# Patient Record
Sex: MALE | Race: WHITE | NOT HISPANIC OR LATINO | Employment: OTHER | URBAN - METROPOLITAN AREA
[De-identification: names, ages, dates, MRNs, and addresses within clinical notes are randomized per-mention and may not be internally consistent; named-entity substitution may affect disease eponyms.]

---

## 2017-01-09 ENCOUNTER — LAB CONVERSION - ENCOUNTER (OUTPATIENT)
Dept: OTHER | Facility: OTHER | Age: 62
End: 2017-01-09

## 2017-01-09 LAB
CHOLEST SERPL-MCNC: 162 MG/DL (ref 125–200)
CHOLEST/HDLC SERPL: 2.3 (CALC)
HBA1C MFR BLD HPLC: 5.3 % OF TOTAL HGB
HDLC SERPL-MCNC: 70 MG/DL
LDL CHOLESTEROL (HISTORICAL): 85 MG/DL (CALC)
NON-HDL-CHOL (CHOL-HDL) (HISTORICAL): 92 MG/DL (CALC)
TRIGL SERPL-MCNC: 36 MG/DL

## 2017-01-26 ENCOUNTER — ALLSCRIPTS OFFICE VISIT (OUTPATIENT)
Dept: OTHER | Facility: OTHER | Age: 62
End: 2017-01-26

## 2017-02-02 ENCOUNTER — ALLSCRIPTS OFFICE VISIT (OUTPATIENT)
Dept: OTHER | Facility: OTHER | Age: 62
End: 2017-02-02

## 2017-03-06 ENCOUNTER — TRANSCRIBE ORDERS (OUTPATIENT)
Dept: ADMINISTRATIVE | Facility: HOSPITAL | Age: 62
End: 2017-03-06

## 2017-03-06 ENCOUNTER — ALLSCRIPTS OFFICE VISIT (OUTPATIENT)
Dept: OTHER | Facility: OTHER | Age: 62
End: 2017-03-06

## 2017-03-06 DIAGNOSIS — I34.1 NONRHEUMATIC MITRAL VALVE PROLAPSE: ICD-10-CM

## 2017-03-06 DIAGNOSIS — I34.1 MITRAL VALVE PROLAPSE: Primary | ICD-10-CM

## 2017-03-27 ENCOUNTER — HOSPITAL ENCOUNTER (OUTPATIENT)
Dept: NON INVASIVE DIAGNOSTICS | Facility: CLINIC | Age: 62
Discharge: HOME | End: 2017-03-27
Payer: COMMERCIAL

## 2017-03-27 DIAGNOSIS — I34.1 NONRHEUMATIC MITRAL VALVE PROLAPSE: ICD-10-CM

## 2017-03-27 PROCEDURE — 93306 TTE W/DOPPLER COMPLETE: CPT

## 2017-07-30 LAB
A/G RATIO (HISTORICAL): 2.1 (CALC) (ref 1–2.5)
ALBUMIN SERPL BCP-MCNC: 4.6 G/DL (ref 3.6–5.1)
ALP SERPL-CCNC: 46 U/L (ref 40–115)
ALT SERPL W P-5'-P-CCNC: 30 U/L (ref 9–46)
AST SERPL W P-5'-P-CCNC: 26 U/L (ref 10–35)
BILIRUB SERPL-MCNC: 0.7 MG/DL (ref 0.2–1.2)
BUN SERPL-MCNC: 12 MG/DL (ref 7–25)
BUN/CREA RATIO (HISTORICAL): NORMAL (CALC) (ref 6–22)
CALCIUM SERPL-MCNC: 9.6 MG/DL (ref 8.6–10.3)
CHLORIDE SERPL-SCNC: 104 MMOL/L (ref 98–110)
CHOLEST SERPL-MCNC: 167 MG/DL (ref 125–200)
CHOLEST/HDLC SERPL: 2.3 (CALC)
CO2 SERPL-SCNC: 25 MMOL/L (ref 20–31)
CREAT SERPL-MCNC: 0.87 MG/DL (ref 0.7–1.25)
EGFR AFRICAN AMERICAN (HISTORICAL): 106 ML/MIN/1.73M2
EGFR-AMERICAN CALC (HISTORICAL): 91 ML/MIN/1.73M2
GAMMA GLOBULIN (HISTORICAL): 2.2 G/DL (CALC) (ref 1.9–3.7)
GLUCOSE (HISTORICAL): 95 MG/DL (ref 65–99)
HDLC SERPL-MCNC: 72 MG/DL
LDL CHOLESTEROL (HISTORICAL): 90 MG/DL (CALC)
NON-HDL-CHOL (CHOL-HDL) (HISTORICAL): 95 MG/DL (CALC)
POTASSIUM SERPL-SCNC: 4.5 MMOL/L (ref 3.5–5.3)
SODIUM SERPL-SCNC: 137 MMOL/L (ref 135–146)
TOTAL PROTEIN (HISTORICAL): 6.8 G/DL (ref 6.1–8.1)
TRIGL SERPL-MCNC: 27 MG/DL

## 2017-08-02 DIAGNOSIS — L65.9 NONSCARRING HAIR LOSS: ICD-10-CM

## 2017-08-02 DIAGNOSIS — M23.8X9 OTHER INTERNAL DERANGEMENTS OF UNSPECIFIED KNEE: ICD-10-CM

## 2017-08-02 DIAGNOSIS — I10 ESSENTIAL (PRIMARY) HYPERTENSION: ICD-10-CM

## 2017-08-02 DIAGNOSIS — M43.6 TORTICOLLIS: ICD-10-CM

## 2017-08-15 ENCOUNTER — ALLSCRIPTS OFFICE VISIT (OUTPATIENT)
Dept: OTHER | Facility: OTHER | Age: 62
End: 2017-08-15

## 2017-08-17 ENCOUNTER — GENERIC CONVERSION - ENCOUNTER (OUTPATIENT)
Dept: OTHER | Facility: OTHER | Age: 62
End: 2017-08-17

## 2017-08-24 ENCOUNTER — LAB CONVERSION - ENCOUNTER (OUTPATIENT)
Dept: OTHER | Facility: OTHER | Age: 62
End: 2017-08-24

## 2017-08-24 ENCOUNTER — GENERIC CONVERSION - ENCOUNTER (OUTPATIENT)
Dept: OTHER | Facility: OTHER | Age: 62
End: 2017-08-24

## 2017-08-24 LAB
FERRITIN SERPL-MCNC: 11 NG/ML (ref 20–380)
IRON SERPL-MCNC: 80 MCG/DL (ref 50–180)
TSH SERPL DL<=0.05 MIU/L-ACNC: 0.89 MIU/L (ref 0.4–4.5)

## 2017-10-21 ENCOUNTER — GENERIC CONVERSION - ENCOUNTER (OUTPATIENT)
Dept: OTHER | Facility: OTHER | Age: 62
End: 2017-10-21

## 2017-10-21 ENCOUNTER — LAB CONVERSION - ENCOUNTER (OUTPATIENT)
Dept: OTHER | Facility: OTHER | Age: 62
End: 2017-10-21

## 2017-10-21 LAB
FERRITIN SERPL-MCNC: 19 NG/ML (ref 20–380)
IRON SERPL-MCNC: 92 MCG/DL (ref 50–180)

## 2017-11-14 ENCOUNTER — GENERIC CONVERSION - ENCOUNTER (OUTPATIENT)
Dept: OTHER | Facility: OTHER | Age: 62
End: 2017-11-14

## 2017-11-14 DIAGNOSIS — R79.0 ABNORMAL LEVEL OF BLOOD MINERAL: ICD-10-CM

## 2018-01-11 NOTE — RESULT NOTES
Message     notify the patient labs were normal except a low ferritin level have the patient start Slow Fe over-the-counter 1 tablet daily and recheck the CBC and ferritin and 1 month also the patient should see GI Dr Ermias Morris for a colonoscopy and f/u to discuss        Verified Results  (1) OP LUIS ALFREDO FERRITIN [id number] 09:36AM Paulina Staples     Test Name Result Flag Reference   FERRITIN 11 ng/mL L        Signatures   Electronically signed by : Kirby Ashraf DO; Aug 24 2017  9:25AM EST                       (Author)

## 2018-01-11 NOTE — RESULT NOTES
Message   Notify the patient normal CBC, normal iron level and improved ferritin level which is only slightly low at this point in time, is the patient still taking iron, if so how much and how often        Verified Results  (1) IRON 20Oct2017 09:00AM Karuna Chahal     Test Name Result Flag Reference   IRON, TOTAL 92 mcg/dL       (Q) CBC (H/H, RBC, INDICES, WBC, PLT) 20Oct2017 09:00AM Karuna Chahal     Test Name Result Flag Reference   WHITE BLOOD CELL COUNT 6 4 Thousand/uL  3 8-10 8   RED BLOOD CELL COUNT 5 53 Million/uL  4 20-5 80   HEMOGLOBIN 15 7 g/dL  13 2-17 1   HEMATOCRIT 48 0 %  38 5-50 0   MCV 86 8 fL  80 0-100 0   MCH 28 4 pg  27 0-33 0   MCHC 32 7 g/dL  32 0-36 0   RDW 13 8 %  11 0-15 0   PLATELET COUNT 188 Thousand/uL  140-400   WE RECEIVED YOUR HANDWRITTEN TEST ORDER AND  PERFORMED A HEMOGRAM WITH A PLATELET WITHOUT  A DIFFERENTIAL  IF THIS IS NOT WHAT YOU INTENDED  TO ORDER, PLEASE CONTACT YOUR LOCAL CLIENT SERVICE  REPRESENTATIVE IMMEDIATELY SO THAT WE CAN   ADJUST OUR BILLING APPROPRIATELY  YOU MAY ALSO   INQUIRE ABOUT ALTERNATIVE OR ADDITIONAL TESTING     MPV 10 1 fL  7 5-12 5           (1) OP LUIS ALFREDO FERRITIN [id number] 09:00AM Karuna Chahal   REPORT COMMENT:  FASTING:NO     Test Name Result Flag Reference   FERRITIN 19 ng/mL L

## 2018-01-13 VITALS
SYSTOLIC BLOOD PRESSURE: 168 MMHG | HEIGHT: 67 IN | WEIGHT: 168 LBS | DIASTOLIC BLOOD PRESSURE: 80 MMHG | HEART RATE: 88 BPM | BODY MASS INDEX: 26.37 KG/M2

## 2018-01-14 VITALS
DIASTOLIC BLOOD PRESSURE: 82 MMHG | BODY MASS INDEX: 26.84 KG/M2 | HEART RATE: 78 BPM | HEIGHT: 67 IN | SYSTOLIC BLOOD PRESSURE: 154 MMHG | WEIGHT: 171 LBS

## 2018-01-14 VITALS
SYSTOLIC BLOOD PRESSURE: 176 MMHG | BODY MASS INDEX: 26.69 KG/M2 | HEART RATE: 85 BPM | HEIGHT: 67 IN | RESPIRATION RATE: 16 BRPM | WEIGHT: 170.03 LBS | DIASTOLIC BLOOD PRESSURE: 76 MMHG

## 2018-01-14 VITALS
OXYGEN SATURATION: 99 % | BODY MASS INDEX: 26.22 KG/M2 | SYSTOLIC BLOOD PRESSURE: 168 MMHG | WEIGHT: 167.04 LBS | RESPIRATION RATE: 16 BRPM | DIASTOLIC BLOOD PRESSURE: 84 MMHG | HEIGHT: 67 IN | HEART RATE: 67 BPM

## 2018-01-19 ENCOUNTER — ANESTHESIA EVENT (OUTPATIENT)
Dept: PERIOP | Facility: AMBULARY SURGERY CENTER | Age: 63
End: 2018-01-19
Payer: COMMERCIAL

## 2018-01-22 VITALS
WEIGHT: 169 LBS | DIASTOLIC BLOOD PRESSURE: 70 MMHG | HEART RATE: 86 BPM | BODY MASS INDEX: 26.47 KG/M2 | TEMPERATURE: 97.4 F | SYSTOLIC BLOOD PRESSURE: 166 MMHG

## 2018-01-26 DIAGNOSIS — I10 ESSENTIAL (PRIMARY) HYPERTENSION: ICD-10-CM

## 2018-01-26 DIAGNOSIS — N40.2 NODULAR PROSTATE WITHOUT LOWER URINARY TRACT SYMPTOMS: ICD-10-CM

## 2018-01-30 ENCOUNTER — ANESTHESIA (OUTPATIENT)
Dept: PERIOP | Facility: AMBULARY SURGERY CENTER | Age: 63
End: 2018-01-30
Payer: COMMERCIAL

## 2018-02-04 LAB
ALBUMIN SERPL-MCNC: 4.5 G/DL (ref 3.6–5.1)
ALBUMIN/GLOB SERPL: 2.3 (CALC) (ref 1–2.5)
ALP SERPL-CCNC: 49 U/L (ref 40–115)
ALT SERPL-CCNC: 23 U/L (ref 9–46)
AST SERPL-CCNC: 20 U/L (ref 10–35)
BILIRUB SERPL-MCNC: 0.9 MG/DL (ref 0.2–1.2)
BUN SERPL-MCNC: 15 MG/DL (ref 7–25)
BUN/CREAT SERPL: NORMAL (CALC) (ref 6–22)
CALCIUM SERPL-MCNC: 9.5 MG/DL (ref 8.6–10.3)
CHLORIDE SERPL-SCNC: 103 MMOL/L (ref 98–110)
CHOLEST SERPL-MCNC: 163 MG/DL
CHOLEST/HDLC SERPL: 3 (CALC)
CO2 SERPL-SCNC: 27 MMOL/L (ref 20–31)
CREAT SERPL-MCNC: 0.89 MG/DL (ref 0.7–1.25)
GLOBULIN SER CALC-MCNC: 2 G/DL (CALC) (ref 1.9–3.7)
GLUCOSE SERPL-MCNC: 92 MG/DL (ref 65–99)
HDLC SERPL-MCNC: 54 MG/DL
LDLC SERPL CALC-MCNC: 96 MG/DL (CALC)
NONHDLC SERPL-MCNC: 109 MG/DL (CALC)
POTASSIUM SERPL-SCNC: 4.4 MMOL/L (ref 3.5–5.3)
PROT SERPL-MCNC: 6.5 G/DL (ref 6.1–8.1)
PSA SERPL-MCNC: 0.5 NG/ML
SL AMB EGFR AFRICAN AMERICAN: 105 ML/MIN/1.73M2
SL AMB EGFR NON AFRICAN AMERICAN: 90 ML/MIN/1.73M2
SODIUM SERPL-SCNC: 138 MMOL/L (ref 135–146)
TRIGL SERPL-MCNC: 44 MG/DL

## 2018-02-08 RX ORDER — MULTIVITAMIN
1 TABLET ORAL DAILY
COMMUNITY

## 2018-02-08 RX ORDER — EPINEPHRINE 0.3 MG/.3ML
0.3 INJECTION SUBCUTANEOUS
COMMUNITY
Start: 2013-12-03 | End: 2018-02-12

## 2018-02-08 RX ORDER — LISINOPRIL 20 MG/1
1 TABLET ORAL DAILY
COMMUNITY
Start: 2011-08-22 | End: 2018-03-16 | Stop reason: SDUPTHER

## 2018-02-08 RX ORDER — VERAPAMIL HYDROCHLORIDE 240 MG/1
1 TABLET, FILM COATED, EXTENDED RELEASE ORAL DAILY
COMMUNITY
Start: 2012-04-23 | End: 2018-03-16 | Stop reason: SDUPTHER

## 2018-02-08 RX ORDER — FEXOFENADINE HCL 180 MG/1
1 TABLET ORAL DAILY PRN
COMMUNITY
Start: 2014-06-10

## 2018-02-08 RX ORDER — ASPIRIN 81 MG/1
1 TABLET, CHEWABLE ORAL EVERY OTHER DAY
COMMUNITY

## 2018-02-08 RX ORDER — UBIDECARENONE/VIT E ACET 100MG-5
1 CAPSULE ORAL DAILY
COMMUNITY
End: 2019-09-16 | Stop reason: ALTCHOICE

## 2018-02-08 RX ORDER — BACLOFEN 20 MG
6 TABLET ORAL DAILY
COMMUNITY

## 2018-02-09 ENCOUNTER — HOSPITAL ENCOUNTER (OUTPATIENT)
Facility: AMBULARY SURGERY CENTER | Age: 63
Setting detail: OUTPATIENT SURGERY
Discharge: HOME | End: 2018-02-09
Attending: INTERNAL MEDICINE | Admitting: INTERNAL MEDICINE
Payer: COMMERCIAL

## 2018-02-09 VITALS
SYSTOLIC BLOOD PRESSURE: 129 MMHG | WEIGHT: 160 LBS | TEMPERATURE: 97.4 F | HEART RATE: 69 BPM | RESPIRATION RATE: 18 BRPM | DIASTOLIC BLOOD PRESSURE: 69 MMHG | BODY MASS INDEX: 24.25 KG/M2 | OXYGEN SATURATION: 99 % | HEIGHT: 68 IN

## 2018-02-09 PROCEDURE — G0121 COLON CA SCRN NOT HI RSK IND: HCPCS | Performed by: INTERNAL MEDICINE

## 2018-02-09 RX ORDER — SODIUM CHLORIDE 9 MG/ML
125 INJECTION, SOLUTION INTRAVENOUS CONTINUOUS
Status: DISCONTINUED | OUTPATIENT
Start: 2018-02-09 | End: 2018-02-09 | Stop reason: HOSPADM

## 2018-02-09 RX ORDER — PROPOFOL 10 MG/ML
INJECTION, EMULSION INTRAVENOUS AS NEEDED
Status: DISCONTINUED | OUTPATIENT
Start: 2018-02-09 | End: 2018-02-09 | Stop reason: SURG

## 2018-02-09 RX ORDER — ALBUTEROL SULFATE 90 UG/1
2 AEROSOL, METERED RESPIRATORY (INHALATION) EVERY 6 HOURS PRN
COMMUNITY
End: 2018-02-12

## 2018-02-09 RX ADMIN — PROPOFOL 50 MG: 10 INJECTION, EMULSION INTRAVENOUS at 11:45

## 2018-02-09 RX ADMIN — PROPOFOL 100 MG: 10 INJECTION, EMULSION INTRAVENOUS at 11:32

## 2018-02-09 RX ADMIN — PROPOFOL 50 MG: 10 INJECTION, EMULSION INTRAVENOUS at 11:42

## 2018-02-09 RX ADMIN — SODIUM CHLORIDE 125 ML/HR: 0.9 INJECTION, SOLUTION INTRAVENOUS at 10:49

## 2018-02-09 RX ADMIN — SODIUM CHLORIDE: 0.9 INJECTION, SOLUTION INTRAVENOUS at 11:29

## 2018-02-09 RX ADMIN — PROPOFOL 50 MG: 10 INJECTION, EMULSION INTRAVENOUS at 11:38

## 2018-02-09 RX ADMIN — PROPOFOL 50 MG: 10 INJECTION, EMULSION INTRAVENOUS at 11:36

## 2018-02-09 NOTE — ANESTHESIA POSTPROCEDURE EVALUATION
Post-Op Assessment Note      CV Status:  Stable    Mental Status:  Alert and awake    Hydration Status:  Stable and euvolemic    PONV Controlled:  Controlled    Airway Patency:  Patent and adequate    Post Op Vitals Reviewed: Yes          Staff: CRNA           BP 91/63 (02/09/18 1149)    Temp      Pulse 63 (02/09/18 1149)   Resp 20 (02/09/18 1149)    SpO2 99 % (02/09/18 1149)

## 2018-02-09 NOTE — DISCHARGE INSTRUCTIONS
Discharge home  Resume regular diet  Resume home medications  Repeat colonoscopy in 1 to 2 years with a 2 day bowel prep  Call with any abdominal pain, bleeding, fevers    Colonoscopy   WHAT YOU NEED TO KNOW:   A colonoscopy is a procedure to examine the inside of your colon (intestine) with a scope  Polyps or tissue growths may have been removed during your colonoscopy  It is normal to feel bloated and to have some abdominal discomfort  You should be passing gas  If you have hemorrhoids or you had polyps removed, you may have a small amount of bleeding         DISCHARGE INSTRUCTIONS:   Seek care immediately if:   · You have a large amount of bright red blood in your bowel movements      · Your abdomen is hard and firm and you have severe pain      · You have sudden trouble breathing  Contact your healthcare provider if:   · You develop a rash or hives      · You have a fever within 24 hours of your procedure      · You have not had a bowel movement for 3 days after your procedure      · You have questions or concerns about your condition or care  Activity:   · Do not lift, strain, or run for 3 days after your procedure      · Rest after your procedure  You have been given medicine to relax you  Do not drive or make important decisions until the day after your procedure  Return to your normal activity as directed      · Relieve gas and discomfort from bloating by lying on your right side with a heating pad on your abdomen  You may need to take short walks to help the gas move out  Eat small meals until bloating is relieved  If you had polyps removed: For 7 days after your procedure:  · Do not take aspirin      · Do not go on long car rides  Help prevent constipation:   · Eat a variety of healthy foods  Healthy foods include fruit, vegetables, whole-grain breads, low-fat dairy products, beans, lean meat, and fish  Ask if you need to be on a special diet   Your healthcare provider may recommend that you eat high-fiber foods such as cooked beans  Fiber helps you have regular bowel movements      · Drink liquids as directed  Adults should drink between 9 and 13 eight-ounce cups of liquid every day  Ask what amount is best for you  For most people, good liquids to drink are water, juice, and milk       · Exercise as directed  Talk to your healthcare provider about the best exercise plan for you  Exercise can help prevent constipation, decrease your blood pressure and improve your health  Follow up with your healthcare provider as directed: Write down your questions so you remember to ask them during your visits  © 2017 [facility] Information is for End User's use only and may not be sold, redistributed or otherwise used for commercial purposes  All illustrations and images included in CareNotes® are the copyrighted property of A D A M , Inc  or [employer] Jacobsen  The above information is an  only  It is not intended as medical advice for individual conditions or treatments   Talk to your doctor, nurse or pharmacist before following any medical regimen to see if it is safe and effective for you

## 2018-02-09 NOTE — OP NOTE
COLONOSCOPY    PROCEDURE: Colonoscopy    INDICATIONS: low iron levels    POST-OP DIAGNOSIS: See the impression below    SEDATION: Monitored anesthesia care, check anesthesia records    PHYSICAL EXAM:    BP 91/63   Pulse 63   Temp 98 7 °F (37 1 °C)   Resp 20   Ht 5' 8" (1 727 m)   Wt 72 6 kg (160 lb)   SpO2 99%   BMI 24 33 kg/m²   Body mass index is 24 33 kg/m²  General: NAD  Heart: S1 & S2 normal, RRR  Lungs: CTA, No rales or rhonchi  Abdomen: Soft, nontender, nondistended, good bowel sounds    CONSENT:  Informed consent was obtained for the procedure, including sedation after explaining the risks and benefits of the procedure  Risks including but not limited to bleeding, perforation, infection, aspiration were discussed in detail  Also explained about less than 100%$ sensitivity with the exam and other alternatives  PREPARATION:   EKG tracing, pulse oximetry, blood pressure were monitored throughout the procedure  Patient was identified by myself both verbally and by visual inspection of ID band  DESCRIPTION:   Patient was placed in the left lateral decubitus position and was sedated with the above medication  Digital rectal examination was performed  The colonoscope was introduced in to the anal canal and advanced up to cecum, which was identified by the appendiceal orifice and IC valve  A careful inspection was made as the colonoscope was withdrawn, including a retroflexed view of the rectum; findings and interventions are described below  Appropriate photodocumentation was obtained  The quality of the colonic preparation was poor      FINDINGS:    Poor prep with retained seeds and nuts throughout the colon making visualization very difficult despite adequate irrigation  Moderate internal hemorrhoids on retroflexion  No large polyps or lesions were noted         IMPRESSIONS:      Normal colonoscopy, within limits of a poor prep  Hemorrhoids on retroflexion  Smaller lesions, flat lesions could have been missed    RECOMMENDATIONS:    Discharge home  Resume regular diet  Resume home medications  Repeat colonoscopy in 1 to 2 years with a 2 day bowel prep  Call with any abdominal pain, bleeding, fevers    COMPLICATIONS:  None; patient tolerated the procedure well      DISPOSITION: PACU           CONDITION: Stable

## 2018-02-09 NOTE — ANESTHESIA PREPROCEDURE EVALUATION
Review of Systems/Medical History  Patient summary reviewed  Chart reviewed  No history of anesthetic complications     Cardiovascular  Exercise tolerance: good,  Hypertension ,    Pulmonary  Asthma: ,        GI/Hepatic            Endo/Other  History of thyroid disease , hypothyroidism,      GYN       Hematology   Musculoskeletal       Neurology   [place] [address]  [address]  [phone]     Transthoracic Echocardiogram  2D, M-mode, Doppler, and Color Doppler     Study date:  27-Mar-2017     Patient: Brijesh Calle  MR number: [id number]  Account number: [de-identified]  : 1955  Age: 59 years  Gender: Male  Status: Outpatient  [facility] [address] [place] [address] [facility]  Height: 67 in  Weight: 170 7 lb  BP: 154/ 82 mmHg     Indications: Mitral valve prolapse     Diagnoses: I34 1 - Nonrheumatic mitral (valve) prolapse     Sonographer:  Virgil Painting BS, RDCS, RVT, RDMS  Primary Physician:  Anthony Hilton DO  Referring Physician:  Donald Flower DO  Group:  [facility]  Interpreting Physician:  Stefanie Soto MD     SUMMARY     LEFT VENTRICLE:  Size was normal   Systolic function was normal  Ejection fraction was estimated to be 55 %  Wall thickness was normal   Left ventricular diastolic function parameters were normal      RIGHT VENTRICLE:  The size was normal   Systolic function was normal      MITRAL VALVE:  There was mild regurgitation      AORTIC VALVE:  There was trace regurgitation      TRICUSPID VALVE:  There was trace regurgitation      COMPARISONS:  There has been no significant interval change  Comparison was made with the previous study of 2011      Physical Exam    Airway    Mallampati score: II  TM Distance: >3 FB       Dental       Cardiovascular      Pulmonary      Other Findings        Anesthesia Plan  ASA Score- 2     Anesthesia Type- IV sedation with anesthesia with ASA Monitors  Additional Monitors:   Airway Plan:     Comment: FLORENCIO Main , have personally seen and evaluated the patient prior to anesthetic care  I have reviewed the pre-anesthetic record, and other medical records if appropriate to the anesthetic care  If a CRNA is involved in the case, I have reviewed the CRNA assessment, if present, and agree  Risks/benefits and alternatives discussed with patient including possible PONV, sore throat, and possibility of rare anesthetic and surgical emergencies        Plan Factors-Patient not instructed to abstain from smoking on day of procedure  Patient did not smoke on day of surgery  Induction-     Postoperative Plan-     Informed Consent- Anesthetic plan and risks discussed with patient  I personally reviewed this patient with the CRNA  Discussed and agreed on the Anesthesia Plan with the CRNA  Keisha Cortez

## 2018-02-09 NOTE — H&P
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     PAC (premature atrial contraction)     PVC (premature ventricular contraction)     Thyroid nodule      Past Surgical History:   Procedure Laterality Date    HAND SURGERY      PROSTATE BIOPSY      SHOULDER SURGERY       Social History   History   Alcohol Use    4 2 oz/week    7 Glasses of wine per week     Comment: socially     History   Drug Use No     History   Smoking Status    Never Smoker   Smokeless Tobacco    Never Used     History reviewed  No pertinent family history  Meds/Allergies     Prescriptions Prior to Admission   Medication    fexofenadine (ALLEGRA) 180 MG tablet    lisinopril (ZESTRIL) 20 mg tablet    verapamil (CALAN-SR) 240 mg CR tablet    albuterol (PROVENTIL HFA,VENTOLIN HFA) 90 mcg/act inhaler    aspirin 81 mg chewable tablet    Docosahexaenoic Acid (DHA OMEGA 3) 100 MG CAPS    EPINEPHrine (EPIPEN 2-CHAI) 0 3 mg/0 3 mL SOAJ    Misc Natural Products (PROSTATE HEALTH) CAPS    Multiple Vitamin tablet    Resveratrol 50 MG CAPS       No Known Allergies    Objective     Blood pressure (!) 188/95, pulse 75, temperature 98 7 °F (37 1 °C), resp  rate 18, height 5' 8" (1 727 m), weight 72 6 kg (160 lb), SpO2 99 %  PHYSICAL EXAM    Gen: NAD  CV: RRR  CHEST: Clear  ABD: soft, NT/ND  EXT: no edema  Neuro: AAO      ASSESSMENT/PLAN:  This is a 57y o  year old male here for  low iron, last colonosocpy 8 years prior         PLAN:   Procedure: colonoscopy

## 2018-02-12 ENCOUNTER — OFFICE VISIT (OUTPATIENT)
Dept: INTERNAL MEDICINE CLINIC | Facility: CLINIC | Age: 63
End: 2018-02-12
Payer: COMMERCIAL

## 2018-02-12 VITALS
BODY MASS INDEX: 26.18 KG/M2 | OXYGEN SATURATION: 99 % | DIASTOLIC BLOOD PRESSURE: 84 MMHG | WEIGHT: 166.8 LBS | HEIGHT: 67 IN | HEART RATE: 75 BPM | SYSTOLIC BLOOD PRESSURE: 124 MMHG | RESPIRATION RATE: 16 BRPM

## 2018-02-12 DIAGNOSIS — D50.9 IRON DEFICIENCY ANEMIA, UNSPECIFIED IRON DEFICIENCY ANEMIA TYPE: Primary | ICD-10-CM

## 2018-02-12 DIAGNOSIS — Z91.012 EGG ALLERGY: ICD-10-CM

## 2018-02-12 DIAGNOSIS — I10 ESSENTIAL HYPERTENSION: ICD-10-CM

## 2018-02-12 PROCEDURE — 99214 OFFICE O/P EST MOD 30 MIN: CPT | Performed by: INTERNAL MEDICINE

## 2018-02-12 PROCEDURE — 3074F SYST BP LT 130 MM HG: CPT | Performed by: INTERNAL MEDICINE

## 2018-02-12 PROCEDURE — 3079F DIAST BP 80-89 MM HG: CPT | Performed by: INTERNAL MEDICINE

## 2018-02-12 RX ORDER — AMOXICILLIN 500 MG/1
CAPSULE ORAL
COMMUNITY
Start: 2018-01-03 | End: 2018-08-13 | Stop reason: ALTCHOICE

## 2018-02-12 NOTE — PROGRESS NOTES
Assessment/Plan:    No problem-specific Assessment & Plan notes found for this encounter  Diagnoses and all orders for this visit:    Iron deficiency anemia, unspecified iron deficiency anemia type  Comments:  Increase iron in the diet, decreased blood donation to every 4 months the patient plans to go for blood donation in the near future will recheck CBC  Orders:  -     CBC (Includes Diff/Plt) (Refl); Future  -     Ferritin; Future    Essential hypertension  Comments: With white coat hypertension superimposed currently stable  Orders:  -     Comprehensive metabolic panel; Future  -     Lipid Panel with Direct LDL reflex; Future    Egg allergy  Comments: The patient is interested in receiving the flu vaccine but he has a allergy I will have the patient see an allergist for flu shot cell culture  Orders:  -     Ambulatory referral to Allergy; Future        Assessment and plan 1  Hypertension - controlled, I have counseled patient following healthy imbalance diet, I would like the patient reduce sodium, exercise routinely, I would like the patient continued the med current medical regiment and we will continue to monitor the progress  2   Egg allergy patient is very interested in a flu vaccine this year I would like the patient see an allergist for consideration of cell culture flu shot 3  Iron deficiency anemia patient did undergo GI workup we did review the colonoscopy which did show poor prep he will need a repeat in several years he is currently under care of GI no major problems were elucidated I did review the report with the patient  Patient is up-to-date on his vaccines with the exception of flu vaccine which she will see the allergist, up-to-date on colonoscopy and will be seeing Neurology for prostate examination I did review the PSA which is stable  I did review the laboratories with the patient  RTO 6 months call if any problems  Subjective:      Patient ID: Gina Hope is a 57 y o  male     HPI 60-year old male coming in for a follow up visit regarding iron deficiency anemia, hypertension, egg allergy; The patient reports me compliant taking medications without untoward side effects the  The patient is here to review his medical condition, update me on the medical condition and the patient reports me no hospitalizations and no ER visits  Patient did not have a flu shot at this point in time because of his egg allergy but because of the increased incidence of flu this year he is interested to receive one  He is trying to follow healthy imbalance diet, he is less active with the winter months with plans to become more active during the summer and spring  Had the colonoscopy and poor prep ; the pt reports he had given blood prior to the prior last test   The following portions of the patient's history were reviewed and updated as appropriate: allergies, current medications, past medical history, past social history, past surgical history and problem list     Review of Systems   Constitutional: Negative for activity change, appetite change, chills, fever and unexpected weight change  HENT: Negative for hearing loss and postnasal drip  Eyes: Negative for pain  Respiratory: Negative for cough and shortness of breath  Cardiovascular: Negative for chest pain and palpitations  Gastrointestinal: Negative for abdominal distention, abdominal pain, diarrhea, nausea and vomiting  Neurological: Negative for dizziness (mild with change in position), light-headedness and headaches  Psychiatric/Behavioral: Negative for suicidal ideas  The patient is nervous/anxious (only coming into the office)  Objective:    Vitals:    02/12/18 0808   BP: 124/84   Pulse: 75   Resp: 16   SpO2: 99%                     Return in about 6 months (around 8/12/2018)        No Known Allergies    Past Medical History:   Diagnosis Date    Asthma     Hypertension     Mitral valve disorder     Mitral valve prolapse     Murmur, cardiac     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     PAC (premature atrial contraction)     PVC (premature ventricular contraction)     Thyroid nodule      Past Surgical History:   Procedure Laterality Date    HAND SURGERY      HI COLONOSCOPY FLX DX W/COLLJ SPEC WHEN PFRMD N/A 2/9/2018    Procedure: COLONOSCOPY;  Surgeon: Romeo Luis MD;  [facility]; Service: Gastroenterology    PROSTATE BIOPSY      SHOULDER SURGERY       Current Outpatient Prescriptions on File Prior to Visit   Medication Sig Dispense Refill    aspirin 81 mg chewable tablet Chew 1 tablet every other day      Docosahexaenoic Acid (DHA OMEGA 3) 100 MG CAPS Take 6 capsules by mouth daily      fexofenadine (ALLEGRA) 180 MG tablet Take 1 tablet by mouth daily as needed      lisinopril (ZESTRIL) 20 mg tablet Take 1 tablet by mouth daily      Misc Natural Products (PROSTATE HEALTH) CAPS Take 1 capsule by mouth daily      Multiple Vitamin tablet Take 1 tablet by mouth daily      Resveratrol 50 MG CAPS Take 1 capsule by mouth daily      verapamil (CALAN-SR) 240 mg CR tablet Take 1 tablet by mouth daily      albuterol (PROVENTIL HFA,VENTOLIN HFA) 90 mcg/act inhaler Inhale 2 puffs every 6 (six) hours as needed for wheezing      EPINEPHrine (EPIPEN 2-CHAI) 0 3 mg/0 3 mL SOAJ Inject 0 3 mL as directed       No current facility-administered medications on file prior to visit  History reviewed  No pertinent family history  Social History     Social History    Marital status: /Civil Union     Spouse name: N/A    Number of children: N/A    Years of education: N/A     Occupational History    Not on file       Social History Main Topics    Smoking status: Never Smoker    Smokeless tobacco: Never Used    Alcohol use 4 2 oz/week     7 Glasses of wine per week      Comment: socially    Drug use: No    Sexual activity: Not on file     Other Topics Concern    Not on file     Social History Narrative    No narrative on file     Vitals:    02/12/18 0808   BP: 124/84   BP Location: Left arm   Patient Position: Sitting   Cuff Size: Standard   Pulse: 75   Resp: 16   SpO2: 99%   Weight: 75 7 kg (166 lb 12 8 oz)   Height: 5' 7" (1 702 m)     Results for orders placed or performed in visit on 02/03/18   PSA   Result Value Ref Range    Prostate Specific Antigen Total 0 5 < OR = 4 0 ng/mL     Weight (last 2 days)     Date/Time   Weight    02/12/18 0808  75 7 (166 8)            Body mass index is 26 12 kg/m²  /84 (02/12/18 0808)    Temp      Pulse 75 (02/12/18 0808)   Resp 16 (02/12/18 0808)    SpO2 99 % (02/12/18 8425)      Vitals:    02/12/18 0808   Weight: 75 7 kg (166 lb 12 8 oz)     Vitals:    02/12/18 0808   Weight: 75 7 kg (166 lb 12 8 oz)      Physical Exam   Constitutional: He appears well-developed and well-nourished  No distress  HENT:   Head: Normocephalic and atraumatic  Right Ear: External ear normal    Left Ear: External ear normal    Mouth/Throat: Oropharynx is clear and moist    Eyes: Conjunctivae are normal  Pupils are equal, round, and reactive to light  Right eye exhibits no discharge  Left eye exhibits no discharge  No scleral icterus  Neck: Neck supple  Cardiovascular: Normal rate, regular rhythm and normal heart sounds  Exam reveals no gallop and no friction rub  No murmur heard  Pulmonary/Chest: No respiratory distress  He has no wheezes  He has no rales  Abdominal: Soft  Bowel sounds are normal  He exhibits no distension and no mass  There is no tenderness  There is no rebound and no guarding  Musculoskeletal: He exhibits no edema or deformity  Lymphadenopathy:     He has no cervical adenopathy  Neurological: He is alert  Skin: He is not diaphoretic  Psychiatric: He has a normal mood and affect

## 2018-02-14 ENCOUNTER — OFFICE VISIT (OUTPATIENT)
Dept: UROLOGY | Facility: AMBULATORY SURGERY CENTER | Age: 63
End: 2018-02-14
Payer: COMMERCIAL

## 2018-02-14 VITALS
HEART RATE: 78 BPM | SYSTOLIC BLOOD PRESSURE: 130 MMHG | BODY MASS INDEX: 26.21 KG/M2 | DIASTOLIC BLOOD PRESSURE: 80 MMHG | HEIGHT: 67 IN | WEIGHT: 167 LBS

## 2018-02-14 DIAGNOSIS — N40.2 PROSTATE NODULE: Primary | ICD-10-CM

## 2018-02-14 DIAGNOSIS — N40.2 NODULAR PROSTATE WITHOUT LOWER URINARY TRACT SYMPTOMS: ICD-10-CM

## 2018-02-14 LAB
SL AMB  POCT GLUCOSE, UA: NORMAL
SL AMB LEUKOCYTE ESTERASE,UA: NORMAL
SL AMB POCT BILIRUBIN,UA: NORMAL
SL AMB POCT BLOOD,UA: NORMAL
SL AMB POCT CLARITY,UA: NORMAL
SL AMB POCT COLOR,UA: YELLOW
SL AMB POCT KETONES,UA: NORMAL
SL AMB POCT NITRITE,UA: NORMAL
SL AMB POCT PH,UA: 5
SL AMB POCT SPECIFIC GRAVITY,UA: 1.02
SL AMB POCT URINE PROTEIN: NORMAL
SL AMB POCT UROBILINOGEN: NORMAL

## 2018-02-14 PROCEDURE — 81002 URINALYSIS NONAUTO W/O SCOPE: CPT | Performed by: NURSE PRACTITIONER

## 2018-02-14 PROCEDURE — 99213 OFFICE O/P EST LOW 20 MIN: CPT | Performed by: NURSE PRACTITIONER

## 2018-02-14 NOTE — PROGRESS NOTES
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 being managed by Airam Hinson  He is doing well with no urinary complaints  His PSA remains stable at 0 5, previously 0 4  No evidence of infection of urine dip today in the office  He will return in 1 year for follow up with PSA and LEXIE  History of Present Illness  57 y o  male with a history of prostate nodule  presents today for 1 year follow up  He underwent a prostate biopsy 12/2008 for the nodule and his pathology was negative for malignancy  Last week had dysuria  Has since resolved  Denies any other lower urinary tract symptoms  Denies any changes in his overall health  Review of Systems  Review of Systems   Constitutional: Negative  HENT: Negative  Respiratory: Negative  Cardiovascular: Negative  Gastrointestinal: Negative  Genitourinary:        As per HPI   Musculoskeletal: Negative  Skin: Negative  Neurological: Negative  Hematological: Negative  Urinary Incontinence Screening    Flowsheet Row Most Recent Value   Urinary Incontinence   Urinary Incontinence? No   Incomplete emptying? No   Urinary frequency? No   Urinary urgency? No   Urinary hesitancy? No   Dysuria (painful difficult urination)? No   Nocturia (waking up to use the bathroom)? No   Straining (having to push to go)? No   Weak stream?  No   Intermittent stream?  Yes   Post void dribbling? Yes   Vaginal pressure? No   Vaginal dryness?   No          Past Medical History  Past Medical History:   Diagnosis Date    Asthma     Hypertension     Mitral valve disorder     Mitral valve prolapse     Murmur, cardiac     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     PAC (premature atrial contraction)     PVC (premature ventricular contraction)     Thyroid nodule        Past Surgical History  Past Surgical History: Procedure Laterality Date    HAND SURGERY      CT COLONOSCOPY FLX DX W/COLLJ SPEC WHEN PFRMD N/A 2/9/2018    Procedure: COLONOSCOPY;  Surgeon: Cas Weinberg MD;  [facility]; Service: Gastroenterology    PROSTATE BIOPSY      SHOULDER SURGERY          Past Family History  History reviewed  No pertinent family history  Past Social history  Social History     Social History    Marital status: /Civil Union     Spouse name: N/A    Number of children: N/A    Years of education: N/A     Occupational History    Not on file  Social History Main Topics    Smoking status: Never Smoker    Smokeless tobacco: Never Used    Alcohol use 4 2 oz/week     7 Glasses of wine per week      Comment: socially    Drug use: No    Sexual activity: Not on file     Other Topics Concern    Not on file     Social History Narrative    No narrative on file       Current Medications  Current Outpatient Prescriptions   Medication Sig Dispense Refill    amoxicillin (AMOXIL) 500 mg capsule       aspirin 81 mg chewable tablet Chew 1 tablet every other day      Coenzyme Q10-Red Yeast Rice  MG CAPS Take 1 capsule by mouth daily      Docosahexaenoic Acid (DHA OMEGA 3) 100 MG CAPS Take 6 capsules by mouth daily      fexofenadine (ALLEGRA) 180 MG tablet Take 1 tablet by mouth daily as needed      lisinopril (ZESTRIL) 20 mg tablet Take 1 tablet by mouth daily      Misc Natural Products (HDL RX) TABS Take 1 tablet by mouth daily      Misc Natural Products (PROSTATE HEALTH) CAPS Take 1 capsule by mouth daily      Multiple Vitamin tablet Take 1 tablet by mouth daily      Resveratrol 50 MG CAPS Take 1 capsule by mouth daily      verapamil (CALAN-SR) 240 mg CR tablet Take 1 tablet by mouth daily      Na Sulfate-K Sulfate-Mg Sulf (SUPREP BOWEL PREP KIT) 17 5-3 13-1 6 GM/180ML SOLN Take by mouth       No current facility-administered medications for this visit          Allergies  No Known Allergies    Past Medical History, Social History, Family History, medications and allergies were reviewed  Vitals  Vitals:    02/14/18 1057   BP: 130/80   Pulse: 78   Weight: 75 8 kg (167 lb)   Height: 5' 7" (1 702 m)       Physical Exam  Skin: warm, dry, intact  Pulmonary: Non-labored breathing  Abdomen: Soft, non-tender, non-distended  Musculoskeletal: AROM with no joint deformity or tenderness  Neurology: Alert and oriented      (Male):  LEXIE: Prostate is not enlarged at 40 grams  No nodules noted        Results    Lab Results   Component Value Date    CALCIUM 9 5 02/03/2018     07/23/2016    K 4 3 07/23/2016    CO2 23 07/23/2016     07/23/2016    BUN 15 02/03/2018    CREATININE 0 89 02/03/2018     Lab Results   Component Value Date    WBC 6 4 10/20/2017    HGB 15 7 10/20/2017    HCT 48 0 10/20/2017    MCV 86 8 10/20/2017     10/20/2017       Recent Results (from the past 1 hour(s))   POCT urine dip    Collection Time: 02/14/18 11:00 AM   Result Value Ref Range    LEUKOCYTE ESTERASE,UA -      NITRITE,UA -     SL AMB POCT UROBILINOGEN -     SL AMB POCT URINE PROTEIN -      PH,UA 5 0      BLOOD,UA -      SPECIFIC GRAVITY,UA 1 020      KETONES,UA -      BILIRUBIN,UA -     GLUCOSE, UA -      COLOR,UA yellow      CLARITY,UA trans    ]

## 2018-03-16 DIAGNOSIS — I10 ESSENTIAL HYPERTENSION: Primary | ICD-10-CM

## 2018-03-16 RX ORDER — VERAPAMIL HYDROCHLORIDE 240 MG/1
TABLET, FILM COATED, EXTENDED RELEASE ORAL
Qty: 90 TABLET | Refills: 0 | Status: SHIPPED | OUTPATIENT
Start: 2018-03-16 | End: 2018-06-14 | Stop reason: SDUPTHER

## 2018-03-16 RX ORDER — LISINOPRIL 20 MG/1
TABLET ORAL
Qty: 90 TABLET | Refills: 0 | Status: SHIPPED | OUTPATIENT
Start: 2018-03-16 | End: 2018-06-14 | Stop reason: SDUPTHER

## 2018-06-14 DIAGNOSIS — I10 ESSENTIAL HYPERTENSION: ICD-10-CM

## 2018-06-14 RX ORDER — VERAPAMIL HYDROCHLORIDE 240 MG/1
TABLET, FILM COATED, EXTENDED RELEASE ORAL
Qty: 90 TABLET | Refills: 0 | Status: SHIPPED | OUTPATIENT
Start: 2018-06-14 | End: 2018-09-12 | Stop reason: SDUPTHER

## 2018-06-14 RX ORDER — LISINOPRIL 20 MG/1
TABLET ORAL
Qty: 90 TABLET | Refills: 0 | Status: SHIPPED | OUTPATIENT
Start: 2018-06-14 | End: 2018-09-12 | Stop reason: SDUPTHER

## 2018-08-04 LAB
ALBUMIN SERPL-MCNC: 4.5 G/DL (ref 3.6–5.1)
ALBUMIN/GLOB SERPL: 2 (CALC) (ref 1–2.5)
ALP SERPL-CCNC: 47 U/L (ref 40–115)
ALT SERPL-CCNC: 28 U/L (ref 9–46)
AST SERPL-CCNC: 24 U/L (ref 10–35)
BASOPHILS # BLD AUTO: 51 CELLS/UL (ref 0–200)
BASOPHILS NFR BLD AUTO: 1 %
BILIRUB SERPL-MCNC: 1.1 MG/DL (ref 0.2–1.2)
BUN SERPL-MCNC: 16 MG/DL (ref 7–25)
BUN/CREAT SERPL: ABNORMAL (CALC) (ref 6–22)
CALCIUM SERPL-MCNC: 9.3 MG/DL (ref 8.6–10.3)
CHLORIDE SERPL-SCNC: 102 MMOL/L (ref 98–110)
CHOLEST SERPL-MCNC: 145 MG/DL
CHOLEST/HDLC SERPL: 2.5 (CALC)
CO2 SERPL-SCNC: 27 MMOL/L (ref 20–31)
CREAT SERPL-MCNC: 0.72 MG/DL (ref 0.7–1.25)
EOSINOPHIL # BLD AUTO: 454 CELLS/UL (ref 15–500)
EOSINOPHIL NFR BLD AUTO: 8.9 %
ERYTHROCYTE [DISTWIDTH] IN BLOOD BY AUTOMATED COUNT: 12.9 % (ref 11–15)
FERRITIN SERPL-MCNC: 53 NG/ML (ref 20–380)
GLOBULIN SER CALC-MCNC: 2.2 G/DL (CALC) (ref 1.9–3.7)
GLUCOSE SERPL-MCNC: 101 MG/DL (ref 65–99)
HCT VFR BLD AUTO: 46.4 % (ref 38.5–50)
HDLC SERPL-MCNC: 59 MG/DL
HGB BLD-MCNC: 15.5 G/DL (ref 13.2–17.1)
LDLC SERPL CALC-MCNC: 76 MG/DL (CALC)
LYMPHOCYTES # BLD AUTO: 1688 CELLS/UL (ref 850–3900)
LYMPHOCYTES NFR BLD AUTO: 33.1 %
MCH RBC QN AUTO: 29.3 PG (ref 27–33)
MCHC RBC AUTO-ENTMCNC: 33.4 G/DL (ref 32–36)
MCV RBC AUTO: 87.7 FL (ref 80–100)
MONOCYTES # BLD AUTO: 423 CELLS/UL (ref 200–950)
MONOCYTES NFR BLD AUTO: 8.3 %
NEUTROPHILS # BLD AUTO: 2484 CELLS/UL (ref 1500–7800)
NEUTROPHILS NFR BLD AUTO: 48.7 %
NONHDLC SERPL-MCNC: 86 MG/DL (CALC)
PLATELET # BLD AUTO: 261 THOUSAND/UL (ref 140–400)
PMV BLD REES-ECKER: 9.4 FL (ref 7.5–12.5)
POTASSIUM SERPL-SCNC: 4.3 MMOL/L (ref 3.5–5.3)
PROT SERPL-MCNC: 6.7 G/DL (ref 6.1–8.1)
RBC # BLD AUTO: 5.29 MILLION/UL (ref 4.2–5.8)
SL AMB EGFR AFRICAN AMERICAN: 113 ML/MIN/1.73M2
SL AMB EGFR NON AFRICAN AMERICAN: 98 ML/MIN/1.73M2
SODIUM SERPL-SCNC: 136 MMOL/L (ref 135–146)
TRIGL SERPL-MCNC: 34 MG/DL
WBC # BLD AUTO: 5.1 THOUSAND/UL (ref 3.8–10.8)

## 2018-08-13 ENCOUNTER — OFFICE VISIT (OUTPATIENT)
Dept: INTERNAL MEDICINE CLINIC | Facility: CLINIC | Age: 63
End: 2018-08-13
Payer: COMMERCIAL

## 2018-08-13 VITALS
WEIGHT: 165 LBS | HEART RATE: 67 BPM | OXYGEN SATURATION: 99 % | HEIGHT: 67 IN | SYSTOLIC BLOOD PRESSURE: 154 MMHG | DIASTOLIC BLOOD PRESSURE: 84 MMHG | RESPIRATION RATE: 16 BRPM | BODY MASS INDEX: 25.9 KG/M2

## 2018-08-13 DIAGNOSIS — Z23 NEED FOR PNEUMOCOCCAL VACCINATION: ICD-10-CM

## 2018-08-13 DIAGNOSIS — R73.9 ELEVATED BLOOD SUGAR: ICD-10-CM

## 2018-08-13 DIAGNOSIS — R73.01 IMPAIRED FASTING GLUCOSE: ICD-10-CM

## 2018-08-13 DIAGNOSIS — Z11.59 ENCOUNTER FOR HEPATITIS C SCREENING TEST FOR LOW RISK PATIENT: ICD-10-CM

## 2018-08-13 DIAGNOSIS — J30.9 ALLERGIC RHINITIS, UNSPECIFIED SEASONALITY, UNSPECIFIED TRIGGER: ICD-10-CM

## 2018-08-13 DIAGNOSIS — I10 ESSENTIAL HYPERTENSION: Primary | ICD-10-CM

## 2018-08-13 PROCEDURE — 90670 PCV13 VACCINE IM: CPT | Performed by: INTERNAL MEDICINE

## 2018-08-13 PROCEDURE — 99214 OFFICE O/P EST MOD 30 MIN: CPT | Performed by: INTERNAL MEDICINE

## 2018-08-13 PROCEDURE — G0009 ADMIN PNEUMOCOCCAL VACCINE: HCPCS | Performed by: INTERNAL MEDICINE

## 2018-08-13 PROCEDURE — 3725F SCREEN DEPRESSION PERFORMED: CPT | Performed by: INTERNAL MEDICINE

## 2018-08-13 PROCEDURE — 1101F PT FALLS ASSESS-DOCD LE1/YR: CPT | Performed by: INTERNAL MEDICINE

## 2018-08-13 RX ORDER — TRIAMCINOLONE ACETONIDE 1 MG/G
CREAM TOPICAL
COMMUNITY
Start: 2018-07-22 | End: 2019-03-04 | Stop reason: ALTCHOICE

## 2018-08-13 NOTE — PROGRESS NOTES
Assessment/Plan:           Problem List Items Addressed This Visit        Endocrine    Impaired fasting glucose     Pre diabetes -I have counseled the patient to follow a healthy balanced diet, I have counseled patient reduce carbohydrates and sweets in the diet, I would like the patient exercise routinely  I will be checking hemoglobin A1c and comprehensive metabolic panel  Have counseled patient about the prevention of diabetes, and the risk of progression to type 2 diabetes  Respiratory    Allergic rhinitis     Currently stable doing well we will continue with Allegra 180 mg once daily he has brought a copy of his profile of his allergy testing which we have reviewed with him today  Cardiovascular and Mediastinum    Hypertension - Primary     Hypertension controlled, patient does have element of white coat hypertension         Relevant Orders    Lipid Panel with Direct LDL reflex       Other    Encounter for hepatitis C screening test for low risk patient     Counseled, check hep C antibody  Relevant Orders    Hepatitis C antibody    Need for pneumococcal vaccination     Counseled, patient will receive the Prevnar 13         Relevant Orders    PNEUMOCOCCAL CONJUGATE VACCINE 13-VALENT GREATER THAN 6 MONTHS (Completed)      Other Visit Diagnoses     Elevated blood sugar        Relevant Orders    Hemoglobin A1C    Comprehensive metabolic panel    Lipid Panel with Direct LDL reflex          Return to office 6  months  call if any problems  Subjective:      Patient ID: Ida Vergara is a 70 y o  male  HPI 59by -year old male coming in for a follow up visit regarding essential hypertension, screening for hepatitis-C, prediabetes, allergic rhinitis; The patient reports me compliant taking medications without untoward side effects the    The patient is here to review his medical condition, update me on the medical condition and the patient reports me no hospitalizations and no ER visits  Home blood pressure readings are doing very well 130/80's he does report me getting nervous coming in the office traditionally  His blood pressure does increase when coming into the doctor's office  Patient does report me that he is very active doing outdoor work  His allergies are stable using Allegra, he has seen an allergist and has tested positive for multiple wall allergens he has brought a copy of the testing that we have reviewed the weather today  The pt reports he was on vacation week prior to the blood work, less riding bike but a lot of out door work The following portions of the patient's history were reviewed and updated as appropriate: allergies, current medications, past family history, past medical history, past social history, past surgical history and problem list     Review of Systems   Constitutional: Negative for activity change, appetite change and unexpected weight change  HENT: Negative for congestion and postnasal drip  Eyes: Negative for visual disturbance  Respiratory: Negative for cough and shortness of breath  Cardiovascular: Negative for chest pain  Gastrointestinal: Negative for abdominal pain, diarrhea, nausea and vomiting  Neurological: Negative for dizziness, light-headedness and headaches  Hematological: Negative for adenopathy  Objective:                    No Follow-up on file        No Known Allergies    Past Medical History:   Diagnosis Date    Asthma     Hypertension     Impaired fasting glucose     Mitral valve disorder     Mitral valve prolapse     Murmur, cardiac     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     PAC (premature atrial contraction)     PVC (premature ventricular contraction)     Thyroid nodule      Past Surgical History:   Procedure Laterality Date    HAND SURGERY      [facility] COLONOSCOPY FLX DX W/COLLJ SPEC WHEN PFRMD N/A 2/9/2018    Procedure: COLONOSCOPY;  Surgeon: Stacia Isaacs MD;  [facility];  Service: Gastroenterology    PROSTATE BIOPSY      SHOULDER SURGERY       Current Outpatient Prescriptions on File Prior to Visit   Medication Sig Dispense Refill    aspirin 81 mg chewable tablet Chew 1 tablet every other day      Coenzyme Q10-Red Yeast Rice  MG CAPS Take 1 capsule by mouth daily      Docosahexaenoic Acid (DHA OMEGA 3) 100 MG CAPS Take 6 capsules by mouth daily      fexofenadine (ALLEGRA) 180 MG tablet Take 1 tablet by mouth daily as needed      lisinopril (ZESTRIL) 20 mg tablet TAKE 1 TABLET DAILY 90 tablet 0    Misc Natural Products (HDL RX) TABS Take 1 tablet by mouth daily      Misc Natural Products (PROSTATE HEALTH) CAPS Take 1 capsule by mouth daily      Multiple Vitamin tablet Take 1 tablet by mouth daily      Resveratrol 50 MG CAPS Take 1 capsule by mouth daily      verapamil (CALAN-SR) 240 mg CR tablet TAKE 1 TABLET DAILY 90 tablet 0    [DISCONTINUED] amoxicillin (AMOXIL) 500 mg capsule       [DISCONTINUED] Na Sulfate-K Sulfate-Mg Sulf (SUPREP BOWEL PREP KIT) 17 5-3 13-1 6 GM/180ML SOLN Take by mouth       No current facility-administered medications on file prior to visit  Family History   Problem Relation Age of Onset    Diabetes Sister     Other Family         Stroke syndrome     Social History     Social History    Marital status: /Civil Union     Spouse name: N/A    Number of children: N/A    Years of education: N/A     Occupational History    Not on file       Social History Main Topics    Smoking status: Never Smoker    Smokeless tobacco: Never Used    Alcohol use 4 2 oz/week     7 Glasses of wine per week      Comment: socially    Drug use: No    Sexual activity: Not on file     Other Topics Concern    Not on file     Social History Narrative    No narrative on file     Vitals:    08/13/18 0804   BP: 154/84   BP Location: Left arm   Patient Position: Sitting   Cuff Size: Standard   Pulse: 67   Resp: 16   SpO2: 99%   Weight: 74 8 kg (165 lb)   Height: 5' 7" (1 702 m)     Results for orders placed or performed in visit on 08/03/18   Lipid Panel with Direct LDL reflex   Result Value Ref Range    Total Cholesterol 145 <200 mg/dL    HDL 59 >40 mg/dL    Triglycerides 34 <150 mg/dL    LDL Direct 76 mg/dL (calc)    Chol HDLC Ratio 2 5 <5 0 (calc)    Non-HDL Cholesterol 86 <130 mg/dL (calc)   Comprehensive metabolic panel   Result Value Ref Range    SL AMB GLUCOSE 101 (H) 65 - 99 mg/dL    BUN 16 7 - 25 mg/dL    Creatinine, Serum 0 72 0 70 - 1 25 mg/dL    eGFR Non  98 > OR = 60 mL/min/1 73m2    SL AMB EGFR  113 > OR = 60 mL/min/1 73m2    SL AMB BUN/CREATININE RATIO NOT APPLICABLE 6 - 22 (calc)    SL AMB SODIUM 136 135 - 146 mmol/L    SL AMB POTASSIUM 4 3 3 5 - 5 3 mmol/L    SL AMB CHLORIDE 102 98 - 110 mmol/L    SL AMB CARBON DIOXIDE 27 20 - 31 mmol/L    SL AMB CALCIUM 9 3 8 6 - 10 3 mg/dL    SL AMB PROTEIN, TOTAL 6 7 6 1 - 8 1 g/dL    Serum Albumin 4 5 3 6 - 5 1 g/dL    SL AMB GLOBULIN 2 2 1 9 - 3 7 g/dL (calc)    SL AMB ALBUMIN/GLOBULIN RATIO 2 0 1 0 - 2 5 (calc)    SL AMB BILIRUBIN, TOTAL 1 1 0 2 - 1 2 mg/dL    SL AMB ALKALINE PHOSPHATASE 47 40 - 115 U/L    SL AMB AST 24 10 - 35 U/L    SL AMB ALT 28 9 - 46 U/L   CBC and differential   Result Value Ref Range    SL AMB LAB WHITE BLOOD CELL COUNT 5 1 3 8 - 10 8 Thousand/uL    SL AMB LAB RED BLOOD CELLS 5 29 4  20 - 5 80 Million/uL    Hemoglobin 15 5 13 2 - 17 1 g/dL    Hematocrit 46 4 38 5 - 50 0 %    MCV 87 7 80 0 - 100 0 fL    MCH 29 3 27 0 - 33 0 pg    MCHC 33 4 32 0 - 36 0 g/dL    RDW 12 9 11 0 - 15 0 %    Platelet Count 589 103 - 400 Thousand/uL    SL AMB MPV 9 4 7 5 - 12 5 fL    Neutrophils (Absolute) 2,484 1,500 - 7,800 cells/uL    Lymphocytes (Absolute) 1,688 850 - 3,900 cells/uL    Monocytes (Absolute) 423 200 - 950 cells/uL    Eosinophils (Absolute) 454 15 - 500 cells/uL    Basophils (Absolute) 51 0 - 200 cells/uL    Neutrophils 48 7 %    Lymphocytes 33 1 % Monocytes 8 3 %    Eosinophils 8 9 %    Basophils 1 0 %   Ferritin   Result Value Ref Range    Ferritin 53 20 - 380 ng/mL     Weight (last 2 days)     Date/Time   Weight    08/13/18 0804  74 8 (165)            Body mass index is 25 84 kg/m²  BP      Temp      Pulse     Resp      SpO2        Vitals:    08/13/18 0804   Weight: 74 8 kg (165 lb)     Vitals:    08/13/18 0804   Weight: 74 8 kg (165 lb)       /84 (BP Location: Left arm, Patient Position: Sitting, Cuff Size: Standard)   Pulse 67   Resp 16   Ht 5' 7" (1 702 m)   Wt 74 8 kg (165 lb)   SpO2 99%   BMI 25 84 kg/m²          Physical Exam   Constitutional: He appears well-developed and well-nourished  No distress  HENT:   Head: Normocephalic and atraumatic  Right Ear: External ear normal    Left Ear: External ear normal    Mouth/Throat: Oropharynx is clear and moist    Eyes: Conjunctivae are normal  Pupils are equal, round, and reactive to light  Right eye exhibits no discharge  Left eye exhibits no discharge  No scleral icterus  Neck: Neck supple  Cardiovascular: Normal rate, regular rhythm and normal heart sounds  Exam reveals no gallop and no friction rub  No murmur heard  Pulmonary/Chest: No respiratory distress  He has no wheezes  He has no rales  Abdominal: Soft  Bowel sounds are normal  He exhibits no distension and no mass  There is no tenderness  There is no rebound and no guarding  Musculoskeletal: He exhibits no edema or deformity  Lymphadenopathy:     He has no cervical adenopathy  Neurological: He is alert  Skin: He is not diaphoretic  Psychiatric: He has a normal mood and affect

## 2018-08-14 NOTE — ASSESSMENT & PLAN NOTE
Currently stable doing well we will continue with Allegra 180 mg once daily he has brought a copy of his profile of his allergy testing which we have reviewed with him today

## 2018-08-29 PROBLEM — I49.1 PREMATURE ATRIAL CONTRACTIONS: Status: ACTIVE | Noted: 2018-08-29

## 2018-08-29 PROBLEM — I49.3 PVC'S (PREMATURE VENTRICULAR CONTRACTIONS): Status: ACTIVE | Noted: 2018-08-29

## 2018-08-30 ENCOUNTER — OFFICE VISIT (OUTPATIENT)
Dept: CARDIOLOGY CLINIC | Facility: CLINIC | Age: 63
End: 2018-08-30
Payer: COMMERCIAL

## 2018-08-30 VITALS
SYSTOLIC BLOOD PRESSURE: 146 MMHG | HEART RATE: 73 BPM | HEIGHT: 67 IN | BODY MASS INDEX: 26.13 KG/M2 | WEIGHT: 166.5 LBS | DIASTOLIC BLOOD PRESSURE: 84 MMHG

## 2018-08-30 DIAGNOSIS — I49.3 PVC'S (PREMATURE VENTRICULAR CONTRACTIONS): Primary | ICD-10-CM

## 2018-08-30 DIAGNOSIS — I10 ESSENTIAL HYPERTENSION: ICD-10-CM

## 2018-08-30 PROCEDURE — 93000 ELECTROCARDIOGRAM COMPLETE: CPT | Performed by: INTERNAL MEDICINE

## 2018-08-30 PROCEDURE — 4040F PNEUMOC VAC/ADMIN/RCVD: CPT | Performed by: INTERNAL MEDICINE

## 2018-08-30 PROCEDURE — 99214 OFFICE O/P EST MOD 30 MIN: CPT | Performed by: INTERNAL MEDICINE

## 2018-08-30 NOTE — PROGRESS NOTES
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  I had  asked that he have a repeat echocardiogram performed to reassess his mitral regurgitation     This revealed a structurally normal mitral valve with mild regurgitation  His blood pressure is high in the office today  He states he has white coat syndrome  He monitors his blood pressure at home regularly with readings less than 140/90 mmHg  I have asked him to continue to do so and notify either me or his primary care physician if his blood pressure should exceed this reading consistently       In terms of his ectopic ventricular and supraventricular beats, he has symptoms occasionally particularly when he consumes caffeine  He will remain on his current dose of verapamil  His most recent lipids were reviewed  Based on current guidelines statin therapy is indicated which he declines        Follow-up was tentatively scheduled for one year or sooner if deemed necessary  Interval History:   Mr Vasile Lee is a pleasant 27-year-old male who presents to the office today for follow-up  Since his last visit he remains active  He works out pretty much daily  He lifts weights, rides a stationary bike, or utilizes a [employer] for approximately a half hour  He can do so without any chest pain or shortness of breath  He has symptoms reminiscent of his ectopic beats occasionally, particularly when he consumes caffeine   He denies any symptoms suggestive of a sustained arrhythmia  He denies lightheadedness, syncope or presyncope  He denies signs or symptoms of congestive heart failure  He denies symptoms of claudication  Problem List     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    Impaired fasting glucose    Hypertension    Encounter for hepatitis C screening test for low risk patient    Need for pneumococcal vaccination    Allergic rhinitis        Past Medical History:   Diagnosis Date    Asthma     Hypertension     Impaired fasting glucose     Mitral valve disorder     Mitral valve prolapse     Murmur, cardiac     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     PAC (premature atrial contraction)     PVC (premature ventricular contraction)     Thyroid nodule      Social History     Social History    Marital status: /Civil Union     Spouse name: N/A    Number of children: N/A    Years of education: N/A     Occupational History    Not on file  Social History Main Topics    Smoking status: Never Smoker    Smokeless tobacco: Never Used    Alcohol use 4 2 oz/week     7 Glasses of wine per week      Comment: socially    Drug use: No    Sexual activity: Not on file     Other Topics Concern    Not on file     Social History Narrative    No narrative on file      Family History   Problem Relation Age of Onset    Diabetes Sister     Other Family         Stroke syndrome     Past Surgical History:   Procedure Laterality Date    HAND SURGERY      [place] COLONOSCOPY FLX DX W/COLLJ SPEC WHEN PFRMD N/A 2/9/2018    Procedure: COLONOSCOPY;  Surgeon: Beth Leong MD;  [facility];   Service: Gastroenterology    PROSTATE BIOPSY      SHOULDER SURGERY         Current Outpatient Prescriptions:     aspirin 81 mg chewable tablet, Chew 1 tablet every other day, Disp: , Rfl:     Coenzyme Q10-Red Yeast Rice  MG CAPS, Take 1 capsule by mouth daily, Disp: , Rfl:     Docosahexaenoic Acid (DHA OMEGA 3) 100 MG CAPS, Take 6 capsules by mouth daily, Disp: , Rfl:     fexofenadine (ALLEGRA) 180 MG tablet, Take 1 tablet by mouth daily as needed, Disp: , Rfl:     lisinopril (ZESTRIL) 20 mg tablet, TAKE 1 TABLET DAILY, Disp: 90 tablet, Rfl: 0    Misc Natural Products (HDL RX) TABS, Take 1 tablet by mouth daily, Disp: , Rfl:     Misc Natural Products (PROSTATE HEALTH) CAPS, Take 1 capsule by mouth daily, Disp: , Rfl:     Multiple Vitamin tablet, Take 1 tablet by mouth daily, Disp: , Rfl:     Resveratrol 50 MG CAPS, Take 1 capsule by mouth daily, Disp: , Rfl:     triamcinolone (KENALOG) 0 1 % cream, , Disp: , Rfl:     verapamil (CALAN-SR) 240 mg CR tablet, TAKE 1 TABLET DAILY, Disp: 90 tablet, Rfl: 0  No Known Allergies    Labs:     Chemistry        Component Value Date/Time     07/29/2017 0917    K 4 5 07/29/2017 0917     08/03/2018 0803    CO2 25 07/29/2017 0917    BUN 16 08/03/2018 0803    CREATININE 0 72 08/03/2018 0803    CREATININE 0 87 07/29/2017 0917        Component Value Date/Time    CALCIUM 9 3 08/03/2018 0803    ALKPHOS 47 08/03/2018 0803    AST 26 07/29/2017 0917    ALT 30 07/29/2017 0917    BILITOT 0 7 07/29/2017 0917            Lab Results   Component Value Date    CHOL 167 07/29/2017    CHOL 162 01/07/2017    CHOL 152 07/23/2016     Lab Results   Component Value Date    HDL 59 08/03/2018    HDL 54 02/03/2018    HDL 72 07/29/2017     No results found for: [facility]  Lab Results   Component Value Date    TRIG 34 08/03/2018    TRIG 44 02/03/2018    TRIG 27 07/29/2017     No components found for: CHOLHDL    Imaging: No results found  ECG:    Normal sinus rhythm, normal ECG      Review of Systems   Cardiovascular: Positive for irregular heartbeat  Negative for chest pain and near-syncope  All other systems reviewed and are negative        Vitals:    08/30/18 1008   BP: 146/84   Pulse:      Vitals:    08/30/18 0949   Weight: 75 5 kg (166 lb 8 oz)     Height: 5' 7" (170 2 cm)   Body mass index is 26 08 kg/m²      Physical Exam:  General appearance:  Appears stated age, alert, well appearing and in no distress  HEENT:  PERRLA, EOMI, no scleral icterus, no conjunctival pallor  NECK:  Supple, No elevated JVP, no thyromegaly, no carotid bruits  HEART:  Regular rate and rhythm, normal S1/S2, no S3/S4, no murmur or rub  LUNGS:  Clear to auscultation bilaterally  ABDOMEN:  Soft, non-tender, positive bowel sounds, no rebound or guarding, no organomegaly   EXTREMITIES:  No edema  VASCULAR:  Normal pedal pulses   SKIN: No lesions or rashes on exposed skin  NEURO:  CN II-XII intact, no focal deficits

## 2018-09-12 DIAGNOSIS — I10 ESSENTIAL HYPERTENSION: ICD-10-CM

## 2018-09-12 RX ORDER — LISINOPRIL 20 MG/1
TABLET ORAL
Qty: 90 TABLET | Refills: 0 | Status: SHIPPED | OUTPATIENT
Start: 2018-09-12 | End: 2018-12-11 | Stop reason: SDUPTHER

## 2018-09-12 RX ORDER — VERAPAMIL HYDROCHLORIDE 240 MG/1
TABLET, FILM COATED, EXTENDED RELEASE ORAL
Qty: 90 TABLET | Refills: 0 | Status: SHIPPED | OUTPATIENT
Start: 2018-09-12 | End: 2018-12-11 | Stop reason: SDUPTHER

## 2018-12-11 DIAGNOSIS — I10 ESSENTIAL HYPERTENSION: ICD-10-CM

## 2018-12-11 RX ORDER — VERAPAMIL HYDROCHLORIDE 240 MG/1
TABLET, FILM COATED, EXTENDED RELEASE ORAL
Qty: 90 TABLET | Refills: 0 | Status: SHIPPED | OUTPATIENT
Start: 2018-12-11 | End: 2019-03-11 | Stop reason: SDUPTHER

## 2018-12-11 RX ORDER — LISINOPRIL 20 MG/1
TABLET ORAL
Qty: 90 TABLET | Refills: 0 | Status: SHIPPED | OUTPATIENT
Start: 2018-12-11 | End: 2019-02-18 | Stop reason: SDUPTHER

## 2019-02-09 LAB — PSA SERPL-MCNC: 0.5 NG/ML

## 2019-02-18 DIAGNOSIS — I10 ESSENTIAL HYPERTENSION: ICD-10-CM

## 2019-02-18 RX ORDER — LISINOPRIL 20 MG/1
TABLET ORAL
Qty: 90 TABLET | Refills: 0 | Status: SHIPPED | OUTPATIENT
Start: 2019-02-18 | End: 2019-05-17 | Stop reason: SDUPTHER

## 2019-02-19 NOTE — PROGRESS NOTES
2/20/2019      Chief Complaint   Patient presents with    Elevated PSA       Assessment and Plan    70 y o  male managed by Dr Ahum[facility]    1  Prostate cancer screening, prostate nodule previously palpated   - PSA 0 5 (2/8/19), 0 5 (2/3/18)  - LEXIE with no nodules   - FU 1 year with PSA prior and LEXIE at visit       History of Present Illness  Maki Montoya is a 70 y o  male here for follow up evaluation of prostate cancer screening  At some point time the patient was found of a prostate nodule  This is not been palpated as of recently  His PSA is low and stable at 0 5  He has no urinary complaints are lower urinary tract symptoms at his visit today  These in his AUA symptom score listed as below  Review of Systems   Constitutional: Negative for activity change, chills and fever  Gastrointestinal: Negative for abdominal distention and abdominal pain  Musculoskeletal: Negative for back pain and gait problem  Psychiatric/Behavioral: Negative for behavioral problems and confusion  Urinary Incontinence Screening      Most Recent Value   Urinary Incontinence   Urinary Incontinence? No   Incomplete emptying? No   Urinary frequency? No   Urinary urgency? No   Urinary hesitancy? No   Dysuria (painful difficult urination)? No   Nocturia (waking up to use the bathroom)?  -- [1x]   Straining (having to push to go)? No   Weak stream?  No   Intermittent stream?  No   Post void dribbling? No          AUA SYMPTOM SCORE      Most Recent Value   AUA SYMPTOM SCORE   How often have you had a sensation of not emptying your bladder completely after you finished urinating? 1   How often have you had to urinate again less than two hours after you finished urinating? 1   How often have you found you stopped and started again several times when you urinate? 1   How often have you found it difficult to postpone urination?   1   How often have you had a weak urinary stream?  1   How often have you had to push or strain to begin urination? 0   How many times did you most typically get up to urinate from the time you went to bed at night until the time you got up in the morning? 1   Quality of Life: If you were to spend the rest of your life with your urinary condition just the way it is now, how would you feel about that?  0   AUA SYMPTOM SCORE  6          Past Medical History  Past Medical History:   Diagnosis Date    Asthma     Hypertension     Impaired fasting glucose     Mitral valve disorder     Mitral valve prolapse     Murmur, cardiac     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     PAC (premature atrial contraction)     PVC (premature ventricular contraction)     Thyroid nodule        Past Social History  Past Surgical History:   Procedure Laterality Date    HAND SURGERY      MI COLONOSCOPY FLX DX W/COLLJ SPEC WHEN PFRMD N/A 2/9/2018    Procedure: COLONOSCOPY;  Surgeon: John Vasquez MD;  [facility]; Service: Gastroenterology    PROSTATE BIOPSY      SHOULDER SURGERY       Social History     Tobacco Use   Smoking Status Never Smoker   Smokeless Tobacco Never Used       Past Family History  Family History   Problem Relation Age of Onset    Diabetes Sister     Other Family         Stroke syndrome       Past Social history  Social History     Socioeconomic History    Marital status: /Civil Union     Spouse name: Not on file    Number of children: Not on file    Years of education: Not on file    Highest education level: Not on file   Occupational History    Not on file   Social Needs    Financial resource strain: Not on file    Food insecurity:     Worry: Not on file     Inability: Not on file    Transportation needs:     Medical: Not on file     Non-medical: Not on file   Tobacco Use    Smoking status: Never Smoker    Smokeless tobacco: Never Used   Substance and Sexual Activity    Alcohol use:  Yes     Alcohol/week: 4 2 oz     Types: 7 Glasses of wine per week     Comment: socially    Drug use: No    Sexual activity: Not on file   Lifestyle    Physical activity:     Days per week: Not on file     Minutes per session: Not on file    Stress: Not on file   Relationships    Social connections:     Talks on phone: Not on file     Gets together: Not on file     Attends Yazdanism service: Not on file     Active member of club or organization: Not on file     Attends meetings of clubs or organizations: Not on file     Relationship status: Not on file    Intimate partner violence:     Fear of current or ex partner: Not on file     Emotionally abused: Not on file     Physically abused: Not on file     Forced sexual activity: Not on file   Other Topics Concern    Not on file   Social History Narrative    Not on file       Current Medications  Current Outpatient Medications   Medication Sig Dispense Refill    aspirin 81 mg chewable tablet Chew 1 tablet every other day      Coenzyme Q10-Red Yeast Rice  MG CAPS Take 1 capsule by mouth daily      Docosahexaenoic Acid (DHA OMEGA 3) 100 MG CAPS Take 6 capsules by mouth daily      fexofenadine (ALLEGRA) 180 MG tablet Take 1 tablet by mouth daily as needed      lisinopril (ZESTRIL) 20 mg tablet TAKE 1 TABLET DAILY 90 tablet 0    Misc Natural Products (HDL RX) TABS Take 1 tablet by mouth daily      Misc Natural Products (PROSTATE HEALTH) CAPS Take 1 capsule by mouth daily      Multiple Vitamin tablet Take 1 tablet by mouth daily      Resveratrol 50 MG CAPS Take 1 capsule by mouth daily      verapamil (CALAN-SR) 240 mg CR tablet TAKE 1 TABLET DAILY 90 tablet 0    triamcinolone (KENALOG) 0 1 % cream        No current facility-administered medications for this visit          Allergies  No Known Allergies      The following portions of the patient's history were reviewed and updated as appropriate: allergies, current medications, past medical history, past social history, past surgical history and problem list       Vitals  Vitals:    02/20/19 1110   BP: 144/80   Pulse: 72   Weight: 76 4 kg (168 lb 6 4 oz)   Height: 5' 8" (1 727 m)         Physical Exam  Constitutional   General appearance: Patient is seated and in no acute distress, well appearing and well nourished  Head and Face   Head and face: Normal     Eyes   Conjunctiva and lids: No erythema, swelling or discharge  Ears, Nose, Mouth, and Throat   Hearing: Normal     Pulmonary   Respiratory effort: No increased work of breathing or signs of respiratory distress  Cardiovascular   Examination of extremities for edema and/or varicosities: Normal     Abdomen   Abdomen: Non-tender, no masses  Genitourinary   Digital rectal exam of prostate: Smooth, nontender, 40 g prostate with no nodules  Musculoskeletal   Gait and station: Normal     Skin   Skin and subcutaneous tissue: Warm, dry, and intact  No visible lesions or rashes    Psychiatric   Judgment and insight: Normal  Recent and remote memory:  Normal  Mood and affect: Normal      Results  No results found for this or any previous visit (from the past 1 hour(s)) ]  Lab Results   Component Value Date    PSA 0 5 02/08/2019    PSA 0 5 02/03/2018     Lab Results   Component Value Date    CALCIUM 9 3 08/03/2018     07/29/2017    K 4 3 08/03/2018    CO2 27 08/03/2018     08/03/2018    BUN 16 08/03/2018    CREATININE 0 87 07/29/2017     Lab Results   Component Value Date    WBC 5 1 08/03/2018    HGB 15 5 08/03/2018    HCT 46 4 08/03/2018    MCV 87 7 08/03/2018     08/03/2018       Orders  Orders Placed This Encounter   Procedures    PSA Total, Diagnostic     Standing Status:   Future     Standing Expiration Date:   2/20/2020

## 2019-02-20 ENCOUNTER — OFFICE VISIT (OUTPATIENT)
Dept: UROLOGY | Facility: AMBULATORY SURGERY CENTER | Age: 64
End: 2019-02-20
Payer: COMMERCIAL

## 2019-02-20 VITALS
BODY MASS INDEX: 25.52 KG/M2 | HEART RATE: 72 BPM | HEIGHT: 68 IN | DIASTOLIC BLOOD PRESSURE: 80 MMHG | WEIGHT: 168.4 LBS | SYSTOLIC BLOOD PRESSURE: 144 MMHG

## 2019-02-20 DIAGNOSIS — N40.2 NODULAR PROSTATE WITHOUT LOWER URINARY TRACT SYMPTOMS: Primary | ICD-10-CM

## 2019-02-20 PROCEDURE — 99213 OFFICE O/P EST LOW 20 MIN: CPT | Performed by: PHYSICIAN ASSISTANT

## 2019-02-25 LAB
ALBUMIN SERPL-MCNC: 4.8 G/DL (ref 3.6–5.1)
ALBUMIN/GLOB SERPL: 2 (CALC) (ref 1–2.5)
ALP SERPL-CCNC: 47 U/L (ref 40–115)
ALT SERPL-CCNC: 28 U/L (ref 9–46)
AST SERPL-CCNC: 24 U/L (ref 10–35)
BILIRUB SERPL-MCNC: 1.1 MG/DL (ref 0.2–1.2)
BUN SERPL-MCNC: 15 MG/DL (ref 7–25)
BUN/CREAT SERPL: NORMAL (CALC) (ref 6–22)
CALCIUM SERPL-MCNC: 9.6 MG/DL (ref 8.6–10.3)
CHLORIDE SERPL-SCNC: 103 MMOL/L (ref 98–110)
CHOLEST SERPL-MCNC: 141 MG/DL
CHOLEST/HDLC SERPL: 2.4 (CALC)
CO2 SERPL-SCNC: 27 MMOL/L (ref 20–32)
CREAT SERPL-MCNC: 0.88 MG/DL (ref 0.7–1.25)
GLOBULIN SER CALC-MCNC: 2.4 G/DL (CALC) (ref 1.9–3.7)
GLUCOSE SERPL-MCNC: 82 MG/DL (ref 65–99)
HBA1C MFR BLD: 5 % OF TOTAL HGB
HCV AB S/CO SERPL IA: 0.02
HCV AB SERPL QL IA: NORMAL
HDLC SERPL-MCNC: 58 MG/DL
LDLC SERPL CALC-MCNC: 72 MG/DL (CALC)
NONHDLC SERPL-MCNC: 83 MG/DL (CALC)
POTASSIUM SERPL-SCNC: 4.6 MMOL/L (ref 3.5–5.3)
PROT SERPL-MCNC: 7.2 G/DL (ref 6.1–8.1)
SL AMB EGFR AFRICAN AMERICAN: 104 ML/MIN/1.73M2
SL AMB EGFR NON AFRICAN AMERICAN: 90 ML/MIN/1.73M2
SODIUM SERPL-SCNC: 138 MMOL/L (ref 135–146)
TRIGL SERPL-MCNC: 37 MG/DL

## 2019-03-05 ENCOUNTER — TELEPHONE (OUTPATIENT)
Dept: INTERNAL MEDICINE CLINIC | Facility: CLINIC | Age: 64
End: 2019-03-05

## 2019-03-05 ENCOUNTER — OFFICE VISIT (OUTPATIENT)
Dept: INTERNAL MEDICINE CLINIC | Facility: CLINIC | Age: 64
End: 2019-03-05
Payer: COMMERCIAL

## 2019-03-05 VITALS
HEART RATE: 73 BPM | HEIGHT: 68 IN | RESPIRATION RATE: 16 BRPM | DIASTOLIC BLOOD PRESSURE: 82 MMHG | OXYGEN SATURATION: 99 % | SYSTOLIC BLOOD PRESSURE: 152 MMHG | BODY MASS INDEX: 25.82 KG/M2 | WEIGHT: 170.4 LBS

## 2019-03-05 DIAGNOSIS — D50.9 IRON DEFICIENCY ANEMIA, UNSPECIFIED IRON DEFICIENCY ANEMIA TYPE: ICD-10-CM

## 2019-03-05 DIAGNOSIS — Z00.00 MEDICARE ANNUAL WELLNESS VISIT, SUBSEQUENT: ICD-10-CM

## 2019-03-05 DIAGNOSIS — R73.9 ELEVATED BLOOD SUGAR: ICD-10-CM

## 2019-03-05 DIAGNOSIS — I10 ESSENTIAL HYPERTENSION: Primary | ICD-10-CM

## 2019-03-05 PROCEDURE — 1170F FXNL STATUS ASSESSED: CPT | Performed by: INTERNAL MEDICINE

## 2019-03-05 PROCEDURE — 99214 OFFICE O/P EST MOD 30 MIN: CPT | Performed by: INTERNAL MEDICINE

## 2019-03-05 PROCEDURE — 1036F TOBACCO NON-USER: CPT | Performed by: INTERNAL MEDICINE

## 2019-03-05 PROCEDURE — G0439 PPPS, SUBSEQ VISIT: HCPCS | Performed by: INTERNAL MEDICINE

## 2019-03-05 PROCEDURE — 1125F AMNT PAIN NOTED PAIN PRSNT: CPT | Performed by: INTERNAL MEDICINE

## 2019-03-05 NOTE — ASSESSMENT & PLAN NOTE
Reduce carbohydrates, reduce sweets routine exercise will continue to monitor the fasting blood sugar I reviewed his laboratories today with the patient

## 2019-03-05 NOTE — ASSESSMENT & PLAN NOTE
Hypertension - controlled, I have counseled patient following healthy balance diet, I would like the patient reduce sodium, exercise routinely, I would like the patient continued the med current medical regiment and we will continue to monitor  Patient does have superimposed white coat hypertension his blood pressure normalized after 10 minutes

## 2019-03-05 NOTE — ASSESSMENT & PLAN NOTE
Assessment and plan 1  Medicare subsequent annual wellness examination overall the patient is clinically stable and doing well, we encouraged the patient to follow a healthy and balanced diet  We recommend that the patient exercise routinely approximately 30 minutes 5 times per week   We have reviewed the patient's vaccines and have made recommendations for updates if necessary  consider the new shingles vaccine      We will be ordering screening laboratories which are age appropriate  Return to the office in  6 months    call if any problems

## 2019-03-05 NOTE — PROGRESS NOTES
Assessment/Plan:    Medicare annual wellness visit, subsequent  Assessment and plan 1  Medicare subsequent annual wellness examination overall the patient is clinically stable and doing well, we encouraged the patient to follow a healthy and balanced diet  We recommend that the patient exercise routinely approximately 30 minutes 5 times per week   We have reviewed the patient's vaccines and have made recommendations for updates if necessary  consider the new shingles vaccine      We will be ordering screening laboratories which are age appropriate  Return to the office in  6 months    call if any problems  Hypertension  Hypertension - controlled, I have counseled patient following healthy balance diet, I would like the patient reduce sodium, exercise routinely, I would like the patient continued the [facility] current medical regiment and we will continue to monitor  Patient does have superimposed white coat hypertension his blood pressure normalized after 10 minutes  Elevated blood sugar  Reduce carbohydrates, reduce sweets routine exercise will continue to monitor the fasting blood sugar I reviewed his laboratories today with the patient  Iron deficiency anemia  Will check CBC         Problem List Items Addressed This Visit        Cardiovascular and Mediastinum    Hypertension - Primary     Hypertension - controlled, I have counseled patient following healthy balance diet, I would like the patient reduce sodium, exercise routinely, I would like the patient continued the [facility] current medical regiment and we will continue to monitor  Patient does have superimposed white coat hypertension his blood pressure normalized after 10 minutes  Relevant Orders    Comprehensive metabolic panel    Lipid Panel with Direct LDL reflex       Other    Medicare annual wellness visit, subsequent     Assessment and plan 1    Medicare subsequent annual wellness examination overall the patient is clinically stable and doing well, we encouraged the patient to follow a healthy and balanced diet  We recommend that the patient exercise routinely approximately 30 minutes 5 times per week   We have reviewed the patient's vaccines and have made recommendations for updates if necessary  consider the new shingles vaccine      We will be ordering screening laboratories which are age appropriate  Return to the office in  6 months    call if any problems  Iron deficiency anemia     Will check CBC         Relevant Orders    CBC (Includes Diff/Plt) (Refl)    Elevated blood sugar     Reduce carbohydrates, reduce sweets routine exercise will continue to monitor the fasting blood sugar I reviewed his laboratories today with the patient  Return to office 6  months  call if any problems  Subjective:      Patient ID: Destini Cadena is a 70 y o  male  HPI 76-year old male coming in for a follow up visit regarding essential hypertension, history of iron deficiency anemia, elevated blood sugar; The patient reports me compliant taking medications without untoward side effects the  The patient is here to review his medical condition, update me on the medical condition and the patient reports me no hospitalizations and no ER visits  Patient reports me cabin fever, he reports me mild anxiety when coming to the physician's office but normally does not have anxiety he reports me monitoring his blood pressure at home 130/80 on average me the blood pressure goes up when he comes to the office  Overall he is feeling well  He is less active during the winter months  His wife will be retiring in the next several months      The following portions of the patient's history were reviewed and updated as appropriate: allergies, current medications, past family history, past medical history, past social history, past surgical history and problem list     Review of Systems   Constitutional: Negative for activity change, appetite change and unexpected weight change  HENT: Negative for congestion and postnasal drip  Eyes: Negative for visual disturbance  Respiratory: Negative for cough and shortness of breath  Cardiovascular: Negative for chest pain  Gastrointestinal: Negative for abdominal pain, diarrhea, nausea and vomiting  Hematological: Negative for adenopathy  Objective:    Return in about 6 months (around 2019)  No results found  Recent Results (from the past 96 [id number] hour(s))   POCT ECG    Narrative    Normal sinus rhythm, normal ECG   Echo complete with contrast if indicated    Narrative    [address] [facility], [address] [place] [address]  [phone]    Transthoracic Echocardiogram  2D, M-mode, Doppler, and Color Doppler    Study date:  27-Mar-2017    Patient: Benjamin Coleman  MR number: [id number]  Account number: [de-identified]  : 1955  Age: 59 years  Gender: Male  Status: Outpatient  Location: [address] [place] [address] [facility] [place]  Height: 67 in  Weight: 170 7 lb  BP: 154/ 82 mmHg    Indications: Mitral valve prolapse    Diagnoses: I34 1 - Nonrheumatic mitral (valve) prolapse    Sonographer:  Phylicia Saeed BS, RDCS, RVT, RDMS  Primary Physician:  Lupillo Yanez DO  Referring Physician:  Clarence Zhang DO  Group:  [facility]  Interpreting Physician:  Eugenio Villalba MD    SUMMARY    LEFT VENTRICLE:  Size was normal   Systolic function was normal  Ejection fraction was estimated to be 55 %  Wall thickness was normal   Left ventricular diastolic function parameters were normal     RIGHT VENTRICLE:  The size was normal   Systolic function was normal     MITRAL VALVE:  There was mild regurgitation  AORTIC VALVE:  There was trace regurgitation  TRICUSPID VALVE:  There was trace regurgitation  COMPARISONS:  There has been no significant interval change  Comparison was made with the previous study of 2011      HISTORY: PRIOR HISTORY: Palpitations, PAc, PVC, HTN, asthma Prior echo 2011  PROCEDURE: The study was performed in the [address] [facility] [place]  This was a routine study  The transthoracic approach was used  The study included complete 2D imaging, M-mode, complete spectral Doppler, and color Doppler  Image  quality was adequate  LEFT VENTRICLE: Size was normal  Systolic function was normal  Ejection fraction was estimated to be 55 %  There were no regional wall motion abnormalities  Wall thickness was normal  DOPPLER: Left ventricular diastolic function parameters  were normal     RIGHT VENTRICLE: The size was normal  Systolic function was normal  Wall thickness was normal     LEFT ATRIUM: Size was normal     RIGHT ATRIUM: Size was at the upper limits of normal     MITRAL VALVE: Valve structure was normal  There was normal leaflet separation  There was systolic bowing of the anterior leaflet, but without diagnostic evidence for prolapse  DOPPLER: The transmitral velocity was within the normal range  There was no evidence for stenosis  There was mild regurgitation  AORTIC VALVE: The valve was trileaflet  Leaflets exhibited normal thickness and normal cuspal separation  DOPPLER: Transaortic velocity was within the normal range  There was no evidence for stenosis  There was trace regurgitation  TRICUSPID VALVE: The valve structure was normal  There was normal leaflet separation  DOPPLER: The transtricuspid velocity was within the normal range  There was no evidence for stenosis  There was trace regurgitation  The tricuspid jet  envelope definition was inadequate for estimation of RV systolic pressure  There are no indirect findings suggestive of moderate or severe pulmonary hypertension  PULMONIC VALVE: Leaflets exhibited normal thickness, no calcification, and normal cuspal separation  DOPPLER: The transpulmonic velocity was within the normal range  There was no regurgitation      PERICARDIUM: There was no pericardial effusion  The pericardium was normal in appearance  AORTA: The root exhibited normal size  SYSTEMIC VEINS: IVC: The inferior vena cava was normal in size and course  Respirophasic changes were normal     SYSTEM MEASUREMENT TABLES    2D  %FS: 57 05 %  Ao Diam: 3 53 cm  EDV(Teich): 126 79 ml  EF(Cube): 92 08 %  EF(Teich): 87 03 %  ESV(Cube): 10 84 ml  ESV(Teich): 16 45 ml  IVSd: 1 06 cm  LA Area: 19 3 cm2  LA Diam: 3 58 cm  LVEDV MOD A4C: 106 39 ml  LVEF MOD A4C: 70 73 %  LVESV MOD A4C: 31 14 ml  LVIDd: 5 15 cm  LVIDs: 2 21 cm  LVLd A4C: 9 33 cm  LVLs A4C: 7 71 cm  LVPWd: 1 08 cm  RA Area: 20 82 cm2  RV Diam : 4 35 cm  SI(Cube): 66 65 ml/m2  SI(Teich): 58 38 ml/m2  SV MOD A4C: 75 25 ml  SV(Cube): 125 96 ml  SV(Teich): 110 34 ml    CW  AV Vmax: 1 61 m/s  AV maxPG: 10 4 mmHg    MM  TAPSE: 2 33 cm    PW  E': 0 09 m/s  E/E': 8 43  LVOT Vmax: 1 1 m/s  LVOT maxP 84 mmHg  MV A Jose: 0 55 m/s  MV Dec [place]: 2 74 m/s2  MV DecT: 266 8 ms  MV E Jose: 0 73 m/s  MV E/A Ratio: 1 32    [facility]    Prepared and electronically signed by    Sandra Cruz MD  Signed 27-Mar-2017 16:51:56         No Known Allergies    Past Medical History:   Diagnosis Date    Asthma     Hypertension     Impaired fasting glucose     Mitral valve disorder     Mitral valve prolapse     Murmur, cardiac     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     PAC (premature atrial contraction)     PVC (premature ventricular contraction)     Thyroid nodule      Past Surgical History:   Procedure Laterality Date    HAND SURGERY      TN COLONOSCOPY FLX DX W/COLLJ SPEC WHEN PFRMD N/A 2018    Procedure: COLONOSCOPY;  Surgeon: Sridhar Middleton MD;  [facility];   Service: Gastroenterology    PROSTATE BIOPSY      SHOULDER SURGERY       Current Outpatient Medications on File Prior to Visit   Medication Sig Dispense Refill    aspirin 81 mg chewable tablet Chew 1 tablet every other day      Coenzyme Q10-Red Yeast Rice  MG CAPS Take 1 capsule by mouth daily      Docosahexaenoic Acid (DHA OMEGA 3) 100 MG CAPS Take 6 capsules by mouth daily      fexofenadine (ALLEGRA) 180 MG tablet Take 1 tablet by mouth daily as needed      lisinopril (ZESTRIL) 20 mg tablet TAKE 1 TABLET DAILY 90 tablet 0    Misc Natural Products (HDL RX) TABS Take 1 tablet by mouth daily      Misc Natural Products (PROSTATE HEALTH) CAPS Take 1 capsule by mouth daily      Multiple Vitamin tablet Take 1 tablet by mouth daily      Resveratrol 50 MG CAPS Take 1 capsule by mouth daily      verapamil (CALAN-SR) 240 mg CR tablet TAKE 1 TABLET DAILY 90 tablet 0     No current facility-administered medications on file prior to visit  Family History   Problem Relation Age of Onset    Diabetes Sister     Other Family         Stroke syndrome     Social History     Socioeconomic History    Marital status: /Civil Union     Spouse name: Not on file    Number of children: Not on file    Years of education: Not on file    Highest education level: Not on file   Occupational History    Not on file   Social Needs    Financial resource strain: Not on file    Food insecurity:     Worry: Not on file     Inability: Not on file    Transportation needs:     Medical: Not on file     Non-medical: Not on file   Tobacco Use    Smoking status: Never Smoker    Smokeless tobacco: Never Used   Substance and Sexual Activity    Alcohol use:  Yes     Alcohol/week: 4 2 oz     Types: 7 Glasses of wine per week     Comment: socially    Drug use: No    Sexual activity: Not on file   Lifestyle    Physical activity:     Days per week: Not on file     Minutes per session: Not on file    Stress: Not on file   Relationships    Social connections:     Talks on phone: Not on file     Gets together: Not on file     Attends Yarsani service: Not on file     Active member of club or organization: Not on file Attends meetings of clubs or organizations: Not on file     Relationship status: Not on file    Intimate partner violence:     Fear of current or ex partner: Not on file     Emotionally abused: Not on file     Physically abused: Not on file     Forced sexual activity: Not on file   Other Topics Concern    Not on file   Social History Narrative    Not on file     Vitals:    03/05/19 0756   BP: 152/82   Pulse: 73   Resp: 16   SpO2: 99%   Weight: 77 3 kg (170 lb 6 4 oz)   Height: 5' 8" (1 727 m)     Results for orders placed or performed in visit on 02/22/19   Lipid Panel with Direct LDL reflex   Result Value Ref Range    Total Cholesterol 141 <200 mg/dL    HDL 58 >40 mg/dL    Triglycerides 37 <150 mg/dL    LDL Direct 72 mg/dL (calc)    Chol HDLC Ratio 2 4 <5 0 (calc)    Non-HDL Cholesterol 83 <130 mg/dL (calc)   Comprehensive metabolic panel   Result Value Ref Range    Glucose, Random 82 65 - 99 mg/dL    BUN 15 7 - 25 mg/dL    Creatinine 0 88 0 70 - 1 25 mg/dL    eGFR Non  90 > OR = 60 mL/min/1 73m2    eGFR  104 > OR = 60 mL/min/1 73m2    SL AMB BUN/CREATININE RATIO NOT APPLICABLE 6 - 22 (calc)    Sodium 138 135 - 146 mmol/L    Potassium 4 6 3 5 - 5 3 mmol/L    Chloride 103 98 - 110 mmol/L    CO2 27 20 - 32 mmol/L    SL AMB CALCIUM 9 6 8 6 - 10 3 mg/dL    Protein, Total 7 2 6 1 - 8 1 g/dL    Albumin 4 8 3 6 - 5 1 g/dL    Globulin 2 4 1 9 - 3 7 g/dL (calc)    Albumin/Globulin Ratio 2 0 1 0 - 2 5 (calc)    TOTAL BILIRUBIN 1 1 0 2 - 1 2 mg/dL    Alkaline Phosphatase 47 40 - 115 U/L    AST 24 10 - 35 U/L    ALT 28 9 - 46 U/L   Hepatitis C Ab W/Refl To HCV RNA, Qn, PCR   Result Value Ref Range    HEP C AB NON-REACTIVE NON-REACTIVE    Signal to Cut-Off 0 02 <1 00   Hemoglobin A1c (w/out EAG)   Result Value Ref Range    Hemoglobin A1C 5 0 <5 7 % of total Hgb     Weight (last 2 days)     Date/Time   Weight    03/05/19 0756   77 3 (170 4)            Body mass index is 25 91 kg/m²    BP Temp      Pulse     Resp      SpO2        Vitals:    03/05/19 0756   Weight: 77 3 kg (170 lb 6 4 oz)     Vitals:    03/05/19 0756   Weight: 77 3 kg (170 lb 6 4 oz)       /82   Pulse 73   Resp 16   Ht 5' 8" (1 727 m)   Wt 77 3 kg (170 lb 6 4 oz)   SpO2 99%   BMI 25 91 kg/m²          Physical Exam   Constitutional: He appears well-developed and well-nourished  No distress  HENT:   Head: Normocephalic and atraumatic  Right Ear: External ear normal    Left Ear: External ear normal    Mouth/Throat: Oropharynx is clear and moist    Eyes: Pupils are equal, round, and reactive to light  Conjunctivae are normal  Right eye exhibits no discharge  Left eye exhibits no discharge  No scleral icterus  Neck: Neck supple  Cardiovascular: Normal rate, regular rhythm and normal heart sounds  Exam reveals no gallop and no friction rub  No murmur heard  Pulmonary/Chest: No respiratory distress  He has no wheezes  He has no rales  Abdominal: Soft  Bowel sounds are normal  He exhibits no distension and no mass  There is no tenderness  There is no rebound and no guarding  Musculoskeletal: He exhibits no edema or deformity  Lymphadenopathy:     He has no cervical adenopathy  Neurological: He is alert  Skin: He is not diaphoretic  Psychiatric: He has a normal mood and affect

## 2019-03-05 NOTE — PROGRESS NOTES
Assessment and Plan:    Problem List Items Addressed This Visit        Cardiovascular and Mediastinum    Hypertension - Primary    Relevant Orders    Comprehensive metabolic panel    Lipid Panel with Direct LDL reflex       Other    Medicare annual wellness visit, subsequent     Assessment and plan 1  Medicare subsequent annual wellness examination overall the patient is clinically stable and doing well, we encouraged the patient to follow a healthy and balanced diet  We recommend that the patient exercise routinely approximately 30 minutes 5 times per week   We have reviewed the patient's vaccines and have made recommendations for updates if necessary  consider the new shingles vaccine      We will be ordering screening laboratories which are age appropriate  Return to the office in  6 months    call if any problems  Other Visit Diagnoses     Iron deficiency anemia, unspecified iron deficiency anemia type        Relevant Orders    CBC (Includes Diff/Plt) (Refl)        Health Maintenance Due   Topic Date Due    Medicare Annual Wellness Visit (AWV)  1955    BMI: Followup Plan  05/19/1971         HPI:  Robert Patton is a 70 y o  male here for his Subsequent Wellness Visit      Patient Active Problem List   Diagnosis    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    Impaired fasting glucose    Hypertension    Encounter for hepatitis C screening test for low risk patient    Need for pneumococcal vaccination    Allergic rhinitis    PVC's (premature ventricular contractions)    Premature atrial contractions    Medicare annual wellness visit, subsequent     Past Medical History:   Diagnosis Date    Asthma     Hypertension     Impaired fasting glucose     Mitral valve disorder     Mitral valve prolapse     Murmur, cardiac     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     PAC (premature atrial contraction)     PVC (premature ventricular contraction)     Thyroid nodule      Past Surgical History:   Procedure Laterality Date    HAND SURGERY      IN COLONOSCOPY FLX DX W/COLLJ SPEC WHEN PFRMD N/A 2/9/2018    Procedure: COLONOSCOPY;  Surgeon: Colleen Lord MD;  [facility]; Service: Gastroenterology    PROSTATE BIOPSY      SHOULDER SURGERY       Family History   Problem Relation Age of Onset    Diabetes Sister     Other Family         Stroke syndrome     Social History     Tobacco Use   Smoking Status Never Smoker   Smokeless Tobacco Never Used     Social History     Substance and Sexual Activity   Alcohol Use Yes    Alcohol/week: 4 2 oz    Types: 7 Glasses of wine per week    Comment: socially      Social History     Substance and Sexual Activity   Drug Use No       Current Outpatient Medications   Medication Sig Dispense Refill    aspirin 81 mg chewable tablet Chew 1 tablet every other day      Coenzyme Q10-Red Yeast Rice  MG CAPS Take 1 capsule by mouth daily      Docosahexaenoic Acid (DHA OMEGA 3) 100 MG CAPS Take 6 capsules by mouth daily      fexofenadine (ALLEGRA) 180 MG tablet Take 1 tablet by mouth daily as needed      lisinopril (ZESTRIL) 20 mg tablet TAKE 1 TABLET DAILY 90 tablet 0    Misc Natural Products (HDL RX) TABS Take 1 tablet by mouth daily      Misc Natural Products (PROSTATE HEALTH) CAPS Take 1 capsule by mouth daily      Multiple Vitamin tablet Take 1 tablet by mouth daily      Resveratrol 50 MG CAPS Take 1 capsule by mouth daily      verapamil (CALAN-SR) 240 mg CR tablet TAKE 1 TABLET DAILY 90 tablet 0     No current facility-administered medications for this visit        No Known Allergies  Immunization History   Administered Date(s) Administered    Influenza TIV (IM) 10/16/2010, 11/05/2011    Pneumococcal Conjugate 13-Valent 08/13/2018    Td (adult), adsorbed 1955, 06/22/2010    Tuberculin Skin Test-PPD Intradermal 03/03/2009    influenza, trivalent, adjuvanted 11/19/2018       Patient Care Team:  Dereck Britton DO as PCP - General  MD Rupinder Nguyen CRNP Gavin Moment, John Trujillo MD    Medicare Screening Tests and Risk Assessments:  Noreen Pryor is here for his Subsequent Wellness visit  Health Risk Assessment:  Patient rates overall health as good  Patient feels that their physical health rating is Same  Eyesight was rated as Same  Hearing was rated as Same  Patient feels that their emotional and mental health rating is Same  Pain experienced by patient in the last 7 days has been None  Patient states that he has experienced no weight loss or gain in last 6 months  (Additional comments: optho  Dr Cyndy Zimmer and Arynga )    [address]:  Patient has been feeling nervous/anxious  PHQ-9 Depression Screening:    Frequency of the following problems over the past two weeks:      1  Little interest or pleasure in doing things: 0 - not at all      2  Feeling down, depressed, or hopeless: 0 - not at all  PHQ-2 Score: 0          Broken Bones/Falls: Fall Risk Assessment:    In the past year, patient has experienced: No history of falling in past year          Bladder/Bowel:  Patient has not leaked urine accidently in the last six months  Patient reports no loss of bowel control  Immunizations:  Patient has had a flu vaccination within the last year  Patient has received a pneumonia shot  Patient has not received a shingles shot  Patient has received tetanus/diphtheria shot  Home Safety:  Patient does not have trouble with stairs inside or outside of their home  Patient currently reports that there are no safety hazards present in home, working smoke alarms, working carbon monoxide detectors        Preventative Screenings:   prostate cancer screen performed, colon cancer screen completed, cholesterol screen completed, glaucoma eye exam completed,     Nutrition:  Current diet: Regular with servings of the following:    Medications:  Patient is currently taking over-the-counter supplements  Patient is able to manage medications  Lifestyle Choices:  Patient reports no tobacco use  Patient has not smoked or used tobacco in the past   Patient reports alcohol use  Patient drives a vehicle  Patient wears seat belt  Activities of Daily Living:  Can get out of bed by his or her self, able to dress self, able to make own meals, able to do own shopping, able to bathe self, can do own laundry/housekeeping, can manage own money, pay bills and track expenses    Previous Hospitalizations:  No hospitalization or ED visit in past 12 months        Advanced Directives:  Patient has decided on a power of   Patient has not completed advanced directive          Preventative Screening/Counseling:      Cardiovascular:      General: Risks and Benefits Discussed      Counseling: Healthy Diet, Healthy Weight and Improve Exercise Tolerance          Diabetes:      Counseling: Healthy Diet, Healthy Weight and Improve Physical Activity          Colorectal Cancer:      General: Screening Current          Prostate Cancer:      General: Screening Current          Osteoporosis:      General: Screening Not Indicated          AAA:      General: Screening Not Indicated          Glaucoma:      General: Screening Current          HIV:      General: Screening Not Indicated          Hepatitis C:      General: Screening Not Indicated        Advanced Directives:   Patient has no living will for healthcare,

## 2019-03-11 DIAGNOSIS — I10 ESSENTIAL HYPERTENSION: ICD-10-CM

## 2019-03-11 RX ORDER — VERAPAMIL HYDROCHLORIDE 240 MG/1
TABLET, FILM COATED, EXTENDED RELEASE ORAL
Qty: 90 TABLET | Refills: 0 | Status: SHIPPED | OUTPATIENT
Start: 2019-03-11 | End: 2019-06-09 | Stop reason: SDUPTHER

## 2019-05-17 DIAGNOSIS — I10 ESSENTIAL HYPERTENSION: ICD-10-CM

## 2019-05-17 RX ORDER — LISINOPRIL 20 MG/1
TABLET ORAL
Qty: 90 TABLET | Refills: 0 | Status: SHIPPED | OUTPATIENT
Start: 2019-05-17 | End: 2019-08-15 | Stop reason: SDUPTHER

## 2019-06-09 DIAGNOSIS — I10 ESSENTIAL HYPERTENSION: ICD-10-CM

## 2019-06-09 RX ORDER — VERAPAMIL HYDROCHLORIDE 240 MG/1
TABLET, FILM COATED, EXTENDED RELEASE ORAL
Qty: 90 TABLET | Refills: 0 | Status: SHIPPED | OUTPATIENT
Start: 2019-06-09 | End: 2019-09-07 | Stop reason: SDUPTHER

## 2019-08-15 DIAGNOSIS — I10 ESSENTIAL HYPERTENSION: ICD-10-CM

## 2019-08-15 RX ORDER — LISINOPRIL 20 MG/1
TABLET ORAL
Qty: 90 TABLET | Refills: 0 | Status: SHIPPED | OUTPATIENT
Start: 2019-08-15 | End: 2019-11-13 | Stop reason: SDUPTHER

## 2019-09-07 DIAGNOSIS — I10 ESSENTIAL HYPERTENSION: ICD-10-CM

## 2019-09-07 LAB
ALBUMIN SERPL-MCNC: 4.6 G/DL (ref 3.6–5.1)
ALBUMIN/GLOB SERPL: 2.1 (CALC) (ref 1–2.5)
ALP SERPL-CCNC: 45 U/L (ref 40–115)
ALT SERPL-CCNC: 33 U/L (ref 9–46)
AST SERPL-CCNC: 25 U/L (ref 10–35)
BASOPHILS # BLD AUTO: 82 CELLS/UL (ref 0–200)
BASOPHILS NFR BLD AUTO: 1.6 %
BILIRUB SERPL-MCNC: 1 MG/DL (ref 0.2–1.2)
BUN SERPL-MCNC: 12 MG/DL (ref 7–25)
BUN/CREAT SERPL: NORMAL (CALC) (ref 6–22)
CALCIUM SERPL-MCNC: 9.4 MG/DL (ref 8.6–10.3)
CHLORIDE SERPL-SCNC: 103 MMOL/L (ref 98–110)
CHOLEST SERPL-MCNC: 150 MG/DL
CHOLEST/HDLC SERPL: 2.3 (CALC)
CO2 SERPL-SCNC: 26 MMOL/L (ref 20–32)
CREAT SERPL-MCNC: 0.92 MG/DL (ref 0.7–1.25)
EOSINOPHIL # BLD AUTO: 490 CELLS/UL (ref 15–500)
EOSINOPHIL NFR BLD AUTO: 9.6 %
ERYTHROCYTE [DISTWIDTH] IN BLOOD BY AUTOMATED COUNT: 12.8 % (ref 11–15)
GLOBULIN SER CALC-MCNC: 2.2 G/DL (CALC) (ref 1.9–3.7)
GLUCOSE SERPL-MCNC: 93 MG/DL (ref 65–99)
HCT VFR BLD AUTO: 46.8 % (ref 38.5–50)
HDLC SERPL-MCNC: 65 MG/DL
HGB BLD-MCNC: 15.2 G/DL (ref 13.2–17.1)
LDLC SERPL CALC-MCNC: 74 MG/DL (CALC)
LYMPHOCYTES # BLD AUTO: 1658 CELLS/UL (ref 850–3900)
LYMPHOCYTES NFR BLD AUTO: 32.5 %
MCH RBC QN AUTO: 29.2 PG (ref 27–33)
MCHC RBC AUTO-ENTMCNC: 32.5 G/DL (ref 32–36)
MCV RBC AUTO: 89.8 FL (ref 80–100)
MONOCYTES # BLD AUTO: 342 CELLS/UL (ref 200–950)
MONOCYTES NFR BLD AUTO: 6.7 %
NEUTROPHILS # BLD AUTO: 2530 CELLS/UL (ref 1500–7800)
NEUTROPHILS NFR BLD AUTO: 49.6 %
NONHDLC SERPL-MCNC: 85 MG/DL (CALC)
PLATELET # BLD AUTO: 297 THOUSAND/UL (ref 140–400)
PMV BLD REES-ECKER: 9.8 FL (ref 7.5–12.5)
POTASSIUM SERPL-SCNC: 4.1 MMOL/L (ref 3.5–5.3)
PROT SERPL-MCNC: 6.8 G/DL (ref 6.1–8.1)
RBC # BLD AUTO: 5.21 MILLION/UL (ref 4.2–5.8)
SL AMB EGFR AFRICAN AMERICAN: 100 ML/MIN/1.73M2
SL AMB EGFR NON AFRICAN AMERICAN: 86 ML/MIN/1.73M2
SODIUM SERPL-SCNC: 136 MMOL/L (ref 135–146)
TRIGL SERPL-MCNC: 40 MG/DL
WBC # BLD AUTO: 5.1 THOUSAND/UL (ref 3.8–10.8)

## 2019-09-07 RX ORDER — VERAPAMIL HYDROCHLORIDE 240 MG/1
TABLET, FILM COATED, EXTENDED RELEASE ORAL
Qty: 90 TABLET | Refills: 4 | Status: SHIPPED | OUTPATIENT
Start: 2019-09-07 | End: 2020-11-30

## 2019-09-12 RX ORDER — EFINACONAZOLE 100 MG/ML
SOLUTION TOPICAL
COMMUNITY
Start: 2019-08-19 | End: 2021-01-11 | Stop reason: ALTCHOICE

## 2019-09-12 RX ORDER — KETOCONAZOLE 20 MG/ML
SHAMPOO TOPICAL
COMMUNITY
Start: 2019-08-17 | End: 2021-03-19 | Stop reason: ALTCHOICE

## 2019-09-16 ENCOUNTER — OFFICE VISIT (OUTPATIENT)
Dept: INTERNAL MEDICINE CLINIC | Facility: CLINIC | Age: 64
End: 2019-09-16
Payer: COMMERCIAL

## 2019-09-16 VITALS
HEIGHT: 68 IN | BODY MASS INDEX: 25.01 KG/M2 | HEART RATE: 71 BPM | DIASTOLIC BLOOD PRESSURE: 84 MMHG | OXYGEN SATURATION: 98 % | WEIGHT: 165 LBS | RESPIRATION RATE: 16 BRPM | SYSTOLIC BLOOD PRESSURE: 148 MMHG

## 2019-09-16 DIAGNOSIS — I49.1 PREMATURE ATRIAL CONTRACTIONS: ICD-10-CM

## 2019-09-16 DIAGNOSIS — I49.3 PVC (PREMATURE VENTRICULAR CONTRACTION): ICD-10-CM

## 2019-09-16 DIAGNOSIS — Z23 NEED FOR PNEUMOCOCCAL VACCINATION: ICD-10-CM

## 2019-09-16 DIAGNOSIS — D50.9 IRON DEFICIENCY ANEMIA, UNSPECIFIED IRON DEFICIENCY ANEMIA TYPE: ICD-10-CM

## 2019-09-16 DIAGNOSIS — I10 ESSENTIAL HYPERTENSION: Primary | ICD-10-CM

## 2019-09-16 DIAGNOSIS — R73.01 IMPAIRED FASTING GLUCOSE: ICD-10-CM

## 2019-09-16 PROCEDURE — 99214 OFFICE O/P EST MOD 30 MIN: CPT | Performed by: INTERNAL MEDICINE

## 2019-09-16 PROCEDURE — 3008F BODY MASS INDEX DOCD: CPT | Performed by: INTERNAL MEDICINE

## 2019-09-16 PROCEDURE — G0009 ADMIN PNEUMOCOCCAL VACCINE: HCPCS

## 2019-09-16 PROCEDURE — 90732 PPSV23 VACC 2 YRS+ SUBQ/IM: CPT

## 2019-09-16 NOTE — ASSESSMENT & PLAN NOTE
Iron deficiency anemia improved after reducing the amount of blood donations to every 3 months instead of every 2 months also patient will see GI for colonoscopy in early 2020

## 2019-09-16 NOTE — ASSESSMENT & PLAN NOTE
Improved reduce carbohydrates, reduce sweets review the laboratories will continue monitor  Continue routine exercise

## 2019-09-16 NOTE — PROGRESS NOTES
BMI Counseling: Body mass index is 25 09 kg/m²  Discussed the patient's BMI with him  The BMI in the normal range I have counselled the pt to follow a healthy and balanced diet ,and recommend routine exercise  Assessment/Plan:    Impaired fasting glucose  Improved reduce carbohydrates, reduce sweets review the laboratories will continue monitor  Continue routine exercise  Hypertension  Hypertension - controlled, I have counseled patient following healthy balance diet, I would like the patient reduce sodium, exercise routinely, I would like the patient continued the [facility] current medical regiment and we will continue to monitor  Premature atrial contractions  Clinically stable doing well continue reduce caffeinated beverages    Iron deficiency anemia  Iron deficiency anemia improved after reducing the amount of blood donations to every 3 months instead of every 2 months also patient will see GI for colonoscopy in early 2020         Problem List Items Addressed This Visit        Endocrine    Impaired fasting glucose     Improved reduce carbohydrates, reduce sweets review the laboratories will continue monitor  Continue routine exercise  Cardiovascular and Mediastinum    Hypertension - Primary     Hypertension - controlled, I have counseled patient following healthy balance diet, I would like the patient reduce sodium, exercise routinely, I would like the patient continued the [facility] current medical regiment and we will continue to monitor           Relevant Orders    Comprehensive metabolic panel    Lipid Panel with Direct LDL reflex    Premature atrial contractions     Clinically stable doing well continue reduce caffeinated beverages         PVC (premature ventricular contraction)    Relevant Orders    Ambulatory referral to Cardiology       Other    Need for pneumococcal vaccination    Relevant Orders    Pneumococcal polysaccharide vaccine 23-valent greater than or equal to 1yo subcutaneous/IM (Completed)    Iron deficiency anemia     Iron deficiency anemia improved after reducing the amount of blood donations to every 3 months instead of every 2 months also patient will see GI for colonoscopy in early 2020               Return to office 6  months  call if any problems  Subjective:      Patient ID: Chhaya Quinonez is a 75 y o  male  HPI 68-year old male coming in for a follow up visit regarding essential hypertension, PVCs, impaired fasting glucose, hyperlipidemia; The patient reports me compliant taking medications without untoward side effects the  The patient is here to review his medical condition, update me on the medical condition and the patient reports me no hospitalizations and no ER visits  He is here to review his laboratories  Overall he is doing   home readings  140 at first and check 3 times and then 120  He reports me that he has reduced his blood donation stay every 3 months previously it was every 2 months  He has seen GI for colonoscopy last year but because of poor prep it must be repeated this year  The following portions of the patient's history were reviewed and updated as appropriate: allergies, current medications, past family history, past medical history, past social history, past surgical history and problem list     Review of Systems   Constitutional: Negative for activity change, appetite change and unexpected weight change  HENT: Negative for congestion and postnasal drip  Eyes: Negative for visual disturbance  Respiratory: Negative for cough and shortness of breath  Cardiovascular: Negative for chest pain  Gastrointestinal: Negative for abdominal pain, diarrhea, nausea and vomiting  Neurological: Negative for dizziness, light-headedness and headaches  Hematological: Negative for adenopathy  Objective:    No follow-ups on file  No results found    Recent Results (from the past 69541 hour(s))   POCT ECG    Narrative    Normal sinus rhythm, normal ECG   Echo complete with contrast if indicated    Narrative    [address]  [address] [facility], [address] [place] [address]  [phone]    Transthoracic Echocardiogram  2D, M-mode, Doppler, and Color Doppler    Study date:  27-Mar-2017    Patient: Kelsie Joyce  MR number: [id number]  Account number: [de-identified]  : 1955  Age: 59 years  Gender: Male  Status: Outpatient  Location: [address] [place] [address] Vascular Deputy  Height: 67 in  Weight: 170 7 lb  BP: 154/ 82 mmHg    Indications: Mitral valve prolapse    Diagnoses: I34 1 - Nonrheumatic mitral (valve) prolapse    Sonographer:  Deidre Fregoso BS, RDCS, RVT, RDMS  Primary Physician:  Ana Baez DO  Referring Physician:  Taniya Joiner DO  Group:  [facility]  Interpreting Physician:  Getachew Tian MD    SUMMARY    LEFT VENTRICLE:  Size was normal   Systolic function was normal  Ejection fraction was estimated to be 55 %  Wall thickness was normal   Left ventricular diastolic function parameters were normal     RIGHT VENTRICLE:  The size was normal   Systolic function was normal     MITRAL VALVE:  There was mild regurgitation  AORTIC VALVE:  There was trace regurgitation  TRICUSPID VALVE:  There was trace regurgitation  COMPARISONS:  There has been no significant interval change  Comparison was made with the previous study of 2011  HISTORY: PRIOR HISTORY: Palpitations, PAc, PVC, HTN, asthma Prior echo   PROCEDURE: The study was performed in the [address]  This was a routine study  The transthoracic approach was used  The study included complete 2D imaging, M-mode, complete spectral Doppler, and color Doppler  Image  quality was adequate  LEFT VENTRICLE: Size was normal  Systolic function was normal  Ejection fraction was estimated to be 55 %  There were no regional wall motion abnormalities   Wall thickness was normal  DOPPLER: Left ventricular diastolic function parameters  were normal     RIGHT VENTRICLE: The size was normal  Systolic function was normal  Wall thickness was normal     LEFT ATRIUM: Size was normal     RIGHT ATRIUM: Size was at the upper limits of normal     MITRAL VALVE: Valve structure was normal  There was normal leaflet separation  There was systolic bowing of the anterior leaflet, but without diagnostic evidence for prolapse  DOPPLER: The transmitral velocity was within the normal range  There was no evidence for stenosis  There was mild regurgitation  AORTIC VALVE: The valve was trileaflet  Leaflets exhibited normal thickness and normal cuspal separation  DOPPLER: Transaortic velocity was within the normal range  There was no evidence for stenosis  There was trace regurgitation  TRICUSPID VALVE: The valve structure was normal  There was normal leaflet separation  DOPPLER: The transtricuspid velocity was within the normal range  There was no evidence for stenosis  There was trace regurgitation  The tricuspid jet  envelope definition was inadequate for estimation of RV systolic pressure  There are no indirect findings suggestive of moderate or severe pulmonary hypertension  PULMONIC VALVE: Leaflets exhibited normal thickness, no calcification, and normal cuspal separation  DOPPLER: The transpulmonic velocity was within the normal range  There was no regurgitation  PERICARDIUM: There was no pericardial effusion  The pericardium was normal in appearance  AORTA: The root exhibited normal size  SYSTEMIC VEINS: IVC: The inferior vena cava was normal in size and course   Respirophasic changes were normal     SYSTEM MEASUREMENT TABLES    2D  %FS: 57 05 %  Ao Diam: 3 53 cm  EDV(Teich): 126 79 ml  EF(Cube): 92 08 %  EF(Teich): 87 03 %  ESV(Cube): 10 84 ml  ESV(Teich): 16 45 ml  IVSd: 1 06 cm  LA Area: 19 3 cm2  LA Diam: 3 58 cm  LVEDV MOD A4C: 106 39 ml  LVEF MOD A4C: 70 73 %  LVESV MOD A4C: 31 14 ml  LVIDd: 5 15 cm  LVIDs: 2 21 cm  LVLd A4C: 9 33 cm  LVLs A4C: 7 71 cm  LVPWd: 1 08 cm  RA Area: 20 82 cm2  RV Diam : 4 35 cm  SI(Cube): 66 65 ml/m2  SI(Teich): 58 38 ml/m2  SV MOD A4C: 75 25 ml  SV(Cube): 125 96 ml  SV(Teich): 110 34 ml    CW  AV Vmax: 1 61 m/s  AV maxPG: 10 4 mmHg    MM  TAPSE: 2 33 cm    PW  E': 0 09 m/s  E/E': 8 43  LVOT Vmax: 1 1 m/s  LVOT maxP 84 mmHg  MV A Jose: 0 55 m/s  MV Dec [place]: 2 74 m/s2  MV DecT: 266 8 ms  MV E Jose: 0 73 m/s  MV E/A Ratio: 1 32    [facility] Accredited [facility]chocardi[facility]    Prepared and electronically signed by    Naun Peace MD  Signed 27-Mar-2017 16:51:56         No Known Allergies    Past Medical History:   Diagnosis Date    Asthma     Hypertension     Impaired fasting glucose     Mitral valve disorder     Mitral valve prolapse     Murmur, cardiac     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     PAC (premature atrial contraction)     PVC (premature ventricular contraction)     Thyroid nodule      Past Surgical History:   Procedure Laterality Date    HAND SURGERY      [place] COLONOSCOPY FLX DX W/COLLJ SPEC WHEN PFRMD N/A 2018    Procedure: COLONOSCOPY;  Surgeon: Juan Grant MD;  [facility];   Service: Gastroenterology    PROSTATE BIOPSY      SHOULDER SURGERY       Current Outpatient Medications on File Prior to Visit   Medication Sig Dispense Refill    aspirin 81 mg chewable tablet Chew 1 tablet every other day      Coenzyme Q10-Red Yeast Rice  MG CAPS Take 1 capsule by mouth daily      Docosahexaenoic Acid (DHA OMEGA 3) 100 MG CAPS Take 6 capsules by mouth daily      fexofenadine (ALLEGRA) 180 MG tablet Take 1 tablet by mouth daily as needed      JUBLIA 10 % SOLN       ketoconazole (NIZORAL) 2 % shampoo       lisinopril (ZESTRIL) 20 mg tablet TAKE 1 TABLET DAILY 90 tablet 0    Misc Natural Products (PROSTATE HEALTH) CAPS Take 1 capsule by mouth daily      Multiple Vitamin tablet Take 1 tablet by mouth daily      verapamil (CALAN-SR) 240 mg CR tablet TAKE 1 TABLET DAILY 90 tablet 4    [DISCONTINUED] Misc Natural Products (HDL RX) TABS Take 1 tablet by mouth daily      [DISCONTINUED] Resveratrol 50 MG CAPS Take 1 capsule by mouth daily       No current facility-administered medications on file prior to visit  Family History   Problem Relation Age of Onset    Diabetes Sister     Other Family         Stroke syndrome     Social History     Socioeconomic History    Marital status: /Civil Union     Spouse name: Not on file    Number of children: Not on file    Years of education: Not on file    Highest education level: Not on file   Occupational History    Not on file   Social Needs    Financial resource strain: Not on file    Food insecurity:     Worry: Not on file     Inability: Not on file    Transportation needs:     Medical: Not on file     Non-medical: Not on file   Tobacco Use    Smoking status: Never Smoker    Smokeless tobacco: Never Used   Substance and Sexual Activity    Alcohol use:  Yes     Alcohol/week: 7 0 standard drinks     Types: 7 Glasses of wine per week     Comment: socially    Drug use: No    Sexual activity: Not on file   Lifestyle    Physical activity:     Days per week: Not on file     Minutes per session: Not on file    Stress: Not on file   Relationships    Social connections:     Talks on phone: Not on file     Gets together: Not on file     Attends Restorationism service: Not on file     Active member of club or organization: Not on file     Attends meetings of clubs or organizations: Not on file     Relationship status: Not on file    Intimate partner violence:     Fear of current or ex partner: Not on file     Emotionally abused: Not on file     Physically abused: Not on file     Forced sexual activity: Not on file   Other Topics Concern    Not on file   Social History Narrative    Not on file     Vitals:    09/16/19 0801   BP: 148/84   Pulse: 71 Resp: 16   SpO2: 98%   Weight: 74 8 kg (165 lb)   Height: 5' 8" (1 727 m)     Results for orders placed or performed in visit on 09/06/19   Lipid Panel with Direct LDL reflex   Result Value Ref Range    Total Cholesterol 150 <200 mg/dL    HDL 65 >40 mg/dL    Triglycerides 40 <150 mg/dL    LDL Direct 74 mg/dL (calc)    Chol HDLC Ratio 2 3 <5 0 (calc)    Non-HDL Cholesterol 85 <130 mg/dL (calc)   Comprehensive metabolic panel   Result Value Ref Range    Glucose, Random 93 65 - 99 mg/dL    BUN 12 7 - 25 mg/dL    Creatinine 0 92 0 70 - 1 25 mg/dL    eGFR Non  86 > OR = 60 mL/min/1 73m2    eGFR  100 > OR = 60 mL/min/1 73m2    SL AMB BUN/CREATININE RATIO NOT APPLICABLE 6 - 22 (calc)    Sodium 136 135 - 146 mmol/L    Potassium 4 1 3 5 - 5 3 mmol/L    Chloride 103 98 - 110 mmol/L    CO2 26 20 - 32 mmol/L    SL AMB CALCIUM 9 4 8 6 - 10 3 mg/dL    Protein, Total 6 8 6 1 - 8 1 g/dL    Albumin 4 6 3 6 - 5 1 g/dL    Globulin 2 2 1 9 - 3 7 g/dL (calc)    Albumin/Globulin Ratio 2 1 1 0 - 2 5 (calc)    TOTAL BILIRUBIN 1 0 0 2 - 1 2 mg/dL    Alkaline Phosphatase 45 40 - 115 U/L    AST 25 10 - 35 U/L    ALT 33 9 - 46 U/L   CBC (Includes Diff/Plt) (Refl)   Result Value Ref Range    White Blood Cell Count 5 1 3 8 - 10 8 Thousand/uL    Red Blood Cell Count 5 21 4 20 - 5 80 Million/uL    Hemoglobin 15 2 13 2 - 17 1 g/dL    HCT 46 8 38 5 - 50 0 %    MCV 89 8 80 0 - 100 0 fL    MCH 29 2 27 0 - 33 0 pg    MCHC 32 5 32 0 - 36 0 g/dL    RDW 12 8 11 0 - 15 0 %    Platelet Count 586 473 - 400 Thousand/uL    SL AMB MPV 9 8 7 5 - 12 5 fL    Neutrophils (Absolute) 2,530 1,500 - 7,800 cells/uL    Lymphocytes (Absolute) 1,658 850 - 3,900 cells/uL    Monocytes (Absolute) 342 200 - 950 cells/uL    Eosinophils (Absolute) 490 15 - 500 cells/uL    Basophils ABS 82 0 - 200 cells/uL    Neutrophils 49 6 %    Lymphocytes 32 5 %    Monocytes 6 7 %    Eosinophils 9 6 %    Basophils PCT 1 6 %     Weight (last 2 days) Date/Time   Weight    09/16/19 0801   74 8 (165)            Body mass index is 25 09 kg/m²  BP      Temp      Pulse     Resp      SpO2        Vitals:    09/16/19 0801   Weight: 74 8 kg (165 lb)     Vitals:    09/16/19 0801   Weight: 74 8 kg (165 lb)       /84   Pulse 71   Resp 16   Ht 5' 8" (1 727 m)   Wt 74 8 kg (165 lb)   SpO2 98%   BMI 25 09 kg/m²          Physical Exam   Constitutional: He appears well-developed and well-nourished  No distress  HENT:   Head: Normocephalic and atraumatic  Right Ear: External ear normal    Left Ear: External ear normal    Mouth/Throat: Oropharynx is clear and moist    Eyes: Pupils are equal, round, and reactive to light  Conjunctivae are normal  Right eye exhibits no discharge  Left eye exhibits no discharge  No scleral icterus  Neck: Neck supple  Cardiovascular: Normal rate, regular rhythm and normal heart sounds  Exam reveals no gallop and no friction rub  No murmur heard  Pulmonary/Chest: No respiratory distress  He has no wheezes  He has no rales  Abdominal: Soft  Bowel sounds are normal  He exhibits no distension and no mass  There is no tenderness  There is no rebound and no guarding  Musculoskeletal: He exhibits no edema or deformity  Lymphadenopathy:     He has no cervical adenopathy  Neurological: He is alert  Skin: He is not diaphoretic  Psychiatric: He has a normal mood and affect

## 2019-11-13 DIAGNOSIS — I10 ESSENTIAL HYPERTENSION: ICD-10-CM

## 2019-11-13 RX ORDER — LISINOPRIL 20 MG/1
TABLET ORAL
Qty: 90 TABLET | Refills: 4 | Status: SHIPPED | OUTPATIENT
Start: 2019-11-13 | End: 2021-01-15 | Stop reason: SDUPTHER

## 2019-11-25 ENCOUNTER — OFFICE VISIT (OUTPATIENT)
Dept: CARDIOLOGY CLINIC | Facility: CLINIC | Age: 64
End: 2019-11-25
Payer: COMMERCIAL

## 2019-11-25 VITALS
HEART RATE: 74 BPM | DIASTOLIC BLOOD PRESSURE: 82 MMHG | HEIGHT: 68 IN | BODY MASS INDEX: 25.31 KG/M2 | SYSTOLIC BLOOD PRESSURE: 180 MMHG | WEIGHT: 167 LBS

## 2019-11-25 DIAGNOSIS — I10 BENIGN ESSENTIAL HYPERTENSION: Primary | ICD-10-CM

## 2019-11-25 DIAGNOSIS — I49.3 PVC'S (PREMATURE VENTRICULAR CONTRACTIONS): ICD-10-CM

## 2019-11-25 DIAGNOSIS — I49.3 PVC (PREMATURE VENTRICULAR CONTRACTION): ICD-10-CM

## 2019-11-25 PROCEDURE — 93000 ELECTROCARDIOGRAM COMPLETE: CPT | Performed by: INTERNAL MEDICINE

## 2019-11-25 PROCEDURE — 99214 OFFICE O/P EST MOD 30 MIN: CPT | Performed by: INTERNAL MEDICINE

## 2019-11-25 NOTE — PROGRESS NOTES
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  I had asked that he have a repeat echocardiogram performed to reassess his mitral regurgitation     This revealed a structurally normal mitral valve with mild regurgitation  I will ask for repeat study after his next visit  His blood pressure is high in the office today  He attributes this to white coat hypertension  He does check his blood pressure at home with predominantly normotensive readings  Therefore no changes were made to his regimen  In terms of his ectopic ventricular and supraventricular beats, he has symptoms occasionally particularly when he consumes caffeine  He will remain on his current dose of verapamil  He will follow-up in one year or sooner if deemed necessary  Interval History:   Mr Lyndsey Germain is a pleasant 69-year-old male who presents to the office today for follow-up  Since his last visit he remains active  He exercises regularly  With the activity he performs he denies any cardiopulmonary symptoms of chest pain or shortness of breath  He denies any signs or symptoms of congestive heart failure including increasing lower extremity edema, paroxysmal nocturnal dyspnea, orthopnea, acute weight gain or increasing abdominal girth  He denies lightheadedness, syncope or presyncope  He denies palpitations or symptoms of claudication      He does check his blood pressure at home faithfully with a blood pressure monitor which has been verified for accuracy by his primary care provider  Problem List     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    Impaired fasting glucose    Hypertension    Encounter for hepatitis C screening test for low risk patient    Need for pneumococcal vaccination    Allergic rhinitis        Past Medical History:   Diagnosis Date    Asthma     Hypertension     Impaired fasting glucose     Mitral valve disorder     Mitral valve prolapse     Murmur, cardiac     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     PAC (premature atrial contraction)     PVC (premature ventricular contraction)     Thyroid nodule      Social History     Socioeconomic History    Marital status: /Civil Union     Spouse name: Not on file    Number of children: Not on file    Years of education: Not on file    Highest education level: Not on file   Occupational History    Not on file   Social Needs    Financial resource strain: Not on file    Food insecurity:     Worry: Not on file     Inability: Not on file    Transportation needs:     Medical: Not on file     Non-medical: Not on file   Tobacco Use    Smoking status: Never Smoker    Smokeless tobacco: Never Used   Substance and Sexual Activity    Alcohol use:  Yes     Alcohol/week: 7 0 standard drinks     Types: 7 Glasses of wine per week     Comment: socially    Drug use: No    Sexual activity: Not on file   Lifestyle    Physical activity:     Days per week: Not on file     Minutes per session: Not on file    Stress: Not on file   Relationships    Social connections:     Talks on phone: Not on file     Gets together: Not on file     Attends Bahai service: Not on file     Active member of club or organization: Not on file     Attends meetings of clubs or organizations: Not on file     Relationship status: Not on file    Intimate partner violence:     Fear of current or ex partner: Not on file     Emotionally abused: Not on file     Physically abused: Not on file     Forced sexual activity: Not on file   Other Topics Concern    Not on file   Social History Narrative    Not on file      Family History   Problem Relation Age of Onset    Diabetes Sister     Other Family         Stroke syndrome     Past Surgical History:   Procedure Laterality Date    HAND SURGERY      IA COLONOSCOPY FLX DX W/COLLJ SPEC WHEN PFRMD N/A 2/9/2018    Procedure: COLONOSCOPY;  Surgeon: Kayley Granado MD;  [facility];   Service: Gastroenterology    PROSTATE BIOPSY      SHOULDER SURGERY         Current Outpatient Medications:     aspirin 81 mg chewable tablet, Chew 1 tablet every other day, Disp: , Rfl:     Coenzyme Q10-Red Yeast Rice  MG CAPS, Take 1 capsule by mouth daily, Disp: , Rfl:     Docosahexaenoic Acid (DHA OMEGA 3) 100 MG CAPS, Take 6 capsules by mouth daily, Disp: , Rfl:     fexofenadine (ALLEGRA) 180 MG tablet, Take 1 tablet by mouth daily as needed, Disp: , Rfl:     JUBLIA 10 % SOLN, , Disp: , Rfl:     ketoconazole (NIZORAL) 2 % shampoo, , Disp: , Rfl:     lisinopril (ZESTRIL) 20 mg tablet, TAKE 1 TABLET DAILY, Disp: 90 tablet, Rfl: 4    Misc Natural Products (PROSTATE HEALTH) CAPS, Take 1 capsule by mouth daily, Disp: , Rfl:     Multiple Vitamin tablet, Take 1 tablet by mouth daily, Disp: , Rfl:     verapamil (CALAN-SR) 240 mg CR tablet, TAKE 1 TABLET DAILY, Disp: 90 tablet, Rfl: 4  No Known Allergies    Labs:     Chemistry        Component Value Date/Time     07/29/2017 0917    K 4 1 09/06/2019 0902     09/06/2019 0902    CO2 26 09/06/2019 0902    BUN 12 09/06/2019 0902    CREATININE 0 92 09/06/2019 0902    CREATININE 0 87 07/29/2017 0917        Component Value Date/Time    CALCIUM 9 4 09/06/2019 0902    ALKPHOS 45 09/06/2019 0902    AST 25 09/06/2019 0902    ALT 33 09/06/2019 0902    BILITOT 0 7 07/29/2017 0917            Lab Results   Component Value Date    CHOL 167 07/29/2017    CHOL 162 01/07/2017    CHOL 152 07/23/2016     Lab Results   Component Value Date    HDL 65 09/06/2019    HDL 58 02/22/2019    HDL 59 08/03/2018     No results found for: [facility]  Lab Results   Component Value Date    TRIG 40 09/06/2019    TRIG 37 02/22/2019    TRIG 34 08/03/2018     No results found for: CHOLHDL    Imaging: No results found  ECG:    Normal sinus rhythm, left atrial abnormality    Review of Systems   Cardiovascular: Negative for chest pain, claudication and cyanosis  All other systems reviewed and are negative  Vitals:    11/25/19 1439   BP: (!) 180/82   Pulse: 74     Vitals:    11/25/19 1439   Weight: 75 8 kg (167 lb)     Height: 5' 8" (172 7 cm)   Body mass index is 25 39 kg/m²      Physical Exam:  General:  Alert and cooperative, appears stated age  HEENT:  PERRLA, EOMI, no scleral icterus, no conjunctival pallor  Neck:  No lymphadenopathy, no thyromegaly, no carotid bruits, no elevated JVP  Heart:  Regular rate and rhythm, normal S1/S2, no S3/S4, no murmur  Lungs:  Clear to auscultation bilaterally   Abdomen:  Soft, non-tender, positive bowel sounds, no rebound or guarding,   no organomegaly   Extremities:  No clubbing, cyanosis or edema   Vascular:  2+ pedal pulses  Skin:  No rashes or lesions on exposed skin  Neurologic:  Cranial nerves II-XII grossly intact without focal deficits

## 2020-02-15 LAB — PSA SERPL-MCNC: 0.4 NG/ML

## 2020-03-27 LAB
ALBUMIN SERPL-MCNC: 4.7 G/DL (ref 3.6–5.1)
ALBUMIN/GLOB SERPL: 2.1 (CALC) (ref 1–2.5)
ALP SERPL-CCNC: 48 U/L (ref 35–144)
ALT SERPL-CCNC: 28 U/L (ref 9–46)
AST SERPL-CCNC: 28 U/L (ref 10–35)
BILIRUB SERPL-MCNC: 1 MG/DL (ref 0.2–1.2)
BUN SERPL-MCNC: 13 MG/DL (ref 7–25)
BUN/CREAT SERPL: ABNORMAL (CALC) (ref 6–22)
CALCIUM SERPL-MCNC: 9.7 MG/DL (ref 8.6–10.3)
CHLORIDE SERPL-SCNC: 102 MMOL/L (ref 98–110)
CHOLEST SERPL-MCNC: 149 MG/DL
CHOLEST/HDLC SERPL: 2.5 (CALC)
CO2 SERPL-SCNC: 27 MMOL/L (ref 20–32)
CREAT SERPL-MCNC: 0.88 MG/DL (ref 0.7–1.25)
GLOBULIN SER CALC-MCNC: 2.2 G/DL (CALC) (ref 1.9–3.7)
GLUCOSE SERPL-MCNC: 102 MG/DL (ref 65–99)
HDLC SERPL-MCNC: 59 MG/DL
LDLC SERPL CALC-MCNC: 77 MG/DL (CALC)
NONHDLC SERPL-MCNC: 90 MG/DL (CALC)
POTASSIUM SERPL-SCNC: 4.1 MMOL/L (ref 3.5–5.3)
PROT SERPL-MCNC: 6.9 G/DL (ref 6.1–8.1)
SL AMB EGFR AFRICAN AMERICAN: 104 ML/MIN/1.73M2
SL AMB EGFR NON AFRICAN AMERICAN: 90 ML/MIN/1.73M2
SODIUM SERPL-SCNC: 137 MMOL/L (ref 135–146)
TRIGL SERPL-MCNC: 46 MG/DL

## 2020-04-07 ENCOUNTER — TELEPHONE (OUTPATIENT)
Dept: UROLOGY | Facility: AMBULATORY SURGERY CENTER | Age: 65
End: 2020-04-07

## 2020-04-14 ENCOUNTER — DOCUMENTATION (OUTPATIENT)
Dept: INTERNAL MEDICINE CLINIC | Facility: CLINIC | Age: 65
End: 2020-04-14

## 2020-07-15 ENCOUNTER — OFFICE VISIT (OUTPATIENT)
Dept: INTERNAL MEDICINE CLINIC | Facility: CLINIC | Age: 65
End: 2020-07-15
Payer: COMMERCIAL

## 2020-07-15 VITALS
RESPIRATION RATE: 16 BRPM | SYSTOLIC BLOOD PRESSURE: 158 MMHG | WEIGHT: 160 LBS | TEMPERATURE: 98 F | HEART RATE: 74 BPM | BODY MASS INDEX: 24.25 KG/M2 | HEIGHT: 68 IN | DIASTOLIC BLOOD PRESSURE: 80 MMHG

## 2020-07-15 DIAGNOSIS — Z00.00 MEDICARE ANNUAL WELLNESS VISIT, SUBSEQUENT: Primary | ICD-10-CM

## 2020-07-15 DIAGNOSIS — Z20.822 EXPOSURE TO COVID-19 VIRUS: ICD-10-CM

## 2020-07-15 DIAGNOSIS — I10 BENIGN ESSENTIAL HYPERTENSION: ICD-10-CM

## 2020-07-15 DIAGNOSIS — R73.01 IMPAIRED FASTING GLUCOSE: ICD-10-CM

## 2020-07-15 DIAGNOSIS — I10 ESSENTIAL HYPERTENSION: ICD-10-CM

## 2020-07-15 PROCEDURE — 3008F BODY MASS INDEX DOCD: CPT | Performed by: INTERNAL MEDICINE

## 2020-07-15 PROCEDURE — 4040F PNEUMOC VAC/ADMIN/RCVD: CPT | Performed by: INTERNAL MEDICINE

## 2020-07-15 PROCEDURE — 1125F AMNT PAIN NOTED PAIN PRSNT: CPT | Performed by: INTERNAL MEDICINE

## 2020-07-15 PROCEDURE — 99214 OFFICE O/P EST MOD 30 MIN: CPT | Performed by: INTERNAL MEDICINE

## 2020-07-15 PROCEDURE — 1036F TOBACCO NON-USER: CPT | Performed by: INTERNAL MEDICINE

## 2020-07-15 PROCEDURE — G0439 PPPS, SUBSEQ VISIT: HCPCS | Performed by: INTERNAL MEDICINE

## 2020-07-15 PROCEDURE — 1170F FXNL STATUS ASSESSED: CPT | Performed by: INTERNAL MEDICINE

## 2020-07-15 PROCEDURE — 1160F RVW MEDS BY RX/DR IN RCRD: CPT | Performed by: INTERNAL MEDICINE

## 2020-07-15 PROCEDURE — 1101F PT FALLS ASSESS-DOCD LE1/YR: CPT | Performed by: INTERNAL MEDICINE

## 2020-07-15 PROCEDURE — 3079F DIAST BP 80-89 MM HG: CPT | Performed by: INTERNAL MEDICINE

## 2020-07-15 PROCEDURE — 3288F FALL RISK ASSESSMENT DOCD: CPT | Performed by: INTERNAL MEDICINE

## 2020-07-15 PROCEDURE — 3077F SYST BP >= 140 MM HG: CPT | Performed by: INTERNAL MEDICINE

## 2020-07-15 NOTE — ASSESSMENT & PLAN NOTE
He reports me a viral syndrome in February he is concerned it might have been COVID-19 the patient symptoms did resolve will check COVID-19 IgG antibody

## 2020-07-15 NOTE — PROGRESS NOTES
Assessment and Plan:     Problem List Items Addressed This Visit        Endocrine    Impaired fasting glucose     Pre diabetes -I have counseled the patient to follow a healthy balanced diet, I have counseled patient reduce carbohydrates and sweets in the diet, I would like the patient exercise routinely  I will be checking hemoglobin A1c and comprehensive metabolic panel  Have counseled patient about the prevention of diabetes, and the risk of progression to type 2 diabetes  Relevant Orders    Hemoglobin A1C       Cardiovascular and Mediastinum    Benign essential hypertension     Element of white coat hypertension his home readings are stable and doing very well he will continue monitor he is running in the 130/80 range continue lisinopril 20 mg once daily, verapamil  mg once daily         Essential hypertension    Relevant Orders    Comprehensive metabolic panel    Lipid Panel with Direct LDL reflex       Other    Medicare annual wellness visit, subsequent - Primary     Assessment and plan 1  Medicare subsequent annual wellness examination overall the patient is clinically stable and doing well, we encouraged the patient to follow a healthy and balanced diet  We recommend that the patient exercise routinely approximately 30 minutes 5 times per week   We have reviewed the patient's vaccines and have made recommendations for updates if necessary   annual flu vaccine, consider the new shingles vaccine     We will be ordering screening laboratories which are age appropriate  Return to the office in  6 months    call if any problems  Exposure to COVID-19 virus     He reports me a viral syndrome in February he is concerned it might have been COVID-19 the patient symptoms did resolve will check COVID-19 IgG antibody           Relevant Orders    SARS CoV 2 SEROLOGY (COVID 19) AB (IGG), [place]           Preventive health issues were discussed with patient, and age appropriate screening tests were ordered as noted in patient's After Visit Summary  Personalized health advice and appropriate referrals for health education or preventive services given if needed, as noted in patient's After Visit Summary  History of Present Illness:     Patient presents for Medicare Annual Wellness visit    Patient Care Team:  Ellyn Calhoun DO as PCP - General  Geoffrey Sears DO as PCP - [address] [place], [place] [id number] [facility]  MD Dillon Hutchinson CRNP Tolbert Mall, MD Bronson Loh, MD as Endoscopist     Problem List:     Patient Active Problem List   Diagnosis    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    Impaired fasting glucose    Benign essential hypertension    Encounter for hepatitis C screening test for low risk patient    Need for pneumococcal vaccination    Allergic rhinitis    PVC's (premature ventricular contractions)    Premature atrial contractions    Medicare annual wellness visit, subsequent    Iron deficiency anemia    Elevated blood sugar    PVC (premature ventricular contraction)    Exposure to COVID-19 virus    Essential hypertension      Past Medical and Surgical History:     Past Medical History:   Diagnosis Date    Asthma     Hypertension     Impaired fasting glucose     Mitral valve disorder     Mitral valve prolapse     Murmur, cardiac     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     PAC (premature atrial contraction)     PVC (premature ventricular contraction)     Thyroid nodule      Past Surgical History:   Procedure Laterality Date    HAND SURGERY      MA COLONOSCOPY FLX DX W/COLLJ SPEC WHEN PFRMD N/A 2/9/2018    Procedure: COLONOSCOPY;  Surgeon: Salas Cano MD;  [facility];   Service: Gastroenterology    PROSTATE BIOPSY      SHOULDER SURGERY        Family History:     Family History   Problem Relation Age of Onset    Diabetes Sister     Other Family         Stroke syndrome      Social History: Social History     Socioeconomic History    Marital status: /Civil Union     Spouse name: None    Number of children: None    Years of education: None    Highest education level: None   Occupational History    None   Social Needs    Financial resource strain: None    Food insecurity:     Worry: None     Inability: None    Transportation needs:     Medical: None     Non-medical: None   Tobacco Use    Smoking status: Never Smoker    Smokeless tobacco: Never Used   Substance and Sexual Activity    Alcohol use:  Yes     Alcohol/week: 7 0 standard drinks     Types: 7 Glasses of wine per week     Comment: socially    Drug use: No    Sexual activity: None   Lifestyle    Physical activity:     Days per week: None     Minutes per session: None    Stress: None   Relationships    Social connections:     Talks on phone: None     Gets together: None     Attends Pentecostalism service: None     Active member of club or organization: None     Attends meetings of clubs or organizations: None     Relationship status: None    Intimate partner violence:     Fear of current or ex partner: None     Emotionally abused: None     Physically abused: None     Forced sexual activity: None   Other Topics Concern    None   Social History Narrative    None      Medications and Allergies:     Current Outpatient Medications   Medication Sig Dispense Refill    aspirin 81 mg chewable tablet Chew 1 tablet every other day      Coenzyme Q10-Red Yeast Rice  MG CAPS Take 1 capsule by mouth daily      Docosahexaenoic Acid (DHA OMEGA 3) 100 MG CAPS Take 6 capsules by mouth daily      fexofenadine (ALLEGRA) 180 MG tablet Take 1 tablet by mouth daily as needed      JUBLIA 10 % SOLN       ketoconazole (NIZORAL) 2 % shampoo       lisinopril (ZESTRIL) 20 mg tablet TAKE 1 TABLET DAILY 90 tablet 4    Misc Natural Products (PROSTATE HEALTH) CAPS Take 1 capsule by mouth daily      Multiple Vitamin tablet Take 1 tablet by mouth daily      verapamil (CALAN-SR) 240 mg CR tablet TAKE 1 TABLET DAILY 90 tablet 4     No current facility-administered medications for this visit  No Known Allergies   Immunizations:     Immunization History   Administered Date(s) Administered    Influenza Split High Dose Preservative Free IM 03/02/2020    Influenza TIV (IM) 10/16/2010, 11/05/2011    Pneumococcal Conjugate 13-Valent 08/13/2018    Pneumococcal Polysaccharide PPV23 09/16/2019    Td (adult), adsorbed 1955, 06/22/2010    Tuberculin Skin Test-PPD Intradermal 03/03/2009    influenza, trivalent, adjuvanted 11/19/2018      Health Maintenance:         Topic Date Due    CRC Screening: Colonoscopy  02/09/2028    Hepatitis C Screening  Completed         Topic Date Due    Influenza Vaccine  07/01/2020      Medicare Health Risk Assessment:     /80   Pulse 74   Temp 98 °F (36 7 °C) (Temporal)   Resp 16   Ht 5' 8" (1 727 m)   Wt 72 6 kg (160 lb)   BMI 24 33 kg/m²      Taft Primrose is here for his Subsequent Wellness visit  Health Risk Assessment:   Patient rates overall health as good  Patient feels that their physical health rating is same  Eyesight was rated as same  Hearing was rated as same  Patient feels that their emotional and mental health rating is same  Pain experienced in the last 7 days has been none  Patient states that he has experienced no weight loss or gain in last 6 months  Depression Screening:   PHQ-2 Score: 0      Fall Risk Screening: In the past year, patient has experienced: no history of falling in past year      Home Safety:  Patient does not have trouble with stairs inside or outside of their home  Patient has working smoke alarms and has working carbon monoxide detector  Home safety hazards include: none  Nutrition:   Current diet is Regular  Medications:   Patient is currently taking over-the-counter supplements   OTC medications include: see medication list  Patient is able to manage medications  Activities of Daily Living (ADLs)/Instrumental Activities of Daily Living (IADLs):   Walk and transfer into and out of bed and chair?: Yes  Dress and groom yourself?: Yes    Bathe or shower yourself?: Yes    Feed yourself? Yes  Do your laundry/housekeeping?: Yes  Manage your money, pay your bills and track your expenses?: Yes  Make your own meals?: Yes    Do your own shopping?: Yes    Previous Hospitalizations:   Any hospitalizations or ED visits within the last 12 months?: No      Advance Care Planning:   Living will: Yes    Durable POA for healthcare:  Yes    Advanced directive: Yes      Cognitive Screening:   Provider or family/friend/caregiver concerned regarding cognition?: No    PREVENTIVE SCREENINGS      Cardiovascular Screening:    General: Screening Current      Diabetes Screening:     General: Screening Current      Colorectal Cancer Screening:     General: Screening Current      Prostate Cancer Screening:    General: Screening Current      Abdominal Aortic Aneurysm (AAA) Screening:    Risk factors include: age between 73-69 yo        Lung Cancer Screening:     General: Screening Not Indicated      Hepatitis C Screening:    General: Screening Current      Sae Becerra, DO

## 2020-07-15 NOTE — ASSESSMENT & PLAN NOTE
Assessment and plan 1  Medicare subsequent annual wellness examination overall the patient is clinically stable and doing well, we encouraged the patient to follow a healthy and balanced diet  We recommend that the patient exercise routinely approximately 30 minutes 5 times per week   We have reviewed the patient's vaccines and have made recommendations for updates if necessary   annual flu vaccine, consider the new shingles vaccine     We will be ordering screening laboratories which are age appropriate  Return to the office in  6 months    call if any problems

## 2020-07-15 NOTE — PROGRESS NOTES
Assessment/Plan:    Impaired fasting glucose  Pre diabetes -I have counseled the patient to follow a healthy balanced diet, I have counseled patient reduce carbohydrates and sweets in the diet, I would like the patient exercise routinely  I will be checking hemoglobin A1c and comprehensive metabolic panel  Have counseled patient about the prevention of diabetes, and the risk of progression to type 2 diabetes  Benign essential hypertension  Element of white coat hypertension his home readings are stable and doing very well he will continue monitor he is running in the 130/80 range continue lisinopril 20 mg once daily, verapamil  mg once daily    Exposure to COVID-19 virus  He reports me a viral syndrome in February he is concerned it might have been COVID-19 the patient symptoms did resolve will check COVID-19 IgG antibody  Problem List Items Addressed This Visit        Endocrine    Impaired fasting glucose     Pre diabetes -I have counseled the patient to follow a healthy balanced diet, I have counseled patient reduce carbohydrates and sweets in the diet, I would like the patient exercise routinely  I will be checking hemoglobin A1c and comprehensive metabolic panel  Have counseled patient about the prevention of diabetes, and the risk of progression to type 2 diabetes           Relevant Orders    Hemoglobin A1C       Cardiovascular and Mediastinum    Benign essential hypertension     Element of white coat hypertension his home readings are stable and doing very well he will continue monitor he is running in the 130/80 range continue lisinopril 20 mg once daily, verapamil  mg once daily         Essential hypertension    Relevant Orders    Comprehensive metabolic panel    Lipid Panel with Direct LDL reflex       Other    Medicare annual wellness visit, subsequent - Primary    Exposure to COVID-19 virus     He reports me a viral syndrome in February he is concerned it might have been COVID-19 the patient symptoms did resolve will check COVID-19 IgG antibody  Relevant Orders    [facility] CoV 2 SEROLOGY (COVID 19) [facility] ([facility]), [place]          Return to office 6  months  call if any problems  Subjective:      Patient ID: Karie Stack is a 77 y o  male  HPI 68-year old male coming in for a follow up visit regarding viral illness? 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; he is following healthy/balance diet  He is trying his best with COVID-19  He does report me in February will being treated for a dental infection he developed viral-like symptoms  cough, st weak after dental procedure a couple weeks feb 13 130/80 bp home    The following portions of the patient's history were reviewed and updated as appropriate: allergies, current medications, past family history, past medical history, past social history, past surgical history and problem list     Review of Systems   Constitutional: Negative for activity change, appetite change and unexpected weight change  HENT: Negative for congestion and postnasal drip  Respiratory: Negative for cough and shortness of breath  Cardiovascular: Negative for chest pain  Gastrointestinal: Negative for abdominal pain, diarrhea, nausea and vomiting  Neurological: Negative for dizziness, light-headedness and headaches  Objective:    No follow-ups on file  No results found        No Known Allergies    Past Medical History:   Diagnosis Date    Asthma     Hypertension     Impaired fasting glucose     Mitral valve disorder     Mitral valve prolapse     Murmur, cardiac     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     PAC (premature atrial contraction)     PVC (premature ventricular contraction)     Thyroid nodule      Past Surgical History:   Procedure Laterality Date    HAND SURGERY      OR COLONOSCOPY FLX DX W/COLLJ SPEC WHEN PFRMD N/A 2/9/2018    Procedure: COLONOSCOPY;  Surgeon: Victor Manuel Larkin MD;  [facility]; Service: Gastroenterology    PROSTATE BIOPSY      SHOULDER SURGERY       Current Outpatient Medications on File Prior to Visit   Medication Sig Dispense Refill    aspirin 81 mg chewable tablet Chew 1 tablet every other day      Coenzyme Q10-Red Yeast Rice  MG CAPS Take 1 capsule by mouth daily      Docosahexaenoic Acid (DHA OMEGA 3) 100 MG CAPS Take 6 capsules by mouth daily      fexofenadine (ALLEGRA) 180 MG tablet Take 1 tablet by mouth daily as needed      JUBLIA 10 % SOLN       ketoconazole (NIZORAL) 2 % shampoo       lisinopril (ZESTRIL) 20 mg tablet TAKE 1 TABLET DAILY 90 tablet 4    Misc Natural Products (PROSTATE HEALTH) CAPS Take 1 capsule by mouth daily      Multiple Vitamin tablet Take 1 tablet by mouth daily      verapamil (CALAN-SR) 240 mg CR tablet TAKE 1 TABLET DAILY 90 tablet 4     No current facility-administered medications on file prior to visit  Family History   Problem Relation Age of Onset    Diabetes Sister     Other Family         Stroke syndrome     Social History     Socioeconomic History    Marital status: /Civil Union     Spouse name: Not on file    Number of children: Not on file    Years of education: Not on file    Highest education level: Not on file   Occupational History    Not on file   Social Needs    Financial resource strain: Not on file    Food insecurity:     Worry: Not on file     Inability: Not on file    Transportation needs:     Medical: Not on file     Non-medical: Not on file   Tobacco Use    Smoking status: Never Smoker    Smokeless tobacco: Never Used   Substance and Sexual Activity    Alcohol use:  Yes Alcohol/week: 7 0 standard drinks     Types: 7 Glasses of wine per week     Comment: socially    Drug use: No    Sexual activity: Not on file   Lifestyle    Physical activity:     Days per week: Not on file     Minutes per session: Not on file    Stress: Not on file   Relationships    Social connections:     Talks on phone: Not on file     Gets together: Not on file     Attends [employer] service: Not on file     Active member of club or organization: Not on file     Attends meetings of clubs or organizations: Not on file     Relationship status: Not on file    Intimate partner violence:     Fear of current or ex partner: Not on file     Emotionally abused: Not on file     Physically abused: Not on file     Forced sexual activity: Not on file   Other Topics Concern    Not on file   Social History Narrative    Not on file     Vitals:    07/15/20 1534   BP: 158/80   Pulse: 74   Resp: 16   Temp: 98 °F (36 7 °C)   TempSrc: Temporal   Weight: 72 6 kg (160 lb)   Height: 5' 8" (1 727 m)     Results for orders placed or performed in visit on 03/27/20   Lipid Panel with Direct LDL reflex   Result Value Ref Range    Total Cholesterol 149 <200 mg/dL    HDL 59 > OR = 40 mg/dL    Triglycerides 46 <150 mg/dL    LDL Calculated 77 mg/dL (calc)    Chol HDLC Ratio 2 5 <5 0 (calc)    Non-HDL Cholesterol 90 <130 mg/dL (calc)   Comprehensive metabolic panel   Result Value Ref Range    Glucose, Random 102 (H) 65 - 99 mg/dL    BUN 13 7 - 25 mg/dL    Creatinine 0 88 0 70 - 1 25 mg/dL    eGFR Non  90 > OR = 60 mL/min/1 73m2    eGFR  104 > OR = 60 mL/min/1 73m2    SL AMB BUN/CREATININE RATIO NOT APPLICABLE 6 - 22 (calc)    Sodium 137 135 - 146 mmol/L    Potassium 4 1 3 5 - 5 3 mmol/L    Chloride 102 98 - 110 mmol/L    CO2 27 20 - 32 mmol/L    Calcium 9 7 8 6 - 10 3 mg/dL    Protein, Total 6 9 6 1 - 8 1 g/dL    Albumin 4 7 3 6 - 5 1 g/dL    Globulin 2 2 1 9 - 3 7 g/dL (calc)    Albumin/Globulin Ratio 2 1 1 0 - 2 5 (calc)    TOTAL BILIRUBIN 1 0 0 2 - 1 2 mg/dL    Alkaline Phosphatase 48 35 - 144 U/L    AST 28 10 - 35 U/L    ALT 28 9 - 46 U/L     Weight (last 2 days)     Date/Time   Weight    07/15/20 1534   72 6 (160)            Body mass index is 24 33 kg/m²  BP      Temp      Pulse     Resp      SpO2        Vitals:    07/15/20 1534   Weight: 72 6 kg (160 lb)     Vitals:    07/15/20 1534   Weight: 72 6 kg (160 lb)       /80   Pulse 74   Temp 98 °F (36 7 °C) (Temporal)   Resp 16   Ht 5' 8" (1 727 m)   Wt 72 6 kg (160 lb)   BMI 24 33 kg/m²          Physical Exam   Constitutional: He appears well-developed and well-nourished  No distress  HENT:   Head: Normocephalic and atraumatic  Right Ear: External ear normal    Left Ear: External ear normal    Eyes: Pupils are equal, round, and reactive to light  Conjunctivae are normal  Right eye exhibits no discharge  Left eye exhibits no discharge  No scleral icterus  Neck: Neck supple  Cardiovascular: Normal rate, regular rhythm and normal heart sounds  Exam reveals no gallop and no friction rub  No murmur heard  Pulmonary/Chest: No respiratory distress  He has no wheezes  He has no rales  Abdominal: Soft  Bowel sounds are normal  He exhibits no distension and no mass  There is no tenderness  There is no rebound and no guarding  Musculoskeletal: He exhibits no edema or deformity  Lymphadenopathy:     He has no cervical adenopathy  Neurological: He is alert  Skin: He is not diaphoretic  Psychiatric: He has a normal mood and affect

## 2020-07-15 NOTE — PATIENT INSTRUCTIONS
Medicare Preventive Visit Patient Instructions  Thank you for completing your Welcome to Medicare Visit or Medicare Annual Wellness Visit today  Your next wellness visit will be due in one year (7/15/2021)  The screening/preventive services that you may require over the next 5-10 years are detailed below  Some tests may not apply to you based off risk factors and/or age  Screening tests ordered at today's visit but not completed yet may show as past due  Also, please note that scanned in results may not display below  Preventive Screenings:  Service Recommendations Previous Testing/Comments   Colorectal Cancer Screening  · Colonoscopy    · Fecal Occult Blood Test (FOBT)/Fecal Immunochemical Test (FIT)  · Fecal DNA/Cologuard Test  · Flexible Sigmoidoscopy Age: 54-65 years old   Colonoscopy: every 10 years (May be performed more frequently if at higher risk)  OR  FOBT/FIT: every 1 year  OR  Cologuard: every 3 years  OR  Sigmoidoscopy: every 5 years  Screening may be recommended earlier than age 48 if at higher risk for colorectal cancer  Also, an individualized decision between you and your healthcare provider will decide whether screening between the ages of 74-80 would be appropriate  Colonoscopy: 02/09/2018  FOBT/FIT: Not on file  Cologuard: Not on file  Sigmoidoscopy: Not on file         Prostate Cancer Screening Individualized decision between patient and health care provider in men between ages of 53-78   Medicare will cover every 12 months beginning on the day after your 50th birthday PSA: 0 4 ng/mL          Hepatitis C Screening Once for adults born between 1945 and 1965  More frequently in patients at high risk for Hepatitis C Hep C Antibody: 02/22/2019       Diabetes Screening 1-2 times per year if you're at risk for diabetes or have pre-diabetes Fasting glucose: No results in last 5 years   A1C: 5 0 % of total Hgb       Cholesterol Screening Once every 5 years if you don't have a lipid disorder   May order more often based on risk factors  Lipid panel: 03/27/2020          Other Preventive Screenings Covered by Medicare:  1  Abdominal Aortic Aneurysm (AAA) Screening: covered once if your at risk  You're considered to be at risk if you have a family history of AAA or a male between the age of 73-68 who smoking at least 100 cigarettes in your lifetime  2  Lung Cancer Screening: covers low dose CT scan once per year if you meet all of the following conditions: (1) Age 50-69; (2) No signs or symptoms of lung cancer; (3) Current smoker or have quit smoking within the last 15 years; (4) You have a tobacco smoking history of at least 30 pack years (packs per day x number of years you smoked); (5) You get a written order from a healthcare provider  3  Glaucoma Screening: covered annually if you're considered high risk: (1) You have diabetes OR (2) Family history of glaucoma OR (3)  aged 48 and older OR (3)  American aged 72 and older  3  Osteoporosis Screening: covered every 2 years if you meet one of the following conditions: (1) Have a vertebral abnormality; (2) On glucocorticoid therapy for more than 3 months; (3) Have primary hyperparathyroidism; (4) On osteoporosis medications and need to assess response to drug therapy  5  HIV Screening: covered annually if you're between the age of 12-76  Also covered annually if you are younger than 13 and older than 72 with risk factors for HIV infection  For pregnant patients, it is covered up to 3 times per pregnancy      Immunizations:  Immunization Recommendations   Influenza Vaccine Annual influenza vaccination during flu season is recommended for all persons aged >= 6 months who do not have contraindications   Pneumococcal Vaccine (Prevnar and Pneumovax)  * Prevnar = PCV13  * Pneumovax = PPSV23 Adults 25-60 years old: 1-3 doses may be recommended based on certain risk factors  Adults 72 years old: Prevnar (PCV13) vaccine recommended followed by Pneumovax (PPSV23) vaccine  If already received PPSV23 since turning 65, then PCV13 recommended at least one year after PPSV23 dose  Hepatitis B Vaccine 3 dose series if at intermediate or high risk (ex: diabetes, end stage renal disease, liver disease)   Tetanus (Td) Vaccine - COST NOT COVERED BY MEDICARE PART B Following completion of primary series, a booster dose should be given every 10 years to maintain immunity against tetanus  Td may also be given as tetanus wound prophylaxis  Tdap Vaccine - COST NOT COVERED BY MEDICARE PART B Recommended at least once for all adults  For pregnant patients, recommended with each pregnancy  Shingles Vaccine (Shingrix) - COST NOT COVERED BY MEDICARE PART B  2 shot series recommended in those aged 48 and above     Health Maintenance Due:      Topic Date Due    CRC Screening: Colonoscopy  02/09/2028    Hepatitis C Screening  Completed     Immunizations Due:      Topic Date Due    Influenza Vaccine  07/01/2020     Advance Directives   What are advance directives? Advance directives are legal documents that state your wishes and plans for medical care  These plans are made ahead of time in case you lose your ability to make decisions for yourself  Advance directives can apply to any medical decision, such as the treatments you want, and if you want to donate organs  What are the types of advance directives? There are many types of advance directives, and each state has rules about how to use them  You may choose a combination of any of the following:  · Living will: This is a written record of the treatment you want  You can also choose which treatments you do not want, which to limit, and which to stop at a certain time  This includes surgery, medicine, IV fluid, and tube feedings  · Durable power of  for healthcare [facility]): This is a written record that states who you want to make healthcare choices for you when you are unable to make them for yourself  This person, called a proxy, is usually a family member or a friend  You may choose more than 1 proxy  · Do not resuscitate (DNR) order:  A DNR order is used in case your heart stops beating or you stop breathing  It is a request not to have certain forms of treatment, such as CPR  A DNR order may be included in other types of advance directives  · Medical directive: This covers the care that you want if you are in a coma, near death, or unable to make decisions for yourself  You can list the treatments you want for each condition  Treatment may include pain medicine, surgery, blood transfusions, dialysis, IV or tube feedings, and a ventilator (breathing machine)  · Values history: This document has questions about your views, beliefs, and how you feel and think about life  This information can help others choose the care that you would choose  Why are advance directives important? An advance directive helps you control your care  Although spoken wishes may be used, it is better to have your wishes written down  Spoken wishes can be misunderstood, or not followed  Treatments may be given even if you do not want them  An advance directive may make it easier for your family to make difficult choices about your care  © Copyright [employer] 2018 Information is for End User's use only and may not be sold, redistributed or otherwise used for commercial purposes   All illustrations and images included in CareNotes® are the copyrighted property of A D A M [employer] Inc  or [facility] [employer]

## 2020-07-15 NOTE — ASSESSMENT & PLAN NOTE
Element of white coat hypertension his home readings are stable and doing very well he will continue monitor he is running in the 130/80 range continue lisinopril 20 mg once daily, verapamil  mg once daily

## 2020-07-17 LAB — SARS-COV-2 IGG SERPL QL IA: NEGATIVE

## 2020-09-15 ENCOUNTER — OFFICE VISIT (OUTPATIENT)
Dept: UROLOGY | Facility: AMBULATORY SURGERY CENTER | Age: 65
End: 2020-09-15
Payer: COMMERCIAL

## 2020-09-15 VITALS
TEMPERATURE: 98 F | SYSTOLIC BLOOD PRESSURE: 168 MMHG | WEIGHT: 161 LBS | BODY MASS INDEX: 25.27 KG/M2 | RESPIRATION RATE: 18 BRPM | HEIGHT: 67 IN | HEART RATE: 80 BPM | DIASTOLIC BLOOD PRESSURE: 90 MMHG

## 2020-09-15 DIAGNOSIS — Z12.5 PROSTATE CANCER SCREENING: Primary | ICD-10-CM

## 2020-09-15 PROCEDURE — 3077F SYST BP >= 140 MM HG: CPT | Performed by: NURSE PRACTITIONER

## 2020-09-15 PROCEDURE — 1036F TOBACCO NON-USER: CPT | Performed by: NURSE PRACTITIONER

## 2020-09-15 PROCEDURE — 3080F DIAST BP >= 90 MM HG: CPT | Performed by: NURSE PRACTITIONER

## 2020-09-15 PROCEDURE — 1160F RVW MEDS BY RX/DR IN RCRD: CPT | Performed by: NURSE PRACTITIONER

## 2020-09-15 PROCEDURE — 99213 OFFICE O/P EST LOW 20 MIN: CPT | Performed by: NURSE PRACTITIONER

## 2020-09-15 NOTE — PROGRESS NOTES
9/15/2020      Chief Complaint   Patient presents with    Prostate Cancer Screening     Assessment and Plan    77 y o  male managed by Dr Key Gains    1  Prostate cancer screening  · PSA performed 02/14/2020 resulted 0 4  · LEXIE reveals a prostate of approximate 40 grams no nodules noted   · Repeat PSA and LEXIE in 1 year    History of Present Illness  Marianne Subramanian is a 77 y o  male here for follow up evaluation of  prostate cancer screening  At some point time the patient was found of a prostate nodule  This is not been palpated as of recently  His PSA is low and stable at 0 5  He has no urinary complaints are lower urinary tract symptoms at his visit today  These in his AUA symptom score listed as below  Component       PSA, Total   Latest Ref Rng & Units       < OR = 4 0 ng/mL   2/3/2018      9:01 AM 0 5   2/8/2019      10:40 AM 0 5   2/14/2020      8:59 AM 0 4     AUA SYMPTOM SCORE      Most Recent Value   AUA SYMPTOM SCORE   How often have you had a sensation of not emptying your bladder completely after you finished urinating? 1   How often have you had to urinate again less than two hours after you finished urinating? 1   How often have you found you stopped and started again several times when you urinate? 1   How often have you found it difficult to postpone urination? 1   How often have you had a weak urinary stream?  1   How often have you had to push or strain to begin urination? 0   How many times did you most typically get up to urinate from the time you went to bed at night until the time you got up in the morning? 1   Quality of Life: If you were to spend the rest of your life with your urinary condition just the way it is now, how would you feel about that?  1   AUA SYMPTOM SCORE  6        Review of Systems   Constitutional: Negative for chills and fever  Respiratory: Negative for cough and shortness of breath  Cardiovascular: Negative for chest pain     Gastrointestinal: Negative for abdominal distention, abdominal pain, blood in stool, nausea and vomiting  Genitourinary: Negative for difficulty urinating, dysuria, enuresis, flank pain, frequency, hematuria and urgency  Skin: Negative for rash  Past Medical History  Past Medical History:   Diagnosis Date    Asthma     Hypertension     Impaired fasting glucose     Mitral valve disorder     Mitral valve prolapse     Murmur, cardiac     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     PAC (premature atrial contraction)     PVC (premature ventricular contraction)     Thyroid nodule        Past Social History  Past Surgical History:   Procedure Laterality Date    HAND SURGERY      OK COLONOSCOPY FLX DX W/COLLJ SPEC WHEN PFRMD N/A 2/9/2018    Procedure: COLONOSCOPY;  Surgeon: Victor Manuel Larkin MD;  [facility]; Service: Gastroenterology    PROSTATE BIOPSY      SHOULDER SURGERY       Social History     Tobacco Use   Smoking Status Never Smoker   Smokeless Tobacco Never Used       Past Family History  Family History   Problem Relation Age of Onset    Diabetes Sister     Other Family         Stroke syndrome       Past Social history  Social History     Socioeconomic History    Marital status: /Civil Union     Spouse name: Not on file    Number of children: Not on file    Years of education: Not on file    Highest education level: Not on file   Occupational History    Not on file   Social Needs    Financial resource strain: Not on file    Food insecurity     Worry: Not on file     Inability: Not on file    Transportation needs     Medical: Not on file     Non-medical: Not on file   Tobacco Use    Smoking status: Never Smoker    Smokeless tobacco: Never Used   Substance and Sexual Activity    Alcohol use:  Yes     Alcohol/week: 7 0 standard drinks     Types: 7 Glasses of wine per week     Comment: socially    Drug use: No    Sexual activity: Not on file   Lifestyle    Physical activity     Days per week: Not on file     Minutes per session: Not on file    Stress: Not on file   Relationships    Social connections     Talks on phone: Not on file     Gets together: Not on file     Attends Tenriism service: Not on file     Active member of club or organization: Not on file     Attends meetings of clubs or organizations: Not on file     Relationship status: Not on file    Intimate partner violence     Fear of current or ex partner: Not on file     Emotionally abused: Not on file     Physically abused: Not on file     Forced sexual activity: Not on file   Other Topics Concern    Not on file   Social History Narrative    Not on file       Current Medications  Current Outpatient Medications   Medication Sig Dispense Refill    aspirin 81 mg chewable tablet Chew 1 tablet every other day      Coenzyme Q10-Red Yeast Rice  MG CAPS Take 1 capsule by mouth daily      Docosahexaenoic Acid (DHA OMEGA 3) 100 MG CAPS Take 6 capsules by mouth daily      fexofenadine (ALLEGRA) 180 MG tablet Take 1 tablet by mouth daily as needed      ketoconazole (NIZORAL) 2 % shampoo       lisinopril (ZESTRIL) 20 mg tablet TAKE 1 TABLET DAILY 90 tablet 4    Misc Natural Products (PROSTATE HEALTH) CAPS Take 1 capsule by mouth daily      Multiple Vitamin tablet Take 1 tablet by mouth daily      verapamil (CALAN-SR) 240 mg CR tablet TAKE 1 TABLET DAILY 90 tablet 4    JUBLIA 10 % SOLN        No current facility-administered medications for this visit          Allergies  Allergies   Allergen Reactions    Seasonal Ic  [Cholestatin]      The following portions of the patient's history were reviewed and updated as appropriate: allergies, current medications, past medical history, past social history, past surgical history and problem list       Vitals  Vitals:    09/15/20 1131   BP: 168/90   BP Location: Right arm   Patient Position: Sitting   Cuff Size: Adult   Pulse: 80   Resp: 18   Temp: 98 °F (36 7 °C) Weight: 73 kg (161 lb)   Height: 5' 7" (1 702 m)     Physical Exam  Physical Exam  Vitals signs reviewed  Constitutional:       General: He is not in acute distress  Appearance: Normal appearance  He is normal weight  HENT:      Head: Normocephalic  Eyes:      Pupils: Pupils are equal, round, and reactive to light  Cardiovascular:      Rate and Rhythm: Normal rate  Pulmonary:      Effort: No respiratory distress  Breath sounds: Normal breath sounds  Genitourinary:     Comments: Prostate approximately 40 g smooth nontender  No nodules noted  Skin:     General: Skin is warm and dry  Neurological:      General: No focal deficit present  Mental Status: He is alert and oriented to person, place, and time  Psychiatric:         Mood and Affect: Mood normal          Behavior: Behavior normal        Results  No results found for this or any previous visit (from the past 1 hour(s)) ]  Lab Results   Component Value Date    PSA 0 4 02/14/2020    PSA 0 5 02/08/2019    PSA 0 5 02/03/2018     Lab Results   Component Value Date    CALCIUM 9 7 03/27/2020     07/29/2017    K 4 1 03/27/2020    CO2 27 03/27/2020     03/27/2020    BUN 13 03/27/2020    CREATININE 0 88 03/27/2020     Lab Results   Component Value Date    WBC 5 1 09/06/2019    HGB 15 2 09/06/2019    HCT 46 8 09/06/2019    MCV 89 8 09/06/2019     09/06/2019     Orders  Orders Placed This Encounter   Procedures    PSA, Total Screen     This is a patient instruction: This test is non-fasting  Please drink two glasses of water morning of bloodwork          Standing Status:   Future     Standing Expiration Date:   3/15/2022       PRABHAKAR Portillo

## 2020-10-14 ENCOUNTER — CLINICAL SUPPORT (OUTPATIENT)
Dept: INTERNAL MEDICINE CLINIC | Facility: CLINIC | Age: 65
End: 2020-10-14
Payer: COMMERCIAL

## 2020-10-14 DIAGNOSIS — Z23 NEED FOR INFLUENZA VACCINATION: Primary | ICD-10-CM

## 2020-10-14 PROCEDURE — G0008 ADMIN INFLUENZA VIRUS VAC: HCPCS

## 2020-10-14 PROCEDURE — 90662 IIV NO PRSV INCREASED AG IM: CPT

## 2020-11-30 DIAGNOSIS — I10 ESSENTIAL HYPERTENSION: ICD-10-CM

## 2020-11-30 RX ORDER — VERAPAMIL HYDROCHLORIDE 240 MG/1
TABLET, FILM COATED, EXTENDED RELEASE ORAL
Qty: 90 TABLET | Refills: 3 | Status: SHIPPED | OUTPATIENT
Start: 2020-11-30 | End: 2021-11-27

## 2021-01-13 LAB
ALBUMIN SERPL-MCNC: 4.5 G/DL (ref 3.6–5.1)
ALBUMIN/GLOB SERPL: 2.1 (CALC) (ref 1–2.5)
ALP SERPL-CCNC: 49 U/L (ref 35–144)
ALT SERPL-CCNC: 27 U/L (ref 9–46)
AST SERPL-CCNC: 25 U/L (ref 10–35)
BILIRUB SERPL-MCNC: 1 MG/DL (ref 0.2–1.2)
BUN SERPL-MCNC: 11 MG/DL (ref 7–25)
BUN/CREAT SERPL: ABNORMAL (CALC) (ref 6–22)
CALCIUM SERPL-MCNC: 9.4 MG/DL (ref 8.6–10.3)
CHLORIDE SERPL-SCNC: 104 MMOL/L (ref 98–110)
CHOLEST SERPL-MCNC: 143 MG/DL
CHOLEST/HDLC SERPL: 2.5 (CALC)
CO2 SERPL-SCNC: 28 MMOL/L (ref 20–32)
CREAT SERPL-MCNC: 0.73 MG/DL (ref 0.7–1.25)
GLOBULIN SER CALC-MCNC: 2.1 G/DL (CALC) (ref 1.9–3.7)
GLUCOSE SERPL-MCNC: 100 MG/DL (ref 65–99)
HBA1C MFR BLD: 4.9 % OF TOTAL HGB
HDLC SERPL-MCNC: 57 MG/DL
LDLC SERPL CALC-MCNC: 75 MG/DL (CALC)
NONHDLC SERPL-MCNC: 86 MG/DL (CALC)
POTASSIUM SERPL-SCNC: 4.2 MMOL/L (ref 3.5–5.3)
PROT SERPL-MCNC: 6.6 G/DL (ref 6.1–8.1)
SARS-COV-2 IGG SERPL QL IA: NEGATIVE
SL AMB EGFR AFRICAN AMERICAN: 111 ML/MIN/1.73M2
SL AMB EGFR NON AFRICAN AMERICAN: 96 ML/MIN/1.73M2
SODIUM SERPL-SCNC: 139 MMOL/L (ref 135–146)
TRIGL SERPL-MCNC: 40 MG/DL

## 2021-01-15 ENCOUNTER — OFFICE VISIT (OUTPATIENT)
Dept: INTERNAL MEDICINE CLINIC | Facility: CLINIC | Age: 66
End: 2021-01-15
Payer: COMMERCIAL

## 2021-01-15 VITALS
OXYGEN SATURATION: 98 % | HEIGHT: 67 IN | HEART RATE: 78 BPM | DIASTOLIC BLOOD PRESSURE: 90 MMHG | SYSTOLIC BLOOD PRESSURE: 194 MMHG | WEIGHT: 160 LBS | BODY MASS INDEX: 25.11 KG/M2 | TEMPERATURE: 96.9 F

## 2021-01-15 DIAGNOSIS — I10 ESSENTIAL HYPERTENSION: Primary | ICD-10-CM

## 2021-01-15 DIAGNOSIS — I49.3 PVC (PREMATURE VENTRICULAR CONTRACTION): ICD-10-CM

## 2021-01-15 DIAGNOSIS — R73.01 IMPAIRED FASTING GLUCOSE: ICD-10-CM

## 2021-01-15 DIAGNOSIS — D50.9 IRON DEFICIENCY ANEMIA, UNSPECIFIED IRON DEFICIENCY ANEMIA TYPE: ICD-10-CM

## 2021-01-15 DIAGNOSIS — F41.9 ANXIETY: ICD-10-CM

## 2021-01-15 PROCEDURE — 1160F RVW MEDS BY RX/DR IN RCRD: CPT | Performed by: INTERNAL MEDICINE

## 2021-01-15 PROCEDURE — 3725F SCREEN DEPRESSION PERFORMED: CPT | Performed by: INTERNAL MEDICINE

## 2021-01-15 PROCEDURE — 1036F TOBACCO NON-USER: CPT | Performed by: INTERNAL MEDICINE

## 2021-01-15 PROCEDURE — 3080F DIAST BP >= 90 MM HG: CPT | Performed by: INTERNAL MEDICINE

## 2021-01-15 PROCEDURE — 3077F SYST BP >= 140 MM HG: CPT | Performed by: INTERNAL MEDICINE

## 2021-01-15 PROCEDURE — 3008F BODY MASS INDEX DOCD: CPT | Performed by: INTERNAL MEDICINE

## 2021-01-15 PROCEDURE — 99214 OFFICE O/P EST MOD 30 MIN: CPT | Performed by: INTERNAL MEDICINE

## 2021-01-15 RX ORDER — ESCITALOPRAM OXALATE 5 MG/1
5 TABLET ORAL DAILY
Qty: 30 TABLET | Refills: 5 | Status: SHIPPED | OUTPATIENT
Start: 2021-01-15 | End: 2021-03-24

## 2021-01-15 RX ORDER — LISINOPRIL 30 MG/1
30 TABLET ORAL DAILY
Qty: 90 TABLET | Refills: 2 | Status: SHIPPED | OUTPATIENT
Start: 2021-01-15 | End: 2021-07-01

## 2021-01-15 NOTE — PROGRESS NOTES
Assessment/Plan:    Impaired fasting glucose  Pre diabetes -I have counseled the patient to follow a healthy balanced diet, I have counseled patient reduce carbohydrates and sweets in the diet, I would like the patient exercise routinely  I will be checking hemoglobin A1c and comprehensive metabolic panel  Have counseled patient about the prevention of diabetes, and the risk of progression to type 2 diabetes  Essential hypertension  Suboptimal control increase lisinopril to 30 mg once daily check BMP in 1 week monitor blood pressure keep log    PVC (premature ventricular contraction)  Clinically stable and doing well     Iron deficiency anemia  Check CBC    Anxiety  No SI possibly contributing to the elevated blood pressure likely to multiple life stressors will start Lexapro 5 mg once daily, counseled patient; at this point time would like to hold off on counseling at this point         Problem List Items Addressed This Visit        Endocrine    Impaired fasting glucose     Pre diabetes -I have counseled the patient to follow a healthy balanced diet, I have counseled patient reduce carbohydrates and sweets in the diet, I would like the patient exercise routinely  I will be checking hemoglobin A1c and comprehensive metabolic panel  Have counseled patient about the prevention of diabetes, and the risk of progression to type 2 diabetes           Relevant Orders    Hemoglobin A1C       Cardiovascular and Mediastinum    PVC (premature ventricular contraction)     Clinically stable and doing well          Essential hypertension - Primary     Suboptimal control increase lisinopril to 30 mg once daily check BMP in 1 week monitor blood pressure keep log         Relevant Medications    lisinopril (ZESTRIL) 30 mg tablet    Other Relevant Orders    Lipid Panel with Direct LDL reflex    Basic metabolic panel       Other    Iron deficiency anemia     Check CBC         Relevant Orders    CBC (Includes Diff/Plt) (Refl) Anxiety     No SI possibly contributing to the elevated blood pressure likely to multiple life stressors will start Lexapro 5 mg once daily, counseled patient; at this point time would like to hold off on counseling at this point         Relevant Medications    escitalopram (LEXAPRO) 5 mg tablet          RTO in 4 weeks call if any problems  Subjective:      Patient ID: Javi Chamorro is a 77 y o  male  HPI 68-year old male coming in for a follow up visit regarding hypertension, impaired fasting glucose, PVC, anemia, anxiety; The patient reports me compliant taking medications without untoward side effects the  The patient is here to review his medical condition, update me on the medical condition and the patient reports me no hospitalizations and no ER visits  Does report me multiple stressors that have compound it over last 6 months including several problems at home with his house  stress with house, telephone multiple phone calls (he reports me some how they got his phone number and keep calling), stress, what is next going to happen next, worrying, mild depression   No si using breathing exercizes day    The following portions of the patient's history were reviewed and updated as appropriate: allergies, current medications, past family history, past medical history, past social history, past surgical history and problem list     Review of Systems   Constitutional: Negative for activity change, appetite change and unexpected weight change  HENT: Negative for congestion and postnasal drip  Eyes: Negative for visual disturbance  Respiratory: Negative for cough and shortness of breath  Cardiovascular: Negative for chest pain  Gastrointestinal: Negative for abdominal pain, diarrhea, nausea and vomiting  Neurological: Negative for dizziness, light-headedness and headaches  Psychiatric/Behavioral: Negative for suicidal ideas  The patient is nervous/anxious            Objective:    No follow-ups on file  No results found  Allergies   Allergen Reactions    Seasonal Ic  [Cholestatin]        Past Medical History:   Diagnosis Date    Asthma     Hypertension     Impaired fasting glucose     Mitral valve disorder     Mitral valve prolapse     Murmur, cardiac     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     PAC (premature atrial contraction)     PVC (premature ventricular contraction)     Thyroid nodule      Past Surgical History:   Procedure Laterality Date    HAND SURGERY      NE COLONOSCOPY FLX DX W/COLLJ SPEC WHEN PFRMD N/A 2/9/2018    Procedure: COLONOSCOPY;  Surgeon: Esperanza Shirley MD;  [facility]; Service: Gastroenterology    PROSTATE BIOPSY      SHOULDER SURGERY       Current Outpatient Medications on File Prior to Visit   Medication Sig Dispense Refill    aspirin 81 mg chewable tablet Chew 1 tablet every other day      Coenzyme Q10-Red Yeast Rice  MG CAPS Take 1 capsule by mouth daily      Docosahexaenoic Acid (DHA OMEGA 3) 100 MG CAPS Take 6 capsules by mouth daily      fexofenadine (ALLEGRA) 180 MG tablet Take 1 tablet by mouth daily as needed      ketoconazole (NIZORAL) 2 % shampoo       Misc Natural Products (PROSTATE HEALTH) CAPS Take 1 capsule by mouth daily      Multiple Vitamin tablet Take 1 tablet by mouth daily      verapamil (CALAN-SR) 240 mg CR tablet TAKE 1 TABLET DAILY 90 tablet 3     No current facility-administered medications on file prior to visit        Family History   Problem Relation Age of Onset    Diabetes Sister     Other Family         Stroke syndrome     Social History     Socioeconomic History    Marital status: /Civil Union     Spouse name: Not on file    Number of children: Not on file    Years of education: Not on file    Highest education level: Not on file   Occupational History    Not on file   Social Needs    Financial resource strain: Not on file    Food insecurity     Worry: Not on file     Inability: Not on file    Transportation needs     Medical: Not on file     Non-medical: Not on file   Tobacco Use    Smoking status: Never Smoker    Smokeless tobacco: Never Used   Substance and Sexual Activity    Alcohol use:  Yes     Alcohol/week: 7 0 standard drinks     Types: 7 Glasses of wine per week     Comment: socially    Drug use: No    Sexual activity: Not on file   Lifestyle    Physical activity     Days per week: Not on file     Minutes per session: Not on file    Stress: Not on file   Relationships    Social connections     Talks on phone: Not on file     Gets together: Not on file     Attends Christian service: Not on file     Active member of club or organization: Not on file     Attends meetings of clubs or organizations: Not on file     Relationship status: Not on file    Intimate partner violence     Fear of current or ex partner: Not on file     Emotionally abused: Not on file     Physically abused: Not on file     Forced sexual activity: Not on file   Other Topics Concern    Not on file   Social History Narrative    Not on file     Vitals:    01/15/21 1028 01/15/21 1037   BP: 170/100 (!) 194/90   Pulse: 78    Temp: (!) 96 9 °F (36 1 °C)    TempSrc: Temporal    SpO2: 98%    Weight: 72 6 kg (160 lb)    Height: 5' 7" (1 702 m)      Results for orders placed or performed in visit on 01/12/21   Lipid Panel with Direct LDL reflex   Result Value Ref Range    Total Cholesterol 143 <200 mg/dL    HDL 57 > OR = 40 mg/dL    Triglycerides 40 <150 mg/dL    LDL Calculated 75 mg/dL (calc)    Chol HDLC Ratio 2 5 <5 0 (calc)    Non-HDL Cholesterol 86 <130 mg/dL (calc)   Comprehensive metabolic panel   Result Value Ref Range    Glucose, Random 100 (H) 65 - 99 mg/dL    BUN 11 7 - 25 mg/dL    Creatinine 0 73 0 70 - 1 25 mg/dL    eGFR Non  96 > OR = 60 mL/min/1 73m2    eGFR  111 > OR = 60 mL/min/1 73m2    SL AMB BUN/CREATININE RATIO NOT APPLICABLE 6 - 22 (calc) Sodium 139 135 - 146 mmol/L    Potassium 4 2 3 5 - 5 3 mmol/L    Chloride 104 98 - 110 mmol/L    CO2 28 20 - 32 mmol/L    Calcium 9 4 8 6 - 10 3 mg/dL    Protein, Total 6 6 6 1 - 8 1 g/dL    Albumin 4 5 3 6 - 5 1 g/dL    Globulin 2 1 1 9 - 3 7 g/dL (calc)    Albumin/Globulin Ratio 2 1 1 0 - 2 5 (calc)    TOTAL BILIRUBIN 1 0 0 2 - 1 2 mg/dL    Alkaline Phosphatase 49 35 - 144 U/L    AST 25 10 - 35 U/L    ALT 27 9 - 46 U/L   SARS CoV 2 SEROLOGY (COVID 19) AB (IGG), IA   Result Value Ref Range    SARS-CoV-2 Ab, IgG NEGATIVE    Hemoglobin A1c (w/out EAG)   Result Value Ref Range    Hemoglobin A1C 4 9 <5 7 % of total Hgb     Weight (last 2 days)     Date/Time   Weight    01/15/21 1028   72 6 (160)            Body mass index is 25 06 kg/m²  BP     Temp     Pulse     Resp      SpO2        Vitals:    01/15/21 1028   Weight: 72 6 kg (160 lb)     Vitals:    01/15/21 1028   Weight: 72 6 kg (160 lb)       BP (!) 194/90   Pulse 78   Temp (!) 96 9 °F (36 1 °C) (Temporal)   Ht 5' 7" (1 702 m)   Wt 72 6 kg (160 lb)   SpO2 98%   BMI 25 06 kg/m²          Physical Exam  Constitutional:       General: He is not in acute distress  Appearance: He is well-developed  He is not diaphoretic  HENT:      Head: Normocephalic and atraumatic  Right Ear: External ear normal       Left Ear: External ear normal    Eyes:      General: No scleral icterus  Right eye: No discharge  Left eye: No discharge  Conjunctiva/sclera: Conjunctivae normal       Pupils: Pupils are equal, round, and reactive to light  Neck:      Musculoskeletal: Neck supple  Cardiovascular:      Rate and Rhythm: Normal rate and regular rhythm  Heart sounds: Normal heart sounds  No murmur  No friction rub  No gallop  Pulmonary:      Effort: No respiratory distress  Breath sounds: No wheezing or rales  Abdominal:      General: Bowel sounds are normal  There is no distension  Palpations: Abdomen is soft  There is no mass  Tenderness: There is no abdominal tenderness  There is no guarding or rebound  Musculoskeletal:         General: No deformity  Lymphadenopathy:      Cervical: No cervical adenopathy  Neurological:      Mental Status: He is alert  Psychiatric:         Mood and Affect: Mood is anxious  Mood is not depressed  Thought Content: Thought content does not include suicidal ideation

## 2021-01-17 PROBLEM — F41.9 ANXIETY: Status: ACTIVE | Noted: 2021-01-17

## 2021-01-17 NOTE — ASSESSMENT & PLAN NOTE
No SI possibly contributing to the elevated blood pressure likely to multiple life stressors will start Lexapro 5 mg once daily, counseled patient; at this point time would like to hold off on counseling at this point

## 2021-01-17 NOTE — ASSESSMENT & PLAN NOTE
Suboptimal control increase lisinopril to 30 mg once daily check BMP in 1 week monitor blood pressure keep log

## 2021-02-13 DIAGNOSIS — Z23 ENCOUNTER FOR IMMUNIZATION: ICD-10-CM

## 2021-03-19 RX ORDER — AMOXICILLIN 500 MG/1
CAPSULE ORAL
COMMUNITY
Start: 2021-02-09 | End: 2022-02-25 | Stop reason: ALTCHOICE

## 2021-03-22 LAB
BUN SERPL-MCNC: 15 MG/DL (ref 7–25)
BUN/CREAT SERPL: NORMAL (CALC) (ref 6–22)
CALCIUM SERPL-MCNC: 9.4 MG/DL (ref 8.6–10.3)
CHLORIDE SERPL-SCNC: 103 MMOL/L (ref 98–110)
CO2 SERPL-SCNC: 27 MMOL/L (ref 20–32)
CREAT SERPL-MCNC: 0.79 MG/DL (ref 0.7–1.25)
GLUCOSE SERPL-MCNC: 97 MG/DL (ref 65–99)
POTASSIUM SERPL-SCNC: 4.2 MMOL/L (ref 3.5–5.3)
SL AMB EGFR AFRICAN AMERICAN: 108 ML/MIN/1.73M2
SL AMB EGFR NON AFRICAN AMERICAN: 93 ML/MIN/1.73M2
SODIUM SERPL-SCNC: 138 MMOL/L (ref 135–146)

## 2021-03-24 ENCOUNTER — OFFICE VISIT (OUTPATIENT)
Dept: INTERNAL MEDICINE CLINIC | Facility: CLINIC | Age: 66
End: 2021-03-24
Payer: COMMERCIAL

## 2021-03-24 VITALS
HEIGHT: 67 IN | BODY MASS INDEX: 24.89 KG/M2 | HEART RATE: 71 BPM | WEIGHT: 158.6 LBS | RESPIRATION RATE: 16 BRPM | OXYGEN SATURATION: 99 % | SYSTOLIC BLOOD PRESSURE: 164 MMHG | DIASTOLIC BLOOD PRESSURE: 88 MMHG

## 2021-03-24 DIAGNOSIS — Z23 ENCOUNTER FOR IMMUNIZATION: Primary | ICD-10-CM

## 2021-03-24 DIAGNOSIS — I10 BENIGN ESSENTIAL HYPERTENSION: ICD-10-CM

## 2021-03-24 DIAGNOSIS — F41.9 ANXIETY: ICD-10-CM

## 2021-03-24 DIAGNOSIS — R73.01 IMPAIRED FASTING GLUCOSE: ICD-10-CM

## 2021-03-24 PROCEDURE — 3079F DIAST BP 80-89 MM HG: CPT | Performed by: INTERNAL MEDICINE

## 2021-03-24 PROCEDURE — 3077F SYST BP >= 140 MM HG: CPT | Performed by: INTERNAL MEDICINE

## 2021-03-24 PROCEDURE — 1036F TOBACCO NON-USER: CPT | Performed by: INTERNAL MEDICINE

## 2021-03-24 PROCEDURE — 3008F BODY MASS INDEX DOCD: CPT | Performed by: INTERNAL MEDICINE

## 2021-03-24 PROCEDURE — 3725F SCREEN DEPRESSION PERFORMED: CPT | Performed by: INTERNAL MEDICINE

## 2021-03-24 PROCEDURE — 1160F RVW MEDS BY RX/DR IN RCRD: CPT | Performed by: INTERNAL MEDICINE

## 2021-03-24 PROCEDURE — 99214 OFFICE O/P EST MOD 30 MIN: CPT | Performed by: INTERNAL MEDICINE

## 2021-03-24 RX ORDER — HYDROCHLOROTHIAZIDE 12.5 MG/1
12.5 CAPSULE, GELATIN COATED ORAL EVERY MORNING
Qty: 30 CAPSULE | Refills: 3 | Status: SHIPPED | OUTPATIENT
Start: 2021-03-24 | End: 2021-07-01 | Stop reason: ALTCHOICE

## 2021-03-24 RX ORDER — SERTRALINE HYDROCHLORIDE 25 MG/1
25 TABLET, FILM COATED ORAL DAILY
Qty: 30 TABLET | Refills: 5 | Status: SHIPPED | OUTPATIENT
Start: 2021-03-24 | End: 2021-04-20

## 2021-03-24 NOTE — PROGRESS NOTES
Assessment/Plan:    Impaired fasting glucose    I have counselled the pt to follow a healthy and balanced diet ,and recommend routine exercise  , review labs    Benign essential hypertension   suboptimal controlled  There is also element of white coat hypertension secondary to anxiety his home averages are in the 305P 800 systolic today will continue with the current regimen and add hydrochlorothiazide 12 5 mg 1 tab once daily reviewed the risks benefits side effects medication will check BMP in 1 week he is to monitor the blood pressure twice daily keep a log I did explain to him he should talk to his cardiologist about possibly changing the calcium channel blocker to a beta-blocker which did help both with palpitations and also his blood pressure    Anxiety   Did not tolerate Lexapro secondary to loss of the emotion therefore discontinued the medication; at this point time his symptoms are moderate no suicidal ideation will start him on Zoloft 25 mg once a day reviewed the risks benefits and side effects at this point time he would like to hold off on counseling if any problem please notify me  Will re-evaluate in 4 weeks         Problem List Items Addressed This Visit        Endocrine    Impaired fasting glucose       I have counselled the pt to follow a healthy and balanced diet ,and recommend routine exercise   , review labs            Cardiovascular and Mediastinum    Benign essential hypertension      suboptimal controlled  There is also element of white coat hypertension secondary to anxiety his home averages are in the 699O 624 systolic today will continue with the current regimen and add hydrochlorothiazide 12 5 mg 1 tab once daily reviewed the risks benefits side effects medication will check BMP in 1 week he is to monitor the blood pressure twice daily keep a log I did explain to him he should talk to his cardiologist about possibly changing the calcium channel blocker to a beta-blocker which did help both with palpitations and also his blood pressure         Relevant Medications    hydrochlorothiazide (MICROZIDE) 12 5 mg capsule    Other Relevant Orders    Basic metabolic panel       Other    Anxiety      Did not tolerate Lexapro secondary to loss of the emotion therefore discontinued the medication; at this point time his symptoms are moderate no suicidal ideation will start him on Zoloft 25 mg once a day reviewed the risks benefits and side effects at this point time he would like to hold off on counseling if any problem please notify me  Will re-evaluate in 4 weeks         Relevant Medications    sertraline (ZOLOFT) 25 mg tablet      Other Visit Diagnoses     Encounter for immunization    -  Primary           RTO in 4 weeks call if any problems  Subjective:      Patient ID: Javi Chamorro is a 77 y o  male  HPI  77-year old male coming in for a follow up visit regarding anxiety, benign essential hypertension, impaired fasting glucose; The patient reports me compliant taking medications without untoward side effects the  The patient is here to review his medical condition, update me on the medical condition and the patient reports me no hospitalizations and no ER visits  He reports me he did not tolerate the Lexapro he reports me he did not experience any motions therefore discontinue the medicine  Patient reports me his home blood pressure readings are elevated in the 140- 60 range at home  He reports me feeling anxious and on edge related to the news and the COVID crisis no suicidal ideation no depression he is skeptical about the COVID vaccine although we did discuss the vaccine in detail he may consider for the future  He is willing to try a different he anxiety medication to help him with this symptoms  nervous about bp-  140-160 systioic no si the pt is using the resperate pulled muscle in left shoulder wants to wait on treatment    The following portions of the patient's history were reviewed and updated as appropriate: allergies, current medications, past family history, past medical history, past social history, past surgical history and problem list     Review of Systems   Constitutional: Negative for activity change, appetite change and unexpected weight change  HENT: Negative for congestion and postnasal drip  Eyes: Negative for visual disturbance  Respiratory: Negative for cough and shortness of breath  Cardiovascular: Negative for chest pain  Gastrointestinal: Negative for abdominal pain, diarrhea, nausea and vomiting  Neurological: Negative for dizziness, light-headedness and headaches  Psychiatric/Behavioral: Negative for suicidal ideas  The patient is nervous/anxious  Objective:    No follow-ups on file  No results found  Allergies   Allergen Reactions    Seasonal Ic  [Cholestatin]        Past Medical History:   Diagnosis Date    Asthma     Hypertension     Impaired fasting glucose     Mitral valve disorder     Mitral valve prolapse     Murmur, cardiac     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     PAC (premature atrial contraction)     PVC (premature ventricular contraction)     Thyroid nodule      Past Surgical History:   Procedure Laterality Date    HAND SURGERY      DC COLONOSCOPY FLX DX W/COLLJ SPEC WHEN PFRMD N/A 2/9/2018    Procedure: COLONOSCOPY;  Surgeon: Doni Mcintosh MD;  [facility];   Service: Gastroenterology    PROSTATE BIOPSY      SHOULDER SURGERY       Current Outpatient Medications on File Prior to Visit   Medication Sig Dispense Refill    amoxicillin (AMOXIL) 500 mg capsule TAKE 4 CAPSULES BY MOUTH ONE HOUR PRIOR TO APPOINTMENT      aspirin 81 mg chewable tablet Chew 1 tablet every other day      Coenzyme Q10-Red Yeast Rice  MG CAPS Take 1 capsule by mouth daily      Docosahexaenoic Acid (DHA OMEGA 3) 100 MG CAPS Take 6 capsules by mouth daily      fexofenadine (ALLEGRA) 180 MG tablet Take 1 tablet by mouth daily as needed      lisinopril (ZESTRIL) 30 mg tablet Take 1 tablet (30 mg total) by mouth daily 90 tablet 2    Misc Natural Products (PROSTATE HEALTH) CAPS Take 1 capsule by mouth daily      Multiple Vitamin tablet Take 1 tablet by mouth daily      verapamil (CALAN-SR) 240 mg CR tablet TAKE 1 TABLET DAILY 90 tablet 3    [DISCONTINUED] escitalopram (LEXAPRO) 5 mg tablet Take 1 tablet (5 mg total) by mouth daily 30 tablet 5     No current facility-administered medications on file prior to visit  Family History   Problem Relation Age of Onset    Diabetes Sister     Other Family         Stroke syndrome     Social History     Socioeconomic History    Marital status: /Civil Union     Spouse name: Not on file    Number of children: Not on file    Years of education: Not on file    Highest education level: Not on file   Occupational History    Not on file   Social Needs    Financial resource strain: Not on file    Food insecurity     Worry: Not on file     Inability: Not on file   [employer] needs     Medical: Not on file     Non-medical: Not on file   Tobacco Use    Smoking status: Never Smoker    Smokeless tobacco: Never Used   Substance and Sexual Activity    Alcohol use:  Yes     Alcohol/week: 7 0 standard drinks     Types: 7 Glasses of wine per week     Comment: socially    Drug use: No    Sexual activity: Not on file   Lifestyle    Physical activity     Days per week: Not on file     Minutes per session: Not on file    Stress: Not on file   Relationships    Social connections     Talks on phone: Not on file     Gets together: Not on file     Attends Confucianist service: Not on file     Active member of club or organization: Not on file     Attends meetings of clubs or organizations: Not on file     Relationship status: Not on file    Intimate partner violence     Fear of current or ex partner: Not on file     Emotionally abused: Not on file     Physically abused: Not on file     Forced sexual activity: Not on file   Other Topics Concern    Not on file   Social History Narrative    Not on file     Vitals:    03/24/21 1324   BP: 164/88   Pulse: 71   Resp: 16   SpO2: 99%   Weight: 71 9 kg (158 lb 9 6 oz)   Height: 5' 7" (1 702 m)     Results for orders placed or performed in visit on 66/07/55   Basic metabolic panel   Result Value Ref Range    Glucose, Random 97 65 - 99 mg/dL    BUN 15 7 - 25 mg/dL    Creatinine 0 79 0 70 - 1 25 mg/dL    eGFR Non  93 > OR = 60 mL/min/1 73m2    eGFR  108 > OR = 60 mL/min/1 73m2    SL AMB BUN/CREATININE RATIO NOT APPLICABLE 6 - 22 (calc)    Sodium 138 135 - 146 mmol/L    Potassium 4 2 3 5 - 5 3 mmol/L    Chloride 103 98 - 110 mmol/L    CO2 27 20 - 32 mmol/L    Calcium 9 4 8 6 - 10 3 mg/dL     Weight (last 2 days)     Date/Time   Weight    03/24/21 1324   71 9 (158 6)            Body mass index is 24 84 kg/m²  BP      Temp      Pulse     Resp      SpO2        Vitals:    03/24/21 1324   Weight: 71 9 kg (158 lb 9 6 oz)     Vitals:    03/24/21 1324   Weight: 71 9 kg (158 lb 9 6 oz)       /88   Pulse 71   Resp 16   Ht 5' 7" (1 702 m)   Wt 71 9 kg (158 lb 9 6 oz)   SpO2 99%   BMI 24 84 kg/m²          Physical Exam  Constitutional:       General: He is not in acute distress  Appearance: He is well-developed  He is not diaphoretic  HENT:      Head: Normocephalic and atraumatic  Right Ear: External ear normal       Left Ear: External ear normal    Eyes:      General: No scleral icterus  Right eye: No discharge  Left eye: No discharge  Conjunctiva/sclera: Conjunctivae normal       Pupils: Pupils are equal, round, and reactive to light  Neck:      Musculoskeletal: Neck supple  Cardiovascular:      Rate and Rhythm: Normal rate and regular rhythm  Heart sounds: Normal heart sounds  No murmur  No friction rub  No gallop      Pulmonary:      Effort: No respiratory distress  Breath sounds: No wheezing or rales  Abdominal:      General: Bowel sounds are normal  There is no distension  Palpations: Abdomen is soft  There is no mass  Tenderness: There is no abdominal tenderness  There is no guarding or rebound  Musculoskeletal:         General: No deformity  Lymphadenopathy:      Cervical: No cervical adenopathy  Neurological:      Mental Status: He is alert  Psychiatric:         Mood and Affect: Mood is anxious  Mood is not depressed  Thought Content: Thought content does not include suicidal ideation

## 2021-03-25 NOTE — ASSESSMENT & PLAN NOTE
suboptimal controlled  There is also element of white coat hypertension secondary to anxiety his home averages are in the 381Y 718 systolic today will continue with the current regimen and add hydrochlorothiazide 12 5 mg 1 tab once daily reviewed the risks benefits side effects medication will check BMP in 1 week he is to monitor the blood pressure twice daily keep a log I did explain to him he should talk to his cardiologist about possibly changing the calcium channel blocker to a beta-blocker which did help both with palpitations and also his blood pressure

## 2021-03-25 NOTE — ASSESSMENT & PLAN NOTE
I have counselled the pt to follow a healthy and balanced diet ,and recommend routine exercise   , review labs

## 2021-03-25 NOTE — ASSESSMENT & PLAN NOTE
Did not tolerate Lexapro secondary to loss of the emotion therefore discontinued the medication; at this point time his symptoms are moderate no suicidal ideation will start him on Zoloft 25 mg once a day reviewed the risks benefits and side effects at this point time he would like to hold off on counseling if any problem please notify me    Will re-evaluate in 4 weeks

## 2021-04-07 DIAGNOSIS — F41.9 ANXIETY: Primary | ICD-10-CM

## 2021-04-07 RX ORDER — BUSPIRONE HYDROCHLORIDE 5 MG/1
5 TABLET ORAL DAILY PRN
Qty: 30 TABLET | Refills: 1 | Status: SHIPPED | OUTPATIENT
Start: 2021-04-07

## 2021-04-08 LAB
BUN SERPL-MCNC: 17 MG/DL (ref 7–25)
BUN/CREAT SERPL: ABNORMAL (CALC) (ref 6–22)
CALCIUM SERPL-MCNC: 9.3 MG/DL (ref 8.6–10.3)
CHLORIDE SERPL-SCNC: 104 MMOL/L (ref 98–110)
CO2 SERPL-SCNC: 28 MMOL/L (ref 20–32)
CREAT SERPL-MCNC: 0.78 MG/DL (ref 0.7–1.25)
GLUCOSE SERPL-MCNC: 102 MG/DL (ref 65–99)
POTASSIUM SERPL-SCNC: 4.2 MMOL/L (ref 3.5–5.3)
SL AMB EGFR AFRICAN AMERICAN: 108 ML/MIN/1.73M2
SL AMB EGFR NON AFRICAN AMERICAN: 93 ML/MIN/1.73M2
SODIUM SERPL-SCNC: 137 MMOL/L (ref 135–146)

## 2021-04-12 ENCOUNTER — IMMUNIZATIONS (OUTPATIENT)
Dept: FAMILY MEDICINE CLINIC | Facility: HOSPITAL | Age: 66
End: 2021-04-12

## 2021-04-12 DIAGNOSIS — Z23 ENCOUNTER FOR IMMUNIZATION: Primary | ICD-10-CM

## 2021-04-12 PROCEDURE — 91300 SARS-COV-2 / COVID-19 MRNA VACCINE (PFIZER-BIONTECH) 30 MCG: CPT

## 2021-04-12 PROCEDURE — 0001A SARS-COV-2 / COVID-19 MRNA VACCINE (PFIZER-BIONTECH) 30 MCG: CPT

## 2021-04-20 ENCOUNTER — TELEPHONE (OUTPATIENT)
Dept: INTERNAL MEDICINE CLINIC | Facility: CLINIC | Age: 66
End: 2021-04-20

## 2021-04-20 ENCOUNTER — TELEPHONE (OUTPATIENT)
Dept: OTHER | Facility: OTHER | Age: 66
End: 2021-04-20

## 2021-04-20 ENCOUNTER — TELEMEDICINE (OUTPATIENT)
Dept: INTERNAL MEDICINE CLINIC | Facility: CLINIC | Age: 66
End: 2021-04-20
Payer: COMMERCIAL

## 2021-04-20 VITALS
HEIGHT: 67 IN | BODY MASS INDEX: 24.8 KG/M2 | WEIGHT: 158 LBS | TEMPERATURE: 98 F | HEART RATE: 72 BPM | OXYGEN SATURATION: 99 %

## 2021-04-20 DIAGNOSIS — Z20.822 EXPOSURE TO COVID-19 VIRUS: Primary | ICD-10-CM

## 2021-04-20 DIAGNOSIS — Z20.822 EXPOSURE TO COVID-19 VIRUS: ICD-10-CM

## 2021-04-20 PROCEDURE — 99442 PR PHYS/QHP TELEPHONE EVALUATION 11-20 MIN: CPT | Performed by: NURSE PRACTITIONER

## 2021-04-20 PROCEDURE — U0003 INFECTIOUS AGENT DETECTION BY NUCLEIC ACID (DNA OR RNA); SEVERE ACUTE RESPIRATORY SYNDROME CORONAVIRUS 2 (SARS-COV-2) (CORONAVIRUS DISEASE [COVID-19]), AMPLIFIED PROBE TECHNIQUE, MAKING USE OF HIGH THROUGHPUT TECHNOLOGIES AS DESCRIBED BY CMS-2020-01-R: HCPCS | Performed by: NURSE PRACTITIONER

## 2021-04-20 PROCEDURE — 3008F BODY MASS INDEX DOCD: CPT | Performed by: INTERNAL MEDICINE

## 2021-04-20 PROCEDURE — U0005 INFEC AGEN DETEC AMPLI PROBE: HCPCS | Performed by: NURSE PRACTITIONER

## 2021-04-20 NOTE — PROGRESS NOTES
COVID-19 Outpatient Progress Note    Assessment/Plan:    Problem List Items Addressed This Visit        Other    Exposure to COVID-19 virus - Primary    Relevant Orders    Novel Coronavirus (Covid-19),PCR [facility] - Collected at [facility]         Disposition:     I referred patient to one of our centralized sites for a COVID-19 swab  I have spent 15 minutes directly with the patient  Greater than 50% of this time was spent in counseling/coordination of care regarding: instructions for management and impressions  Encounter provider Jessica Cavazos [facility]    Provider located at [address] [address] [id number]    Recent Visits  No visits were found meeting these conditions  Showing recent visits within past 7 days and meeting all other requirements     Today's Visits  Date Type Provider Dept   04/20/21 Telemedicine Jessica Cavazos, [address] [place] [address]   Showing today's visits and meeting all other requirements     Future Appointments  No visits were found meeting these conditions  Showing future appointments within next 150 days and meeting all other requirements        Patient agrees to participate in a virtual check in via telephone or video visit instead of presenting to the office to address urgent/immediate medical needs  Patient is aware this is a billable service  After connecting through Telephone, the patient was identified by name and date of birth  [email or web address] [email or web address] was informed that this was a telemedicine visit and that the exam was being conducted confidentially over secure lines  My office door was closed  No one else was in the room  Krystina Reid acknowledged consent and understanding of privacy and security of the telemedicine visit  I informed the patient that I have reviewed his record in Epic and presented the opportunity for him to ask any questions regarding the visit today   The patient agreed to participate  It was my intent to perform this visit via video technology but the patient was not able to do a video connection so the visit was completed via audio telephone only  Subjective: Victoria Lanier is a 77 y o  male who is concerned about COVID-19  Patient's symptoms include chills, fatigue and headache  Patient denies fever, malaise, congestion, rhinorrhea, sore throat, anosmia, loss of taste, cough, shortness of breath, chest tightness, abdominal pain, nausea, vomiting, diarrhea and myalgias       Date of symptom onset: 4/19/2021    Exposure:   Contact with a person who is under investigation (PUI) for or who is positive for COVID-19 within the last 14 days?: No    Hospitalized recently for fever and/or lower respiratory symptoms?: No      Currently a healthcare worker that is involved in direct patient care?: No      Works in a special setting where the risk of COVID-19 transmission may be high? (this may include long-term care, correctional and [facility] facilities; homeless shelters; assisted-living facilities and group homes ): No      Resident in a special setting where the risk of COVID-19 transmission may be high? (this may include long-term care, correctional and [facility] facilities; homeless shelters; assisted-living facilities and group homes ): No      Had first [id number] vaccine a week ago  Started yesterday with dizziness and chills    No results found for: Ana Amador  Past Medical History:   Diagnosis Date    Asthma     Hypertension     Impaired fasting glucose     Mitral valve disorder     Mitral valve prolapse     Murmur, cardiac     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     PAC (premature atrial contraction)     PVC (premature ventricular contraction)     Thyroid nodule      Past Surgical History:   Procedure Laterality Date    HAND SURGERY      DE COLONOSCOPY FLX DX W/COLLJ SPEC WHEN PFRMD N/A 2/9/2018 Procedure: COLONOSCOPY;  Surgeon: Marie Forman MD;  [facility]; Service: Gastroenterology    PROSTATE BIOPSY      SHOULDER SURGERY       Current Outpatient Medications   Medication Sig Dispense Refill    aspirin 81 mg chewable tablet Chew 1 tablet every other day      Coenzyme Q10-Red Yeast Rice  MG CAPS Take 1 capsule by mouth daily      Docosahexaenoic Acid (DHA OMEGA 3) 100 MG CAPS Take 6 capsules by mouth daily      fexofenadine (ALLEGRA) 180 MG tablet Take 1 tablet by mouth daily as needed      hydrochlorothiazide (MICROZIDE) 12 5 mg capsule Take 1 capsule (12 5 mg total) by mouth every morning 30 capsule 3    lisinopril (ZESTRIL) 30 mg tablet Take 1 tablet (30 mg total) by mouth daily 90 tablet 2    Misc Natural Products (PROSTATE HEALTH) CAPS Take 1 capsule by mouth daily      Multiple Vitamin tablet Take 1 tablet by mouth daily      verapamil (CALAN-SR) 240 mg CR tablet TAKE 1 TABLET DAILY 90 tablet 3    amoxicillin (AMOXIL) 500 mg capsule TAKE 4 CAPSULES BY MOUTH ONE HOUR PRIOR TO APPOINTMENT      busPIRone (BUSPAR) 5 mg tablet Take 1 tablet (5 mg total) by mouth daily as needed (Anxiety) (Patient not taking: Reported on 4/20/2021) 30 tablet 1     No current facility-administered medications for this visit  Allergies   Allergen Reactions    Seasonal Ic  [Cholestatin]        Review of Systems   Constitutional: Positive for chills and fatigue  Negative for fever  HENT: Negative  Negative for congestion, rhinorrhea and sore throat  Eyes: Negative  Respiratory: Negative  Negative for cough, chest tightness and shortness of breath  Cardiovascular: Negative  Gastrointestinal: Negative  Negative for abdominal pain, diarrhea, nausea and vomiting  Musculoskeletal: Negative  Negative for myalgias  Neurological: Positive for dizziness and headaches       Objective:    Vitals:    04/20/21 1146   Pulse: 72   Temp: 98 °F (36 7 °C)   SpO2: 99%   Weight: 71 7 kg (158 lb)   Height: 5' 7" (1 702 m)         VIRTUAL VISIT DISCLAIMER    Destini Cadena acknowledges that he has consented to an online visit or consultation  He understands that the online visit is based solely on information provided by him, and that, in the absence of a face-to-face physical evaluation by the physician, the diagnosis he receives is both limited and provisional in terms of accuracy and completeness  This is not intended to replace a full medical face-to-face evaluation by the physician  Destini Cadena understands and accepts these terms

## 2021-04-20 NOTE — TELEPHONE ENCOUNTER
Wife states patient has not been feeling well since this morning  He complained of feeling dizzy, lightheaded, weak, chills, hot, BP elevated and blood sugar elevated       No fever

## 2021-04-20 NOTE — TELEPHONE ENCOUNTER
Pt's wife calling in stating something was supposed to be sent into the pharmacy for dizziness?  Please send/advise

## 2021-04-21 LAB — SARS-COV-2 RNA RESP QL NAA+PROBE: NEGATIVE

## 2021-04-26 ENCOUNTER — OFFICE VISIT (OUTPATIENT)
Dept: URGENT CARE | Facility: MEDICAL CENTER | Age: 66
End: 2021-04-26
Payer: COMMERCIAL

## 2021-04-26 ENCOUNTER — APPOINTMENT (OUTPATIENT)
Dept: RADIOLOGY | Facility: MEDICAL CENTER | Age: 66
End: 2021-04-26
Payer: COMMERCIAL

## 2021-04-26 VITALS
RESPIRATION RATE: 14 BRPM | HEIGHT: 67 IN | DIASTOLIC BLOOD PRESSURE: 86 MMHG | TEMPERATURE: 97.9 F | BODY MASS INDEX: 24.48 KG/M2 | OXYGEN SATURATION: 98 % | HEART RATE: 78 BPM | SYSTOLIC BLOOD PRESSURE: 160 MMHG | WEIGHT: 156 LBS

## 2021-04-26 DIAGNOSIS — S46.192A OTHER INJURY OF MUSCLE, FASCIA AND TENDON OF LONG HEAD OF BICEPS, LEFT ARM, INITIAL ENCOUNTER: Primary | ICD-10-CM

## 2021-04-26 DIAGNOSIS — M25.512 ACUTE PAIN OF LEFT SHOULDER: ICD-10-CM

## 2021-04-26 PROCEDURE — 99213 OFFICE O/P EST LOW 20 MIN: CPT | Performed by: PHYSICIAN ASSISTANT

## 2021-04-26 PROCEDURE — 73030 X-RAY EXAM OF SHOULDER: CPT

## 2021-04-26 NOTE — PROGRESS NOTES
[facility]        NAME: Victorino Gonsales is a 77 y o  male  : 1955    MRN: [id number]  DATE: 2021  TIME: 11:32 AM    Assessment and Plan   Other injury of muscle, fascia and tendon of long head of biceps, left arm, initial encounter [S46 192A]  1  Other injury of muscle, fascia and tendon of long head of biceps, left arm, initial encounter  XR shoulder 2+ vw left    Ambulatory referral to Orthopedic Surgery       X-ray shows no acute fracture or dislocation, does shows some degenerative change  There is concern for biceps tendon tear as he does have bulge of anterior upper arm with bruising, given orthopedics referral for further evaluation and treatment  Recommended rest, ice, NSAIDs for pain  Patient Instructions   Rest  Ice  Motrin for pain  Follow up with orthopedics  Follow up with PCP in 3-5 days  Proceed to  ER if symptoms worsen  Chief Complaint     Chief Complaint   Patient presents with    Shoulder Pain     left rotator cuff pain getting progressively worse since winter, appears swollen, interfering with range of motion         History of Present Illness         Patient is a 69-year-old male who presents today with progressively worsening left shoulder pain  Patient states he had rotator cuff surgery in  but the last couple years it has been bothering him  No significant injury although he does report shoveling a lot of snow this winter and began bothering him again 2-3 months ago  He noticed over the past few days it has gotten more swollen, cannot remember an acute injury recently  Has been using Tylenol and icing with little relief  He does report a bulge at the top of the arm  Review of Systems   Review of Systems   Constitutional: Negative for fever  Respiratory: Negative for shortness of breath  Cardiovascular: Negative for chest pain  Musculoskeletal: Positive for arthralgias and joint swelling  Skin: Positive for color change  Negative for wound           Current Medications       Current Outpatient Medications:     aspirin 81 mg chewable tablet, Chew 1 tablet every other day, Disp: , Rfl:     busPIRone (BUSPAR) 5 mg tablet, Take 1 tablet (5 mg total) by mouth daily as needed (Anxiety), Disp: 30 tablet, Rfl: 1    Coenzyme Q10-Red Yeast Rice  MG CAPS, Take 1 capsule by mouth daily, Disp: , Rfl:     Docosahexaenoic Acid (DHA OMEGA 3) 100 MG CAPS, Take 6 capsules by mouth daily, Disp: , Rfl:     fexofenadine (ALLEGRA) 180 MG tablet, Take 1 tablet by mouth daily as needed, Disp: , Rfl:     hydrochlorothiazide (MICROZIDE) 12 5 mg capsule, Take 1 capsule (12 5 mg total) by mouth every morning, Disp: 30 capsule, Rfl: 3    lisinopril (ZESTRIL) 30 mg tablet, Take 1 tablet (30 mg total) by mouth daily, Disp: 90 tablet, Rfl: 2    Misc Natural Products (PROSTATE HEALTH) CAPS, Take 1 capsule by mouth daily, Disp: , Rfl:     Multiple Vitamin tablet, Take 1 tablet by mouth daily, Disp: , Rfl:     verapamil (CALAN-SR) 240 mg CR tablet, TAKE 1 TABLET DAILY, Disp: 90 tablet, Rfl: 3    amoxicillin (AMOXIL) 500 mg capsule, TAKE 4 CAPSULES BY MOUTH ONE HOUR PRIOR TO APPOINTMENT, Disp: , Rfl:     Current Allergies     Allergies as of 04/26/2021 - Reviewed 04/26/2021   Allergen Reaction Noted    Seasonal ic  [cholestatin]  09/15/2020            The following portions of the patient's history were reviewed and updated as appropriate: allergies, current medications, past family history, past medical history, past social history, past surgical history and problem list      Past Medical History:   Diagnosis Date    Asthma     Hypertension     Impaired fasting glucose     Mitral valve disorder     Mitral valve prolapse     Murmur, cardiac     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     PAC (premature atrial contraction)     PVC (premature ventricular contraction)     Thyroid nodule        Past Surgical History:   Procedure Laterality Date    HAND SURGERY      [place] COLONOSCOPY FLX DX W/COLLJ SPEC WHEN PFRMD N/A 2/9/2018    Procedure: COLONOSCOPY;  Surgeon: Otoniel Ware MD;  [facility]; Service: Gastroenterology    PROSTATE BIOPSY      SHOULDER SURGERY         Family History   Problem Relation Age of Onset    Diabetes Sister     Other Family         Stroke syndrome         Medications have been verified  Objective   /86 (BP Location: Right arm)   Pulse 78   Temp 97 9 °F (36 6 °C)   Resp 14   Ht 5' 7" (1 702 m)   Wt 70 8 kg (156 lb)   SpO2 98%   BMI 24 43 kg/m²        Physical Exam     Physical Exam  Constitutional:       General: He is not in acute distress  Appearance: Normal appearance  He is normal weight  He is not ill-appearing  Cardiovascular:      Rate and Rhythm: Normal rate and regular rhythm  Pulmonary:      Effort: Pulmonary effort is normal    Musculoskeletal:         General: Swelling, tenderness and signs of injury present  Left shoulder: He exhibits tenderness, bony tenderness, swelling and pain  He exhibits normal range of motion, no deformity, normal pulse and normal strength  Arms:       Comments:   Negative Holcomb, negative empty can   Skin:     General: Skin is warm and dry  Neurological:      Mental Status: He is alert

## 2021-05-03 ENCOUNTER — IMMUNIZATIONS (OUTPATIENT)
Dept: FAMILY MEDICINE CLINIC | Facility: HOSPITAL | Age: 66
End: 2021-05-03

## 2021-05-03 DIAGNOSIS — Z23 ENCOUNTER FOR IMMUNIZATION: Primary | ICD-10-CM

## 2021-05-03 PROCEDURE — 0002A SARS-COV-2 / COVID-19 MRNA VACCINE (PFIZER-BIONTECH) 30 MCG: CPT

## 2021-05-03 PROCEDURE — 91300 SARS-COV-2 / COVID-19 MRNA VACCINE (PFIZER-BIONTECH) 30 MCG: CPT

## 2021-05-06 ENCOUNTER — OFFICE VISIT (OUTPATIENT)
Dept: CARDIOLOGY CLINIC | Facility: CLINIC | Age: 66
End: 2021-05-06
Payer: COMMERCIAL

## 2021-05-06 VITALS
HEIGHT: 67 IN | HEART RATE: 72 BPM | WEIGHT: 157.9 LBS | DIASTOLIC BLOOD PRESSURE: 80 MMHG | BODY MASS INDEX: 24.78 KG/M2 | SYSTOLIC BLOOD PRESSURE: 148 MMHG

## 2021-05-06 DIAGNOSIS — I49.1 PREMATURE ATRIAL CONTRACTIONS: ICD-10-CM

## 2021-05-06 DIAGNOSIS — I49.3 PVC'S (PREMATURE VENTRICULAR CONTRACTIONS): ICD-10-CM

## 2021-05-06 DIAGNOSIS — I10 BENIGN ESSENTIAL HYPERTENSION: Primary | ICD-10-CM

## 2021-05-06 PROCEDURE — 93000 ELECTROCARDIOGRAM COMPLETE: CPT | Performed by: INTERNAL MEDICINE

## 2021-05-06 PROCEDURE — 99214 OFFICE O/P EST MOD 30 MIN: CPT | Performed by: INTERNAL MEDICINE

## 2021-05-06 RX ORDER — MECLIZINE HCL 12.5 MG/1
TABLET ORAL
COMMUNITY
Start: 2021-04-20 | End: 2021-07-01

## 2021-05-06 NOTE — PROGRESS NOTES
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  I had asked that he have a repeat echocardiogram performed to reassess his mitral regurgitation    This revealed a structurally normal mitral valve with mild regurgitation  I will ask for a repeat echocardiogram for re-evaluation  His blood pressure is high in the office today  He attributes this to white coat hypertension  He does check his blood pressure at home with predominantly normotensive readings  Therefore no changes were made to his regimen  Regarding his ectopic ventricular and supraventricular beats, he has symptoms occasionally particularly when he is anxious or consumes caffeine  He will remain on his current dose of verapamil  His most recent lipids were reviewed  Based on his 10-year-risk statin therapy is indicated which he declines  He will follow-up in one year or sooner if deemed necessary  Interval History:   Mr Norris Monday is a pleasant 13-year-old male who presents to the office today for follow-up  Since his last visit he remains active  He exercises regularly  With the activity he performs he denies any cardiopulmonary symptoms of chest pain or shortness of breath    He denies any signs or symptoms of congestive heart failure including increasing lower extremity edema, paroxysmal nocturnal dyspnea, orthopnea, acute weight gain or increasing abdominal girth   He denies lightheadedness, syncope or presyncope  He denies sustained palpitations or symptoms of claudication  Since his last visit with me his primary care provider did place him on hydrochlorothiazide for blood pressure control  He checks it at home faithfully  He declined daily anti-anxiety medication and has been taking BuSpar as needed  Problem List     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    Impaired fasting glucose    Hypertension    Encounter for hepatitis C screening test for low risk patient    Need for pneumococcal vaccination    Allergic rhinitis        Past Medical History:   Diagnosis Date    Asthma     Hypertension     Impaired fasting glucose     Mitral valve disorder     Mitral valve prolapse     Murmur, cardiac     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     PAC (premature atrial contraction)     PVC (premature ventricular contraction)     Thyroid nodule      Social History     Socioeconomic History    Marital status: /Civil Union     Spouse name: Not on file    Number of children: Not on file    Years of education: Not on file    Highest education level: Not on file   Occupational History    Not on file   Social Needs    Financial resource strain: Not on file    Food insecurity     Worry: Not on file     Inability: Not on file   [employer] needs     Medical: Not on file     Non-medical: Not on file   Tobacco Use    Smoking status: Never Smoker    Smokeless tobacco: Never Used   Substance and Sexual Activity    Alcohol use:  Yes     Alcohol/week: 7 0 standard drinks     Types: 7 Glasses of wine per week     Comment: socially    Drug use: No    Sexual activity: Yes   Lifestyle    Physical activity     Days per week: Not on file     Minutes per session: Not on file    Stress: Not on file   Relationships    Social connections     Talks on phone: Not on file     Gets together: Not on file     Attends Shinto service: Not on file     Active member of club or organization: Not on file     Attends meetings of clubs or organizations: Not on file     Relationship status: Not on file    Intimate partner violence     Fear of current or ex partner: Not on file     Emotionally abused: Not on file     Physically abused: Not on file     Forced sexual activity: Not on file   Other Topics Concern    Not on file   Social History Narrative    Not on file      Family History   Problem Relation Age of Onset    Diabetes Sister     Other Family         Stroke syndrome     Past Surgical History:   Procedure Laterality Date    HAND SURGERY      [place] COLONOSCOPY FLX DX W/COLLJ SPEC WHEN PFRMD N/A 2/9/2018    Procedure: COLONOSCOPY;  Surgeon: Laura Thomas MD;  [facility];   Service: Gastroenterology    PROSTATE BIOPSY      SHOULDER SURGERY         Current Outpatient Medications:     aspirin 81 mg chewable tablet, Chew 1 tablet every other day, Disp: , Rfl:     busPIRone (BUSPAR) 5 mg tablet, Take 1 tablet (5 mg total) by mouth daily as needed (Anxiety), Disp: 30 tablet, Rfl: 1    Coenzyme Q10-Red Yeast Rice  MG CAPS, Take 1 capsule by mouth daily, Disp: , Rfl:     Docosahexaenoic Acid (DHA OMEGA 3) 100 MG CAPS, Take 6 capsules by mouth daily, Disp: , Rfl:     fexofenadine (ALLEGRA) 180 MG tablet, Take 1 tablet by mouth daily as needed, Disp: , Rfl:     hydrochlorothiazide (MICROZIDE) 12 5 mg capsule, Take 1 capsule (12 5 mg total) by mouth every morning, Disp: 30 capsule, Rfl: 3    lisinopril (ZESTRIL) 30 mg tablet, Take 1 tablet (30 mg total) by mouth daily, Disp: 90 tablet, Rfl: 2    Misc Natural Products (PROSTATE HEALTH) CAPS, Take 1 capsule by mouth daily, Disp: , Rfl:     Multiple Vitamin tablet, Take 1 tablet by mouth daily, Disp: , Rfl:     verapamil (CALAN-SR) 240 mg CR tablet, TAKE 1 TABLET DAILY, Disp: 90 tablet, Rfl: 3    amoxicillin (AMOXIL) 500 mg capsule, TAKE 4 CAPSULES BY MOUTH ONE HOUR PRIOR TO APPOINTMENT, Disp: , Rfl:     meclizine (ANTIVERT) 12 5 MG tablet, TAKE ONE TABLET BY MOUTH TWICE A DAY FOR 1 WEEK, Disp: , Rfl:   Allergies   Allergen Reactions    Seasonal Ic  [Cholestatin]        Labs:     Chemistry        Component Value Date/Time     07/29/2017 0917    K 4 2 04/08/2021 0918     04/08/2021 0918    CO2 28 04/08/2021 0918    BUN 17 04/08/2021 0918    CREATININE 0 78 04/08/2021 0918    CREATININE 0 87 07/29/2017 0917        Component Value Date/Time    CALCIUM 9 3 04/08/2021 0918    ALKPHOS 49 01/12/2021 0920    AST 25 01/12/2021 0920    ALT 27 01/12/2021 0920    BILITOT 0 7 07/29/2017 0917            Lab Results   Component Value Date    CHOL 167 07/29/2017    CHOL 162 01/07/2017    CHOL 152 07/23/2016     Lab Results   Component Value Date    HDL 57 01/12/2021    HDL 59 03/27/2020    HDL 65 09/06/2019     Lab Results   Component Value Date    LDLCALC 75 01/12/2021    LDLCALC 77 03/27/2020    LDLCALC 74 09/06/2019     Lab Results   Component Value Date    TRIG 40 01/12/2021    TRIG 46 03/27/2020    TRIG 40 09/06/2019     No results found for: CHOLHDL    Imaging: No results found  ECG:    Normal sinus rhythm, normal ECG     Review of Systems   Cardiovascular: Positive for irregular heartbeat  Negative for chest pain, claudication, cyanosis, near-syncope and orthopnea  Psychiatric/Behavioral: The patient is nervous/anxious  All other systems reviewed and are negative  Vitals:    05/06/21 1159   BP: 148/80   Pulse:      Vitals:    05/06/21 1137   Weight: 71 6 kg (157 lb 14 4 oz)     Height: 5' 7" (170 2 cm)   Body mass index is 24 73 kg/m²      Physical Exam:  General:  Alert and cooperative, appears stated age  HEENT:  PERRLA, EOMI, no scleral icterus, no conjunctival pallor  Neck:  No lymphadenopathy, no thyromegaly, no carotid bruits, no elevated JVP  Heart:  Regular rate and rhythm, normal S1/S2, no S3/S4, no murmur  Lungs:  Clear to auscultation bilaterally Abdomen:  Soft, non-tender, positive bowel sounds, no rebound or guarding,   no organomegaly   Extremities:  No clubbing, cyanosis or edema   Vascular:  2+ pedal pulses  Skin:  No rashes or lesions on exposed skin  Neurologic:  Cranial nerves II-XII grossly intact without focal deficits

## 2021-05-07 ENCOUNTER — OFFICE VISIT (OUTPATIENT)
Dept: OBGYN CLINIC | Facility: MEDICAL CENTER | Age: 66
End: 2021-05-07
Payer: COMMERCIAL

## 2021-05-07 VITALS
BODY MASS INDEX: 24.48 KG/M2 | WEIGHT: 156 LBS | DIASTOLIC BLOOD PRESSURE: 86 MMHG | SYSTOLIC BLOOD PRESSURE: 160 MMHG | HEART RATE: 71 BPM | HEIGHT: 67 IN

## 2021-05-07 DIAGNOSIS — S46.192A OTHER INJURY OF MUSCLE, FASCIA AND TENDON OF LONG HEAD OF BICEPS, LEFT ARM, INITIAL ENCOUNTER: ICD-10-CM

## 2021-05-07 DIAGNOSIS — S46.212A BICEPS RUPTURE, PROXIMAL, LEFT, INITIAL ENCOUNTER: Primary | ICD-10-CM

## 2021-05-07 PROCEDURE — 99203 OFFICE O/P NEW LOW 30 MIN: CPT | Performed by: ORTHOPAEDIC SURGERY

## 2021-05-07 NOTE — PROGRESS NOTES
Assessment:  1  Biceps rupture, proximal, left, initial encounter  Ambulatory referral to Physical Therapy   2  Other injury of muscle, fascia and tendon of long head of biceps, left arm, initial encounter  Ambulatory referral to Orthopedic Surgery    Ambulatory referral to Physical Therapy       Plan:  The patient is explained he has rupture of proximal biceps attachment and conservative care can be considered as the biceps has two proximal attachment points  Exercises are described and explained to patient  He should limited activity and slowly return to baseline activity as tolerated  Physical therapy script is provided and patient should attend 1-3 visits to learn focused exercises  He should follow up in 6 weeks as needed  To do next visit:  Return in about 6 weeks (around 6/18/2021)  The above stated was discussed in layman's terms and the patient expressed understanding  All questions were answered to the patient's satisfaction  Scribe Attestation    I,:  Radha Don am acting as a scribe while in the presence of the attending physician :       I,:  Alina Connolly MD personally performed the services described in this documentation    as scribed in my presence :             Subjective: Clarissa Bro is a 77 y o  RHD male who presents for initial evaluation of left shoulder  He has longstanding history of issues with increase of symptoms to weeks ago following doing a lot of yard work  Today he complains of superior left shoulder pain and laxity  The arm became swollen near the elbow  He rates his pain at 2-3/10 and 5/10 at its worse  He denies radiating numbness and tingling into the arm  Using left arm aggravates while rest alleviates  He does use Tylenol with some benefit  He avoids NSAIDs due to blood pressure issues  He denies past left shoulder physical therapy or injections  He has history of 2005, Dr Kings Tate, RTC repair          Review of systems negative unless otherwise specified in HPI    Past Medical History:   Diagnosis Date    Asthma     Hypertension     Impaired fasting glucose     Mitral valve disorder     Mitral valve prolapse     Murmur, cardiac     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     PAC (premature atrial contraction)     PVC (premature ventricular contraction)     Thyroid nodule        Past Surgical History:   Procedure Laterality Date    HAND SURGERY      [place] COLONOSCOPY FLX DX W/COLLJ SPEC WHEN PFRMD N/A 2/9/2018    Procedure: COLONOSCOPY;  Surgeon: Marie Forman MD;  [facility]; Service: Gastroenterology    PROSTATE BIOPSY      SHOULDER SURGERY         Family History   Problem Relation Age of Onset    Diabetes Sister     Other Family         Stroke syndrome       Social History     Occupational History    Not on file   Tobacco Use    Smoking status: Never Smoker    Smokeless tobacco: Never Used   Substance and Sexual Activity    Alcohol use:  Yes     Alcohol/week: 7 0 standard drinks     Types: 7 Glasses of wine per week     Comment: socially    Drug use: No    Sexual activity: Yes         Current Outpatient Medications:     aspirin 81 mg chewable tablet, Chew 1 tablet every other day, Disp: , Rfl:     busPIRone (BUSPAR) 5 mg tablet, Take 1 tablet (5 mg total) by mouth daily as needed (Anxiety), Disp: 30 tablet, Rfl: 1    Coenzyme Q10-Red Yeast Rice  MG CAPS, Take 1 capsule by mouth daily, Disp: , Rfl:     Docosahexaenoic Acid (DHA OMEGA 3) 100 MG CAPS, Take 6 capsules by mouth daily, Disp: , Rfl:     fexofenadine (ALLEGRA) 180 MG tablet, Take 1 tablet by mouth daily as needed, Disp: , Rfl:     hydrochlorothiazide (MICROZIDE) 12 5 mg capsule, Take 1 capsule (12 5 mg total) by mouth every morning, Disp: 30 capsule, Rfl: 3    lisinopril (ZESTRIL) 30 mg tablet, Take 1 tablet (30 mg total) by mouth daily, Disp: 90 tablet, Rfl: 2    meclizine (ANTIVERT) 12 5 MG tablet, TAKE ONE TABLET BY MOUTH TWICE A DAY FOR 1 WEEK, Disp: , Rfl:     Misc Natural Products (PROSTATE HEALTH) CAPS, Take 1 capsule by mouth daily, Disp: , Rfl:     Multiple Vitamin tablet, Take 1 tablet by mouth daily, Disp: , Rfl:     verapamil (CALAN-SR) 240 mg CR tablet, TAKE 1 TABLET DAILY, Disp: 90 tablet, Rfl: 3    amoxicillin (AMOXIL) 500 mg capsule, TAKE 4 CAPSULES BY MOUTH ONE HOUR PRIOR TO APPOINTMENT, Disp: , Rfl:     Allergies   Allergen Reactions    Seasonal Ic  [Cholestatin]             Vitals:    05/07/21 0832   BP: 160/86   Pulse: 71       Objective:  Physical exam  · General: Awake, Alert, Oriented  · Eyes: Pupils equal, round and reactive to light  · Heart: regular rate and rhythm  · Lungs: No audible wheezing  · Abdomen: soft                    Ortho Exam  Left shoulder:  Anterior ecchymotic staining over upper arm  Pop-eye deformity with flexed biceps   TTP anterior and superior shoulder over biceps origin   Pain with resisted abduction with good strength  Full FF  Sensation intact     Diagnostics, reviewed and taken today if performed as documented: The attending physician has personally reviewed the pertinent films in PACS and interpretation is as follows:  Left shoulder x-ray:  No obvious osseus abnormalities  Evidence of past RTC surgery  Procedures, if performed today:    Procedures    None performed      Portions of the record may have been created with voice recognition software  Occasional wrong word or "sound a like" substitutions may have occurred due to the inherent limitations of voice recognition software  Read the chart carefully and recognize, using context, where substitutions have occurred

## 2021-05-10 ENCOUNTER — OFFICE VISIT (OUTPATIENT)
Dept: INTERNAL MEDICINE CLINIC | Facility: CLINIC | Age: 66
End: 2021-05-10
Payer: COMMERCIAL

## 2021-05-10 VITALS
SYSTOLIC BLOOD PRESSURE: 162 MMHG | HEART RATE: 75 BPM | HEIGHT: 67 IN | OXYGEN SATURATION: 98 % | DIASTOLIC BLOOD PRESSURE: 89 MMHG | WEIGHT: 158.4 LBS | BODY MASS INDEX: 24.86 KG/M2 | RESPIRATION RATE: 16 BRPM

## 2021-05-10 DIAGNOSIS — I10 BENIGN ESSENTIAL HYPERTENSION: Primary | ICD-10-CM

## 2021-05-10 DIAGNOSIS — S46.212A RUPTURE OF LEFT BICEPS TENDON, INITIAL ENCOUNTER: ICD-10-CM

## 2021-05-10 DIAGNOSIS — I95.1 POSTURAL HYPOTENSION: ICD-10-CM

## 2021-05-10 PROCEDURE — 99213 OFFICE O/P EST LOW 20 MIN: CPT | Performed by: INTERNAL MEDICINE

## 2021-05-10 NOTE — ASSESSMENT & PLAN NOTE
Blood Pressure: 162/89(Electronic cuff)     · Has white coat syndrome  · BP at home is 120-144/70s  · Home medication HCTZ 12 5 mg qd  Verapamil 240 mg qd  Lisinopril 30 mg qd  · Patient has had cardiologist visit 5/6/21 and had ECG that was benign  · Patient is compliant with medication  Had postural hypotension that resolves in a few seconds  Patient also had Covid 19 vaccination within the same time frame  Will resolve with time  Plan:  · Continue medication regimen with BP checks at home  · Encouraged to use NUUN electrolyte tabs with 6-8 glasses of water per day  · Follow up in 3 months

## 2021-05-10 NOTE — ASSESSMENT & PLAN NOTE
· Patient had left bicep rupture about 2 months ago  · He followed up with Orthopedic clinic and had Xray done which showed no acute pathology  · He has PT scheduled for the left bicep  Plan:  · Continue PT as scheduled  · Continue f/u with Orthopedic outpatient clinic

## 2021-05-10 NOTE — ASSESSMENT & PLAN NOTE
· Patient complains of lightheadness when he gets up too fast or shakes his head  · He received both covid vaccinations within the same time frame  · He admits to feeling like he is swaying and not the room spinning  Plan:  · Continue BP meds as prescribed  · Encourage good hydration and use of electrolyte drinks and NUUN for electrolyte replacement without sugars    · Follow up in 3 months or sooner if s/s persist

## 2021-05-10 NOTE — PROGRESS NOTES
INTERNAL MEDICINE FOLLOW-UP OFFICE VISIT  [facility]    NAME: Sandhay Alejandre  AGE: 77 y o  SEX: male  : 1955     DATE: 5/10/2021     Assessment and Plan:     Benign essential hypertension  Blood Pressure: 162/89(Electronic cuff)     · Has white coat syndrome  · BP at home is 120-144/70s  · Home medication HCTZ 12 5 mg qd  Verapamil 240 mg qd  Lisinopril 30 mg qd  · Patient has had cardiologist visit 21 and had ECG that was benign  · Patient is compliant with medication  Had postural hypotension that resolves in a few seconds  Patient also had Covid 19 vaccination within the same time frame  Will resolve with time  Plan:  · Continue medication regimen with BP checks at home  · Encouraged to use NUUN electrolyte tabs with 6-8 glasses of water per day  · Follow up in 3 months  Postural hypotension  · Patient complains of lightheadness when he gets up too fast or shakes his head  · He received both covid vaccinations within the same time frame  · He admits to feeling like he is swaying and not the room spinning  Plan:  · Continue BP meds as prescribed  · Encourage good hydration and use of electrolyte drinks and NUUN for electrolyte replacement without sugars  · Follow up in 3 months or sooner if s/s persist     Rupture of left biceps tendon  · Patient had left bicep rupture about 2 months ago  · He followed up with Orthopedic clinic and had Xray done which showed no acute pathology  · He has PT scheduled for the left bicep  Plan:  · Continue PT as scheduled  · Continue f/u with Orthopedic outpatient clinic  No follow-ups on file  Chief Complaint:     Chief Complaint   Patient presents with    Follow-up     1 month        History of Present Illness:     Patient presents for follow-up on blood pressure  He admits to have a hypertension and had his medications adjusted at previous visit about a month ago    Patient has been compliant with medication and states that his blood pressure readings at home have been better  BP range is 120-144/70s-80  He has associated lightheadedness that is intermittent and only experiences when he gets suddenly from a sitting position to standing or laying position to sitting up  He also experiences the same lightheadedness and dizziness when he turns his head real fast   Symptoms only last a few minutes  Symptoms have been going on for about 3 weeks  Coincidentally patient had his goal with vaccines in the last 3 weeks as well  Patient also complains of left  Arm pain  He followed up with Orthopedic surgery last week and was informed that he had a ruptured biceps tendon  He had an x-ray which was benign  He is scheduled for physical therapy  Patient states that he injured the bicep while mowing his lawn recently  However he does admit to doing a lot of shoveling of snow in the past few months during the winter months  The following portions of the patient's history were reviewed and updated as appropriate: allergies, current medications, past family history, past medical history, past social history, past surgical history and problem list      Review of Systems:     Review of Systems   Constitutional: Negative for activity change, appetite change, chills, fatigue, fever and unexpected weight change  HENT: Negative for congestion  Eyes: Negative for visual disturbance  Respiratory: Negative for shortness of breath  Cardiovascular: Negative for chest pain and palpitations  Gastrointestinal: Negative for abdominal pain, constipation, diarrhea, nausea and vomiting  Genitourinary: Negative for difficulty urinating  Musculoskeletal: Positive for myalgias (left bicep)  Negative for arthralgias, joint swelling and neck pain  Neurological: Positive for dizziness and light-headedness  Negative for weakness, numbness and headaches     Hematological: Does not bruise/bleed easily  Psychiatric/Behavioral: Negative for agitation, behavioral problems and confusion  Problem List:     Patient Active Problem List   Diagnosis    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    Impaired fasting glucose    Benign essential hypertension    Encounter for hepatitis C screening test for low risk patient    Need for pneumococcal vaccination    Allergic rhinitis    PVC's (premature ventricular contractions)    Premature atrial contractions    Medicare annual wellness visit, subsequent    Iron deficiency anemia    Elevated blood sugar    Exposure to COVID-19 virus    Essential hypertension    Anxiety    Postural hypotension    Rupture of left biceps tendon        Objective:     /89 (BP Location: Right arm, Patient Position: Sitting, Cuff Size: Standard) Comment: Electronic cuff  Pulse 75   Resp 16   Ht 5' 7" (1 702 m)   Wt 71 8 kg (158 lb 6 4 oz)   SpO2 98%   BMI 24 81 kg/m²     Physical Exam  Vitals signs and nursing note reviewed  Constitutional:       General: He is not in acute distress  Appearance: Normal appearance  He is not ill-appearing, toxic-appearing or diaphoretic  HENT:      Head: Normocephalic and atraumatic  Nose: Nose normal    Eyes:      General: No scleral icterus  Right eye: No discharge  Left eye: No discharge  Extraocular Movements: Extraocular movements intact  Pupils: Pupils are equal, round, and reactive to light  Neck:      Musculoskeletal: Normal range of motion and neck supple  No neck rigidity or muscular tenderness  Vascular: No carotid bruit  Cardiovascular:      Rate and Rhythm: Normal rate and regular rhythm  Pulses: Normal pulses  Heart sounds: Normal heart sounds  No murmur  No friction rub  No gallop  Pulmonary:      Effort: Pulmonary effort is normal       Breath sounds: Normal breath sounds  No wheezing, rhonchi or rales     Abdominal:      General: Bowel sounds are normal       Palpations: Abdomen is soft  Tenderness: There is no abdominal tenderness  There is no guarding or rebound  Musculoskeletal:         General: No swelling or tenderness  Right lower leg: No edema  Left lower leg: No edema  Skin:     Capillary Refill: Capillary refill takes less than 2 seconds  Coloration: Skin is not jaundiced  Findings: No bruising, erythema, lesion or rash  Neurological:      General: No focal deficit present  Mental Status: He is alert and oriented to person, place, and time  Motor: No weakness  Gait: Gait normal    Psychiatric:         Mood and Affect: Mood normal          Behavior: Behavior normal          Thought Content: Thought content normal          Judgment: Judgment normal          Pertinent Laboratory/Diagnostic Studies:    Laboratory Results: I have personally reviewed the pertinent laboratory results/reports     Chemistry Profile:   Results from Last 12 Months   Lab Units 04/08/21  0918  01/12/21  0920   POTASSIUM mmol/L 4 2   < > 4 2   CHLORIDE mmol/L 104   < > 104   CO2 mmol/L 28   < > 28   BUN mg/dL 17   < > 11   CREATININE mg/dL 0 78   < > 0 73   GLUCOSE RANDOM mg/dL 102*   < > 100*   CALCIUM mg/dL 9 3   < > 9 4   AST U/L  --   --  25   ALT U/L  --   --  27   ALK PHOS U/L  --   --  49   GFR MDRD AF AMER mL/min/1 73m2 108   < > 111   EGFR NON  mL/min/1 73m2 93   < > 96    < > = values in this interval not displayed         Radiology/Other Diagnostic Testing Results: I have personally reviewed pertinent films in PACS    MD Jignesh ArreagaMayo Clinic Floridaramande[id number]

## 2021-05-11 ENCOUNTER — EVALUATION (OUTPATIENT)
Dept: PHYSICAL THERAPY | Facility: MEDICAL CENTER | Age: 66
End: 2021-05-11
Payer: COMMERCIAL

## 2021-05-11 DIAGNOSIS — S46.192A OTHER INJURY OF MUSCLE, FASCIA AND TENDON OF LONG HEAD OF BICEPS, LEFT ARM, INITIAL ENCOUNTER: ICD-10-CM

## 2021-05-11 DIAGNOSIS — S46.212A BICEPS RUPTURE, PROXIMAL, LEFT, INITIAL ENCOUNTER: ICD-10-CM

## 2021-05-11 PROCEDURE — 97161 PT EVAL LOW COMPLEX 20 MIN: CPT | Performed by: PHYSICAL THERAPIST

## 2021-05-11 PROCEDURE — 97110 THERAPEUTIC EXERCISES: CPT | Performed by: PHYSICAL THERAPIST

## 2021-05-11 NOTE — PROGRESS NOTES
PT Evaluation     Today's date: 2021  Patient name: Nyla Knott  : 1955  MRN: [id number]  Referring provider: Aram Mclean MD  Dx:   Encounter Diagnosis     ICD-10-CM    1  Other injury of muscle, fascia and tendon of long head of biceps, left arm, initial encounter  [id number] Ambulatory referral to Physical Therapy   2  Biceps rupture, proximal, left, initial encounter  S46 212A Ambulatory referral to Physical Therapy       Start Time:   Stop Time: 140  Total time in clinic (min): 44 minutes    Assessment  Assessment details: Pt is a 77 y o male who presents with increased L shoulder pain, decreased L shoulder ROM, decreased L elbow strength and decreased activity tolerance secondary to symptoms  These impairments limit the patient from participating in house and yard work at [employer], decreased tolerance to prolonged UE activity with L UE, and decreased ability to participate in the community  Pt reports that he would like to avoid surgery for L UE  I believe this patient is a good candidate for and will benefit from skilled physical therapy for L shoulder ROM exercises, L elbow strengthening exercises, and postural retraining to improve symptoms and assist the patient to return to PLOF  Positive Prognostic Indicators: good attitude towards PT    Negative Prognostic Indicators: chronic reoccurring condition  Understanding of Dx/Px/POC: good   Prognosis: good    Goals  STGs: 4 weeks  1) Pt will have SPR decrease of 2 units at worst in L bicep  2) pt will have improved L shoulder flexion AROM by 10*  3) pt will have improved foto score of 10 points    LTGs: 8 weeks  1) pt will be independent with HEP by D/C  2) pt will be independent with symptom management by D/C  3) pt will reports improved tolerance to yard work with no more than 2/10 pain in L shoulder by DC      Plan  Patient would benefit from: skilled physical therapy  Planned modality interventions: cryotherapy  Planned therapy interventions: joint mobilization, manual therapy, neuromuscular re-education, postural training, patient education, strengthening, stretching, therapeutic activities, therapeutic exercise and home exercise program  Frequency: 1x week  Duration in weeks: 6  Plan of Care beginning date: 5/11/2021  Plan of Care expiration date: 6/22/2021  Treatment plan discussed with: patient        Subjective Evaluation    History of Present Illness  Mechanism of injury: Subjective Comments: about 6 months of symptoms  Pt has Hx of RTC tear in L shoulder with repair  He has a Hx of b/l RTC tears  He reports increased pain in L shoulder recently and recent bicep tear in L UE  Pt has repeat f/u appointment with MD in 6 weeks  Pt's major concerns are weakness in the L UE  Certain movements cause increased pain  Quick movements cause increased pain  Also holding up objects away from the body will cause increased pain  Pt reports some numbness in L hand secondary to  accident  Other than that there are no numbness  L shoulder  Pain   Rest: 0/10   Best: 0/10   Worst: 5-6/10    L bicep  Pain   Rest: 0/10   Best:0/10   Worst: 5-6/10      Relieving Factors: ice, tylenol     Exacerbating Factors: sleeping the wrong way    Sleeping: sometimes disturbed, but bale to fall sleep    Home Set-up: hold back on yard work due to arm     ADLs: independent    Work/Hobbies: [employer]  Previous Treatment: previous PT for RTC repairs    Goals:  Wants to get back to doing yard work and normal day to day activities  Objective     Static Posture     Head  Forward  Shoulders  Elevated  Observations     Additional Observation Details  Ecchymosis noted L bicep secondary to BP cuff  Palpation   Left   Hypertonic in the upper trapezius  Tenderness of the anterior deltoid and biceps  Tenderness     Left Shoulder   Tenderness in the biceps tendon (proximal)       Active Range of Motion   Left Shoulder   Flexion: 135 degrees   Abduction: 132 degrees with pain  External rotation BTH: T3   Internal rotation BTB: L2     Right Shoulder   Flexion: 147 degrees   Abduction: 155 degrees   External rotation BTH: T4   Internal rotation BTB: L2     Left Elbow   Flexion: 138 degrees   Extension: 0 degrees   Forearm supination: 80 degrees   Forearm pronation: 80 degrees     Right Elbow   Flexion: 143 degrees   Extension: -2 degrees   Forearm supination: 80 degrees   Forearm pronation: 80 degrees     Passive Range of Motion   Left Shoulder   Flexion: 155 degrees   Abduction: 150 degrees   External rotation 45°: 90 degrees   Internal rotation 45°: 50 degrees     Left Elbow   Normal passive range of motion    Right Elbow   Normal passive range of motion    Strength/Myotome Testing     Left Shoulder     Planes of Motion   Flexion: 4   Abduction: 3   External rotation at 0°: 3+   Internal rotation at 0°: 4+     Right Shoulder     Planes of Motion   Flexion: 4   Abduction: 4   External rotation at 0°: 4-   Internal rotation at 0°: 4+     Left Elbow   Flexion: 4  Extension: 4+  Forearm supination: 4+  Forearm pronation: 4+    Right Elbow   Flexion: 4+  Extension: 4+  Forearm supination: 4+  Forearm pronation: 4+    Tests     Left Shoulder   Positive drop arm, external rotation lag sign and sulcus sign         Flowsheet Rows      Most Recent Value   PT/OT G-Codes   Current Score  55   Projected Score  70             Precautions: HTN, PVCs, premature atrial contractions, postural hypotension, L bicep tear, Hx bilateral RTC repairs      Manuals 5/11            L shoulder PROM RK                                                   Neuro Re-Ed             scap retract 5"x10            TB rows/ext                                                                              Ther Ex             Supine cane flexion x10            L elbow flex/ext x10            L elbow sup/pro x10            pulleys             Wall slides thera  sup/pro                                       Ther Activity                                       Gait Training                                       Modalities

## 2021-05-18 ENCOUNTER — OFFICE VISIT (OUTPATIENT)
Dept: PHYSICAL THERAPY | Facility: MEDICAL CENTER | Age: 66
End: 2021-05-18
Payer: COMMERCIAL

## 2021-05-18 DIAGNOSIS — S46.212A BICEPS RUPTURE, PROXIMAL, LEFT, INITIAL ENCOUNTER: ICD-10-CM

## 2021-05-18 DIAGNOSIS — S46.192A OTHER INJURY OF MUSCLE, FASCIA AND TENDON OF LONG HEAD OF BICEPS, LEFT ARM, INITIAL ENCOUNTER: Primary | ICD-10-CM

## 2021-05-18 PROCEDURE — 97110 THERAPEUTIC EXERCISES: CPT | Performed by: PHYSICAL THERAPIST

## 2021-05-18 PROCEDURE — 97140 MANUAL THERAPY 1/> REGIONS: CPT | Performed by: PHYSICAL THERAPIST

## 2021-05-18 NOTE — PROGRESS NOTES
Daily Note     Today's date: 2021  Patient name: Ml Lee  : 1955  MRN: [id number]  Referring provider: Khoi Muller MD  Dx:   Encounter Diagnosis     ICD-10-CM    1  Other injury of muscle, fascia and tendon of long head of biceps, left arm, initial encounter  S46 192A    2  Biceps rupture, proximal, left, initial encounter  S46 212A        Start Time: 1315  Stop Time: 1400  Total time in clinic (min): 45 minutes    Subjective: pt reports aching in his L shoulder  He states that there is a lot of grinding in his L shoulder  He was mowing the lawn with increased symptoms  Objective: See treatment diary below      Assessment: Tolerated treatment well  Pt completed HEP well with minimal complaints of symptoms  progression of exercise was also tolerated well  Trial of AROM forward flexion in standing was completed with increased pain and significant UT compensation  This was substituted with AAROM shoulder flexion (wall slides) which was tolerated better  HEP progressed  Continue to progress as tolerated  Patient would benefit from continued PT      Plan: Continue per plan of care  Precautions: HTN, PVCs, premature atrial contractions, postural hypotension, L bicep tear, Hx bilateral RTC repairs      Manuals            L shoulder PROM RK RK                                                  Neuro Re-Ed             scap retract 5"x10 5"x10           TB rows/ext  2x10 ea  rtb                                                                            Ther Ex             Supine cane flexion x10 X10, x10 AROM           L elbow flex/ext x10 #2 2x10           L elbow sup/pro x10            pulleys  5'           Wall slides  x20           thera  sup/pro  W/ STM roller x10 ea             SL L shoulder abd AROM  2x10                        Ther Activity                                       Gait Training                                       Modalities

## 2021-05-25 ENCOUNTER — OFFICE VISIT (OUTPATIENT)
Dept: PHYSICAL THERAPY | Facility: MEDICAL CENTER | Age: 66
End: 2021-05-25
Payer: COMMERCIAL

## 2021-05-25 DIAGNOSIS — S46.192A OTHER INJURY OF MUSCLE, FASCIA AND TENDON OF LONG HEAD OF BICEPS, LEFT ARM, INITIAL ENCOUNTER: Primary | ICD-10-CM

## 2021-05-25 DIAGNOSIS — S46.212A BICEPS RUPTURE, PROXIMAL, LEFT, INITIAL ENCOUNTER: ICD-10-CM

## 2021-05-25 PROCEDURE — 97140 MANUAL THERAPY 1/> REGIONS: CPT | Performed by: PHYSICAL THERAPIST

## 2021-05-25 PROCEDURE — 97110 THERAPEUTIC EXERCISES: CPT | Performed by: PHYSICAL THERAPIST

## 2021-05-25 PROCEDURE — 97112 NEUROMUSCULAR REEDUCATION: CPT | Performed by: PHYSICAL THERAPIST

## 2021-05-25 NOTE — PROGRESS NOTES
Daily Note     Today's date: 2021  Patient name: Karie Stack  : 1955  MRN: [id number]  Referring provider: Cailin Pat MD  Dx:   Encounter Diagnosis     ICD-10-CM    1  Other injury of muscle, fascia and tendon of long head of biceps, left arm, initial encounter  S46 192A    2  Biceps rupture, proximal, left, initial encounter  S46 212A        Start Time: 1316  Stop Time: 1357  Total time in clinic (min): 41 minutes    Subjective: pt reports that his biep is sore but not painful  He reports increased aching and soreness in his L shoulder  Objective: See treatment diary below      Assessment: Tolerated treatment well  Pt completed all exercises well with minor increase in discomfort in the L bicep with increased intensity of bicep curls and sup/pro  Sup/pro exercise was reduced  Pt was educated to monitor symptoms with bicep curls and reduce reps if symptoms are produced  We will continue to progress as tolerated  Patient would benefit from continued PT      Plan: Continue per plan of care  Precautions: HTN, PVCs, premature atrial contractions, postural hypotension, L bicep tear, Hx bilateral RTC repairs      Manuals           L shoulder PROM RK RK RK + STM L shoulder and bicep                                                 Neuro Re-Ed             scap retract 5"x10 5"x10           TB rows/ext  2x10 ea  rtb 2x10 btb ea  Ther Ex             Supine cane flexion x10 X10, x10 AROM 2x10           L elbow flex/ext x10 #2 2x10 #4 2x10          L elbow sup/pro x10            pulleys  5' 5'          Wall slides  x20 x20          thera  sup/pro  W/ STM roller x10 ea  W/ STM roller 2x10 ea            SL L shoulder abd AROM  2x10                        Ther Activity                                       Gait Training                                       Modalities

## 2021-06-01 ENCOUNTER — OFFICE VISIT (OUTPATIENT)
Dept: PHYSICAL THERAPY | Facility: MEDICAL CENTER | Age: 66
End: 2021-06-01
Payer: COMMERCIAL

## 2021-06-01 DIAGNOSIS — S46.192A OTHER INJURY OF MUSCLE, FASCIA AND TENDON OF LONG HEAD OF BICEPS, LEFT ARM, INITIAL ENCOUNTER: Primary | ICD-10-CM

## 2021-06-01 DIAGNOSIS — S46.212A BICEPS RUPTURE, PROXIMAL, LEFT, INITIAL ENCOUNTER: ICD-10-CM

## 2021-06-01 PROCEDURE — 97110 THERAPEUTIC EXERCISES: CPT | Performed by: PHYSICAL THERAPIST

## 2021-06-01 PROCEDURE — 97112 NEUROMUSCULAR REEDUCATION: CPT | Performed by: PHYSICAL THERAPIST

## 2021-06-01 PROCEDURE — 97140 MANUAL THERAPY 1/> REGIONS: CPT | Performed by: PHYSICAL THERAPIST

## 2021-06-01 NOTE — PROGRESS NOTES
Daily Note     Today's date: 2021  Patient name: Destini Cadena  : 1955  MRN: [id number]  Referring provider: Estiven Palacios MD  Dx:   Encounter Diagnosis     ICD-10-CM    1  Other injury of muscle, fascia and tendon of long head of biceps, left arm, initial encounter  S46 192A    2  Biceps rupture, proximal, left, initial encounter  S46 212A        Start Time: 1315  Stop Time: 1355  Total time in clinic (min): 40 minutes    Subjective: pt states that he continues to have aching and discomfort in his shoulder  He has no complaints about his bicep  Objective: See treatment diary below      Assessment: Tolerated treatment well  Pt completed all exercises well with minor complaints of discomfort in his hand with increased gripping exercises  Continue to progress as tolerated  Patient would benefit from continued PT      Plan: Continue per plan of care  Precautions: HTN, PVCs, premature atrial contractions, postural hypotension, L bicep tear, Hx bilateral RTC repairs      Manuals          L shoulder PROM RK RK RK + STM L shoulder and bicep RK + STM L shoulder and bicep                                                Neuro Re-Ed             scap retract 5"x10 5"x10           TB rows/ext  2x10 ea  rtb 2x10 btb ea  2x10 btb ea  Ther Ex             Supine cane flexion x10 X10, x10 AROM 2x10  2x10 supine and standing limit UT comp         L elbow flex/ext x10 #2 2x10 #4 2x10 #7 2x10         L elbow sup/pro x10            pulleys  5' 5' 5'         Wall slides  x20 x20 x20         thera  sup/pro  W/ STM roller x10 ea  W/ STM roller 2x10 ea  W/ STM roller 2x10 ea  #1 close to          SL L shoulder abd AROM  2x10           thera  twist    Ext and flex x10; bending sup and pro x10 ea                                                  Ther Activity                                       Gait Training Modalities

## 2021-06-08 ENCOUNTER — OFFICE VISIT (OUTPATIENT)
Dept: PHYSICAL THERAPY | Facility: MEDICAL CENTER | Age: 66
End: 2021-06-08
Payer: COMMERCIAL

## 2021-06-08 DIAGNOSIS — S46.192A OTHER INJURY OF MUSCLE, FASCIA AND TENDON OF LONG HEAD OF BICEPS, LEFT ARM, INITIAL ENCOUNTER: Primary | ICD-10-CM

## 2021-06-08 DIAGNOSIS — S46.212A BICEPS RUPTURE, PROXIMAL, LEFT, INITIAL ENCOUNTER: ICD-10-CM

## 2021-06-08 PROCEDURE — 97140 MANUAL THERAPY 1/> REGIONS: CPT | Performed by: PHYSICAL THERAPIST

## 2021-06-08 PROCEDURE — 97112 NEUROMUSCULAR REEDUCATION: CPT | Performed by: PHYSICAL THERAPIST

## 2021-06-08 PROCEDURE — 97110 THERAPEUTIC EXERCISES: CPT | Performed by: PHYSICAL THERAPIST

## 2021-06-08 NOTE — PROGRESS NOTES
Daily Note     Today's date: 2021  Patient name: Maki Montoya  : 1955  MRN: [id number]  Referring provider: Raysa Baum MD  Dx:   Encounter Diagnosis     ICD-10-CM    1  Other injury of muscle, fascia and tendon of long head of biceps, left arm, initial encounter  S46 192A    2  Biceps rupture, proximal, left, initial encounter  S46 212A        Start Time: 1400  Stop Time: 1445  Total time in clinic (min): 45 minutes    Subjective: pt reports no complaints in his bicep  He reports no change in the symptoms in his shoulder  Pt reports cracking and popping with all activity  His biggets complaint is decreased strength in L shoulder  Objective: See treatment diary below      Assessment: Tolerated treatment well  Pt completed all exercises well with good tolerance  Pt completed gentle shoulder stability exercises with good tolerance and complaints of fatigue following  Increased fatigue was also noted in biceps and wrists with increased intensity of exercises  fatigue was kept within a normal range  Continue to progress as tolerated  Patient would benefit from continued PT      Plan: Continue per plan of care  Precautions: HTN, PVCs, premature atrial contractions, postural hypotension, L bicep tear, Hx bilateral RTC repairs      Manuals         L shoulder PROM RK RK RK + STM L shoulder and bicep RK + STM L shoulder and bicep RK                                               Neuro Re-Ed             scap retract 5"x10 5"x10           TB rows/ext  2x10 ea  rtb 2x10 btb ea  2x10 btb ea  2x10 btb ea          RS     30"x3        Supine ABCs     x1                                               Ther Ex             Supine cane flexion x10 X10, x10 AROM 2x10  2x10 supine and standing limit UT comp 2x10 supine and standing limit UT comp        L elbow flex/ext x10 #2 2x10 #4 2x10 #7 2x10 #10 2x10        L elbow sup/pro x10            pulleys  5' 5' 5' 5'        Wall slides  x20 x20 x20         thera  sup/pro  W/ STM roller x10 ea  W/ STM roller 2x10 ea  W/ STM roller 2x10 ea  #1 close to  W/ STM roller 2x10 ea  #1 close to         SL L shoulder abd AROM  2x10           thera  twist    Ext and flex x10; bending sup and pro x10 ea  Ext and flex x10; bending sup and pro x10 ea                                                 Ther Activity                                       Gait Training                                       Modalities

## 2021-06-15 ENCOUNTER — EVALUATION (OUTPATIENT)
Dept: PHYSICAL THERAPY | Facility: MEDICAL CENTER | Age: 66
End: 2021-06-15
Payer: COMMERCIAL

## 2021-06-15 DIAGNOSIS — S46.212A BICEPS RUPTURE, PROXIMAL, LEFT, INITIAL ENCOUNTER: ICD-10-CM

## 2021-06-15 DIAGNOSIS — S46.192A OTHER INJURY OF MUSCLE, FASCIA AND TENDON OF LONG HEAD OF BICEPS, LEFT ARM, INITIAL ENCOUNTER: Primary | ICD-10-CM

## 2021-06-15 PROCEDURE — 97112 NEUROMUSCULAR REEDUCATION: CPT | Performed by: PHYSICAL THERAPIST

## 2021-06-15 PROCEDURE — 97110 THERAPEUTIC EXERCISES: CPT | Performed by: PHYSICAL THERAPIST

## 2021-06-15 PROCEDURE — 97140 MANUAL THERAPY 1/> REGIONS: CPT | Performed by: PHYSICAL THERAPIST

## 2021-06-15 NOTE — PROGRESS NOTES
PT Re-Evaluation     Today's date: 6/15/2021  Patient name: Robert Patton  : 1955  MRN: [id number]  Referring provider: Eulogio Watson MD  Dx:   Encounter Diagnosis     ICD-10-CM    1  Other injury of muscle, fascia and tendon of long head of biceps, left arm, initial encounter  S46 192A    2  Biceps rupture, proximal, left, initial encounter  S46 212A        Start Time: 1400  Stop Time: 1438  Total time in clinic (min): 38 minutes    Assessment  Assessment details: Pt is a 77 y o male who has completed 6 PT sessions to this date  The patient has made improvements in decreased L bicep pain, improved L UE strength, improved L shoulder AROM and improved activity tolerance  However, the patient continues to have increased difficulty with limited L shoulder AROM , decreased L shoulder strength and decreased functional ability with the L shoulder  Pt states that he f/u with MD on friday about shoulder and bicep  Pt and therapist agree to put PT on hold pending MD f/u  I believe this patient would continue to benefit from skilled PT for continued manual therapy to the L shoulder, L shoulder strengthening exercises and L shoulder ROM exercises to improve limitations and assist the patient to improved functional ability    Impairments: abnormal or restricted ROM, abnormal movement, activity intolerance, impaired physical strength and pain with function  Understanding of Dx/Px/POC: good   Prognosis: good    Goals  STGs: 4 weeks  1) Pt will have SPR decrease of 2 units at worst in L bicep- met  2) pt will have improved L shoulder flexion AROM by 10*- met  3) pt will have improved foto score of 10 points- met    LTGs: 8 weeks  1) pt will be independent with HEP by D/C- met  2) pt will be independent with symptom management by D/C- met  3) pt will reports improved tolerance to yard work with no more than 2/10 pain in L shoulder by DC - not met    Plan  Patient would benefit from: skilled physical therapy  Planned modality interventions: cryotherapy  Planned therapy interventions: joint mobilization, manual therapy, neuromuscular re-education, postural training, patient education, strengthening, stretching, therapeutic activities, therapeutic exercise and home exercise program  Frequency: 1x week  Duration in weeks: 8  Plan of Care beginning date: 6/15/2021  Plan of Care expiration date: 8/10/2021  Treatment plan discussed with: patient        Subjective Evaluation    History of Present Illness  Mechanism of injury: Subjective Comments: pt reports that his bicep feels good and has no issues  He denies pain with all activity in the bicep  Pt reports that his shoulder feels achy, grindy and rattley  Pt reports that his shoulder feels about the same since we began PT  Denies N&T in his arm  L shoulder  Pain   Rest: 5/10   Best: 0/10   Worst: 9/10- lifting something with weight over head  L bicep  Pain   Rest: 0/10   Best:0/10   Worst: 0/10              Objective     Static Posture     Head  Forward  Shoulders  Elevated  Observations     Additional Observation Details  Ecchymosis noted L bicep secondary to BP cuff  Tenderness     Left Shoulder   Tenderness in the biceps tendon (proximal)       Active Range of Motion   Left Shoulder   Flexion: 135 degrees   Abduction: 132 degrees with pain  External rotation BTH: T3   Internal rotation BTB: L2     Right Shoulder   Flexion: 147 degrees   Abduction: 155 degrees   External rotation BTH: T4   Internal rotation BTB: L2     Left Elbow   Flexion: 142 degrees   Extension: 0 degrees   Forearm supination: 80 degrees   Forearm pronation: 80 degrees     Right Elbow   Flexion: 143 degrees   Extension: -2 degrees   Forearm supination: 80 degrees   Forearm pronation: 80 degrees     Passive Range of Motion   Left Shoulder   Flexion: 155 degrees   Abduction: 150 degrees   External rotation 45°: 90 degrees   Internal rotation 45°: 70 degrees     Left Elbow Normal passive range of motion    Right Elbow   Normal passive range of motion    Strength/Myotome Testing     Left Shoulder     Planes of Motion   Flexion: 4   Abduction: 3+   External rotation at 0°: 3+   Internal rotation at 0°: 4+     Right Shoulder     Planes of Motion   Flexion: 4+   Abduction: 4+   External rotation at 0°: 4+   Internal rotation at 0°: 4+     Left Elbow   Flexion: 4+  Extension: 4+  Forearm supination: 4+  Forearm pronation: 4+    Right Elbow   Flexion: 4+  Extension: 4+  Forearm supination: 4+  Forearm pronation: 4+    Tests     Left Shoulder   Positive drop arm, external rotation lag sign and sulcus sign  Precautions: HTN, PVCs, premature atrial contractions, postural hypotension, L bicep tear, Hx bilateral RTC repairs      Manuals 5/11 5/18 5/25 6/1 6/8 6/15       L shoulder PROM RK RK RK + STM L shoulder and bicep RK + STM L shoulder and bicep RK RK                                              Neuro Re-Ed             scap retract 5"x10 5"x10           TB rows/ext  2x10 ea  rtb 2x10 btb ea  2x10 btb ea  2x10 btb ea  2x10 btb ea  RS     30"x3 30"x3       Supine ABCs     x1 x1                                              Ther Ex             Supine cane flexion x10 X10, x10 AROM 2x10  2x10 supine and standing limit UT comp 2x10 supine and standing limit UT comp 2x10 supine and standing limit UT comp       L elbow flex/ext x10 #2 2x10 #4 2x10 #7 2x10 #10 2x10 #10 2x10       L elbow sup/pro x10            pulleys  5' 5' 5' 5' 5'       Wall slides  x20 x20 x20         thera  sup/pro  W/ STM roller x10 ea  W/ STM roller 2x10 ea  W/ STM roller 2x10 ea  #1 close to  W/ STM roller 2x10 ea  #1 close to         SL L shoulder abd AROM  2x10           thera  twist    Ext and flex x10; bending sup and pro x10 ea  Ext and flex x10; bending sup and pro x10 ea  Ext and flex x10; bending sup and pro x10 ea                                                Ther Activity Gait Training                                       Modalities

## 2021-06-18 ENCOUNTER — OFFICE VISIT (OUTPATIENT)
Dept: OBGYN CLINIC | Facility: MEDICAL CENTER | Age: 66
End: 2021-06-18
Payer: COMMERCIAL

## 2021-06-18 VITALS
DIASTOLIC BLOOD PRESSURE: 72 MMHG | HEIGHT: 67 IN | SYSTOLIC BLOOD PRESSURE: 201 MMHG | BODY MASS INDEX: 24.48 KG/M2 | HEART RATE: 80 BPM | WEIGHT: 156 LBS

## 2021-06-18 DIAGNOSIS — M25.512 CHRONIC LEFT SHOULDER PAIN: ICD-10-CM

## 2021-06-18 DIAGNOSIS — M75.42 IMPINGEMENT SYNDROME OF LEFT SHOULDER: ICD-10-CM

## 2021-06-18 DIAGNOSIS — G89.29 CHRONIC LEFT SHOULDER PAIN: ICD-10-CM

## 2021-06-18 DIAGNOSIS — M24.812 INTERNAL DERANGEMENT OF LEFT SHOULDER: Primary | ICD-10-CM

## 2021-06-18 DIAGNOSIS — S46.212A BICEPS RUPTURE, PROXIMAL, LEFT, INITIAL ENCOUNTER: ICD-10-CM

## 2021-06-18 PROCEDURE — 3078F DIAST BP <80 MM HG: CPT | Performed by: ORTHOPAEDIC SURGERY

## 2021-06-18 PROCEDURE — 3008F BODY MASS INDEX DOCD: CPT | Performed by: ORTHOPAEDIC SURGERY

## 2021-06-18 PROCEDURE — 1036F TOBACCO NON-USER: CPT | Performed by: ORTHOPAEDIC SURGERY

## 2021-06-18 PROCEDURE — 20610 DRAIN/INJ JOINT/BURSA W/O US: CPT | Performed by: ORTHOPAEDIC SURGERY

## 2021-06-18 PROCEDURE — 1160F RVW MEDS BY RX/DR IN RCRD: CPT | Performed by: ORTHOPAEDIC SURGERY

## 2021-06-18 PROCEDURE — 99213 OFFICE O/P EST LOW 20 MIN: CPT | Performed by: ORTHOPAEDIC SURGERY

## 2021-06-18 PROCEDURE — 3077F SYST BP >= 140 MM HG: CPT | Performed by: ORTHOPAEDIC SURGERY

## 2021-06-18 RX ORDER — LIDOCAINE HYDROCHLORIDE 10 MG/ML
2 INJECTION, SOLUTION INFILTRATION; PERINEURAL
Status: COMPLETED | OUTPATIENT
Start: 2021-06-18 | End: 2021-06-18

## 2021-06-18 RX ORDER — BETAMETHASONE SODIUM PHOSPHATE AND BETAMETHASONE ACETATE 3; 3 MG/ML; MG/ML
12 INJECTION, SUSPENSION INTRA-ARTICULAR; INTRALESIONAL; INTRAMUSCULAR; SOFT TISSUE
Status: COMPLETED | OUTPATIENT
Start: 2021-06-18 | End: 2021-06-18

## 2021-06-18 RX ORDER — BUPIVACAINE HYDROCHLORIDE 2.5 MG/ML
2 INJECTION, SOLUTION INFILTRATION; PERINEURAL
Status: COMPLETED | OUTPATIENT
Start: 2021-06-18 | End: 2021-06-18

## 2021-06-18 RX ADMIN — LIDOCAINE HYDROCHLORIDE 2 ML: 10 INJECTION, SOLUTION INFILTRATION; PERINEURAL at 10:40

## 2021-06-18 RX ADMIN — BETAMETHASONE SODIUM PHOSPHATE AND BETAMETHASONE ACETATE 12 MG: 3; 3 INJECTION, SUSPENSION INTRA-ARTICULAR; INTRALESIONAL; INTRAMUSCULAR; SOFT TISSUE at 10:40

## 2021-06-18 RX ADMIN — BUPIVACAINE HYDROCHLORIDE 2 ML: 2.5 INJECTION, SOLUTION INFILTRATION; PERINEURAL at 10:40

## 2021-06-18 NOTE — PROGRESS NOTES
Assessment:  1  Internal derangement of left shoulder  MRI shoulder left wo contrast   2  Biceps rupture, proximal, left, initial encounter     3  Chronic left shoulder pain  MRI shoulder left wo contrast   4  Impingement syndrome of left shoulder         Plan:  The patient is doing well in regard to left proximal biceps tear yet does have new complaints of left shoulder pain and instability with history of past RTC repair in 2005  He is provided with left subacromial steroid injection  He tolerated the procedure well  He should re-start physical therapy for specific left shoulder pathology  Left shoulder MRI is ordered  He should follow up with Dr Robertson for further evaluation  To do next visit:  Return if symptoms worsen or fail to improve  The above stated was discussed in layman's terms and the patient expressed understanding  All questions were answered to the patient's satisfaction  Scribe Attestation    I,:  Radha Don am acting as a scribe while in the presence of the attending physician :       I,:  Alina Connolly MD personally performed the services described in this documentation    as scribed in my presence :             Subjective: Clarissa Bro is a 74 y o  male who presents for follow up left proximal biceps tear  He is improving with left biceps complaints yet has new left shoulder complaints  Today he complains of left superior shoulder pain and weakness  Lateral and overhead movements aggravate  He has history of bilateral RTC repai in 2005          Review of systems negative unless otherwise specified in HPI    Past Medical History:   Diagnosis Date    Asthma     Hypertension     Impaired fasting glucose     Mitral valve disorder     Mitral valve prolapse     Murmur, cardiac     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     PAC (premature atrial contraction)     PVC (premature ventricular contraction)     Thyroid nodule        Past Surgical History:   Procedure Laterality Date    HAND SURGERY      WA COLONOSCOPY FLX DX W/COLLJ SPEC WHEN PFRMD N/A 2/9/2018    Procedure: COLONOSCOPY;  Surgeon: Tiki Cosby MD;  [facility]; Service: Gastroenterology    PROSTATE BIOPSY      SHOULDER SURGERY         Family History   Problem Relation Age of Onset    Diabetes Sister     Other Family         Stroke syndrome       Social History     Occupational History    Not on file   Tobacco Use    Smoking status: Never Smoker    Smokeless tobacco: Never Used   Substance and Sexual Activity    Alcohol use:  Yes     Alcohol/week: 7 0 standard drinks     Types: 7 Glasses of wine per week     Comment: socially    Drug use: No    Sexual activity: Yes         Current Outpatient Medications:     amoxicillin (AMOXIL) 500 mg capsule, TAKE 4 CAPSULES BY MOUTH ONE HOUR PRIOR TO APPOINTMENT, Disp: , Rfl:     aspirin 81 mg chewable tablet, Chew 1 tablet every other day, Disp: , Rfl:     busPIRone (BUSPAR) 5 mg tablet, Take 1 tablet (5 mg total) by mouth daily as needed (Anxiety), Disp: 30 tablet, Rfl: 1    Coenzyme Q10-Red Yeast Rice  MG CAPS, Take 1 capsule by mouth daily, Disp: , Rfl:     Docosahexaenoic Acid (DHA OMEGA 3) 100 MG CAPS, Take 6 capsules by mouth daily, Disp: , Rfl:     fexofenadine (ALLEGRA) 180 MG tablet, Take 1 tablet by mouth daily as needed, Disp: , Rfl:     hydrochlorothiazide (MICROZIDE) 12 5 mg capsule, Take 1 capsule (12 5 mg total) by mouth every morning, Disp: 30 capsule, Rfl: 3    lisinopril (ZESTRIL) 30 mg tablet, Take 1 tablet (30 mg total) by mouth daily, Disp: 90 tablet, Rfl: 2    meclizine (ANTIVERT) 12 5 MG tablet, TAKE ONE TABLET BY MOUTH TWICE A DAY FOR 1 WEEK, Disp: , Rfl:     Misc Natural Products (PROSTATE HEALTH) CAPS, Take 1 capsule by mouth daily, Disp: , Rfl:     Multiple Vitamin tablet, Take 1 tablet by mouth daily, Disp: , Rfl:     verapamil (CALAN-SR) 240 mg CR tablet, TAKE 1 TABLET DAILY, Disp: 90 tablet, Rfl: 3    Allergies   Allergen Reactions    Seasonal Ic  [Cholestatin]             Vitals:    06/18/21 0947   BP: (!) 201/72   Pulse: 80       Objective:  Physical exam  · General: Awake, Alert, Oriented  · Eyes: Pupils equal, round and reactive to light  · Heart: regular rate and rhythm  · Lungs: No audible wheezing  · Abdomen: soft                    Ortho Exam  Left shoulder:  No evidence of muscular waisting  Shoulders equal height  Well healed anterolateral incision  Restriction of ER compared to contralateral side  Good ROM in FF, Abduction and IR  Weakness of supraspinatus           Diagnostics, reviewed and taken today if performed as documented: The attending physician has personally reviewed the pertinent films in PACS and interpretation is as follows:  Left shoulder x-ray of 4/26/2021:  No obvious osseus abnormalities  RTC repair anchors  Procedures, if performed today:    Large joint arthrocentesis: L subacromial bursa  Universal Protocol:  Consent: Verbal consent obtained  Risks and benefits: risks, benefits and alternatives were discussed  Consent given by: patient  Time out: Immediately prior to procedure a "time out" was called to verify the correct patient, procedure, equipment, support staff and site/side marked as required    Timeout called at: 6/18/2021 10:29 AM   Patient understanding: patient states understanding of the procedure being performed  Site marked: the operative site was marked  Patient identity confirmed: verbally with patient    Supporting Documentation  Indications: pain   Procedure Details  Location: shoulder - L subacromial bursa  Needle size: 25 G  Ultrasound guidance: no  Approach: posterolateral  Medications administered: 2 mL bupivacaine 0 25 %; 12 mg betamethasone acetate-betamethasone sodium phosphate 6 (3-3) mg/mL; 2 mL lidocaine 1 %    Patient tolerance: patient tolerated the procedure well with no immediate complications  Dressing:  Sterile dressing applied              Portions of the record may have been created with voice recognition software  Occasional wrong word or "sound a like" substitutions may have occurred due to the inherent limitations of voice recognition software  Read the chart carefully and recognize, using context, where substitutions have occurred

## 2021-06-29 ENCOUNTER — OFFICE VISIT (OUTPATIENT)
Dept: PHYSICAL THERAPY | Facility: MEDICAL CENTER | Age: 66
End: 2021-06-29
Payer: COMMERCIAL

## 2021-06-29 DIAGNOSIS — M24.812 INTERNAL DERANGEMENT OF LEFT SHOULDER: ICD-10-CM

## 2021-06-29 DIAGNOSIS — S46.212A BICEPS RUPTURE, PROXIMAL, LEFT, INITIAL ENCOUNTER: ICD-10-CM

## 2021-06-29 DIAGNOSIS — G89.29 CHRONIC LEFT SHOULDER PAIN: ICD-10-CM

## 2021-06-29 DIAGNOSIS — M75.42 IMPINGEMENT SYNDROME OF LEFT SHOULDER: ICD-10-CM

## 2021-06-29 DIAGNOSIS — S46.192A OTHER INJURY OF MUSCLE, FASCIA AND TENDON OF LONG HEAD OF BICEPS, LEFT ARM, INITIAL ENCOUNTER: Primary | ICD-10-CM

## 2021-06-29 DIAGNOSIS — M25.512 CHRONIC LEFT SHOULDER PAIN: ICD-10-CM

## 2021-06-29 PROCEDURE — 97112 NEUROMUSCULAR REEDUCATION: CPT | Performed by: PHYSICAL THERAPIST

## 2021-06-29 PROCEDURE — 97110 THERAPEUTIC EXERCISES: CPT | Performed by: PHYSICAL THERAPIST

## 2021-06-29 NOTE — PROGRESS NOTES
Daily Note     Today's date: 2021  Patient name: Clarissa Bro  : 1955  MRN: [id number]  Referring provider: Jeanna Zelaya MD  Dx:   Encounter Diagnosis     ICD-10-CM    1  Other injury of muscle, fascia and tendon of long head of biceps, left arm, initial encounter  S46 192A    2  Internal derangement of left shoulder  M24 812 Ambulatory referral to Physical Therapy   3  Biceps rupture, proximal, left, initial encounter  S46 212A Ambulatory referral to Physical Therapy   4  Chronic left shoulder pain  M25 512 Ambulatory referral to Physical Therapy    G89 29    5  Impingement syndrome of left shoulder  M75 42 Ambulatory referral to Physical Therapy                  Subjective: pt reports no complaints in his bicep  He reports no change in the symptoms in his shoulder  Continues to have cracking and popping and would like to be on hold after today  Objective: See treatment diary below      Assessment: Tolerated treatment well  Pt deferred a few exercises due to increase in pain and discomfort  Pt reports cracking and popping with all activity which continues at times, held off on exercises that increased this  Reviewed HEP with patient and answered all       Patient would benefit from continued PT      Plan: Hold PT     Precautions: HTN, PVCs, premature atrial contractions, postural hypotension, L bicep tear, Hx bilateral RTC repairs      Manuals        L shoulder PROM RK RK RK + STM L shoulder and bicep RK + STM L shoulder and bicep RK                                               Neuro Re-Ed             scap retract 5"x10 5"x10           TB rows/ext  2x10 ea  rtb 2x10 btb ea  2x10 btb ea  2x10 btb ea  3x10 btb ea         RS     30"x3 30" x 3       Supine ABCs     x1                                               Ther Ex             Supine cane flexion x10 X10, x10 AROM 2x10  2x10 supine and standing limit UT comp 2x10 supine and standing limit UT comp deferred L elbow flex/ext x10 #2 2x10 #4 2x10 #7 2x10 #10 2x10 # 10, 2 x 10        L elbow sup/pro x10            pulleys  5' 5' 5' 5' deferred       Wall slides  x20 x20 x20  deferred       thera  sup/pro  W/ STM roller x10 ea  W/ STM roller 2x10 ea  W/ STM roller 2x10 ea  #1 close to  W/ STM roller 2x10 ea  #1 close to  W/ STM roller 2x10 ea  #1 close to        SL L shoulder abd AROM  2x10           thera  twist    Ext and flex x10; bending sup and pro x10 ea  Ext and flex x10; bending sup and pro x10 ea   Ext and flex x10; bending sup and pro x10                                               Ther Activity                                       Gait Training                                       Modalities

## 2021-06-30 LAB
BASOPHILS # BLD AUTO: 51 CELLS/UL (ref 0–200)
BASOPHILS NFR BLD AUTO: 0.9 %
CHOLEST SERPL-MCNC: 138 MG/DL
CHOLEST/HDLC SERPL: 2.6 (CALC)
EOSINOPHIL # BLD AUTO: 143 CELLS/UL (ref 15–500)
EOSINOPHIL NFR BLD AUTO: 2.5 %
ERYTHROCYTE [DISTWIDTH] IN BLOOD BY AUTOMATED COUNT: 12.2 % (ref 11–15)
HBA1C MFR BLD: 4.9 % OF TOTAL HGB
HCT VFR BLD AUTO: 45.8 % (ref 38.5–50)
HDLC SERPL-MCNC: 54 MG/DL
HGB BLD-MCNC: 15.6 G/DL (ref 13.2–17.1)
LDLC SERPL CALC-MCNC: 72 MG/DL (CALC)
LYMPHOCYTES # BLD AUTO: 1562 CELLS/UL (ref 850–3900)
LYMPHOCYTES NFR BLD AUTO: 27.4 %
MCH RBC QN AUTO: 30 PG (ref 27–33)
MCHC RBC AUTO-ENTMCNC: 34.1 G/DL (ref 32–36)
MCV RBC AUTO: 88.1 FL (ref 80–100)
MONOCYTES # BLD AUTO: 519 CELLS/UL (ref 200–950)
MONOCYTES NFR BLD AUTO: 9.1 %
NEUTROPHILS # BLD AUTO: 3426 CELLS/UL (ref 1500–7800)
NEUTROPHILS NFR BLD AUTO: 60.1 %
NONHDLC SERPL-MCNC: 84 MG/DL (CALC)
PLATELET # BLD AUTO: 303 THOUSAND/UL (ref 140–400)
PMV BLD REES-ECKER: 9.5 FL (ref 7.5–12.5)
RBC # BLD AUTO: 5.2 MILLION/UL (ref 4.2–5.8)
TRIGL SERPL-MCNC: 41 MG/DL
WBC # BLD AUTO: 5.7 THOUSAND/UL (ref 3.8–10.8)

## 2021-07-01 ENCOUNTER — OFFICE VISIT (OUTPATIENT)
Dept: INTERNAL MEDICINE CLINIC | Facility: CLINIC | Age: 66
End: 2021-07-01
Payer: COMMERCIAL

## 2021-07-01 VITALS
WEIGHT: 155 LBS | BODY MASS INDEX: 24.33 KG/M2 | TEMPERATURE: 98 F | OXYGEN SATURATION: 99 % | HEART RATE: 66 BPM | HEIGHT: 67 IN

## 2021-07-01 DIAGNOSIS — I95.1 POSTURAL HYPOTENSION: ICD-10-CM

## 2021-07-01 DIAGNOSIS — I10 BENIGN ESSENTIAL HYPERTENSION: Primary | ICD-10-CM

## 2021-07-01 DIAGNOSIS — S46.212S RUPTURE OF LEFT BICEPS TENDON, SEQUELA: ICD-10-CM

## 2021-07-01 DIAGNOSIS — I10 ESSENTIAL HYPERTENSION: ICD-10-CM

## 2021-07-01 DIAGNOSIS — R53.83 FATIGUE, UNSPECIFIED TYPE: ICD-10-CM

## 2021-07-01 PROCEDURE — 3725F SCREEN DEPRESSION PERFORMED: CPT | Performed by: INTERNAL MEDICINE

## 2021-07-01 PROCEDURE — 3008F BODY MASS INDEX DOCD: CPT | Performed by: INTERNAL MEDICINE

## 2021-07-01 PROCEDURE — 1036F TOBACCO NON-USER: CPT | Performed by: INTERNAL MEDICINE

## 2021-07-01 PROCEDURE — 1160F RVW MEDS BY RX/DR IN RCRD: CPT | Performed by: INTERNAL MEDICINE

## 2021-07-01 PROCEDURE — 99213 OFFICE O/P EST LOW 20 MIN: CPT | Performed by: INTERNAL MEDICINE

## 2021-07-01 RX ORDER — LISINOPRIL 40 MG/1
40 TABLET ORAL DAILY
Qty: 30 TABLET | Refills: 0 | Status: SHIPPED | OUTPATIENT
Start: 2021-07-01 | End: 2021-07-26 | Stop reason: SDUPTHER

## 2021-07-01 NOTE — ASSESSMENT & PLAN NOTE
· Patient has been complaining of fatigue that has progressively worsening since in the last 3 months He noticed the symptoms since he started the HCTZ and would like to see if he can get off the HCTZ  His urine is darker now  · [place]  He denies havng ticks on the property or getting a tick bite  · He denies depression s/s  Plan:  · Discontinue the HCTZ and monitor BP    · Increase po hydration  · Lyme titer pending  · CMP pending  · Magnesium pending  · Iron panel as ferritin was low in the past   · TSH pending

## 2021-07-01 NOTE — ASSESSMENT & PLAN NOTE
· Has white coat syndrome  He went to Ortho clinic on 6/18/21 and BP was 201/72  Repeat BP was in the 140s  · BP at home is 120-144/70s  · Home medication HCTZ 12 5 mg qd  Verapamil 240 mg qd  Lisinopril 30 mg qd  · Patient admits to being fatigued since starting HCTZ and would like to come off of it  Wife concurs  · Patient has had cardiologist visit 5/6/21 and had ECG that was benign  · Patient is compliant with medication  Had postural hypotension that resolves in a few seconds  Patient also had Covid 19 vaccination within the same time frame  Will resolve with time      Plan:  · Continue Verapamil and increase Lisinopril 40 mg qd regimen with BP checks at home  · Discontinue HCTZ at this  time and monitor  · Encouraged to use NUUN electrolyte tabs with 6-8 glasses of water per day  · Follow up in 3 months

## 2021-07-01 NOTE — PROGRESS NOTES
INTERNAL MEDICINE FOLLOW-UP OFFICE VISIT  [facility]    NAME: Victorino Carney  AGE: 74 y o  SEX: male  : 1955     DATE: 2021     Assessment and Plan:     Fatigue  · Patient has been complaining of fatigue that has progressively worsening since in the last 3 months He noticed the symptoms since he started the HCTZ and would like to see if he can get off the HCTZ  His urine is darker now  · He [place]  He denies havng ticks on the property or getting a tick bite  · He denies depression s/s  Plan:  · Discontinue the HCTZ and monitor BP  · Increase po hydration  · Lyme titer pending  · CMP pending  · Magnesium pending  · Iron panel as ferritin was low in the past   · TSH pending    Benign essential hypertension        · Has white coat syndrome  He went to Ortho clinic on 21 and BP was 201/72  Repeat BP was in the 140s  · BP at home is 120-144/70s  · Home medication HCTZ 12 5 mg qd  Verapamil 240 mg qd  Lisinopril 30 mg qd  · Patient admits to being fatigued since starting HCTZ and would like to come off of it  Wife concurs  · Patient has had cardiologist visit 21 and had ECG that was benign  · Patient is compliant with medication  Had postural hypotension that resolves in a few seconds  Patient also had Covid 19 vaccination within the same time frame  Will resolve with time      Plan:  · Continue Verapamil and increase Lisinopril 40 mg qd regimen with BP checks at home  · Discontinue HCTZ at this  time and monitor  · Encouraged to use NUUN electrolyte tabs with 6-8 glasses of water per day  · Follow up in 3 months  Rupture of left biceps tendon  · Mild atrophy on left bicep noted  Plan:  · Patient has MRI scheduled by Ortho  · Continue PT      Return in about 4 weeks (around 2021) for Next scheduled follow up  Chief Complaint:     No chief complaint on file         History of Present Illness:     Patient presented for follow up on BP  He states that his BP at home daily is in the 120s-130s/70s  However, when he went to the Ortho clinic, his BP was 200s/70s initially and then the repeat was 140s/70  He took Buspar before his appt today to avoid getting anxious  He is compliant with medication but complains of fatigue and muscle cramps that is progressively worsening since he started taking the HCTZ  His urine has been darker and has light headedness intermittently  His wife who is with him at the office visit agrees  He denies tick bites, depression, rashes, SOB or CP  The following portions of the patient's history were reviewed and updated as appropriate: allergies, current medications, past family history, past medical history, past social history, past surgical history and problem list      Review of Systems:     Review of Systems   Constitutional: Positive for fatigue  Negative for activity change, appetite change, chills, fever and unexpected weight change  HENT: Negative for congestion  Eyes: Negative for visual disturbance  Respiratory: Negative for shortness of breath  Cardiovascular: Negative for chest pain and palpitations  Gastrointestinal: Negative for abdominal pain, constipation, diarrhea, nausea and vomiting  Endocrine: Negative for polyuria  Genitourinary: Negative for decreased urine volume and difficulty urinating  Musculoskeletal: Negative for arthralgias, joint swelling, myalgias and neck pain  Skin: Negative for rash  No bites   Neurological: Negative for dizziness, weakness, numbness and headaches  Hematological: Does not bruise/bleed easily  Psychiatric/Behavioral: Negative for agitation, behavioral problems and confusion          Problem List:     Patient Active Problem List   Diagnosis    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    Impaired fasting glucose    Benign essential hypertension    Encounter for hepatitis C screening test for low risk patient    Need for pneumococcal vaccination    Allergic rhinitis    PVC's (premature ventricular contractions)    Premature atrial contractions    Medicare annual wellness visit, subsequent    Iron deficiency anemia    Elevated blood sugar    Exposure to COVID-19 virus    Essential hypertension    Anxiety    Postural hypotension    Rupture of left biceps tendon    Fatigue        Objective:     Pulse 66   Temp 98 °F (36 7 °C) (Temporal)   Ht 5' 7" (1 702 m)   Wt 70 3 kg (155 lb)   SpO2 99%   BMI 24 28 kg/m²     Physical Exam  Vitals and nursing note reviewed  Constitutional:       General: He is not in acute distress  Appearance: Normal appearance  He is not ill-appearing, toxic-appearing or diaphoretic  HENT:      Head: Normocephalic and atraumatic  Nose: Nose normal    Eyes:      General: No scleral icterus  Right eye: No discharge  Left eye: No discharge  Extraocular Movements: Extraocular movements intact  Pupils: Pupils are equal, round, and reactive to light  Neck:      Vascular: No carotid bruit  Cardiovascular:      Rate and Rhythm: Normal rate and regular rhythm  Pulses: Normal pulses  Heart sounds: Normal heart sounds  No murmur heard  No friction rub  No gallop  Pulmonary:      Effort: Pulmonary effort is normal       Breath sounds: Normal breath sounds  No wheezing, rhonchi or rales  Abdominal:      General: Bowel sounds are normal       Palpations: Abdomen is soft  Tenderness: There is no abdominal tenderness  There is no guarding or rebound  Musculoskeletal:         General: No swelling or tenderness  Cervical back: Normal range of motion and neck supple  No rigidity  No muscular tenderness  Right lower leg: No edema  Left lower leg: No edema  Skin:     Capillary Refill: Capillary refill takes less than 2 seconds  Coloration: Skin is not jaundiced  Findings: No bruising, erythema, lesion or rash  Neurological:      General: No focal deficit present  Mental Status: He is alert and oriented to person, place, and time  Motor: No weakness  Gait: Gait normal    Psychiatric:         Mood and Affect: Mood normal          Behavior: Behavior normal          Thought Content: Thought content normal          Judgment: Judgment normal          Pertinent Laboratory/Diagnostic Studies:    Laboratory Results: I have personally reviewed the pertinent laboratory results/reports     CBC:   Results from Last 12 Months   Lab Units 06/30/21  0904   WHITE BLOOD CELL COUNT  Thousand/uL 5 7   RED BLOOD CELL COUNT  Million/uL 5 20   HEMOGLOBIN  g/dL 15 6   HEMATOCRIT  % 45 8   MCV  fL 88 1   MCH  pg 30 0   MCHC  g/dL 34 1   RDW  % 12 2   PLATELETS  Thousand/uL 303   NEUTROS PCT  % 60 1   LYMPHS PCT  % 27 4   MONOS PCT  % 9 1   EOS PCT  % 2 5   NEUTROS ABS  cells/uL 3,426   LYMPHS ABS  cells/uL 1,562   MONOS ABS  cells/uL 519   EOS ABS  cells/uL 143     Chemistry Profile:   Results from Last 12 Months   Lab Units 04/08/21  0918 01/12/21  0920   POTASSIUM mmol/L 4 2 4 2   CHLORIDE mmol/L 104 104   CO2 mmol/L 28 28   BUN mg/dL 17 11   CREATININE mg/dL 0 78 0 73   GLUCOSE RANDOM mg/dL 102* 100*   CALCIUM mg/dL 9 3 9 4   AST U/L  --  25   ALT U/L  --  27   ALK PHOS U/L  --  49   GFR MDRD AF AMER mL/min/1 73m2 108 111   EGFR NON  mL/min/1 73m2 93 96     Endocrine Studies:   Results from Last 12 Months   Lab Units 06/30/21  0904   HEMOGLOBIN A1C % of total Hgb 4 9   TRIGLYCERIDES mg/dL 41   CHOLESTEROL mg/dL 138   HDL mg/dL 54   LDL CALC mg/dL (calc) 72       Radiology/Other Diagnostic Testing Results: I have personally reviewed pertinent reports        Dex Robles MD  [facility]

## 2021-07-14 ENCOUNTER — HOSPITAL ENCOUNTER (OUTPATIENT)
Dept: RADIOLOGY | Age: 66
Discharge: HOME | End: 2021-07-14
Payer: COMMERCIAL

## 2021-07-14 DIAGNOSIS — G89.29 CHRONIC LEFT SHOULDER PAIN: ICD-10-CM

## 2021-07-14 DIAGNOSIS — M24.812 INTERNAL DERANGEMENT OF LEFT SHOULDER: ICD-10-CM

## 2021-07-14 DIAGNOSIS — M25.512 CHRONIC LEFT SHOULDER PAIN: ICD-10-CM

## 2021-07-14 PROCEDURE — G1004 CDSM NDSC: HCPCS

## 2021-07-14 PROCEDURE — 73221 MRI JOINT UPR EXTREM W/O DYE: CPT

## 2021-07-19 ENCOUNTER — OFFICE VISIT (OUTPATIENT)
Dept: OBGYN CLINIC | Facility: OTHER | Age: 66
End: 2021-07-19
Payer: COMMERCIAL

## 2021-07-19 VITALS
HEIGHT: 67 IN | BODY MASS INDEX: 24.8 KG/M2 | HEART RATE: 74 BPM | WEIGHT: 158 LBS | DIASTOLIC BLOOD PRESSURE: 89 MMHG | SYSTOLIC BLOOD PRESSURE: 165 MMHG

## 2021-07-19 DIAGNOSIS — M12.812 ROTATOR CUFF TEAR ARTHROPATHY OF LEFT SHOULDER: Primary | ICD-10-CM

## 2021-07-19 DIAGNOSIS — M75.102 ROTATOR CUFF TEAR ARTHROPATHY OF LEFT SHOULDER: Primary | ICD-10-CM

## 2021-07-19 PROCEDURE — 3079F DIAST BP 80-89 MM HG: CPT | Performed by: ORTHOPAEDIC SURGERY

## 2021-07-19 PROCEDURE — 3077F SYST BP >= 140 MM HG: CPT | Performed by: ORTHOPAEDIC SURGERY

## 2021-07-19 PROCEDURE — 99214 OFFICE O/P EST MOD 30 MIN: CPT | Performed by: ORTHOPAEDIC SURGERY

## 2021-07-19 NOTE — PROGRESS NOTES
Assessment  Diagnoses and all orders for this visit:    Rotator cuff tear arthropathy of left shoulder      Discussion and Plan:    · Explained to the patient that his MRI reveals a chronic, retracted rotator cuff tear with osteoarthritis which is termed as rotator cuff tear arthropathy  The pathoanatomy and natural history of this diagnosis was explained to the patient in detail today in the office  The surgical option for him would be to perform a reverse total shoulder arthroplasty but this is only if he fails all other non operative treatments  He understood and all questions were answered  · At this time it is recommended that he continue with non operative treatments, which include intermittent CS injections and PT/HEP as tolerated, as he is functioning well at this time with only mild, intermittent pain about the shoulder  · Follow up in 3 months for possible repeat CS injection  May cancel if symptoms improve    Subjective:   Patient ID: Carlo Mcdowell is a 74 y o  male      The patient presents with a chief complaint of left shoulder pain  The pain began a few month(s) ago and is not associated with an acute injury  Patient reports that  The patient describes the pain as aching and dull in intensity,  intermittent in timing, and localizes the pain to the  left subacromial joint, deltoid  The pain is worse with overhead work, overuse and raising arm over head and relieved by rest, ice, avoiding the painful activities  The pain is not associated with numbness and tingling  The pain is not associated with constitutional symptoms  The patient is not awoken at night by the pain  The patient has had prior treatment in the form of formal PT/HEP with some benefit and a CS injection on 6/18/2021 with benefit for about 1 week  He also had a rotator cuff repair in 2005 by Dr Demetria Bianchi                 The following portions of the patient's history were reviewed and updated as appropriate: allergies, current medications, past family history, past medical history, past social history, past surgical history and problem list     Review of Systems   Constitutional: Negative for chills, fatigue, fever and unexpected weight change  HENT: Negative for hearing loss, nosebleeds and sore throat  Eyes: Negative for pain, redness and visual disturbance  Respiratory: Negative for cough, shortness of breath and wheezing  Cardiovascular: Negative for chest pain, palpitations and leg swelling  Gastrointestinal: Negative for abdominal pain, nausea and vomiting  Endocrine: Negative for polydipsia and polyuria  Genitourinary: Negative for frequency and urgency  Skin: Negative for color change, rash and wound  Neurological: Negative for dizziness, weakness, numbness and headaches  Psychiatric/Behavioral: Negative for behavioral problems, self-injury and suicidal ideas  Objective:  /89   Pulse 74   Ht 5' 7" (1 702 m)   Wt 71 7 kg (158 lb)   BMI 24 75 kg/m²       Left Shoulder Exam     Tenderness   The patient is experiencing no tenderness  Range of Motion   External rotation: 60   Forward flexion: 160   Internal rotation 0 degrees: Lumbar     Muscle Strength   Abduction: 3/5   External rotation: 3/5     Tests   Drop arm: positive    Other   Erythema: absent  Sensation: normal  Pulse: present             Physical Exam  Constitutional:       General: He is not in acute distress  Appearance: He is well-developed  Eyes:      Conjunctiva/sclera: Conjunctivae normal       Pupils: Pupils are equal, round, and reactive to light  Cardiovascular:      Rate and Rhythm: Normal rate and regular rhythm  Pulmonary:      Effort: Pulmonary effort is normal       Breath sounds: Normal breath sounds  Abdominal:      General: Bowel sounds are normal       Palpations: Abdomen is soft  Musculoskeletal:      Cervical back: Normal range of motion and neck supple     Skin:     General: Skin is warm and dry       Findings: No erythema or rash  Neurological:      Mental Status: He is alert and oriented to person, place, and time  Deep Tendon Reflexes: Reflexes are normal and symmetric  Psychiatric:         Behavior: Behavior normal            I have personally reviewed pertinent films in PACS and my interpretation is as follows  MRI Left Shoulder 7/14/2021: Post surgical changes from prior rotator cuff repair  Complete, full thickness re tear of the supraspinatus and infraspinatus tendons with retraction and moderate fatty muscle atrophy  Mild glenohumeral joint osteoarthritis       Scribe Attestation    I,:  Ian Melgar am acting as a scribe while in the presence of the attending physician :       I,:  Roxy Lopez MD personally performed the services described in this documentation    as scribed in my presence :

## 2021-07-22 ENCOUNTER — VBI (OUTPATIENT)
Dept: ADMINISTRATIVE | Facility: OTHER | Age: 66
End: 2021-07-22

## 2021-07-26 ENCOUNTER — OFFICE VISIT (OUTPATIENT)
Dept: INTERNAL MEDICINE CLINIC | Facility: CLINIC | Age: 66
End: 2021-07-26
Payer: COMMERCIAL

## 2021-07-26 VITALS
HEART RATE: 67 BPM | OXYGEN SATURATION: 99 % | WEIGHT: 158.6 LBS | BODY MASS INDEX: 24.89 KG/M2 | DIASTOLIC BLOOD PRESSURE: 72 MMHG | RESPIRATION RATE: 16 BRPM | HEIGHT: 67 IN | SYSTOLIC BLOOD PRESSURE: 132 MMHG

## 2021-07-26 DIAGNOSIS — F41.9 ANXIETY: ICD-10-CM

## 2021-07-26 DIAGNOSIS — I10 ESSENTIAL HYPERTENSION: ICD-10-CM

## 2021-07-26 DIAGNOSIS — Z00.00 MEDICARE ANNUAL WELLNESS VISIT, SUBSEQUENT: Primary | ICD-10-CM

## 2021-07-26 DIAGNOSIS — R53.83 OTHER FATIGUE: ICD-10-CM

## 2021-07-26 LAB
ALBUMIN SERPL-MCNC: 4.4 G/DL (ref 3.6–5.1)
ALBUMIN/GLOB SERPL: 2.1 (CALC) (ref 1–2.5)
ALP SERPL-CCNC: 46 U/L (ref 35–144)
ALT SERPL-CCNC: 24 U/L (ref 9–46)
AST SERPL-CCNC: 24 U/L (ref 10–35)
B BURGDOR AB SER IA-ACNC: <0.9 INDEX
BILIRUB SERPL-MCNC: 1 MG/DL (ref 0.2–1.2)
BUN SERPL-MCNC: 12 MG/DL (ref 7–25)
BUN/CREAT SERPL: 18 (CALC) (ref 6–22)
CALCIUM SERPL-MCNC: 9.2 MG/DL (ref 8.6–10.3)
CHLORIDE SERPL-SCNC: 104 MMOL/L (ref 98–110)
CO2 SERPL-SCNC: 25 MMOL/L (ref 20–32)
CREAT SERPL-MCNC: 0.68 MG/DL (ref 0.7–1.25)
FERRITIN SERPL-MCNC: 52 NG/ML (ref 24–380)
GLOBULIN SER CALC-MCNC: 2.1 G/DL (CALC) (ref 1.9–3.7)
GLUCOSE SERPL-MCNC: 97 MG/DL (ref 65–99)
IRON SERPL-MCNC: 122 MCG/DL (ref 50–180)
MAGNESIUM SERPL-MCNC: 2.2 MG/DL (ref 1.5–2.5)
POTASSIUM SERPL-SCNC: 4.3 MMOL/L (ref 3.5–5.3)
PROT SERPL-MCNC: 6.5 G/DL (ref 6.1–8.1)
SL AMB EGFR AFRICAN AMERICAN: 114 ML/MIN/1.73M2
SL AMB EGFR NON AFRICAN AMERICAN: 98 ML/MIN/1.73M2
SODIUM SERPL-SCNC: 137 MMOL/L (ref 135–146)
TIBC SERPL-MCNC: NORMAL MCG/DL (CALC)
TSH SERPL-ACNC: 1.07 MIU/L (ref 0.4–4.5)

## 2021-07-26 PROCEDURE — 1160F RVW MEDS BY RX/DR IN RCRD: CPT | Performed by: INTERNAL MEDICINE

## 2021-07-26 PROCEDURE — 99213 OFFICE O/P EST LOW 20 MIN: CPT | Performed by: INTERNAL MEDICINE

## 2021-07-26 PROCEDURE — 3288F FALL RISK ASSESSMENT DOCD: CPT | Performed by: INTERNAL MEDICINE

## 2021-07-26 PROCEDURE — 1170F FXNL STATUS ASSESSED: CPT | Performed by: INTERNAL MEDICINE

## 2021-07-26 PROCEDURE — 1036F TOBACCO NON-USER: CPT | Performed by: INTERNAL MEDICINE

## 2021-07-26 PROCEDURE — 1125F AMNT PAIN NOTED PAIN PRSNT: CPT | Performed by: INTERNAL MEDICINE

## 2021-07-26 PROCEDURE — G0439 PPPS, SUBSEQ VISIT: HCPCS | Performed by: INTERNAL MEDICINE

## 2021-07-26 RX ORDER — LISINOPRIL 20 MG/1
20 TABLET ORAL EVERY 12 HOURS
Qty: 180 TABLET | Refills: 3 | Status: SHIPPED | OUTPATIENT
Start: 2021-07-26 | End: 2022-03-01 | Stop reason: SDUPTHER

## 2021-07-26 NOTE — PROGRESS NOTES
Assessment/Plan:    Medicare annual wellness visit, subsequent    Assessment and plan 1  Medicare subsequent annual wellness examination overall the patient is clinically stable and doing well, we encouraged the patient to follow a healthy and balanced diet  We recommend that the patient exercise routinely approximately 30 minutes 5 times per week   We have reviewed the patient's vaccines and have made recommendations for updates if necessary  Annual flu shot, he has had the COVID vaccine       We will be ordering screening laboratories which are age appropriate  Return to the office in   6 months    call if any problems  Essential hypertension   High blood pressure is now controlled with the current regimen reports me some possible fatigue secondary to the lisinopril I have asked him the take 20 mg b i d  if fatigue persists he can take 40 mg HS  Continue monitor blood pressure his readings are well controlled at this point    Anxiety   Improvement continue BuSpar 5 mg once daily as needed    Fatigue   Possibly related to antihypertensive medication will adjust medications as per plan under hypertension will also check vitamin-D and B12 level         Problem List Items Addressed This Visit        Cardiovascular and Mediastinum    Essential hypertension      High blood pressure is now controlled with the current regimen reports me some possible fatigue secondary to the lisinopril I have asked him the take 20 mg b i d  if fatigue persists he can take 40 mg HS  Continue monitor blood pressure his readings are well controlled at this point         Relevant Medications    lisinopril (ZESTRIL) 20 mg tablet       Other    Medicare annual wellness visit, subsequent - Primary       Assessment and plan 1  Medicare subsequent annual wellness examination overall the patient is clinically stable and doing well, we encouraged the patient to follow a healthy and balanced diet    We recommend that the patient exercise routinely approximately 30 minutes 5 times per week   We have reviewed the patient's vaccines and have made recommendations for updates if necessary  Annual flu shot, he has had the COVID vaccine       We will be ordering screening laboratories which are age appropriate  Return to the office in   6 months    call if any problems  Anxiety      Improvement continue BuSpar 5 mg once daily as needed         Fatigue      Possibly related to antihypertensive medication will adjust medications as per plan under hypertension will also check vitamin-D and B12 level         Relevant Orders    Vitamin B12    Vitamin D 25 hydroxy           RTO in 6 months call if any problems  Subjective:      Patient ID: Elkin Mccracken is a 74 y o  male  HPI  74-year old male coming in for a follow up visit regarding essential hypertension, fatigue, anxiety; The patient reports me compliant taking medications without untoward side effects the  The patient is here to review his medical condition, update me on the medical condition and the patient reports me no hospitalizations and no ER visits  Reports me ongoing mild fatigue possibly related to the lisinopril he had noted more after taking this medication pressure also reports me anxiety levels are better controlled he is using the BuSpar as needed reports me he is currently in the process of downsizing  The following portions of the patient's history were reviewed and updated as appropriate: allergies, current medications, past family history, past medical history, past social history, past surgical history and problem list     Review of Systems   Constitutional: Positive for fatigue  Negative for activity change, appetite change and unexpected weight change  Eyes: Negative for visual disturbance  Respiratory: Negative for cough and shortness of breath  Cardiovascular: Negative for chest pain     Gastrointestinal: Negative for abdominal pain, diarrhea, nausea and vomiting  Neurological: Negative for dizziness, light-headedness and headaches  Psychiatric/Behavioral: The patient is nervous/anxious ( improved)  Objective:    No follow-ups on file  Procedure: MRI shoulder left wo contrast    Result Date: 7/16/2021  Narrative: MRI LEFT SHOULDER INDICATION:   M24 812: Other specific joint derangements of left shoulder, not elsewhere classified M25 512: Pain in left shoulder G89 29: Other chronic pain  COMPARISON:  X-ray left shoulder dated April 26, 2021  TECHNIQUE:   The following MR sequences were obtained of the left shoulder: Localizer, axial GRE/PD fat sat, oblique coronal T2 fat sat, oblique sagittal T1/T2 fat sat  Imaging performed on 3 0T MRI Gadolinium was not used  FINDINGS: SUBCUTANEOUS TISSUES: Normal JOINT EFFUSION: None  ACROMION PROCESS: There appears to be prior acromioplasty and subacromial decompression  ROTATOR CUFF: Status post prior rotator cuff repair with suture anchors in place with susceptibility artifacts  There is complete re-tear of supraspinatus and infraspinatus with the torn tendon edge is proximally retracted to the level of glenohumeral joint (series 4 images 8-15, series 6 images 11-19)  There is subscapularis tendinosis with low-grade interstitial tear (3/15, 6/17)  Teres minor is intact  Moderate fatty muscle atrophy of supraspinatus, infraspinatus and subscapularis (5/27)  SUBACROMIAL/SUBDELTOID BURSA: Small volume bursal fluid  LONG HEAD OF BICEPS TENDON: Status post long head of biceps tendinosis with susceptibility artifacts (3/12, 4/18)  GLENOID LABRUM: Diffuse labral degeneration  GLENOHUMERAL JOINT: Mild degenerative arthropathy with diffuse cartilage irregularity  ACROMIOCLAVICULAR JOINT:  There is mild osteoarthritis  BONES: No acute or suspicious osseous abnormality  Superior migration of the humeral head, in keeping with chronicity of rotator cuff tear  Impression: 1    Postsurgical changes from prior rotator cuff repair  There is complete re-tear of supraspinatus and infraspinatus with the torn tendon edges proximally retracted to the level of glenohumeral joint  Moderate fatty muscle atrophy of supraspinatus, infraspinatus and subscapularis  2   Subscapularis tendinosis with low-grade interstitial tear  3   Postsurgical changes from long head of biceps tenodesis  4   Mild glenohumeral and acromioclavicular osteoarthritis  Workstation performed: [id number]         Allergies   Allergen Reactions    Seasonal Ic  [Cholestatin]        Past Medical History:   Diagnosis Date    Asthma     Hypertension     Impaired fasting glucose     Mitral valve disorder     Mitral valve prolapse     Murmur, cardiac     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     PAC (premature atrial contraction)     PVC (premature ventricular contraction)     Thyroid nodule      Past Surgical History:   Procedure Laterality Date    HAND SURGERY      [place] COLONOSCOPY FLX DX W/COLLJ SPEC WHEN PFRMD N/A 2/9/2018    Procedure: COLONOSCOPY;  Surgeon: Janett Dobbins MD;  [facility];   Service: Gastroenterology    PROSTATE BIOPSY      SHOULDER SURGERY       Current Outpatient Medications on File Prior to Visit   Medication Sig Dispense Refill    amoxicillin (AMOXIL) 500 mg capsule TAKE 4 CAPSULES BY MOUTH ONE HOUR PRIOR TO APPOINTMENT      aspirin 81 mg chewable tablet Chew 1 tablet every other day      busPIRone (BUSPAR) 5 mg tablet Take 1 tablet (5 mg total) by mouth daily as needed (Anxiety) 30 tablet 1    Coenzyme Q10-Red Yeast Rice  MG CAPS Take 1 capsule by mouth daily      Docosahexaenoic Acid (DHA OMEGA 3) 100 MG CAPS Take 6 capsules by mouth daily      fexofenadine (ALLEGRA) 180 MG tablet Take 1 tablet by mouth daily as needed      Misc Natural Products (PROSTATE HEALTH) CAPS Take 1 capsule by mouth daily      Multiple Vitamin tablet Take 1 tablet by mouth daily      verapamil (CALAN-SR) 240 mg CR tablet TAKE 1 TABLET DAILY 90 tablet 3    [DISCONTINUED] lisinopril (ZESTRIL) 40 mg tablet Take 1 tablet (40 mg total) by mouth daily 30 tablet 0     No current facility-administered medications on file prior to visit  Family History   Problem Relation Age of Onset    Diabetes Sister     Other Family         Stroke syndrome     Social History     Socioeconomic History    Marital status: /Civil Union     Spouse name: Not on file    Number of children: Not on file    Years of education: Not on file    Highest education level: Not on file   Occupational History    Not on file   Tobacco Use    Smoking status: Never Smoker    Smokeless tobacco: Never Used   Vaping Use    Vaping Use: Never used   Substance and Sexual Activity    Alcohol use: Yes     Alcohol/week: 7 0 standard drinks     Types: 7 Glasses of wine per week     Comment: socially    Drug use: No    Sexual activity: Yes   Other Topics Concern    Not on file   Social History Narrative    Not on file     Social Determinants of Health     Financial Resource Strain:     Difficulty of Paying Living Expenses:    Food Insecurity:     Worried About Running Out of Food in the Last Year:     [address] in the Last Year:    Transportation Needs:     Lack of Transportation (Medical):      Lack of Transportation (Non-Medical):    Physical Activity:     Days of Exercise per Week:     Minutes of Exercise per Session:    Stress:     Feeling of Stress :    Social Connections:     Frequency of Communication with Friends and Family:     Frequency of Social Gatherings with Friends and Family:     Attends [employer] Services:     Active Member of Clubs or Organizations:     Attends Club or Organization Meetings:     Marital Status:    Intimate Partner Violence:     Fear of Current or Ex-Partner:     Emotionally Abused:     Physically Abused:     Sexually Abused:      Vitals:    07/26/21 1114   BP: 132/72   Pulse: 67   Resp: 16 SpO2: 99%   Weight: 71 9 kg (158 lb 9 6 oz)   Height: 5' 7" (1 702 m)     Results for orders placed or performed in visit on 07/24/21   Iron, TIBC and Ferritin Panel   Result Value Ref Range    Iron, Serum 122 50 - 180 mcg/dL    Total Iron Binding Capacity (TIBC) TNP mcg/dL (calc)    Ferritin 52 24 - 380 ng/mL   Magnesium   Result Value Ref Range    Magnesium, Serum 2 2 1 5 - 2 5 mg/dL   Comprehensive metabolic panel   Result Value Ref Range    Glucose, Random 97 65 - 99 mg/dL    BUN 12 7 - 25 mg/dL    Creatinine 0 68 (L) 0 70 - 1 25 mg/dL    eGFR Non  98 > OR = 60 mL/min/1 73m2    eGFR  114 > OR = 60 mL/min/1 73m2    SL AMB BUN/CREATININE RATIO 18 6 - 22 (calc)    Sodium 137 135 - 146 mmol/L    Potassium 4 3 3 5 - 5 3 mmol/L    Chloride 104 98 - 110 mmol/L    CO2 25 20 - 32 mmol/L    Calcium 9 2 8 6 - 10 3 mg/dL    Protein, Total 6 5 6 1 - 8 1 g/dL    Albumin 4 4 3 6 - 5 1 g/dL    Globulin 2 1 1 9 - 3 7 g/dL (calc)    Albumin/Globulin Ratio 2 1 1 0 - 2 5 (calc)    TOTAL BILIRUBIN 1 0 0 2 - 1 2 mg/dL    Alkaline Phosphatase 46 35 - 144 U/L    AST 24 10 - 35 U/L    ALT 24 9 - 46 U/L   Lyme Total Antibody Profile with reflex to WB   Result Value Ref Range    SL AMB LYME AB SCREEN <0 90 index   TSH, 3rd generation with Free T4 reflex   Result Value Ref Range    TSH W/RFX TO FREE T4 1 07 0 40 - 4 50 mIU/L     Weight (last 2 days)     Date/Time   Weight    07/26/21 1114   71 9 (158 6)            Body mass index is 24 84 kg/m²  BP      Temp      Pulse     Resp      SpO2        Vitals:    07/26/21 1114   Weight: 71 9 kg (158 lb 9 6 oz)     Vitals:    07/26/21 1114   Weight: 71 9 kg (158 lb 9 6 oz)       /72   Pulse 67   Resp 16   Ht 5' 7" (1 702 m)   Wt 71 9 kg (158 lb 9 6 oz)   SpO2 99%   BMI 24 84 kg/m²          Physical Exam  Constitutional:       General: He is not in acute distress  Appearance: He is well-developed  He is not diaphoretic     HENT:      Head: Normocephalic and atraumatic  Right Ear: External ear normal       Left Ear: External ear normal    Eyes:      General: No scleral icterus  Right eye: No discharge  Left eye: No discharge  Conjunctiva/sclera: Conjunctivae normal       Pupils: Pupils are equal, round, and reactive to light  Cardiovascular:      Rate and Rhythm: Normal rate and regular rhythm  Heart sounds: Normal heart sounds  No murmur heard  No friction rub  No gallop  Pulmonary:      Effort: No respiratory distress  Breath sounds: No wheezing or rales  Abdominal:      General: Bowel sounds are normal  There is no distension  Palpations: Abdomen is soft  There is no mass  Tenderness: There is no abdominal tenderness  There is no guarding or rebound  Musculoskeletal:         General: No deformity  Cervical back: Neck supple  Lymphadenopathy:      Cervical: No cervical adenopathy  Neurological:      Mental Status: He is alert  Psychiatric:         Mood and Affect: Mood is not anxious or depressed  Thought Content: Thought content does not include suicidal ideation

## 2021-07-26 NOTE — ASSESSMENT & PLAN NOTE
High blood pressure is now controlled with the current regimen reports me some possible fatigue secondary to the lisinopril I have asked him the take 20 mg b i d  if fatigue persists he can take 40 mg HS    Continue monitor blood pressure his readings are well controlled at this point

## 2021-07-26 NOTE — ASSESSMENT & PLAN NOTE
Assessment and plan 1  Medicare subsequent annual wellness examination overall the patient is clinically stable and doing well, we encouraged the patient to follow a healthy and balanced diet  We recommend that the patient exercise routinely approximately 30 minutes 5 times per week   We have reviewed the patient's vaccines and have made recommendations for updates if necessary  Annual flu shot, he has had the COVID vaccine       We will be ordering screening laboratories which are age appropriate  Return to the office in   6 months    call if any problems

## 2021-07-26 NOTE — PROGRESS NOTES
Assessment and Plan:     Problem List Items Addressed This Visit        Cardiovascular and Mediastinum    Essential hypertension    Relevant Medications    lisinopril (ZESTRIL) 20 mg tablet       Other    Medicare annual wellness visit, subsequent - Primary       Assessment and plan 1  Medicare subsequent annual wellness examination overall the patient is clinically stable and doing well, we encouraged the patient to follow a healthy and balanced diet  We recommend that the patient exercise routinely approximately 30 minutes 5 times per week   We have reviewed the patient's vaccines and have made recommendations for updates if necessary  Annual flu shot, he has had the COVID vaccine       We will be ordering screening laboratories which are age appropriate  Return to the office in   6 months    call if any problems  Fatigue    Relevant Orders    Vitamin B12    Vitamin D 25 hydroxy       RTO in 6 months call if any problems     Preventive health issues were discussed with patient, and age appropriate screening tests were ordered as noted in patient's After Visit Summary  Personalized health advice and appropriate referrals for health education or preventive services given if needed, as noted in patient's After Visit Summary       History of Present Illness:     Patient presents for Medicare Annual Wellness visit    Patient Care Team:  Chris Erazo DO as PCP - [facility] DO Reji as PCP - [address] [place] [address] (RTE)  MD Nina Pena CRNP Shedrick Harts, Earlyne Ding, MD Karin Callow, MD as Endoscopist     Problem List:     Patient Active Problem List   Diagnosis    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    Impaired fasting glucose    Benign essential hypertension    Encounter for hepatitis C screening test for low risk patient    Need for pneumococcal vaccination    Allergic rhinitis    PVC's (premature ventricular contractions)    Premature atrial contractions    Medicare annual wellness visit, subsequent    Iron deficiency anemia    Elevated blood sugar    Exposure to COVID-19 virus    Essential hypertension    Anxiety    Postural hypotension    Rupture of left biceps tendon    Fatigue    Rotator cuff tear arthropathy of left shoulder    Other fatigue      Past Medical and Surgical History:     Past Medical History:   Diagnosis Date    Asthma     Hypertension     Impaired fasting glucose     Mitral valve disorder     Mitral valve prolapse     Murmur, cardiac     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     PAC (premature atrial contraction)     PVC (premature ventricular contraction)     Thyroid nodule      Past Surgical History:   Procedure Laterality Date    HAND SURGERY      [place] COLONOSCOPY FLX DX W/COLLJ SPEC WHEN PFRMD N/A 2/9/2018    Procedure: COLONOSCOPY;  Surgeon: Werner Caldwell MD;  [facility]; Service: Gastroenterology    PROSTATE BIOPSY      SHOULDER SURGERY        Family History:     Family History   Problem Relation Age of Onset    Diabetes Sister     Other Family         Stroke syndrome      Social History:     Social History     Socioeconomic History    Marital status: /Civil Union     Spouse name: None    Number of children: None    Years of education: None    Highest education level: None   Occupational History    None   Tobacco Use    Smoking status: Never Smoker    Smokeless tobacco: Never Used   Vaping Use    Vaping Use: Never used   Substance and Sexual Activity    Alcohol use:  Yes     Alcohol/week: 7 0 standard drinks     Types: 7 Glasses of wine per week     Comment: socially    Drug use: No    Sexual activity: Yes   Other Topics Concern    None   Social History Narrative    None     Social Determinants of Health     Financial Resource Strain:     Difficulty of Paying Living Expenses:    Food Insecurity:     Worried About Running Out of Food in the Last Year:     Ran Out of Food in the Last Year:    Transportation Needs:     Lack of Transportation (Medical):  Lack of Transportation (Non-Medical):    Physical Activity:     Days of Exercise per Week:     Minutes of Exercise per Session:    Stress:     Feeling of Stress :    Social Connections:     Frequency of Communication with Friends and Family:     Frequency of Social Gatherings with Friends and Family:     Attends [employer] Services:     Active Member of Clubs or Organizations:     Attends Club or Organization Meetings:     Marital Status:    Intimate Partner Violence:     Fear of Current or Ex-Partner:     Emotionally Abused:     Physically Abused:     Sexually Abused:       Medications and Allergies:     Current Outpatient Medications   Medication Sig Dispense Refill    amoxicillin (AMOXIL) 500 mg capsule TAKE 4 CAPSULES BY MOUTH ONE HOUR PRIOR TO APPOINTMENT      aspirin 81 mg chewable tablet Chew 1 tablet every other day      busPIRone (BUSPAR) 5 mg tablet Take 1 tablet (5 mg total) by mouth daily as needed (Anxiety) 30 tablet 1    Coenzyme Q10-Red Yeast Rice  MG CAPS Take 1 capsule by mouth daily      Docosahexaenoic Acid (DHA OMEGA 3) 100 MG CAPS Take 6 capsules by mouth daily      fexofenadine (ALLEGRA) 180 MG tablet Take 1 tablet by mouth daily as needed      lisinopril (ZESTRIL) 20 mg tablet Take 1 tablet (20 mg total) by mouth every 12 (twelve) hours 180 tablet 3    Misc Natural Products (PROSTATE HEALTH) CAPS Take 1 capsule by mouth daily      Multiple Vitamin tablet Take 1 tablet by mouth daily      verapamil (CALAN-SR) 240 mg CR tablet TAKE 1 TABLET DAILY 90 tablet 3     No current facility-administered medications for this visit       Allergies   Allergen Reactions    Seasonal Ic  [Cholestatin]       Immunizations:     Immunization History   Administered Date(s) Administered    Influenza Split High Dose Preservative Free IM 03/02/2020    Influenza, high dose seasonal 0 7 mL 10/14/2020    Influenza, seasonal, injectable 10/16/2010, 11/05/2011    Pneumococcal Conjugate 13-Valent 08/13/2018    Pneumococcal Polysaccharide PPV23 09/16/2019    SARS-CoV-2 / COVID-19 mRNA IM ("[employer]") 04/12/2021, 05/03/2021    TD (adult) Preservative Free 08/15/2020    Td (adult), adsorbed 1953, 06/22/2010    Tuberculin Skin Test-PPD Intradermal 03/03/2009    influenza, trivalent, adjuvanted 11/19/2018      Health Maintenance:         Topic Date Due    Colorectal Cancer Screening  02/09/2028    Hepatitis C Screening  Completed         Topic Date Due    Influenza Vaccine (1) 09/01/2021      Medicare Health Risk Assessment:     /72   Pulse 67   Resp 16   Ht 5' 7" (1 702 m)   Wt 71 9 kg (158 lb 9 6 oz)   SpO2 99%   BMI 24 84 kg/m²      Mario Reyes is here for his Subsequent Wellness visit  Health Risk Assessment:   Patient rates overall health as good  Patient feels that their physical health rating is same  Patient is very satisfied with their life  Eyesight was rated as same  Hearing was rated as same  Patient feels that their emotional and mental health rating is same  Patients states they are never, rarely angry  Patient states they are never, rarely unusually tired/fatigued  Pain experienced in the last 7 days has been none  Patient states that he has experienced no weight loss or gain in last 6 months  Fall Risk Screening: In the past year, patient has experienced: no history of falling in past year      Home Safety:  Patient does not have trouble with stairs inside or outside of their home  Patient has working smoke alarms and has working carbon monoxide detector  Home safety hazards include: none  Nutrition:   Current diet is Regular  Medications:   Patient is currently taking over-the-counter supplements  OTC medications include: see medication list  Patient is able to manage medications       Activities of Daily Living (ADLs)/Instrumental Activities of Daily Living (IADLs):   Walk and transfer into and out of bed and chair?: Yes  Dress and groom yourself?: Yes    Bathe or shower yourself?: Yes    Feed yourself? Yes  Do your laundry/housekeeping?: Yes  Manage your money, pay your bills and track your expenses?: Yes  Make your own meals?: Yes    Do your own shopping?: Yes    Previous Hospitalizations:   Any hospitalizations or ED visits within the last 12 months?: No      Advance Care Planning:   Living will: Yes    Durable POA for healthcare:  Yes    Advanced directive: Yes      Cognitive Screening:   Provider or family/friend/caregiver concerned regarding cognition?: No    PREVENTIVE SCREENINGS      Cardiovascular Screening:    General: Screening Current      Diabetes Screening:     General: Screening Current      Colorectal Cancer Screening:     General: Screening Current      Abdominal Aortic Aneurysm (AAA) Screening:    Risk factors include: age between 73-69 yo        Lung Cancer Screening:     General: Screening Not Indicated      Hepatitis C Screening:    General: Screening Current    Screening, Brief Intervention, and Referral to Treatment (SBIRT)    Screening      AUDIT-C Screenin) How often did you have a drink containing alcohol in the past year? never  2) How many drinks did you have on a typical day when you were drinking in the past year? 0  3) How often did you have 6 or more drinks on one occasion in the past year? never    AUDIT-C Score: 0  Interpretation: Score 0-3 (male): Negative screen for alcohol misuse    Single Item Drug Screening:  How often have you used an illegal drug (including marijuana) or a prescription medication for non-medical reasons in the past year? never    Single Item Drug Screen Score: 0  Interpretation: Negative screen for possible drug use disorder      Ana Baez,

## 2021-07-26 NOTE — ASSESSMENT & PLAN NOTE
Possibly related to antihypertensive medication will adjust medications as per plan under hypertension will also check vitamin-D and B12 level

## 2021-07-26 NOTE — PATIENT INSTRUCTIONS
Medicare Preventive Visit Patient Instructions  Thank you for completing your Welcome to Medicare Visit or Medicare Annual Wellness Visit today  Your next wellness visit will be due in one year (7/27/2022)  The screening/preventive services that you may require over the next 5-10 years are detailed below  Some tests may not apply to you based off risk factors and/or age  Screening tests ordered at today's visit but not completed yet may show as past due  Also, please note that scanned in results may not display below  Preventive Screenings:  Service Recommendations Previous Testing/Comments   Colorectal Cancer Screening  · Colonoscopy    · Fecal Occult Blood Test (FOBT)/Fecal Immunochemical Test (FIT)  · Fecal DNA/Cologuard Test  · Flexible Sigmoidoscopy Age: 54-65 years old   Colonoscopy: every 10 years (May be performed more frequently if at higher risk)  OR  FOBT/FIT: every 1 year  OR  Cologuard: every 3 years  OR  Sigmoidoscopy: every 5 years  Screening may be recommended earlier than age 48 if at higher risk for colorectal cancer  Also, an individualized decision between you and your healthcare provider will decide whether screening between the ages of 74-80 would be appropriate  Colonoscopy: 02/09/2018  FOBT/FIT: Not on file  Cologuard: Not on file  Sigmoidoscopy: Not on file          Prostate Cancer Screening Individualized decision between patient and health care provider in men between ages of 53-78   Medicare will cover every 12 months beginning on the day after your 50th birthday PSA: 0 4 ng/mL           Hepatitis C Screening Once for adults born between 1945 and 1965  More frequently in patients at high risk for Hepatitis C Hep C Antibody: 02/22/2019        Diabetes Screening 1-2 times per year if you're at risk for diabetes or have pre-diabetes Fasting glucose: No results in last 5 years   A1C: 4 9 % of total Hgb        Cholesterol Screening Once every 5 years if you don't have a lipid disorder   May order more often based on risk factors  Lipid panel: 06/30/2021           Other Preventive Screenings Covered by Medicare:  1  Abdominal Aortic Aneurysm (AAA) Screening: covered once if your at risk  You're considered to be at risk if you have a family history of AAA or a male between the age of 73-68 who smoking at least 100 cigarettes in your lifetime  2  Lung Cancer Screening: covers low dose CT scan once per year if you meet all of the following conditions: (1) Age 50-69; (2) No signs or symptoms of lung cancer; (3) Current smoker or have quit smoking within the last 15 years; (4) You have a tobacco smoking history of at least 30 pack years (packs per day x number of years you smoked); (5) You get a written order from a healthcare provider  3  Glaucoma Screening: covered annually if you're considered high risk: (1) You have diabetes OR (2) Family history of glaucoma OR (3)  aged 48 and older OR (3)  American aged 72 and older  3  Osteoporosis Screening: covered every 2 years if you meet one of the following conditions: (1) Have a vertebral abnormality; (2) On glucocorticoid therapy for more than 3 months; (3) Have primary hyperparathyroidism; (4) On osteoporosis medications and need to assess response to drug therapy  5  HIV Screening: covered annually if you're between the age of 12-76  Also covered annually if you are younger than 13 and older than 72 with risk factors for HIV infection  For pregnant patients, it is covered up to 3 times per pregnancy      Immunizations:  Immunization Recommendations   Influenza Vaccine Annual influenza vaccination during flu season is recommended for all persons aged >= 6 months who do not have contraindications   Pneumococcal Vaccine (Prevnar and Pneumovax)  * Prevnar = PCV13  * Pneumovax = PPSV23 Adults 25-60 years old: 1-3 doses may be recommended based on certain risk factors  Adults 72 years old: Prevnar (PCV13) vaccine recommended followed by Pneumovax (PPSV23) vaccine  If already received PPSV23 since turning 65, then PCV13 recommended at least one year after PPSV23 dose  Hepatitis B Vaccine 3 dose series if at intermediate or high risk (ex: diabetes, end stage renal disease, liver disease)   Tetanus (Td) Vaccine - COST NOT COVERED BY MEDICARE PART B Following completion of primary series, a booster dose should be given every 10 years to maintain immunity against tetanus  Td may also be given as tetanus wound prophylaxis  Tdap Vaccine - COST NOT COVERED BY MEDICARE PART B Recommended at least once for all adults  For pregnant patients, recommended with each pregnancy  Shingles Vaccine (Shingrix) - COST NOT COVERED BY MEDICARE PART B  2 shot series recommended in those aged 48 and above     Health Maintenance Due:      Topic Date Due    Colorectal Cancer Screening  02/09/2028    Hepatitis C Screening  Completed     Immunizations Due:      Topic Date Due    Influenza Vaccine (1) 09/01/2021     Advance Directives   What are advance directives? Advance directives are legal documents that state your wishes and plans for medical care  These plans are made ahead of time in case you lose your ability to make decisions for yourself  Advance directives can apply to any medical decision, such as the treatments you want, and if you want to donate organs  What are the types of advance directives? There are many types of advance directives, and each state has rules about how to use them  You may choose a combination of any of the following:  · Living will: This is a written record of the treatment you want  You can also choose which treatments you do not want, which to limit, and which to stop at a certain time  This includes surgery, medicine, IV fluid, and tube feedings  · Durable power of  for healthcare [facility]):   This is a written record that states who you want to make healthcare choices for you when you are unable to make them for yourself  This person, called a proxy, is usually a family member or a friend  You may choose more than 1 proxy  · Do not resuscitate (DNR) order:  A DNR order is used in case your heart stops beating or you stop breathing  It is a request not to have certain forms of treatment, such as CPR  A DNR order may be included in other types of advance directives  · Medical directive: This covers the care that you want if you are in a coma, near death, or unable to make decisions for yourself  You can list the treatments you want for each condition  Treatment may include pain medicine, surgery, blood transfusions, dialysis, IV or tube feedings, and a ventilator (breathing machine)  · Values history: This document has questions about your views, beliefs, and how you feel and think about life  This information can help others choose the care that you would choose  Why are advance directives important? An advance directive helps you control your care  Although spoken wishes may be used, it is better to have your wishes written down  Spoken wishes can be misunderstood, or not followed  Treatments may be given even if you do not want them  An advance directive may make it easier for your family to make difficult choices about your care  © Copyright [employer] 2018 Information is for End User's use only and may not be sold, redistributed or otherwise used for commercial purposes   All illustrations and images included in CareNotes® are the copyrighted property of [employer]  or [facility]

## 2021-07-27 NOTE — PROGRESS NOTES
Pt has not contacted PT clinic about continuing PT services    At this time this patient will be DC from PT

## 2021-10-02 ENCOUNTER — IMMUNIZATIONS (OUTPATIENT)
Dept: INTERNAL MEDICINE CLINIC | Facility: CLINIC | Age: 66
End: 2021-10-02
Payer: COMMERCIAL

## 2021-10-02 DIAGNOSIS — Z23 ENCOUNTER FOR IMMUNIZATION: Primary | ICD-10-CM

## 2021-10-02 PROCEDURE — G0008 ADMIN INFLUENZA VIRUS VAC: HCPCS

## 2021-10-02 PROCEDURE — 90662 IIV NO PRSV INCREASED AG IM: CPT

## 2021-10-20 DIAGNOSIS — Z12.5 PROSTATE CANCER SCREENING: Primary | ICD-10-CM

## 2021-10-24 LAB — PSA SERPL-MCNC: 0.35 NG/ML

## 2021-11-08 ENCOUNTER — OFFICE VISIT (OUTPATIENT)
Dept: OBGYN CLINIC | Facility: OTHER | Age: 66
End: 2021-11-08
Payer: COMMERCIAL

## 2021-11-08 VITALS
DIASTOLIC BLOOD PRESSURE: 80 MMHG | BODY MASS INDEX: 24.36 KG/M2 | SYSTOLIC BLOOD PRESSURE: 176 MMHG | WEIGHT: 155.2 LBS | HEART RATE: 79 BPM | HEIGHT: 67 IN

## 2021-11-08 DIAGNOSIS — M75.102 ROTATOR CUFF TEAR ARTHROPATHY OF LEFT SHOULDER: Primary | ICD-10-CM

## 2021-11-08 DIAGNOSIS — S46.111A RUPTURE OF RIGHT LONG HEAD BICEPS TENDON, INITIAL ENCOUNTER: ICD-10-CM

## 2021-11-08 DIAGNOSIS — M12.812 ROTATOR CUFF TEAR ARTHROPATHY OF LEFT SHOULDER: Primary | ICD-10-CM

## 2021-11-08 PROCEDURE — 3079F DIAST BP 80-89 MM HG: CPT | Performed by: ORTHOPAEDIC SURGERY

## 2021-11-08 PROCEDURE — 3077F SYST BP >= 140 MM HG: CPT | Performed by: ORTHOPAEDIC SURGERY

## 2021-11-08 PROCEDURE — 1160F RVW MEDS BY RX/DR IN RCRD: CPT | Performed by: ORTHOPAEDIC SURGERY

## 2021-11-08 PROCEDURE — 99214 OFFICE O/P EST MOD 30 MIN: CPT | Performed by: ORTHOPAEDIC SURGERY

## 2021-11-08 PROCEDURE — 3008F BODY MASS INDEX DOCD: CPT | Performed by: ORTHOPAEDIC SURGERY

## 2021-11-08 PROCEDURE — 1036F TOBACCO NON-USER: CPT | Performed by: ORTHOPAEDIC SURGERY

## 2021-11-08 PROCEDURE — 20610 DRAIN/INJ JOINT/BURSA W/O US: CPT | Performed by: ORTHOPAEDIC SURGERY

## 2021-11-08 RX ORDER — BETAMETHASONE SODIUM PHOSPHATE AND BETAMETHASONE ACETATE 3; 3 MG/ML; MG/ML
6 INJECTION, SUSPENSION INTRA-ARTICULAR; INTRALESIONAL; INTRAMUSCULAR; SOFT TISSUE
Status: COMPLETED | OUTPATIENT
Start: 2021-11-08 | End: 2021-11-08

## 2021-11-08 RX ORDER — BUPIVACAINE HYDROCHLORIDE 2.5 MG/ML
2 INJECTION, SOLUTION INFILTRATION; PERINEURAL
Status: COMPLETED | OUTPATIENT
Start: 2021-11-08 | End: 2021-11-08

## 2021-11-08 RX ADMIN — BETAMETHASONE SODIUM PHOSPHATE AND BETAMETHASONE ACETATE 6 MG: 3; 3 INJECTION, SUSPENSION INTRA-ARTICULAR; INTRALESIONAL; INTRAMUSCULAR; SOFT TISSUE at 10:46

## 2021-11-08 RX ADMIN — BUPIVACAINE HYDROCHLORIDE 2 ML: 2.5 INJECTION, SOLUTION INFILTRATION; PERINEURAL at 10:46

## 2021-11-25 DIAGNOSIS — I10 ESSENTIAL HYPERTENSION: ICD-10-CM

## 2021-11-27 RX ORDER — VERAPAMIL HYDROCHLORIDE 240 MG/1
TABLET, FILM COATED, EXTENDED RELEASE ORAL
Qty: 90 TABLET | Refills: 3 | Status: SHIPPED | OUTPATIENT
Start: 2021-11-27 | End: 2022-03-17

## 2021-12-21 ENCOUNTER — TELEPHONE (OUTPATIENT)
Dept: URGENT CARE | Facility: MEDICAL CENTER | Age: 66
End: 2021-12-21

## 2021-12-21 ENCOUNTER — TELEPHONE (OUTPATIENT)
Dept: OBGYN CLINIC | Facility: HOSPITAL | Age: 66
End: 2021-12-21

## 2021-12-21 ENCOUNTER — APPOINTMENT (OUTPATIENT)
Dept: RADIOLOGY | Facility: MEDICAL CENTER | Age: 66
End: 2021-12-21
Payer: COMMERCIAL

## 2021-12-21 ENCOUNTER — OFFICE VISIT (OUTPATIENT)
Dept: URGENT CARE | Facility: MEDICAL CENTER | Age: 66
End: 2021-12-21
Payer: COMMERCIAL

## 2021-12-21 VITALS
TEMPERATURE: 98.4 F | SYSTOLIC BLOOD PRESSURE: 179 MMHG | DIASTOLIC BLOOD PRESSURE: 88 MMHG | HEART RATE: 64 BPM | RESPIRATION RATE: 20 BRPM | OXYGEN SATURATION: 99 %

## 2021-12-21 DIAGNOSIS — S63.501A SPRAIN OF RIGHT WRIST, INITIAL ENCOUNTER: ICD-10-CM

## 2021-12-21 DIAGNOSIS — S62.101A CLOSED FRACTURE OF RIGHT WRIST, INITIAL ENCOUNTER: Primary | ICD-10-CM

## 2021-12-21 PROCEDURE — 73110 X-RAY EXAM OF WRIST: CPT

## 2021-12-21 PROCEDURE — 99213 OFFICE O/P EST LOW 20 MIN: CPT | Performed by: PHYSICIAN ASSISTANT

## 2021-12-23 ENCOUNTER — OFFICE VISIT (OUTPATIENT)
Dept: OBGYN CLINIC | Facility: CLINIC | Age: 66
End: 2021-12-23
Payer: COMMERCIAL

## 2021-12-23 VITALS — HEIGHT: 67 IN | BODY MASS INDEX: 24.86 KG/M2 | WEIGHT: 158.4 LBS

## 2021-12-23 DIAGNOSIS — M19.131 SLAC (SCAPHOLUNATE ADVANCED COLLAPSE) OF WRIST, RIGHT: Primary | ICD-10-CM

## 2021-12-23 DIAGNOSIS — S63.501A SPRAIN OF RIGHT WRIST, INITIAL ENCOUNTER: ICD-10-CM

## 2021-12-23 PROCEDURE — 3008F BODY MASS INDEX DOCD: CPT | Performed by: ORTHOPAEDIC SURGERY

## 2021-12-23 PROCEDURE — 99213 OFFICE O/P EST LOW 20 MIN: CPT | Performed by: PHYSICIAN ASSISTANT

## 2022-02-27 LAB
25(OH)D3 SERPL-MCNC: 97 NG/ML (ref 30–100)
ALBUMIN SERPL-MCNC: 4.6 G/DL (ref 3.6–5.1)
ALBUMIN/GLOB SERPL: 2.1 (CALC) (ref 1–2.5)
ALP SERPL-CCNC: 54 U/L (ref 35–144)
ALT SERPL-CCNC: 28 U/L (ref 9–46)
AST SERPL-CCNC: 25 U/L (ref 10–35)
BILIRUB SERPL-MCNC: 1.1 MG/DL (ref 0.2–1.2)
BUN SERPL-MCNC: 13 MG/DL (ref 7–25)
BUN/CREAT SERPL: NORMAL (CALC) (ref 6–22)
CALCIUM SERPL-MCNC: 9.6 MG/DL (ref 8.6–10.3)
CHLORIDE SERPL-SCNC: 103 MMOL/L (ref 98–110)
CHOLEST SERPL-MCNC: 152 MG/DL
CHOLEST/HDLC SERPL: 2.4 (CALC)
CO2 SERPL-SCNC: 28 MMOL/L (ref 20–32)
CREAT SERPL-MCNC: 0.83 MG/DL (ref 0.7–1.25)
GLOBULIN SER CALC-MCNC: 2.2 G/DL (CALC) (ref 1.9–3.7)
GLUCOSE SERPL-MCNC: 92 MG/DL (ref 65–99)
HDLC SERPL-MCNC: 63 MG/DL
LDLC SERPL CALC-MCNC: 78 MG/DL (CALC)
NONHDLC SERPL-MCNC: 89 MG/DL (CALC)
POTASSIUM SERPL-SCNC: 5 MMOL/L (ref 3.5–5.3)
PROT SERPL-MCNC: 6.8 G/DL (ref 6.1–8.1)
SL AMB EGFR AFRICAN AMERICAN: 105 ML/MIN/1.73M2
SL AMB EGFR NON AFRICAN AMERICAN: 90 ML/MIN/1.73M2
SODIUM SERPL-SCNC: 137 MMOL/L (ref 135–146)
TRIGL SERPL-MCNC: 40 MG/DL
VIT B12 SERPL-MCNC: 1609 PG/ML (ref 200–1100)

## 2022-02-28 ENCOUNTER — TELEPHONE (OUTPATIENT)
Dept: OBGYN CLINIC | Facility: HOSPITAL | Age: 67
End: 2022-02-28

## 2022-02-28 NOTE — TELEPHONE ENCOUNTER
Patient is calling to reschedule his 3/3 appt with Jacky Khan  Not sure how long he can wait to be seen    Call back # [phone]

## 2022-03-01 ENCOUNTER — OFFICE VISIT (OUTPATIENT)
Dept: INTERNAL MEDICINE CLINIC | Facility: CLINIC | Age: 67
End: 2022-03-01
Payer: COMMERCIAL

## 2022-03-01 VITALS
HEART RATE: 71 BPM | BODY MASS INDEX: 24.27 KG/M2 | SYSTOLIC BLOOD PRESSURE: 164 MMHG | HEIGHT: 67 IN | WEIGHT: 154.6 LBS | DIASTOLIC BLOOD PRESSURE: 80 MMHG | OXYGEN SATURATION: 98 %

## 2022-03-01 DIAGNOSIS — I10 BENIGN ESSENTIAL HYPERTENSION: ICD-10-CM

## 2022-03-01 DIAGNOSIS — I10 ESSENTIAL HYPERTENSION: ICD-10-CM

## 2022-03-01 DIAGNOSIS — F41.9 ANXIETY: ICD-10-CM

## 2022-03-01 DIAGNOSIS — G89.29 CHRONIC PAIN OF BOTH SHOULDERS: Primary | ICD-10-CM

## 2022-03-01 DIAGNOSIS — M25.511 CHRONIC PAIN OF BOTH SHOULDERS: Primary | ICD-10-CM

## 2022-03-01 DIAGNOSIS — J30.2 SEASONAL ALLERGIES: ICD-10-CM

## 2022-03-01 DIAGNOSIS — M25.512 CHRONIC PAIN OF BOTH SHOULDERS: Primary | ICD-10-CM

## 2022-03-01 PROBLEM — R73.01 IMPAIRED FASTING GLUCOSE: Status: RESOLVED | Noted: 2018-08-13 | Resolved: 2022-03-01

## 2022-03-01 PROCEDURE — 99214 OFFICE O/P EST MOD 30 MIN: CPT | Performed by: INTERNAL MEDICINE

## 2022-03-01 RX ORDER — LISINOPRIL 20 MG/1
20 TABLET ORAL DAILY
Qty: 90 TABLET | Refills: 3 | Status: SHIPPED | OUTPATIENT
Start: 2022-03-01 | End: 2022-04-05 | Stop reason: SDUPTHER

## 2022-03-01 RX ORDER — LIDOCAINE 50 MG/G
OINTMENT TOPICAL AS NEEDED
Qty: 35.44 G | Refills: 0 | Status: SHIPPED | OUTPATIENT
Start: 2022-03-01

## 2022-03-01 RX ORDER — FLUTICASONE PROPIONATE 50 MCG
2 SPRAY, SUSPENSION (ML) NASAL DAILY
Qty: 1 G | Refills: 0 | Status: SHIPPED | OUTPATIENT
Start: 2022-03-01 | End: 2022-04-12 | Stop reason: ALTCHOICE

## 2022-03-01 NOTE — PROGRESS NOTES
Assessment/Plan:    Benign essential hypertension  Patient reports me symptoms of postural hypotension venous he has had work symptoms of dizziness since going on the higher of lisinopril therefore will reduce the dose of lisinopril to 20 mg once daily I have asked the patient to monitor his blood pressure at home if the blood pressures greater than 150/90 to notify me  I also would like him to drink adequate amounts water    Anxiety  Moderate extrinsic anxiety no suicidal ideation he does not want to go on SSRI I did have him meet with Janice Bah today I did not reduction and bring recommended that he has an appointment    Chronic pain of both shoulders  Rx for lidocaine ointment apply to affected area once a day as needed met with Orthopedics recommended total shoulder replacement at this point time the patient would like to hold off on surgical intervention    Seasonal allergies  Rx Flonase 2 sprays each nostril daily as needed         Problem List Items Addressed This Visit        Cardiovascular and Mediastinum    Benign essential hypertension     Patient reports me symptoms of postural hypotension venous he has had work symptoms of dizziness since going on the higher of lisinopril therefore will reduce the dose of lisinopril to 20 mg once daily I have asked the patient to monitor his blood pressure at home if the blood pressures greater than 150/90 to notify me    I also would like him to drink adequate amounts water         Relevant Medications    lisinopril (ZESTRIL) 20 mg tablet    RESOLVED: Essential hypertension    Relevant Medications    lisinopril (ZESTRIL) 20 mg tablet       Other    Anxiety     Moderate extrinsic anxiety no suicidal ideation he does not want to go on SSRI I did have him meet with Janice Bah today I did not reduction and bring recommended that he has an appointment         Relevant Orders    Ambulatory Referral to Psychiatry    Seasonal allergies     Rx Flonase 2 sprays each nostril daily as needed         Relevant Medications    fluticasone (FLONASE) 50 mcg/act nasal spray    Chronic pain of both shoulders - Primary     Rx for lidocaine ointment apply to affected area once a day as needed met with Orthopedics recommended total shoulder replacement at this point time the patient would like to hold off on surgical intervention         Relevant Medications    lidocaine (XYLOCAINE) 5 % ointment          RTO in 3 months call if any problems  Subjective:      Patient ID: Keya Melendez is a 74 y o  male  HPI 70-year old male coming in for a follow up visit regarding hypertension, anxiety, fatigue, postural dizziness, allergies;; The patient reports me compliant taking medications without untoward side effects the  The patient is here to review his medical condition, update me on the medical condition and the patient reports me no hospitalizations and no ER visits  Pt has b/l ruptured bicep tendon, reports saw Dr Shania Cummins [facility] total shoulder replacement (115/60) tries to drink ; reports since increase of lisinopril in july  Feels light headed getting up quickly    Sinus congestion ear congestion ,   Ear clicking     Low energy  Reports me anxiety, stress regarding the world situation watching too much news also concerns about his health no suicidal ideation he does not want SSRI but may consider counseling  The following portions of the patient's history were reviewed and updated as appropriate: allergies, current medications, past family history, past medical history, past social history, past surgical history and problem list     Review of Systems   Constitutional: Negative for activity change, appetite change and unexpected weight change  HENT: Negative for congestion and postnasal drip  Eyes: Negative for visual disturbance  Respiratory: Negative for cough and shortness of breath  Cardiovascular: Negative for chest pain     Gastrointestinal: Negative for abdominal pain, diarrhea, nausea and vomiting  Musculoskeletal:        Shoulder pain   Neurological: Negative for dizziness, light-headedness and headaches  Psychiatric/Behavioral: Negative for suicidal ideas  The patient is nervous/anxious  Objective:    No follow-ups on file  No results found  Allergies   Allergen Reactions    Seasonal Ic  [Cholestatin]        Past Medical History:   Diagnosis Date    Asthma     Hypertension     Impaired fasting glucose     Mitral valve disorder     Mitral valve prolapse     Murmur, cardiac     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     PAC (premature atrial contraction)     PVC (premature ventricular contraction)     Thyroid nodule      Past Surgical History:   Procedure Laterality Date    HAND SURGERY      WI COLONOSCOPY FLX DX W/COLLJ SPEC WHEN PFRMD N/A 2/9/2018    Procedure: COLONOSCOPY;  Surgeon: Zachary Pugh MD;  [facility]; Service: Gastroenterology    PROSTATE BIOPSY      SHOULDER SURGERY       Current Outpatient Medications on File Prior to Visit   Medication Sig Dispense Refill    aspirin 81 mg chewable tablet Chew 1 tablet every other day      busPIRone (BUSPAR) 5 mg tablet Take 1 tablet (5 mg total) by mouth daily as needed (Anxiety) 30 tablet 1    Coenzyme Q10-Red Yeast Rice  MG CAPS Take 1 capsule by mouth daily      Docosahexaenoic Acid (DHA OMEGA 3) 100 MG CAPS Take 6 capsules by mouth daily      fexofenadine (ALLEGRA) 180 MG tablet Take 1 tablet by mouth daily as needed      Misc Natural Products (PROSTATE HEALTH) CAPS Take 1 capsule by mouth daily      Multiple Vitamin tablet Take 1 tablet by mouth daily      verapamil (CALAN-SR) 240 mg CR tablet TAKE 1 TABLET DAILY 90 tablet 3    [DISCONTINUED] lisinopril (ZESTRIL) 20 mg tablet Take 1 tablet (20 mg total) by mouth every 12 (twelve) hours 180 tablet 3     No current facility-administered medications on file prior to visit       Family History Problem Relation Age of Onset    Diabetes Sister     Other Family         Stroke syndrome     Social History     Socioeconomic History    Marital status: /Civil Union     Spouse name: Not on file    Number of children: Not on file    Years of education: Not on file    Highest education level: Not on file   Occupational History    Not on file   Tobacco Use    Smoking status: Never Smoker    Smokeless tobacco: Never Used   Vaping Use    Vaping Use: Never used   Substance and Sexual Activity    Alcohol use: Yes     Comment: very little    Drug use: No    Sexual activity: Yes   Other Topics Concern    Not on file   Social History Narrative    Not on file     Social Determinants of Health     Financial Resource Strain: Not on file   Food Insecurity: Not on file   Transportation Needs: Not on file   Physical Activity: Not on file   Stress: Not on file   Social Connections: Not on file   Intimate Partner Violence: Not on file   Housing Stability: Not on file     Vitals:    03/01/22 1411 03/01/22 1502 03/01/22 1503 03/01/22 1504   BP: 162/80 138/82 144/82 164/80   BP Location: Left arm Left arm Left arm Left arm   Patient Position: Sitting Supine Sitting Standing   Cuff Size: Standard Adult Adult Adult   Pulse: 68 66 67 71   SpO2: 99% 96% 98% 98%   Weight: 70 1 kg (154 lb 9 6 oz)      Height: 5' 7" (1 702 m)        Results for orders placed or performed in visit on 02/26/22   Lipid Panel with Direct LDL reflex   Result Value Ref Range    Total Cholesterol 152 <200 mg/dL    HDL 63 > OR = 40 mg/dL    Triglycerides 40 <150 mg/dL    LDL Calculated 78 mg/dL (calc)    Chol HDLC Ratio 2 4 <5 0 (calc)    Non-HDL Cholesterol 89 <130 mg/dL (calc)   Comprehensive metabolic panel   Result Value Ref Range    Glucose, Random 92 65 - 99 mg/dL    BUN 13 7 - 25 mg/dL    Creatinine 0 83 0 70 - 1 25 mg/dL    eGFR Non  90 > OR = 60 mL/min/1 73m2    eGFR  105 > OR = 60 mL/min/1 73m2    SL AMB BUN/CREATININE RATIO NOT APPLICABLE 6 - 22 (calc)    Sodium 137 135 - 146 mmol/L    Potassium 5 0 3 5 - 5 3 mmol/L    Chloride 103 98 - 110 mmol/L    CO2 28 20 - 32 mmol/L    Calcium 9 6 8 6 - 10 3 mg/dL    Protein, Total 6 8 6 1 - 8 1 g/dL    Albumin 4 6 3 6 - 5 1 g/dL    Globulin 2 2 1 9 - 3 7 g/dL (calc)    Albumin/Globulin Ratio 2 1 1 0 - 2 5 (calc)    TOTAL BILIRUBIN 1 1 0 2 - 1 2 mg/dL    Alkaline Phosphatase 54 35 - 144 U/L    AST 25 10 - 35 U/L    ALT 28 9 - 46 U/L   Vitamin B12   Result Value Ref Range    Vitamin B-12 1,609 (H) 200 - 1,100 pg/mL   Vitamin D 25 hydroxy   Result Value Ref Range    Vitamin D, 25-Hydroxy, Serum 97 30 - 100 ng/mL     Weight (last 2 days)     Date/Time Weight    03/01/22 1411 70 1 (154 6)        Body mass index is 24 21 kg/m²  BP      Temp      Pulse     Resp      SpO2        Vitals:    03/01/22 1411   Weight: 70 1 kg (154 lb 9 6 oz)     Vitals:    03/01/22 1411   Weight: 70 1 kg (154 lb 9 6 oz)       /80 (BP Location: Left arm, Patient Position: Standing, Cuff Size: Adult)   Pulse 71   Ht 5' 7" (1 702 m)   Wt 70 1 kg (154 lb 9 6 oz)   SpO2 98%   BMI 24 21 kg/m²          Physical Exam  Constitutional:       General: He is not in acute distress  Appearance: He is well-developed  He is not diaphoretic  HENT:      Head: Normocephalic and atraumatic  Right Ear: External ear normal       Left Ear: External ear normal    Eyes:      General: No scleral icterus  Right eye: No discharge  Left eye: No discharge  Conjunctiva/sclera: Conjunctivae normal       Pupils: Pupils are equal, round, and reactive to light  Cardiovascular:      Rate and Rhythm: Normal rate and regular rhythm  Heart sounds: Normal heart sounds  No murmur heard  No friction rub  No gallop  Pulmonary:      Effort: No respiratory distress  Breath sounds: No wheezing or rales  Abdominal:      General: Bowel sounds are normal  There is no distension  Palpations: Abdomen is soft  There is no mass  Tenderness: There is no abdominal tenderness  There is no guarding or rebound  Musculoskeletal:         General: No deformity  Cervical back: Neck supple  Lymphadenopathy:      Cervical: No cervical adenopathy  Neurological:      Mental Status: He is alert  Psychiatric:         Mood and Affect: Mood is anxious  Mood is not depressed  Thought Content: Thought content does not include suicidal ideation

## 2022-03-02 ENCOUNTER — TELEPHONE (OUTPATIENT)
Dept: PSYCHIATRY | Facility: CLINIC | Age: 67
End: 2022-03-02

## 2022-03-02 NOTE — ASSESSMENT & PLAN NOTE
Moderate extrinsic anxiety no suicidal ideation he does not want to go on SSRI I did have him meet with Pratik Naidu today I did not reduction and bring recommended that he has an appointment

## 2022-03-02 NOTE — ASSESSMENT & PLAN NOTE
Patient reports me symptoms of postural hypotension venous he has had work symptoms of dizziness since going on the higher of lisinopril therefore will reduce the dose of lisinopril to 20 mg once daily I have asked the patient to monitor his blood pressure at home if the blood pressures greater than 150/90 to notify me    I also would like him to drink adequate amounts water

## 2022-03-02 NOTE — ASSESSMENT & PLAN NOTE
Rx for lidocaine ointment apply to affected area once a day as needed met with Orthopedics recommended total shoulder replacement at this point time the patient would like to hold off on surgical intervention

## 2022-03-07 ENCOUNTER — OFFICE VISIT (OUTPATIENT)
Dept: UROLOGY | Facility: AMBULATORY SURGERY CENTER | Age: 67
End: 2022-03-07
Payer: COMMERCIAL

## 2022-03-07 VITALS
HEIGHT: 67 IN | BODY MASS INDEX: 24.33 KG/M2 | SYSTOLIC BLOOD PRESSURE: 178 MMHG | DIASTOLIC BLOOD PRESSURE: 80 MMHG | OXYGEN SATURATION: 100 % | WEIGHT: 155 LBS | HEART RATE: 78 BPM

## 2022-03-07 DIAGNOSIS — Z12.5 SCREENING FOR PROSTATE CANCER: Primary | ICD-10-CM

## 2022-03-07 PROCEDURE — 99213 OFFICE O/P EST LOW 20 MIN: CPT | Performed by: NURSE PRACTITIONER

## 2022-03-07 NOTE — PROGRESS NOTES
3/7/2022    Carina Jae  1955  [id number]      Assessment  -Prostate cancer screening    Discussion/Plan  Saleem Aiken is a 74 y o  male being managed by our office    1  Prostate cancer screening- reviewed the results of his recent PSA which is 0 35, previously 0 4  No significant findings noted on digital rectal examination today  He has no urinary complaints  We discussed follow-up every other year following AUA guidelines as he denies any significant risk factors  He is amenable with this plan  Follow-up in March 2024 with PSA and LEXIE  He was instructed to call sooner with any issues     -All questions answered, patient agrees with plan      History of Present Illness  74 y o  male with a history of prostate cancer screening presents today for follow up  Patient last seen in the office in September 2020  He denies any lower urinary tract symptoms, gross hematuria, or dysuria  Last PSA ws 0 4  He denies any strong family history of prostate cancer  No changes to his overall health  Review of Systems  Review of Systems   Constitutional: Negative  HENT: Negative  Respiratory: Negative  Cardiovascular: Negative  Gastrointestinal: Negative  Genitourinary: Negative for decreased urine volume, difficulty urinating, dysuria, flank pain, frequency, hematuria and urgency  Musculoskeletal: Negative  Skin: Negative  Neurological: Negative  Psychiatric/Behavioral: Negative        AUA SYMPTOM SCORE      Most Recent Value   AUA SYMPTOM SCORE    How often have you had a sensation of not emptying your bladder completely after you finished urinating? 0 (P)     How often have you had to urinate again less than two hours after you finished urinating? 0 (P)     How often have you found you stopped and started again several times when you urinate? 0 (P)     How often have you found it difficult to postpone urination? 0 (P)     How often have you had a weak urinary stream? 0 (P) How often have you had to push or strain to begin urination? 0 (P)     How many times did you most typically get up to urinate from the time you went to bed at night until the time you got up in the morning? 2 (P)     Quality of Life: If you were to spend the rest of your life with your urinary condition just the way it is now, how would you feel about that? 1 (P)     AUA SYMPTOM SCORE 2 (P)           Past Medical History  Past Medical History:   Diagnosis Date    Asthma     Hypertension     Impaired fasting glucose     Mitral valve disorder     Mitral valve prolapse     Murmur, cardiac     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     PAC (premature atrial contraction)     PVC (premature ventricular contraction)     Thyroid nodule        Past Social History  Past Surgical History:   Procedure Laterality Date    HAND SURGERY      NE COLONOSCOPY FLX DX W/COLLJ SPEC WHEN PFRMD N/A 2/9/2018    Procedure: COLONOSCOPY;  Surgeon: Latoya Lord MD;  [facility];   Service: Gastroenterology    PROSTATE BIOPSY      SHOULDER SURGERY         Past Family History  Family History   Problem Relation Age of Onset    Diabetes Sister     Other Family         Stroke syndrome       Past Social history  Social History     Socioeconomic History    Marital status: /Civil Union     Spouse name: Not on file    Number of children: Not on file    Years of education: Not on file    Highest education level: Not on file   Occupational History    Not on file   Tobacco Use    Smoking status: Never Smoker    Smokeless tobacco: Never Used   Vaping Use    Vaping Use: Never used   Substance and Sexual Activity    Alcohol use: Yes     Comment: very little    Drug use: No    Sexual activity: Yes   Other Topics Concern    Not on file   Social History Narrative    Not on file     Social Determinants of Health     Financial Resource Strain: Not on file   Food Insecurity: Not on file Transportation Needs: Not on file   Physical Activity: Not on file   Stress: Not on file   Social Connections: Not on file   Intimate Partner Violence: Not on file   Housing Stability: Not on file       Current Medications  Current Outpatient Medications   Medication Sig Dispense Refill    aspirin 81 mg chewable tablet Chew 1 tablet every other day      busPIRone (BUSPAR) 5 mg tablet Take 1 tablet (5 mg total) by mouth daily as needed (Anxiety) 30 tablet 1    Coenzyme Q10-Red Yeast Rice  MG CAPS Take 1 capsule by mouth daily      Docosahexaenoic Acid (DHA OMEGA 3) 100 MG CAPS Take 6 capsules by mouth daily      fexofenadine (ALLEGRA) 180 MG tablet Take 1 tablet by mouth daily as needed      fluticasone (FLONASE) 50 mcg/act nasal spray 2 sprays into each nostril daily 1 g 0    lidocaine (XYLOCAINE) 5 % ointment Apply topically as needed for mild pain 35 44 g 0    lisinopril (ZESTRIL) 20 mg tablet Take 1 tablet (20 mg total) by mouth daily 90 tablet 3    Misc Natural Products (PROSTATE HEALTH) CAPS Take 1 capsule by mouth daily      Multiple Vitamin tablet Take 1 tablet by mouth daily      verapamil (CALAN-SR) 240 mg CR tablet TAKE 1 TABLET DAILY 90 tablet 3     No current facility-administered medications for this visit  Allergies  Allergies   Allergen Reactions    Seasonal Ic  [Cholestatin]        Past Medical History, Social History, Family History, medications and allergies were reviewed  Vitals  Vitals:    03/07/22 1508   BP: (!) 178/80   BP Location: Left arm   Patient Position: Sitting   Cuff Size: Adult   Pulse: 78   SpO2: 100%   Weight: 70 3 kg (155 lb)   Height: 5' 7" (1 702 m)       Physical Exam  Physical Exam  Constitutional:       Appearance: Normal appearance  He is well-developed  HENT:      Head: Normocephalic  Eyes:      Pupils: Pupils are equal, round, and reactive to light     Pulmonary:      Effort: Pulmonary effort is normal    Abdominal:      Palpations: Abdomen is soft  Genitourinary:     Prostate: Normal       Rectum: Normal       Comments: Prostate 35gm, smooth, nontender, no nodules  Musculoskeletal:         General: Normal range of motion  Cervical back: Normal range of motion  Skin:     General: Skin is warm and dry  Neurological:      General: No focal deficit present  Mental Status: He is alert and oriented to person, place, and time  Psychiatric:         Mood and Affect: Mood normal          Behavior: Behavior normal          Thought Content: Thought content normal          Judgment: Judgment normal          Results    I have personally reviewed all pertinent lab results and reviewed with patient  Lab Results   Component Value Date    PSA 0 35 10/23/2021    PSA 0 4 02/14/2020    PSA 0 5 02/08/2019     Lab Results   Component Value Date    CALCIUM 9 6 02/26/2022     07/29/2017    K 5 0 02/26/2022    CO2 28 02/26/2022     02/26/2022    BUN 13 02/26/2022    CREATININE 0 83 02/26/2022     Lab Results   Component Value Date    WBC 5 7 06/30/2021    HGB 15 6 06/30/2021    HCT 45 8 06/30/2021    MCV 88 1 06/30/2021     06/30/2021     No results found for this or any previous visit (from the past 1 hour(s))

## 2022-03-17 ENCOUNTER — OFFICE VISIT (OUTPATIENT)
Dept: INTERNAL MEDICINE CLINIC | Facility: CLINIC | Age: 67
End: 2022-03-17
Payer: COMMERCIAL

## 2022-03-17 VITALS
DIASTOLIC BLOOD PRESSURE: 78 MMHG | HEART RATE: 70 BPM | SYSTOLIC BLOOD PRESSURE: 162 MMHG | OXYGEN SATURATION: 99 % | BODY MASS INDEX: 24.36 KG/M2 | WEIGHT: 155.2 LBS | HEIGHT: 67 IN

## 2022-03-17 DIAGNOSIS — I10 BENIGN ESSENTIAL HYPERTENSION: ICD-10-CM

## 2022-03-17 DIAGNOSIS — R00.2 PALPITATIONS: Primary | ICD-10-CM

## 2022-03-17 PROCEDURE — 1036F TOBACCO NON-USER: CPT | Performed by: INTERNAL MEDICINE

## 2022-03-17 PROCEDURE — 93000 ELECTROCARDIOGRAM COMPLETE: CPT | Performed by: INTERNAL MEDICINE

## 2022-03-17 PROCEDURE — 3008F BODY MASS INDEX DOCD: CPT | Performed by: INTERNAL MEDICINE

## 2022-03-17 PROCEDURE — 1160F RVW MEDS BY RX/DR IN RCRD: CPT | Performed by: INTERNAL MEDICINE

## 2022-03-17 PROCEDURE — 99213 OFFICE O/P EST LOW 20 MIN: CPT | Performed by: INTERNAL MEDICINE

## 2022-03-17 PROCEDURE — 3078F DIAST BP <80 MM HG: CPT | Performed by: INTERNAL MEDICINE

## 2022-03-17 PROCEDURE — 3077F SYST BP >= 140 MM HG: CPT | Performed by: INTERNAL MEDICINE

## 2022-03-17 RX ORDER — METOPROLOL SUCCINATE 25 MG/1
25 TABLET, EXTENDED RELEASE ORAL DAILY
Qty: 30 TABLET | Refills: 0 | Status: SHIPPED | OUTPATIENT
Start: 2022-03-17 | End: 2022-04-05 | Stop reason: SDUPTHER

## 2022-03-17 NOTE — PROGRESS NOTES
INTERNAL MEDICINE OFFICE VISIT  [facility]        ASSESSMENT/PLAN:    Palpitations  · Patient reports feeling sensation of palpitations after decreasing lisinopril from b i d  To daily, associated with increased blood pressure in the 140s over 80s  · EKG in the office shows normal sinus rhythm without PVCs or PACs  Patient reports feeling palpitations while in the office  · Patient has already scheduled appointment with Cardiology and echocardiogram in the next few months    Plan:  · Obtain echocardiogram and follow-up with cardiology as planned  · Switch from verapamil to metoprolol succinate, beta-blocker may help if there is an orthostatic component to patient's lightheadedness  · Advised patient to call us right away if he experiences worsening symptoms while on this new medication  · Follow-up in 1 month    Benign essential hypertension  · Patient reports concerns of palpitations and higher blood pressures reverse since decreasing lisinopril from b i d  To once daily  · Decrease in lisinopril last appointment due to complaints of lightheadedness "out of body sensation" from seated position to standing  Patient does not notice any difference in symptoms despite decrease in lisinopril  · Orthostatic vital signs in the office negative    Plan:  · Will resume lisinopril b i d  Since decreased did not affect symptoms of lightheadedness  · Will switch verapamil to metoprolol succinate  · Advise a slow positional changes, patient to stay well hydrated, try electrolyte drink  · Follow-up in 1 month  · Symptoms not exactly consistent with vertigo but could consider vestibular therapy in the future if cardiac workup and medication changes do not help  · Continue to monitor BP      Diagnoses and all orders for this visit:    Palpitations  -     POCT ECG  -     metoprolol succinate (TOPROL-XL) 25 mg 24 hr tablet;  Take 1 tablet (25 mg total) by mouth daily    Benign essential hypertension        SUBJECTIVE     Patient ID: Derwood Eisenmenger is a 74 y o  male  Patient presents for follow up regarding hypertension  During his last appointment on 3/1/2022, patient was noted to have developed symptoms of postural hypotension with dizziness after starting lisinopril 20 mg BID  His medication dosage was reduced to lisinopril 20 mg daily  Since then, patient has noted blood pressure rising as well as the sensation of palpitations  He resumed lisinopril BID  He has made appointments for an echocardiogram and to see the cardiologist in April and May respectively  He states that despite lowering lisinopril dose from b i d  To daily, the "out of body" sensation that he gets when rising from a sitting to standing position has not improved  He denies any loss of consciousness, or lightheadedness to the point that he thinks he would fall  The sensation lasts a few minutes  He avoids standing too quickly  We reviewed his blood pressure log  On average, his blood pressures since decreasing his lisinopril dose have been in the 140s over 80s  There was 1 value with the SBP in the 150s  Patient denies any chest pain, shortness of breath, abdominal pain, nausea, vomiting, fever, chills, numbness, tingling           The following portions of the patient's history were reviewed and updated as appropriate: allergies, current medications, past family history, past medical history, past social history, past surgical history and problem list     Review of Systems   Constitutional: Negative for chills and fever  Respiratory: Negative for shortness of breath  Cardiovascular: Negative for chest pain  Gastrointestinal: Negative for abdominal pain  Genitourinary: Negative for difficulty urinating  Musculoskeletal: Negative for myalgias  Skin: Negative for rash  Neurological: Positive for dizziness and light-headedness  Negative for syncope     Psychiatric/Behavioral: The patient is nervous/anxious  OBJECTIVE:      /78 (BP Location: Left arm, Patient Position: Standing)   Pulse 70   Ht 5' 7" (1 702 m)   Wt 70 4 kg (155 lb 3 2 oz)   SpO2 99%   BMI 24 31 kg/m²        Physical Exam  Vitals reviewed  Constitutional:       Appearance: Normal appearance  HENT:      Head: Normocephalic and atraumatic  Eyes:      Extraocular Movements: Extraocular movements intact  Pupils: Pupils are equal, round, and reactive to light  Cardiovascular:      Rate and Rhythm: Normal rate and regular rhythm  Pulmonary:      Effort: Pulmonary effort is normal       Breath sounds: Normal breath sounds  Abdominal:      General: Bowel sounds are normal       Palpations: Abdomen is soft  Tenderness: There is no abdominal tenderness  Musculoskeletal:      Cervical back: Neck supple  Right lower leg: No edema  Left lower leg: No edema  Skin:     General: Skin is warm and dry  Neurological:      General: No focal deficit present  Mental Status: He is alert and oriented to person, place, and time     Psychiatric:         Mood and Affect: Mood normal          Behavior: Behavior normal

## 2022-03-17 NOTE — PATIENT INSTRUCTIONS
1  Please stop taking verapamil  2  Please start taking metoprolol succinate once a day  3  Please continue to take lisinopril twice a day  4  Stay well hydrated  Try electrolyte drinks  5  Follow up in 1 month

## 2022-03-18 PROBLEM — R00.2 PALPITATIONS: Status: ACTIVE | Noted: 2022-03-18

## 2022-03-18 NOTE — ASSESSMENT & PLAN NOTE
· Patient reports concerns of palpitations and higher blood pressures reverse since decreasing lisinopril from b i d  To once daily  · Decrease in lisinopril last appointment due to complaints of lightheadedness "out of body sensation" from seated position to standing  Patient does not notice any difference in symptoms despite decrease in lisinopril  · Orthostatic vital signs in the office negative    Plan:  · Will resume lisinopril b i d   Since decreased did not affect symptoms of lightheadedness  · Will switch verapamil to metoprolol succinate  · Advise a slow positional changes, patient to stay well hydrated, try electrolyte drink  · Follow-up in 1 month  · Symptoms not exactly consistent with vertigo but could consider vestibular therapy in the future if cardiac workup and medication changes do not help  · Continue to monitor BP

## 2022-03-18 NOTE — ASSESSMENT & PLAN NOTE
· Patient reports feeling sensation of palpitations after decreasing lisinopril from b i d  To daily, associated with increased blood pressure in the 140s over 80s  · EKG in the office shows normal sinus rhythm without PVCs or PACs  Patient reports feeling palpitations while in the office    · Patient has already scheduled appointment with Cardiology and echocardiogram in the next few months    Plan:  · Obtain echocardiogram and follow-up with cardiology as planned  · Switch from verapamil to metoprolol succinate, beta-blocker may help if there is an orthostatic component to patient's lightheadedness  · Advised patient to call us right away if he experiences worsening symptoms while on this new medication  · Follow-up in 1 month

## 2022-03-28 ENCOUNTER — HOSPITAL ENCOUNTER (OUTPATIENT)
Dept: NON INVASIVE DIAGNOSTICS | Facility: HOSPITAL | Age: 67
Discharge: HOME | End: 2022-03-28
Payer: COMMERCIAL

## 2022-03-28 VITALS
HEIGHT: 67 IN | WEIGHT: 155 LBS | HEART RATE: 70 BPM | DIASTOLIC BLOOD PRESSURE: 78 MMHG | BODY MASS INDEX: 24.33 KG/M2 | SYSTOLIC BLOOD PRESSURE: 162 MMHG

## 2022-03-28 DIAGNOSIS — I49.3 PVC'S (PREMATURE VENTRICULAR CONTRACTIONS): ICD-10-CM

## 2022-03-28 LAB
AORTIC ROOT: 3.3 CM
APICAL FOUR CHAMBER EJECTION FRACTION: 67 %
ASCENDING AORTA: 3.8 CM (ref 1.94–2.9)
E WAVE DECELERATION TIME: 162 MS
FRACTIONAL SHORTENING: 37 % (ref 28–44)
INTERVENTRICULAR SEPTUM IN DIASTOLE (PARASTERNAL SHORT AXIS VIEW): 0.9 CM
INTERVENTRICULAR SEPTUM: 0.9 CM (ref 0.51–0.96)
LAAS-AP4: 17.5 CM2
LEFT ATRIUM SIZE: 4 CM
LEFT INTERNAL DIMENSION IN SYSTOLE: 3.1 CM (ref 2.55–3.85)
LEFT VENTRICULAR INTERNAL DIMENSION IN DIASTOLE: 4.9 CM (ref 4.16–6.19)
LEFT VENTRICULAR POSTERIOR WALL IN END DIASTOLE: 1 CM (ref 0.5–0.95)
LEFT VENTRICULAR STROKE VOLUME: 78 ML
LVSV (TEICH): 78 ML
MV E'TISSUE VEL-SEP: 10 CM/S
MV PEAK A VEL: 0.56 M/S
MV PEAK E VEL: 68 CM/S
MV STENOSIS PRESSURE HALF TIME: 47 MS
MV VALVE AREA P 1/2 METHOD: 4.68 CM2
RIGHT ATRIAL 2D VOLUME: 41 ML
RIGHT ATRIUM AREA SYSTOLE A4C: 16.7 CM2
RIGHT VENTRICLE ID DIMENSION: 3.9 CM
SL CV LV EF: 60
SL CV PED ECHO LEFT VENTRICLE DIASTOLIC VOLUME (MOD BIPLANE) 2D: 115 ML
SL CV PED ECHO LEFT VENTRICLE SYSTOLIC VOLUME (MOD BIPLANE) 2D: 37 ML
Z-SCORE OF ASCENDING AORTA: 5.66 CM
Z-SCORE OF INTERVENTRICULAR SEPTUM IN END DIASTOLE: 1.41
Z-SCORE OF LEFT VENTRICULAR DIMENSION IN END DIASTOLE: -0.34
Z-SCORE OF LEFT VENTRICULAR DIMENSION IN END SYSTOLE: -0.08
Z-SCORE OF LEFT VENTRICULAR POSTERIOR WALL IN END DIASTOLE: 2.39

## 2022-03-28 PROCEDURE — 93306 TTE W/DOPPLER COMPLETE: CPT

## 2022-03-28 PROCEDURE — 93306 TTE W/DOPPLER COMPLETE: CPT | Performed by: INTERNAL MEDICINE

## 2022-04-05 ENCOUNTER — NURSE TRIAGE (OUTPATIENT)
Dept: OTHER | Facility: OTHER | Age: 67
End: 2022-04-05

## 2022-04-05 DIAGNOSIS — I10 ESSENTIAL HYPERTENSION: ICD-10-CM

## 2022-04-05 DIAGNOSIS — R00.2 PALPITATIONS: ICD-10-CM

## 2022-04-05 RX ORDER — LISINOPRIL 20 MG/1
20 TABLET ORAL DAILY
Qty: 180 TABLET | Refills: 3 | Status: SHIPPED | OUTPATIENT
Start: 2022-04-05

## 2022-04-05 RX ORDER — METOPROLOL SUCCINATE 25 MG/1
25 TABLET, EXTENDED RELEASE ORAL DAILY
Qty: 90 TABLET | Refills: 0 | Status: SHIPPED | OUTPATIENT
Start: 2022-04-05 | End: 2022-06-21 | Stop reason: SDUPTHER

## 2022-04-05 NOTE — TELEPHONE ENCOUNTER
Regarding: Medication Cancelled needs Refill  ----- Message from [facility] sent at 4/5/2022 11:43 AM EDT -----  " I am trying to get my Lisinopril 20 mg , but it was cancelled   I would also like to get my Metoprolol 25 mg "

## 2022-04-05 NOTE — TELEPHONE ENCOUNTER
Reason for Disposition   Caller requesting a prescription renewal (no refills left), no triage required, and triager able to refill per department policy    Answer Assessment - Initial Assessment Questions  1  NAME of MEDICATION: "What medicine are you calling about?"      Lisinopril and Metoprolol     2  QUESTION: "What is your question?" (e g , medication refill, side effect)      Patient is calling because he would like his medications sent to [facility] for refill  He said when he called they told him his medications were d/c'd  It looks like his refills had been sent to the [facility] instead of [facility]  Will pend medications to be sent to [facility] instead  3  PRESCRIBING HCP: "Who prescribed it?" Reason: if prescribed by specialist, call should be referred to that group        PCP    Protocols used: MEDICATION QUESTION CALL-ADULT-[place]

## 2022-04-11 ENCOUNTER — TELEPHONE (OUTPATIENT)
Dept: INTERNAL MEDICINE CLINIC | Facility: CLINIC | Age: 67
End: 2022-04-11

## 2022-04-11 NOTE — TELEPHONE ENCOUNTER
Spoke to patient and advised that form was completed with correct directions and faxed to [facility]

## 2022-04-11 NOTE — TELEPHONE ENCOUNTER
----- Message from Cong Griffin sent at 4/11/2022 11:06 AM EDT -----  Regarding: Lisinopril  I talked to [facility] they sent a fax to your office last week to fill the RX  They are still waiting for a response to that message and sent another message this morning  I also spoke to Julianna Mosquera this morning  Can someone please call [facility] system so I can have this filled? I am running low  I am supposed to take 20 mg twice/day  I am looking for a 90-day supply  Thank you for your help

## 2022-04-11 NOTE — TELEPHONE ENCOUNTER
Form received from [facility] to clarify directions on Lisinopril  Script sent reads take 1 tablet daily and take twice daily  The patient was recently increased to BID  Form completed and faxed to [facility] with clarification  Patient advised

## 2022-04-11 NOTE — TELEPHONE ENCOUNTER
Patient is trying to get a refill on his Blood Pressure Medication and have it filled thru [id number]  The Pharmacy faxed over a request last week and today and still have not received a response  Please look into this and fax over the necessary paperwork  Also, the patient would like a call back to know that this was taken care of please

## 2022-04-14 ENCOUNTER — OFFICE VISIT (OUTPATIENT)
Dept: INTERNAL MEDICINE CLINIC | Facility: CLINIC | Age: 67
End: 2022-04-14
Payer: COMMERCIAL

## 2022-04-14 VITALS
WEIGHT: 154.4 LBS | HEART RATE: 66 BPM | SYSTOLIC BLOOD PRESSURE: 132 MMHG | OXYGEN SATURATION: 98 % | BODY MASS INDEX: 24.23 KG/M2 | TEMPERATURE: 97.3 F | DIASTOLIC BLOOD PRESSURE: 74 MMHG | RESPIRATION RATE: 16 BRPM | HEIGHT: 67 IN

## 2022-04-14 DIAGNOSIS — I10 BENIGN ESSENTIAL HYPERTENSION: ICD-10-CM

## 2022-04-14 DIAGNOSIS — R00.2 PALPITATIONS: Primary | ICD-10-CM

## 2022-04-14 PROCEDURE — 99213 OFFICE O/P EST LOW 20 MIN: CPT | Performed by: INTERNAL MEDICINE

## 2022-04-14 PROCEDURE — 3075F SYST BP GE 130 - 139MM HG: CPT | Performed by: INTERNAL MEDICINE

## 2022-04-14 PROCEDURE — 3078F DIAST BP <80 MM HG: CPT | Performed by: INTERNAL MEDICINE

## 2022-04-14 NOTE — PROGRESS NOTES
Assessment/Plan:    Benign essential hypertension  ·  Reviewed blood pressure log  ·   Ranging from 120 to 140/80 to 90  Average-120 to 130  Only 2 episodes of 140 blood pressure -associated with stress, exercise  ·  continue Toprol XL 25 mg-   Noted heart rate ranging from 49-60  Average 55  ·  continue lisinopril 20 mg b i d   ·   Counseled patient on symptoms of orthostatic hypotension, precautions  ·  counseled patient on with hold parameters for  Toprol XL if heart rate is less than 50  Or with symptoms of bradycardia  ·  consult to continue monitoring heart rate and blood pressure    Palpitations   Reviewed echocardiogram -no changes from previous   decreased symptoms after initiation of Toprol XL   continue Toprol XL 25 mg  Reviewed  With patient on symptoms of bradycardia  Asked to hold Toprol-XL for a day if heart rate is below 50  counselled if heart rate is continuously below 50 -to call the office  Will need  Dose adjustment-  decrease Toprol-XL to 12 5       Diagnoses and all orders for this visit:    Palpitations    Benign essential hypertension          Subjective:      Patient ID: Rossi Deng is a 74 y o  male  Patient seen in the office for 4 week follow-up visit  has noticed significant improvement with palpitations after starting Toprol XL  Had couple of episodes lasting for couple of seconds  Brought blood pressure log with him today for office visit reviewed  Blood pressure logs noted in a/plan  Still has symptoms of lightheadedness when gets up suddenly  Now takes time to stand up  Hydrates well  Has significantly cut down on caffeine, drinks more T, coffee less than 1 cup per day  No sodas            The following portions of the patient's history were reviewed and updated as appropriate: allergies, current medications, past family history, past medical history, past social history, past surgical history and problem list     Review of Systems Constitutional: Negative  HENT: Negative  Respiratory: Negative  Cardiovascular: Positive for palpitations ( significant improvement)  Negative for chest pain and leg swelling  Gastrointestinal: Negative  Objective:      /74   Pulse 66   Temp (!) 97 3 °F (36 3 °C)   Resp 16   Ht 5' 7" (1 702 m)   Wt 70 kg (154 lb 6 4 oz)   SpO2 98%   BMI 24 18 kg/m²            Physical Exam     Physical Exam  Vitals and nursing note reviewed  Constitutional:       Appearance: Normal appearance  HENT:      Head: Normocephalic and atraumatic  Nose: Nose normal       Mouth/Throat:      Mouth: Mucous membranes are moist       Pharynx: Oropharynx is clear  Eyes:      Conjunctiva/sclera: Conjunctivae normal    Cardiovascular:      Rate and Rhythm: Normal rate and regular rhythm  Pulses: Normal pulses  Pulmonary:      Effort: Pulmonary effort is normal    Abdominal:      General: There is no distension  Palpations: Abdomen is soft  There is no mass  Tenderness: There is no abdominal tenderness  There is no guarding or rebound  Hernia: No hernia is present  Musculoskeletal:      Cervical back: Normal range of motion  Comments:  Biceps tendon rupture bilaterally-  chronic   Skin:     General: Skin is warm and dry  Capillary Refill: Capillary refill takes 2 to 3 seconds  Neurological:      Mental Status: He is alert and oriented to person, place, and time  Psychiatric:         Mood and Affect: Mood normal          Behavior: Behavior normal          Thought Content:  Thought content normal          Judgment: Judgment normal

## 2022-04-15 NOTE — ASSESSMENT & PLAN NOTE
·  Reviewed blood pressure log  ·   Ranging from 120 to 140/80 to 90  Average-120 to 130  Only 2 episodes of 140 blood pressure -associated with stress, exercise  ·  continue Toprol XL 25 mg-   Noted heart rate ranging from 49-60  Average 55  ·  continue lisinopril 20 mg b i d   ·   Counseled patient on symptoms of orthostatic hypotension, precautions    ·  counseled patient on with hold parameters for  Toprol XL if heart rate is less than 50  Or with symptoms of bradycardia  ·  consult to continue monitoring heart rate and blood pressure

## 2022-04-18 ENCOUNTER — ESTABLISHED COMPREHENSIVE EXAM (OUTPATIENT)
Dept: URBAN - METROPOLITAN AREA CLINIC 6 | Facility: CLINIC | Age: 67
End: 2022-04-18

## 2022-04-18 DIAGNOSIS — H25.13: ICD-10-CM

## 2022-04-18 DIAGNOSIS — H02.831: ICD-10-CM

## 2022-04-18 DIAGNOSIS — H04.123: ICD-10-CM

## 2022-04-18 DIAGNOSIS — H02.834: ICD-10-CM

## 2022-04-18 DIAGNOSIS — H43.813: ICD-10-CM

## 2022-04-18 PROCEDURE — 92014 COMPRE OPH EXAM EST PT 1/>: CPT

## 2022-04-18 ASSESSMENT — VISUAL ACUITY
OD_CC: 20/25
OS_CC: 20/20-2
OU_CC: J1

## 2022-04-18 ASSESSMENT — TONOMETRY
OS_IOP_MMHG: 15
OD_IOP_MMHG: 19

## 2022-04-21 ENCOUNTER — OFFICE VISIT (OUTPATIENT)
Dept: OBGYN CLINIC | Facility: CLINIC | Age: 67
End: 2022-04-21
Payer: COMMERCIAL

## 2022-04-21 VITALS
HEART RATE: 63 BPM | SYSTOLIC BLOOD PRESSURE: 144 MMHG | HEIGHT: 67 IN | DIASTOLIC BLOOD PRESSURE: 83 MMHG | BODY MASS INDEX: 24.33 KG/M2 | WEIGHT: 155 LBS

## 2022-04-21 DIAGNOSIS — M19.131 SLAC (SCAPHOLUNATE ADVANCED COLLAPSE) OF WRIST, RIGHT: Primary | ICD-10-CM

## 2022-04-21 PROCEDURE — 1160F RVW MEDS BY RX/DR IN RCRD: CPT | Performed by: SURGERY

## 2022-04-21 PROCEDURE — 99203 OFFICE O/P NEW LOW 30 MIN: CPT | Performed by: SURGERY

## 2022-04-21 PROCEDURE — 1036F TOBACCO NON-USER: CPT | Performed by: SURGERY

## 2022-04-21 PROCEDURE — 3008F BODY MASS INDEX DOCD: CPT | Performed by: SURGERY

## 2022-04-21 NOTE — PATIENT INSTRUCTIONS
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

## 2022-04-21 NOTE — PROGRESS NOTES
Phylicia BENTON  Attending, Orthopaedic Surgery  Hand, Wrist, and Elbow Surgery  [facility]      ORTHOPAEDIC HAND, WRIST, AND ELBOW OFFICE  VISIT       ASSESSMENT/PLAN:      74year old male with right SLAC wrist noted on xrays from 12/2021  On exam, patient does not have any pain that warrants an injection at this time  It's recommended to hold on a cortisone injection at this time since he can manage his pain with oral and topical medications  We discussed taking NSAIDs with Tylenol to help with pain and discomfort before and after his activities  Wearing a wrist splint if he is using this wrist in the flexion/extension plane like shoveling  He understands and agrees with this plan of care  We will follow up as needed  The patient verbalized understanding of exam findings and treatment plan  We engaged in the shared decision-making process and treatment options were discussed at length with the patient  Surgical and conservative management discussed today along with risks and benefits  Diagnoses and all orders for this visit:    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, right        Follow Up:  Return if symptoms worsen or fail to improve  To Do Next Visit:  Re-evaluation of current issue      General Discussions:  Osteoarthritis:  The anatomy and physiology of osteoarthritis was discussed with the patient today in the office  Deterioration of the articular cartilage eventually leads to altered mobility at the joint, resulting in joint subluxation, osteophyte formation, cystic changes, as well as subchondral sclerosis  Eventually, pain, limited mobility, and compensatory hypermobility at surrounding joints may develop  While normal activity and usage of the joint may provide a painful experience to the patient, this typically does not result in damage to the limb  Treatment options include splints to decreased joint edema, pain, and inflammation    Therapy exercises to strengthen the surrounding musculature may relieve pain, but do not alter the overall continued development of osteoarthritis  Oral medications, topical medications, corticosteroid injections may decrease pain and increase overall function  Eventually, some patients may require surgical intervention  ____________________________________________________________________________________________________________________________________________      CHIEF COMPLAINT:  Chief Complaint   Patient presents with    Right Wrist - Pain       SUBJECTIVE:  Cam Jean is a 74y o  year old RHD male who presents today for a consultation for his right wrist pain referred by STEPH Reina  He was seen by Kandice Higgins on 12/23/21, had increase pain after moving a ladder 2 days prior  He has treated conservatively with splinting that has helped, but then had increase stiffness  Pain/symptom timing:  Worse during the day when active  Pain/symptom context:  Worse with activites and work  Pain/symptom modifying factors:  Rest makes better, activities make worse  Pain/symptom associated signs/symptoms: none    Prior treatment   · NSAIDsNo   · Injections No   · Bracing/Orthotics Yes    Physical Therapy No     I have personally reviewed all the relevant PMH, PSH, SH, FH, Medications and allergies      PAST MEDICAL HISTORY:  Past Medical History:   Diagnosis Date    Asthma     Hypertension     Impaired fasting glucose     Mitral valve disorder     Mitral valve prolapse     Murmur, cardiac     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     PAC (premature atrial contraction)     PVC (premature ventricular contraction)     Thyroid nodule        PAST SURGICAL HISTORY:  Past Surgical History:   Procedure Laterality Date    HAND SURGERY      [place] COLONOSCOPY FLX DX W/COLLJ SPEC WHEN PFRMD N/A 2/9/2018    Procedure: COLONOSCOPY;  Surgeon: Betty Khan MD;  [facility];   Service: Gastroenterology    PROSTATE BIOPSY      SHOULDER SURGERY         FAMILY HISTORY:  Family History   Problem Relation Age of Onset    Diabetes Sister     Other Family         Stroke syndrome       SOCIAL HISTORY:  Social History     Tobacco Use    Smoking status: Never Smoker    Smokeless tobacco: Never Used   Vaping Use    Vaping Use: Never used   Substance Use Topics    Alcohol use: Yes     Comment: very little    Drug use: No       MEDICATIONS:    Current Outpatient Medications:     aspirin 81 mg chewable tablet, Chew 1 tablet every other day, Disp: , Rfl:     busPIRone (BUSPAR) 5 mg tablet, Take 1 tablet (5 mg total) by mouth daily as needed (Anxiety), Disp: 30 tablet, Rfl: 1    Coenzyme Q10-Red Yeast Rice  MG CAPS, Take 1 capsule by mouth daily, Disp: , Rfl:     Docosahexaenoic Acid (DHA OMEGA 3) 100 MG CAPS, Take 6 capsules by mouth daily, Disp: , Rfl:     fexofenadine (ALLEGRA) 180 MG tablet, Take 1 tablet by mouth daily as needed, Disp: , Rfl:     lidocaine (XYLOCAINE) 5 % ointment, Apply topically as needed for mild pain, Disp: 35 44 g, Rfl: 0    lisinopril (ZESTRIL) 20 mg tablet, Take 1 tablet (20 mg total) by mouth daily Twice a day, Disp: 180 tablet, Rfl: 3    metoprolol succinate (TOPROL-XL) 25 mg 24 hr tablet, Take 1 tablet (25 mg total) by mouth daily, Disp: 90 tablet, Rfl: 0    Misc Natural Products (PROSTATE HEALTH) CAPS, Take 1 capsule by mouth daily, Disp: , Rfl:     Multiple Vitamin tablet, Take 1 tablet by mouth daily, Disp: , Rfl:     ALLERGIES:  Allergies   Allergen Reactions    Seasonal Ic  [Cholestatin]            REVIEW OF SYSTEMS:  Review of Systems   Constitutional: Negative for chills, fever and unexpected weight change  HENT: Negative for hearing loss, nosebleeds and sore throat  Eyes: Negative for pain, redness and visual disturbance  Respiratory: Negative for cough, shortness of breath and wheezing      Cardiovascular: Negative for chest pain, palpitations and leg swelling  Gastrointestinal: Negative for abdominal pain, nausea and vomiting  Endocrine: Negative for polydipsia and polyuria  Genitourinary: Negative for dysuria and hematuria  Musculoskeletal: Positive for arthralgias  Skin: Negative for rash and wound  Neurological: Negative for dizziness, light-headedness and headaches  Psychiatric/Behavioral: Negative for decreased concentration, dysphoric mood and suicidal ideas  The patient is not nervous/anxious  VITALS:  Vitals:    04/21/22 0840   BP: 144/83   Pulse: 63       LABS:  HgA1c:   Lab Results   Component Value Date    HGBA1C 4 9 06/30/2021     BMP:   Lab Results   Component Value Date    CALCIUM 9 6 02/26/2022     07/29/2017    K 5 0 02/26/2022    CO2 28 02/26/2022     02/26/2022    BUN 13 02/26/2022    CREATININE 0 83 02/26/2022       _____________________________________________________  PHYSICAL EXAMINATION:  General: well developed and well nourished, alert, oriented times 3 and appears comfortable  Psychiatric: Normal  HEENT: Normocephalic, Atraumatic Trachea Midline, No torticollis  Pulmonary: No audible wheezing or respiratory distress   Abdomen/GI: Non tender, non distended   Cardiovascular: No pitting edema, 2+ radial pulse   Skin: No masses, erythema, lacerations, fluctation, ulcerations  Neurovascular: Sensation Intact to the Median, Ulnar, Radial Nerve, Motor Intact to the Median, Ulnar, Radial Nerve and Pulses Intact  Musculoskeletal: Normal, except as noted in detailed exam and in HPI        MUSCULOSKELETAL EXAMINATION:  Right wrist:     Wrist deformity noted  Flexion 50 degrees  Extension 30 degrees  Full pronation and supination  Sensation intact to light touch  Brisk capillary refill     ___________________________________________________  STUDIES REVIEWED:  I have personally reviewed AP lateral and oblique radiographs of right wrist reviewed from 12/21/21   which demonstrate SLAC wrist with degenerative changes          PROCEDURES PERFORMED:  Procedures  No Procedures performed today    _____________________________________________________      Tariqrol Minium    I,:  Dona Vera am acting as a scribe while in the presence of the attending physician :       I,:  Petra Mcneal MD personally performed the services described in this documentation    as scribed in my presence :

## 2022-05-05 NOTE — PROGRESS NOTES
Cardiology Follow Up    Kendall Ro  1955  [id number]  [address] [facility]  [address] [place] [address] [address]      1  Benign essential hypertension     2  PVC's (premature ventricular contractions)     3  Premature atrial contractions         Discussion/Summary:  Mr Lyndsey Germain is a pleasant 58-year-old male who presents to the office for routine follow-up       Since his last visit he underwent a repeat echocardiogram revealing a mildly dilated ascending aorta and mild aortic insufficiency  This will require ongoing surveillance  His blood pressure is high in the office today  He attributes this to white coat hypertension  He brings a log of his blood pressure readings in from home  His numbers are acceptable on his current regimen to which no changes were advised  Regarding his ectopic ventricular and supraventricular beats, symptomatically he has improved after the transition from verapamil to metoprolol  He cannot quantify how often they occur  Nonetheless no changes were made to his AV taye blocking regimen  His most recent lipids were reviewed  Based on his 10-year risk statin therapy is indicated which he declines  Manolo Spence He will follow-up in one year or sooner if deemed necessary  Interval History:   Mr Lyndsey Germain is a pleasant 58-year-old male who presents to the office today for follow-up  Since his last visit he remains active  He has been having issues with his shoulders and biceps tendons  He has been exercising on a stationary bike and also doing stretching exercises  With the activity he performs he denies any cardiopulmonary symptoms of chest pain or shortness of breath  He denies any signs or symptoms of congestive heart failure including increasing lower extremity edema, paroxysmal nocturnal dyspnea, orthopnea, acute weight gain or increasing abdominal girth    He denies lightheadedness, syncope or presyncope  He denies sustained palpitations or symptoms of claudication  After his last visit his verapamil was transitioned to metoprolol given palpitations  They have improved  He cannot quantify how frequently they are occurring  They are triggered by stress and caffeine          Problem List     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    Impaired fasting glucose    Hypertension    Encounter for hepatitis C screening test for low risk patient    Need for pneumococcal vaccination    Allergic rhinitis        Past Medical History:   Diagnosis Date    Asthma     Hypertension     Impaired fasting glucose     Mitral valve disorder     Mitral valve prolapse     Murmur, cardiac     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     PAC (premature atrial contraction)     PVC (premature ventricular contraction)     Thyroid nodule      Social History     Socioeconomic History    Marital status: /Civil Union     Spouse name: Not on file    Number of children: Not on file    Years of education: Not on file    Highest education level: Not on file   Occupational History    Not on file   Tobacco Use    Smoking status: Never Smoker    Smokeless tobacco: Never Used   Vaping Use    Vaping Use: Never used   Substance and Sexual Activity    Alcohol use: Yes     Comment: very little    Drug use: No    Sexual activity: Yes   Other Topics Concern    Not on file   Social History Narrative    Not on file     Social Determinants of Health     Financial Resource Strain: Not on file   Food Insecurity: Not on file   Transportation Needs: Not on file   Physical Activity: Not on file   Stress: Not on file   Social Connections: Not on file   Intimate Partner Violence: Not on file   Housing Stability: Not on file      Family History   Problem Relation Age of Onset    Diabetes Sister     Other Family         Stroke syndrome     Past Surgical History: Procedure Laterality Date    HAND SURGERY      [place] COLONOSCOPY FLX DX W/COLLJ SPEC WHEN PFRMD N/A 2/9/2018    Procedure: COLONOSCOPY;  Surgeon: Fabienne Kong MD;  [facility];   Service: Gastroenterology    PROSTATE BIOPSY      SHOULDER SURGERY         Current Outpatient Medications:     aspirin 81 mg chewable tablet, Chew 1 tablet every other day, Disp: , Rfl:     busPIRone (BUSPAR) 5 mg tablet, Take 1 tablet (5 mg total) by mouth daily as needed (Anxiety), Disp: 30 tablet, Rfl: 1    Coenzyme Q10-Red Yeast Rice  MG CAPS, Take 1 capsule by mouth daily, Disp: , Rfl:     Docosahexaenoic Acid (DHA OMEGA 3) 100 MG CAPS, Take 6 capsules by mouth daily, Disp: , Rfl:     fexofenadine (ALLEGRA) 180 MG tablet, Take 1 tablet by mouth daily as needed, Disp: , Rfl:     lidocaine (XYLOCAINE) 5 % ointment, Apply topically as needed for mild pain, Disp: 35 44 g, Rfl: 0    lisinopril (ZESTRIL) 20 mg tablet, Take 1 tablet (20 mg total) by mouth daily Twice a day, Disp: 180 tablet, Rfl: 3    metoprolol succinate (TOPROL-XL) 25 mg 24 hr tablet, Take 1 tablet (25 mg total) by mouth daily, Disp: 90 tablet, Rfl: 0    Misc Natural Products (PROSTATE HEALTH) CAPS, Take 1 capsule by mouth daily, Disp: , Rfl:     Multiple Vitamin tablet, Take 1 tablet by mouth daily, Disp: , Rfl:   Allergies   Allergen Reactions    Seasonal Ic  [Cholestatin]        Labs:     Chemistry        Component Value Date/Time     07/29/2017 0917    K 5 0 02/26/2022 0843     02/26/2022 0843    CO2 28 02/26/2022 0843    BUN 13 02/26/2022 0843    CREATININE 0 83 02/26/2022 0843    CREATININE 0 87 07/29/2017 0917        Component Value Date/Time    CALCIUM 9 6 02/26/2022 0843    ALKPHOS 54 02/26/2022 0843    AST 25 02/26/2022 0843    ALT 28 02/26/2022 0843    BILITOT 0 7 07/29/2017 0917            Lab Results   Component Value Date    CHOL 167 07/29/2017    CHOL 162 01/07/2017    CHOL 152 07/23/2016     Lab Results   Component Value Date    HDL 63 02/26/2022    HDL 54 06/30/2021    HDL 57 01/12/2021     Lab Results   Component Value Date    LDLCALC 78 02/26/2022    LDLCALC 72 06/30/2021    LDLCALC 75 01/12/2021     Lab Results   Component Value Date    TRIG 40 02/26/2022    TRIG 41 06/30/2021    TRIG 40 01/12/2021     No results found for: CHOLHDL    Imaging: No results found  Review of Systems   Cardiovascular: Positive for palpitations  Negative for chest pain, dyspnea on exertion and irregular heartbeat  Musculoskeletal: Positive for arthritis and joint pain  All other systems reviewed and are negative  Vitals:    05/06/22 1021   BP: 142/68   Pulse:      Vitals:    05/06/22 0950   Weight: 71 kg (156 lb 8 oz)     Height: 5' 7" (170 2 cm)   Body mass index is 24 51 kg/m²      Physical Exam:  General:  Alert and cooperative, appears stated age  HEENT:  PERRLA, EOMI, no scleral icterus, no conjunctival pallor  Neck:  No lymphadenopathy, no thyromegaly, no carotid bruits, no elevated JVP  Heart:  Regular rate and rhythm, normal S1/S2, no S3/S4, no murmur  Lungs:  Clear to auscultation bilaterally   Abdomen:  Soft, non-tender, positive bowel sounds, no rebound or guarding,   no organomegaly   Extremities:  No clubbing, cyanosis or edema   Vascular:  2+ pedal pulses  Skin:  No rashes or lesions on exposed skin  Neurologic:  Cranial nerves II-XII grossly intact without focal deficits

## 2022-05-06 ENCOUNTER — OFFICE VISIT (OUTPATIENT)
Dept: CARDIOLOGY CLINIC | Facility: CLINIC | Age: 67
End: 2022-05-06
Payer: COMMERCIAL

## 2022-05-06 VITALS
HEIGHT: 67 IN | DIASTOLIC BLOOD PRESSURE: 68 MMHG | BODY MASS INDEX: 24.56 KG/M2 | HEART RATE: 66 BPM | SYSTOLIC BLOOD PRESSURE: 142 MMHG | WEIGHT: 156.5 LBS

## 2022-05-06 DIAGNOSIS — I49.3 PVC'S (PREMATURE VENTRICULAR CONTRACTIONS): ICD-10-CM

## 2022-05-06 DIAGNOSIS — I10 BENIGN ESSENTIAL HYPERTENSION: Primary | ICD-10-CM

## 2022-05-06 DIAGNOSIS — I49.1 PREMATURE ATRIAL CONTRACTIONS: ICD-10-CM

## 2022-05-06 PROCEDURE — 3078F DIAST BP <80 MM HG: CPT | Performed by: INTERNAL MEDICINE

## 2022-05-06 PROCEDURE — 99214 OFFICE O/P EST MOD 30 MIN: CPT | Performed by: INTERNAL MEDICINE

## 2022-05-06 PROCEDURE — 3008F BODY MASS INDEX DOCD: CPT | Performed by: NURSE PRACTITIONER

## 2022-05-06 PROCEDURE — 3077F SYST BP >= 140 MM HG: CPT | Performed by: INTERNAL MEDICINE

## 2022-05-13 ENCOUNTER — TELEMEDICINE (OUTPATIENT)
Dept: INTERNAL MEDICINE CLINIC | Facility: CLINIC | Age: 67
End: 2022-05-13
Payer: COMMERCIAL

## 2022-05-13 DIAGNOSIS — J01.00 ACUTE NON-RECURRENT MAXILLARY SINUSITIS: ICD-10-CM

## 2022-05-13 DIAGNOSIS — R42 VERTIGO: Primary | ICD-10-CM

## 2022-05-13 PROCEDURE — 99213 OFFICE O/P EST LOW 20 MIN: CPT | Performed by: NURSE PRACTITIONER

## 2022-05-13 PROCEDURE — 1160F RVW MEDS BY RX/DR IN RCRD: CPT | Performed by: NURSE PRACTITIONER

## 2022-05-13 PROCEDURE — 1036F TOBACCO NON-USER: CPT | Performed by: NURSE PRACTITIONER

## 2022-05-13 RX ORDER — AMOXICILLIN AND CLAVULANATE POTASSIUM 875; 125 MG/1; MG/1
1 TABLET, FILM COATED ORAL EVERY 12 HOURS SCHEDULED
Qty: 14 TABLET | Refills: 0 | Status: SHIPPED | OUTPATIENT
Start: 2022-05-13 | End: 2022-05-20

## 2022-05-13 RX ORDER — MECLIZINE HYDROCHLORIDE 25 MG/1
25 TABLET ORAL EVERY 8 HOURS SCHEDULED
Qty: 30 TABLET | Refills: 0 | Status: SHIPPED | OUTPATIENT
Start: 2022-05-13

## 2022-05-13 NOTE — ASSESSMENT & PLAN NOTE
Sudden vertigo may be due to possible sinus congestion/infection start meclizine avoid sudden head movements    Drink lots of fluids and rest if not improving go to the ER for a CT scan

## 2022-05-13 NOTE — ASSESSMENT & PLAN NOTE
Start antibiotics drink lots of fluids and rest I advised him to keep testing for COVID in 2-3 days to make sure this is not COVID

## 2022-05-13 NOTE — PROGRESS NOTES
Virtual Regular Visit    Verification of patient location:    Patient is located in the following state in which I hold an active license [place]      Assessment/Plan:    Problem List Items Addressed This Visit        Respiratory    Acute non-recurrent maxillary sinusitis     Start antibiotics drink lots of fluids and rest I advised him to keep testing for COVID in 2-3 days to make sure this is not COVID           Relevant Medications    amoxicillin-clavulanate (AUGMENTIN) 875-125 mg per tablet       Other    Vertigo - Primary     Sudden vertigo may be due to possible sinus congestion/infection start meclizine avoid sudden head movements  Drink lots of fluids and rest if not improving go to the ER for a CT scan           Relevant Medications    meclizine (ANTIVERT) 25 mg tablet               Reason for visit is   Chief Complaint   Patient presents with    Virtual Regular Visit        Encounter provider PRABHAKAR Fatima    Provider located at [address] [place] [id number]      Recent Visits  No visits were found meeting these conditions  Showing recent visits within past 7 days and meeting all other requirements  Today's Visits  Date Type Provider Dept   05/13/22 Telemedicine [facility] Fatima today's visits and meeting all other requirements  Future Appointments  No visits were found meeting these conditions  Showing future appointments within next 150 days and meeting all other requirements       The patient was identified by name and date of birth  Huey Dominguez was informed that this is a telemedicine visit and that the visit is being conducted through [facility] and patient was informed this is a secure, HIPAA-complaint platform  He agrees to proceed     My office door was closed  No one else was in the room  He acknowledged consent and understanding of privacy and security of the video platform   The patient has agreed to participate and understands they can discontinue the visit at any time  Patient is aware this is a billable service  Subjective  Rossi Deng is a 74 y o  male    Patient is here for vertigo sinus congestion and pressure chills fatigue since last night  He is very dizzy with head movements  His mucus is clear to yellowish green  No fever  He tested negative for COVID today       Past Medical History:   Diagnosis Date    Asthma     Hypertension     Impaired fasting glucose     Mitral valve disorder     Mitral valve prolapse     Murmur, cardiac     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     PAC (premature atrial contraction)     PVC (premature ventricular contraction)     Thyroid nodule        Past Surgical History:   Procedure Laterality Date    HAND SURGERY      [place] COLONOSCOPY FLX DX W/COLLJ SPEC WHEN PFRMD N/A 2/9/2018    Procedure: COLONOSCOPY;  Surgeon: Esperanza Shirley MD;  [facility];   Service: Gastroenterology    PROSTATE BIOPSY      SHOULDER SURGERY         Current Outpatient Medications   Medication Sig Dispense Refill    amoxicillin-clavulanate (AUGMENTIN) 875-125 mg per tablet Take 1 tablet by mouth every 12 (twelve) hours for 7 days 14 tablet 0    meclizine (ANTIVERT) 25 mg tablet Take 1 tablet (25 mg total) by mouth every 8 (eight) hours 30 tablet 0    aspirin 81 mg chewable tablet Chew 1 tablet every other day      busPIRone (BUSPAR) 5 mg tablet Take 1 tablet (5 mg total) by mouth daily as needed (Anxiety) 30 tablet 1    Coenzyme Q10-Red Yeast Rice  MG CAPS Take 1 capsule by mouth daily      Docosahexaenoic Acid (DHA OMEGA 3) 100 MG CAPS Take 6 capsules by mouth daily      fexofenadine (ALLEGRA) 180 MG tablet Take 1 tablet by mouth daily as needed      lidocaine (XYLOCAINE) 5 % ointment Apply topically as needed for mild pain 35 44 g 0    lisinopril (ZESTRIL) 20 mg tablet Take 1 tablet (20 mg total) by mouth daily Twice a day 180 tablet 3    metoprolol succinate (TOPROL-XL) 25 mg 24 hr tablet Take 1 tablet (25 mg total) by mouth daily 90 tablet 0    Misc Natural Products (PROSTATE HEALTH) CAPS Take 1 capsule by mouth daily      Multiple Vitamin tablet Take 1 tablet by mouth daily       No current facility-administered medications for this visit  Allergies   Allergen Reactions    Seasonal Ic  [Cholestatin]        Review of Systems   Constitutional: Positive for chills and fatigue  HENT: Positive for congestion, sinus pressure and sinus pain  Eyes: Negative  Respiratory: Negative  Cardiovascular: Negative  Gastrointestinal: Negative  Musculoskeletal: Negative  Neurological: Positive for dizziness  Video Exam    There were no vitals filed for this visit  Physical Exam  Vitals reviewed  Constitutional:       Appearance: Normal appearance  Neurological:      Mental Status: He is alert  I spent 15 minutes directly with the patient during this visit    [address] verbally agrees to participate in [employer]  Pt is aware that [employer] could be limited without vital signs or the ability to perform a full hands-on physical exam  Oral Yang understands he or the provider may request at any time to terminate the video visit and request the patient to seek care or treatment in person

## 2022-06-14 ENCOUNTER — OFFICE VISIT (OUTPATIENT)
Dept: INTERNAL MEDICINE CLINIC | Facility: CLINIC | Age: 67
End: 2022-06-14
Payer: COMMERCIAL

## 2022-06-14 VITALS
HEIGHT: 67 IN | WEIGHT: 152.8 LBS | HEART RATE: 67 BPM | OXYGEN SATURATION: 97 % | SYSTOLIC BLOOD PRESSURE: 132 MMHG | RESPIRATION RATE: 16 BRPM | BODY MASS INDEX: 23.98 KG/M2 | DIASTOLIC BLOOD PRESSURE: 80 MMHG

## 2022-06-14 DIAGNOSIS — M12.812 ROTATOR CUFF ARTHROPATHY OF LEFT SHOULDER: ICD-10-CM

## 2022-06-14 DIAGNOSIS — Z23 ENCOUNTER FOR IMMUNIZATION: Primary | ICD-10-CM

## 2022-06-14 DIAGNOSIS — T07.XXXA MULTIPLE BRUISES: ICD-10-CM

## 2022-06-14 DIAGNOSIS — R42 DIZZINESS: ICD-10-CM

## 2022-06-14 DIAGNOSIS — R53.82 CHRONIC FATIGUE: ICD-10-CM

## 2022-06-14 DIAGNOSIS — M25.512 ACUTE PAIN OF LEFT SHOULDER: ICD-10-CM

## 2022-06-14 DIAGNOSIS — R53.83 FATIGUE, UNSPECIFIED TYPE: ICD-10-CM

## 2022-06-14 PROCEDURE — 1036F TOBACCO NON-USER: CPT | Performed by: INTERNAL MEDICINE

## 2022-06-14 PROCEDURE — 1101F PT FALLS ASSESS-DOCD LE1/YR: CPT | Performed by: INTERNAL MEDICINE

## 2022-06-14 PROCEDURE — 3725F SCREEN DEPRESSION PERFORMED: CPT | Performed by: INTERNAL MEDICINE

## 2022-06-14 PROCEDURE — 1160F RVW MEDS BY RX/DR IN RCRD: CPT | Performed by: INTERNAL MEDICINE

## 2022-06-14 PROCEDURE — 3288F FALL RISK ASSESSMENT DOCD: CPT | Performed by: INTERNAL MEDICINE

## 2022-06-14 PROCEDURE — 3079F DIAST BP 80-89 MM HG: CPT | Performed by: INTERNAL MEDICINE

## 2022-06-14 PROCEDURE — 3008F BODY MASS INDEX DOCD: CPT | Performed by: INTERNAL MEDICINE

## 2022-06-14 PROCEDURE — 3075F SYST BP GE 130 - 139MM HG: CPT | Performed by: INTERNAL MEDICINE

## 2022-06-14 PROCEDURE — 99214 OFFICE O/P EST MOD 30 MIN: CPT | Performed by: INTERNAL MEDICINE

## 2022-06-14 RX ORDER — LIDOCAINE 40 MG/G
CREAM TOPICAL AS NEEDED
Qty: 30 G | Refills: 0 | Status: SHIPPED | OUTPATIENT
Start: 2022-06-14

## 2022-06-14 RX ORDER — METHOCARBAMOL 500 MG/1
500 TABLET, FILM COATED ORAL 4 TIMES DAILY PRN
Qty: 30 TABLET | Refills: 0 | Status: SHIPPED | OUTPATIENT
Start: 2022-06-14

## 2022-06-14 NOTE — ASSESSMENT & PLAN NOTE
History of left rotator cuff arthropathy  Worsening pain since a week ago  Limited range of motion( can't reach above the head, limited external and internal rotation, abduction)  Plan:   · Tylenol p r n ,  Lidocaine patch or gel, diclofenac gel  · Robaxin prn    · Avoid NSAIDS oral because of the bruising  · Continue with the physical therapy exercises     · Orthopedics referral

## 2022-06-14 NOTE — ASSESSMENT & PLAN NOTE
Muscular and generalized fatigue, also reported low appetite and 10 lbs weight loss since last year  Fatigue is not new    - Las cmp wnl, vit b12 and vit D normal  Last PSA normal     - Had colonoscopy in the past (2018) that was normal but insufficient bowel prep  Was recommended to recheck in 1-2 years      - Reported HR in the low 50s random in the morning that will increase during the day  Currently WNL  - Low suspicion for STEPHANIE  Depression screening negative  Could be related to the generalized joint pain he has ( chronic pain)   Will rule out other causes     Plan:   · Check CBC for anemia, TSH  · Recommended colonoscopy   · Pain management with tylenol, lidocaine and ortho referral   · Monitor heart rate at home

## 2022-06-14 NOTE — ASSESSMENT & PLAN NOTE
Spontaneous bruising on the right hand since a week ago  Ecchymosis left arm  Patient reported likely secondary to blood pressure cuff( he was checking his BP on the same arm, 4 times a day)  On aspirin 81 daily     Will check CBC

## 2022-06-14 NOTE — PROGRESS NOTES
Assessment/Plan:    Shoulder pain, left   History of left rotator cuff arthropathy  Worsening pain since a week ago  Limited range of motion( can't reach above the head, limited external and internal rotation, abduction)  Plan:   · Tylenol p r n ,  Lidocaine patch or gel, diclofenac gel  · Robaxin prn    · Avoid NSAIDS oral because of the bruising  · Continue with the physical therapy exercises  · Orthopedics referral      Multiple bruises  Spontaneous bruising on the right hand since a week ago  Ecchymosis left arm  Patient reported likely secondary to blood pressure cuff( he was checking his BP on the same arm, 4 times a day)  On aspirin 81 daily  Will check CBC     Fatigue  Muscular and generalized fatigue, also reported low appetite and 10 lbs weight loss since last year  Fatigue is not new    - Las cmp wnl, vit b12 and vit D normal  Last PSA normal     - Had colonoscopy in the past (2018) that was normal but insufficient bowel prep  Was recommended to recheck in 1-2 years      - Reported HR in the low 50s random in the morning that will increase during the day  Currently WNL  - Low suspicion for STEPHANIE  Depression screening negative  Could be related to the generalized joint pain he has ( chronic pain)  Will rule out other causes     Plan:   · Check CBC for anemia, TSH  · Recommended colonoscopy   · Pain management with tylenol, lidocaine and ortho referral   · Monitor heart rate at home        Dizziness  Chronic  Positional dizziness/off balance ( when standing fast) or with head movements  Reported he is off balance if he is closing his eyes  improvement with prn Meclizine   Likely multifactorial: orthostatic hypotension, BPPV, posterior circulation  Plan:   · Refer to neurology   · Continue Meclizine prn   · Maintain proper oral hydration  · Rest at the edge of the bed before walking  · Monitor BP at home ( reported Bp >100 mmHg)           Diagnoses and all orders for this visit:    Encounter for immunization    Rotator cuff arthropathy of left shoulder  -     Ambulatory Referral to Orthopedic Surgery; Future  -     methocarbamol (ROBAXIN) 500 mg tablet; Take 1 tablet (500 mg total) by mouth 4 (four) times a day as needed for muscle spasms  -     lidocaine (LMX) 4 % cream; Apply topically as needed for mild pain    Dizziness  -     Ambulatory Referral to Neurology; Future    Chronic fatigue    Fatigue, unspecified type  -     CBC and differential; Future  -     TSH, 3rd generation with Free T4 reflex; Future  -     Ambulatory referral for colonoscopy; Future    Acute pain of left shoulder    Multiple bruises          Subjective:      Patient ID: Surjit Reyes is a 69 y o  male, came in the office for fatigue, dizziness follow up and for acute shoulder pain complaint  patient has history of left shoulder surgery, rotator cuff arthropathy, right biceps tendon rupture and multiple joints osteoarthritis  Patient was seen in Orthopedics office last year for his left shoulder, with indication to have left shoulder arthroplasty  The patient reported left shoulder injections in the past with improvement  Patient mentioned that he had no left shoulder pain in the past year until last week  Patient worked with arms over his shoulder and had acute pain in the left shoulder  Patient has reduced range of motion in the left shoulder in part secondary to rotator cuff and exacerbated by acute pain  Patient currently interested in pain management, mentioned he can't have shoulder surgery as for now because 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  Patient mentioned chronic fatigue, generalized and muscle fatigue  He mentioned that he has chronic pain, shoulders, knees, legs likely related to generalized osteoarthritis  Reported poor appetite and about 10 lb loss in the past year  Denied sleep apnea, constipation/ diarrhea, blood in stool  No history of cancer   He follow with Urology for PSA  Which is  With the normal range, had colonoscopy in 2018 that was normal, but was poor preparation, recommendations to have it repeated in 1-2 years  Patient reported his heart rate sometimes in the morning is in the 50s but with increased during the day  Also reported his blood pressure was 108 mmHg,  Increasing during the day  Denied blood pressure lower than 100 mmHg     Has history of dizziness, tried meclizine before with improvement  Reported dizziness with transition from sitting to standing, had movements, mentioned he is off balance if he closes his eyes  HPI    The following portions of the patient's history were reviewed and updated as appropriate: allergies, current medications, past family history, past medical history, past social history, past surgical history and problem list     Review of Systems   Constitutional: Positive for activity change, appetite change, fatigue and unexpected weight change  Negative for fever  Respiratory: Negative for cough, chest tightness and shortness of breath  Cardiovascular: Negative for chest pain  Gastrointestinal: Negative for abdominal pain, blood in stool, constipation and diarrhea  Genitourinary: Negative for decreased urine volume, difficulty urinating and hematuria  Musculoskeletal: Positive for arthralgias  Skin: Positive for color change (bruises)  Neurological: Positive for dizziness  Negative for light-headedness and headaches  Psychiatric/Behavioral: Negative for self-injury, sleep disturbance and suicidal ideas  Objective:      /80   Pulse 67   Resp 16   Ht 5' 7" (1 702 m)   Wt 69 3 kg (152 lb 12 8 oz)   SpO2 97%   BMI 23 93 kg/m²            Physical Exam  Vitals reviewed  Constitutional:       General: He is not in acute distress  Appearance: He is normal weight  He is not diaphoretic  Cardiovascular:      Rate and Rhythm: Normal rate  Rhythm irregular  Occasional extrasystoles are present  Heart sounds: No murmur heard  Pulmonary:      Effort: Pulmonary effort is normal  No respiratory distress  Breath sounds: Normal breath sounds  No wheezing or rales  Abdominal:      General: Abdomen is flat  There is no distension  Palpations: Abdomen is soft  Tenderness: There is no abdominal tenderness  There is no guarding or rebound  Musculoskeletal:      Left shoulder: Tenderness and crepitus present  No swelling, deformity or effusion  Decreased range of motion  Right lower leg: No edema  Left lower leg: No edema  Skin:     General: Skin is warm  Neurological:      Mental Status: He is alert and oriented to person, place, and time  Psychiatric:         Mood and Affect: Mood normal          Behavior: Behavior normal          Thought Content:  Thought content normal          Judgment: Judgment normal

## 2022-06-14 NOTE — PATIENT INSTRUCTIONS
Please take tylenol as needed for pain  You can take up to 3 grams a day or 975 mg tylenol every 8 hours  Use lidocaine gel as needed, diclofenac gel   Avoid Ibuprofen or any NSAIDs medication, oral   You can take Robaxin as needed as muscle relaxant

## 2022-06-17 ENCOUNTER — TELEPHONE (OUTPATIENT)
Dept: INTERNAL MEDICINE CLINIC | Facility: CLINIC | Age: 67
End: 2022-06-17

## 2022-06-21 DIAGNOSIS — R00.2 PALPITATIONS: ICD-10-CM

## 2022-06-21 RX ORDER — METOPROLOL SUCCINATE 25 MG/1
25 TABLET, EXTENDED RELEASE ORAL DAILY
Qty: 90 TABLET | Refills: 3 | Status: SHIPPED | OUTPATIENT
Start: 2022-06-21 | End: 2022-09-19

## 2022-07-08 ENCOUNTER — APPOINTMENT (OUTPATIENT)
Dept: RADIOLOGY | Facility: AMBULARY SURGERY CENTER | Age: 67
End: 2022-07-08
Attending: ORTHOPAEDIC SURGERY
Payer: COMMERCIAL

## 2022-07-08 ENCOUNTER — OFFICE VISIT (OUTPATIENT)
Dept: OBGYN CLINIC | Facility: CLINIC | Age: 67
End: 2022-07-08
Payer: COMMERCIAL

## 2022-07-08 VITALS
BODY MASS INDEX: 23.54 KG/M2 | HEART RATE: 60 BPM | WEIGHT: 150 LBS | DIASTOLIC BLOOD PRESSURE: 88 MMHG | HEIGHT: 67 IN | SYSTOLIC BLOOD PRESSURE: 157 MMHG

## 2022-07-08 DIAGNOSIS — M25.512 LEFT SHOULDER PAIN, UNSPECIFIED CHRONICITY: ICD-10-CM

## 2022-07-08 DIAGNOSIS — M12.812 ROTATOR CUFF ARTHROPATHY OF LEFT SHOULDER: ICD-10-CM

## 2022-07-08 DIAGNOSIS — M25.512 LEFT SHOULDER PAIN, UNSPECIFIED CHRONICITY: Primary | ICD-10-CM

## 2022-07-08 PROCEDURE — 1160F RVW MEDS BY RX/DR IN RCRD: CPT | Performed by: ORTHOPAEDIC SURGERY

## 2022-07-08 PROCEDURE — 3079F DIAST BP 80-89 MM HG: CPT | Performed by: ORTHOPAEDIC SURGERY

## 2022-07-08 PROCEDURE — 3077F SYST BP >= 140 MM HG: CPT | Performed by: ORTHOPAEDIC SURGERY

## 2022-07-08 PROCEDURE — 99214 OFFICE O/P EST MOD 30 MIN: CPT | Performed by: ORTHOPAEDIC SURGERY

## 2022-07-08 PROCEDURE — 73030 X-RAY EXAM OF SHOULDER: CPT

## 2022-07-08 NOTE — PROGRESS NOTES
Assessment:  1  Left shoulder pain, unspecified chronicity  XR shoulder 2+ vw left    CANCELED: XR shoulder 2+ vw left   2  Rotator cuff arthropathy of left shoulder  Ambulatory Referral to Orthopedic Surgery     Patient Active Problem List   Diagnosis    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    Benign essential hypertension    Encounter for hepatitis C screening test for low risk patient    Need for pneumococcal vaccination    Allergic rhinitis    PVC's (premature ventricular contractions)    Premature atrial contractions    Medicare annual wellness visit, subsequent    Iron deficiency anemia    Elevated blood sugar    Exposure to COVID-19 virus    Anxiety    Postural hypotension    Rupture of left biceps tendon    Fatigue    Rotator cuff tear arthropathy of left shoulder    Other fatigue    Rupture of right long head biceps tendon    Seasonal allergies    Shoulder pain, left    Palpitations    Acute non-recurrent maxillary sinusitis    Dizziness    Multiple bruises           Plan      Upon review of the MRI, a thorough history and my examination, Teri Smith is presenting with signs and symptoms consistent with retracted rotator cuff tear and severe atrophy of supraspinatus, infraspinatus and subscapularis  · Diagnosis and treatment options were discussed including no treatment, non surgical treatment and potential for surgical intervention  · He will follow up with Dr Ke Best when he is ready to move forward with a shoulder replacement                Subjective:     Patient ID:    Chief Complaint:Oral IBARRA Marcia 69 y o  male      HPI      Patient comes in today with regards to left shoulder pain  Previous patient of Dr Ke Best  Patient has chronic rotator cuff tear  MRI from 7/14/2021 reveals a chronic, retracted rotator cuff tear with osteoarthritis  Patient also reports a history of bilateral biceps tear, notes pop eye deformity    He notes a severe lack of mobility  He says he has pain and limited range of motion  He describes his pain as aching in intensity, intermittent in timing, localized to the posterior shoulder and deltoid  It is worsened with overhead activity, and overuse  It is relieved by rest   The pain is not associated with numbness and tingling  He has gotten previous relief from PT/HEP and corticosteroid injections          The following portions of the patient's history were reviewed and updated as appropriate: allergies, current medications, past family history, past social history, past surgical history and problem list         Social History     Socioeconomic History    Marital status: /Civil Union     Spouse name: Not on file    Number of children: Not on file    Years of education: Not on file    Highest education level: Not on file   Occupational History    Not on file   Tobacco Use    Smoking status: Never Smoker    Smokeless tobacco: Never Used   Vaping Use    Vaping Use: Never used   Substance and Sexual Activity    Alcohol use: Yes     Comment: very little    Drug use: No    Sexual activity: Yes   Other Topics Concern    Not on file   Social History Narrative    Not on file     Social Determinants of Health     Financial Resource Strain: Not on file   Food Insecurity: Not on file   Transportation Needs: Not on file   Physical Activity: Not on file   Stress: Not on file   Social Connections: Not on file   Intimate Partner Violence: Not on file   Housing Stability: Not on file     Past Medical History:   Diagnosis Date    Asthma     Hypertension     Impaired fasting glucose     Mitral valve disorder     Mitral valve prolapse     Murmur, cardiac     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     PAC (premature atrial contraction)     PVC (premature ventricular contraction)     Thyroid nodule      Past Surgical History:   Procedure Laterality Date    HAND SURGERY      SD COLONOSCOPY FLX DX W/COLLJ SPEC WHEN PFRMD N/A 2/9/2018    Procedure: COLONOSCOPY;  Surgeon: Zeke Abrams MD;  [facility]; Service: Gastroenterology    PROSTATE BIOPSY      SHOULDER SURGERY       Allergies   Allergen Reactions    Seasonal Ic  [Cholestatin]      Current Outpatient Medications on File Prior to Visit   Medication Sig Dispense Refill    aspirin 81 mg chewable tablet Chew 1 tablet every other day      busPIRone (BUSPAR) 5 mg tablet Take 1 tablet (5 mg total) by mouth daily as needed (Anxiety) 30 tablet 1    Coenzyme Q10-Red Yeast Rice  MG CAPS Take 1 capsule by mouth daily      Docosahexaenoic Acid (DHA OMEGA 3) 100 MG CAPS Take 6 capsules by mouth daily      fexofenadine (ALLEGRA) 180 MG tablet Take 1 tablet by mouth daily as needed      lidocaine (LMX) 4 % cream Apply topically as needed for mild pain 30 g 0    lidocaine (XYLOCAINE) 5 % ointment Apply topically as needed for mild pain 35 44 g 0    lisinopril (ZESTRIL) 20 mg tablet Take 1 tablet (20 mg total) by mouth daily Twice a day 180 tablet 3    meclizine (ANTIVERT) 25 mg tablet Take 1 tablet (25 mg total) by mouth every 8 (eight) hours 30 tablet 0    methocarbamol (ROBAXIN) 500 mg tablet Take 1 tablet (500 mg total) by mouth 4 (four) times a day as needed for muscle spasms 30 tablet 0    metoprolol succinate (TOPROL-XL) 25 mg 24 hr tablet Take 1 tablet (25 mg total) by mouth daily 90 tablet 3    Misc Natural Products (PROSTATE HEALTH) CAPS Take 1 capsule by mouth daily      Multiple Vitamin tablet Take 1 tablet by mouth daily       No current facility-administered medications on file prior to visit  Objective:    Review of Systems   Constitutional: Negative for chills and fever  HENT: Negative for ear pain and sore throat  Eyes: Negative for pain and visual disturbance  Respiratory: Negative for cough and shortness of breath  Cardiovascular: Negative for chest pain and palpitations     Gastrointestinal: Negative for abdominal pain and vomiting  Genitourinary: Negative for dysuria and hematuria  Musculoskeletal: Negative for arthralgias and back pain  Skin: Negative for color change and rash  Neurological: Negative for seizures and syncope  All other systems reviewed and are negative  Left Shoulder Exam     Range of Motion   Active abduction: 30 (compensation from shoulder blade, passive 180)   Left shoulder external rotation: Passive at 90 is 90  Forward flexion: 10 (Passive 170)   Internal rotation 90 degrees: 70     Tests   Drop arm: positive    Other   Erythema: absent  Sensation: normal             Physical Exam  HENT:      Head: Normocephalic  Eyes:      Pupils: Pupils are equal, round, and reactive to light  Pulmonary:      Effort: Pulmonary effort is normal    Musculoskeletal:         General: Normal range of motion  Comments: See HPI     Skin:     General: Skin is warm and dry  Neurological:      General: No focal deficit present  Mental Status: He is alert  Psychiatric:         Behavior: Behavior normal          Procedures  No Procedures performed today    I have personally reviewed pertinent films in PACS  MRI from 7/14/2021 reviewed, shows postsurgical changes from prior rotator cuff repair  There is complete re-tear of supraspinatus and infraspinatus with the torn tendon edges proximally retracted  Moderate fatty muscle atrophy supraspinatus, infraspinatus and subscapularis    I showed I showed these pictures these pictures and explained these pictures to the patient and explained these pictures to the patient    Scribe Attestation    I,:  Mina Jones am acting as a scribe while in the presence of the attending physician :       I,:  Song Jimenez, DO personally performed the services described in this documentation    as scribed in my presence :             Portions of the record may have been created with voice recognition software   Occasional wrong word or "sound a like" substitutions may have occurred due to the inherent limitations of voice recognition software   Read the chart carefully and recognize, using context, where substitutions have occurred

## 2022-08-08 ENCOUNTER — TELEMEDICINE (OUTPATIENT)
Dept: INTERNAL MEDICINE CLINIC | Facility: CLINIC | Age: 67
End: 2022-08-08
Payer: COMMERCIAL

## 2022-08-08 DIAGNOSIS — U07.1 COVID-19: Primary | ICD-10-CM

## 2022-08-08 PROCEDURE — 99442 PR PHYS/QHP TELEPHONE EVALUATION 11-20 MIN: CPT | Performed by: INTERNAL MEDICINE

## 2022-08-08 NOTE — PROGRESS NOTES
Virtual Regular Visit    Verification of patient location:    Patient is located in the following state in which I hold an active license [place]      Assessment/Plan:    Problem List Items Addressed This Visit    None              Reason for visit is   Chief Complaint   Patient presents with    Cold Like Symptoms    Sore Throat     Tested + on home test    Virtual Regular Visit        Encounter provider Abdirahman Groves MD    Provider located at [address] [place] [id number]      Recent Visits  No visits were found meeting these conditions  Showing recent visits within past 7 days and meeting all other requirements  Today's Visits  Date Type Provider Dept   08/08/22 Telemedicine Abdirahman Groves MD [facility]   Showing today's visits and meeting all other requirements  Future Appointments  No visits were found meeting these conditions  Showing future appointments within next 150 days and meeting all other requirements       The patient was identified by name and date of birth  Marianne Subramanian was informed that this is a telemedicine visit and that the visit is being conducted through Telephone  My office door was closed  No one else was in the room  He acknowledged consent and understanding of privacy and security of the video platform  The patient has agreed to participate and understands they can discontinue the visit at any time  Patient is aware this is a billable service  It was my intent to perform this visit via video technology but the patient was not able to do a video connection so the visit was completed via audio telephone only  Subjective  Marianne Subramanian is a 69 y o  male with COVID-19   HPI   Symptoms started on Wednesday August 3rd with fatigue, cough, shortness of breath in myalgia  He also reports 1-2 loose stools daily  No blood in the stool  Tested positive on Saturday    Currently doing better  He reports oxygen saturation 98% on room air  Offered paxlovid but he declined  Instructed patient to go to ED for any worsening symptoms or if his oxygen saturation dropped below 90%  He will call us for any concerns  Continue supportive measures and maintain good hydration  Recommended as needed Imodium for diarrhea  Past Medical History:   Diagnosis Date    Asthma     Hypertension     Impaired fasting glucose     Mitral valve disorder     Mitral valve prolapse     Murmur, cardiac     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     PAC (premature atrial contraction)     PVC (premature ventricular contraction)     Thyroid nodule        Past Surgical History:   Procedure Laterality Date    HAND SURGERY      TX COLONOSCOPY FLX DX W/COLLJ SPEC WHEN PFRMD N/A 2/9/2018    Procedure: COLONOSCOPY;  Surgeon: Christiano Barnett MD;  [facility];   Service: Gastroenterology    PROSTATE BIOPSY      SHOULDER SURGERY         Current Outpatient Medications   Medication Sig Dispense Refill    aspirin 81 mg chewable tablet Chew 1 tablet every other day      busPIRone (BUSPAR) 5 mg tablet Take 1 tablet (5 mg total) by mouth daily as needed (Anxiety) 30 tablet 1    Coenzyme Q10-Red Yeast Rice  MG CAPS Take 1 capsule by mouth daily      Docosahexaenoic Acid (DHA OMEGA 3) 100 MG CAPS Take 6 capsules by mouth daily      fexofenadine (ALLEGRA) 180 MG tablet Take 1 tablet by mouth daily as needed      lidocaine (LMX) 4 % cream Apply topically as needed for mild pain 30 g 0    lidocaine (XYLOCAINE) 5 % ointment Apply topically as needed for mild pain 35 44 g 0    lisinopril (ZESTRIL) 20 mg tablet Take 1 tablet (20 mg total) by mouth daily Twice a day 180 tablet 3    meclizine (ANTIVERT) 25 mg tablet Take 1 tablet (25 mg total) by mouth every 8 (eight) hours 30 tablet 0    methocarbamol (ROBAXIN) 500 mg tablet Take 1 tablet (500 mg total) by mouth 4 (four) times a day as needed for muscle spasms 30 tablet 0    metoprolol succinate (TOPROL-XL) 25 mg 24 hr tablet Take 1 tablet (25 mg total) by mouth daily 90 tablet 3    Misc Natural Products (PROSTATE HEALTH) CAPS Take 1 capsule by mouth daily      Multiple Vitamin tablet Take 1 tablet by mouth daily       No current facility-administered medications for this visit  Allergies   Allergen Reactions    Seasonal Ic  [Cholestatin]        Review of Systems   Constitutional: Positive for fatigue  Negative for chills and fever  HENT: Negative for ear pain and sore throat  Eyes: Negative for pain and visual disturbance  Respiratory: Positive for cough and shortness of breath (Improved significantly)  Cardiovascular: Negative for palpitations  Gastrointestinal: Positive for diarrhea  Negative for abdominal pain, blood in stool, nausea and vomiting  Genitourinary: Negative for dysuria and hematuria  Musculoskeletal: Negative for arthralgias and back pain  Skin: Negative for color change and rash  Neurological: Negative for dizziness, seizures, syncope and headaches  All other systems reviewed and are negative  Video Exam    There were no vitals filed for this visit  I spent 20 minutes directly with the patient during this visit    [address] [place] [address] verbally agrees to participate in [employer]  Pt is aware that [employer] could be limited without vital signs or the ability to perform a full hands-on physical exam  Oarl Daugherty understands he or the provider may request at any time to terminate the video visit and request the patient to seek care or treatment in person

## 2022-08-08 NOTE — ASSESSMENT & PLAN NOTE
· Symptoms started on Wednesday August 3rd with fatigue, cough, shortness of breath in myalgia  He also reports 1-2 loose stools daily  No blood in the stool  Tested positive on Saturday  Currently doing better  He reports oxygen saturation 98% on room air  Offered paxlovid but he declined  Instructed patient to go to ED for any worsening symptoms or if his oxygen saturation dropped below 90%  He will call us for any concerns  Continue supportive measures and maintain good hydration    Recommended as needed Imodium for diarrhea

## 2022-08-15 ENCOUNTER — TELEPHONE (OUTPATIENT)
Dept: INTERNAL MEDICINE CLINIC | Facility: CLINIC | Age: 67
End: 2022-08-15

## 2022-08-15 ENCOUNTER — APPOINTMENT (OUTPATIENT)
Dept: RADIOLOGY | Facility: MEDICAL CENTER | Age: 67
End: 2022-08-15
Payer: COMMERCIAL

## 2022-08-15 ENCOUNTER — OFFICE VISIT (OUTPATIENT)
Dept: INTERNAL MEDICINE CLINIC | Facility: CLINIC | Age: 67
End: 2022-08-15
Payer: COMMERCIAL

## 2022-08-15 VITALS
HEART RATE: 65 BPM | BODY MASS INDEX: 23.76 KG/M2 | DIASTOLIC BLOOD PRESSURE: 76 MMHG | SYSTOLIC BLOOD PRESSURE: 157 MMHG | WEIGHT: 151.4 LBS | OXYGEN SATURATION: 99 % | HEIGHT: 67 IN

## 2022-08-15 DIAGNOSIS — Z00.00 MEDICARE ANNUAL WELLNESS VISIT, SUBSEQUENT: ICD-10-CM

## 2022-08-15 DIAGNOSIS — H91.93 DECREASED HEARING OF BOTH EARS: ICD-10-CM

## 2022-08-15 DIAGNOSIS — I10 BENIGN ESSENTIAL HYPERTENSION: ICD-10-CM

## 2022-08-15 DIAGNOSIS — R06.2 WHEEZING: ICD-10-CM

## 2022-08-15 DIAGNOSIS — U07.1 COVID-19: ICD-10-CM

## 2022-08-15 DIAGNOSIS — R06.2 WHEEZING: Primary | ICD-10-CM

## 2022-08-15 DIAGNOSIS — Z23 NEED FOR SHINGLES VACCINE: ICD-10-CM

## 2022-08-15 LAB
SARS-COV-2 AG UPPER RESP QL IA: NEGATIVE
VALID CONTROL: NORMAL

## 2022-08-15 PROCEDURE — 3077F SYST BP >= 140 MM HG: CPT | Performed by: INTERNAL MEDICINE

## 2022-08-15 PROCEDURE — 1125F AMNT PAIN NOTED PAIN PRSNT: CPT | Performed by: INTERNAL MEDICINE

## 2022-08-15 PROCEDURE — 1170F FXNL STATUS ASSESSED: CPT | Performed by: INTERNAL MEDICINE

## 2022-08-15 PROCEDURE — 99213 OFFICE O/P EST LOW 20 MIN: CPT | Performed by: INTERNAL MEDICINE

## 2022-08-15 PROCEDURE — 3288F FALL RISK ASSESSMENT DOCD: CPT | Performed by: INTERNAL MEDICINE

## 2022-08-15 PROCEDURE — G0439 PPPS, SUBSEQ VISIT: HCPCS | Performed by: INTERNAL MEDICINE

## 2022-08-15 PROCEDURE — 87811 SARS-COV-2 COVID19 W/OPTIC: CPT | Performed by: INTERNAL MEDICINE

## 2022-08-15 PROCEDURE — 1160F RVW MEDS BY RX/DR IN RCRD: CPT | Performed by: INTERNAL MEDICINE

## 2022-08-15 PROCEDURE — 3078F DIAST BP <80 MM HG: CPT | Performed by: INTERNAL MEDICINE

## 2022-08-15 PROCEDURE — 71046 X-RAY EXAM CHEST 2 VIEWS: CPT

## 2022-08-15 RX ORDER — ZOSTER VACCINE RECOMBINANT, ADJUVANTED 50 MCG/0.5
0.5 KIT INTRAMUSCULAR ONCE
Qty: 1 EACH | Refills: 1 | Status: SHIPPED | OUTPATIENT
Start: 2022-08-15 | End: 2022-08-15

## 2022-08-15 RX ORDER — ALBUTEROL SULFATE 90 UG/1
2 AEROSOL, METERED RESPIRATORY (INHALATION) EVERY 6 HOURS PRN
Qty: 1 G | Refills: 0 | Status: SHIPPED | OUTPATIENT
Start: 2022-08-15

## 2022-08-15 NOTE — TELEPHONE ENCOUNTER
----- Message from Mynor Marroquin DO sent at 8/15/2022  1:58 PM EDT -----  Please have patient check the blood pressure on a daily basis keep a log and call back with the readings in 1 week

## 2022-08-15 NOTE — ASSESSMENT & PLAN NOTE
He is now on day 11    Continues to have difficulty with smell and taste fatigue along with a residual cough and minimal wheezing in the left base of his lungs he rapid COVID antibody in office test was completed that was negative I will check a chest x-ray PA and Lat discontinue Allegra Rx for albuterol 2 puffs every 4-6 hours as needed use Mucinex as directed p r n  plenty of fluids and rest RTO in 4 weeks call if any problems

## 2022-08-15 NOTE — ASSESSMENT & PLAN NOTE
Assessment and plan 1  Medicare subsequent annual wellness examination overall the patient is clinically stable and doing well, we encouraged the patient to follow a healthy and balanced diet  We recommend that the patient exercise routinely approximately 30 minutes 5 times per week   We have reviewed the patient's vaccines and have made recommendations for updates if necessary  recommend annual flu shot      We will be ordering screening laboratories which are age appropriate  Return to the office in   1 month   call if any problems

## 2022-08-15 NOTE — ASSESSMENT & PLAN NOTE
Mild elevation related to likely anxiety I would like the patient start monitoring the blood pressure at home routinely and call with the readings in 1 week

## 2022-08-15 NOTE — PATIENT INSTRUCTIONS
Medicare Preventive Visit Patient Instructions  Thank you for completing your Welcome to Medicare Visit or Medicare Annual Wellness Visit today  Your next wellness visit will be due in one year (8/16/2023)  The screening/preventive services that you may require over the next 5-10 years are detailed below  Some tests may not apply to you based off risk factors and/or age  Screening tests ordered at today's visit but not completed yet may show as past due  Also, please note that scanned in results may not display below  Preventive Screenings:  Service Recommendations Previous Testing/Comments   Colorectal Cancer Screening  · Colonoscopy    · Fecal Occult Blood Test (FOBT)/Fecal Immunochemical Test (FIT)  · Fecal DNA/Cologuard Test  · Flexible Sigmoidoscopy Age: 39-70 years old   Colonoscopy: every 10 years (May be performed more frequently if at higher risk)  OR  FOBT/FIT: every 1 year  OR  Cologuard: every 3 years  OR  Sigmoidoscopy: every 5 years  Screening may be recommended earlier than age 39 if at higher risk for colorectal cancer  Also, an individualized decision between you and your healthcare provider will decide whether screening between the ages of 74-80 would be appropriate   Colonoscopy: 02/09/2018  FOBT/FIT: Not on file  Cologuard: Not on file  Sigmoidoscopy: Not on file          Prostate Cancer Screening Individualized decision between patient and health care provider in men between ages of 53-78   Medicare will cover every 12 months beginning on the day after your 50th birthday PSA: 0 35 ng/mL           Hepatitis C Screening Once for adults born between 1945 and 1965  More frequently in patients at high risk for Hepatitis C Hep C Antibody: 02/22/2019        Diabetes Screening 1-2 times per year if you're at risk for diabetes or have pre-diabetes Fasting glucose: No results in last 5 years (No results in last 5 years)  A1C: 4 9 % of total Hgb (6/30/2021)      Cholesterol Screening Once every 5 years if you don't have a lipid disorder  May order more often based on risk factors  Lipid panel: 02/26/2022         Other Preventive Screenings Covered by Medicare:  1  Abdominal Aortic Aneurysm (AAA) Screening: covered once if your at risk  You're considered to be at risk if you have a family history of AAA or a male between the age of 73-68 who smoking at least 100 cigarettes in your lifetime  2  Lung Cancer Screening: covers low dose CT scan once per year if you meet all of the following conditions: (1) Age 50-69; (2) No signs or symptoms of lung cancer; (3) Current smoker or have quit smoking within the last 15 years; (4) You have a tobacco smoking history of at least 20 pack years (packs per day x number of years you smoked); (5) You get a written order from a healthcare provider  3  Glaucoma Screening: covered annually if you're considered high risk: (1) You have diabetes OR (2) Family history of glaucoma OR (3)  aged 48 and older OR (3)  American aged 72 and older  3  Osteoporosis Screening: covered every 2 years if you meet one of the following conditions: (1) Have a vertebral abnormality; (2) On glucocorticoid therapy for more than 3 months; (3) Have primary hyperparathyroidism; (4) On osteoporosis medications and need to assess response to drug therapy  5  HIV Screening: covered annually if you're between the age of 12-76  Also covered annually if you are younger than 13 and older than 72 with risk factors for HIV infection  For pregnant patients, it is covered up to 3 times per pregnancy      Immunizations:  Immunization Recommendations   Influenza Vaccine Annual influenza vaccination during flu season is recommended for all persons aged >= 6 months who do not have contraindications   Pneumococcal Vaccine   * Pneumococcal conjugate vaccine = PCV13 (Prevnar 13), PCV15 (Vaxneuvance), PCV20 (Prevnar 20)  * Pneumococcal polysaccharide vaccine = PPSV23 (Pneumovax) Adults 25-60 years old: 1-3 doses may be recommended based on certain risk factors  Adults 72 years old: 1-2 doses may be recommended based off what pneumonia vaccine you previously received   Hepatitis B Vaccine 3 dose series if at intermediate or high risk (ex: diabetes, end stage renal disease, liver disease)   Tetanus (Td) Vaccine - COST NOT COVERED BY MEDICARE PART B Following completion of primary series, a booster dose should be given every 10 years to maintain immunity against tetanus  Td may also be given as tetanus wound prophylaxis  Tdap Vaccine - COST NOT COVERED BY MEDICARE PART B Recommended at least once for all adults  For pregnant patients, recommended with each pregnancy  Shingles Vaccine (Shingrix) - COST NOT COVERED BY MEDICARE PART B  2 shot series recommended in those aged 48 and above     Health Maintenance Due:      Topic Date Due    Colorectal Cancer Screening  02/09/2028    Hepatitis C Screening  Completed     Immunizations Due:      Topic Date Due    COVID-19 Vaccine (3 - Booster for Pfizer series) 10/03/2021    Influenza Vaccine (1) 09/01/2022     Advance Directives   What are advance directives? Advance directives are legal documents that state your wishes and plans for medical care  These plans are made ahead of time in case you lose your ability to make decisions for yourself  Advance directives can apply to any medical decision, such as the treatments you want, and if you want to donate organs  What are the types of advance directives? There are many types of advance directives, and each state has rules about how to use them  You may choose a combination of any of the following:  · Living will: This is a written record of the treatment you want  You can also choose which treatments you do not want, which to limit, and which to stop at a certain time  This includes surgery, medicine, IV fluid, and tube feedings  · Durable power of  for healthcare [place] [facility]):   This is a written record that states who you want to make healthcare choices for you when you are unable to make them for yourself  This person, called a proxy, is usually a family member or a friend  You may choose more than 1 proxy  · Do not resuscitate (DNR) order:  A DNR order is used in case your heart stops beating or you stop breathing  It is a request not to have certain forms of treatment, such as CPR  A DNR order may be included in other types of advance directives  · Medical directive: This covers the care that you want if you are in a coma, near death, or unable to make decisions for yourself  You can list the treatments you want for each condition  Treatment may include pain medicine, surgery, blood transfusions, dialysis, IV or tube feedings, and a ventilator (breathing machine)  · Values history: This document has questions about your views, beliefs, and how you feel and think about life  This information can help others choose the care that you would choose  Why are advance directives important? An advance directive helps you control your care  Although spoken wishes may be used, it is better to have your wishes written down  Spoken wishes can be misunderstood, or not followed  Treatments may be given even if you do not want them  An advance directive may make it easier for your family to make difficult choices about your care  © Copyright [employer] 2018 Information is for End User's use only and may not be sold, redistributed or otherwise used for commercial purposes  All illustrations and images included in CareNotes® are the copyrighted property of [employer]  or [facility] Preventive Visit Patient Instructions  Thank you for completing your Welcome to Medicare Visit or Medicare Annual Wellness Visit today  Your next wellness visit will be due in one year (8/16/2023)  The screening/preventive services that you may require over the next 5-10 years are detailed below   Some tests may not apply to you based off risk factors and/or age  Screening tests ordered at today's visit but not completed yet may show as past due  Also, please note that scanned in results may not display below  Preventive Screenings:  Service Recommendations Previous Testing/Comments   Colorectal Cancer Screening  · Colonoscopy    · Fecal Occult Blood Test (FOBT)/Fecal Immunochemical Test (FIT)  · Fecal DNA/Cologuard Test  · Flexible Sigmoidoscopy Age: 39-70 years old   Colonoscopy: every 10 years (May be performed more frequently if at higher risk)  OR  FOBT/FIT: every 1 year  OR  Cologuard: every 3 years  OR  Sigmoidoscopy: every 5 years  Screening may be recommended earlier than age 39 if at higher risk for colorectal cancer  Also, an individualized decision between you and your healthcare provider will decide whether screening between the ages of 74-80 would be appropriate  Colonoscopy: 02/09/2018  FOBT/FIT: Not on file  Cologuard: Not on file  Sigmoidoscopy: Not on file          Prostate Cancer Screening Individualized decision between patient and health care provider in men between ages of 53-78   Medicare will cover every 12 months beginning on the day after your 50th birthday PSA: 0 35 ng/mL           Hepatitis C Screening Once for adults born between 1945 and 1965  More frequently in patients at high risk for Hepatitis C Hep C Antibody: 02/22/2019        Diabetes Screening 1-2 times per year if you're at risk for diabetes or have pre-diabetes Fasting glucose: No results in last 5 years (No results in last 5 years)  A1C: 4 9 % of total Hgb (6/30/2021)      Cholesterol Screening Once every 5 years if you don't have a lipid disorder  May order more often based on risk factors  Lipid panel: 02/26/2022         Other Preventive Screenings Covered by Medicare:  6  Abdominal Aortic Aneurysm (AAA) Screening: covered once if your at risk   You're considered to be at risk if you have a family history of AAA or a male between the age of 73-68 who smoking at least 100 cigarettes in your lifetime  7  Lung Cancer Screening: covers low dose CT scan once per year if you meet all of the following conditions: (1) Age 50-69; (2) No signs or symptoms of lung cancer; (3) Current smoker or have quit smoking within the last 15 years; (4) You have a tobacco smoking history of at least 20 pack years (packs per day x number of years you smoked); (5) You get a written order from a healthcare provider  8  Glaucoma Screening: covered annually if you're considered high risk: (1) You have diabetes OR (2) Family history of glaucoma OR (3)  aged 48 and older OR (3)  American aged 72 and older  5  Osteoporosis Screening: covered every 2 years if you meet one of the following conditions: (1) Have a vertebral abnormality; (2) On glucocorticoid therapy for more than 3 months; (3) Have primary hyperparathyroidism; (4) On osteoporosis medications and need to assess response to drug therapy  10  HIV Screening: covered annually if you're between the age of 12-76  Also covered annually if you are younger than 13 and older than 72 with risk factors for HIV infection  For pregnant patients, it is covered up to 3 times per pregnancy      Immunizations:  Immunization Recommendations   Influenza Vaccine Annual influenza vaccination during flu season is recommended for all persons aged >= 6 months who do not have contraindications   Pneumococcal Vaccine   * Pneumococcal conjugate vaccine = PCV13 (Prevnar 13), PCV15 (Vaxneuvance), PCV20 (Prevnar 20)  * Pneumococcal polysaccharide vaccine = PPSV23 (Pneumovax) Adults 25-60 years old: 1-3 doses may be recommended based on certain risk factors  Adults 72 years old: 1-2 doses may be recommended based off what pneumonia vaccine you previously received   Hepatitis B Vaccine 3 dose series if at intermediate or high risk (ex: diabetes, end stage renal disease, liver disease)   Tetanus (Td) Vaccine - COST NOT COVERED BY MEDICARE PART B Following completion of primary series, a booster dose should be given every 10 years to maintain immunity against tetanus  Td may also be given as tetanus wound prophylaxis  Tdap Vaccine - COST NOT COVERED BY MEDICARE PART B Recommended at least once for all adults  For pregnant patients, recommended with each pregnancy  Shingles Vaccine (Shingrix) - COST NOT COVERED BY MEDICARE PART B  2 shot series recommended in those aged 48 and above     Health Maintenance Due:      Topic Date Due    Colorectal Cancer Screening  02/09/2028    Hepatitis C Screening  Completed     Immunizations Due:      Topic Date Due    COVID-19 Vaccine (3 - Booster for Pfizer series) 10/03/2021    Influenza Vaccine (1) 09/01/2022     Advance Directives   What are advance directives? Advance directives are legal documents that state your wishes and plans for medical care  These plans are made ahead of time in case you lose your ability to make decisions for yourself  Advance directives can apply to any medical decision, such as the treatments you want, and if you want to donate organs  What are the types of advance directives? There are many types of advance directives, and each state has rules about how to use them  You may choose a combination of any of the following:  · Living will: This is a written record of the treatment you want  You can also choose which treatments you do not want, which to limit, and which to stop at a certain time  This includes surgery, medicine, IV fluid, and tube feedings  · Durable power of  for healthcare [place] [facility]): This is a written record that states who you want to make healthcare choices for you when you are unable to make them for yourself  This person, called a proxy, is usually a family member or a friend  You may choose more than 1 proxy  · Do not resuscitate (DNR) order:  A DNR order is used in case your heart stops beating or you stop breathing   It is a request not to have certain forms of treatment, such as CPR  A DNR order may be included in other types of advance directives  · Medical directive: This covers the care that you want if you are in a coma, near death, or unable to make decisions for yourself  You can list the treatments you want for each condition  Treatment may include pain medicine, surgery, blood transfusions, dialysis, IV or tube feedings, and a ventilator (breathing machine)  · Values history: This document has questions about your views, beliefs, and how you feel and think about life  This information can help others choose the care that you would choose  Why are advance directives important? An advance directive helps you control your care  Although spoken wishes may be used, it is better to have your wishes written down  Spoken wishes can be misunderstood, or not followed  Treatments may be given even if you do not want them  An advance directive may make it easier for your family to make difficult choices about your care  © Copyright [employer] 2018 Information is for End User's use only and may not be sold, redistributed or otherwise used for commercial purposes   All illustrations and images included in CareNotes® are the copyrighted property of [employer]  or [address] [place] [address] [employer]

## 2022-08-15 NOTE — PROGRESS NOTES
Assessment and Plan:     Problem List Items Addressed This Visit        Cardiovascular and Mediastinum    Benign essential hypertension     Mild elevation related to likely anxiety I would like the patient start monitoring the blood pressure at home routinely and call with the readings in 1 week         Relevant Orders    Comprehensive metabolic panel    Lipid Panel with Direct LDL reflex       Other    Medicare annual wellness visit, subsequent     Assessment and plan 1  Medicare subsequent annual wellness examination overall the patient is clinically stable and doing well, we encouraged the patient to follow a healthy and balanced diet  We recommend that the patient exercise routinely approximately 30 minutes 5 times per week   We have reviewed the patient's vaccines and have made recommendations for updates if necessary  recommend annual flu shot      We will be ordering screening laboratories which are age appropriate  Return to the office in   1 month   call if any problems  COVID-19     He is now on day 11    Continues to have difficulty with smell and taste fatigue along with a residual cough and minimal wheezing in the left base of his lungs he rapid COVID antibody in office test was completed that was negative I will check a chest x-ray PA and Lat discontinue Allegra Rx for albuterol 2 puffs every 4-6 hours as needed use Mucinex as directed p r n  plenty of fluids and rest RTO in 4 weeks call if any problems            Other Visit Diagnoses     Wheezing    -  Primary    Relevant Medications    albuterol (ProAir HFA) 90 mcg/act inhaler    Other Relevant Orders    XR chest pa & lateral    POCT Rapid Covid Ag (Completed)    Need for shingles vaccine        Relevant Medications    Zoster Vac Recomb Adjuvanted (Shingrix) 50 MCG/0 5ML SUSR    Decreased hearing of both ears        Relevant Orders    Ambulatory Referral to Audiology          RTO in 4 weeks call if any problems  Preventive health issues were discussed with patient, and age appropriate screening tests were ordered as noted in patient's After Visit Summary  Personalized health advice and appropriate referrals for health education or preventive services given if needed, as noted in patient's After Visit Summary  History of Present Illness:     Patient presents for a Medicare Wellness Visit    HPI 68-year old male coming in for a follow up visit regarding resolving COVID-19, cough, intermittent wheezing, hypertension decreased hearing; The patient reports me compliant taking medications without untoward side effects the  The patient is here to review his medical condition, update me on the medical condition and the patient reports me no hospitalizations and no ER visits  No injuries no illnesses recently diagnosed with cov positive day 12 tired , cough , mucous clear  Decrease taste and smell sx improving , some brain fog ears popping rhinorrhea , pnd , mucinex helping   No fevers no chills no body aches  Patient Care Team:  Agnieszka Bobo DO as PCP - General  Georgiana Agarwal DO as PCP - [address] [place], [place] [id number] [facility]  MD Clau Abbasi CRNP Jennefer Guess, Reggie Alliance, MD Pollie Gardner, MD as Endoscopist     Review of Systems:     Review of Systems   Constitutional: Negative for appetite change, chills and fever  HENT: Positive for congestion  Negative for ear pain, rhinorrhea, sinus pressure, sinus pain, sneezing and sore throat  Respiratory: Positive for cough  Negative for shortness of breath and wheezing  Gastrointestinal: Negative for diarrhea, nausea and vomiting  Neurological: Negative for headaches          Problem List:     Patient Active Problem List   Diagnosis    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    Benign essential hypertension    Encounter for hepatitis C screening test for low risk patient    Need for pneumococcal vaccination    Allergic rhinitis    [facility]'s (premature ventricular contractions)    Premature atrial contractions    Medicare annual wellness visit, subsequent    Iron deficiency anemia    Elevated blood sugar    Exposure to COVID-19 virus    Anxiety    Postural hypotension    Rupture of left biceps tendon    Fatigue    Rotator cuff tear arthropathy of left shoulder    Other fatigue    Rupture of right long head biceps tendon    Seasonal allergies    Shoulder pain, left    Palpitations    Acute non-recurrent maxillary sinusitis    Dizziness    Multiple bruises    COVID-19      Past Medical and Surgical History:     Past Medical History:   Diagnosis Date    Asthma     Hypertension     Impaired fasting glucose     Mitral valve disorder     Mitral valve prolapse     Murmur, cardiac     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     PAC (premature atrial contraction)     PVC (premature ventricular contraction)     Thyroid nodule      Past Surgical History:   Procedure Laterality Date    HAND SURGERY      [place] COLONOSCOPY FLX DX W/COLLJ SPEC WHEN PFRMD N/A 2/9/2018    Procedure: COLONOSCOPY;  Surgeon: Romeo Luis MD;  [facility];   Service: Gastroenterology    PROSTATE BIOPSY      SHOULDER SURGERY        Family History:     Family History   Problem Relation Age of Onset    Diabetes Sister     Other Family         Stroke syndrome      Social History:     Social History     Socioeconomic History    Marital status: /Civil Union     Spouse name: None    Number of children: None    Years of education: None    Highest education level: None   Occupational History    None   Tobacco Use    Smoking status: Never Smoker    Smokeless tobacco: Never Used   Vaping Use    Vaping Use: Never used   Substance and Sexual Activity    Alcohol use: Yes     Comment: very little    Drug use: No    Sexual activity: Yes   Other Topics Concern    None   Social History Narrative    None     Social Determinants of Health     Financial Resource Strain: Low Risk     Difficulty of Paying Living Expenses: Not very hard   Food Insecurity: Not on file   Transportation Needs: No Transportation Needs    Lack of Transportation (Medical): No    Lack of Transportation (Non-Medical):  No   Physical Activity: Not on file   Stress: Not on file   Social Connections: Not on file   Intimate Partner Violence: Not on file   Housing Stability: Not on file      Medications and Allergies:     Current Outpatient Medications   Medication Sig Dispense Refill    albuterol (ProAir HFA) 90 mcg/act inhaler Inhale 2 puffs every 6 (six) hours as needed for wheezing 1 g 0    aspirin 81 mg chewable tablet Chew 1 tablet every other day      busPIRone (BUSPAR) 5 mg tablet Take 1 tablet (5 mg total) by mouth daily as needed (Anxiety) 30 tablet 1    Coenzyme Q10-Red Yeast Rice  MG CAPS Take 1 capsule by mouth daily      Docosahexaenoic Acid (DHA OMEGA 3) 100 MG CAPS Take 6 capsules by mouth daily      fexofenadine (ALLEGRA) 180 MG tablet Take 1 tablet by mouth daily as needed      lidocaine (LMX) 4 % cream Apply topically as needed for mild pain 30 g 0    lidocaine (XYLOCAINE) 5 % ointment Apply topically as needed for mild pain 35 44 g 0    lisinopril (ZESTRIL) 20 mg tablet Take 1 tablet (20 mg total) by mouth daily Twice a day 180 tablet 3    meclizine (ANTIVERT) 25 mg tablet Take 1 tablet (25 mg total) by mouth every 8 (eight) hours 30 tablet 0    methocarbamol (ROBAXIN) 500 mg tablet Take 1 tablet (500 mg total) by mouth 4 (four) times a day as needed for muscle spasms 30 tablet 0    metoprolol succinate (TOPROL-XL) 25 mg 24 hr tablet Take 1 tablet (25 mg total) by mouth daily 90 tablet 3    Misc Natural Products (PROSTATE HEALTH) CAPS Take 1 capsule by mouth daily      Multiple Vitamin tablet Take 1 tablet by mouth daily      Zoster Vac Recomb Adjuvanted (Shingrix) 50 MCG/0 5ML SUSR Inject 0 5 mL into a muscle once for 1 dose Repeat dose in 2 to 6 months 1 each 1     No current facility-administered medications for this visit  Allergies   Allergen Reactions    Seasonal Ic  [Cholestatin]       Immunizations:     Immunization History   Administered Date(s) Administered    COVID-19 PFIZER VACCINE 0 3 ML IM 04/12/2021, 05/03/2021    Influenza Split High Dose Preservative Free IM 03/02/2020    Influenza, high dose seasonal 0 7 mL 10/14/2020, 10/02/2021    Influenza, seasonal, injectable 10/16/2010, 11/05/2011    Pneumococcal Conjugate 13-Valent 08/13/2018    Pneumococcal Polysaccharide PPV23 09/16/2019    TD (adult) Preservative Free 08/15/2020    Td (adult), adsorbed 1953, 06/22/2010    Tuberculin Skin Test-PPD Intradermal 03/03/2009    influenza, trivalent, adjuvanted 11/19/2018      Health Maintenance:         Topic Date Due    Colorectal Cancer Screening  02/09/2028    Hepatitis C Screening  Completed         Topic Date Due    COVID-19 Vaccine (3 - Booster for [employer] series) 10/03/2021    Influenza Vaccine (1) 09/01/2022      Medicare Screening Tests and Risk Assessments:     Derek Ramirez is here for his Subsequent Wellness visit  Health Risk Assessment:   Patient rates overall health as fair  Patient feels that their physical health rating is slightly worse  Patient is satisfied with their life  Eyesight was rated as slightly worse  Hearing was rated as slightly worse  Patient feels that their emotional and mental health rating is same  Patients states they are never, rarely angry  Patient states they are often unusually tired/fatigued  Pain experienced in the last 7 days has been some  Patient's pain rating has been 5/10  Patient states that he has experienced weight loss or gain in last 6 months  Fall Risk Screening: In the past year, patient has experienced: no history of falling in past year      Home Safety:  Patient does not have trouble with stairs inside or outside of their home   Patient has working smoke alarms and has working carbon monoxide detector  Home safety hazards include: medications that cause fatigue  Nutrition:   Current diet is Regular  Medications:   Patient is currently taking over-the-counter supplements  OTC medications include: on list  Patient is able to manage medications  Activities of Daily Living (ADLs)/Instrumental Activities of Daily Living (IADLs):   Walk and transfer into and out of bed and chair?: Yes  Dress and groom yourself?: Yes    Bathe or shower yourself?: Yes    Feed yourself? Yes  Do your laundry/housekeeping?: Yes  Manage your money, pay your bills and track your expenses?: Yes  Make your own meals?: Yes    Do your own shopping?: Yes    Previous Hospitalizations:   Any hospitalizations or ED visits within the last 12 months?: No      Advance Care Planning:   Living will: Yes    Durable POA for healthcare: Yes    Advanced directive: Yes      Cognitive Screening:   Provider or family/friend/caregiver concerned regarding cognition?: No    PREVENTIVE SCREENINGS      Cardiovascular Screening:    General: Screening Current      Diabetes Screening:     General: Screening Current      Colorectal Cancer Screening:     General: Screening Current      Prostate Cancer Screening:    General: Screening Current      Abdominal Aortic Aneurysm (AAA) Screening:    Risk factors include: age between 73-67 yo        Lung Cancer Screening:     General: Screening Not Indicated      Hepatitis C Screening:    General: Screening Current    Screening, Brief Intervention, and Referral to Treatment (SBIRT)    Screening  Typical number of drinks in a day: 0  Typical number of drinks in a week: 2  Interpretation: Low risk drinking behavior      AUDIT-C Screenin) How often did you have a drink containing alcohol in the past year? 2 to 4 times a month  2) How many drinks did you have on a typical day when you were drinking in the past year? 0  3) How often did you have 6 or more drinks on one occasion in the past year? never    AUDIT-C Score: 2  Interpretation: Score 0-3 (male): Negative screen for alcohol misuse    Single Item Drug Screening:  How often have you used an illegal drug (including marijuana) or a prescription medication for non-medical reasons in the past year? never    Single Item Drug Screen Score: 0  Interpretation: Negative screen for possible drug use disorder    No exam data present     Physical Exam:     /76 (BP Location: Left arm, Patient Position: Sitting, Cuff Size: Standard)   Pulse 65   Ht 5' 7" (1 702 m)   Wt 68 7 kg (151 lb 6 4 oz)   SpO2 99%   BMI 23 71 kg/m²     Physical Exam  Constitutional:       General: He is not in acute distress  Appearance: Normal appearance  He is well-developed and normal weight  He is not ill-appearing, toxic-appearing or diaphoretic  HENT:      Head: Normocephalic and atraumatic  Right Ear: External ear normal       Left Ear: External ear normal       Nose: Nose normal    Eyes:      Pupils: Pupils are equal, round, and reactive to light  Cardiovascular:      Rate and Rhythm: Normal rate and regular rhythm  Heart sounds: Normal heart sounds  No murmur heard  Pulmonary:      Effort: Pulmonary effort is normal       Breath sounds: Wheezing (Left base minimal) present  Abdominal:      General: There is no distension  Palpations: Abdomen is soft  Tenderness: There is no abdominal tenderness  There is no guarding  Neurological:      Mental Status: He is alert            Mery Crawford DO

## 2022-09-18 LAB
ALBUMIN SERPL-MCNC: 4.5 G/DL (ref 3.6–5.1)
ALBUMIN/GLOB SERPL: 2 (CALC) (ref 1–2.5)
ALP SERPL-CCNC: 51 U/L (ref 35–144)
ALT SERPL-CCNC: 25 U/L (ref 9–46)
AST SERPL-CCNC: 25 U/L (ref 10–35)
BILIRUB SERPL-MCNC: 1.4 MG/DL (ref 0.2–1.2)
BUN SERPL-MCNC: 13 MG/DL (ref 7–25)
BUN/CREAT SERPL: 19 (CALC) (ref 6–22)
CALCIUM SERPL-MCNC: 9.6 MG/DL (ref 8.6–10.3)
CHLORIDE SERPL-SCNC: 104 MMOL/L (ref 98–110)
CHOLEST SERPL-MCNC: 153 MG/DL
CHOLEST/HDLC SERPL: 2.4 (CALC)
CO2 SERPL-SCNC: 25 MMOL/L (ref 20–32)
CREAT SERPL-MCNC: 0.69 MG/DL (ref 0.7–1.35)
GFR/BSA.PRED SERPLBLD CYS-BASED-ARV: 100 ML/MIN/1.73M2
GLOBULIN SER CALC-MCNC: 2.3 G/DL (CALC) (ref 1.9–3.7)
GLUCOSE SERPL-MCNC: 97 MG/DL (ref 65–99)
HDLC SERPL-MCNC: 64 MG/DL
LDLC SERPL CALC-MCNC: 76 MG/DL (CALC)
NONHDLC SERPL-MCNC: 89 MG/DL (CALC)
POTASSIUM SERPL-SCNC: 4.3 MMOL/L (ref 3.5–5.3)
PROT SERPL-MCNC: 6.8 G/DL (ref 6.1–8.1)
SODIUM SERPL-SCNC: 137 MMOL/L (ref 135–146)
TRIGL SERPL-MCNC: 45 MG/DL

## 2022-09-19 ENCOUNTER — OFFICE VISIT (OUTPATIENT)
Dept: INTERNAL MEDICINE CLINIC | Facility: CLINIC | Age: 67
End: 2022-09-19
Payer: COMMERCIAL

## 2022-09-19 VITALS
WEIGHT: 151.2 LBS | RESPIRATION RATE: 16 BRPM | OXYGEN SATURATION: 97 % | BODY MASS INDEX: 23.73 KG/M2 | DIASTOLIC BLOOD PRESSURE: 78 MMHG | HEART RATE: 67 BPM | SYSTOLIC BLOOD PRESSURE: 140 MMHG | HEIGHT: 67 IN

## 2022-09-19 DIAGNOSIS — I10 BENIGN ESSENTIAL HYPERTENSION: ICD-10-CM

## 2022-09-19 DIAGNOSIS — Z23 NEEDS FLU SHOT: Primary | ICD-10-CM

## 2022-09-19 DIAGNOSIS — F41.9 ANXIETY: ICD-10-CM

## 2022-09-19 DIAGNOSIS — R53.83 FATIGUE, UNSPECIFIED TYPE: ICD-10-CM

## 2022-09-19 PROCEDURE — 99213 OFFICE O/P EST LOW 20 MIN: CPT | Performed by: INTERNAL MEDICINE

## 2022-09-19 PROCEDURE — 3078F DIAST BP <80 MM HG: CPT | Performed by: INTERNAL MEDICINE

## 2022-09-19 PROCEDURE — 90662 IIV NO PRSV INCREASED AG IM: CPT | Performed by: INTERNAL MEDICINE

## 2022-09-19 PROCEDURE — 3077F SYST BP >= 140 MM HG: CPT | Performed by: INTERNAL MEDICINE

## 2022-09-19 PROCEDURE — G0008 ADMIN INFLUENZA VIRUS VAC: HCPCS | Performed by: INTERNAL MEDICINE

## 2022-09-19 PROCEDURE — 1160F RVW MEDS BY RX/DR IN RCRD: CPT | Performed by: INTERNAL MEDICINE

## 2022-09-19 NOTE — PATIENT INSTRUCTIONS
Please take Lisinopril 20 mg once a day  Check blood pressure daily  If the top number is >140 or the bottom number is > 90, give the office a call

## 2022-09-19 NOTE — ASSESSMENT & PLAN NOTE
· Elevated BP during office visits but home BP log with '2-120's   +whitecoat hypertension  · Reports lightheadedness occasionally, no syncope or falls  · Currently taking Lisinopril 20 mg bid and Toprol XL 25 mg daily  · Instructed to decrease Lisinopril dose to 20 mg once daily and continue Metoprolol  · Continue to keep BP log

## 2022-09-19 NOTE — PROGRESS NOTES
Assessment/Plan:    HTN (hypertension)  · Elevated BP during office visits but home BP log with '2-120's  +whitecoat hypertension  · Reports lightheadedness occasionally, no syncope or falls  · Currently taking Lisinopril 20 mg bid and Toprol XL 25 mg daily  · Instructed to decrease Lisinopril dose to 20 mg once daily and continue Metoprolol  · Continue to keep BP log    Anxiety  · Controlled  · Buspar 5 mg prn  · Has been trying breathing techniques and other stress-relieving techniques    Fatigue  · Started with COVID 19 infection, however improving  · Encouraged to stay active         Problem List Items Addressed This Visit        Cardiovascular and Mediastinum    HTN (hypertension)     · Elevated BP during office visits but home BP log with '2-120's  +whitecoat hypertension  · Reports lightheadedness occasionally, no syncope or falls  · Currently taking Lisinopril 20 mg bid and Toprol XL 25 mg daily  · Instructed to decrease Lisinopril dose to 20 mg once daily and continue Metoprolol  · Continue to keep BP log            Other    Anxiety     · Controlled  · Buspar 5 mg prn  · Has been trying breathing techniques and other stress-relieving techniques         Fatigue     · Started with COVID 19 infection, however improving  · Encouraged to stay active           Other Visit Diagnoses     Needs flu shot    -  Primary    Relevant Orders    influenza vaccine, high-dose, PF 0 7 mL (FLUZONE HIGH-DOSE) (Completed)            Subjective:      Patient ID: Melissa Jimenez is a 69 y o  male  Patient is a 58-year-old male with history of hypertension and anxiety who presents for a one-month follow-up  He was last seen 8/15/22 at which time he reported wheezing, fatigue and persistent loss of smell since COVID infection the week prior  Outpatient chest x-ray was ordered and was normal   He was ordered p r n  albuterol and Mucinex    While in the office, he was noted to have elevated blood pressures so he was asked to keep a blood pressure log at home  He presents today in no acute distress  Reports persistent fatigue however states that he has no further shortness of breath, wheezing or cough  He is not using his albuterol inhaler  Taste and smell are slowly coming back  He reports occasional lightheadedness when standing up from sitting position too abruptly  Currently states that he is taking lisinopril 20 mg twice a day and metoprolol 25 mg daily  On review of blood pressure log, noted BP range of 110-120's/60's-70's at home  Endorses good mood and good sleep  He has occasional anxiety however has not had to take Buspar recently  Denies abdominal pain, nausea, vomiting, diarrhea, fever or chills  The following portions of the patient's history were reviewed and updated as appropriate: allergies, current medications, past family history, past medical history, past social history, past surgical history and problem list     Review of Systems   Constitutional: Positive for fatigue  Negative for chills and fever  Respiratory: Negative for cough and shortness of breath  Cardiovascular: Negative for chest pain, palpitations and leg swelling  Gastrointestinal: Negative for abdominal pain, diarrhea, nausea and vomiting  Neurological: Positive for light-headedness  Negative for syncope, weakness and headaches  Objective:    Return in about 6 months (around 3/19/2023) for Next scheduled follow up  No results found        Allergies   Allergen Reactions    Seasonal Ic  [Cholestatin]        Past Medical History:   Diagnosis Date    Asthma     Hypertension     Impaired fasting glucose     Mitral valve disorder     Mitral valve prolapse     Murmur, cardiac     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     PAC (premature atrial contraction)     PVC (premature ventricular contraction)     Thyroid nodule      Past Surgical History:   Procedure Laterality Date    HAND SURGERY  MD COLONOSCOPY FLX DX W/COLLJ SPEC WHEN PFRMD N/A 2/9/2018    Procedure: COLONOSCOPY;  Surgeon: Marie Forman MD;  [facility]; Service: Gastroenterology    PROSTATE BIOPSY      SHOULDER SURGERY       Current Outpatient Medications on File Prior to Visit   Medication Sig Dispense Refill    albuterol (ProAir HFA) 90 mcg/act inhaler Inhale 2 puffs every 6 (six) hours as needed for wheezing 1 g 0    aspirin 81 mg chewable tablet Chew 1 tablet every other day      busPIRone (BUSPAR) 5 mg tablet Take 1 tablet (5 mg total) by mouth daily as needed (Anxiety) 30 tablet 1    Coenzyme Q10-Red Yeast Rice  MG CAPS Take 1 capsule by mouth daily      Docosahexaenoic Acid (DHA OMEGA 3) 100 MG CAPS Take 6 capsules by mouth daily      fexofenadine (ALLEGRA) 180 MG tablet Take 1 tablet by mouth daily as needed      lidocaine (LMX) 4 % cream Apply topically as needed for mild pain 30 g 0    lidocaine (XYLOCAINE) 5 % ointment Apply topically as needed for mild pain 35 44 g 0    lisinopril (ZESTRIL) 20 mg tablet Take 1 tablet (20 mg total) by mouth daily Twice a day 180 tablet 3    meclizine (ANTIVERT) 25 mg tablet Take 1 tablet (25 mg total) by mouth every 8 (eight) hours 30 tablet 0    methocarbamol (ROBAXIN) 500 mg tablet Take 1 tablet (500 mg total) by mouth 4 (four) times a day as needed for muscle spasms 30 tablet 0    metoprolol succinate (TOPROL-XL) 25 mg 24 hr tablet Take 1 tablet (25 mg total) by mouth daily 90 tablet 3    Misc Natural Products (PROSTATE HEALTH) CAPS Take 1 capsule by mouth daily      Multiple Vitamin tablet Take 1 tablet by mouth daily       No current facility-administered medications on file prior to visit       Family History   Problem Relation Age of Onset    Diabetes Sister     Other Family         Stroke syndrome     Social History     Socioeconomic History    Marital status: /Civil Union     Spouse name: Not on file    Number of children: Not on file    Years of education: Not on file    Highest education level: Not on file   Occupational History    Not on file   Tobacco Use    Smoking status: Never Smoker    Smokeless tobacco: Never Used   Vaping Use    Vaping Use: Never used   Substance and Sexual Activity    Alcohol use: Yes     Comment: very little    Drug use: No    Sexual activity: Yes   Other Topics Concern    Not on file   Social History Narrative    Not on file     Social Determinants of Health     Financial Resource Strain: Low Risk     Difficulty of Paying Living Expenses: Not very hard   Food Insecurity: Not on file   Transportation Needs: No Transportation Needs    Lack of Transportation (Medical): No    Lack of Transportation (Non-Medical):  No   Physical Activity: Not on file   Stress: Not on file   Social Connections: Not on file   Intimate Partner Violence: Not on file   Housing Stability: Not on file     Vitals:    09/19/22 1400   BP: 140/78   Pulse: 67   Resp: 16   SpO2: 97%   Weight: 68 6 kg (151 lb 3 2 oz)   Height: 5' 7" (1 702 m)     Results for orders placed or performed in visit on 09/17/22   Lipid Panel with Direct LDL reflex   Result Value Ref Range    Total Cholesterol 153 <200 mg/dL    HDL 64 > OR = 40 mg/dL    Triglycerides 45 <150 mg/dL    LDL Calculated 76 mg/dL (calc)    Chol HDLC Ratio 2 4 <5 0 (calc)    Non-HDL Cholesterol 89 <130 mg/dL (calc)   Comprehensive metabolic panel   Result Value Ref Range    Glucose, Random 97 65 - 99 mg/dL    BUN 13 7 - 25 mg/dL    Creatinine 0 69 (L) 0 70 - 1 35 mg/dL    eGFR 100 > OR = 60 mL/min/1 73m2    SL AMB BUN/CREATININE RATIO 19 6 - 22 (calc)    Sodium 137 135 - 146 mmol/L    Potassium 4 3 3 5 - 5 3 mmol/L    Chloride 104 98 - 110 mmol/L    CO2 25 20 - 32 mmol/L    Calcium 9 6 8 6 - 10 3 mg/dL    Protein, Total 6 8 6 1 - 8 1 g/dL    Albumin 4 5 3 6 - 5 1 g/dL    Globulin 2 3 1 9 - 3 7 g/dL (calc)    Albumin/Globulin Ratio 2 0 1 0 - 2 5 (calc)    TOTAL BILIRUBIN 1 4 (H) 0 2 - 1 2 mg/dL    Alkaline Phosphatase 51 35 - 144 U/L    AST 25 10 - 35 U/L    ALT 25 9 - 46 U/L     Weight (last 2 days)     Date/Time Weight    09/19/22 1400 68 6 (151 2)        Body mass index is 23 68 kg/m²  BP      Temp      Pulse     Resp      SpO2        Vitals:    09/19/22 1400   Weight: 68 6 kg (151 lb 3 2 oz)     Vitals:    09/19/22 1400   Weight: 68 6 kg (151 lb 3 2 oz)       /78   Pulse 67   Resp 16   Ht 5' 7" (1 702 m)   Wt 68 6 kg (151 lb 3 2 oz)   SpO2 97%   BMI 23 68 kg/m²          Physical Exam  Vitals reviewed  Constitutional:       General: He is not in acute distress  Appearance: He is well-developed  He is not ill-appearing  HENT:      Head: Normocephalic and atraumatic  Eyes:      Extraocular Movements: Extraocular movements intact  Conjunctiva/sclera: Conjunctivae normal    Cardiovascular:      Rate and Rhythm: Normal rate and regular rhythm  Pulses: Normal pulses  Heart sounds: Normal heart sounds  Pulmonary:      Effort: Pulmonary effort is normal  No respiratory distress  Breath sounds: Normal breath sounds  No wheezing  Abdominal:      General: Bowel sounds are normal  There is no distension  Palpations: Abdomen is soft  Tenderness: There is no abdominal tenderness  Musculoskeletal:         General: No swelling or tenderness  Normal range of motion  Cervical back: Normal range of motion and neck supple  Skin:     General: Skin is warm and dry  Neurological:      General: No focal deficit present  Mental Status: He is alert and oriented to person, place, and time

## 2022-09-19 NOTE — ASSESSMENT & PLAN NOTE
· Controlled  · Buspar 5 mg prn  · Has been trying breathing techniques and other stress-relieving techniques

## 2022-09-23 ENCOUNTER — ESTABLISHED COMPREHENSIVE EXAM (OUTPATIENT)
Dept: URBAN - METROPOLITAN AREA CLINIC 6 | Facility: CLINIC | Age: 67
End: 2022-09-23

## 2022-09-23 DIAGNOSIS — H02.831: ICD-10-CM

## 2022-09-23 DIAGNOSIS — H02.834: ICD-10-CM

## 2022-09-23 DIAGNOSIS — H04.123: ICD-10-CM

## 2022-09-23 DIAGNOSIS — H25.13: ICD-10-CM

## 2022-09-23 PROCEDURE — 92012 INTRM OPH EXAM EST PATIENT: CPT

## 2022-09-23 PROCEDURE — 92020 GONIOSCOPY: CPT | Mod: NC

## 2022-09-23 ASSESSMENT — TONOMETRY
OD_IOP_MMHG: 17
OS_IOP_MMHG: 15

## 2022-09-23 ASSESSMENT — VISUAL ACUITY
OD_CC: 20/25
OS_CC: 20/25-2
OU_CC: J1+

## 2022-09-26 ENCOUNTER — TELEPHONE (OUTPATIENT)
Dept: INTERNAL MEDICINE CLINIC | Facility: CLINIC | Age: 67
End: 2022-09-26

## 2022-09-26 NOTE — TELEPHONE ENCOUNTER
Pt calling in stating he was told to reduce his lisinopril from twice daily to once, since reducing his BP was 140/75 - he is going to go back up to twice daily

## 2022-10-11 PROBLEM — J01.00 ACUTE NON-RECURRENT MAXILLARY SINUSITIS: Status: RESOLVED | Noted: 2022-05-13 | Resolved: 2022-10-11

## 2022-10-12 DIAGNOSIS — M25.511 CHRONIC PAIN OF BOTH SHOULDERS: ICD-10-CM

## 2022-10-12 DIAGNOSIS — M25.512 CHRONIC PAIN OF BOTH SHOULDERS: ICD-10-CM

## 2022-10-12 DIAGNOSIS — G89.29 CHRONIC PAIN OF BOTH SHOULDERS: ICD-10-CM

## 2022-10-12 RX ORDER — LIDOCAINE 50 MG/G
OINTMENT TOPICAL AS NEEDED
Qty: 35.44 G | Refills: 3 | Status: SHIPPED | OUTPATIENT
Start: 2022-10-12

## 2022-11-23 ENCOUNTER — OFFICE VISIT (OUTPATIENT)
Dept: INTERNAL MEDICINE CLINIC | Facility: CLINIC | Age: 67
End: 2022-11-23

## 2022-11-23 ENCOUNTER — NURSE TRIAGE (OUTPATIENT)
Dept: OTHER | Facility: OTHER | Age: 67
End: 2022-11-23

## 2022-11-23 VITALS
OXYGEN SATURATION: 98 % | DIASTOLIC BLOOD PRESSURE: 80 MMHG | HEIGHT: 67 IN | WEIGHT: 153.2 LBS | TEMPERATURE: 96.6 F | HEART RATE: 66 BPM | SYSTOLIC BLOOD PRESSURE: 160 MMHG | BODY MASS INDEX: 24.04 KG/M2

## 2022-11-23 DIAGNOSIS — M62.81 MUSCLE WEAKNESS: ICD-10-CM

## 2022-11-23 DIAGNOSIS — H10.9 BACTERIAL CONJUNCTIVITIS: Primary | ICD-10-CM

## 2022-11-23 DIAGNOSIS — R53.83 FATIGUE, UNSPECIFIED TYPE: ICD-10-CM

## 2022-11-23 DIAGNOSIS — I10 PRIMARY HYPERTENSION: ICD-10-CM

## 2022-11-23 RX ORDER — OFLOXACIN 3 MG/ML
1 SOLUTION/ DROPS OPHTHALMIC 4 TIMES DAILY
Qty: 5 ML | Refills: 0 | Status: SHIPPED | OUTPATIENT
Start: 2022-11-23

## 2022-11-23 NOTE — ASSESSMENT & PLAN NOTE
Chronic  Had several visits with pcp on this before  Reviewed previous notes  Had work ups ruled out common medical causes of fatigue  Has appointment with neurology  Due for colonoscopy

## 2022-11-23 NOTE — ASSESSMENT & PLAN NOTE
Component of white coat HTN  Check BP at home daily usually runs 148-550 systolic  He has brought machine to office and reading is consistent with office reading     In office today BP is elevated likely 2/2 white coat htn  Continue current regimen

## 2022-11-23 NOTE — PROGRESS NOTES
Name: Javi Chamorro      : 1955      MRN: [id number]  Encounter Provider: Nely Horvath MD  Encounter Date: 2022   Encounter department: [facility]    Assessment & Plan     1  Bacterial conjunctivitis  Assessment & Plan:  Start abx eye drop    Orders:  -     ofloxacin (OCUFLOX) 0 3 % ophthalmic solution; Administer 1 drop to the right eye 4 (four) times a day    2  Primary hypertension  Assessment & Plan:  Component of white coat HTN  Check BP at home daily usually runs 311-038 systolic  He has brought machine to office and reading is consistent with office reading  In office today BP is elevated likely 2/2 white coat htn  Continue current regimen      3  Fatigue, unspecified type  Assessment & Plan:  Chronic  Had several visits with pcp on this before  Reviewed previous notes  Had work ups ruled out common medical causes of fatigue  Has appointment with neurology  Due for colonoscopy  4  Muscle weakness  Assessment & Plan:  Chronic  Affecting his daily activity  Has appointment with neurology  Subjective      HPI    From MA: "Eye Drainage (Right eye draining, red feel weakness, fatigue started yesterday with eye redness   Fatigue has been going on for a while)  "  Fatigue, muscle weakness, vertigo  All ongoing    BP running high in office    Review of Systems    Current Outpatient Medications on File Prior to Visit   Medication Sig   • albuterol (ProAir HFA) 90 mcg/act inhaler Inhale 2 puffs every 6 (six) hours as needed for wheezing   • aspirin 81 mg chewable tablet Chew 1 tablet every other day   • busPIRone (BUSPAR) 5 mg tablet Take 1 tablet (5 mg total) by mouth daily as needed (Anxiety)   • Coenzyme Q10-Red Yeast Rice  MG CAPS Take 1 capsule by mouth daily   • Docosahexaenoic Acid (DHA OMEGA 3) 100 MG CAPS Take 6 capsules by mouth daily   • fexofenadine (ALLEGRA) 180 MG tablet Take 1 tablet by mouth daily as needed   • lidocaine (LMX) 4 % cream Apply topically as needed for mild pain   • lidocaine (XYLOCAINE) 5 % ointment Apply topically as needed for mild pain   • lisinopril (ZESTRIL) 20 mg tablet Take 1 tablet (20 mg total) by mouth daily Twice a day   • metoprolol succinate (TOPROL-XL) 25 mg 24 hr tablet Take 1 tablet (25 mg total) by mouth daily   • Misc Natural Products (PROSTATE HEALTH) CAPS Take 1 capsule by mouth daily   • Multiple Vitamin tablet Take 1 tablet by mouth daily   • meclizine (ANTIVERT) 25 mg tablet Take 1 tablet (25 mg total) by mouth every 8 (eight) hours (Patient not taking: Reported on 11/23/2022)   • methocarbamol (ROBAXIN) 500 mg tablet Take 1 tablet (500 mg total) by mouth 4 (four) times a day as needed for muscle spasms (Patient not taking: Reported on 11/23/2022)       Objective     /80   Pulse 66   Temp (!) 96 6 °F (35 9 °C) (Probe)   Ht 5' 7" (1 702 m)   Wt 69 5 kg (153 lb 3 2 oz)   SpO2 98%   BMI 23 99 kg/m²     Physical Exam  Kade White MD

## 2022-11-23 NOTE — TELEPHONE ENCOUNTER
Regarding: Weak, Off Balance, Right Eye is Red and Swollen    ----- Message from Mychal Wood sent at 11/23/2022  7:02 AM EST -----  " I have been off balance and feeling weak, my right eye is red and swollen, the white area is red and irritated with Crust  I'd like to see my Doctor "

## 2022-11-23 NOTE — TELEPHONE ENCOUNTER
Patient reports redness and drainage in his right eye along with weakness, fatigue, vertigo, and chills  He denies fever  Patient would like to be seen today in the office  Appointment made for 0845  Reason for Disposition  • Patient wants to be seen    Answer Assessment - Initial Assessment Questions  1  EYE DISCHARGE: "Is the discharge in one or both eyes?" "What color is it?" "How much is there?" "When did the discharge start?"       R eye   2  REDNESS OF SCLERA: "Is the redness in one or both eyes?" "When did the redness start?"       Yes  3  EYELIDS: "Are the eyelids red or swollen?" If Yes, ask: "How much?"       Denies   4  VISION: "Is there any difficulty seeing clearly?"       Denies   5  PAIN: "Is there any pain? If Yes, ask: "How bad is it?" (Scale 1-10; or mild, moderate, severe)     - MILD (1-3): doesn't interfere with normal activities      - MODERATE (4-7): interferes with normal activities or awakens from sleep     - SEVERE (8-10): excruciating pain, unable to do any normal activities        Denies   6  CONTACT LENS: "Do you wear contacts?"      Denies   7  OTHER SYMPTOMS: "Do you have any other symptoms?" (e g , fever, runny nose, cough)      Weakness/fatigue/vertigo/chills   8   PREGNANCY: "Is there any chance you are pregnant?" "When was your last menstrual period?"      Denies    Protocols used: EYE - PUS OR DISCHARGE-ADULT-OH

## 2022-11-23 NOTE — PATIENT INSTRUCTIONS
Start eye drops  If no improvement after 7 days, follow up with ophthalmologist      Schedule your colonoscopy

## 2022-11-25 ENCOUNTER — TELEPHONE (OUTPATIENT)
Dept: NEUROLOGY | Facility: CLINIC | Age: 67
End: 2022-11-25

## 2022-11-25 NOTE — TELEPHONE ENCOUNTER
Spoke to patient and confirmed his 11/29/2022 appointment with Dr Corry Oakley at the [facility]

## 2022-11-29 ENCOUNTER — FOLLOW UP (OUTPATIENT)
Dept: URBAN - METROPOLITAN AREA CLINIC 6 | Facility: CLINIC | Age: 67
End: 2022-11-29

## 2022-11-29 ENCOUNTER — CONSULT (OUTPATIENT)
Dept: NEUROLOGY | Facility: CLINIC | Age: 67
End: 2022-11-29

## 2022-11-29 VITALS
HEIGHT: 67 IN | SYSTOLIC BLOOD PRESSURE: 178 MMHG | BODY MASS INDEX: 24.14 KG/M2 | WEIGHT: 153.8 LBS | DIASTOLIC BLOOD PRESSURE: 82 MMHG

## 2022-11-29 DIAGNOSIS — G62.9 POLYNEUROPATHY: Primary | ICD-10-CM

## 2022-11-29 DIAGNOSIS — H04.123: ICD-10-CM

## 2022-11-29 DIAGNOSIS — R26.89 IMBALANCE: ICD-10-CM

## 2022-11-29 DIAGNOSIS — R42 DIZZINESS: ICD-10-CM

## 2022-11-29 PROCEDURE — 92012 INTRM OPH EXAM EST PATIENT: CPT

## 2022-11-29 ASSESSMENT — VISUAL ACUITY
OD_CC: 20/25
OS_CC: 20/20-1

## 2022-11-29 ASSESSMENT — TONOMETRY
OS_IOP_MMHG: 16
OD_IOP_MMHG: 22

## 2022-11-29 NOTE — PROGRESS NOTES
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  - Referral to Physical therapy for balance and dizziness    Follow up in 4-5 months    Dizziness  Dizziness especially when he closes his eyes  Mic Hernandez was negative  -Recommend continue taking Meclizine as needed prescribed by PCP  -Will refer physical therapy evaluation for vestibular therapy     Imbalance  Balance issues especially at night or in a darker environment secondary to neuropathy in his feet  Plan same as above  Diagnoses and all orders for this visit:    Polyneuropathy  -     Vitamin B1, whole blood; Future  -     Vitamin B6; Future  -     Methylmalonic acid, serum; Future  -     Sedimentation rate, automated; Future  -     Protein electrophoresis, serum; Future  -     LUZMARIA w/Reflex if Positive; Future  -     Lyme Total Antibody Profile with reflex to WB; Future  -     Sjogren's Antibodies; Future  -     Heavy metals screen; Future  -     Magnesium; Future  -     Phosphorus; Future  -     C-reactive protein; Future  -     Ambulatory Referral to Physical Therapy; Future  -     EMG 1 Upper/1 Lower Neuropathy; Future    Dizziness  -     Ambulatory Referral to Neurology  -     Ambulatory Referral to Physical Therapy; Future    Imbalance  -     Ambulatory Referral to Physical Therapy; Future           Subjective:    68 yo M with hypertension is here for initial evaluation of dizziness, generalized weakness, eyes twitching, and balance issues  Symptoms started after 2nd COVID vaccine shot in May 2021  He feels that he has generalized weakness overall  Dizziness occurs especially when he closes his eyes    He describes it as moving motion when he closes his eyes and that causes him to feel dizzy  He also has eyes twitching right side more than left especially when eyes are closed  He has balance issues when the environment is dark or when he uses bathroom at nighttime or when he showers  For dizziness, PCP prescribed meclizine p r n  in which seems to help  He had COVID positive in August 2022  He denies any headache, vision changes, nausea/vomiting, any other neurological deficits  He also has multiple musculoskeletal problems with his upper extremities  He does not smoke  Uses alcohol socially  He does not use drugs  The following portions of the patient's history were reviewed and updated as appropriate: He  has a past medical history of Asthma, Hypertension, Impaired fasting glucose, Mitral valve disorder, Mitral valve prolapse, Murmur, cardiac, 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, PAC (premature atrial contraction), PVC (premature ventricular contraction), and Thyroid nodule    He   Patient Active Problem List    Diagnosis Date Noted   • Polyneuropathy 11/29/2022   • Imbalance 11/29/2022   • Muscle weakness 11/23/2022   • Bacterial conjunctivitis 11/23/2022   • COVID-19 08/08/2022   • Multiple bruises 06/14/2022   • Dizziness 05/13/2022   • Palpitations 03/18/2022   • Seasonal allergies 03/01/2022   • Shoulder pain, left 03/01/2022   • Rupture of right long head biceps tendon 11/08/2021   • Other fatigue 07/26/2021   • Rotator cuff tear arthropathy of left shoulder 07/19/2021   • Fatigue 07/01/2021   • Postural hypotension 05/10/2021   • Rupture of left biceps tendon 05/10/2021   • Anxiety 01/17/2021   • Exposure to COVID-19 virus 07/15/2020   • Medicare annual wellness visit, subsequent 03/05/2019   • Iron deficiency anemia 03/05/2019   • Elevated blood sugar 03/05/2019   • PVC's (premature ventricular contractions) 08/29/2018   • Premature atrial contractions 08/29/2018   • HTN (hypertension) 08/13/2018   • Encounter for hepatitis C screening test for low risk patient 08/13/2018   • Need for pneumococcal vaccination 08/13/2018   • 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 12/30/2013   • Allergic rhinitis 12/03/2013     He  has a past surgical history that includes Hand surgery; Shoulder surgery; Prostate biopsy; and pr colonoscopy flx dx w/collj spec when pfrmd (N/A, 2/9/2018)  His family history includes Diabetes in his sister; Other in his family  He  reports that he has never smoked  He has never used smokeless tobacco  He reports current alcohol use  He reports that he does not use drugs    Current Outpatient Medications   Medication Sig Dispense Refill   • albuterol (ProAir HFA) 90 mcg/act inhaler Inhale 2 puffs every 6 (six) hours as needed for wheezing 1 g 0   • aspirin 81 mg chewable tablet Chew 1 tablet every other day     • busPIRone (BUSPAR) 5 mg tablet Take 1 tablet (5 mg total) by mouth daily as needed (Anxiety) 30 tablet 1   • Coenzyme Q10-Red Yeast Rice  MG CAPS Take 1 capsule by mouth daily     • Docosahexaenoic Acid (DHA OMEGA 3) 100 MG CAPS Take 6 capsules by mouth daily     • fexofenadine (ALLEGRA) 180 MG tablet Take 1 tablet by mouth daily as needed     • lidocaine (XYLOCAINE) 5 % ointment Apply topically as needed for mild pain 35 44 g 3   • lisinopril (ZESTRIL) 20 mg tablet Take 1 tablet (20 mg total) by mouth daily Twice a day 180 tablet 3   • metoprolol succinate (TOPROL-XL) 25 mg 24 hr tablet Take 1 tablet (25 mg total) by mouth daily 90 tablet 3   • Misc Natural Products (PROSTATE HEALTH) CAPS Take 1 capsule by mouth daily     • Multiple Vitamin tablet Take 1 tablet by mouth daily     • ofloxacin (OCUFLOX) 0 3 % ophthalmic solution Administer 1 drop to the right eye 4 (four) times a day 5 mL 0   • lidocaine (LMX) 4 % cream Apply topically as needed for mild pain 30 g 0   • meclizine (ANTIVERT) 25 mg tablet Take 1 tablet (25 mg total) by mouth every 8 (eight) hours (Patient not taking: Reported on 11/23/2022) 30 tablet 0   • methocarbamol (ROBAXIN) 500 mg tablet Take 1 tablet (500 mg total) by mouth 4 (four) times a day as needed for muscle spasms (Patient not taking: Reported on 11/23/2022) 30 tablet 0     No current facility-administered medications for this visit  Current Outpatient Medications on File Prior to Visit   Medication Sig   • albuterol (ProAir HFA) 90 mcg/act inhaler Inhale 2 puffs every 6 (six) hours as needed for wheezing   • aspirin 81 mg chewable tablet Chew 1 tablet every other day   • busPIRone (BUSPAR) 5 mg tablet Take 1 tablet (5 mg total) by mouth daily as needed (Anxiety)   • Coenzyme Q10-Red Yeast Rice  MG CAPS Take 1 capsule by mouth daily   • Docosahexaenoic Acid (DHA OMEGA 3) 100 MG CAPS Take 6 capsules by mouth daily   • fexofenadine (ALLEGRA) 180 MG tablet Take 1 tablet by mouth daily as needed   • lidocaine (XYLOCAINE) 5 % ointment Apply topically as needed for mild pain   • lisinopril (ZESTRIL) 20 mg tablet Take 1 tablet (20 mg total) by mouth daily Twice a day   • metoprolol succinate (TOPROL-XL) 25 mg 24 hr tablet Take 1 tablet (25 mg total) by mouth daily   • Misc Natural Products (PROSTATE HEALTH) CAPS Take 1 capsule by mouth daily   • Multiple Vitamin tablet Take 1 tablet by mouth daily   • ofloxacin (OCUFLOX) 0 3 % ophthalmic solution Administer 1 drop to the right eye 4 (four) times a day   • lidocaine (LMX) 4 % cream Apply topically as needed for mild pain   • meclizine (ANTIVERT) 25 mg tablet Take 1 tablet (25 mg total) by mouth every 8 (eight) hours (Patient not taking: Reported on 11/23/2022)   • methocarbamol (ROBAXIN) 500 mg tablet Take 1 tablet (500 mg total) by mouth 4 (four) times a day as needed for muscle spasms (Patient not taking: Reported on 11/23/2022)     No current facility-administered medications on file prior to visit  He has No Known Allergies            Objective:    Blood pressure (!) 178/82, height 5' 7" (1 702 m), weight 69 8 kg (153 lb 12 8 oz)  Physical Exam  Constitutional:       General: He is not in acute distress  Appearance: He is normal weight  HENT:      Head: Normocephalic and atraumatic  Nose: Nose normal       Mouth/Throat:      Mouth: Mucous membranes are moist       Pharynx: Oropharynx is clear  No oropharyngeal exudate or posterior oropharyngeal erythema  Eyes:      General: Lids are normal       Extraocular Movements: Extraocular movements intact  Pupils: Pupils are equal, round, and reactive to light  Cardiovascular:      Rate and Rhythm: Normal rate  Pulses: Normal pulses  Pulmonary:      Effort: Pulmonary effort is normal  No respiratory distress  Musculoskeletal:      Cervical back: Normal range of motion  Skin:     General: Skin is warm and dry  Neurological:      General: No focal deficit present  Mental Status: He is alert and oriented to person, place, and time  Motor: Motor strength is normal       Coordination: Romberg sign negative  Deep Tendon Reflexes:      Reflex Scores:       Tricep reflexes are 2+ on the right side and 2+ on the left side  Bicep reflexes are 2+ on the right side and 2+ on the left side  Brachioradialis reflexes are 2+ on the right side and 2+ on the left side  Patellar reflexes are 2+ on the right side and 2+ on the left side  Achilles reflexes are 2+ on the right side and 2+ on the left side  Psychiatric:         Mood and Affect: Mood normal          Speech: Speech normal          Thought Content: Thought content normal          Judgment: Judgment normal          Neurological Exam  Mental Status  Alert  Oriented to person, place and time  Oriented to person, place, and time  Speech is normal  Language is fluent with no aphasia  Cranial Nerves  CN II: Visual acuity is normal  Visual fields full to confrontation  Right funduscopic exam: not visualized   Left funduscopic exam: not visualized  CN III, IV, VI: Extraocular movements intact bilaterally  Normal lids and orbits bilaterally  Pupils equal round and reactive to light bilaterally  CN V: Facial sensation is normal   CN VII: Full and symmetric facial movement  CN VIII: Hearing is normal   CN IX, X: Palate elevates symmetrically  CN XI: Shoulder shrug strength is normal   CN XII: Tongue midline without atrophy or fasciculations  Motor  Normal muscle bulk throughout  No fasciculations present  Normal muscle tone  No abnormal involuntary movements  Strength is 5/5 throughout all four extremities  Sensory  Light touch is normal in upper and lower extremities  Pinprick abnormality: Left dorsal hand has reduced sensation due to prior major surgery    Temperature abnormality: Reduced below mid foot bilaterally  Vibration abnormality: 4 seconds on the toes bilaterally  Proprioception is normal in upper and lower extremities  Reflexes                                            Right                      Left  Brachioradialis                    2+                         2+  Biceps                                 2+                         2+  Triceps                                2+                         2+  Patellar                                2+                         2+  Achilles                                2+                         2+    Coordination  Right: Finger-to-nose normal Left: Finger-to-nose normal     Gait  Casual gait: Stoop  Romberg is absent  ROS:    Review of Systems   Constitutional: Negative for chills and fever  HENT: Negative for ear pain and sore throat  Eyes: Negative for pain and visual disturbance  Respiratory: Negative for cough and shortness of breath  Cardiovascular: Negative for chest pain and palpitations  Gastrointestinal: Negative for abdominal pain and vomiting  Genitourinary: Negative for dysuria and hematuria     Musculoskeletal: Negative for arthralgias and back pain  Skin: Negative for color change and rash  Neurological: Positive for dizziness and weakness  Negative for seizures and syncope  Speech difficulty:     All other systems reviewed and are negative

## 2022-11-29 NOTE — ASSESSMENT & PLAN NOTE
Balance issues especially at night or in a darker environment secondary to neuropathy in his feet  Plan same as above

## 2022-11-29 NOTE — ASSESSMENT & PLAN NOTE
Dizziness especially when he closes his eyes  Johnson Sender was negative      -Recommend continue taking Meclizine as needed prescribed by PCP  -Will refer physical therapy evaluation for vestibular therapy

## 2022-11-29 NOTE — PATIENT INSTRUCTIONS
Please get blood work done    EMG 1upper and 1 lower Neuropathy    Referral to Physical therapy for balance and vestibular therapy if appropriate      Follow up in 4-5 months

## 2022-12-17 LAB
ALBUMIN SERPL ELPH-MCNC: 4.4 G/DL (ref 3.8–4.8)
ALPHA1 GLOB SERPL ELPH-MCNC: 0.2 G/DL (ref 0.2–0.3)
ALPHA2 GLOB SERPL ELPH-MCNC: 0.6 G/DL (ref 0.5–0.9)
ANA SER QL IF: NEGATIVE
ARSENIC BLD-MCNC: 3 MCG/L
B BURGDOR AB SER IA-ACNC: <0.9 INDEX
BETA1 GLOB SERPL ELPH-MCNC: 0.4 G/DL (ref 0.4–0.6)
BETA2 GLOB SERPL ELPH-MCNC: 0.3 G/DL (ref 0.2–0.5)
CRP SERPL-MCNC: 0.2 MG/L
ENA SS-A AB SER IA-ACNC: NORMAL AI
ENA SS-B AB SER IA-ACNC: NORMAL AI
ERYTHROCYTE [SEDIMENTATION RATE] IN BLOOD BY WESTERGREN METHOD: 2 MM/H
GAMMA GLOB SERPL ELPH-MCNC: 0.8 G/DL (ref 0.8–1.7)
LEAD BLD-MCNC: 1.7 MCG/DL
MAGNESIUM SERPL-MCNC: 2.2 MG/DL (ref 1.5–2.5)
MERCURY BLD-MCNC: <4 MCG/L
METHYLMALONATE SERPL-SCNC: 74 NMOL/L (ref 87–318)
PHOSPHATE SERPL-MCNC: 3.1 MG/DL (ref 2.1–4.3)
PROT PATTERN SERPL ELPH-IMP: NORMAL
PROT SERPL-MCNC: 6.6 G/DL (ref 6.1–8.1)
VIT B1 BLD-SCNC: 191 NMOL/L (ref 78–185)

## 2023-01-22 PROBLEM — H10.9 BACTERIAL CONJUNCTIVITIS: Status: RESOLVED | Noted: 2022-11-23 | Resolved: 2023-01-22

## 2023-03-13 ENCOUNTER — OFFICE VISIT (OUTPATIENT)
Dept: INTERNAL MEDICINE CLINIC | Facility: CLINIC | Age: 68
End: 2023-03-13

## 2023-03-13 VITALS
OXYGEN SATURATION: 98 % | BODY MASS INDEX: 23.61 KG/M2 | HEIGHT: 67 IN | RESPIRATION RATE: 16 BRPM | WEIGHT: 150.4 LBS | SYSTOLIC BLOOD PRESSURE: 138 MMHG | HEART RATE: 67 BPM | DIASTOLIC BLOOD PRESSURE: 82 MMHG

## 2023-03-13 DIAGNOSIS — Z13.6 SCREENING FOR CARDIOVASCULAR CONDITION: ICD-10-CM

## 2023-03-13 DIAGNOSIS — M25.511 ACUTE PAIN OF RIGHT SHOULDER: Primary | ICD-10-CM

## 2023-03-13 DIAGNOSIS — Z12.5 SCREENING PSA (PROSTATE SPECIFIC ANTIGEN): ICD-10-CM

## 2023-03-13 NOTE — ASSESSMENT & PLAN NOTE
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 I confirm that the note above accurately reflects all work, treatment, procedures, and medical decision making performed by me.  Avery Diaz MD

## 2023-03-13 NOTE — PROGRESS NOTES
Assessment/Plan:    Acute pain of right shoulder  Suspected reinjury of prior rotator cuff repair rule out tear we will order MRI of the right shoulder we will have patient see orthopedics Dr Jaquelin Felix ice, rest, range of motion exercises Tylenol as directed as needed RTO as scheduled call if any change, worse or symptoms ciro         Problem List Items Addressed This Visit        Other    Acute pain of right shoulder - Primary     Suspected reinjury of prior rotator cuff repair rule out tear we will order MRI of the right shoulder we will have patient see orthopedics Dr Jaquelin Felix ice, rest, range of motion exercises Tylenol as directed as needed RTO as scheduled call if any change, worse or symptoms ciro         Relevant Orders    MRI shoulder right wo contrast    Ambulatory Referral to Orthopedic Surgery   Other Visit Diagnoses     Screening PSA (prostate specific antigen)        Relevant Orders    PSA, Total Screen    Screening for cardiovascular condition        Relevant Orders    Comprehensive metabolic panel    Lipid Panel with Direct LDL reflex          RTO as scheduled call if any problems  Subjective:      Patient ID: Nikky Rider is a 69 y o  male  HPI 64-year-old male, with a chief complaint of right shoulder pain with movement he reports me his symptoms started after lifting a heavy bucket 25 labs , heard snap , no rest pain , right hands , history of bicep ruptue,  Had open repair 2004 rtc complete tear in the past patient reports me the pain is worse with movement  And he has limitation of range of motion    The following portions of the patient's history were reviewed and updated as appropriate: allergies, current medications, past family history, past medical history, past social history, past surgical history and problem list     Review of Systems   Constitutional: Negative for activity change, appetite change and unexpected weight change  Eyes: Negative for visual disturbance  Respiratory: Negative for cough and shortness of breath  Cardiovascular: Negative for chest pain  Gastrointestinal: Negative for abdominal pain, diarrhea, nausea and vomiting  Musculoskeletal:        Right shoulder pain with movement/limited range of motion   Neurological: Negative for dizziness  Objective:    Return if symptoms worsen or fail to improve, for Next scheduled follow up  No results found  No Known Allergies    Past Medical History:   Diagnosis Date   • Asthma    • Hypertension    • Impaired fasting glucose    • Mitral valve disorder    • Mitral valve prolapse    • Murmur, cardiac    • 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    • PAC (premature atrial contraction)    • PVC (premature ventricular contraction)    • Thyroid nodule      Past Surgical History:   Procedure Laterality Date   • HAND SURGERY     • [place] COLONOSCOPY FLX DX W/COLLJ SPEC WHEN PFRMD N/A 2/9/2018    Procedure: COLONOSCOPY;  Surgeon: Ihsan Gomez MD;  Location: [facility];   Service: Gastroenterology   • PROSTATE BIOPSY     • SHOULDER SURGERY       Current Outpatient Medications on File Prior to Visit   Medication Sig Dispense Refill   • albuterol (ProAir HFA) 90 mcg/act inhaler Inhale 2 puffs every 6 (six) hours as needed for wheezing 1 g 0   • aspirin 81 mg chewable tablet Chew 1 tablet every other day     • busPIRone (BUSPAR) 5 mg tablet Take 1 tablet (5 mg total) by mouth daily as needed (Anxiety) 30 tablet 1   • Coenzyme Q10-Red Yeast Rice  MG CAPS Take 1 capsule by mouth daily     • Docosahexaenoic Acid (DHA OMEGA 3) 100 MG CAPS Take 6 capsules by mouth daily     • fexofenadine (ALLEGRA) 180 MG tablet Take 1 tablet by mouth daily as needed     • lidocaine (XYLOCAINE) 5 % ointment Apply topically as needed for mild pain 35 44 g 3   • lisinopril (ZESTRIL) 20 mg tablet Take 1 tablet (20 mg total) by mouth daily Twice a day 180 tablet 3   • meclizine (ANTIVERT) 25 mg tablet Take 1 tablet (25 mg total) by mouth every 8 (eight) hours (Patient not taking: Reported on 11/23/2022) 30 tablet 0   • methocarbamol (ROBAXIN) 500 mg tablet Take 1 tablet (500 mg total) by mouth 4 (four) times a day as needed for muscle spasms (Patient not taking: Reported on 11/23/2022) 30 tablet 0   • metoprolol succinate (TOPROL-XL) 25 mg 24 hr tablet Take 1 tablet (25 mg total) by mouth daily 90 tablet 3   • Misc Natural Products (PROSTATE HEALTH) CAPS Take 1 capsule by mouth daily     • Multiple Vitamin tablet Take 1 tablet by mouth daily       No current facility-administered medications on file prior to visit  Family History   Problem Relation Age of Onset   • Diabetes Sister    • Other Family         Stroke syndrome     Social History     Socioeconomic History   • Marital status: /Civil Union     Spouse name: Not on file   • Number of children: Not on file   • Years of education: Not on file   • Highest education level: Not on file   Occupational History   • Not on file   Tobacco Use   • Smoking status: Never   • Smokeless tobacco: Never   Vaping Use   • Vaping Use: Never used   Substance and Sexual Activity   • Alcohol use: Yes     Comment: very little   • Drug use: No   • Sexual activity: Yes   Other Topics Concern   • Not on file   Social History Narrative   • Not on file     Social Determinants of Health     Financial Resource Strain: Low Risk    • Difficulty of Paying Living Expenses: Not very hard   Food Insecurity: Not on file   Transportation Needs: No Transportation Needs   • Lack of Transportation (Medical): No   • Lack of Transportation (Non-Medical):  No   Physical Activity: Not on file   Stress: Not on file   Social Connections: Not on file   Intimate Partner Violence: Not on file   Housing Stability: Not on file     Vitals:    03/13/23 1002   BP: 138/82   Pulse: 67   Resp: 16   SpO2: 98%   Weight: 68 2 kg (150 lb 6 4 oz)   Height: 5' 7" (1 702 m)     Results for orders placed or performed in visit on 12/10/22   Protein electrophoresis, serum   Result Value Ref Range    Protein, Total 6 6 6 1 - 8 1 g/dL    Albumin, Electrophoresis 4 4 3 8 - 4 8 g/dL    Alpha-1 Globulin 0 2 0 2 - 0 3 g/dL    Alpha-2 Globulin 0 6 0 5 - 0 9 g/dL    Beta 1 Globulin 0 4 0 4 - 0 6 g/dL    Beta 2 Globulin 0 3 0 2 - 0 5 g/dL    Gamma Globulin 0 8 0 8 - 1 7 g/dL    Interpretation Normal Electrophoretic Pattern    Heavy metals, blood   Result Value Ref Range    Arsenic 3 <23 mcg/L    Mercury <4 <=10 mcg/L    Lead 1 7 <3 5 mcg/dL   Magnesium   Result Value Ref Range    Magnesium, Serum 2 2 1 5 - 2 5 mg/dL   Phosphorus   Result Value Ref Range    Phosphorus, Serum 3 1 2 1 - 4 3 mg/dL   Vitamin B1, whole blood   Result Value Ref Range    Vitamin B1, Whole Blood 191 (H) 78 - 185 nmol/L   Methylmalonic acid, serum   Result Value Ref Range    Methylmalonic Acid, S 74 (L) 87 - 318 nmol/L   Sedimentation rate, automated   Result Value Ref Range    Sedimentation Rate 2 < OR = 20 mm/h   Lyme Total Antibody Profile with reflex to WB   Result Value Ref Range    SL AMB LYME AB SCREEN <0 90 index   LUZMARIA Screen w/ Reflex to Titer/Pattern   Result Value Ref Range    LUZMARIA Screen, IFA NEGATIVE NEGATIVE   C-reactive protein   Result Value Ref Range    C-Reactive Protein, Quant 0 2 <8 0 mg/L   Sjogren's Antibodies   Result Value Ref Range    Sjogren's Antibody (SS-A) <1 0 NEG <1 0 NEG AI    Sjogren's Antibody (SS-B) <1 0 NEG <1 0 NEG AI     Weight (last 2 days)     None        Body mass index is 23 56 kg/m²  BP      Temp      Pulse     Resp      SpO2        Vitals:    03/13/23 1002   Weight: 68 2 kg (150 lb 6 4 oz)     Vitals:    03/13/23 1002   Weight: 68 2 kg (150 lb 6 4 oz)       /82   Pulse 67   Resp 16   Ht 5' 7" (1 702 m)   Wt 68 2 kg (150 lb 6 4 oz)   SpO2 98%   BMI 23 56 kg/m²          Physical Exam  Constitutional:       General: He is not in acute distress  Appearance: He is well-developed  He is not diaphoretic     HENT:      Head: Normocephalic and atraumatic  Right Ear: External ear normal       Left Ear: External ear normal    Eyes:      General: No scleral icterus  Right eye: No discharge  Left eye: No discharge  Conjunctiva/sclera: Conjunctivae normal       Pupils: Pupils are equal, round, and reactive to light  Cardiovascular:      Rate and Rhythm: Normal rate and regular rhythm  Heart sounds: Normal heart sounds  No murmur heard  No friction rub  No gallop  Pulmonary:      Effort: No respiratory distress  Breath sounds: No wheezing or rales  Abdominal:      Palpations: Abdomen is soft  Musculoskeletal:         General: No deformity  Cervical back: Neck supple  Lymphadenopathy:      Cervical: No cervical adenopathy  Neurological:      Mental Status: He is alert         Examination of the right shoulder pain with internal/external rotation decreased range of motion abduction, internal/external rotation

## 2023-03-17 DIAGNOSIS — M25.512 ACUTE PAIN OF LEFT SHOULDER: Primary | ICD-10-CM

## 2023-03-17 RX ORDER — NAPROXEN 500 MG/1
500 TABLET ORAL 2 TIMES DAILY WITH MEALS
Qty: 30 TABLET | Refills: 0 | Status: SHIPPED | OUTPATIENT
Start: 2023-03-17

## 2023-03-19 PROBLEM — M25.511 ACUTE PAIN OF RIGHT SHOULDER: Status: ACTIVE | Noted: 2023-03-19

## 2023-03-19 NOTE — ASSESSMENT & PLAN NOTE
Suspected reinjury of prior rotator cuff repair rule out tear we will order MRI of the right shoulder we will have patient see orthopedics Dr Sarah Birmingham ice, rest, range of motion exercises Tylenol as directed as needed RTO as scheduled call if any change, worse or symptoms ciro

## 2023-03-23 ENCOUNTER — HOSPITAL ENCOUNTER (OUTPATIENT)
Dept: RADIOLOGY | Age: 68
Discharge: HOME | End: 2023-03-23

## 2023-03-23 DIAGNOSIS — M25.511 ACUTE PAIN OF RIGHT SHOULDER: ICD-10-CM

## 2023-03-24 ENCOUNTER — PROBLEM (OUTPATIENT)
Dept: URBAN - METROPOLITAN AREA CLINIC 6 | Facility: CLINIC | Age: 68
End: 2023-03-24

## 2023-03-24 DIAGNOSIS — H04.123: ICD-10-CM

## 2023-03-24 DIAGNOSIS — H43.813: ICD-10-CM

## 2023-03-24 PROCEDURE — 92014 COMPRE OPH EXAM EST PT 1/>: CPT

## 2023-03-24 ASSESSMENT — TONOMETRY
OS_IOP_MMHG: 19
OD_IOP_MMHG: 23

## 2023-03-24 ASSESSMENT — VISUAL ACUITY
OS_CC: 20/25
OD_CC: 20/30-1

## 2023-03-26 LAB
ALBUMIN SERPL-MCNC: 4.5 G/DL (ref 3.6–5.1)
ALBUMIN/GLOB SERPL: 2 (CALC) (ref 1–2.5)
ALP SERPL-CCNC: 56 U/L (ref 35–144)
ALT SERPL-CCNC: 37 U/L (ref 9–46)
AST SERPL-CCNC: 32 U/L (ref 10–35)
BILIRUB SERPL-MCNC: 0.9 MG/DL (ref 0.2–1.2)
BUN SERPL-MCNC: 14 MG/DL (ref 7–25)
BUN/CREAT SERPL: ABNORMAL (CALC) (ref 6–22)
CALCIUM SERPL-MCNC: 9.7 MG/DL (ref 8.6–10.3)
CHLORIDE SERPL-SCNC: 104 MMOL/L (ref 98–110)
CHOLEST SERPL-MCNC: 156 MG/DL
CHOLEST/HDLC SERPL: 2.6 (CALC)
CO2 SERPL-SCNC: 26 MMOL/L (ref 20–32)
CREAT SERPL-MCNC: 0.77 MG/DL (ref 0.7–1.35)
GFR/BSA.PRED SERPLBLD CYS-BASED-ARV: 97 ML/MIN/1.73M2
GLOBULIN SER CALC-MCNC: 2.2 G/DL (CALC) (ref 1.9–3.7)
GLUCOSE SERPL-MCNC: 102 MG/DL (ref 65–99)
HDLC SERPL-MCNC: 61 MG/DL
LDLC SERPL CALC-MCNC: 82 MG/DL (CALC)
NONHDLC SERPL-MCNC: 95 MG/DL (CALC)
POTASSIUM SERPL-SCNC: 4.3 MMOL/L (ref 3.5–5.3)
PROT SERPL-MCNC: 6.7 G/DL (ref 6.1–8.1)
PSA SERPL-MCNC: 0.41 NG/ML
SODIUM SERPL-SCNC: 139 MMOL/L (ref 135–146)
TRIGL SERPL-MCNC: 54 MG/DL

## 2023-03-27 ENCOUNTER — OFFICE VISIT (OUTPATIENT)
Dept: INTERNAL MEDICINE CLINIC | Facility: CLINIC | Age: 68
End: 2023-03-27

## 2023-03-27 VITALS
WEIGHT: 148 LBS | BODY MASS INDEX: 23.23 KG/M2 | SYSTOLIC BLOOD PRESSURE: 158 MMHG | RESPIRATION RATE: 16 BRPM | HEART RATE: 67 BPM | HEIGHT: 67 IN | OXYGEN SATURATION: 98 % | DIASTOLIC BLOOD PRESSURE: 92 MMHG

## 2023-03-27 DIAGNOSIS — I10 PRIMARY HYPERTENSION: Primary | ICD-10-CM

## 2023-03-27 DIAGNOSIS — R00.2 PALPITATIONS: ICD-10-CM

## 2023-03-27 DIAGNOSIS — G89.29 CHRONIC PAIN OF BOTH SHOULDERS: ICD-10-CM

## 2023-03-27 DIAGNOSIS — M25.512 CHRONIC PAIN OF BOTH SHOULDERS: ICD-10-CM

## 2023-03-27 DIAGNOSIS — R63.4 WEIGHT LOSS: ICD-10-CM

## 2023-03-27 DIAGNOSIS — I10 ESSENTIAL HYPERTENSION: ICD-10-CM

## 2023-03-27 DIAGNOSIS — M25.511 CHRONIC PAIN OF BOTH SHOULDERS: ICD-10-CM

## 2023-03-27 DIAGNOSIS — M75.81 TENDINITIS OF RIGHT ROTATOR CUFF: ICD-10-CM

## 2023-03-27 DIAGNOSIS — F41.9 ANXIETY: ICD-10-CM

## 2023-03-27 RX ORDER — LIDOCAINE 50 MG/G
OINTMENT TOPICAL AS NEEDED
Qty: 35.44 G | Refills: 3 | Status: SHIPPED | OUTPATIENT
Start: 2023-03-27

## 2023-03-27 RX ORDER — BUSPIRONE HYDROCHLORIDE 7.5 MG/1
7.5 TABLET ORAL 2 TIMES DAILY PRN
Qty: 60 TABLET | Refills: 1 | Status: SHIPPED | OUTPATIENT
Start: 2023-03-27

## 2023-03-27 RX ORDER — AMLODIPINE BESYLATE 5 MG/1
5 TABLET ORAL DAILY
Qty: 30 TABLET | Refills: 1 | Status: SHIPPED | OUTPATIENT
Start: 2023-03-27

## 2023-03-27 NOTE — ASSESSMENT & PLAN NOTE
Anxiety no SI increase BuSpar to 7 5 mg 1 p o  twice daily as needed the patient does not want SSRI and would like to hold off on counseling

## 2023-03-27 NOTE — PROGRESS NOTES
Assessment/Plan:    HTN (hypertension)  Suboptimal control labile in nature secondary to pain and anxiety I have given him a prescription for amlodipine 5 mg 1 tablet once daily  as needed blood pressure greater than 150/90 he is to check his blood sugar daily if his blood pressure is greater than 150/90 he will utilize the amlodipine    Tendinitis of right rotator cuff  Patient does have weakness and limitation of range of motion he did review the MRI of the right shoulder in detail with the patient he will be seeing orthopedics Dr Titi Rivera later this week to determine if he needs arthroscopic repair versus a total shoulder replacement      Anxiety  Anxiety no SI increase BuSpar to 7 5 mg 1 p o  twice daily as needed the patient does not want SSRI and would like to hold off on counseling    Palpitations  EKG normal sinus rhythm no acute changes likely he is describing PVCs/PACs had them in the past he reports me they are more noticeable when he is stressed and using caffeinated beverages, at this point in time I would like the patient to go for a Holter monitor 48-hour, will check CBC and also third-generation TSH we will treat his anxiety if any change or worsen please notify me    Weight loss  Weight loss reviewed need to decrease appetite related to anxiety he also reports some constipation over the last year his last colonoscopy was several years ago at this point in time I would like the patient to see GI for a work-up         Problem List Items Addressed This Visit        Cardiovascular and Mediastinum    HTN (hypertension) - Primary     Suboptimal control labile in nature secondary to pain and anxiety I have given him a prescription for amlodipine 5 mg 1 tablet once daily  as needed blood pressure greater than 150/90 he is to check his blood sugar daily if his blood pressure is greater than 150/90 he will utilize the amlodipine         Relevant Medications    amLODIPine (NORVASC) 5 mg tablet Musculoskeletal and Integument    Tendinitis of right rotator cuff     Patient does have weakness and limitation of range of motion he did review the MRI of the right shoulder in detail with the patient he will be seeing orthopedics Dr Ivis Moy later this week to determine if he needs arthroscopic repair versus a total shoulder replacement  Other    Anxiety     Anxiety no SI increase BuSpar to 7 5 mg 1 p o  twice daily as needed the patient does not want SSRI and would like to hold off on counseling         Relevant Medications    busPIRone (BUSPAR) 7 5 mg tablet    Palpitations     EKG normal sinus rhythm no acute changes likely he is describing PVCs/PACs had them in the past he reports me they are more noticeable when he is stressed and using caffeinated beverages, at this point in time I would like the patient to go for a Holter monitor 48-hour, will check CBC and also third-generation TSH we will treat his anxiety if any change or worsen please notify me         Relevant Orders    Holter monitor    TSH, 3rd generation with Free T4 reflex    Chronic pain of both shoulders    Relevant Medications    lidocaine (XYLOCAINE) 5 % ointment    Other Relevant Orders    Ambulatory Referral to Social Work Care Management Program    Weight loss     Weight loss reviewed need to decrease appetite related to anxiety he also reports some constipation over the last year his last colonoscopy was several years ago at this point in time I would like the patient to see GI for a work-up         Relevant Orders    Ambulatory Referral to Gastroenterology    CBC (Includes Diff/Plt) (Refl)   Other Visit Diagnoses     Essential hypertension        Relevant Medications    amLODIPine (NORVASC) 5 mg tablet          Return to office 2  months  call if any problems  Subjective:      Patient ID: Elden Nissen is a 69 y o  male      HPI 68-year old male coming in for a follow up visit regarding anxiety, right shoulder pain/rotator cuff tendinitis, hypertension, weight loss, palpitations, essential hypertension; the patient reports me compliant taking medications without untoward side effects the  The patient is here to review his medical condition, update me on the medical condition and the patient reports me no hospitalizations and no ER visits  No injuries no illnesses he reports may increase anxiety he reports me no depression reports a decreased appetite secondary to anxiety; reports may constipation over the last year no blood in the stool his last colonoscopy was several years prior  He reports me reports heart palpitation , yesterday , last night missed beats , no cp , no racing heart , increase stress, several cups of coffee ,  Mild shoulder pain and frustration with getting the shirt and jacket on   Reports flashing lights saw oph no tear  Not using the systane more floaters   Reports me anxiety no SI  The patient also reports me his home blood pressure readings have been elevated in the 150s to 80s to 90s in the last several days also  There is mild loss of muscle bulk involving the supraspinatus and infraspinatus muscles  There is also some edema and fluid within the supraspinatus and infraspinatus muscle suggestive of partial tear/grade 2 strain      SUBACROMIAL/SUBDELTOID BURSA: There is fluid noted communicating with the glenohumeral joint       LONG HEAD OF BICEPS TENDON: The biceps tendon appears somewhat small and attenuated      GLENOID LABRUM: There is degeneration of the superior labrum      GLENOHUMERAL JOINT: There is moderate glenohumeral joint cartilage loss particularly superiorly with prominent humeral head marginal osteophytes  There is superior subluxation of the humeral head with respect to the glenoid    There is a prominent   subchondral cyst along the superior glenoid      ACROMIOCLAVICULAR JOINT:  There is moderate osteoarthritis      BONES: Normal      IMPRESSION:     Prior rotator cuff repair  Full-thickness full width re-tears of the supraspinatus and infraspinatus tendons with retracted tendon fibers to the level of the glenoid  Partial tears of the supraspinatus and infraspinatus muscles greater along the   infraspinatus muscle      Moderate glenohumeral joint osteoarthrosis with superior subluxation of the humeral head and degeneration of the superior labrum      Moderate acromioclavicular joint osteoarthrosis      The long head of the biceps tendon appears attenuated and small      Glenohumeral joint effusion communicating with the subacromial/subdeltoid bursa    ollowing portions of the patient's history were reviewed and updated as appropriate: allergies, current medications, past family history, past medical history, past social history, past surgical history and problem list     Review of Systems   Constitutional: Negative for activity change, appetite change and unexpected weight change  HENT: Negative for congestion and postnasal drip  Eyes: Negative for visual disturbance  Respiratory: Negative for cough and shortness of breath  Cardiovascular: Positive for palpitations  Negative for chest pain  Gastrointestinal: Negative for abdominal pain, diarrhea, nausea and vomiting  Musculoskeletal:        Shoulder pain right   Neurological: Negative for dizziness, light-headedness and headaches  Psychiatric/Behavioral: Negative for suicidal ideas  The patient is nervous/anxious  Objective:    Return in about 4 weeks (around 4/24/2023)  Procedure: MRI shoulder right wo contrast    Result Date: 3/24/2023  Narrative: MRI SHOULDER RIGHT WO CONTRAST INDICATION:   M25 511: Pain in right shoulder  COMPARISON:  None  TECHNIQUE:  Multiplanar/multisequence MR of the right shoulder was performed  Imaging performed on 3 0T MRI FINDINGS: SUBCUTANEOUS TISSUES: There is magnetic susceptibility artifact around the shoulder from prior surgery   JOINT EFFUSION: There is a small glenohumeral joint effusion  ACROMION PROCESS: Normal  ROTATOR CUFF: There is magnetic susceptibility artifact arising from anchors along the greater tuberosity  Full-thickness full width tears of the supraspinatus and infraspinatus tendons are noted with retracted tendon fibers to the level of the glenoid  There is mild loss of muscle bulk involving the supraspinatus and infraspinatus muscles  There is also some edema and fluid within the supraspinatus and infraspinatus muscle suggestive of partial tear/grade 2 strain  SUBACROMIAL/SUBDELTOID BURSA: There is fluid noted communicating with the glenohumeral joint  LONG HEAD OF BICEPS TENDON: The biceps tendon appears somewhat small and attenuated  GLENOID LABRUM: There is degeneration of the superior labrum  GLENOHUMERAL JOINT: There is moderate glenohumeral joint cartilage loss particularly superiorly with prominent humeral head marginal osteophytes  There is superior subluxation of the humeral head with respect to the glenoid  There is a prominent subchondral cyst along the superior glenoid  ACROMIOCLAVICULAR JOINT:  There is moderate osteoarthritis  BONES: Normal      Impression: Prior rotator cuff repair  Full-thickness full width re-tears of the supraspinatus and infraspinatus tendons with retracted tendon fibers to the level of the glenoid  Partial tears of the supraspinatus and infraspinatus muscles greater along the infraspinatus muscle  Moderate glenohumeral joint osteoarthrosis with superior subluxation of the humeral head and degeneration of the superior labrum  Moderate acromioclavicular joint osteoarthrosis  The long head of the biceps tendon appears attenuated and small  Glenohumeral joint effusion communicating with the subacromial/subdeltoid bursa   Workstation performed: [id number]          No Known Allergies    Past Medical History:   Diagnosis Date   • Asthma    • Hypertension    • Impaired fasting glucose    • Mitral valve disorder    • Mitral valve prolapse    • Murmur, cardiac    • 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    • PAC (premature atrial contraction)    • PVC (premature ventricular contraction)    • Thyroid nodule      Past Surgical History:   Procedure Laterality Date   • HAND SURGERY     • CA COLONOSCOPY FLX DX W/COLLJ SPEC WHEN PFRMD N/A 2/9/2018    Procedure: COLONOSCOPY;  Surgeon: Garth Merlos MD;  Location: [facility];   Service: Gastroenterology   • PROSTATE BIOPSY     • SHOULDER SURGERY       Current Outpatient Medications on File Prior to Visit   Medication Sig Dispense Refill   • albuterol (ProAir HFA) 90 mcg/act inhaler Inhale 2 puffs every 6 (six) hours as needed for wheezing 1 g 0   • aspirin 81 mg chewable tablet Chew 1 tablet every other day     • Coenzyme Q10-Red Yeast Rice  MG CAPS Take 1 capsule by mouth daily     • Docosahexaenoic Acid (DHA OMEGA 3) 100 MG CAPS Take 6 capsules by mouth daily     • fexofenadine (ALLEGRA) 180 MG tablet Take 1 tablet by mouth daily as needed     • lisinopril (ZESTRIL) 20 mg tablet Take 1 tablet (20 mg total) by mouth daily Twice a day 180 tablet 3   • Misc Natural Products (PROSTATE HEALTH) CAPS Take 1 capsule by mouth daily     • Multiple Vitamin tablet Take 1 tablet by mouth daily     • naproxen (Naprosyn) 500 mg tablet Take 1 tablet (500 mg total) by mouth 2 (two) times a day with meals 30 tablet 0   • [DISCONTINUED] busPIRone (BUSPAR) 5 mg tablet Take 1 tablet (5 mg total) by mouth daily as needed (Anxiety) 30 tablet 1   • [DISCONTINUED] lidocaine (XYLOCAINE) 5 % ointment Apply topically as needed for mild pain 35 44 g 3   • meclizine (ANTIVERT) 25 mg tablet Take 1 tablet (25 mg total) by mouth every 8 (eight) hours (Patient not taking: Reported on 11/23/2022) 30 tablet 0   • methocarbamol (ROBAXIN) 500 mg tablet Take 1 tablet (500 mg total) by mouth 4 (four) times a day as needed for muscle spasms (Patient not taking: Reported on 11/23/2022) 30 tablet 0   • "metoprolol succinate (TOPROL-XL) 25 mg 24 hr tablet Take 1 tablet (25 mg total) by mouth daily 90 tablet 3     No current facility-administered medications on file prior to visit  Family History   Problem Relation Age of Onset   • Diabetes Sister    • Other Family         Stroke syndrome     Social History     Socioeconomic History   • Marital status: /Civil Union     Spouse name: Not on file   • Number of children: Not on file   • Years of education: Not on file   • Highest education level: Not on file   Occupational History   • Not on file   Tobacco Use   • Smoking status: Never   • Smokeless tobacco: Never   Vaping Use   • Vaping Use: Never used   Substance and Sexual Activity   • Alcohol use: Yes     Comment: very little   • Drug use: No   • Sexual activity: Yes   Other Topics Concern   • Not on file   Social History Narrative   • Not on file     Social Determinants of Health     Financial Resource Strain: Low Risk    • Difficulty of Paying Living Expenses: Not very hard   Food Insecurity: Not on file   Transportation Needs: No Transportation Needs   • Lack of Transportation (Medical): No   • Lack of Transportation (Non-Medical):  No   Physical Activity: Not on file   Stress: Not on file   Social Connections: Not on file   Intimate Partner Violence: Not on file   Housing Stability: Not on file     Vitals:    03/27/23 1009   BP: 158/92   Pulse: 67   Resp: 16   SpO2: 98%   Weight: 67 1 kg (148 lb)   Height: 5' 7\" (1 702 m)     Results for orders placed or performed in visit on 03/25/23   Lipid Panel with Direct LDL reflex   Result Value Ref Range    Total Cholesterol 156 <200 mg/dL    HDL 61 > OR = 40 mg/dL    Triglycerides 54 <150 mg/dL    LDL Calculated 82 mg/dL (calc)    Chol HDLC Ratio 2 6 <5 0 (calc)    Non-HDL Cholesterol 95 <130 mg/dL (calc)   Comprehensive metabolic panel   Result Value Ref Range    Glucose, Random 102 (H) 65 - 99 mg/dL    BUN 14 7 - 25 mg/dL    Creatinine 0 77 0 70 - 1 35 mg/dL " "   eGFR 97 > OR = 60 mL/min/1 73m2    SL AMB BUN/CREATININE RATIO NOT APPLICABLE 6 - 22 (calc)    Sodium 139 135 - 146 mmol/L    Potassium 4 3 3 5 - 5 3 mmol/L    Chloride 104 98 - 110 mmol/L    CO2 26 20 - 32 mmol/L    Calcium 9 7 8 6 - 10 3 mg/dL    Protein, Total 6 7 6 1 - 8 1 g/dL    Albumin 4 5 3 6 - 5 1 g/dL    Globulin 2 2 1 9 - 3 7 g/dL (calc)    Albumin/Globulin Ratio 2 0 1 0 - 2 5 (calc)    TOTAL BILIRUBIN 0 9 0 2 - 1 2 mg/dL    Alkaline Phosphatase 56 35 - 144 U/L    AST 32 10 - 35 U/L    ALT 37 9 - 46 U/L   PSA, Total Screen   Result Value Ref Range    Prostate Specific Antigen Total 0 41 < OR = 4 00 ng/mL     Weight (last 2 days)     Date/Time Weight    03/27/23 1009 67 1 (148)        Body mass index is 23 18 kg/m²  BP      Temp      Pulse     Resp      SpO2        Vitals:    03/27/23 1009   Weight: 67 1 kg (148 lb)     Vitals:    03/27/23 1009   Weight: 67 1 kg (148 lb)       /92   Pulse 67   Resp 16   Ht 5' 7\" (1 702 m)   Wt 67 1 kg (148 lb)   SpO2 98%   BMI 23 18 kg/m²          Physical Exam  Vitals and nursing note reviewed  Constitutional:       General: He is not in acute distress  Appearance: He is well-developed  He is not diaphoretic  HENT:      Head: Normocephalic and atraumatic  Right Ear: External ear normal       Left Ear: External ear normal    Eyes:      General: No scleral icterus  Right eye: No discharge  Left eye: No discharge  Conjunctiva/sclera: Conjunctivae normal       Pupils: Pupils are equal, round, and reactive to light  Cardiovascular:      Rate and Rhythm: Normal rate and regular rhythm  Heart sounds: Normal heart sounds  No murmur heard  No friction rub  No gallop  Pulmonary:      Effort: No respiratory distress  Breath sounds: No wheezing or rales  Abdominal:      General: Bowel sounds are normal  There is no distension  Palpations: Abdomen is soft  There is no mass  Tenderness:  There is no abdominal " tenderness  There is no guarding or rebound  Musculoskeletal:         General: No deformity  Cervical back: Neck supple  Lymphadenopathy:      Cervical: No cervical adenopathy  Neurological:      Mental Status: He is alert  Psychiatric:         Mood and Affect: Mood is anxious  Mood is not depressed  Thought Content: Thought content does not include suicidal ideation  Decreased range of motion abduction internal/external rotation decreased bilateral shoulders  TONE-7 Flowsheet Screening    Flowsheet Row Most Recent Value   Over the last 2 weeks, how often have you been bothered by any of the following problems?     Feeling nervous, anxious, or on edge 1   Not being able to stop or control worrying 2   Worrying too much about different things 2   Trouble relaxing 1   Being so restless that it is hard to sit still 1   Becoming easily annoyed or irritable 0   Feeling afraid as if something awful might happen 1   TONE-7 Total Score 8

## 2023-03-27 NOTE — ASSESSMENT & PLAN NOTE
Patient does have weakness and limitation of range of motion he did review the MRI of the right shoulder in detail with the patient he will be seeing orthopedics Dr Naga Whalen later this week to determine if he needs arthroscopic repair versus a total shoulder replacement

## 2023-03-27 NOTE — ASSESSMENT & PLAN NOTE
Suboptimal control labile in nature secondary to pain and anxiety I have given him a prescription for amlodipine 5 mg 1 tablet once daily  as needed blood pressure greater than 150/90 he is to check his blood sugar daily if his blood pressure is greater than 150/90 he will utilize the amlodipine

## 2023-03-27 NOTE — ASSESSMENT & PLAN NOTE
Weight loss reviewed need to decrease appetite related to anxiety he also reports some constipation over the last year his last colonoscopy was several years ago at this point in time I would like the patient to see GI for a work-up

## 2023-03-27 NOTE — ASSESSMENT & PLAN NOTE
EKG normal sinus rhythm no acute changes likely he is describing PVCs/PACs had them in the past he reports me they are more noticeable when he is stressed and using caffeinated beverages, at this point in time I would like the patient to go for a Holter monitor 48-hour, will check CBC and also third-generation TSH we will treat his anxiety if any change or worsen please notify me

## 2023-03-29 ENCOUNTER — PATIENT OUTREACH (OUTPATIENT)
Dept: INTERNAL MEDICINE CLINIC | Facility: CLINIC | Age: 68
End: 2023-03-29

## 2023-03-29 NOTE — PROGRESS NOTES
IRVING received referral to assess patient for transportation needs  Chart review completed  Phone call placed to patient  Patient reports that he is unable to drive due to 2 bad rotator cuffs  Wife has been providing transportation; however, still has to work  IRVING advised patient to contact insurance to determine if he has a benefit for non-emergent medical transportation  IRVING also discussed [facility] [employer] and provided phone number to determine if they would drive up his lengthy driveway to their home  Application for [employer] will be emailed to patient  IRVING also provided information on [employer] and will email this as well  Patient was also informed that some doctor's offices do have the capability to set up Lyft rides in situations where no other transportation can be arranged  Patient will make appropriate phone calls  Information emailed to patient on [employer] and [employer]  IRVING provided phone number for future contact

## 2023-03-30 ENCOUNTER — OFFICE VISIT (OUTPATIENT)
Dept: OBGYN CLINIC | Facility: OTHER | Age: 68
End: 2023-03-30

## 2023-03-30 VITALS
WEIGHT: 152.2 LBS | SYSTOLIC BLOOD PRESSURE: 160 MMHG | BODY MASS INDEX: 23.89 KG/M2 | DIASTOLIC BLOOD PRESSURE: 86 MMHG | HEART RATE: 80 BPM | HEIGHT: 67 IN

## 2023-03-30 DIAGNOSIS — M75.101 ROTATOR CUFF TEAR ARTHROPATHY OF RIGHT SHOULDER: Primary | ICD-10-CM

## 2023-03-30 DIAGNOSIS — M12.811 ROTATOR CUFF TEAR ARTHROPATHY OF RIGHT SHOULDER: Primary | ICD-10-CM

## 2023-03-30 DIAGNOSIS — I10 ESSENTIAL HYPERTENSION: ICD-10-CM

## 2023-03-30 RX ORDER — BUPIVACAINE HYDROCHLORIDE 2.5 MG/ML
2 INJECTION, SOLUTION INFILTRATION; PERINEURAL
Status: COMPLETED | OUTPATIENT
Start: 2023-03-30 | End: 2023-03-30

## 2023-03-30 RX ORDER — LISINOPRIL 20 MG/1
TABLET ORAL
Qty: 180 TABLET | Refills: 3 | Status: SHIPPED | OUTPATIENT
Start: 2023-03-30

## 2023-03-30 RX ORDER — BETAMETHASONE SODIUM PHOSPHATE AND BETAMETHASONE ACETATE 3; 3 MG/ML; MG/ML
6 INJECTION, SUSPENSION INTRA-ARTICULAR; INTRALESIONAL; INTRAMUSCULAR; SOFT TISSUE
Status: COMPLETED | OUTPATIENT
Start: 2023-03-30 | End: 2023-03-30

## 2023-03-30 RX ADMIN — BUPIVACAINE HYDROCHLORIDE 2 ML: 2.5 INJECTION, SOLUTION INFILTRATION; PERINEURAL at 10:38

## 2023-03-30 RX ADMIN — BETAMETHASONE SODIUM PHOSPHATE AND BETAMETHASONE ACETATE 6 MG: 3; 3 INJECTION, SUSPENSION INTRA-ARTICULAR; INTRALESIONAL; INTRAMUSCULAR; SOFT TISSUE at 10:38

## 2023-03-30 NOTE — PATIENT INSTRUCTIONS

## 2023-03-30 NOTE — PROGRESS NOTES
Assessment  Diagnoses and all orders for this visit:    Rotator cuff tear arthropathy of right shoulder    Discussion and Plan:    · Explained to the patient that his examination and diagnostic studies are consistent with rotator cuff tear arthropathy of the right shoulder  He is experiencing an acute flare of his chronic symptoms due to lifting that heavy pale  · He was provided with a cortisone injection today in the office  This is documented appropriately below  · He will also be given a referral to formal physical therapy/HEP  · The surgical procedure for his diagnosis would be a reverse total shoulder arthroplasty, similar to his left shoulder  · Follow up on an as needed basis    Subjective:   Patient ID: Dinorah Rico is a 69 y o  male    The patient presents today for an orthopedic surgery consultation visit at the request of Dr Maria Guadalupe Giraldo on 3/25/23 with a chief complaint of right shoulder pain  The pain began a few week(s) ago and is associated with an acute injury  Patient reports that he was lifting a heavy pail and her a crunch and tearing sensation about the shoudler  The patient describes the pain as aching, dull and sharp in intensity,  intermittent, occurring with increasing frequency, awakening patient from sleep in timing, and localizes the pain to the  right subacromial joint, deltoid  The pain is worse with movement, overuse and raising arm over head and relieved by rest, ice, avoiding the painful activities  The pain is not associated with numbness and tingling  The pain is not associated with constitutional symptoms  The patient is awoken at night by the pain  The patient has had treatment in the form of a rotator cuff repair in 2004 with Dr Guero Cabral        The following portions of the patient's history were reviewed and updated as appropriate: allergies, current medications, past family history, past medical history, past social history, past surgical history and problem "list     Objective:  /86 (BP Location: Left arm, Patient Position: Sitting, Cuff Size: Standard)   Pulse 80   Ht 5' 7\" (1 702 m)   Wt 69 kg (152 lb 3 2 oz)   BMI 23 84 kg/m²       Right Shoulder Exam     Range of Motion   External rotation: 10   Right shoulder forward flexion: AROM: 70, PROM: 110  Muscle Strength   Abduction: 3/5   External rotation: 3/5     Other   Erythema: absent  Sensation: normal  Pulse: present            Physical Exam  Constitutional:       General: He is not in acute distress  Appearance: He is well-developed  Eyes:      Conjunctiva/sclera: Conjunctivae normal       Pupils: Pupils are equal, round, and reactive to light  Cardiovascular:      Rate and Rhythm: Normal rate and regular rhythm  Pulmonary:      Effort: Pulmonary effort is normal       Breath sounds: Normal breath sounds  Abdominal:      General: Bowel sounds are normal       Palpations: Abdomen is soft  Musculoskeletal:      Cervical back: Normal range of motion and neck supple  Skin:     General: Skin is warm and dry  Findings: No erythema or rash  Neurological:      Mental Status: He is alert and oriented to person, place, and time  Deep Tendon Reflexes: Reflexes are normal and symmetric  Psychiatric:         Behavior: Behavior normal          Large joint arthrocentesis: R subacromial bursa  Universal Protocol:  Consent: Verbal consent obtained  Risks and benefits: risks, benefits and alternatives were discussed  Consent given by: patient  Time out: Immediately prior to procedure a \"time out\" was called to verify the correct patient, procedure, equipment, support staff and site/side marked as required  Site marked: the operative site was marked  Radiology Images displayed and confirmed   If images not available, report reviewed: imaging studies available  Patient identity confirmed: verbally with patient    Supporting Documentation  Indications: pain and diagnostic evaluation " Procedure Details  Location: shoulder - R subacromial bursa  Preparation: Patient was prepped and draped in the usual sterile fashion  Needle size: 22 G  Ultrasound guidance: no  Approach: lateral  Medications administered: 2 mL bupivacaine 0 25 %; 6 mg betamethasone acetate-betamethasone sodium phosphate 6 (3-3) mg/mL    Patient tolerance: patient tolerated the procedure well with no immediate complications  Dressing:  Sterile dressing applied        I have personally reviewed pertinent films in PACS and my interpretation is as follows  MRI Right Shoulder 3/23/23: Prior rotator cuff repair with full thickness re tears of the supraspinatus and infraspinatus tendons  There is significant retraction and fatty muscle atrophy       Scribe Attestation    I,:  Shivani Zamorano am acting as a scribe while in the presence of the attending physician :       I,:  Abhijeet Ruiz MD personally performed the services described in this documentation    as scribed in my presence :

## 2023-04-07 ENCOUNTER — EVALUATION (OUTPATIENT)
Dept: PHYSICAL THERAPY | Facility: CLINIC | Age: 68
End: 2023-04-07

## 2023-04-07 DIAGNOSIS — M75.101 ROTATOR CUFF TEAR ARTHROPATHY OF RIGHT SHOULDER: Primary | ICD-10-CM

## 2023-04-07 DIAGNOSIS — M12.811 ROTATOR CUFF TEAR ARTHROPATHY OF RIGHT SHOULDER: Primary | ICD-10-CM

## 2023-04-07 NOTE — PROGRESS NOTES
PT Evaluation     Today's date: 2023  Patient name: Lara Apgar  : 1955  MRN: [id number]  Referring provider: Francesco Lynne  Dx:   Encounter Diagnosis     ICD-10-CM    1  Rotator cuff tear arthropathy of right shoulder  M75 101 Ambulatory Referral to Physical Therapy    M12 [id number]           Start Time: 1015  Stop Time: 1100  Total time in clinic (min): 45 minutes    Assessment  Assessment details: Lara Apgar is a pleasant 69 y o  male who presents with acute on chronic right shoulder pain and weakness  The patient's greatest concerns are the pain he is experiencing, worry over not knowing what's wrong and fear of not being able to keep active  No further referral appears necessary at this time based upon examination results  Primary movement impairment diagnosis of s/s consistent with full thickness RTC tear resulting in pathoanatomical symptoms of right shoulder pain and weakness and limiting his ability to dress independently, exercise or recreation, lift, perform household chores, reach overhead and reach behind the body  Primary Impairments:  1) Decreased mm strength of the right shoulder and UE   2) Decreased active and passive ROM of the right shoulder     Etiologic factors include none recalled by the patient  Impairments: abnormal or restricted ROM, activity intolerance, impaired physical strength, lacks appropriate home exercise program, pain with function, scapular dyskinesis and poor posture     Symptom irritability: moderateUnderstanding of Dx/Px/POC: good   Prognosis: fair  Prognosis details: Positive prognostic indicators include positive attitude toward recovery and good understanding of diagnosis and treatment plan options  Negative prognostic indicators include chronicity of symptoms, hypertension, high symptom irritability, minimal changes in pain with movement and multiple concurrent orthopedic problems          Goals  Patient will be "independent with home exercise program    Patient will be able to manage symptoms independently  Short Term Goals 2-4 wks   1  Pt will be independent with initial HEP   2  Pt will decrease pain in the right shoulder to < 5/10 at worst   3  Pt will improve deficient mm strength in the right shoulder 1/2 grade     Long Term Goals 6-8 wks  1  Pt will demonstrate improved postural awareness and control   2  Pt will report no limitations with ADLs using the right UE   3  Pt will improve FOTO score to >61 by d/c      Plan  Patient would benefit from: skilled physical therapy  Planned modality interventions: Modalities PRN  Planned therapy interventions: activity modification, manual therapy, neuromuscular re-education, patient education, therapeutic activities, therapeutic exercise, graded activity, home exercise program, behavior modification, self care, joint mobilization and postural training  Frequency: 1-2x/wk  Duration in weeks: 12  Treatment plan discussed with: patient        Subjective Evaluation    History of Present Illness  Mechanism of injury: Oscar Arzate presents with c/c of acute on chronic right shoulder pain  Symptoms began about a month ago when he was trying to lift a pail and felt a \"pop\" in his right shoulder  Reports intense pain at the time and has noticed progressive worsening of strength since onset  Pt saw PCP, was referred for MRI and to orthopedic  MRI showed full thickness re-tears of supraspinatus and infraspinatus as well as biceps tendon  Pt saw orthopedic and had CSI into subacromial bursa and referred to OPPT  No further f/u scheduled at this time  Pt reports decrease of symptoms since CSI, however, worsening strength day to day  Limitations reported with AROM of the left shoulder in all planes, with lifting, carrying, and performing ADLs  Pt has hx of previous RTCR 18 years ago and tore the right biceps tendon approx 1 year ago   Notes 6 month hx of adhesive capsulitis on " the left, 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, and 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     Pain location: right anterior and lateral shoulder, descriptors: sharp, stab, dull   Aggravating factors: reaching, lifting, carrying performing ADLs   Relieving factors: rest, medication   24hr pain pattern: 0/10 (current), 0/10 (best), 8-9/10 (worst)   Imaging: MRI of the right shoulder on 3/23 (tear of supraspinatus and infraspinatus with retraction, superior subluxation of humerus, damage to superior labrum)   Previous treatments: previous episodes of PT, CSI into right shoulder   Occupation/recreation: retired   Sleeping: some disturbed sleep reported due to positioning, can return to sleep   Patient concerns: performing ADLs, improving function, decreasing pain           Objective     Postural Observations  Seated posture: poor  Standing posture: fair        Tenderness     Additional Tenderness Details  Diffuse TTP anterior and lateral shoulder     Active Range of Motion   Left Shoulder   Flexion: 45 degrees   Abduction: 45 degrees     Right Shoulder   Flexion: 140 degrees with pain  Extension: WFL  Abduction: 100 degrees with pain  External rotation BTH: T2 with pain  Internal rotation BTB: lumbar with pain    Passive Range of Motion     Right Shoulder   Flexion: 150 degrees   External rotation 45°: 75 degrees   Internal rotation 45°: 60 degrees with pain    Strength/Myotome Testing     Left Shoulder     Planes of Motion   Flexion: 4   Extension: 4+   Abduction: 3+   External rotation at 0°: 2+   Internal rotation at 0°: 4     Isolated Muscles   Biceps: 4   Triceps: 4+     Right Shoulder     Planes of Motion   Flexion: 3-   Extension: 4+   Abduction: 2+   External rotation at 0°: 2+   Internal rotation at 0°: 4     Isolated Muscles   Biceps: 3+   Triceps: 4     Additional Strength Details  Manual muscle testing performed in neutral due to lack of mobility and pain      Tests     Right Shoulder   Positive drop arm, external rotation lag sign, full can and painful arc       Additional Tests Details  (+) Infraspinatus MMT              Precautions: Hx of Right RTCR, HTN, PVC, Neuropathy     Access Code: [email or web address]    Manuals 4/7            R Shoulder PROM                                        Neuro Re-Ed             Scap Retract             Cervical Retraction              Thoracic Extension              Scap Set                                        Education  S/S, POC, HEP             Ther Ex             Pulleys              TB Row/Ext              GH Isometrics              Cane AAROM             Shrugs                                        Ther Activity                                       Gait Training                                       Modalities

## 2023-04-12 ENCOUNTER — TELEPHONE (OUTPATIENT)
Dept: OBGYN CLINIC | Facility: HOSPITAL | Age: 68
End: 2023-04-12

## 2023-04-12 ENCOUNTER — APPOINTMENT (OUTPATIENT)
Dept: PHYSICAL THERAPY | Facility: CLINIC | Age: 68
End: 2023-04-12

## 2023-04-12 NOTE — TELEPHONE ENCOUNTER
Have nothing else to add  He just had an injection so that is not something we can offer him tomorrow  OTC medication and Ice  We do not prescribe pain medication    We will eval tomorrow

## 2023-04-12 NOTE — TELEPHONE ENCOUNTER
Called and spoke w/pt and he states that his pain is a 5-6  He is concerned that he cannot raise arm up with shoulder or reach out as the day progress  He has bruise on bicep area and on shoulder area  Has feeling in fingers  Pt is trying to apply ice to area  Pt is taking naproxen for pain every 12 hours  Recommended Tylenol per label instructions for max dose 3000mg in 24 hour period  He is having problems picking up glass or eat  He has a bummed left shoulder  Pt started PT and called out today after speaking to therapist  Reminded of appt tomorrow at 0930  Pt has someone that can drive him  Wife is there to assist pt  Please advise

## 2023-04-12 NOTE — TELEPHONE ENCOUNTER
Caller: patient  Doctor: Breanna Francois    Reason for call: patient scheduled to be seen tomorrow, having a lot of pain and discomfort today, asking what he should do?     Call back#: [phone]

## 2023-04-13 PROBLEM — M12.811 ROTATOR CUFF ARTHROPATHY OF RIGHT SHOULDER: Status: ACTIVE | Noted: 2023-04-13

## 2023-04-14 ENCOUNTER — APPOINTMENT (INPATIENT)
Dept: MRI IMAGING | Facility: HOSPITAL | Age: 68
End: 2023-04-14

## 2023-04-14 ENCOUNTER — HOSPITAL ENCOUNTER (INPATIENT)
Facility: HOSPITAL | Age: 68
LOS: 2 days | Discharge: HOME | End: 2023-04-16
Attending: EMERGENCY MEDICINE | Admitting: INTERNAL MEDICINE

## 2023-04-14 ENCOUNTER — APPOINTMENT (EMERGENCY)
Dept: CT IMAGING | Facility: HOSPITAL | Age: 68
End: 2023-04-14

## 2023-04-14 DIAGNOSIS — R53.1 GENERALIZED WEAKNESS: ICD-10-CM

## 2023-04-14 DIAGNOSIS — R53.1 RIGHT SIDED WEAKNESS: Primary | ICD-10-CM

## 2023-04-14 DIAGNOSIS — K21.9 GERD (GASTROESOPHAGEAL REFLUX DISEASE): ICD-10-CM

## 2023-04-14 DIAGNOSIS — R20.1 IMPAIRED SENSATION: ICD-10-CM

## 2023-04-14 DIAGNOSIS — M12.811 ROTATOR CUFF ARTHROPATHY OF RIGHT SHOULDER: ICD-10-CM

## 2023-04-14 LAB
2HR DELTA HS TROPONIN: 4 NG/L
ALBUMIN SERPL BCP-MCNC: 3.9 G/DL (ref 3.5–5)
ALP SERPL-CCNC: 46 U/L (ref 34–104)
ALT SERPL W P-5'-P-CCNC: 32 U/L (ref 7–52)
ANION GAP SERPL CALCULATED.3IONS-SCNC: 5 MMOL/L (ref 4–13)
AST SERPL W P-5'-P-CCNC: 31 U/L (ref 13–39)
ATRIAL RATE: 78 BPM
BASOPHILS # BLD AUTO: 0.05 THOUSANDS/ΜL (ref 0–0.1)
BASOPHILS NFR BLD AUTO: 1 % (ref 0–1)
BILIRUB SERPL-MCNC: 0.71 MG/DL (ref 0.2–1)
BUN SERPL-MCNC: 14 MG/DL (ref 5–25)
CALCIUM SERPL-MCNC: 9 MG/DL (ref 8.4–10.2)
CARDIAC TROPONIN I PNL SERPL HS: 11 NG/L
CARDIAC TROPONIN I PNL SERPL HS: 7 NG/L
CHLORIDE SERPL-SCNC: 105 MMOL/L (ref 96–108)
CK SERPL-CCNC: 289 U/L (ref 39–308)
CO2 SERPL-SCNC: 25 MMOL/L (ref 21–32)
CREAT SERPL-MCNC: 0.62 MG/DL (ref 0.6–1.3)
CRP SERPL QL: <1 MG/L
EOSINOPHIL # BLD AUTO: 0.06 THOUSAND/ΜL (ref 0–0.61)
EOSINOPHIL NFR BLD AUTO: 1 % (ref 0–6)
ERYTHROCYTE [DISTWIDTH] IN BLOOD BY AUTOMATED COUNT: 12.8 % (ref 11.6–15.1)
ERYTHROCYTE [SEDIMENTATION RATE] IN BLOOD: 2 MM/HOUR (ref 0–19)
FOLATE SERPL-MCNC: >20 NG/ML (ref 3.1–17.5)
GFR SERPL CREATININE-BSD FRML MDRD: 101 ML/MIN/1.73SQ M
GLUCOSE SERPL-MCNC: 105 MG/DL (ref 65–140)
HCT VFR BLD AUTO: 41.7 % (ref 36.5–49.3)
HGB BLD-MCNC: 13.9 G/DL (ref 12–17)
IMM GRANULOCYTES # BLD AUTO: 0.02 THOUSAND/UL (ref 0–0.2)
IMM GRANULOCYTES NFR BLD AUTO: 0 % (ref 0–2)
LYMPHOCYTES # BLD AUTO: 1.23 THOUSANDS/ΜL (ref 0.6–4.47)
LYMPHOCYTES NFR BLD AUTO: 16 % (ref 14–44)
MCH RBC QN AUTO: 29.5 PG (ref 26.8–34.3)
MCHC RBC AUTO-ENTMCNC: 33.3 G/DL (ref 31.4–37.4)
MCV RBC AUTO: 89 FL (ref 82–98)
MONOCYTES # BLD AUTO: 0.7 THOUSAND/ΜL (ref 0.17–1.22)
MONOCYTES NFR BLD AUTO: 9 % (ref 4–12)
NEUTROPHILS # BLD AUTO: 5.64 THOUSANDS/ΜL (ref 1.85–7.62)
NEUTS SEG NFR BLD AUTO: 73 % (ref 43–75)
NRBC BLD AUTO-RTO: 0 /100 WBCS
P AXIS: 32 DEGREES
PLATELET # BLD AUTO: 276 THOUSANDS/UL (ref 149–390)
PMV BLD AUTO: 9.1 FL (ref 8.9–12.7)
POTASSIUM SERPL-SCNC: 4 MMOL/L (ref 3.5–5.3)
PR INTERVAL: 172 MS
PROT SERPL-MCNC: 6.1 G/DL (ref 6.4–8.4)
QRS AXIS: -4 DEGREES
QRSD INTERVAL: 104 MS
QT INTERVAL: 374 MS
QTC INTERVAL: 426 MS
RBC # BLD AUTO: 4.71 MILLION/UL (ref 3.88–5.62)
SODIUM SERPL-SCNC: 135 MMOL/L (ref 135–147)
T WAVE AXIS: 46 DEGREES
TSH SERPL DL<=0.05 MIU/L-ACNC: 0.69 UIU/ML (ref 0.45–4.5)
VENTRICULAR RATE: 78 BPM
VIT B12 SERPL-MCNC: 1316 PG/ML (ref 100–900)
WBC # BLD AUTO: 7.7 THOUSAND/UL (ref 4.31–10.16)

## 2023-04-14 RX ORDER — LISINOPRIL 20 MG/1
20 TABLET ORAL 2 TIMES DAILY
Status: DISCONTINUED | OUTPATIENT
Start: 2023-04-14 | End: 2023-04-16 | Stop reason: HOSPADM

## 2023-04-14 RX ORDER — ASPIRIN 81 MG/1
81 TABLET, CHEWABLE ORAL EVERY OTHER DAY
Status: DISCONTINUED | OUTPATIENT
Start: 2023-04-14 | End: 2023-04-16 | Stop reason: HOSPADM

## 2023-04-14 RX ORDER — HYDROXYZINE HYDROCHLORIDE 25 MG/1
25 TABLET, FILM COATED ORAL EVERY 6 HOURS PRN
Status: DISCONTINUED | OUTPATIENT
Start: 2023-04-14 | End: 2023-04-15

## 2023-04-14 RX ORDER — BUSPIRONE HYDROCHLORIDE 5 MG/1
7.5 TABLET ORAL 2 TIMES DAILY PRN
Status: DISCONTINUED | OUTPATIENT
Start: 2023-04-14 | End: 2023-04-16 | Stop reason: HOSPADM

## 2023-04-14 RX ORDER — AMLODIPINE BESYLATE 5 MG/1
5 TABLET ORAL DAILY PRN
Status: DISCONTINUED | OUTPATIENT
Start: 2023-04-14 | End: 2023-04-16 | Stop reason: HOSPADM

## 2023-04-14 RX ORDER — METOPROLOL SUCCINATE 25 MG/1
25 TABLET, EXTENDED RELEASE ORAL DAILY
Status: DISCONTINUED | OUTPATIENT
Start: 2023-04-14 | End: 2023-04-16 | Stop reason: HOSPADM

## 2023-04-14 RX ORDER — HYDROXYZINE HYDROCHLORIDE 25 MG/1
25 TABLET, FILM COATED ORAL ONCE
Status: COMPLETED | OUTPATIENT
Start: 2023-04-14 | End: 2023-04-14

## 2023-04-14 RX ORDER — AMLODIPINE BESYLATE 5 MG/1
5 TABLET ORAL DAILY
Status: DISCONTINUED | OUTPATIENT
Start: 2023-04-15 | End: 2023-04-14

## 2023-04-14 RX ORDER — ACETAMINOPHEN 325 MG/1
650 TABLET ORAL EVERY 6 HOURS PRN
Status: DISCONTINUED | OUTPATIENT
Start: 2023-04-14 | End: 2023-04-16 | Stop reason: HOSPADM

## 2023-04-14 RX ORDER — METHOCARBAMOL 500 MG/1
500 TABLET, FILM COATED ORAL 2 TIMES DAILY
Status: DISCONTINUED | OUTPATIENT
Start: 2023-04-14 | End: 2023-04-15

## 2023-04-14 RX ORDER — MAGNESIUM 30 MG
400 TABLET ORAL
COMMUNITY
End: 2023-04-16

## 2023-04-14 RX ORDER — GABAPENTIN 300 MG/1
300 CAPSULE ORAL 3 TIMES DAILY
Status: DISCONTINUED | OUTPATIENT
Start: 2023-04-14 | End: 2023-04-15

## 2023-04-14 RX ORDER — SODIUM CHLORIDE, SODIUM LACTATE, POTASSIUM CHLORIDE, CALCIUM CHLORIDE 600; 310; 30; 20 MG/100ML; MG/100ML; MG/100ML; MG/100ML
75 INJECTION, SOLUTION INTRAVENOUS CONTINUOUS
Status: DISCONTINUED | OUTPATIENT
Start: 2023-04-14 | End: 2023-04-15

## 2023-04-14 RX ORDER — KETOROLAC TROMETHAMINE 30 MG/ML
15 INJECTION, SOLUTION INTRAMUSCULAR; INTRAVENOUS EVERY 6 HOURS PRN
Status: DISCONTINUED | OUTPATIENT
Start: 2023-04-14 | End: 2023-04-16 | Stop reason: HOSPADM

## 2023-04-14 RX ADMIN — LISINOPRIL 20 MG: 20 TABLET ORAL at 16:47

## 2023-04-14 RX ADMIN — ASPIRIN 81 MG 81 MG: 81 TABLET ORAL at 16:46

## 2023-04-14 RX ADMIN — KETOROLAC TROMETHAMINE 15 MG: 30 INJECTION, SOLUTION INTRAMUSCULAR at 16:47

## 2023-04-14 RX ADMIN — HYDROXYZINE HYDROCHLORIDE 25 MG: 25 TABLET ORAL at 16:46

## 2023-04-14 RX ADMIN — SODIUM CHLORIDE 500 ML: 0.9 INJECTION, SOLUTION INTRAVENOUS at 11:38

## 2023-04-14 RX ADMIN — GADOBUTROL 6 ML: 604.72 INJECTION INTRAVENOUS at 23:36

## 2023-04-14 RX ADMIN — SODIUM CHLORIDE, SODIUM LACTATE, POTASSIUM CHLORIDE, AND CALCIUM CHLORIDE 75 ML/HR: .6; .31; .03; .02 INJECTION, SOLUTION INTRAVENOUS at 19:00

## 2023-04-14 NOTE — RESPIRATORY THERAPY NOTE
RT Protocol Note  Rell Belts 69 y o  male MRN: [id number]  Unit/Bed#: S -01 Encounter: [id number]    Assessment    Principal Problem:    Generalized weakness  Active Problems:    HTN (hypertension)    Allergic rhinitis    Anxiety    Rotator cuff arthropathy of right shoulder    Impaired sensation      Home Pulmonary Medications:  None  Home Devices/Therapy:  (None)    Past Medical History:   Diagnosis Date    Asthma     Hypertension     Impaired fasting glucose     Mitral valve disorder     Mitral valve prolapse     Murmur, cardiac     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     PAC (premature atrial contraction)     PVC (premature ventricular contraction)     Thyroid nodule      Social History     Socioeconomic History    Marital status: /Civil Union     Spouse name: Not on file    Number of children: Not on file    Years of education: Not on file    Highest education level: Not on file   Occupational History    Not on file   Tobacco Use    Smoking status: Never    Smokeless tobacco: Never   Vaping Use    Vaping Use: Never used   Substance and Sexual Activity    Alcohol use: Yes     Comment: very little    Drug use: No    Sexual activity: Yes   Other Topics Concern    Not on file   Social History Narrative    Not on file     Social Determinants of Health     Financial Resource Strain: Low Risk     Difficulty of Paying Living Expenses: Not very hard   Food Insecurity: Not on file   Transportation Needs: No Transportation Needs    Lack of Transportation (Medical): No    Lack of Transportation (Non-Medical):  No   Physical Activity: Not on file   Stress: Not on file   Social Connections: Not on file   Intimate Partner Violence: Not on file   Housing Stability: Not on file       Subjective         Objective    Physical Exam:   Assessment Type: During-treatment  General Appearance: Alert, Awake  Respiratory Pattern: Normal  Chest Assessment: Chest expansion symmetrical  Bilateral Breath Sounds: Clear  Cough: None  O2 Device: Room Air    Vitals:  Blood pressure 143/74, pulse 67, temperature 98 8 °F (37 1 °C), resp  rate 16, weight 65 3 kg (144 lb), SpO2 95 %  Imaging and other studies: I have personally reviewed pertinent reports        O2 Device: Room Air     Plan    Respiratory Plan: No distress/Pulmonary history        Resp Comments: Pt has no Resp Distress and doesn't take tx at home

## 2023-04-14 NOTE — ASSESSMENT & PLAN NOTE
Patient has a history of rotator cuff arthropathy of the right shoulder, history of complete rotator cuff tear on the right side  Follows with orthopedic surgery  Recently saw orthopedic surgery yesterday 4/13 due to severe symptoms that are unresponsive to nonoperative treatments including corticosteroid injections, physical therapy, and HEP  Patient has signed consent for a reverse total shoulder arthroplasty, which will occur in approximately 8 weeks after most recent corticosteroid injection done on 3/30/2023  X-ray of the right shoulder done on 4/13 demonstrating a high riding humeral head in relation to the glenoid suggestive of chronic rotator cuff tear with moderate glenohumeral osteoarthritis    Plan:  Pain regimen with Tylenol as needed for mild pain, Toradol 15 mg every 6 hours as needed for moderate pain, and oxycodone 2 5 mg every 6 hours as needed for severe pain  Hold off on orthopedic surgery consult as patient just saw his orthopedic surgeon yesterday 4/13/2023

## 2023-04-14 NOTE — ASSESSMENT & PLAN NOTE
· Patient has a history of anxiety and whitecoat syndrome subjectively  · Home medication includes buspirone 7 5 mg daily    Plan:  Buspirone 7 5 mg daily, home medication  Atarax 25 mg every 6 hours as needed for anxiety

## 2023-04-14 NOTE — H&P
[facility]  H&P  Name: Louis Rand 69 y o  male I MRN: [id number]  Unit/Bed#: S -45 I Date of Admission: 4/14/2023   Date of Service: 4/14/2023 I Hospital Day: 0      Assessment/Plan   * Generalized weakness  Assessment & Plan  · Presenting with generalized weakness, worsening over the last 1 month since significant right-sided shoulder injury  · History of bilateral rotator cuff tears, with bilateral repairs, with subsequent bilateral bicep tendon ruptures  · Patient complaining of subjective weakness of the right upper and right lower extremity, as well as generalized fatigue and other symptoms including reduction in  strength, cramping and muscles of the hands feet and calves, reduction in ability to feed himself, hoarse voice, brain fog, dizziness/vertigo, reduction in exercise capacity  · Also presenting with subjective feelings of reduction in sensation of the right upper and right lower extremity  · Dramatic worsening of chronic symptoms over the last 1 month  · Has never had MRI of the brain/cervical spinal cord    Plan:  Neurology consult, possible MRI of the brain/cervical spinal cord to investigate for CNS pathology, i e  myelopathy  PT/OT evaluation  Creatinine kinase, to investigate for rhabdomyolysis, follow-up on results  LUZMARIA, follow-up on results  CRP and ESR, follow-up on results  Vitamin D level, follow-up on results  B12 and folate, follow-up on results  Regular diet    Rotator cuff arthropathy of right shoulder  Assessment & Plan  · Patient has a history of rotator cuff arthropathy of the right shoulder, history of complete rotator cuff tear on the right side  · Follows with orthopedic surgery  · Recently saw orthopedic surgery yesterday 4/13 due to severe symptoms that are unresponsive to nonoperative treatments including corticosteroid injections, physical therapy, and HEP  · Patient has signed consent for a reverse total shoulder arthroplasty, which will occur in approximately 8 weeks after most recent corticosteroid injection done on 3/30/2023  · X-ray of the right shoulder done on 4/13 demonstrating a high riding humeral head in relation to the glenoid suggestive of chronic rotator cuff tear with moderate glenohumeral osteoarthritis    Plan:  Consult orthopedic surgery for evaluation to possibly expedite patient's surgery/obtain recommendations  Pain regimen with Tylenol as needed for mild pain, Toradol 15 mg every 6 hours as needed for moderate pain, and oxycodone 2 5 mg every 6 hours as needed for severe pain    Impaired sensation  Assessment & Plan  · Patient complaining of impaired sensation on the right sided upper and lower extremities  · Physical exam demonstrating only reduction in sensation of the right lateral shoulder over the deltoid region, normal sensation on the face, chest, torso, bilateral lower extremities    Plan:  Neurology consult    Anxiety  Assessment & Plan  · Patient has a history of anxiety and whitecoat syndrome subjectively  · Home medication includes buspirone 7 5 mg daily    Plan:  Buspirone 7 5 mg daily, home medication  Atarax 25 mg every 6 hours as needed for anxiety    Allergic rhinitis  Assessment & Plan  · Patient has a history of allergic rhinitis  · Home medications include albuterol and fexofenadine 100 mg as needed  · Patient reports he has not been taking these medications lately as he is not experiencing symptoms    Plan:  Monitor    HTN (hypertension)  Assessment & Plan  · Patient has a history of hypertension  · Home medications include amlodipine 5 mg daily, lisinopril 20 mg twice daily, metoprolol 25 mg daily  · Presenting with mild elevation in blood pressure of 164/72    Plan:  Amlodipine 5 mg as needed for blood pressure greater than 150/90  Lisinopril 20 mg twice daily, home medication  Metoprolol 25 mg daily, home medication         VTE Pharmacologic Prophylaxis: VTE Score: 2 Low Risk (Score 0-2) - Encourage Ambulation  Code Status: Level 1 - Full Code   Discussion with family: Updated  (wife) at bedside  Anticipated Length of Stay: Patient will be admitted on an inpatient basis with an anticipated length of stay of greater than 2 midnights secondary to Right-sided shoulder arthropathy with generalized weakness, and other symptoms concerning for possible CNS pathology  Chief Complaint: Generalized weakness    History of Present Illness:  Minh Wheeler is a 69 y o  male with a PMH of history of right-sided rotator cuff tear with osteoarthritis of the right shoulder, bilateral rotator cuff injuries, bilateral bicep tendon ruptures, allergic rhinitis, mitral valve prolapse, hypertension, anxiety, who presents with generalized weakness and changes in sensation on the right side of patient's body  Patient prefers the name Thuy Maguire and presents with his wife Bhaskar Hoskins  Patient reports that he has a significant history of issues with his bilateral shoulders and biceps  He reports he first injured his right rotator cuff in 2004, and tore his left rotator cuff in 2005  Both rotator cuffs were repaired in the past and they were good for several years  Approximately 1 year ago patient reports that he had bilateral bicep tendon ruptures, he is not sure how or why this happened, but he does remember his orthopedic surgeon telling him that it is related to his rotator cuff tears, and the expected progression of his disease includes bicep tendon ruptures with subsequent worsening of rotator cuff symptoms  Approximately 6 to 12 months following his bilateral bicep tendon rupture he began experiencing significant pain in the left shoulder  Patient reports that for approximately 1 month now, after attempting to lift a 20 pound bucket with his right arm, he experienced a crunching loud sensation with extreme pain in his right shoulder    Since then he has had inability to use his right arm, and has had multiple associated symptoms including reduced  strength on the right hand, cramping in the bilateral feet, hands, and calves, reduction in weight, reduction in p o  intake, dry mouth, hoarse voice  patient reports he has also been experiencing brain fog/difficulty making decisions and concentrating for 2 weeks  Patient reports a significant worsening of his symptoms since this event  Patient is also complaining of muscle weakness on the right upper extremity and right lower extremity, which has been going on for greater than 1 year, however has subjectively worsened over the last 1 month  Additionally, patient has a 1 to 2-year history of dizziness/vertigo that has been getting worse over the last 1 month as well  Patient reports that he had adequate mobility of his right arm until approximately 1 week ago with dramatic reduction in range of motion  Patient does note he notices a reduction in his exercise capacity, never had a problem walking up and down his driveway until now  He denies any shortness of breath  Patient also reports that he has been losing weight because he cannot use his hands to hold utensils  He cannot use his right arm due to his right rotator cuff injury, and he cannot use his left hand due to previous trauma to the left fingers that occurred 12 years ago while attempting to work on a lawnmower with blades still running  He reports he almost lost all of his fingers, and his second through fifth fingers on the left hand are deformed  Patient currently reports his pain in the right shoulder is 5/10 described as sharp shooting radiating down the lateral right-sided shoulder, which is worse with movement  He reports his range of motion is limited in the bilateral upper extremities, worse in the right arm, can bend at the elbow he cannot move his right shoulder, and has no strength in the fingers on the right side    He reports his left arm is not as bad, but his  strength is also poor due to the previous trauma  Patient reports that he is significantly anxious, and has whitecoat syndrome  Patient recently saw his orthopedic physician, signed consent yesterday for reverse total shoulder arthroplasty  Was prescribed steroid Z-Syed, however did not pick it up and has not yet started it  Patient reports he is hesitant to begin steroids due to the side effects of the medication  Patient was also prescribed naproxen about 1 month ago, has used it only intermittently, about once or twice over the last 1 week  Regarding this patient's subjective history of sensation changes on the right side, he reports that he has noticed changes in sensation in his right upper extremity and right lower extremity  He denies any changes in sensation on the face, the chest, or the abdomen  He does report he went to see a neurologist in November and they recommended a repeat follow-up in 5 to 6 months  Patient reports that he went for root canal this past Monday, and was strapped to a chair for 2 hours where he experienced excruciating pain  He still needs to go back to complete his procedure  He reports that he has not been able to brush or floss his teeth due to the inability to use his bilateral hands  He has developed cavities which he reports he has never had in the past   Patient needs assistance with eating  Patient and his wife do note that he has had elevated blood pressure over the last 1 year which he was started on metoprolol in May 2022  Since then he reports he has been experiencing generalized muscle weakness and vision changes  Patient does report he went to an ophthalmologist for his vision changes which included blurry vision, and they reported dry eyes and recommended lubricating eyedrops  Patient and his spouse are suspicious that metoprolol is contributing to his muscular weakness    Patient reports an additional history of heart palpitations, has had echocardiograms done in the past, also has a history of mitral valve prolapse  Additionally, patient does report that in May 2021 after second COVID-vaccine booster he became significantly ill, and could not get out of bed, experiencing vertigo, chills, and malaise for a significant period of time  Shortly after this time he experienced bilateral biceps tendon rupture  Patient reports he has a history of constipation which he attributes to his poor diet as of lately  His last bowel movement was this morning, normal stool, no blood  Patient also reports worsening bilateral hand tremors  Patient denies fevers, chills, nausea, vomiting, recent changes in vision, upper respiratory symptoms, chest pain, shortness of breath, abdominal pain, neck pain, diarrhea  Patient reports that he and his wife are under a lot of stress at home as they are trying to sell their home, and the patient can no longer drive  They live in [place]  Patient used to be a , is now retired due to his health changes  Review of Systems:  Review of Systems   Constitutional: Positive for appetite change and fatigue  Negative for chills and fever  HENT: Negative for ear pain and sore throat  Eyes: Negative for pain and visual disturbance  Respiratory: Negative for cough and shortness of breath  Cardiovascular: Negative for chest pain and palpitations  Gastrointestinal: Negative for abdominal pain and vomiting  Genitourinary: Negative for dysuria and hematuria  Musculoskeletal: Negative for arthralgias and back pain  Skin: Negative for color change and rash  Neurological: Negative for seizures and syncope  Hematological: Bruises/bleeds easily  All other systems reviewed and are negative        Past Medical and Surgical History:   Past Medical History:   Diagnosis Date   • Asthma    • Hypertension    • Impaired fasting glucose    • Mitral valve disorder    • Mitral valve prolapse    • Murmur, cardiac    • 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    • PAC (premature atrial contraction)    • PVC (premature ventricular contraction)    • Thyroid nodule        Past Surgical History:   Procedure Laterality Date   • HAND SURGERY     • ND COLONOSCOPY FLX DX W/COLLJ SPEC WHEN PFRMD N/A 2/9/2018    Procedure: COLONOSCOPY;  Surgeon: Martina Nicole MD;  Location: [facility]; Service: Gastroenterology   • PROSTATE BIOPSY     • SHOULDER SURGERY         Meds/Allergies:  Prior to Admission medications    Medication Sig Start Date End Date Taking?  Authorizing Provider   amLODIPine (NORVASC) 5 mg tablet Take 1 tablet (5 mg total) by mouth daily Prn BP> 150/90 3/27/23  Yes Bebe Min DO   aspirin 81 mg chewable tablet Chew 1 tablet every other day   Yes Historical Provider, MD   busPIRone (BUSPAR) 7 5 mg tablet Take 1 tablet (7 5 mg total) by mouth 2 (two) times a day as needed (Anxiety) 3/27/23  Yes Bebe Min DO   Coenzyme Q10-Red Yeast Rice  MG CAPS Take 1 capsule by mouth daily   Yes Historical Provider, MD   Docosahexaenoic Acid (DHA OMEGA 3) 100 MG CAPS Take 6 capsules by mouth daily   Yes Historical Provider, MD   lidocaine (XYLOCAINE) 5 % ointment Apply topically as needed for mild pain 3/27/23  Yes Bebe Min DO   lisinopril (ZESTRIL) 20 mg tablet TAKE 1 TABLET TWICE A DAY 3/30/23  Yes Bebe Min DO   magnesium 30 MG tablet Take 400 mg by mouth   Yes Historical Provider, MD   metoprolol succinate (TOPROL-XL) 25 mg 24 hr tablet Take 1 tablet (25 mg total) by mouth daily 6/21/22 4/14/23 Yes Bebe Min DO   Misc Natural Products (PROSTATE HEALTH) CAPS Take 1 capsule by mouth daily   Yes Historical Provider, MD   Multiple Vitamin tablet Take 1 tablet by mouth daily   Yes Historical Provider, MD   naproxen (Naprosyn) 500 mg tablet Take 1 tablet (500 mg total) by mouth 2 (two) times a day with meals 3/17/23  Yes Bebe Min DO   albuterol (ProAir HFA) 90 mcg/act inhaler Inhale 2 puffs every 6 (six) hours as needed for wheezing  Patient not taking: Reported on 4/14/2023 8/15/22   Breanna Botello DO   fexofenadine (ALLEGRA) 180 MG tablet Take 1 tablet by mouth daily as needed 6/10/14   Historical Provider, MD   meclizine (ANTIVERT) 25 mg tablet Take 1 tablet (25 mg total) by mouth every 8 (eight) hours  Patient not taking: Reported on 11/23/2022 5/13/22   PRABHAKAR Leyva   methocarbamol (ROBAXIN) 500 mg tablet Take 1 tablet (500 mg total) by mouth 4 (four) times a day as needed for muscle spasms  Patient not taking: Reported on 11/23/2022 6/14/22   Bernarda Pat MD   methylPREDNISolone 4 MG tablet therapy pack Use as directed on package  Patient not taking: Reported on 4/14/2023 4/13/23   Sirisha Cuevas MD     I have reviewed home medications with patient personally  Allergies: No Known Allergies    Social History:  Marital Status: /Civil Union   Occupation: Retired  Patient Pre-hospital Living Situation: Home  Patient Pre-hospital Level of Mobility: walks  Patient Pre-hospital Diet Restrictions:   Substance Use History:   Social History     Substance and Sexual Activity   Alcohol Use Yes    Comment: very little     Social History     Tobacco Use   Smoking Status Never   Smokeless Tobacco Never     Social History     Substance and Sexual Activity   Drug Use No       Family History:  Family History   Problem Relation Age of Onset   • Diabetes Sister    • Other Family         Stroke syndrome       Physical Exam:     Vitals:   Blood Pressure: 143/74 (04/14/23 1646)  Pulse: 67 (04/14/23 1646)  Temperature: 98 8 °F (37 1 °C) (04/14/23 1601)  Respirations: 16 (04/14/23 1209)  Weight - Scale: 65 3 kg (144 lb) (04/14/23 1049)  SpO2: 95 % (04/14/23 1646)    Physical Exam  Vitals and nursing note reviewed  Constitutional:       General: He is not in acute distress  Appearance: He is well-developed     HENT:      Head: Normocephalic and atraumatic  Eyes:      Extraocular Movements: Extraocular movements intact  Conjunctiva/sclera: Conjunctivae normal       Pupils: Pupils are equal, round, and reactive to light  Cardiovascular:      Rate and Rhythm: Normal rate and regular rhythm  Heart sounds: No murmur heard  Pulmonary:      Effort: Pulmonary effort is normal  No respiratory distress  Breath sounds: Normal breath sounds  Abdominal:      Palpations: Abdomen is soft  Tenderness: There is no abdominal tenderness  Musculoskeletal:         General: No swelling  Cervical back: Neck supple  Right lower leg: No edema  Left lower leg: No edema  Comments: Adequate range of motion of the neck with bilateral lateral flexion, neck extension and neck flexion  Poor range of motion of the right shoulder, very limited abduction, normal range of motion of elbow flexion and extension on the right side  Improved range of motion on the left shoulder compared to the right, limited abduction and extension, normal range of motion of left elbow flexion and extension  Normal range of motion of the bilateral lower extremity leg extension and flexion at the knee as well as hip flexion   Skin:     General: Skin is warm and dry  Capillary Refill: Capillary refill takes less than 2 seconds  Neurological:      Mental Status: He is alert and oriented to person, place, and time  Sensory: Sensory deficit (reduction in sensation on right lateral sholulder) present  Motor: Weakness (weakness of the right upper extremity with elbow flexion and extension) present        Comments: Bilateral equal strength in the lower legs, with knee flexion and extension, and hip flexion  Knee reflexes equal bilateral  No other sensory deficits noted, no sensory deficits of the face, torso, abdomen, or lower extremities   Psychiatric:         Mood and Affect: Mood normal           Additional Data:     Lab Results:  Results from last 7 days   Lab Units 04/14/23  1136   WBC Thousand/uL 7 70   HEMOGLOBIN g/dL 13 9   HEMATOCRIT % 41 7   PLATELETS Thousands/uL 276   NEUTROS PCT % 73   LYMPHS PCT % 16   MONOS PCT % 9   EOS PCT % 1     Results from last 7 days   Lab Units 04/14/23  1136   SODIUM mmol/L 135   POTASSIUM mmol/L 4 0   CHLORIDE mmol/L 105   CO2 mmol/L 25   BUN mg/dL 14   CREATININE mg/dL 0 62   ANION GAP mmol/L 5   CALCIUM mg/dL 9 0   ALBUMIN g/dL 3 9   TOTAL BILIRUBIN mg/dL 0 71   ALK PHOS U/L 46   ALT U/L 32   AST U/L 31   GLUCOSE RANDOM mg/dL 105                       Lines/Drains:  Invasive Devices     Peripheral Intravenous Line  Duration           Peripheral IV 04/14/23 Proximal;Right;Ventral (anterior) Forearm <1 day                    Imaging: Personally reviewed the following imaging: xray(s)  CT head without contrast   Final Result by Joann Barreto MD (04/14 3460)      No acute intracranial abnormality  Workstation performed: [id number]         MRI inpatient order    (Results Pending)       EKG and Other Studies Reviewed on Admission:   · EKG: No EKG obtained  ** Please Note: This note has been constructed using a voice recognition system   **

## 2023-04-14 NOTE — ASSESSMENT & PLAN NOTE
Patient complaining of impaired sensation on the right sided upper and lower extremities  Physical exam demonstrating only reduction in sensation of the right lateral shoulder over the deltoid region, normal sensation on the face, chest, torso, bilateral lower extremities    Plan:  Neurology consult

## 2023-04-14 NOTE — ASSESSMENT & PLAN NOTE
Patient has a history of allergic rhinitis  Home medications include albuterol and fexofenadine 100 mg as needed  Patient reports he has not been taking these medications lately as he is not experiencing symptoms    Plan:  Monitor

## 2023-04-14 NOTE — ASSESSMENT & PLAN NOTE
· Presenting with generalized weakness, worsening over the last 1 month since significant right-sided shoulder injury  · History of bilateral rotator cuff tears, with bilateral repairs, with subsequent bilateral bicep tendon ruptures  · Patient complaining of subjective weakness of the right upper and right lower extremity, as well as generalized fatigue and other symptoms including reduction in  strength, cramping and muscles of the hands feet and calves, reduction in ability to feed himself, hoarse voice, brain fog, dizziness/vertigo, reduction in exercise capacity  · Also presenting with subjective feelings of reduction in sensation of the right upper and right lower extremity  · Dramatic worsening of chronic symptoms over the last 1 month  · Has never had MRI of the brain/cervical spinal cord    Plan:  Neurology consult, possible MRI of the brain/cervical spinal cord to investigate for CNS pathology, i e  myelopathy  PT/OT evaluation  Creatinine kinase, to investigate for rhabdomyolysis, follow-up on results  LUZMARIA, follow-up on results  CRP and ESR, follow-up on results  Vitamin D level, follow-up on results  B12 and folate, follow-up on results  Regular diet

## 2023-04-14 NOTE — ASSESSMENT & PLAN NOTE
· Patient has a history of rotator cuff arthropathy of the right shoulder, history of complete rotator cuff tear on the right side  · Follows with orthopedic surgery  · Recently saw orthopedic surgery yesterday 4/13 due to severe symptoms that are unresponsive to nonoperative treatments including corticosteroid injections, physical therapy, and HEP  · Patient has signed consent for a reverse total shoulder arthroplasty, which will occur in approximately 8 weeks after most recent corticosteroid injection done on 3/30/2023  · X-ray of the right shoulder done on 4/13 demonstrating a high riding humeral head in relation to the glenoid suggestive of chronic rotator cuff tear with moderate glenohumeral osteoarthritis    Plan:  Consult orthopedic surgery for evaluation to possibly expedite patient's surgery/obtain recommendations  Pain regimen with Tylenol as needed for mild pain, Toradol 15 mg every 6 hours as needed for moderate pain, and oxycodone 2 5 mg every 6 hours as needed for severe pain

## 2023-04-14 NOTE — ASSESSMENT & PLAN NOTE
· Patient has a history of allergic rhinitis  · Home medications include albuterol and fexofenadine 100 mg as needed  · Patient reports he has not been taking these medications lately as he is not experiencing symptoms    Plan:  Monitor

## 2023-04-14 NOTE — PLAN OF CARE
Problem: Potential for Falls  Goal: Patient will remain free of falls  Description: INTERVENTIONS:  - Educate patient/family on patient safety including physical limitations  - Instruct patient to call for assistance with activity   - Consult OT/PT to assist with strengthening/mobility   - Keep Call bell within reach  - Keep bed low and locked with side rails adjusted as appropriate  - Keep care items and personal belongings within reach  - Initiate and maintain comfort rounds  - Make Fall Risk Sign visible to staff  - Offer Toileting every 2 Hours, in advance of need  - Initiate/Maintain alarm  - Obtain necessary fall risk management equipment  - Apply yellow socks and bracelet for high fall risk patients  - Consider moving patient to room near nurses station  Outcome: Progressing     Problem: PAIN - ADULT  Goal: Verbalizes/displays adequate comfort level or baseline comfort level  Description: Interventions:  - Encourage patient to monitor pain and request assistance  - Assess pain using appropriate pain scale  - Administer analgesics based on type and severity of pain and evaluate response  - Implement non-pharmacological measures as appropriate and evaluate response  - Consider cultural and social influences on pain and pain management  - Notify physician/advanced practitioner if interventions unsuccessful or patient reports new pain  Outcome: Progressing     Problem: PAIN - ADULT  Goal: Verbalizes/displays adequate comfort level or baseline comfort level  Description: Interventions:  - Encourage patient to monitor pain and request assistance  - Assess pain using appropriate pain scale  - Administer analgesics based on type and severity of pain and evaluate response  - Implement non-pharmacological measures as appropriate and evaluate response  - Consider cultural and social influences on pain and pain management  - Notify physician/advanced practitioner if interventions unsuccessful or patient reports new pain  Outcome: Progressing     Problem: SAFETY ADULT  Goal: Patient will remain free of falls  Description: INTERVENTIONS:  - Educate patient/family on patient safety including physical limitations  - Instruct patient to call for assistance with activity   - Consult OT/PT to assist with strengthening/mobility   - Keep Call bell within reach  - Keep bed low and locked with side rails adjusted as appropriate  - Keep care items and personal belongings within reach  - Initiate and maintain comfort rounds  - Make Fall Risk Sign visible to staff  - Offer Toileting every 2 Hours, in advance of need  - Initiate/Maintain alarm  - Obtain necessary fall risk management equipmenT  - Apply yellow socks and bracelet for high fall risk patients  - Consider moving patient to room near nurses station  Outcome: Progressing  Goal: Maintain or return to baseline ADL function  Description: INTERVENTIONS:  -  Assess patient's ability to carry out ADLs; assess patient's baseline for ADL function and identify physical deficits which impact ability to perform ADLs (bathing, care of mouth/teeth, toileting, grooming, dressing, etc )  - Assess/evaluate cause of self-care deficits   - Assess range of motion  - Assess patient's mobility; develop plan if impaired  - Assess patient's need for assistive devices and provide as appropriate  - Encourage maximum independence but intervene and supervise when necessary  - Involve family in performance of ADLs  - Assess for home care needs following discharge   - Consider OT consult to assist with ADL evaluation and planning for discharge  - Provide patient education as appropriate  Outcome: Progressing  Goal: Maintains/Returns to pre admission functional level  Description: INTERVENTIONS:  - Perform BMAT or MOVE assessment daily    - Set and communicate daily mobility goal to care team and patient/family/caregiver     - Collaborate with rehabilitation services on mobility goals if consulted  - Perform Range of Motion 3 times a day  - Reposition patient every 2 hours  - Dangle patient 3 times a day  - Stand patient 3 times a day  - Ambulate patient 3 times a day  - Out of bed to chair 3 times a day   - Out of bed for meals 3 times a day  - Out of bed for toileting  - Record patient progress and toleration of activity level   Outcome: Progressing     Problem: DISCHARGE PLANNING  Goal: Discharge to home or other facility with appropriate resources  Description: INTERVENTIONS:  - Identify barriers to discharge w/patient and caregiver  - Arrange for needed discharge resources and transportation as appropriate  - Identify discharge learning needs (meds, wound care, etc )  - Arrange for interpretive services to assist at discharge as needed  - Refer to Case Management Department for coordinating discharge planning if the patient needs post-hospital services based on physician/advanced practitioner order or complex needs related to functional status, cognitive ability, or social support system  Outcome: Progressing     Problem: Knowledge Deficit  Goal: Patient/family/caregiver demonstrates understanding of disease process, treatment plan, medications, and discharge instructions  Description: Complete learning assessment and assess knowledge base    Interventions:  - Provide teaching at level of understanding  - Provide teaching via preferred learning methods  Outcome: Progressing

## 2023-04-14 NOTE — ASSESSMENT & PLAN NOTE
Patient has a history of hypertension  Home medications include amlodipine 5 mg daily, lisinopril 20 mg twice daily, metoprolol 25 mg daily  Presenting with mild elevation in blood pressure of 164/72    Plan:  Amlodipine 5 mg as needed for blood pressure greater than 150/90  Lisinopril 20 mg twice daily, home medication  Metoprolol 25 mg daily, home medication

## 2023-04-14 NOTE — ASSESSMENT & PLAN NOTE
Presenting with generalized weakness, worsening over the last 1 month since significant right-sided shoulder injury  History of bilateral rotator cuff tears, with bilateral repairs, with subsequent bilateral bicep tendon ruptures  Patient complaining of subjective weakness of the right upper and right lower extremity, as well as generalized fatigue and other symptoms including reduction in  strength, cramping and muscles of the hands feet and calves, reduction in ability to feed himself, hoarse voice, brain fog, dizziness/vertigo, reduction in exercise capacity  Also presenting with subjective feelings of reduction in sensation of the right upper and right lower extremity  Dramatic worsening of chronic symptoms over the last 1 month  Has never had MRI of the brain/cervical spinal cord    Plan:  Neurology consult, possible MRI of the brain/cervical spinal cord to investigate for CNS pathology, i e  myelopathy  PT/OT evaluation  Creatinine kinase, to investigate for rhabdomyolysis, follow-up on results  LUZMARIA, follow-up on results  CRP and ESR, follow-up on results  Vitamin D level, follow-up on results  B12 and folate, follow-up on results  Regular diet

## 2023-04-14 NOTE — ASSESSMENT & PLAN NOTE
· Patient has a history of hypertension  · Home medications include amlodipine 5 mg daily, lisinopril 20 mg twice daily, metoprolol 25 mg daily  · Presenting with mild elevation in blood pressure of 164/72    Plan:  Amlodipine 5 mg as needed for blood pressure greater than 150/90  Lisinopril 20 mg twice daily, home medication  Metoprolol 25 mg daily, home medication

## 2023-04-14 NOTE — ASSESSMENT & PLAN NOTE
· Patient complaining of impaired sensation on the right sided upper and lower extremities  · Physical exam demonstrating only reduction in sensation of the right lateral shoulder over the deltoid region, normal sensation on the face, chest, torso, bilateral lower extremities    Plan:  Neurology consult

## 2023-04-14 NOTE — ASSESSMENT & PLAN NOTE
Patient has a history of anxiety and whitecoat syndrome subjectively  Home medication includes buspirone 7 5 mg daily    Plan:  Buspirone 7 5 mg daily, home medication  Atarax 25 mg every 6 hours as needed for anxiety

## 2023-04-15 ENCOUNTER — APPOINTMENT (INPATIENT)
Dept: MRI IMAGING | Facility: HOSPITAL | Age: 68
End: 2023-04-15

## 2023-04-15 PROBLEM — R29.898 UPPER EXTREMITY WEAKNESS: Status: ACTIVE | Noted: 2021-07-26

## 2023-04-15 LAB
25(OH)D3 SERPL-MCNC: 73.4 NG/ML (ref 30–100)
ANA SER QL IA: NEGATIVE
ANION GAP SERPL CALCULATED.3IONS-SCNC: 4 MMOL/L (ref 4–13)
BUN SERPL-MCNC: 11 MG/DL (ref 5–25)
CALCIUM SERPL-MCNC: 9.2 MG/DL (ref 8.4–10.2)
CHLORIDE SERPL-SCNC: 107 MMOL/L (ref 96–108)
CO2 SERPL-SCNC: 27 MMOL/L (ref 21–32)
CREAT SERPL-MCNC: 0.64 MG/DL (ref 0.6–1.3)
ERYTHROCYTE [DISTWIDTH] IN BLOOD BY AUTOMATED COUNT: 13.1 % (ref 11.6–15.1)
GFR SERPL CREATININE-BSD FRML MDRD: 99 ML/MIN/1.73SQ M
GLUCOSE SERPL-MCNC: 92 MG/DL (ref 65–140)
HCT VFR BLD AUTO: 44.7 % (ref 36.5–49.3)
HGB BLD-MCNC: 15 G/DL (ref 12–17)
MAGNESIUM SERPL-MCNC: 2.2 MG/DL (ref 1.9–2.7)
MCH RBC QN AUTO: 30.1 PG (ref 26.8–34.3)
MCHC RBC AUTO-ENTMCNC: 33.6 G/DL (ref 31.4–37.4)
MCV RBC AUTO: 90 FL (ref 82–98)
PLATELET # BLD AUTO: 288 THOUSANDS/UL (ref 149–390)
PMV BLD AUTO: 8.9 FL (ref 8.9–12.7)
POTASSIUM SERPL-SCNC: 4.3 MMOL/L (ref 3.5–5.3)
RBC # BLD AUTO: 4.98 MILLION/UL (ref 3.88–5.62)
SODIUM SERPL-SCNC: 138 MMOL/L (ref 135–147)
WBC # BLD AUTO: 6.51 THOUSAND/UL (ref 4.31–10.16)

## 2023-04-15 RX ORDER — METHOCARBAMOL 500 MG/1
250 TABLET, FILM COATED ORAL 2 TIMES DAILY PRN
Status: DISCONTINUED | OUTPATIENT
Start: 2023-04-15 | End: 2023-04-16 | Stop reason: HOSPADM

## 2023-04-15 RX ORDER — METHYLPREDNISOLONE 4 MG/1
16 TABLET ORAL DAILY
Status: DISCONTINUED | OUTPATIENT
Start: 2023-04-17 | End: 2023-04-16 | Stop reason: HOSPADM

## 2023-04-15 RX ORDER — METHYLPREDNISOLONE 4 MG/1
4 TABLET ORAL DAILY
Status: DISCONTINUED | OUTPATIENT
Start: 2023-04-20 | End: 2023-04-16 | Stop reason: HOSPADM

## 2023-04-15 RX ORDER — METHYLPREDNISOLONE 4 MG/1
12 TABLET ORAL DAILY
Status: DISCONTINUED | OUTPATIENT
Start: 2023-04-18 | End: 2023-04-16 | Stop reason: HOSPADM

## 2023-04-15 RX ORDER — METHOCARBAMOL 500 MG/1
500 TABLET, FILM COATED ORAL 2 TIMES DAILY PRN
Status: DISCONTINUED | OUTPATIENT
Start: 2023-04-15 | End: 2023-04-15

## 2023-04-15 RX ORDER — PANTOPRAZOLE SODIUM 40 MG/1
40 TABLET, DELAYED RELEASE ORAL
Status: DISCONTINUED | OUTPATIENT
Start: 2023-04-15 | End: 2023-04-16 | Stop reason: HOSPADM

## 2023-04-15 RX ORDER — METHYLPREDNISOLONE 4 MG/1
8 TABLET ORAL DAILY
Status: DISCONTINUED | OUTPATIENT
Start: 2023-04-19 | End: 2023-04-16 | Stop reason: HOSPADM

## 2023-04-15 RX ADMIN — PANTOPRAZOLE SODIUM 40 MG: 40 TABLET, DELAYED RELEASE ORAL at 12:32

## 2023-04-15 RX ADMIN — HYDROXYZINE HYDROCHLORIDE 25 MG: 25 TABLET ORAL at 08:22

## 2023-04-15 RX ADMIN — METHYLPREDNISOLONE 24 MG: 4 TABLET ORAL at 11:34

## 2023-04-15 RX ADMIN — SODIUM CHLORIDE, SODIUM LACTATE, POTASSIUM CHLORIDE, AND CALCIUM CHLORIDE 75 ML/HR: .6; .31; .03; .02 INJECTION, SOLUTION INTRAVENOUS at 08:28

## 2023-04-15 RX ADMIN — METOPROLOL SUCCINATE 25 MG: 25 TABLET, EXTENDED RELEASE ORAL at 08:22

## 2023-04-15 RX ADMIN — LISINOPRIL 20 MG: 20 TABLET ORAL at 08:22

## 2023-04-15 RX ADMIN — GABAPENTIN 300 MG: 300 CAPSULE ORAL at 08:22

## 2023-04-15 RX ADMIN — LISINOPRIL 20 MG: 20 TABLET ORAL at 21:07

## 2023-04-15 NOTE — CONSULTS
[address] [facility] 69 y o  male MRN: [id number]  Unit/Bed#: [facility] MRI      Assessment:  69 y o male with bilateral shoulder pain, R>L  Plan:   · WBAT B/L upper extremity   · Rest, ice, activity modification, analgesics for pain control   · PT/OT for ROM/stretching  · Medrol dose pack to improve inflammation and aid in symptom relief  · Continue with outpatient orthopedic follow up and evaluations  Patient saw Dr Lynda Diaz on 4/13  His recommendation was for a Reverse Total Shoulder Arthroplasty  His most recent CSI injection was 3/30 and Dr Lynda Diaz stated he will need to wait at least 8 weeks from the injection for him to be eligible for surgery  · Body mass index is 22 55 kg/m²  · Dispo: No ortho follow up necessary  Please call or TT with any questions  HPI:  69 y o male right hand dominant complaining of bilateral shoulder pain R>L  He has had ongoing bilateral shoulder injuries/surgeries and has seen multiple orthopedic providers within [facility]  He has a PMH of bilateral rotator cuff repairs with full thickness re-tears of supraspinatus and infraspinatus, bilateral distal biceps tears and bilateral glenohumeral arthritis  He saw Dr Lynda Diaz on 4/13/23 who suggested that he needs a right reverse total shoulder replacement  His most recent CSI injection was 3/30/23 and was told by Dr Lynda Diaz he will need to wait at least 8 weeks for surgery  Patient states that his symptoms have worsened a week ago following a dental procedure and was seated in a chair for an extended period of time  He complains of ROM issues and difficulties completing his ADLs including eating and brushing his teeth  Patient also complains of generalized weakness and right sided sensory deficits of which neurology was consulted to evaluate          Review Of Systems:   · Skin: Normal  · Neuro: See HPI  · Musculoskeletal: See HPI  · 14 point review of systems negative except as stated above     Past Medical History:   Past Medical History:   Diagnosis Date   • Asthma    • Hypertension    • Impaired fasting glucose    • Mitral valve disorder    • Mitral valve prolapse    • Murmur, cardiac    • 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    • PAC (premature atrial contraction)    • PVC (premature ventricular contraction)    • Thyroid nodule        Past Surgical History:   Past Surgical History:   Procedure Laterality Date   • HAND SURGERY     • DC COLONOSCOPY FLX DX W/COLLJ SPEC WHEN PFRMD N/A 2/9/2018    Procedure: COLONOSCOPY;  Surgeon: Chetan Jones MD;  Location: [facility]  GI LAB; Service: Gastroenterology   • PROSTATE BIOPSY     • SHOULDER SURGERY         Family History:  Family history reviewed and non-contributory  Family History   Problem Relation Age of Onset   • Diabetes Sister    • Other Family         Stroke syndrome       Social History:  Social History     Socioeconomic History   • Marital status: /Civil Union     Spouse name: Not on file   • Number of children: Not on file   • Years of education: Not on file   • Highest education level: Not on file   Occupational History   • Not on file   Tobacco Use   • Smoking status: Never   • Smokeless tobacco: Never   Vaping Use   • Vaping Use: Never used   Substance and Sexual Activity   • Alcohol use: Yes     Comment: very little   • Drug use: No   • Sexual activity: Yes   Other Topics Concern   • Not on file   Social History Narrative   • Not on file     Social Determinants of Health     Financial Resource Strain: Low Risk    • Difficulty of Paying Living Expenses: Not very hard   Food Insecurity: Not on file   Transportation Needs: No Transportation Needs   • Lack of Transportation (Medical): No   • Lack of Transportation (Non-Medical):  No   Physical Activity: Not on file   Stress: Not on file   Social Connections: Not on file   Intimate Partner Violence: Not on file   Housing Stability: Not on file       Allergies:   No Known Allergies        Labs:  0   Lab Value Date/Time    HCT 44 7 04/15/2023 0651    HCT 41 7 04/14/2023 1136    HCT 44 2 04/08/2023 0930    HCT 45 8 06/30/2021 0904    HCT 46 8 09/06/2019 0902    HGB 15 0 04/15/2023 0651    HGB 13 9 04/14/2023 1136    HGB 14 9 04/08/2023 0930    HGB 15 6 06/30/2021 0904    HGB 15 2 09/06/2019 0902    HGB 15 3 11/21/2015 1153    WBC 6 51 04/15/2023 0651    WBC 7 70 04/14/2023 1136    WBC 7 1 04/08/2023 0930    WBC 5 7 06/30/2021 0904    WBC 5 1 09/06/2019 0902    ESR 2 04/14/2023 1136    ESR 2 12/10/2022 0928    CRP <1 0 04/14/2023 1136       Meds:    Current Facility-Administered Medications:   •  acetaminophen (TYLENOL) tablet 650 mg, 650 mg, Oral, Q6H PRN, Rickie Padilla MD  •  amLODIPine (NORVASC) tablet 5 mg, 5 mg, Oral, Daily PRN, Rickie Padilla MD  •  aspirin chewable tablet 81 mg, 81 mg, Oral, Every Other Day, Rickie Padilla MD, 81 mg at 04/14/23 1646  •  busPIRone (BUSPAR) tablet 7 5 mg, 7 5 mg, Oral, BID PRN, Rickie Padilla MD  •  gabapentin (NEURONTIN) capsule 300 mg, 300 mg, Oral, TID, Raina Hanson MD, 300 mg at 04/15/23 4058  •  hydrOXYzine HCL (ATARAX) tablet 25 mg, 25 mg, Oral, Q6H PRN, Rickie Padilla MD, 25 mg at 04/15/23 3077  •  ketorolac (TORADOL) injection 15 mg, 15 mg, Intravenous, Q6H PRN, Rickie Padilla MD, 15 mg at 04/14/23 1647  •  lactated ringers infusion, 75 mL/hr, Intravenous, Continuous, Rickie Padilla MD, Last Rate: 75 mL/hr at 04/15/23 0828, 75 mL/hr at 04/15/23 0175  •  lisinopril (ZESTRIL) tablet 20 mg, 20 mg, Oral, BID, Harvis Samples MD Valentin, 20 mg at 04/15/23 1753  •  methocarbamol (ROBAXIN) tablet 500 mg, 500 mg, Oral, BID, Rickie Padilla MD  •  metoprolol succinate (TOPROL-XL) 24 hr tablet 25 mg, 25 mg, Oral, Daily, Harvis Samples MD Valentin, 25 mg at 04/15/23 0840  •  oxyCODONE (ROXICODONE) split tablet 2 5 mg, 2 5 mg, Oral, Q6H PRN, Rickie Padilla MD    Blood Culture:   No results found for: BLOODCX    Wound Culture:    No results found for: WOUNDCULT    Ins and Outs:  No intake/output data recorded  Physical Exam:   /79   Pulse 72   Temp 97 8 °F (36 6 °C)   Resp 19   Wt 65 3 kg (144 lb)   SpO2 97%   BMI 22 55 kg/m²   Gen: No acute distress, resting comfortably in bed  HEENT: Eyes clear, moist mucus membranes, hearing intact  Respiratory: No audible wheezing or stridor  Cardiovascular: Well Perfused peripherally, 2+ distal pulse  Abdomen: nondistended, no peritoneal signs  Musculoskeletal: right upper extremity  · Skin pink dry and intact  · Severely reduced passive and Active ROM   · Sensation intact to axillary, musculocutaneous, radial, ulna, median nerves  · 4/5 motor strength to axillary, musculocutaneous, radial, ulna, median nerves  · 2+ radial and ulnar pulse   · Musculature is soft and compressible, no pain with passive stretch    Radiology:   I personally reviewed the films  X Ray Right Shoulder 4/13/23: High riding humeral head in relation to the glenoid suggestive of a chronic rotator cuff tear with moderate glenohumeral joint osteoarthritis   Evidence of prior rotator cuff repair    Ericka Howell PA-C    _*_*_*_*_*_*_*_*_*_*_*_*_*_*_*_*_*_*_*_*_*_*_*_*_*_*_*_*_*_*_*_*_*_*_*_*_*_*_*_*_*

## 2023-04-15 NOTE — TREATMENT PLAN
Discussed patient's case with neurology earlier today, neurology had additional recommendations including the following:    Plan:  MRI with and without contrast of the cervical spine  NIF/vital capacity  Gabapentin 300 mg 3 times daily  Robaxin 500 mg every 12 hours for muscle cramps  Maintenance fluids  Neurology will evaluate patient tomorrow 4/15/2023

## 2023-04-15 NOTE — PROGRESS NOTES
[facility]  Progress Note  Name: Shannon Rodgers  MRN: [id number]  Unit/Bed#: S -53 I Date of Admission: 4/14/2023   Date of Service: 4/15/2023 I Hospital Day: 1    Assessment/Plan   * Upper extremities weakness  Assessment & Plan  · Presenting with generalized weakness, worsening over the last 1 month since significant right-sided shoulder injury  · History of bilateral rotator cuff tears, with bilateral repairs, with subsequent bilateral bicep tendon ruptures  · Patient complaining of subjective weakness of the right upper and right lower extremity, as well as generalized fatigue and other symptoms including reduction in  strength, cramping and muscles of the hands feet and calves, reduction in ability to feed himself, hoarse voice, brain fog, dizziness/vertigo, reduction in exercise capacity  · Also presenting with subjective feelings of reduction in sensation of the right upper and right lower extremity  · Dramatic worsening of chronic symptoms over the last 1 month  · Has never had MRI of the brain/cervical spinal cord    Plan:  Neurology consult, possible MRI of the brain/cervical spinal cord to investigate for CNS pathology, i e  myelopathy  PT/OT evaluation  Creatinine kinase, to investigate for rhabdomyolysis, follow-up on results  LUZMARIA, follow-up on results  CRP and ESR, follow-up on results  Vitamin D level, follow-up on results  B12 and folate, follow-up on results  Regular diet    Impaired sensation  Assessment & Plan  · Patient complaining of impaired sensation on the right sided upper and lower extremities  · Physical exam demonstrating only reduction in sensation of the right lateral shoulder over the deltoid region, normal sensation on the face, chest, torso, bilateral lower extremities    Plan:  Neurology consult    Rotator cuff arthropathy of right shoulder  Assessment & Plan  · Patient has a history of rotator cuff arthropathy of the right shoulder, history of complete rotator cuff tear on the right side  · Follows with orthopedic surgery  · Recently saw orthopedic surgery yesterday 4/13 due to severe symptoms that are unresponsive to nonoperative treatments including corticosteroid injections, physical therapy, and HEP  · Patient has signed consent for a reverse total shoulder arthroplasty, which will occur in approximately 8 weeks after most recent corticosteroid injection done on 3/30/2023  · X-ray of the right shoulder done on 4/13 demonstrating a high riding humeral head in relation to the glenoid suggestive of chronic rotator cuff tear with moderate glenohumeral osteoarthritis    Plan:  Consult orthopedic surgery for evaluation to possibly expedite patient's surgery/obtain recommendations  Pain regimen with Tylenol as needed for mild pain, Toradol 15 mg every 6 hours as needed for moderate pain, and oxycodone 2 5 mg every 6 hours as needed for severe pain    Anxiety  Assessment & Plan  · Patient has a history of anxiety and whitecoat syndrome subjectively  · Home medication includes buspirone 7 5 mg daily    Plan:  Buspirone 7 5 mg daily, home medication  Atarax 25 mg every 6 hours as needed for anxiety    Allergic rhinitis  Assessment & Plan  · Patient has a history of allergic rhinitis  · Home medications include albuterol and fexofenadine 100 mg as needed  · Patient reports he has not been taking these medications lately as he is not experiencing symptoms    Plan:  Monitor    HTN (hypertension)  Assessment & Plan  · Patient has a history of hypertension  · Home medications include amlodipine 5 mg daily, lisinopril 20 mg twice daily, metoprolol 25 mg daily  · Presenting with mild elevation in blood pressure of 164/72    Plan:  Amlodipine 5 mg as needed for blood pressure greater than 150/90  Lisinopril 20 mg twice daily, home medication  Metoprolol 25 mg daily, home medication             VTE Pharmacologic Prophylaxis:   VTE Score: 2 Moderate Risk (Score 3-4) - Pharmacological DVT Prophylaxis Ordered: Enoxaparin (Lovenox)  Mechanical VTE Prophylaxis in Place: Yes    Patient Centered Rounds: I have performed bedside rounds with nursing staff today  Discussions with Specialists or Other Care Team Provider: Orthopedic surgery    Education and Discussions with Family / Patient: Updated  (wife) via phone  Current Length of Stay: 1 day(s)    Current Patient Status: Inpatient     Discharge Plan / Estimated Discharge Date: Anticipate discharge in 48-72 hrs to discharge location to be determined pending rehab evaluations  Code Status: Level 1 - Full Code      Subjective:   Continues to report upper extremity right more than left  Objective:     Vitals:   Temp (24hrs), Av 3 °F (36 8 °C), Min:97 8 °F (36 6 °C), Max:98 8 °F (37 1 °C)    Temp:  [97 8 °F (36 6 °C)-98 8 °F (37 1 °C)] 97 8 °F (36 6 °C)  HR:  [67-72] 72  Resp:  [19] 19  BP: (143-153)/(74-79) 153/79  SpO2:  [95 %-97 %] 97 %  Body mass index is 22 55 kg/m²  Input and Output Summary (last 24 hours):     No intake or output data in the 24 hours ending 04/15/23 1310    Physical Exam:     Physical Exam  Vitals and nursing note reviewed  Constitutional:       General: He is not in acute distress  Appearance: He is well-developed  HENT:      Head: Normocephalic and atraumatic  Eyes:      Conjunctiva/sclera: Conjunctivae normal    Cardiovascular:      Rate and Rhythm: Normal rate and regular rhythm  Heart sounds: No murmur heard  Pulmonary:      Effort: Pulmonary effort is normal  No respiratory distress  Breath sounds: Normal breath sounds  Abdominal:      Palpations: Abdomen is soft  Tenderness: There is no abdominal tenderness  Musculoskeletal:         General: No swelling  Cervical back: Neck supple  Skin:     General: Skin is warm and dry  Capillary Refill: Capillary refill takes less than 2 seconds     Neurological:      General: No focal deficit present  Mental Status: He is alert and oriented to person, place, and time  Sensory: Sensory deficit (LUE) present  Motor: Weakness (RUE>LUE) present     Psychiatric:      Comments: Depressed           Additional Data:     Labs:  Results from last 7 days   Lab Units 04/15/23  0651 04/14/23  1136   WBC Thousand/uL 6 51 7 70   HEMOGLOBIN g/dL 15 0 13 9   HEMATOCRIT % 44 7 41 7   PLATELETS Thousands/uL 288 276   NEUTROS PCT %  --  73   LYMPHS PCT %  --  16   MONOS PCT %  --  9   EOS PCT %  --  1     Results from last 7 days   Lab Units 04/15/23  0651 04/14/23  1136   SODIUM mmol/L 138 135   POTASSIUM mmol/L 4 3 4 0   CHLORIDE mmol/L 107 105   CO2 mmol/L 27 25   BUN mg/dL 11 14   CREATININE mg/dL 0 64 0 62   ANION GAP mmol/L 4 5   CALCIUM mg/dL 9 2 9 0   ALBUMIN g/dL  --  3 9   TOTAL BILIRUBIN mg/dL  --  0 71   ALK PHOS U/L  --  46   ALT U/L  --  32   AST U/L  --  31   GLUCOSE RANDOM mg/dL 92 105                       Imaging: Reviewed radiology reports from this admission including: MRI C    Recent Cultures (last 7 days):           Lines/Drains:  Invasive Devices     Peripheral Intravenous Line  Duration           Peripheral IV 04/14/23 Proximal;Right;Ventral (anterior) Forearm 1 day                Telemetry:        Last 24 Hours Medication List:   Current Facility-Administered Medications   Medication Dose Route Frequency Provider Last Rate   • acetaminophen  650 mg Oral Q6H PRN Cecilio Odom MD     • amLODIPine  5 mg Oral Daily PRN Cecilio Odom MD     • aspirin  81 mg Oral Every Other Day Cecilio Odom MD     • busPIRone  7 5 mg Oral BID PRN Cecilio Odom MD     • ketorolac  15 mg Intravenous Q6H PRN Cecilio Odom MD     • lisinopril  20 mg Oral BID Cecilio Odom MD     • methocarbamol  250 mg Oral BID PRN Ted Salgado MD     • [START ON 4/16/2023] methylPREDNISolone  20 mg Oral Daily Anabelle Santana PA-C      Followed by   • [START ON 4/17/2023] methylPREDNISolone  16 mg Oral Daily Ana Distance, PA-C      Followed by   • [START ON 4/18/2023] methylPREDNISolone  12 mg Oral Daily Ana Distance, PA-C      Followed by   • [START ON 4/19/2023] methylPREDNISolone  8 mg Oral Daily Ana Distance, PA-C      Followed by   • [START ON 4/20/2023] methylPREDNISolone  4 mg Oral Daily Ana Distance, PA-C     • metoprolol succinate  25 mg Oral Daily Shani Knutson MD     • pantoprazole  40 mg Oral Early Morning Bal Constantino MD          Today, Patient Was Seen By: Lamonte Reagan MD    ** Please Note: This note has been constructed using a voice recognition system   **

## 2023-04-15 NOTE — PLAN OF CARE
Problem: Potential for Falls  Goal: Patient will remain free of falls  Description: INTERVENTIONS:  - Educate patient/family on patient safety including physical limitations  - Instruct patient to call for assistance with activity   - Consult OT/PT to assist with strengthening/mobility   - Keep Call bell within reach  - Keep bed low and locked with side rails adjusted as appropriate  - Keep care items and personal belongings within reach  - Initiate and maintain comfort rounds  - Make Fall Risk Sign visible to staff  - Offer Toileting every  Hours, in advance of need  - Initiate/Maintain alarm  - Obtain necessary fall risk management equipment:   - Apply yellow socks and bracelet for high fall risk patients  - Consider moving patient to room near nurses station  Outcome: Progressing     Problem: PAIN - ADULT  Goal: Verbalizes/displays adequate comfort level or baseline comfort level  Description: Interventions:  - Encourage patient to monitor pain and request assistance  - Assess pain using appropriate pain scale  - Administer analgesics based on type and severity of pain and evaluate response  - Implement non-pharmacological measures as appropriate and evaluate response  - Consider cultural and social influences on pain and pain management  - Notify physician/advanced practitioner if interventions unsuccessful or patient reports new pain  Outcome: Progressing     Problem: PAIN - ADULT  Goal: Verbalizes/displays adequate comfort level or baseline comfort level  Description: Interventions:  - Encourage patient to monitor pain and request assistance  - Assess pain using appropriate pain scale  - Administer analgesics based on type and severity of pain and evaluate response  - Implement non-pharmacological measures as appropriate and evaluate response  - Consider cultural and social influences on pain and pain management  - Notify physician/advanced practitioner if interventions unsuccessful or patient reports new pain  Outcome: Progressing     Problem: INFECTION - ADULT  Goal: Absence or prevention of progression during hospitalization  Description: INTERVENTIONS:  - Assess and monitor for signs and symptoms of infection  - Monitor lab/diagnostic results  - Monitor all insertion sites, i e  indwelling lines, tubes, and drains  - Monitor endotracheal if appropriate and nasal secretions for changes in amount and color  - [place] appropriate cooling/warming therapies per order  - Administer medications as ordered  - Instruct and encourage patient and family to use good hand hygiene technique  - Identify and instruct in appropriate isolation precautions for identified infection/condition  Outcome: Progressing  Goal: Absence of fever/infection during neutropenic period  Description: INTERVENTIONS:  - Monitor WBC    Outcome: Progressing     Problem: SAFETY ADULT  Goal: Patient will remain free of falls  Description: INTERVENTIONS:  - Educate patient/family on patient safety including physical limitations  - Instruct patient to call for assistance with activity   - Consult OT/PT to assist with strengthening/mobility   - Keep Call bell within reach  - Keep bed low and locked with side rails adjusted as appropriate  - Keep care items and personal belongings within reach  - Initiate and maintain comfort rounds  - Make Fall Risk Sign visible to staff  - Offer Toileting every  Hours, in advance of need  - Initiate/Maintain alarm  - Obtain necessary fall risk management equipment:   - Apply yellow socks and bracelet for high fall risk patients  - Consider moving patient to room near nurses station  Outcome: Progressing  Goal: Maintain or return to baseline ADL function  Description: INTERVENTIONS:  -  Assess patient's ability to carry out ADLs; assess patient's baseline for ADL function and identify physical deficits which impact ability to perform ADLs (bathing, care of mouth/teeth, toileting, grooming, dressing, etc )  - Assess/evaluate cause of self-care deficits   - Assess range of motion  - Assess patient's mobility; develop plan if impaired  - Assess patient's need for assistive devices and provide as appropriate  - Encourage maximum independence but intervene and supervise when necessary  - Involve family in performance of ADLs  - Assess for home care needs following discharge   - Consider OT consult to assist with ADL evaluation and planning for discharge  - Provide patient education as appropriate  Outcome: Progressing  Goal: Maintains/Returns to pre admission functional level  Description: INTERVENTIONS:  - Perform BMAT or MOVE assessment daily    - Set and communicate daily mobility goal to care team and patient/family/caregiver  - Collaborate with rehabilitation services on mobility goals if consulted  - Perform Range of Motion  times a day  - Reposition patient every  hours    - Dangle patient  times a day  - Stand patient  times a day  - Ambulate patient  times a day  - Out of bed to chair  a day   - Out of bed for meals  times a day  - Out of bed for toileting  - Record patient progress and toleration of activity level   Outcome: Progressing

## 2023-04-15 NOTE — UTILIZATION REVIEW
Initial Clinical Review    Admission: Date/Time/Statement:   Admission Orders (From admission, onward)     Ordered        04/14/23 1354  INPATIENT ADMISSION  Once                      Orders Placed This Encounter   Procedures   • INPATIENT ADMISSION     Standing Status:   Standing     Number of Occurrences:   1     Order Specific Question:   Level of Care     Answer:   Med Surg [16]     Order Specific Question:   Estimated length of stay     Answer:   More than 2 Midnights     Order Specific Question:   Certification     Answer:   I certify that inpatient services are medically necessary for this patient for a duration of greater than two midnights  See H&P and MD Progress Notes for additional information about the patient's course of treatment  ED Arrival Information     Expected   4/14/2023 09:30    Arrival   4/14/2023 10:43    Acuity   Urgent            Means of arrival   Walk-In    Escorted by   Self    Service   Hospitalist    Admission type   Emergency            Arrival complaint   Shoulder Pain, Numbness, Confusion           Chief Complaint   Patient presents with   • Weakness - Generalized     Pt c/o generalized weakness x2 weeks, worse since Monday  Also c/o right arm weakness r/t rotator cuff injury x1 year, saw ortho yesterday       Initial Presentation: 69 y o  male PMH right-sided rotator cuff tear with osteoarthritis of the right shoulder, bilateral rotator cuff injuries, bilateral bicep tendon ruptures w/ OP evaluated by orthopedic surgery for total right shoulder replacement,  allergic rhinitis, mitral valve prolapse, hypertension, anxiety, OP Neuro eval for gen weakness to ED presents self with Dramatic change of generalized weakness and changes in sensation on the right side of patient's body   Reports  cramping of muscles of feet/ calves, reduction in ability to feed himself, hoarse voice, brain fog, dizziness/ vertigo, reduction in exercise capacity;     EXAM taurus shoulder deformities; R shoulder active & passive movements severely limited, wo focal deficits  MRI spine reveals multilevel degenerative changes, mild to moderate canal stenosis and foraminal narrowing w no cord compression    Inpatient admission due to Gen weakness, Rotator cuff arthropathy of R shoulder, Impaired sensation  Neurology consult for possible MRI brain/cervical spine, PT/OT, follow up labs  Consult ORTHO for eval to expedite upcoming surgery; pain regimen  Cont home meds  Date: 4/15   Day 2:   Attending MD NAVA upper extremity weakness will need follow up OP ; MRI rule out Cervical lumbar stenosis; NEURO rec Rt Brachial Plexus MRI to assess nerve injury distally, Neurology suspects nerve injury from rotator cuff injury  Patient does not want Gabapentin  IV toradol for pain DC Oxycodone, PRN Robaxin  Neurology  Symptoms appear mostly isolated to RUE although strength testing limited by pain; systemic symptoms appear related to increased frustration & worry w ORTHO issues & pain  Obtain MRI brachial plexus injury  DC gabapentin, change Robaxin Q 12hr, steroid taper per Ortho, cont MAG, PT/ OT, OP Ortho  ORTHO   XR reveal  bilateral rotator cuff arthropathy  Bilateral arthritis  Seen by OP Ortho in the past and has recommended a total shoulder replacement  At this point a cortisone injection would prohibit him from getting the joint replacement anytime soon recommend oral anti-inflammatories and oral steroids only  He should follow-up with OP Ortho in the office to schedule surgery      ED Triage Vitals   Temperature Pulse Respirations Blood Pressure SpO2   04/14/23 1048 04/14/23 1048 04/14/23 1048 04/14/23 1048 04/14/23 1048   98 8 °F (37 1 °C) 72 18 164/72 98 %      Temp src Heart Rate Source Patient Position - Orthostatic VS BP Location FiO2 (%)   -- 04/14/23 1048 04/14/23 1209 04/14/23 1209 --    Monitor Sitting Right arm       Pain Score       04/14/23 1048       6          Wt Readings from Last 1 Encounters:   04/14/23 65 3 kg (144 lb)     Additional Vital Signs:   Date/Time Temp Pulse Resp BP MAP (mmHg) SpO2 O2 Device Patient Position - Orthostatic VS   04/15/23 07:22:27 97 8 °F (36 6 °C) 72 19 153/79 104 97 % -- --   04/14/23 16:46:24 -- 67 -- 143/74 97 95 % -- --   04/14/23 16:01:58 98 8 °F (37 1 °C) 71 -- 145/78 100 97 % -- --   04/14/23 1209 -- 69 16 147/73 -- 98 % None (Room air) Sitting   04/14/23 1048 98 8 °F (37 1 °C) 72 18 164/72 104 98 % None (Room air) --     Weights (last 14 days)    Date/Time Weight Weight Method   04/14/23 1049 65 3 kg (144 lb) Stated       Pertinent Labs/Diagnostic Test Results:   MRI cervical spine w wo contrast   Final Result by Doc Lam DO (04/15 1984)      Multilevel cervical spondylitic degenerative change with mild to moderate canal stenosis and foraminal narrowing  No cord compression or severe foraminal nerve impingement  CT head without contrast   Final Result by Janie Oneal MD (04/14 1243)      No acute intracranial abnormality        MRI brachial plexus wo contrast    (Results Pending)         Results from last 7 days   Lab Units 04/15/23  0651 04/14/23  1136   WBC Thousand/uL 6 51 7 70   HEMOGLOBIN g/dL 15 0 13 9   HEMATOCRIT % 44 7 41 7   PLATELETS Thousands/uL 288 276   NEUTROS ABS Thousands/µL  --  5 64         Results from last 7 days   Lab Units 04/15/23  0651 04/14/23  1136   SODIUM mmol/L 138 135   POTASSIUM mmol/L 4 3 4 0   CHLORIDE mmol/L 107 105   CO2 mmol/L 27 25   ANION GAP mmol/L 4 5   BUN mg/dL 11 14   CREATININE mg/dL 0 64 0 62   EGFR ml/min/1 73sq m 99 101   CALCIUM mg/dL 9 2 9 0   MAGNESIUM mg/dL 2 2  --      Results from last 7 days   Lab Units 04/14/23  1136   AST U/L 31   ALT U/L 32   ALK PHOS U/L 46   TOTAL PROTEIN g/dL 6 1*   ALBUMIN g/dL 3 9   TOTAL BILIRUBIN mg/dL 0 71         Results from last 7 days   Lab Units 04/15/23  0651 04/14/23  1136   GLUCOSE RANDOM mg/dL 92 105             No results found for: BETA-HYDROXYBUTYRATE Results from last 7 days   Lab Units 04/14/23  1136   CK TOTAL U/L 289     Results from last 7 days   Lab Units 04/14/23  1845 04/14/23  1136   HS TNI 0HR ng/L  --  7   HS TNI 2HR ng/L 11  --    HSTNI D2 ng/L 4  --              Results from last 7 days   Lab Units 04/14/23  1136   TSH 3RD GENERATON uIU/mL 0 689                                     Results from last 7 days   Lab Units 04/14/23  1136   CRP mg/L <1 0   SED RATE mm/hour 2       ED Treatment:   Medication Administration from 04/14/2023 0838 to 04/14/2023 1558       Date/Time Order Dose Route Action     04/14/2023 1138 EDT sodium chloride 0 9 % bolus 500 mL 500 mL Intravenous New Bag        Past Medical History:   Diagnosis Date   • Asthma    • Hypertension    • Impaired fasting glucose    • Mitral valve disorder    • Mitral valve prolapse    • Murmur, cardiac    • 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    • PAC (premature atrial contraction)    • PVC (premature ventricular contraction)    • Thyroid nodule      Present on Admission:  • HTN (hypertension)  • Allergic rhinitis  • Anxiety  • Rotator cuff arthropathy of right shoulder  • Upper extremity weakness      Admitting Diagnosis: Right sided weakness [R53 1]  Age/Sex: 69 y o  male  Admission Orders:  Up OOB   MRi brachial plexus wo contrast    Scheduled Medications:  aspirin, 81 mg, Oral, Every Other Day  lisinopril, 20 mg, Oral, BID  [START ON 4/16/2023] methylPREDNISolone, 20 mg, Oral, Daily   Followed by  Kojo Gallagher ON 4/17/2023] methylPREDNISolone, 16 mg, Oral, Daily   Followed by  Kojo Gallagher ON 4/18/2023] methylPREDNISolone, 12 mg, Oral, Daily   Followed by  Kojo Gallagher ON 4/19/2023] methylPREDNISolone, 8 mg, Oral, Daily   Followed by  Kojo Gallagher ON 4/20/2023] methylPREDNISolone, 4 mg, Oral, Daily  metoprolol succinate, 25 mg, Oral, Daily  pantoprazole, 40 mg, Oral, Early Morning    Continuous IV Infusions:     PRN Meds:  acetaminophen, 650 mg, Oral, Q6H PRN  amLODIPine, 5 mg, Oral, Daily PRN  busPIRone, 7 5 mg, Oral, BID PRN  ketorolac, 15 mg, Intravenous, Q6H PRN  methocarbamol, 250 mg, Oral, BID PRN    IP CONSULT TO NEUROLOGY  IP CONSULT TO ORTHOPEDIC SURGERY    Network Utilization Review Department  ATTENTION: Please call with any questions or concerns to [phone] and carefully listen to the prompts so that you are directed to the right person  All voicemails are confidential   Ailyn Perry all requests for admission clinical reviews, approved or denied determinations and any other requests to dedicated fax number below belonging to the campus where the patient is receiving treatment   List of dedicated fax numbers for the Facilities:  [address] DENIALS (Administrative/Medical Necessity) [phone]   [address] (Maternity/NICU/Pediatrics) [phone]   7 Joanne Vargas [phone]   [facility] [address] [phone]   [address] [address] Artemio Daily 28 [phone]   [facility] [place] [facility] [address] 8[address] [facility] [phone]

## 2023-04-15 NOTE — CONSULTS
Consultation - Neurology   Rory Lira 69 y o  male MRN: [id number]  Unit/Bed#: S -29 Encounter: [id number]      Assessment/Plan     * Upper extremity weakness  Assessment & Plan  69 y o  male with hypertension, asthma, PACs/PVCs, recent right rotator cuff injury on aspirin 81 mg who presented to the ED on 4/14/2023 due to progressive generalized weakness, right greater than left, over the past month to the point where he reports he is unable to brush his teeth or feed himself with his right hand  Additionally he reported a 10 pound weight loss due to difficulty feeding himself, mild confusion, right thumb and right lower extremity numbness and difficulty ambulating secondary to weakness of his right lower extremity  Work-up:  · CTH negative for acute abnormality  · MRI C-spine with and without contrast: Multilevel cervical spondylitic degenerative change with mild to moderate canal stenosis and foraminal narrowing  No cord compression or severe foraminal nerve impingement  · Normal/Negative serum studies: TSH, CK, CRP, ESR, folate, LUZMARIA, Vitamin D 25 hydroxy  · B12 1316  · VC 2 7L  · NIF -60cm H20    On neuro exam, patient's symptoms appear to be mostly isolated to his RUE, though strength testing was limited by pain  Systemic symptoms appear to be more likely related to patient's increased frustration and worry with his orthopaedic issues and pain  For full evaluation, will obtain MRI brachial plexus  Plan:  · Discontinue Gabapentin 300 mg 3 times daily  · Change Robaxin 500 mg every 12 hours prn  · Continue Steroid taper as recommended by Ortho  · Continue Magnesium as patient reports this improves symptoms  · Obtain MRI brachial plexus  · PT/OT  · Outpatient ortho follow-up    Rory Lira will need follow up in in 6 weeks with neuromuscular attending  He will require a EMG/NCS within 4-6 weeks      History of Present Illness     Reason for Consult / Principal Problem: Generalized weakness  Hx and PE limited by: NA  HPI: Teena Solares is a 69 y o  right handed male with hypertension, asthma, PACs/PVCs, recent right rotator cuff injury on aspirin 81 mg at baseline who presented to the ED on 4/14/2023 due to progressive generalized weakness, right greater than left, over the past month  In the ED, patient reported that over the last 3 days symptoms have significantly worsened to the point where he is unable to brush his teeth or feed himself with his right hand  Additionally he reported a 10 pound weight loss due to difficulty eating from pain, mild confusion, right thumb and right lower extremity numbness and difficulty ambulating secondary to weakness of his right lower extremity  SBPs ranging between 140s-160s     3/23/23 MRI R shoulder: Prior rotator cuff repair with full thickness re tears of the supraspinatus and infraspinatus tendons  There is significant retraction and fatty muscle atrophy  On 3/30 patient had a R shoulder arthrocentesis and steroid injection He reported that for a few days after it improved his pain and minimally improved his mobility but symptoms progressively worsened a few days after that  Patient saw ortho in followup on 4/13 and at that time a Medrol dose pack was prescribed which was started here in the hospital      This admission, CT head negative for acute intracranial abnormality  CT C spine revealed multilevel cervical spondylitic degenerative change with mild to moderate canal stenosis and foraminal narrowing  No cord compression or severe foraminal nerve impingement  Labs unremakable and detailed above  Patient reported that in the beginning of March he was picking up a bucket of pellets and said he knew right away that he tore his rotator cuff; however over time his movement has reduced to the point where he has significant difficulty moving his RUE and with  strength   On 4/10/23 he had a root canal and needed to sit in a chair for a prolonged point of time He notes that as a result he has had increasing pain in the right shoulder due to postioning and has since had progressive worsening with increasing difficulty using his RUE  He does note more chronic RLE weakness and numbness related to neuropathy, though notes that his RLE and his balance is a bit worse because he is spending more time sitting and not as much activity on a regular basis  He also notes chronic L frozen shoulder as well  Patient states he tried to minimize medications if possible but does note that Magnesium helps his symptoms  Today reports that he has no pain if he doesn't move his arm, but if he uses it it significant increases in pain in his shoulder and arm pit  He notes that he had to cancel his previously scheduled EMG in [place] because he reports he had too much going on and it was too long of a drive with his pain  Inpatient consult to Neurology  Consult performed by: Wendy Elizabeth PA-C  Consult ordered by: Lamine Camp MD        Review of Systems   Constitutional: Positive for activity change (decreased due to pain/decreased arm mobility)  HENT: Negative for trouble swallowing  Eyes: Negative for visual disturbance  Respiratory: Negative for shortness of breath  Cardiovascular: Negative for chest pain  Musculoskeletal: Positive for arthralgias (R shoulder>L)  Neurological: Positive for weakness (RUE over the last 1-2 months, chronic RLE) and numbness (R thumb and R finger tips, L distal middle finger, BLE distal toes)  Negative for speech difficulty     Psychiatric/Behavioral:        Increased worrying       Historical Information   Past Medical History:   Diagnosis Date   • Asthma    • Hypertension    • Impaired fasting glucose    • Mitral valve disorder    • Mitral valve prolapse    • Murmur, cardiac    • 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    • PAC (premature atrial contraction)    • PVC (premature ventricular contraction)    • Thyroid nodule      Past Surgical History:   Procedure Laterality Date   • HAND SURGERY     • AL COLONOSCOPY FLX DX W/COLLJ SPEC WHEN PFRMD N/A 2/9/2018    Procedure: COLONOSCOPY;  Surgeon: Melita Basurto MD;  Location: [facility]; Service: Gastroenterology   • PROSTATE BIOPSY     • SHOULDER SURGERY       Social History   Social History     Substance and Sexual Activity   Alcohol Use Yes    Comment: very little     Social History     Substance and Sexual Activity   Drug Use No     E-Cigarette/Vaping   • E-Cigarette Use Never User      E-Cigarette/Vaping Substances   • Nicotine No    • THC No    • CBD No    • Flavoring No      Social History     Tobacco Use   Smoking Status Never   Smokeless Tobacco Never     Family History:   Family History   Problem Relation Age of Onset   • Diabetes Sister    • Other Family         Stroke syndrome       Review of previous medical records was completed  Meds/Allergies   all current active meds have been reviewed    No Known Allergies    Objective   Vitals:Blood pressure 153/79, pulse 72, temperature 97 8 °F (36 6 °C), resp  rate 19, weight 65 3 kg (144 lb), SpO2 97 %  ,Body mass index is 22 55 kg/m²  No intake or output data in the 24 hours ending 04/15/23 1524    Invasive Devices: Invasive Devices     Peripheral Intravenous Line  Duration           Peripheral IV 04/14/23 Proximal;Right;Ventral (anterior) Forearm 1 day              Exam completed by Dr Veronica Mckeon with myself at bedside  Physical Exam  Constitutional:       General: He is not in acute distress  Appearance: Normal appearance  He is well-developed  He is not ill-appearing, toxic-appearing or diaphoretic  HENT:      Head: Normocephalic and atraumatic  Eyes:      General: No scleral icterus  Right eye: No discharge  Left eye: No discharge        Extraocular Movements: Extraocular movements intact and EOM normal       Conjunctiva/sclera: Conjunctivae normal  Pupils: Pupils are equal, round, and reactive to light  Cardiovascular:      Rate and Rhythm: Normal rate and regular rhythm  Pulmonary:      Effort: Pulmonary effort is normal  No respiratory distress  Breath sounds: No stridor  Musculoskeletal:         General: No tenderness or deformity  Cervical back: Normal range of motion and neck supple  No rigidity or tenderness  Lymphadenopathy:      Cervical: No cervical adenopathy  Skin:     General: Skin is warm and dry  Findings: No erythema or rash  Neurological:      Mental Status: He is alert and oriented to person, place, and time  Coordination: Heel to Shin Test normal  Finger-nose-finger test: L: no ataxia though mobility limited, Unable to test R       Gait: Gait is intact  Psychiatric:         Speech: Speech normal        Neurologic Exam     Mental Status   Oriented to person, place, and time  Follows 2 step commands  Attention: normal  Concentration: normal    Speech: speech is normal   Level of consciousness: alert  Knowledge: good  Normal comprehension  Cranial Nerves     CN II   Visual acuity: normal  Right visual field deficit: none  Left visual field deficit: none     CN III, IV, VI   Pupils are equal, round, and reactive to light  Extraocular motions are normal    Nystagmus: none   Conjugate gaze: present    CN V   Facial sensation intact  Minimally decreased L nasolabial fold on the left     Motor Exam   Muscle bulk: Grossly normal aside from minimal thenal emminence atrophy in the L  Overall muscle tone: normal  RUE: Biceps 3 limited by pain, Tricep 4  Wrist extension 4-, Wrist flexion 5, Finger extension 4/4-, Finger abduction 4,  and finger flexion 5, thumb extension 4-    LUE: Abduction, elbow flexion,  5, finger abduction, finger flexion 5, finger and thumb extension 4  Limited L shoulder ROM due to frozen shoulder  Limited DIP ROM due to prior injury      BLE 5/5 throughout       Sensory Exam Right arm vibration: normal  Left arm vibration: normal  Right leg vibration: decreased from ankle  Left leg vibration: decreased from ankle  Right leg proprioception: normal  Left leg proprioception: normal    Pin prick:   LUE intact proximally aside from patchy decrease in the doral middle and lower forearm and dorsum of hand and in the hand distal to injury/scarring pin prick is diminished  RUE: Intact throughout proximally and distally including dorsal surface and throughout palmar surface of hand and digits  BLE intact throughout     Gait, Coordination, and Reflexes     Gait  Gait: normal (cautious)    Coordination   Finger-nose-finger test: L: no ataxia though mobility limited, Unable to test R  Heel to winter coordination: normal    Reflexes   Right plantar: equivocal  Left plantar: equivocal  Right Chavira: present  Left Chavira reflex: unable to evaluate due to surgery  DTRs:  Patellars, Achilles: 3+ bilaterally  LUE 3+  RUE: biceps brachi trace, biceps absent, triceps 2       Lab Results: I have personally reviewed pertinent reports  Imaging Studies: I have personally reviewed pertinent films in PACS  EKG, Pathology, and Other Studies: I have personally reviewed pertinent reports      VTE Prophylaxis: Sequential compression device (Venodyne)     Code Status: Level 1 - Full Code

## 2023-04-16 VITALS
OXYGEN SATURATION: 97 % | BODY MASS INDEX: 22.55 KG/M2 | DIASTOLIC BLOOD PRESSURE: 75 MMHG | WEIGHT: 144 LBS | SYSTOLIC BLOOD PRESSURE: 143 MMHG | RESPIRATION RATE: 18 BRPM | HEART RATE: 63 BPM | TEMPERATURE: 98.4 F

## 2023-04-16 RX ORDER — METHYLPREDNISOLONE 8 MG/1
8 TABLET ORAL DAILY
Qty: 1 TABLET | Refills: 0 | Status: SHIPPED | OUTPATIENT
Start: 2023-04-19 | End: 2023-04-20

## 2023-04-16 RX ORDER — METHYLPREDNISOLONE 16 MG/1
16 TABLET ORAL DAILY
Qty: 1 TABLET | Refills: 0 | Status: SHIPPED | OUTPATIENT
Start: 2023-04-17 | End: 2023-04-18

## 2023-04-16 RX ORDER — METHYLPREDNISOLONE 4 MG/1
4 TABLET ORAL DAILY
Qty: 1 TABLET | Refills: 0 | Status: SHIPPED | OUTPATIENT
Start: 2023-04-20 | End: 2023-04-21

## 2023-04-16 RX ORDER — METHYLPREDNISOLONE 4 MG/1
12 TABLET ORAL DAILY
Qty: 3 TABLET | Refills: 0 | Status: SHIPPED | OUTPATIENT
Start: 2023-04-18 | End: 2023-04-19

## 2023-04-16 RX ORDER — PANTOPRAZOLE SODIUM 40 MG/1
40 TABLET, DELAYED RELEASE ORAL
Qty: 90 TABLET | Refills: 0 | Status: SHIPPED | OUTPATIENT
Start: 2023-04-17

## 2023-04-16 RX ADMIN — METHYLPREDNISOLONE 20 MG: 4 TABLET ORAL at 07:50

## 2023-04-16 RX ADMIN — ACETAMINOPHEN 650 MG: 325 TABLET ORAL at 07:50

## 2023-04-16 RX ADMIN — BUSPIRONE HYDROCHLORIDE 7.5 MG: 5 TABLET ORAL at 11:16

## 2023-04-16 RX ADMIN — GADOBUTROL 6 ML: 604.72 INJECTION INTRAVENOUS at 10:10

## 2023-04-16 RX ADMIN — METOPROLOL SUCCINATE 25 MG: 25 TABLET, EXTENDED RELEASE ORAL at 07:50

## 2023-04-16 RX ADMIN — LISINOPRIL 20 MG: 20 TABLET ORAL at 07:49

## 2023-04-16 RX ADMIN — PANTOPRAZOLE SODIUM 40 MG: 40 TABLET, DELAYED RELEASE ORAL at 06:05

## 2023-04-16 RX ADMIN — ASPIRIN 81 MG 81 MG: 81 TABLET ORAL at 07:50

## 2023-04-16 NOTE — ASSESSMENT & PLAN NOTE
· Patient has a history of rotator cuff arthropathy of the right shoulder, history of complete rotator cuff tear on the right side  · Follows with orthopedic surgery  · Recently saw orthopedic surgery yesterday 4/13 due to severe symptoms that are unresponsive to nonoperative treatments including corticosteroid injections, physical therapy, and HEP  · Patient has signed consent for a reverse total shoulder arthroplasty, which will occur in approximately 8 weeks after most recent corticosteroid injection done on 3/30/2023  · X-ray of the right shoulder done on 4/13 demonstrating a high riding humeral head in relation to the glenoid suggestive of chronic rotator cuff tear with moderate glenohumeral osteoarthritis    Plan:   Will follow with orthopedic surgery outpatient, likely total shoulder replacement  Oral steroids and oral antiinflammatories only per ortho  Pain regimen with Tylenol as needed for mild pain

## 2023-04-16 NOTE — PLAN OF CARE
Problem: Potential for Falls  Goal: Patient will remain free of falls  Description: INTERVENTIONS:  - Educate patient/family on patient safety including physical limitations  - Instruct patient to call for assistance with activity   - Consult OT/PT to assist with strengthening/mobility   - Keep Call bell within reach  - Keep bed low and locked with side rails adjusted as appropriate  - Keep care items and personal belongings within reach  - Initiate and maintain comfort rounds  - Consider moving patient to room near nurses station  Outcome: Progressing     Problem: PAIN - ADULT  Goal: Verbalizes/displays adequate comfort level or baseline comfort level  Description: Interventions:  - Encourage patient to monitor pain and request assistance  - Assess pain using appropriate pain scale  - Administer analgesics based on type and severity of pain and evaluate response  - Implement non-pharmacological measures as appropriate and evaluate response  - Consider cultural and social influences on pain and pain management  - Notify physician/advanced practitioner if interventions unsuccessful or patient reports new pain  Outcome: Progressing     Problem: INFECTION - ADULT  Goal: Absence or prevention of progression during hospitalization  Description: INTERVENTIONS:  - Assess and monitor for signs and symptoms of infection  - Monitor lab/diagnostic results  - Monitor all insertion sites, i e  indwelling lines, tubes, and drains  - Monitor endotracheal if appropriate and nasal secretions for changes in amount and color  - Harmon appropriate cooling/warming therapies per order  - Administer medications as ordered  - Instruct and encourage patient and family to use good hand hygiene technique  - Identify and instruct in appropriate isolation precautions for identified infection/condition  Outcome: Progressing  Goal: Absence of fever/infection during neutropenic period  Description: INTERVENTIONS:  - Monitor WBC    Outcome: Progressing     Problem: SAFETY ADULT  Goal: Patient will remain free of falls  Description: INTERVENTIONS:  - Educate patient/family on patient safety including physical limitations  - Instruct patient to call for assistance with activity   - Consult OT/PT to assist with strengthening/mobility   - Keep Call bell within reach  - Keep bed low and locked with side rails adjusted as appropriate  - Keep care items and personal belongings within reach  - Initiate and maintain comfort rounds  - Consider moving patient to room near nurses station  Outcome: Progressing  Goal: Maintain or return to baseline ADL function  Description: INTERVENTIONS:  -  Assess patient's ability to carry out ADLs; assess patient's baseline for ADL function and identify physical deficits which impact ability to perform ADLs (bathing, care of mouth/teeth, toileting, grooming, dressing, etc )  - Assess/evaluate cause of self-care deficits   - Assess range of motion  - Assess patient's mobility; develop plan if impaired  - Assess patient's need for assistive devices and provide as appropriate  - Encourage maximum independence but intervene and supervise when necessary  - Involve family in performance of ADLs  - Assess for home care needs following discharge   - Consider OT consult to assist with ADL evaluation and planning for discharge  - Provide patient education as appropriate  Outcome: Progressing  Goal: Maintains/Returns to pre admission functional level  Description: INTERVENTIONS:  - Perform BMAT or MOVE assessment daily    - Set and communicate daily mobility goal to care team and patient/family/caregiver  - Collaborate with rehabilitation services on mobility goals if consulted  - Perform Range of Motion 4 times a day  - Reposition patient every 2 hours    - Dangle patient 3 times a day  - Stand patient 3 times a day  - Ambulate patient 3 times a day  - Out of bed to chair 3 times a day   - Out of bed for meals 3 times a day  - Out of bed for toileting  - Record patient progress and toleration of activity level   Outcome: Progressing     Problem: DISCHARGE PLANNING  Goal: Discharge to home or other facility with appropriate resources  Description: INTERVENTIONS:  - Identify barriers to discharge w/patient and caregiver  - Arrange for needed discharge resources and transportation as appropriate  - Identify discharge learning needs (meds, wound care, etc )  - Arrange for interpretive services to assist at discharge as needed  - Refer to Case Management Department for coordinating discharge planning if the patient needs post-hospital services based on physician/advanced practitioner order or complex needs related to functional status, cognitive ability, or social support system  Outcome: Progressing     Problem: Knowledge Deficit  Goal: Patient/family/caregiver demonstrates understanding of disease process, treatment plan, medications, and discharge instructions  Description: Complete learning assessment and assess knowledge base  Interventions:  - Provide teaching at level of understanding  - Provide teaching via preferred learning methods  Outcome: Progressing     Problem: Nutrition/Hydration-ADULT  Goal: Nutrient/Hydration intake appropriate for improving, restoring or maintaining nutritional needs  Description: Monitor and assess patient's nutrition/hydration status for malnutrition  Collaborate with interdisciplinary team and initiate plan and interventions as ordered  Monitor patient's weight and dietary intake as ordered or per policy  Utilize nutrition screening tool and intervene as necessary  Determine patient's food preferences and provide high-protein, high-caloric foods as appropriate       INTERVENTIONS:  - Monitor oral intake, urinary output, labs, and treatment plans  - Assess nutrition and hydration status and recommend course of action  - Evaluate amount of meals eaten  - Allow adequate time for meals  - Assess need for intravenous fluids  - Provide specific nutrition/hydration education as appropriate  - Include patient/family/caregiver in decisions related to nutrition  Outcome: Progressing

## 2023-04-16 NOTE — DISCHARGE SUMMARY
[facility]- [facility] 1955, 69 y o  male MRN: [id number]  Unit/Bed#: S -01 Encounter: [id number]  Primary Care Provider: Ortiz Roger DO   Date and time admitted to hospital: 4/14/2023 10:50 AM    * Upper extremity weakness  Assessment & Plan  · Presenting with generalized weakness, worsening over the last 1 month since significant right-sided shoulder injury  · History of bilateral rotator cuff tears, with bilateral repairs, with subsequent bilateral bicep tendon ruptures  · Patient complaining of subjective weakness of the right upper and right lower extremity, as well as generalized fatigue and other symptoms including reduction in  strength, cramping and muscles of the hands feet and calves, reduction in ability to feed himself, hoarse voice, brain fog, dizziness/vertigo, reduction in exercise capacity  · Also presenting with subjective feelings of reduction in sensation of the right upper and right lower extremity  · Dramatic worsening of chronic symptoms over the last 1 month  · MRI C spine: Multilevel cervical spondylitic degenerative change with mild to moderate canal stenosis and foraminal narrowing  No cord compression or severe foraminal nerve impingement  · LUZMARIA, CRP ESR, and CK unremarkable  · MRI brachial plexus: No discrete signal abnormality or abnormal enhancement identified along the course of the right brachial plexus  No compressive hematoma is identified      Plan:  Follow up with neurology and ortho outpatient  PT/OT evaluation  Regular diet    Rotator cuff arthropathy of right shoulder  Assessment & Plan  · Patient has a history of rotator cuff arthropathy of the right shoulder, history of complete rotator cuff tear on the right side  · Follows with orthopedic surgery  · Recently saw orthopedic surgery yesterday 4/13 due to severe symptoms that are unresponsive to nonoperative treatments including corticosteroid injections, physical therapy, and HEP  · Patient has signed consent for a reverse total shoulder arthroplasty, which will occur in approximately 8 weeks after most recent corticosteroid injection done on 3/30/2023  · X-ray of the right shoulder done on 4/13 demonstrating a high riding humeral head in relation to the glenoid suggestive of chronic rotator cuff tear with moderate glenohumeral osteoarthritis    Plan: Will follow with orthopedic surgery outpatient, likely total shoulder replacement  Oral steroids and oral antiinflammatories only per ortho  Pain regimen with Tylenol as needed for mild pain    Impaired sensation  Assessment & Plan  · Patient complaining of impaired sensation on the right sided upper and lower extremities  · Physical exam demonstrating only reduction in sensation of the right lateral shoulder over the deltoid region, normal sensation on the face, chest, torso, bilateral lower extremities    Plan:  Neurology consult    Anxiety  Assessment & Plan  · Patient has a history of anxiety and whitecoat syndrome subjectively  · Home medication includes buspirone 7 5 mg daily    Plan:  Buspirone 7 5 mg daily, home medication  Atarax 25 mg every 6 hours as needed for anxiety        Medical Problems     Resolved Problems  Date Reviewed: 4/15/2023   None       Discharging Resident: Annmarie Cordon DO  Discharging Attending: No att  providers found  PCP: Chana Sanders DO  Admission Date:   Admission Orders (From admission, onward)     Ordered        04/14/23 1354  INPATIENT ADMISSION  Once                      Discharge Date: 04/17/23    Consultations During Hospital Stay:  · Neurology, ortho    Procedures Performed:   · none    Significant Findings / Test Results:   · MRI brachial plexus: No discrete signal abnormality or abnormal enhancement identified along the course of the right brachial plexus  No compressive hematoma is identified    · MRI C spine: Multilevel cervical spondylitic degenerative change with mild to moderate canal stenosis and foraminal narrowing  No cord compression or severe foraminal nerve impingement  Incidental Findings:   · none    Test Results Pending at Discharge (will require follow up):  · none     Outpatient Tests Requested:  · none    Complications:  none    Reason for Admission: upper extremity weakness    Hospital Course: Teena Solares is a 69 y o  male patient who originally presented to the hospital on 4/14/2023 due to generalized weakness that has been worsening over the last month  LUZMARIA, CRP, ESR, and CK levels were drawn which were unremarkable  B12 and folate levels were elevated  Neurology was consulted  MRI C spine was performed which showed multilevel degenerative disc disease  MRI brachial plexus study was performed which showed no abnormality   Patient's chart was continuously review and X-ray of the right shoulder done on 4/13 demonstrating a high riding humeral head in relation to the glenoid suggestive of chronic rotator cuff tear with moderate glenohumeral osteoarthritis so Ortho was consulted  They states that patient will need a total shoulder replacement outpatient  Since patient got a cortisone injection in March the surgery has to be delayed  In the mean time they recommended oral steroid taper and oral antiinflammatories  Patient's pain was treated with Toradol and robaxin  Patient was offered gabapentin and oxycodone but did not want to take it  Patient also given IV fluids during the hospital course  Patient deemed fit for discharge on 4/16  Please see above list of diagnoses and related plan for additional information  Condition at Discharge: stable    Discharge Day Visit / Exam:   Subjective:  Patient seen at bedside this morning  Patient still have bilateral upper extremity weakness R>L   No acute events over night  Vitals: Blood Pressure: 143/75 (04/16/23 1521)  Pulse: 63 (04/16/23 1521)  Temperature: 98 4 °F (36 9 °C) (04/16/23 1521)  Respirations: 18 (04/16/23 1521)  Weight - Scale: 65 3 kg (144 lb) (04/14/23 1049)  SpO2: 97 % (04/16/23 1521)  Exam:   Physical Exam  Vitals and nursing note reviewed  Constitutional:       General: He is not in acute distress  Appearance: He is well-developed  HENT:      Head: Normocephalic and atraumatic  Eyes:      Conjunctiva/sclera: Conjunctivae normal    Cardiovascular:      Rate and Rhythm: Normal rate and regular rhythm  Heart sounds: No murmur heard  Pulmonary:      Effort: Pulmonary effort is normal  No respiratory distress  Breath sounds: Normal breath sounds  Abdominal:      Palpations: Abdomen is soft  Tenderness: There is no abdominal tenderness  Musculoskeletal:         General: No swelling  Cervical back: Neck supple  Skin:     General: Skin is warm and dry  Capillary Refill: Capillary refill takes less than 2 seconds  Neurological:      Mental Status: He is alert  Sensory: Sensory deficit (LUE) present  Motor: Weakness (bilateral upper extremity but RUE>LUE) present  Psychiatric:         Mood and Affect: Mood is anxious and depressed  Discussion with Family: Patient declined call to   Discharge instructions/Information to patient and family:   See after visit summary for information provided to patient and family  Provisions for Follow-Up Care:  See after visit summary for information related to follow-up care and any pertinent home health orders  Disposition:   Home    Planned Readmission: none    Discharge Medications:  See after visit summary for reconciled discharge medications provided to patient and/or family        **Please Note: This note may have been constructed using a voice recognition system**

## 2023-04-16 NOTE — DISCHARGE INSTR - AVS FIRST PAGE
"Dear Bernie Modi,     It was our pleasure to care for you here at [facility]  It is our hope that we were always able to exceed the expected standards for your care during your stay  You were hospitalized due to upper extremity weakness  You were cared for on the 4th floor by Tobi Lazcano DO under the service of Ted Salgado MD with the [facility] who covers for your primary care physician (PCP), Gela Velázquez DO, while you were hospitalized  If you have any questions or concerns related to this hospitalization, you may contact us at [phone]  For follow up as well as any medication refills, we recommend that you follow up with your primary care physician  A registered nurse will reach out to you by phone within a few days after your discharge to answer any additional questions that you may have after going home  However, at this time we provide for you here, the most important instructions / recommendations at discharge:     Notable Medication Adjustments -   Please continue taking medrol dose taper  Please take 16 mg tomorrow and then decrease by 4 mg daily  Last dose should be on 4/20  Testing Required after Discharge -   none  Important follow up information -   Please go to PCP appointment outpatient on 4/19  Please look at upcoming appointment section for more details  Please follow up with Orthopedic surgery outpatient  Please call and schedule an appointment within a couple days of discharge for earliest follow up appointment   Please follow up with Neurology outpatient   Please look at [facility] up\" provider section for more details and instructions  Other Instructions -   Please make sure to make follow up appointments  Please continue to work with PT outpatient   Please review this entire after visit summary as additional general instructions including medication list, appointments, activity, diet, any " pertinent wound care, and other additional recommendations from your care team that may be provided for you        Sincerely,     [employer], DO

## 2023-04-16 NOTE — PLAN OF CARE
Problem: Potential for Falls  Goal: Patient will remain free of falls  Description: INTERVENTIONS:  - Educate patient/family on patient safety including physical limitations  - Instruct patient to call for assistance with activity   - Consult OT/PT to assist with strengthening/mobility   - Keep Call bell within reach  - Keep bed low and locked with side rails adjusted as appropriate  - Keep care items and personal belongings within reach  - Initiate and maintain comfort rounds  - Make Fall Risk Sign visible to staff  - Offer Toileting every  Hours, in advance of need  - Initiate/Maintain alarm  - Obtain necessary fall risk management equipment:   - Apply yellow socks and bracelet for high fall risk patients  - Consider moving patient to room near nurses station  Outcome: Progressing     Problem: PAIN - ADULT  Goal: Verbalizes/displays adequate comfort level or baseline comfort level  Description: Interventions:  - Encourage patient to monitor pain and request assistance  - Assess pain using appropriate pain scale  - Administer analgesics based on type and severity of pain and evaluate response  - Implement non-pharmacological measures as appropriate and evaluate response  - Consider cultural and social influences on pain and pain management  - Notify physician/advanced practitioner if interventions unsuccessful or patient reports new pain  Outcome: Progressing     Problem: INFECTION - ADULT  Goal: Absence or prevention of progression during hospitalization  Description: INTERVENTIONS:  - Assess and monitor for signs and symptoms of infection  - Monitor lab/diagnostic results  - Monitor all insertion sites, i e  indwelling lines, tubes, and drains  - Monitor endotracheal if appropriate and nasal secretions for changes in amount and color  - Bruno appropriate cooling/warming therapies per order  - Administer medications as ordered  - Instruct and encourage patient and family to use good hand hygiene technique  - Identify and instruct in appropriate isolation precautions for identified infection/condition  Outcome: Progressing  Goal: Absence of fever/infection during neutropenic period  Description: INTERVENTIONS:  - Monitor WBC    Outcome: Progressing     Problem: SAFETY ADULT  Goal: Patient will remain free of falls  Description: INTERVENTIONS:  - Educate patient/family on patient safety including physical limitations  - Instruct patient to call for assistance with activity   - Consult OT/PT to assist with strengthening/mobility   - Keep Call bell within reach  - Keep bed low and locked with side rails adjusted as appropriate  - Keep care items and personal belongings within reach  - Initiate and maintain comfort rounds  - Make Fall Risk Sign visible to staff  - Offer Toileting every  Hours, in advance of need  - Initiate/Maintain alarm  - Obtain necessary fall risk management equipment: - Apply yellow socks and bracelet for high fall risk patients  - Consider moving patient to room near nurses station  Outcome: Progressing  Goal: Maintain or return to baseline ADL function  Description: INTERVENTIONS:  -  Assess patient's ability to carry out ADLs; assess patient's baseline for ADL function and identify physical deficits which impact ability to perform ADLs (bathing, care of mouth/teeth, toileting, grooming, dressing, etc )  - Assess/evaluate cause of self-care deficits   - Assess range of motion  - Assess patient's mobility; develop plan if impaired  - Assess patient's need for assistive devices and provide as appropriate  - Encourage maximum independence but intervene and supervise when necessary  - Involve family in performance of ADLs  - Assess for home care needs following discharge   - Consider OT consult to assist with ADL evaluation and planning for discharge  - Provide patient education as appropriate  Outcome: Progressing  Goal: Maintains/Returns to pre admission functional level  Description: INTERVENTIONS:  - Perform BMAT or MOVE assessment daily    - Set and communicate daily mobility goal to care team and patient/family/caregiver  - Collaborate with rehabilitation services on mobility goals if consulted  - Perform Range of Motion  times a day  - Reposition patient every  hours  - Dangle patient  times a day  - Stand patient  times a day  - Ambulate patient  times a day  - Out of bed to chair  times a day   - Out of bed for meals times a day  - Out of bed for toileting  - Record patient progress and toleration of activity level   Outcome: Progressing     Problem: DISCHARGE PLANNING  Goal: Discharge to home or other facility with appropriate resources  Description: INTERVENTIONS:  - Identify barriers to discharge w/patient and caregiver  - Arrange for needed discharge resources and transportation as appropriate  - Identify discharge learning needs (meds, wound care, etc )  - Arrange for interpretive services to assist at discharge as needed  - Refer to Case Management Department for coordinating discharge planning if the patient needs post-hospital services based on physician/advanced practitioner order or complex needs related to functional status, cognitive ability, or social support system  Outcome: Progressing     Problem: Knowledge Deficit  Goal: Patient/family/caregiver demonstrates understanding of disease process, treatment plan, medications, and discharge instructions  Description: Complete learning assessment and assess knowledge base  Interventions:  - Provide teaching at level of understanding  - Provide teaching via preferred learning methods  Outcome: Progressing     Problem: Nutrition/Hydration-ADULT  Goal: Nutrient/Hydration intake appropriate for improving, restoring or maintaining nutritional needs  Description: Monitor and assess patient's nutrition/hydration status for malnutrition  Collaborate with interdisciplinary team and initiate plan and interventions as ordered    Monitor patient's weight and dietary intake as ordered or per policy  Utilize nutrition screening tool and intervene as necessary  Determine patient's food preferences and provide high-protein, high-caloric foods as appropriate       INTERVENTIONS:  - Monitor oral intake, urinary output, labs, and treatment plans  - Assess nutrition and hydration status and recommend course of action  - Evaluate amount of meals eaten  - Assist patient with eating if necessary   - Allow adequate time for meals  - Recommend/ encourage appropriate diets, oral nutritional supplements, and vitamin/mineral supplements  - Order, calculate, and assess calorie counts as needed  - Recommend, monitor, and adjust tube feedings and TPN/PPN based on assessed needs  - Assess need for intravenous fluids  - Provide specific nutrition/hydration education as appropriate  - Include patient/family/caregiver in decisions related to nutrition  Outcome: Progressing

## 2023-04-16 NOTE — ASSESSMENT & PLAN NOTE
· Presenting with generalized weakness, worsening over the last 1 month since significant right-sided shoulder injury  · History of bilateral rotator cuff tears, with bilateral repairs, with subsequent bilateral bicep tendon ruptures  · Patient complaining of subjective weakness of the right upper and right lower extremity, as well as generalized fatigue and other symptoms including reduction in  strength, cramping and muscles of the hands feet and calves, reduction in ability to feed himself, hoarse voice, brain fog, dizziness/vertigo, reduction in exercise capacity  · Also presenting with subjective feelings of reduction in sensation of the right upper and right lower extremity  · Dramatic worsening of chronic symptoms over the last 1 month  · MRI C spine: Multilevel cervical spondylitic degenerative change with mild to moderate canal stenosis and foraminal narrowing  No cord compression or severe foraminal nerve impingement  · LUZMARIA, CRP ESR, and CK unremarkable  · MRI brachial plexus: No discrete signal abnormality or abnormal enhancement identified along the course of the right brachial plexus  No compressive hematoma is identified      Plan:  Follow up with neurology and ortho outpatient  PT/OT evaluation  Regular diet

## 2023-04-16 NOTE — PROGRESS NOTES
[facility]  Progress Note  Name: Isidro Juarez  MRN: [id number]  Unit/Bed#: S -54 I Date of Admission: 4/14/2023   Date of Service: 4/16/2023 I Hospital Day: 2    Assessment/Plan   * Upper extremity weakness  Assessment & Plan  · Presenting with generalized weakness, worsening over the last 1 month since significant right-sided shoulder injury  · History of bilateral rotator cuff tears, with bilateral repairs, with subsequent bilateral bicep tendon ruptures  · Patient complaining of subjective weakness of the right upper and right lower extremity, as well as generalized fatigue and other symptoms including reduction in  strength, cramping and muscles of the hands feet and calves, reduction in ability to feed himself, hoarse voice, brain fog, dizziness/vertigo, reduction in exercise capacity  · Also presenting with subjective feelings of reduction in sensation of the right upper and right lower extremity  · Dramatic worsening of chronic symptoms over the last 1 month  · MRI C spine: Multilevel cervical spondylitic degenerative change with mild to moderate canal stenosis and foraminal narrowing    No cord compression or severe foraminal nerve impingement  · LUZMARIA, CRP ESR, and CK unremarkable    Plan:  Neurology consulted, appreciate recommendations   f/u MRI brachial plexus  PT/OT evaluation  Regular diet    Rotator cuff arthropathy of right shoulder  Assessment & Plan  · Patient has a history of rotator cuff arthropathy of the right shoulder, history of complete rotator cuff tear on the right side  · Follows with orthopedic surgery  · Recently saw orthopedic surgery yesterday 4/13 due to severe symptoms that are unresponsive to nonoperative treatments including corticosteroid injections, physical therapy, and HEP  · Patient has signed consent for a reverse total shoulder arthroplasty, which will occur in approximately 8 weeks after most recent corticosteroid injection done on 3/30/2023  · X-ray of the right shoulder done on 4/13 demonstrating a high riding humeral head in relation to the glenoid suggestive of chronic rotator cuff tear with moderate glenohumeral osteoarthritis    Plan: Will follow with orthopedic surgery outpatient, likely total shoulder replacement  Oral steroids and oral antiinflammatories only per ortho  Pain regimen with Tylenol as needed for mild pain, Toradol 15 mg every 6 hours as needed for moderate pain, and oxycodone 2 5 mg every 6 hours as needed for severe pain    Impaired sensation  Assessment & Plan  · Patient complaining of impaired sensation on the right sided upper and lower extremities  · Physical exam demonstrating only reduction in sensation of the right lateral shoulder over the deltoid region, normal sensation on the face, chest, torso, bilateral lower extremities    Plan:  Neurology consult    Anxiety  Assessment & Plan  · Patient has a history of anxiety and whitecoat syndrome subjectively  · Home medication includes buspirone 7 5 mg daily    Plan:  Buspirone 7 5 mg daily, home medication  Atarax 25 mg every 6 hours as needed for anxiety           VTE Pharmacologic Prophylaxis:   VTE Score: 2 Moderate Risk (Score 3-4) - Pharmacological DVT Prophylaxis Ordered: Enoxaparin (Lovenox)  Mechanical VTE Prophylaxis in Place: Yes    Patient Centered Rounds: I have performed bedside rounds with nursing staff today  Discussions with Specialists or Other Care Team Provider: Orthopedic surgery, Neurology    Education and Discussions with Family / Patient: Patient stated that he is updating his wife    Current Length of Stay: 2 day(s)    Current Patient Status: Inpatient     Discharge Plan / Estimated Discharge Date: Anticipate discharge in 48-72 hrs to discharge location to be determined pending rehab evaluations  Code Status: Level 1 - Full Code      Subjective:   Patient puja at bedside this morning   Patient still complains of bilateral upper extremity weakness but R>L  Patient states he wants to get back to doing his normal daily activities without the weakness    Objective:     Vitals:   Temp (24hrs), Av 1 °F (36 7 °C), Min:98 1 °F (36 7 °C), Max:98 1 °F (36 7 °C)    Temp:  [98 1 °F (36 7 °C)] 98 1 °F (36 7 °C)  HR:  [65-68] 65  Resp:  [16] 16  BP: (152-155)/(76-82) 152/76  SpO2:  [96 %-97 %] 96 %  Body mass index is 22 55 kg/m²  Input and Output Summary (last 24 hours):     No intake or output data in the 24 hours ending 23 0802    Physical Exam:     Physical Exam  Vitals and nursing note reviewed  Constitutional:       General: He is not in acute distress  Appearance: He is well-developed  HENT:      Head: Normocephalic and atraumatic  Eyes:      Conjunctiva/sclera: Conjunctivae normal    Cardiovascular:      Rate and Rhythm: Normal rate and regular rhythm  Heart sounds: No murmur heard  Pulmonary:      Effort: Pulmonary effort is normal  No respiratory distress  Breath sounds: Normal breath sounds  Abdominal:      Palpations: Abdomen is soft  Tenderness: There is no abdominal tenderness  Musculoskeletal:         General: No swelling  Cervical back: Neck supple  Skin:     General: Skin is warm and dry  Capillary Refill: Capillary refill takes less than 2 seconds  Neurological:      Mental Status: He is alert  Sensory: Sensory deficit (lue) present  Motor: Weakness (RUE >LUE) present  Psychiatric:         Mood and Affect: Mood is anxious and depressed            Additional Data:     Labs:  Results from last 7 days   Lab Units 04/15/23  0651 23  1136   WBC Thousand/uL 6 51 7 70   HEMOGLOBIN g/dL 15 0 13 9   HEMATOCRIT % 44 7 41 7   PLATELETS Thousands/uL 288 276   NEUTROS PCT %  --  73   LYMPHS PCT %  --  16   MONOS PCT %  --  9   EOS PCT %  --  1     Results from last 7 days   Lab Units 04/15/23  0651 23  1136   SODIUM mmol/L 138 135   POTASSIUM mmol/L 4 3 4 0   CHLORIDE mmol/L 107 105   CO2 mmol/L 27 25   BUN mg/dL 11 14   CREATININE mg/dL 0 64 0 62   ANION GAP mmol/L 4 5   CALCIUM mg/dL 9 2 9 0   ALBUMIN g/dL  --  3 9   TOTAL BILIRUBIN mg/dL  --  0 71   ALK PHOS U/L  --  46   ALT U/L  --  32   AST U/L  --  31   GLUCOSE RANDOM mg/dL 92 105                       Imaging: Reviewed radiology reports from this admission including: MRI C    Recent Cultures (last 7 days):           Lines/Drains:  Invasive Devices     Peripheral Intravenous Line  Duration           Peripheral IV 04/14/23 Proximal;Right;Ventral (anterior) Forearm 1 day                Telemetry:        Last 24 Hours Medication List:   Current Facility-Administered Medications   Medication Dose Route Frequency Provider Last Rate   • acetaminophen  650 mg Oral Q6H PRN Cori Henry MD     • amLODIPine  5 mg Oral Daily PRN Cori Henry MD     • aspirin  81 mg Oral Every Other Day Cori Henry MD     • busPIRone  7 5 mg Oral BID PRN Cori Henry MD     • ketorolac  15 mg Intravenous Q6H PRN Noris Hanson MD     • lisinopril  20 mg Oral BID Noris Hanson MD     • methocarbamol  250 mg Oral BID PRN Thomas Zavala MD     • [START ON 4/17/2023] methylPREDNISolone  16 mg Oral Daily Lorelle JEAN Roberto      Followed by   • [START ON 4/18/2023] methylPREDNISolone  12 mg Oral Daily Lorelle FABI RobertoC      Followed by   • [START ON 4/19/2023] methylPREDNISolone  8 mg Oral Daily Lorelle FABI RobertoC      Followed by   • [START ON 4/20/2023] methylPREDNISolone  4 mg Oral Daily Lorelle JEAN Roberto     • metoprolol succinate  25 mg Oral Daily Cori Henry MD     • pantoprazole  40 mg Oral Early Morning Thomas Zavala MD          Today, Patient Was Seen By: Kellie Grider DO    ** Please Note: This note has been constructed using a voice recognition system   **

## 2023-04-17 LAB — 1,25(OH)2D3 SERPL-MCNC: 55.5 PG/ML (ref 24.8–81.5)

## 2023-04-17 NOTE — UTILIZATION REVIEW
NOTIFICATION OF ADMISSION DISCHARGE   This is a Notification of Discharge from [facility]  Please be advised that this patient has been discharge from our facility  Below you will find the admission and discharge date and time including the patient’s disposition  UTILIZATION REVIEW CONTACT:  Musa Gruber  Utilization   Network Utilization Review Department  Phone: [phone] x carefully listen to the prompts  All voicemails are confidential   Email: [email or web address]     ADMISSION INFORMATION  PRESENTATION DATE: 4/14/2023 10:50 AM  OBERVATION ADMISSION DATE:   INPATIENT ADMISSION DATE: 4/14/23  1:54 PM   DISCHARGE DATE: 4/16/2023  5:11 PM   DISPOSITION:Home/Self Care    IMPORTANT INFORMATION:  Send all requests for admission clinical reviews, approved or denied determinations and any other requests to dedicated fax number below belonging to the campus where the patient is receiving treatment   List of dedicated fax numbers:  [address] DENIALS (Administrative/Medical Necessity) [phone]   [address] (Maternity/NICU/Pediatrics) [phone]   [facility] [phone]   [place] [address] [phone]   [facility] 134 [phone]   [address] [facility] [phone]   [address] [place] [address] [phone]   [address] [place] [address] 119 [phone]   [facility]  [phone]   [address] [facility] [phone]   [address] [facility] [address] [facility] [phone]

## 2023-04-18 ENCOUNTER — TELEPHONE (OUTPATIENT)
Dept: NEUROLOGY | Facility: CLINIC | Age: 68
End: 2023-04-18

## 2023-04-18 ENCOUNTER — TRANSITIONAL CARE MANAGEMENT (OUTPATIENT)
Dept: INTERNAL MEDICINE CLINIC | Facility: CLINIC | Age: 68
End: 2023-04-18

## 2023-04-18 DIAGNOSIS — R20.2 PARESTHESIA OF RIGHT LEG: ICD-10-CM

## 2023-04-18 DIAGNOSIS — R20.2 ARM PARESTHESIA, RIGHT: ICD-10-CM

## 2023-04-18 DIAGNOSIS — R53.1 RIGHT SIDED WEAKNESS: Primary | ICD-10-CM

## 2023-04-18 NOTE — TELEPHONE ENCOUNTER
Patient left voicemail requesting call back to schedule HFU appt with Neuromuscular provider  Please call to schedule  [phone]

## 2023-04-19 NOTE — TELEPHONE ENCOUNTER
Working on it  He is a patient of Dr Mckinney who is already scheduled awaiting for response from Dr Mckinney      Thank you all,     Chriss Navarro

## 2023-04-20 ENCOUNTER — APPOINTMENT (OUTPATIENT)
Dept: RADIOLOGY | Facility: OTHER | Age: 68
End: 2023-04-20

## 2023-04-20 DIAGNOSIS — M79.601 RIGHT ARM PAIN: ICD-10-CM

## 2023-04-20 DIAGNOSIS — R53.1 RIGHT SIDED WEAKNESS: ICD-10-CM

## 2023-04-20 DIAGNOSIS — M12.811 ROTATOR CUFF ARTHROPATHY OF RIGHT SHOULDER: ICD-10-CM

## 2023-04-20 DIAGNOSIS — R20.2 PARESTHESIA OF RIGHT LEG: ICD-10-CM

## 2023-04-20 DIAGNOSIS — R20.2 ARM PARESTHESIA, RIGHT: Primary | ICD-10-CM

## 2023-04-20 PROBLEM — M54.12 RADICULOPATHY, CERVICAL REGION: Status: ACTIVE | Noted: 2023-04-20

## 2023-04-20 PROBLEM — G56.91 NEUROPATHY OF RIGHT UPPER EXTREMITY: Status: ACTIVE | Noted: 2023-04-20

## 2023-04-20 RX ORDER — MELOXICAM 15 MG/1
15 TABLET ORAL DAILY
Qty: 30 TABLET | Refills: 0 | Status: SHIPPED | OUTPATIENT
Start: 2023-04-20 | End: 2023-06-23 | Stop reason: ALTCHOICE

## 2023-04-20 RX ORDER — GABAPENTIN 300 MG/1
CAPSULE ORAL
Qty: 90 CAPSULE | Refills: 0 | Status: SHIPPED | OUTPATIENT
Start: 2023-04-20 | End: 2023-05-16 | Stop reason: SDUPTHER

## 2023-04-24 ENCOUNTER — TELEPHONE (OUTPATIENT)
Dept: INTERNAL MEDICINE CLINIC | Facility: CLINIC | Age: 68
End: 2023-04-24

## 2023-04-24 ENCOUNTER — EVALUATION (OUTPATIENT)
Dept: OCCUPATIONAL THERAPY | Facility: MEDICAL CENTER | Age: 68
End: 2023-04-24

## 2023-04-24 DIAGNOSIS — M12.811 ROTATOR CUFF ARTHROPATHY OF RIGHT SHOULDER: ICD-10-CM

## 2023-04-24 DIAGNOSIS — R53.1 RIGHT SIDED WEAKNESS: Primary | ICD-10-CM

## 2023-04-24 NOTE — TELEPHONE ENCOUNTER
Please contact Neurology to inform them the patient's EMG is now scheduled for 5/11   Please see if they are able to

## 2023-04-24 NOTE — TELEPHONE ENCOUNTER
----- Message from Gucci Soliman sent at 4/24/2023 10:13 AM EDT -----  Regarding: FW: Constipation  Contact: [phone]    ----- Message -----  From: Tk Kenny  Sent: 4/24/2023  10:11 AM EDT  To: [facility]  Subject: Constipation                                     Good news, Dr Maryuri Keyes  We were able to move the appointment with Dr America Weir up to tomorrow afternoon  Any other suggestions, though would be appreciated  Thank you

## 2023-04-25 ENCOUNTER — HOSPITAL ENCOUNTER (OUTPATIENT)
Dept: RADIOLOGY | Facility: HOSPITAL | Age: 68
Discharge: HOME | End: 2023-04-25
Attending: ORTHOPAEDIC SURGERY

## 2023-04-25 ENCOUNTER — CONSULT (OUTPATIENT)
Dept: OBGYN CLINIC | Facility: HOSPITAL | Age: 68
End: 2023-04-25
Attending: ORTHOPAEDIC SURGERY

## 2023-04-25 VITALS
WEIGHT: 143.08 LBS | HEART RATE: 79 BPM | HEIGHT: 67 IN | SYSTOLIC BLOOD PRESSURE: 170 MMHG | DIASTOLIC BLOOD PRESSURE: 92 MMHG | BODY MASS INDEX: 22.46 KG/M2

## 2023-04-25 DIAGNOSIS — R52 PAIN: ICD-10-CM

## 2023-04-25 DIAGNOSIS — R52 PAIN: Primary | ICD-10-CM

## 2023-04-25 DIAGNOSIS — M54.12 RADICULOPATHY, CERVICAL REGION: ICD-10-CM

## 2023-04-25 NOTE — PROGRESS NOTES
NAME: Josh Woods  : 1955  PCP: Terrial Krabbe, MD    Chief complaint: right arm weakness    HPI:  69 y o  male presenting for initial visit with chief complaint of right arm weakness  Notes onset of increased right shoulder pain, right arm numbness and progressive weakness since lifting pellet bucket in March  Significantly worsening after root canal on   Notes some neck stiffness and mild pain, but denies significant neck pain  Describes right shoulder pain with radiation of numbness into right upper extremity into right thumb  Denies any sully trauma  Denies fever or chills  Denies fine motor changes such as buttoning of shirt or change in gait  Has upcoming EMG on 2023  Conservative therapy includes the following:  Gabapentin with some relief  Denies recent cervical steroid injections  Had right shoulder CS injection on 3/30 with about 1 week of relief  Has been in physical therapy for right shoulder  These therapeutic modalities were ineffective  The patient has noticed significant impairment in performing the following ADLs: Devan Hinkle is retired  Needs help with ADLs due to right upper extremity symptoms  Hs 6 acres of land he needs to take care of      FAMILY HISTORY   Family History   Problem Relation Age of Onset   • Diabetes Sister    • Other Family         Stroke syndrome       PAST MEDICAL HISTORY:   Past Medical History:   Diagnosis Date   • Allergic    • Arthritis    • Asthma    • Hypertension    • Impaired fasting glucose    • Mitral valve disorder    • Mitral valve prolapse    • Murmur, cardiac    • 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    • PAC (premature atrial contraction)    • PVC (premature ventricular contraction)    • Thyroid nodule        MEDICATIONS:  Current Outpatient Medications   Medication Sig Dispense Refill   • amLODIPine (NORVASC) 5 mg tablet Take 1 tablet (5 mg total) by mouth daily Prn BP> 150/90 30 tablet 1   • aspirin 81 mg chewable tablet Chew 1 tablet every other day     • busPIRone (BUSPAR) 7 5 mg tablet Take 1 tablet (7 5 mg total) by mouth 2 (two) times a day as needed (Anxiety) 60 tablet 1   • Coenzyme Q10-Red Yeast Rice  MG CAPS Take 1 capsule by mouth daily     • Docosahexaenoic Acid (DHA OMEGA 3) 100 MG CAPS Take 6 capsules by mouth daily     • fexofenadine (ALLEGRA) 180 MG tablet Take 1 tablet by mouth daily as needed     • gabapentin (Neurontin) 300 mg capsule Take 1 capsule nightly for 4-5 days, then increase to 1 capsule in the mornign and 1 in the evening, then increase to 1 capsule TID  90 capsule 0   • lisinopril (ZESTRIL) 20 mg tablet TAKE 1 TABLET TWICE A  tablet 3   • Misc Natural Products (PROSTATE HEALTH) CAPS Take 1 capsule by mouth daily     • Multiple Vitamin tablet Take 1 tablet by mouth daily     • lidocaine (XYLOCAINE) 5 % ointment Apply topically as needed for mild pain (Patient not taking: Reported on 4/25/2023) 35 44 g 3   • meloxicam (Mobic) 15 mg tablet Take 1 tablet (15 mg total) by mouth daily (Patient not taking: Reported on 4/25/2023) 30 tablet 0   • metoprolol succinate (TOPROL-XL) 25 mg 24 hr tablet Take 1 tablet (25 mg total) by mouth daily 90 tablet 3   • pantoprazole (PROTONIX) 40 mg tablet Take 1 tablet (40 mg total) by mouth daily in the early morning Do not start before April 17, 2023  (Patient not taking: Reported on 4/25/2023) 90 tablet 0     No current facility-administered medications for this visit  PAST SURGICAL HISTORY:  Past Surgical History:   Procedure Laterality Date   • HAND SURGERY     • NJ COLONOSCOPY FLX DX W/COLLJ SPEC WHEN PFRMD N/A 2/9/2018    Procedure: COLONOSCOPY;  Surgeon: Scot Mackenzie MD;  Location: [facility];   Service: Gastroenterology   • PROSTATE BIOPSY     • SHOULDER SURGERY         SOCIAL HISTORY:  Social History     Socioeconomic History   • Marital status: /Civil Union     Spouse name: Not on file   • Number of children: "Not on file   • Years of education: Not on file   • Highest education level: Not on file   Occupational History   • Not on file   Tobacco Use   • Smoking status: Never   • Smokeless tobacco: Never   Vaping Use   • Vaping Use: Never used   Substance and Sexual Activity   • Alcohol use: Yes     Alcohol/week: 1 0 standard drink     Types: 1 Glasses of wine per week     Comment: very little   • Drug use: No   • Sexual activity: Yes     Partners: Female     Birth control/protection: None   Other Topics Concern   • Not on file   Social History Narrative   • Not on file     Social Determinants of Health     Financial Resource Strain: Low Risk    • Difficulty of Paying Living Expenses: Not very hard   Food Insecurity: Not on file   Transportation Needs: No Transportation Needs   • Lack of Transportation (Medical): No   • Lack of Transportation (Non-Medical): No   Physical Activity: Not on file   Stress: Not on file   Social Connections: Not on file   Intimate Partner Violence: Not on file   Housing Stability: Not on file     ALLERGIES:  No Known Allergies    ROS:  Constitutional:  No fever, chills, night sweats, loss of appetite   HEENT No no sore throat, difficulty swallowing   Cardiovascular:  No chest pain, palpitations, BLE edema, HEARD   Respiratory:  No SOB, coughing, chest congestion   Gastrointestinal:  No nausea, vomiting, abdominal pain   Genitourinary:  No dysuria, hematuria, urinary urgency/frequency   Musculoskeletal:  See HPI   Skin:  No rash, erythema, edema   Neurologic:  See HPI   Psychiatric Illness:  No depression, anxiety, mood disorder, substance abuse disorder     PHYSICAL EXAM:  /92 Comment: pt states he's nervous  Pulse 79   Ht 5' 7\" (1 702 m)   Wt 64 9 kg (143 lb 1 3 oz)   BMI 22 41 kg/m²      General:  Well-developed,appears well, no acute distress   Respiratory:  No SOB, no abnormal effort (use of accessory muscles)  GI / Abdominal:  Soft  No abdominal masses or tenderness   " Nondistended  Gait & balance No evidence of myelopathic gait  Cervical  spine range of motion:  -Forward flexion chin to chest  -Extension to 60  -Lateral bend 30 right, 30 left  -Rotation 45 right, 45 left  There is no point tenderness with palpation along the posterior cervical, thoracic, lumbar spine      Neurologic:  Upper Extremity Motor Function    Right  Left    Deltoid  1/5  5/5    Bicep  1/5  5/5    Wrist extension  2/5  5/5    Tricep  1/5  5/5    Finger flexion/  4/5  5/5    Hand intrinsic  4/5  5/5      Lower Extremity Motor Function    Right  Left    Iliopsoas  5/5  5/5    Quadriceps 5/5 5/5   Tibialis anterior  5/5  5/5    EHL  5/5  5/5    Gastroc  muscle  5/5  5/5    Heel rise  5/5  5/5    Toe rise  5/5 5/5      Sensory: light touch is intact to bilateral upper and lower extremities    Reflexes:    Right Left   Biceps 0 2+   Triceps 0 2+   Brachioradialis 0 1+   Patellar 3+ 3+   Achilles 2+ 2+   Babinski neg neg     Other tests:  Spurling's: negative  Chavira's: negative  Inverted radial reflex: negative  Clonus: negative  Tandem gait: unable to perform  Shoulder: unable to perform active ROM right shoulder; pain with passive ROM right shoulder; pain passive ROM left shoulder; limited ROM left shoulder 2/2 previous rotator cuff injury    IMAGING Review: I have personally reviewed the images and these are my findings:  Cervical spine xray from 4/25/2023: Multilevel cervical spondylosis with loss of disc height C3-7, endplate sclerosis, osteophyte formation, uncovertebral sclerosis and hypertrophy, no obvious instability on flexion-extension radiograph      MRI cervical spine from 4/14/2023: Multilevel cervical disc degeneration most noted C4-7 with loss of disc height, endplate sclerosis, varying degrees of mild/moderate foraminal stenosis, no significant central stenosis, no cord signal abnormalities      ASSESSMENT / Medical Decision Making (MDM):  69year old male with history of progressive right arm weakness, numbness/tingling  Also with right shoulder pain  No incontinence of bowel or bladder, no fever, no chills, night sweats  Physical exam showing right upper extremity weakness     Imaging reviewed as above  Findings most notable for cervical spondylosis     Plan: The above clinical, physical and imaging findings were reviewed with the patient  Maurilio Loving  has a constellation of findings right upper extremity weakness, progressively worsening after root canal procedure with prolonged period of neck extension  Discussed the findings of the cervical MRI in detail with the patient and wife  No areas of severe foraminal stenosis appreciated  Do not recommend any urgent surgical intervention at this time  Discussed the diagnosis of possible C5/C6 spontaneous nerve palsy, which may be better evaluated with EMG  Patient has upcoming EMG scheduled for 5/2/2023  Recommend patient proceed with EMG as scheduled  Will plan to follow up with patient after EMG  Continue with medications as previously prescribed if providing pain/symptom relief  Patient instructed to return to office/ER sooner if symptoms are not improving, getting worse, or new worrisome/neurologic symptoms arise

## 2023-04-27 ENCOUNTER — TELEPHONE (OUTPATIENT)
Dept: OCCUPATIONAL THERAPY | Facility: MEDICAL CENTER | Age: 68
End: 2023-04-27

## 2023-04-27 DIAGNOSIS — M75.101 ROTATOR CUFF TEAR ARTHROPATHY OF RIGHT SHOULDER: ICD-10-CM

## 2023-04-27 DIAGNOSIS — M12.811 ROTATOR CUFF ARTHROPATHY OF RIGHT SHOULDER: Primary | ICD-10-CM

## 2023-04-27 DIAGNOSIS — M12.811 ROTATOR CUFF TEAR ARTHROPATHY OF RIGHT SHOULDER: ICD-10-CM

## 2023-04-27 NOTE — TELEPHONE ENCOUNTER
"Gave patient a call today to discuss recurring and new onset of symptoms  Pt's wife was on the phone as well  Pt stated that he is having trouble getting out of bed and completing any personal hygiene  \"Wife has to help with everything  \" Discussed recent doctor's appointment with Dr Tiarra Salomon and the scheduled date for the EMG testing  Pt states, \"I feel like the same atrophy in the right arm is beginning to happen on the left  I also am having trouble with my speech and that is new  \" Told patient that I had an opening tomorrow for OT if he wants to be seen before the weekend  Patient and wife state that they will try to make the appointment as long as he is able to get out of bed  Therapist reached out to Dr Tiarra Salomon after the phone call with Alexia Yi to notify physician    "

## 2023-04-28 ENCOUNTER — OFFICE VISIT (OUTPATIENT)
Dept: OCCUPATIONAL THERAPY | Facility: MEDICAL CENTER | Age: 68
End: 2023-04-28

## 2023-04-28 ENCOUNTER — APPOINTMENT (OUTPATIENT)
Dept: PHYSICAL THERAPY | Facility: CLINIC | Age: 68
End: 2023-04-28

## 2023-04-28 DIAGNOSIS — R53.1 RIGHT SIDED WEAKNESS: ICD-10-CM

## 2023-04-28 DIAGNOSIS — M12.811 ROTATOR CUFF ARTHROPATHY OF RIGHT SHOULDER: Primary | ICD-10-CM

## 2023-04-28 NOTE — PROGRESS NOTES
"Occupational Therapy Daily Note:    Today's date: 2023  Patient name: Rell Mallory  : 1955  MRN: [id number]  Referring provider: Aldine Barthel  Dx:   Encounter Diagnoses   Name Primary? • Rotator cuff arthropathy of right shoulder Yes   • Right sided weakness        POC START DATE: 2023  POC END DATE: 2023  NEXT PN DUE: 2023  FREQUENCY: 1-2x/wk for 12 weeks    Subjective: \"I have been having trouble with everything  \"    Objective:     Splint Fabrication:  Thumb Spica on R with Wrap to mimic giv nathan sling  Time: 45 minutes    Assessment: Tolerated treatment well  Pt session focused on splint fabrication to prevent shoulder subluxation  Thumb spica was fabricated, hand based, for proper alignment of thumb  Soft Strap/Neoprene was attached to thumb and guided up the forearm and biceps and attached to left shoulder to maintain shoulder positions and prevent a subluxation  Pt was educated on checking skin for redness and burns for precautions  Patient would benefit from continued skilled OT  Plan: Continued skilled OT per POC      "

## 2023-05-01 ENCOUNTER — APPOINTMENT (OUTPATIENT)
Dept: OCCUPATIONAL THERAPY | Facility: MEDICAL CENTER | Age: 68
End: 2023-05-01
Payer: COMMERCIAL

## 2023-05-02 ENCOUNTER — HOSPITAL ENCOUNTER (INPATIENT)
Facility: HOSPITAL | Age: 68
LOS: 3 days | Discharge: HOME | End: 2023-05-05
Attending: EMERGENCY MEDICINE | Admitting: INTERNAL MEDICINE

## 2023-05-02 ENCOUNTER — APPOINTMENT (INPATIENT)
Dept: RADIOLOGY | Facility: HOSPITAL | Age: 68
End: 2023-05-02

## 2023-05-02 ENCOUNTER — APPOINTMENT (EMERGENCY)
Dept: RADIOLOGY | Facility: HOSPITAL | Age: 68
End: 2023-05-02

## 2023-05-02 ENCOUNTER — DOCUMENTATION (OUTPATIENT)
Dept: NEUROLOGY | Facility: CLINIC | Age: 68
End: 2023-05-02

## 2023-05-02 ENCOUNTER — HOSPITAL ENCOUNTER (OUTPATIENT)
Dept: NEUROLOGY | Facility: CLINIC | Age: 68
Discharge: HOME | End: 2023-05-02

## 2023-05-02 DIAGNOSIS — R29.898 WEAKNESS OF RIGHT LOWER EXTREMITY: ICD-10-CM

## 2023-05-02 DIAGNOSIS — R29.898 WEAKNESS OF RIGHT UPPER EXTREMITY: Primary | ICD-10-CM

## 2023-05-02 DIAGNOSIS — G81.91 ACUTE RIGHT HEMIPARESIS (HCC): Primary | ICD-10-CM

## 2023-05-02 DIAGNOSIS — R29.898 WEAKNESS OF RIGHT UPPER EXTREMITY: ICD-10-CM

## 2023-05-02 DIAGNOSIS — G62.9 POLYNEUROPATHY: ICD-10-CM

## 2023-05-02 DIAGNOSIS — G56.91 NEUROPATHY OF RIGHT UPPER EXTREMITY: ICD-10-CM

## 2023-05-02 DIAGNOSIS — I47.20 V-TACH (HCC): ICD-10-CM

## 2023-05-02 DIAGNOSIS — N28.89 RIGHT RENAL MASS: ICD-10-CM

## 2023-05-02 LAB
ALBUMIN SERPL BCP-MCNC: 3.7 G/DL (ref 3.5–5)
ALP SERPL-CCNC: 52 U/L (ref 46–116)
ALT SERPL W P-5'-P-CCNC: 42 U/L (ref 12–78)
ANION GAP SERPL CALCULATED.3IONS-SCNC: 3 MMOL/L (ref 4–13)
AST SERPL W P-5'-P-CCNC: 35 U/L (ref 5–45)
BASOPHILS # BLD AUTO: 0.05 THOUSANDS/ÂΜL (ref 0–0.1)
BASOPHILS NFR BLD AUTO: 1 % (ref 0–1)
BILIRUB SERPL-MCNC: 0.5 MG/DL (ref 0.2–1)
BUN SERPL-MCNC: 14 MG/DL (ref 5–25)
CALCIUM SERPL-MCNC: 9.6 MG/DL (ref 8.3–10.1)
CHLORIDE SERPL-SCNC: 106 MMOL/L (ref 96–108)
CO2 SERPL-SCNC: 24 MMOL/L (ref 21–32)
CREAT SERPL-MCNC: 0.62 MG/DL (ref 0.6–1.3)
EOSINOPHIL # BLD AUTO: 0.08 THOUSAND/ÂΜL (ref 0–0.61)
EOSINOPHIL NFR BLD AUTO: 1 % (ref 0–6)
ERYTHROCYTE [DISTWIDTH] IN BLOOD BY AUTOMATED COUNT: 12.8 % (ref 11.6–15.1)
GFR SERPL CREATININE-BSD FRML MDRD: 101 ML/MIN/1.73SQ M
GLUCOSE SERPL-MCNC: 102 MG/DL (ref 65–140)
GLUCOSE SERPL-MCNC: 99 MG/DL (ref 65–140)
HCT VFR BLD AUTO: 40.9 % (ref 36.5–49.3)
HGB BLD-MCNC: 14.3 G/DL (ref 12–17)
IMM GRANULOCYTES # BLD AUTO: 0.02 THOUSAND/UL (ref 0–0.2)
IMM GRANULOCYTES NFR BLD AUTO: 0 % (ref 0–2)
LYMPHOCYTES # BLD AUTO: 1.31 THOUSANDS/ÂΜL (ref 0.6–4.47)
LYMPHOCYTES NFR BLD AUTO: 17 % (ref 14–44)
MCH RBC QN AUTO: 30.2 PG (ref 26.8–34.3)
MCHC RBC AUTO-ENTMCNC: 35 G/DL (ref 31.4–37.4)
MCV RBC AUTO: 86 FL (ref 82–98)
MONOCYTES # BLD AUTO: 0.54 THOUSAND/ÂΜL (ref 0.17–1.22)
MONOCYTES NFR BLD AUTO: 7 % (ref 4–12)
NEUTROPHILS # BLD AUTO: 5.53 THOUSANDS/ÂΜL (ref 1.85–7.62)
NEUTS SEG NFR BLD AUTO: 74 % (ref 43–75)
NRBC BLD AUTO-RTO: 0 /100 WBCS
PLATELET # BLD AUTO: 324 THOUSANDS/UL (ref 149–390)
PMV BLD AUTO: 8.7 FL (ref 8.9–12.7)
POTASSIUM SERPL-SCNC: 3.8 MMOL/L (ref 3.5–5.3)
PROT SERPL-MCNC: 6.9 G/DL (ref 6.4–8.4)
RBC # BLD AUTO: 4.74 MILLION/UL (ref 3.88–5.62)
SODIUM SERPL-SCNC: 133 MMOL/L (ref 135–147)
WBC # BLD AUTO: 7.53 THOUSAND/UL (ref 4.31–10.16)

## 2023-05-02 RX ORDER — HEPARIN SODIUM 5000 [USP'U]/ML
5000 INJECTION, SOLUTION INTRAVENOUS; SUBCUTANEOUS EVERY 8 HOURS SCHEDULED
Status: DISCONTINUED | OUTPATIENT
Start: 2023-05-02 | End: 2023-05-05 | Stop reason: HOSPADM

## 2023-05-02 RX ORDER — BUSPIRONE HYDROCHLORIDE 5 MG/1
7.5 TABLET ORAL 2 TIMES DAILY PRN
Status: DISCONTINUED | OUTPATIENT
Start: 2023-05-02 | End: 2023-05-05 | Stop reason: HOSPADM

## 2023-05-02 RX ORDER — GABAPENTIN 300 MG/1
300 CAPSULE ORAL 2 TIMES DAILY
Status: DISCONTINUED | OUTPATIENT
Start: 2023-05-02 | End: 2023-05-04

## 2023-05-02 RX ORDER — MAGNESIUM SULFATE HEPTAHYDRATE 40 MG/ML
2 INJECTION, SOLUTION INTRAVENOUS ONCE
Status: COMPLETED | OUTPATIENT
Start: 2023-05-02 | End: 2023-05-02

## 2023-05-02 RX ORDER — LISINOPRIL 20 MG/1
20 TABLET ORAL 2 TIMES DAILY
Status: DISCONTINUED | OUTPATIENT
Start: 2023-05-02 | End: 2023-05-05 | Stop reason: HOSPADM

## 2023-05-02 RX ORDER — ASPIRIN 81 MG/1
81 TABLET, CHEWABLE ORAL EVERY OTHER DAY
Status: DISCONTINUED | OUTPATIENT
Start: 2023-05-03 | End: 2023-05-05 | Stop reason: HOSPADM

## 2023-05-02 RX ORDER — METOPROLOL SUCCINATE 25 MG/1
25 TABLET, EXTENDED RELEASE ORAL DAILY
Status: DISCONTINUED | OUTPATIENT
Start: 2023-05-02 | End: 2023-05-05 | Stop reason: HOSPADM

## 2023-05-02 RX ORDER — POTASSIUM CHLORIDE 20 MEQ/1
40 TABLET, EXTENDED RELEASE ORAL ONCE
Status: COMPLETED | OUTPATIENT
Start: 2023-05-02 | End: 2023-05-02

## 2023-05-02 RX ORDER — AMLODIPINE BESYLATE 5 MG/1
5 TABLET ORAL DAILY
Status: DISCONTINUED | OUTPATIENT
Start: 2023-05-03 | End: 2023-05-04

## 2023-05-02 RX ADMIN — GADOBUTROL 6 ML: 604.72 INJECTION INTRAVENOUS at 21:46

## 2023-05-02 RX ADMIN — POTASSIUM CHLORIDE 40 MEQ: 1500 TABLET, EXTENDED RELEASE ORAL at 20:15

## 2023-05-02 RX ADMIN — GABAPENTIN 300 MG: 300 CAPSULE ORAL at 21:19

## 2023-05-02 RX ADMIN — MAGNESIUM SULFATE HEPTAHYDRATE 2 G: 40 INJECTION, SOLUTION INTRAVENOUS at 20:15

## 2023-05-02 RX ADMIN — IOHEXOL 85 ML: 350 INJECTION, SOLUTION INTRAVENOUS at 17:55

## 2023-05-02 RX ADMIN — LISINOPRIL 20 MG: 20 TABLET ORAL at 21:19

## 2023-05-02 NOTE — CONSULTS
Neurology Consult Note    Name: Megan Culver   Age & Sex: 69 y o  male   MRN: [id number]  Unit/Bed#: ED 17   Encounter: [id number]  Length of Stay: 0    Assessment plan:    Weakness of right upper extremity  Assessment & Plan  · 68 yo male w/ hx of HTN, b/l rotator cuff tears, with b/l repairs w/ subsequent b/l bicep tendon ruptures who presents to ED this evening from EMG clinic due to subacute RUE weakness and hyperreflexia concerning for central process  · Initially began to experience some RUE weakness approximately 3 years ago that worsened significantly over the last 3 to 4 weeks without any identifiable triggers  Associated w/ some numbness/tingling in RUE, RLE  Does report difficulty ambulating due to RLE weakness  Denies urinary retention/incontinence, but does endorse some chronic constipation  No head, neck, back trauma, infections, medication changes  · Follows w/ Ortho outpatient: had R shoulder CS injection on 3/30 w/ 1 week of relief  At the time, ortho team was concerned about C5/6 spontaneous nerve palsy due to prolonged neck extension (recent root canal procedure)  ·  Recently admitted to [address] [place] [address] from 4/14 - 4/16/2023 for RUE, RLE weakness, generalized fatigue, reduction in  strength, cramping, impaired functionality  During the admission, CTH negative, MRI C-spine with and without contrast showed no intrinsic cord pathology  MRI brachial plexus with and without contrast showed no signal abnormality or enhancement or compressive lesion along the R brachial plexus  He was seen by neurology team and patient at that visit: Was given Robaxin as needed and discharged with an outpatient EMG  Workup:   · MRI C-spine with and without contrast: Multilevel cervical spondylitic degenerative change with mild to moderate canal stenosis and foraminal narrowing   No cord compression or severe foraminal nerve impingement    · MRI brachial plexus w/wout contrast: no signal abnormality or enhancement or compressive lesion along the R brachial plexus  · Normal/Negative serum studies: TSH, CK, CRP, ESR, folate, LUZMARIA, Vitamin D 25 hydroxy  · B12 1316  · EMG not consistent w/ ALS  Denervation consistent w/ central etiology noted  · Labwork: CBC, CMP grossly unremarkable  Exam: RUE>>RLE weakness w/ hyporeflexia, decreased tone on RUE  Hyporeflexic on R biceps, BR, but Chavira's present  +3 patellars b/l  Up-going plantars on Right  Decreased RUE, RLE sensation as compared to Left  Impression: 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 (RUE> RLE) + sensory loss (RUE, RLE) concerning for central etiology (L fronto-parietal ?) in light of negative MR c-spine, brachial plexus and EMG findings and the above mentioned exam  Differentials include: Demyelination, prior stroke, tumor  Plan:   · MRI brain w/wout contrast  · CTA h/n   · CK level  · Continue to monitor and replete electrolytes as appropriate  · Rest of care as per primary team    Recommendations for outpatient neurological follow up have yet to be determined  Pending for discharge: MR Brain scan, CTA h/n    Subjective:    Reason for Consult / Principal Problem: RUE weakness   Hx and PE limited by: none     HPI: Marsha Cheatham is a 69 y o  right handed male who presents w/ hx of HTN, b/l rotator cuff tears, with b/l repairs w/ subsequent b/l bicep tendon ruptures who presents to ED this evening from EMG clinic due to subacute RUE weakness and hyperreflexia concerning for central process  Patient reports that he used to work for [email or web address] as a   Initially began to experience some RUE weakness approximately 3 years ago that worsened significantly over the last 3 to 4 weeks without any identifiable triggers  Associated w/ some numbness/tingling in RUE, RLE  No history of head, neck, back trauma, infections, medication changes  Does report difficulty ambulating due to RLE weakness  "Denies urinary retention/incontinence, but does endorse some chronic constipation  Does report receiving 2nd COVID vaccine 2 years ago, after which he was \"very sick\" and experienced vertigo  He was recently admitted to [address] [place] [address] from 4/14 - 4/16/2023 for RUE, RLE weakness, generalized fatigue, reduction in  strength, cramping, impaired functionality  During the admission, CTH negative, MRI C-spine with and without contrast showed no intrinsic cord pathology  MRI brachial plexus with and without contrast showed no signal abnormality or enhancement or compressive lesion along the R brachial plexus  LUZMARIA, CRP, ESR, CK unremarkable  He was seen by neurology team and patient at that visit: Was given Robaxin as needed and discharged with an outpatient EMG  He was seen by Dr Caryn Umana in the EMG lab this afternoon: EMG not consistent w/ ALS  Denervation consistent w/ central etiology noted  Follows w/ Ortho outpatient: had R shoulder CS injection on 3/30 w/ 1 week of relief  At the time, ortho team was concerned about C5/6 spontaneous nerve palsy due to prolonged neck extension (recent root canal procedure)  Neurology consulted for evaluation and management of symptoms  Inpatient consult to Neurology  Consult performed by: Meet Mcadams MD  Consult ordered by:  Jh Urbina DO          Historical Information   Past Medical History:   Diagnosis Date   • Allergic    • Arthritis    • Asthma    • Hypertension    • Impaired fasting glucose    • Mitral valve disorder    • Mitral valve prolapse    • Murmur, cardiac    • 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    • PAC (premature atrial contraction)    • PVC (premature ventricular contraction)    • Thyroid nodule      Past Surgical History:   Procedure Laterality Date   • HAND SURGERY     • MS COLONOSCOPY FLX DX W/COLLJ SPEC WHEN PFRMD N/A 2/9/2018    Procedure: COLONOSCOPY;  Surgeon: Eugenia Barclay MD;  Location: [facility];  Service: Gastroenterology   • PROSTATE BIOPSY     • SHOULDER SURGERY       Social History   Social History     Substance and Sexual Activity   Alcohol Use Yes   • Alcohol/week: 1 0 standard drink   • Types: 1 Glasses of wine per week    Comment: very little     Social History     Substance and Sexual Activity   Drug Use No     E-Cigarette/Vaping   • E-Cigarette Use Never User      E-Cigarette/Vaping Substances   • Nicotine No    • THC No    • CBD No    • Flavoring No      Social History     Tobacco Use   Smoking Status Never   Smokeless Tobacco Never     Family History:   Family History   Problem Relation Age of Onset   • Diabetes Sister    • Other Family         Stroke syndrome     Meds/Allergies   all current active meds have been reviewed  No Known Allergies    Review of previous medical records was completed  Review of Systems- see HPI  Objective:     Patient ID: Luciana Whiteside is a 69 y o  male  Vitals:   Vitals:    23 1615 23 1730 23 1800 23 1830   BP: 139/75  154/77    BP Location: Right arm  Right arm    Pulse: 68 68 68 74   Resp: 16 19 14 18   Temp:       SpO2:   97%       There is no height or weight on file to calculate BMI  No intake or output data in the 24 hours ending 23    Temperature:   Temp (24hrs), Av 1 °F (36 7 °C), Min:98 1 °F (36 7 °C), Max:98 1 °F (36 7 °C)    Temperature: 98 1 °F (36 7 °C)    Invasive Devices: Invasive Devices     Peripheral Intravenous Line  Duration           Peripheral IV 23 Right;Ventral (anterior) Forearm <1 day                Physical exam:  Vitals reviewed  General Examination: No distress, cooperative  Pulm: no respiratory distress  Neurological exam:    Mental Status  A, Ox3  Follows simple, 3 step commands  Speech: fluent, normal rhythm, no dysarthria  Cranial Nerves  CN II: Visual fields full to confrontation  CN III, IV, VI: EOMI bilaterally  Normal lids and orbits bilaterally  Pupils reactive to light bilaterally  Physiologic aniscoria  No nystagmus  Normal saccades  Normal smooth pursuit  CN V: Facial sensation is normal    CN VII:  Right: There is no facial weakness or asymmetry  Left: There is no facial weakness or asymmetry  CN VIII: Audition grossly intact to voice  CN IX, X: Uvula midline  Palate elevates symmetrically  CN XI:  Right, left SCMs symmetric-able to shrug shoulders bilaterally  CN XII: Tongue midline without atrophy or fasciculations with appropriate movement  Motor  Normal bulk  Decreased tone on RUE  No fasciculations present  Right Left   Shoulder abduction 1 5 (pain limited)   Shoulder adduction 1 5 (pain limited)   Elbow flexion 1 5   Elbow extension 1 5   Wrist flexion 2 5   Wrist extension 2 5   Hip flexion 5- 5   Hip extension 5- 5   Knee flexion 5- 5   Knee extension 5- 5   Plantar flexion 5- 5   Dorsiflexion 5- 5     Sensory  RUE, RLE: decreased sensation to pinprick (70%) as compared to Left  Reflexes Right Left   Brachioradialis     +1  +2   Biceps                                   +1  +2   Triceps  +2  +2   Patellar   +3  +3   Achilles   +2  +2     Chavira's present on Right  Upgoing toes on Right  Downgoing toes on Left  No clonus on my exam         Coordination  Finger-to-nose normal on L  Gait  Casual gait is normal including stance, stride, and arm swing  Able to rise from chair without using arms  Romberg negative  Labs: I have personally reviewed pertinent reports      Results from last 7 days   Lab Units 05/02/23  1649   WBC Thousand/uL 7 53   HEMOGLOBIN g/dL 14 3   HEMATOCRIT % 40 9   PLATELETS Thousands/uL 324   NEUTROS PCT % 74   MONOS PCT % 7      Results from last 7 days   Lab Units 05/02/23  1649   POTASSIUM mmol/L 3 8   CHLORIDE mmol/L 106   CO2 mmol/L 24   BUN mg/dL 14   CREATININE mg/dL 0 62   CALCIUM mg/dL 9 6   ALK PHOS U/L 52   ALT U/L 42   AST U/L 35                            Imaging and diagnostic studies:    Radiology Results: I have personally reviewed pertinent reports  and I have personally reviewed pertinent films in PACS  CTA head and neck w wo contrast   Final Result by Ward Teague MD (05/02 1933)      No evidence of acute intracranial hemorrhage  No evidence of hemodynamic significant stenosis, aneurysm or dissection  Workstation performed: [id number]         MRI brain wo contrast    (Results Pending)       Other Diagnostic Testing: I have personally reviewed pertinent reports  Active medications:  Current Facility-Administered Medications   Medication Dose Route Frequency   • [START ON 5/3/2023] amLODIPine (NORVASC) tablet 5 mg  5 mg Oral Daily   • [START ON 5/3/2023] aspirin chewable tablet 81 mg  81 mg Oral Every Other Day   • busPIRone (BUSPAR) tablet 7 5 mg  7 5 mg Oral BID PRN   • gabapentin (NEURONTIN) capsule 300 mg  300 mg Oral BID   • heparin (porcine) subcutaneous injection 5,000 Units  5,000 Units Subcutaneous Q8H [address]   • lisinopril (ZESTRIL) tablet 20 mg  20 mg Oral BID   • magnesium sulfate 2 g/50 mL IVPB (premix) 2 g  2 g Intravenous Once   • metoprolol succinate (TOPROL-XL) 24 hr tablet 25 mg  25 mg Oral Daily       Prior to Admission medications    Medication Sig Start Date End Date Taking?  Authorizing Provider   amLODIPine (NORVASC) 5 mg tablet Take 1 tablet (5 mg total) by mouth daily Prn BP> 150/90 3/27/23   Keri Espitia, DO   aspirin 81 mg chewable tablet Chew 1 tablet every other day    Historical Provider, MD   busPIRone (BUSPAR) 7 5 mg tablet Take 1 tablet (7 5 mg total) by mouth 2 (two) times a day as needed (Anxiety) 3/27/23   Keri Espitia DO   Coenzyme Q10-Red Yeast Rice  MG CAPS Take 1 capsule by mouth daily    Historical Provider, MD   Docosahexaenoic Acid (DHA OMEGA 3) 100 MG CAPS Take 6 capsules by mouth daily    Historical Provider, MD   fexofenadine (ALLEGRA) 180 MG tablet Take 1 tablet by mouth daily as needed 6/10/14 Historical Provider, MD   gabapentin (Neurontin) 300 mg capsule Take 1 capsule nightly for 4-5 days, then increase to 1 capsule in the mornign and 1 in the evening, then increase to 1 capsule TID  4/20/23   Benjamin Argueta MD   lidocaine (XYLOCAINE) 5 % ointment Apply topically as needed for mild pain  Patient not taking: Reported on 4/25/2023 3/27/23   Janit Art, DO   lisinopril (ZESTRIL) 20 mg tablet TAKE 1 TABLET TWICE A DAY 3/30/23   Janit Art, DO   meloxicam (Mobic) 15 mg tablet Take 1 tablet (15 mg total) by mouth daily  Patient not taking: Reported on 4/25/2023 4/20/23   Benjamin Argueta MD   metoprolol succinate (TOPROL-XL) 25 mg 24 hr tablet Take 1 tablet (25 mg total) by mouth daily 6/21/22 4/14/23 Janit Art, DO   Misc Natural Products ([address]) CAPS Take 1 capsule by mouth daily    Historical Provider, MD   Multiple Vitamin tablet Take 1 tablet by mouth daily    Historical Provider, MD   pantoprazole (PROTONIX) 40 mg tablet Take 1 tablet (40 mg total) by mouth daily in the early morning Do not start before April 17, 2023    Patient not taking: Reported on 4/25/2023 4/17/23   Peña Jacobo DO         Code status and advanced directives:  Code Status: Level 1 - Full Code      VTE Pharmacologic Prophylaxis: per primary team  VTE Mechanical Prophylaxis: sequential compression device    ==  MD Nicho Suarez [facility] Neurology Residency, PGY-II

## 2023-05-02 NOTE — ASSESSMENT & PLAN NOTE
With history of hypertension, Home medications include amlodipine 5 mg daily, lisinopril 20 mg twice daily, metoprolol 25 mg daily  Continue home medications

## 2023-05-02 NOTE — ED ATTENDING ATTESTATION
5/2/2023  Abdirizak Quinonez DO, saw and evaluated the patient  I have discussed the patient with the resident/non-physician practitioner and agree with the resident's/non-physician practitioner's findings, Plan of Care, and MDM as documented in the resident's/non-physician practitioner's note, except where noted  All available labs and Radiology studies were reviewed  I was present for key portions of any procedure(s) performed by the resident/non-physician practitioner and I was immediately available to provide assistance  At this point I agree with the current assessment done in the Emergency Department  I have conducted an independent evaluation of this patient a history and physical is as follows:    69year-old male patient presents from his neurologists office for further evaluation of progressively worsening right upper extremity weakness that now includes his right lower extremity  Sensation has been intact  Patient was initially evaluated for rotator cuff and brachial plexus injury, including with MRIs, without acute findings  He had a CT head last month (w/o contrast) that was unremarkable  He is now unable to shrug at all with the right shoulder, has minimal strength in his right fingers and mildly diminished strength in his right leg  Neurology reported positive Babinski and clonus, though I am unable to appreciate this in the ED  Bilateral patellar reflexes appear equally brisk (increased)  Symptoms first started about three years ago but have worsened in the past three weeks  He is no longer able to complete his ADLs independently  He underwent EMG testing at the neurology office demonstrating mild to moderate right carpal tunnel syndrome with denervation changes consistent with his weakness  There were no fasciculations, atrophy or abnormalities noted in the other three extremities       Neurology is concerned about an upper motor neuron lesion and sent him for a stat MRI brain with

## 2023-05-02 NOTE — PROGRESS NOTES
Neurology    This is a 33-year-old gentleman with a 3-year history of diffuse weakness  However over the last month he has noted progressive weakness of his right arm and right leg  It began as a weakness of the right proximal arm but is now seem to involve the entire right arm  He does describe weakness of the right leg and difficulty with ambulation  He was hospitalized from 4/ 14 to 4/16 16 at [address] [place] [address] and underwent an extensive work-up including MRI imaging of the cervical spine MRI imaging of the brachial plexus and CAT scan of the head  Since his discharge he has had progressive weakness of the right arm to the point where he is unable to move the entire right arm  He also has weakness of the right lower extremity and is requiring assistance for all of his ADLs  Today he presented for EMG testing  On today's examination cranial nerves were intact  Except for some asymmetry of the right facial droop  Pupils were equal and reactive to light extraocular muscles were intact  He had decreased tone in the right upper extremity and decreased reflexes in the right upper extremity  He had mild 1 out of 5 movement in the shoulder shrug on the right but no movement in the entire  right upper extremity  The right lower extremity was a 4 out of 5 distally and proximally  He had decreased toe tapping on the right lower extremity  There is no dysmetria on finger-to-nose testing on the left  He had decreased reflexes in the right upper extremity but he had hyperreflexia at the knee jerk with crossed abductors and upgoing toe on the right and clonus 3 beats on the right  Gait was limited due to weakness and requires assistance to ambulate  Visual fields are full to count    EMG testing was performed today  It showed mild to moderate right carpal tunnel syndrome there was denervation changes noted in the right upper extremity consistent with his weakness    Three extremities were examined and both lower extremity showed no evidence of abnormalities  There is no fasciculations or atrophy  Impression      this is a 40-year-old patient with progressive hemiparesis of the right upper and lower extremity with progressing over the last 3 to 4 weeks  His examination does demonstrate upper motor neuron  With hyper reflexia upgoing toes and clonus  His EMG today did not demonstrate any evidence of ALS  Given the fact that he has had progressive symptoms in the last few weeks I have recommended a evaluation in the ER and stat  MRI imaging of the brain with contrast    He  was agreeable to the ER evaluation  I discussed this with Montse Navarro and his wife Juan Manuel Cummings in detail today

## 2023-05-02 NOTE — ED NOTES
Run of Vtach noted on monitor at 1709 and 1730  Dr Luba Zhao made aware   Dr Justin Gallagher verbalized to continue monitoring patient at this time      Radha Escudero  05/02/23 [id number]

## 2023-05-02 NOTE — ASSESSMENT & PLAN NOTE
· 68 yo male w/ hx of HTN, b/l rotator cuff tears, with b/l repairs w/ subsequent b/l bicep tendon ruptures who presents to ED this evening from EMG clinic due to subacute RUE weakness and hyperreflexia concerning for central process  Initially began to experience some RUE weakness approximately 3 years ago that worsened significantly over the last 3 to 4 weeks without any identifiable triggers  Associated w/ some numbness/tingling in RUE, RLE  Does report difficulty ambulating due to RLE weakness  Denies urinary retention/incontinence, but does endorse some chronic constipation  No head, neck, back trauma, infections, medication changes  · Follows w/ Ortho outpatient: had R shoulder CS injection on 3/30 w/ 1 week of relief  At the time, ortho team was concerned about C5/6 spontaneous nerve palsy due to prolonged neck extension (recent root canal procedure)  · Recently admitted to [address] [place] [address] from 4/14 - 4/16/2023 for RUE, RLE weakness, generalized fatigue, reduction in  strength, cramping, impaired functionality  During the admission, CTH negative, MRI C-spine with and without contrast showed no intrinsic cord pathology  MRI brachial plexus with and without contrast showed no signal abnormality or enhancement or compressive lesion along the R brachial plexus  He was seen by neurology team and patient at that visit: Was given Robaxin as needed and discharged with an outpatient EMG  · Family hx negative for muscle disease or any neuromuscular pathology  Workup:   · MRI C-spine with and without contrast: Multilevel cervical spondylitic degenerative change with mild to moderate canal stenosis and foraminal narrowing   No cord compression or severe foraminal nerve impingement  · MRI brachial plexus w/wout contrast: no signal abnormality or enhancement or compressive lesion along the R brachial plexus  · Normal/Negative serum studies: TSH, CK, CRP, ESR, folate, LUZMARIA, Vitamin D 25 hydroxy  · B12 1316  ·  (5/3/23)  · MR brain scan w and w/out contrast: Unremarkable  · CTA head/neck: Unremarkable  · EMG not consistent w/ ALS  Denervation consistent w/ central etiology noted  · Labwork: CBC, CMP grossly unremarkable  Exam: RUE weakness w/ hyporeflexia, decreased tone on RUE and decreased biceps bulk  Hyporeflexic on R biceps, BR, R +2 triceps  +2 LUE reflexes  +3 patellars b/l  Up-going plantars on Right  Decreased RUE, RLE sensation as compared to Left  Impression: Subacute on chronic progressive worsening RUE weakness + sensory loss (RLE> RUE) w/ negative MR brain, c-spine, brachial plexus scans and EMG findings  This may be a myogenic process that has been slowly progressive over time  Increased constipation over the last few weeks and increased urinary frequency appears unrelated to his overall constellation of symptoms, however, it would be reasonable to rule out intrinsic cord pathology in L, T spine  A repeat EMG may also be needed in order to further assess whether this is a myogenic or neurogenic process (in setting of some denervation changes seen in RUE)  Plan:   · MR T-spine and L-spine with and without contrast  · MR RUE w/ and w/out contrast to assess for myositis  · Repeat EMG in 8-10 weeks outpatient     · PT/OT/PMR   · Consider adding baclofen   · Continue to monitor and replete electrolytes as appropriate  · Rest of care as per primary team

## 2023-05-02 NOTE — H&P
[facility]  H&P  Name: Lalito Fried 69 y o  male I MRN: [id number]  Unit/Bed#: ED 17 I Date of Admission: 5/2/2023   Date of Service: 5/2/2023 I Hospital Day: 0      Assessment/Plan   Weakness of right upper extremity  Assessment & Plan  Patient has significant history of bilateral rotator cuff tears with repairs and biceps tendon ruptures, presenting to the ER at the advice of his neurologist after outpatient testing with EMG noted concern for central process of his progressive weakness  Patient symptoms started over 3 years ago with right upper extremity weakness, however has progressed over the past several weeks, his weakness is in the right upper and right lower extremity associated with numbness and tingling  Patient denies any urinary changes, he does have chronic constipation, notes recent difficulty in ambulating secondary to the weakness  Patient had a recent admission at [facility] for similar presentation, he had a negative work-up with CT head MRI C-spine MRI brachial plexus  Patient being admitted for MRI brain without contrast, CT head and neck  We will check CK, aldolase    Rotator cuff tear arthropathy of right shoulder  Assessment & Plan  Noted history    Muscle weakness  Assessment & Plan  Management as above    PVC's (premature ventricular contractions)  Assessment & Plan  Noted run of V  tach while in the ER, will check electrolytes and replete, monitor on telemetry    HTN (hypertension)  Assessment & Plan  With history of hypertension, Home medications include amlodipine 5 mg daily, lisinopril 20 mg twice daily, metoprolol 25 mg daily  Continue home medications             VTE Pharmacologic Prophylaxis:   High Risk (Score >/= 5) - Pharmacological DVT Prophylaxis Ordered: heparin  Sequential Compression Devices Ordered  Code Status: Prior level 1  Discussion with family: Updated  (significant other) at bedside      Anticipated Length of Stay: Patient will be admitted on an inpatient basis with an anticipated length of stay of greater than 2 midnights secondary to Right upper extremity right lower extremity weakness  Total Time Spent on Date of Encounter in care of patient: 40 minutes This time was spent on one or more of the following: performing physical exam; counseling and coordination of care; obtaining or reviewing history; documenting in the medical record; reviewing/ordering tests, medications or procedures; communicating with other healthcare professionals and discussing with patient's family/caregivers  Chief Complaint: Weakness    History of Present Illness:  Lalito Fried is a 69 y o  male with a PMH of hypertension, asthma, bilateral rotator cuff tear with repairs, presenting at the advice of his neurologist due to worsening right upper and right lower extremity weakness for the past 3 to 4 weeks  Had outpatient EMG testing which noted concern for central process of weakness, as it appears he has upper motor neuron abnormalities  Patient started experiencing right upper extremity weakness 3 years ago  It worsened progressively over the past 3 to 4 weeks, also associated with some numbness and tingling in his right upper and right lower extremity  Now also having difficulty ambulating secondary to his weakness  Patient is being admitted for neurological work-up including MRI brain  Review of Systems:  Review of Systems   Constitutional: Positive for fatigue  Negative for chills and fever  HENT: Negative for ear pain and sore throat  Eyes: Negative for pain and visual disturbance  Respiratory: Negative for cough and shortness of breath  Cardiovascular: Negative for chest pain, palpitations and leg swelling  Gastrointestinal: Negative for abdominal pain and vomiting  Genitourinary: Negative for dysuria and hematuria  Musculoskeletal: Negative for arthralgias and back pain     Skin: Negative for color change and rash  Neurological: Positive for weakness  Negative for seizures and syncope  All other systems reviewed and are negative  Past Medical and Surgical History:   Past Medical History:   Diagnosis Date   • Allergic    • Arthritis    • Asthma    • Hypertension    • Impaired fasting glucose    • Mitral valve disorder    • Mitral valve prolapse    • Murmur, cardiac    • 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    • PAC (premature atrial contraction)    • PVC (premature ventricular contraction)    • Thyroid nodule        Past Surgical History:   Procedure Laterality Date   • HAND SURGERY     • CO COLONOSCOPY FLX DX W/COLLJ SPEC WHEN PFRMD N/A 2/9/2018    Procedure: COLONOSCOPY;  Surgeon: Rossana Bai MD;  Location: [facility]; Service: Gastroenterology   • PROSTATE BIOPSY     • SHOULDER SURGERY         Meds/Allergies:  Prior to Admission medications    Medication Sig Start Date End Date Taking?  Authorizing Provider   amLODIPine (NORVASC) 5 mg tablet Take 1 tablet (5 mg total) by mouth daily Prn BP> 150/90 3/27/23   Raya Guerra DO   aspirin 81 mg chewable tablet Chew 1 tablet every other day    Historical Provider, MD   busPIRone (BUSPAR) 7 5 mg tablet Take 1 tablet (7 5 mg total) by mouth 2 (two) times a day as needed (Anxiety) 3/27/23   Raya Guerra DO   Coenzyme Q10-Red Yeast Rice  MG CAPS Take 1 capsule by mouth daily    Historical Provider, MD   Docosahexaenoic Acid (DHA OMEGA 3) 100 MG CAPS Take 6 capsules by mouth daily    Historical Provider, MD   fexofenadine (ALLEGRA) 180 MG tablet Take 1 tablet by mouth daily as needed 6/10/14   Historical Provider, MD   gabapentin (Neurontin) 300 mg capsule Take 1 capsule nightly for 4-5 days, then increase to 1 capsule in the mornign and 1 in the evening, then increase to 1 capsule TID  4/20/23   Benjamin Jean MD   lidocaine (XYLOCAINE) 5 % ointment Apply topically as needed for mild pain  Patient not taking: Reported on 4/25/2023 3/27/23   Zeferino Davies DO   lisinopril (ZESTRIL) 20 mg tablet TAKE 1 TABLET TWICE A DAY 3/30/23   Zeferino Davies DO   meloxicam (Mobic) 15 mg tablet Take 1 tablet (15 mg total) by mouth daily  Patient not taking: Reported on 4/25/2023 4/20/23   Cyn Mahoney MD   metoprolol succinate (TOPROL-XL) 25 mg 24 hr tablet Take 1 tablet (25 mg total) by mouth daily 6/21/22 4/14/23  Zeferino Davies DO   Misc Natural Products (PROSTATE HEALTH) CAPS Take 1 capsule by mouth daily    Historical Provider, MD   Multiple Vitamin tablet Take 1 tablet by mouth daily    Historical Provider, MD   pantoprazole (PROTONIX) 40 mg tablet Take 1 tablet (40 mg total) by mouth daily in the early morning Do not start before April 17, 2023  Patient not taking: Reported on 4/25/2023 4/17/23   Giuseppe [address] [place] [address],      I have reviewed home medications with patient personally  Allergies: No Known Allergies    Social History:  Marital Status: /Civil Union   Occupation:   Patient Pre-hospital Living Situation: Home  Patient Pre-hospital Level of Mobility: walks  Patient Pre-hospital Diet Restrictions:   Substance Use History:   Social History     Substance and Sexual Activity   Alcohol Use Yes   • Alcohol/week: 1 0 standard drink   • Types: 1 Glasses of wine per week    Comment: very little     Social History     Tobacco Use   Smoking Status Never   Smokeless Tobacco Never     Social History     Substance and Sexual Activity   Drug Use No       Family History:  Family History   Problem Relation Age of Onset   • Diabetes Sister    • Other Family         Stroke syndrome       Physical Exam:     Vitals:   Blood Pressure: 154/77 (05/02/23 1800)  Pulse: 74 (05/02/23 1830)  Temperature: 98 1 °F (36 7 °C) (05/02/23 1556)  Respirations: 18 (05/02/23 1830)  SpO2: 97 % (05/02/23 1800)    Physical Exam  Vitals and nursing note reviewed     Constitutional:       General: He is not in acute distress  Appearance: He is well-developed  He is not toxic-appearing or diaphoretic  HENT:      Head: Normocephalic and atraumatic  Eyes:      General: No scleral icterus  Conjunctiva/sclera: Conjunctivae normal    Cardiovascular:      Rate and Rhythm: Normal rate and regular rhythm  Heart sounds: No murmur heard  No friction rub  No gallop  Pulmonary:      Effort: Pulmonary effort is normal  No respiratory distress  Breath sounds: Normal breath sounds  No stridor  No wheezing, rhonchi or rales  Chest:      Chest wall: No tenderness  Abdominal:      General: There is no distension  Palpations: Abdomen is soft  There is no mass  Tenderness: There is no abdominal tenderness  There is no guarding or rebound  Hernia: No hernia is present  Musculoskeletal:         General: No swelling or tenderness  Cervical back: Neck supple  Right lower leg: No edema  Left lower leg: No edema  Skin:     General: Skin is warm  Capillary Refill: Capillary refill takes less than 2 seconds  Neurological:      Mental Status: He is alert and oriented to person, place, and time        Comments: Right upper extremity 1-2 out of 5 with relatively normal strength in right lower extremity   Psychiatric:         Mood and Affect: Mood normal           Additional Data:     Lab Results:  Results from last 7 days   Lab Units 05/02/23  1649   WBC Thousand/uL 7 53   HEMOGLOBIN g/dL 14 3   HEMATOCRIT % 40 9   PLATELETS Thousands/uL 324   NEUTROS PCT % 74   LYMPHS PCT % 17   MONOS PCT % 7   EOS PCT % 1     Results from last 7 days   Lab Units 05/02/23  1649   SODIUM mmol/L 133*   POTASSIUM mmol/L 3 8   CHLORIDE mmol/L 106   CO2 mmol/L 24   BUN mg/dL 14   CREATININE mg/dL 0 62   ANION GAP mmol/L 3*   CALCIUM mg/dL 9 6   ALBUMIN g/dL 3 7   TOTAL BILIRUBIN mg/dL 0 50   ALK PHOS U/L 52   ALT U/L 42   AST U/L 35   GLUCOSE RANDOM mg/dL 99         Results from last 7 days   Lab Units 05/02/23  1609   POC GLUCOSE mg/dl 102               Lines/Drains:  Invasive Devices     Peripheral Intravenous Line  Duration           Peripheral IV 05/02/23 Right;Ventral (anterior) Forearm <1 day                    Imaging: Reviewed radiology reports from this admission including: MRI brain  CTA head and neck w wo contrast   Final Result by Nilsa Hopper MD (05/02 1933)      No evidence of acute intracranial hemorrhage  No evidence of hemodynamic significant stenosis, aneurysm or dissection  Workstation performed: [id number]         MRI brain wo contrast    (Results Pending)       EKG and Other Studies Reviewed on Admission:   · EKG: pending  ** Please Note: This note has been constructed using a voice recognition system   **

## 2023-05-02 NOTE — ED PROVIDER NOTES
History  Chief Complaint   Patient presents with   • Extremity Weakness     Pt reports R sided weakness x 3 weeks, symptoms are getting worse, sent by neurology for MRI  Previously seen at [facility] when sx started  Patient is a 68-year-old male with history of hypertension who presents with extremity weakness  Patient states that he is about about 3 years of right upper extremity weakness that was originally confined to his right shoulder that was attributed to a rotator cuff injury  He states that for the past 3 weeks he has had progressive worsening of his right upper extremity weakness and that he now has some weakness in his right lower extremity as well  He says at this point he cannot move his shoulder and upper arm at all and that he can just wiggle his fingers  He states that he has trouble getting his clothes off in time to the bathroom that he requires help changing  He states that he is also into trouble ambulating with a new right lower extremity weakness  He was seen and hospitalized at [address] [place] [address] on April 12 and received an MRI C-spine, brachial plexus, and CT head without contrast which did not reveal any etiologies for his weakness  He was seen by his neurologist today who noted there is significant increased weakness on exam as well as hyperreflexia, clonus, upgoing Babinski reflex and sent him to the emergency department for an emergent MRI of the brain  He currently endorses some blurry vision that has been progressing for the past 3 years  He also endorses some paresthesias in his right fingers  He states he is lost about 20 pounds over the past 2 months which she states is a combination of poor appetite and difficulty eating  He denies any fevers, chills, chest pain, shortness of breath, back pain, urinary continence    He denies any history of smoking, social drinks, no drug use, no history of cancer in the family or self, no history of international travel  Prior to Admission Medications   Prescriptions Last Dose Informant Patient Reported? Taking? Coenzyme Q10-Red Yeast Rice  MG CAPS   Yes No   Sig: Take 1 capsule by mouth daily   Docosahexaenoic Acid (DHA OMEGA 3) 100 MG CAPS   Yes No   Sig: Take 6 capsules by mouth daily   Misc Natural Products (PROSTATE HEALTH) CAPS   Yes No   Sig: Take 1 capsule by mouth daily   Multiple Vitamin tablet   Yes No   Sig: Take 1 tablet by mouth daily   amLODIPine (NORVASC) 5 mg tablet   No No   Sig: Take 1 tablet (5 mg total) by mouth daily Prn BP> 150/90   aspirin 81 mg chewable tablet   Yes No   Sig: Chew 1 tablet every other day   busPIRone (BUSPAR) 7 5 mg tablet   No No   Sig: Take 1 tablet (7 5 mg total) by mouth 2 (two) times a day as needed (Anxiety)   fexofenadine (ALLEGRA) 180 MG tablet   Yes No   Sig: Take 1 tablet by mouth daily as needed   gabapentin (Neurontin) 300 mg capsule   No No   Sig: Take 1 capsule nightly for 4-5 days, then increase to 1 capsule in the mornign and 1 in the evening, then increase to 1 capsule TID    lidocaine (XYLOCAINE) 5 % ointment   No No   Sig: Apply topically as needed for mild pain   Patient not taking: Reported on 4/25/2023   lisinopril (ZESTRIL) 20 mg tablet   No No   Sig: TAKE 1 TABLET TWICE A DAY   meloxicam (Mobic) 15 mg tablet   No No   Sig: Take 1 tablet (15 mg total) by mouth daily   Patient not taking: Reported on 4/25/2023   metoprolol succinate (TOPROL-XL) 25 mg 24 hr tablet   No No   Sig: Take 1 tablet (25 mg total) by mouth daily   pantoprazole (PROTONIX) 40 mg tablet   No No   Sig: Take 1 tablet (40 mg total) by mouth daily in the early morning Do not start before April 17, 2023     Patient not taking: Reported on 4/25/2023      Facility-Administered Medications: None       Past Medical History:   Diagnosis Date   • Allergic    • Arthritis    • Asthma    • Hypertension    • Impaired fasting glucose    • Mitral valve disorder    • Mitral valve prolapse • Murmur, cardiac    • 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    • PAC (premature atrial contraction)    • PVC (premature ventricular contraction)    • Thyroid nodule        Past Surgical History:   Procedure Laterality Date   • HAND SURGERY     • CO COLONOSCOPY FLX DX W/COLLJ SPEC WHEN PFRMD N/A 2/9/2018    Procedure: COLONOSCOPY;  Surgeon: Ela Stone MD;  Location: [facility]; Service: Gastroenterology   • PROSTATE BIOPSY     • SHOULDER SURGERY         Family History   Problem Relation Age of Onset   • Diabetes Sister    • Other Family         Stroke syndrome     I have reviewed and agree with the history as documented  E-Cigarette/Vaping   • E-Cigarette Use Never User      E-Cigarette/Vaping Substances   • Nicotine No    • THC No    • CBD No    • Flavoring No      Social History     Tobacco Use   • Smoking status: Never   • Smokeless tobacco: Never   Vaping Use   • Vaping Use: Never used   Substance Use Topics   • Alcohol use: Yes     Alcohol/week: 1 0 standard drink     Types: 1 Glasses of wine per week     Comment: very little   • Drug use: No        Review of Systems   Constitutional: Positive for appetite change and fatigue  Negative for chills and fever  HENT: Negative for congestion, rhinorrhea and trouble swallowing  Eyes: Positive for visual disturbance  Negative for photophobia and pain  Respiratory: Negative for cough and shortness of breath  Cardiovascular: Positive for palpitations  Negative for chest pain  Gastrointestinal: Negative for abdominal pain, nausea and vomiting  Genitourinary: Negative for difficulty urinating, dysuria, frequency and hematuria  Musculoskeletal: Negative for back pain and neck pain  Skin: Negative for pallor and rash  Neurological: Positive for weakness and numbness  Negative for dizziness, light-headedness and headaches  Psychiatric/Behavioral: Negative for behavioral problems and confusion         Physical Exam  ED Triage Vitals   Temperature Pulse Respirations Blood Pressure SpO2   05/02/23 1556 05/02/23 1556 05/02/23 1556 05/02/23 1556 05/02/23 1556   98 1 °F (36 7 °C) 69 20 165/78 98 %      Temp src Heart Rate Source Patient Position - Orthostatic VS BP Location FiO2 (%)   -- 05/02/23 1730 05/02/23 1615 05/02/23 1615 --    Monitor Lying Right arm       Pain Score       --                    Orthostatic Vital Signs  Vitals:    05/02/23 1730 05/02/23 1800 05/02/23 1830 05/02/23 2119   BP:  154/77  151/77   Pulse: 68 68 74    Patient Position - Orthostatic VS:  Sitting         Physical Exam  Vitals and nursing note reviewed  Constitutional:       General: He is not in acute distress  Appearance: Normal appearance  He is not ill-appearing, toxic-appearing or diaphoretic  Comments: Thin appearing   HENT:      Right Ear: External ear normal       Left Ear: External ear normal       Nose: Nose normal  No congestion or rhinorrhea  Mouth/Throat:      Mouth: Mucous membranes are moist       Pharynx: Oropharynx is clear  No oropharyngeal exudate or posterior oropharyngeal erythema  Eyes:      General: No scleral icterus  Right eye: No discharge  Left eye: No discharge  Extraocular Movements: Extraocular movements intact  Conjunctiva/sclera: Conjunctivae normal       Pupils: Pupils are equal, round, and reactive to light  Comments: Visual fields intact   Cardiovascular:      Rate and Rhythm: Normal rate and regular rhythm  Pulses: Normal pulses  Heart sounds: Normal heart sounds  No murmur heard  No friction rub  No gallop  Pulmonary:      Effort: Pulmonary effort is normal  No respiratory distress  Breath sounds: Normal breath sounds  No wheezing or rales  Abdominal:      General: Abdomen is flat  Bowel sounds are normal  There is no distension  Palpations: Abdomen is soft  Tenderness: There is no abdominal tenderness  There is no guarding     Musculoskeletal: Cervical back: Normal range of motion and neck supple  Right lower leg: No edema  Left lower leg: No edema  Skin:     General: Skin is warm and dry  Capillary Refill: Capillary refill takes less than 2 seconds  Coloration: Skin is not jaundiced or pale  Neurological:      Mental Status: He is alert and oriented to person, place, and time  Cranial Nerves: No cranial nerve deficit  Sensory: No sensory deficit  Motor: Weakness present  Coordination: Coordination normal       Deep Tendon Reflexes: Reflexes abnormal       Comments: There is 0 out of 5 strength in the right proximal musculature and 1 out of 5 strength in the right hand  There is 4 out of 5 strength grossly in the right lower extremity  3+ bilateral patellar reflexes  No clonus  No upgoing Babinski reflex  Psychiatric:         Mood and Affect: Mood normal          Behavior: Behavior normal          Thought Content:  Thought content normal          Judgment: Judgment normal          ED Medications  Medications   amLODIPine (NORVASC) tablet 5 mg (has no administration in time range)   busPIRone (BUSPAR) tablet 7 5 mg (has no administration in time range)   aspirin chewable tablet 81 mg (has no administration in time range)   gabapentin (NEURONTIN) capsule 300 mg (300 mg Oral Given 5/2/23 2119)   lisinopril (ZESTRIL) tablet 20 mg (20 mg Oral Given 5/2/23 2119)   metoprolol succinate (TOPROL-XL) 24 hr tablet 25 mg (25 mg Oral Not Given 5/2/23 2114)   heparin (porcine) subcutaneous injection 5,000 Units (5,000 Units Subcutaneous Not Given 5/2/23 2201)   iohexol (OMNIPAQUE) 350 MG/ML injection (SINGLE-DOSE) 85 mL (85 mL Intravenous Given 5/2/23 1755)   potassium chloride (K-DUR,KLOR-CON) CR tablet 40 mEq (40 mEq Oral Given 5/2/23 2015)   magnesium sulfate 2 g/50 mL IVPB (premix) 2 g (0 g Intravenous Stopped 5/2/23 2115)   Gadobutrol injection (SINGLE-DOSE) SOLN 6 mL (6 mL Intravenous Given 5/2/23 2146) Diagnostic Studies  Results Reviewed     Procedure Component Value Units Date/Time    Hemoglobin A1C [[id number]]     Lab Status: No result Specimen: Blood     Comprehensive metabolic panel [[id number]]  (Abnormal) Collected: 05/02/23 1649    Lab Status: Final result Specimen: Blood from Arm, Right Updated: 05/02/23 1721     Sodium 133 mmol/L      Potassium 3 8 mmol/L      Chloride 106 mmol/L      CO2 24 mmol/L      ANION GAP 3 mmol/L      BUN 14 mg/dL      Creatinine 0 62 mg/dL      Glucose 99 mg/dL      Calcium 9 6 mg/dL      AST 35 U/L      ALT 42 U/L      Alkaline Phosphatase 52 U/L      Total Protein 6 9 g/dL      Albumin 3 7 g/dL      Total Bilirubin 0 50 mg/dL      eGFR 101 ml/min/1 73sq m     Narrative:      National Kidney Disease Foundation guidelines for Chronic Kidney Disease (CKD):   •  Stage 1 with normal or high GFR (GFR > 90 mL/min/1 73 square meters)  •  Stage 2 Mild CKD (GFR = 60-89 mL/min/1 73 square meters)  •  Stage 3A Moderate CKD (GFR = 45-59 mL/min/1 73 square meters)  •  Stage 3B Moderate CKD (GFR = 30-44 mL/min/1 73 square meters)  •  Stage 4 Severe CKD (GFR = 15-29 mL/min/1 73 square meters)  •  Stage 5 End Stage CKD (GFR <15 mL/min/1 73 square meters)  Note: GFR calculation is accurate only with a steady state creatinine    CBC and differential [[id number]]  (Abnormal) Collected: 05/02/23 1649    Lab Status: Final result Specimen: Blood from Arm, Right Updated: 05/02/23 1701     WBC 7 53 Thousand/uL      RBC 4 74 Million/uL      Hemoglobin 14 3 g/dL      Hematocrit 40 9 %      MCV 86 fL      MCH 30 2 pg      MCHC 35 0 g/dL      RDW 12 8 %      MPV 8 7 fL      Platelets 102 Thousands/uL      nRBC 0 /100 WBCs      Neutrophils Relative 74 %      Immat GRANS % 0 %      Lymphocytes Relative 17 %      Monocytes Relative 7 %      Eosinophils Relative 1 %      Basophils Relative 1 %      Neutrophils Absolute 5 53 Thousands/µL      Immature Grans Absolute 0 02 Thousand/uL      Lymphocytes Absolute 1 31 Thousands/µL      Monocytes Absolute 0 54 Thousand/µL      Eosinophils Absolute 0 08 Thousand/µL      Basophils Absolute 0 05 Thousands/µL     Fingerstick Glucose (POCT) [[id number]]  (Normal) Collected: 05/02/23 1609    Lab Status: Final result Updated: 05/02/23 1610     POC Glucose 102 mg/dl                  MRI brain w contrast   Final Result by Adriane Carcamo MD (05/02 2311)      No enhancing lesion  Workstation performed: [id number]         MRI brain wo contrast   Final Result by Adriane Carcamo MD (05/02 2254)      No MR evidence of acute ischemia  Workstation performed: [id number]         CTA head and neck w wo contrast   Final Result by Adriane Carcamo MD (05/02 1933)      No evidence of acute intracranial hemorrhage  No evidence of hemodynamic significant stenosis, aneurysm or dissection  Workstation performed: [id number]         MRI inpatient order    (Results Pending)         Procedures  Procedures      ED Course                                       Medical Decision Making  Patient is a 69-year-old male with history of hypertension who presents with extremity weakness  Differential diagnosis includes but not limited to brain mass, brain abscess, ALS, and cancer  Given progressive right-sided hemiparesis and upper motor neuron signs I am concerned for a central cause of this weakness  He has no personal history of cancer family history of cancer and no other risk factors, EMG and consistent with ALS per neurology office note  Will obtain CBC, CMP, fingerstick glucose, EKG, MRI brain with and without contrast   Neurology consult  Per neurologist, will obtain CTA head and neck  Labs grossly unremarkable, CTA shows no evidence of acute intracranial hemorrhage  MRI pending, will admit patient for further neurologic work-up  Of note, patient had a 20 beat run of asymptomatic V  tach which extinguished spontaneously      V-tach [facility]): acute illness or injury  Weakness of right lower extremity: chronic illness or injury  Weakness of right upper extremity: chronic illness or injury  Amount and/or Complexity of Data Reviewed  External Data Reviewed: notes  Labs: ordered  Radiology: ordered  Risk  Prescription drug management  Decision regarding hospitalization  Disposition  Final diagnoses:   Weakness of right upper extremity   V-tach (HCC)   Weakness of right lower extremity     Time reflects when diagnosis was documented in both MDM as applicable and the Disposition within this note     Time User Action Codes Description Comment    5/2/2023  5:04 PM Sarah De Leon Add [R29 [id number]] Weakness of right upper extremity     5/2/2023  7:05 PM Marcelo Comes [I47 20] V-tach ([address] )     5/2/2023  7:05 PM Sarah De Leon Add [R29 [id number]] Weakness of right lower extremity       ED Disposition     ED Disposition   Admit    Condition   Stable    Date/Time   Tue May 2, 2023  7:04 PM    Comment   Case was discussed with Dr Sandoval Bradford and the patient's admission status was agreed to be Admission Status: inpatient status to the service of Dr Sandoval Bradford   Follow-up Information    None         Patient's Medications   Discharge Prescriptions    No medications on file     No discharge procedures on file  PDMP Review     None           ED Provider  Attending physically available and evaluated Brionna Edwards I managed the patient along with the ED Attending      Electronically Signed by         Jessi Soto MD  05/02/23 0173

## 2023-05-02 NOTE — ASSESSMENT & PLAN NOTE
Patient has significant history of bilateral rotator cuff tears with repairs and biceps tendon ruptures, presenting to the ER at the advice of his neurologist after outpatient testing with EMG noted concern for central process of his progressive weakness  Patient symptoms started over 3 years ago with right upper extremity weakness, however has progressed over the past several weeks, his weakness is in the right upper and right lower extremity associated with numbness and tingling  Patient denies any urinary changes, he does have chronic constipation, notes recent difficulty in ambulating secondary to the weakness  Patient had a recent admission at [facility] for similar presentation, he had a negative work-up with CT head MRI C-spine MRI brachial plexus     Patient being admitted for MRI brain without contrast, CT head and neck  We will check CK, aldolase

## 2023-05-03 ENCOUNTER — APPOINTMENT (INPATIENT)
Dept: RADIOLOGY | Facility: HOSPITAL | Age: 68
End: 2023-05-03

## 2023-05-03 PROBLEM — I47.29 NSVT (NONSUSTAINED VENTRICULAR TACHYCARDIA) (HCC): Status: ACTIVE | Noted: 2018-08-29

## 2023-05-03 LAB
ATRIAL RATE: 66 BPM
CK SERPL-CCNC: 201 U/L (ref 39–308)
P AXIS: 40 DEGREES
PR INTERVAL: 180 MS
QRS AXIS: 21 DEGREES
QRSD INTERVAL: 94 MS
QT INTERVAL: 392 MS
QTC INTERVAL: 410 MS
T WAVE AXIS: 57 DEGREES
VENTRICULAR RATE: 66 BPM

## 2023-05-03 RX ORDER — LIDOCAINE 50 MG/G
1 PATCH TOPICAL DAILY
Status: DISCONTINUED | OUTPATIENT
Start: 2023-05-03 | End: 2023-05-05 | Stop reason: HOSPADM

## 2023-05-03 RX ORDER — LORAZEPAM 2 MG/ML
1 INJECTION INTRAMUSCULAR ONCE AS NEEDED
Status: COMPLETED | OUTPATIENT
Start: 2023-05-03 | End: 2023-05-03

## 2023-05-03 RX ORDER — ACETAMINOPHEN 325 MG/1
975 TABLET ORAL EVERY 6 HOURS PRN
Status: DISCONTINUED | OUTPATIENT
Start: 2023-05-03 | End: 2023-05-05 | Stop reason: HOSPADM

## 2023-05-03 RX ADMIN — GABAPENTIN 300 MG: 300 CAPSULE ORAL at 17:59

## 2023-05-03 RX ADMIN — LORAZEPAM 1 MG: 2 INJECTION INTRAMUSCULAR; INTRAVENOUS at 10:29

## 2023-05-03 RX ADMIN — GADOBUTROL 6.5 ML: 604.72 INJECTION INTRAVENOUS at 11:41

## 2023-05-03 RX ADMIN — HEPARIN SODIUM 5000 UNITS: 5000 INJECTION INTRAVENOUS; SUBCUTANEOUS at 22:08

## 2023-05-03 RX ADMIN — HEPARIN SODIUM 5000 UNITS: 5000 INJECTION INTRAVENOUS; SUBCUTANEOUS at 05:10

## 2023-05-03 RX ADMIN — HEPARIN SODIUM 5000 UNITS: 5000 INJECTION INTRAVENOUS; SUBCUTANEOUS at 14:21

## 2023-05-03 RX ADMIN — LIDOCAINE 5% 1 PATCH: 700 PATCH TOPICAL at 10:29

## 2023-05-03 RX ADMIN — LISINOPRIL 20 MG: 20 TABLET ORAL at 09:28

## 2023-05-03 RX ADMIN — GABAPENTIN 300 MG: 300 CAPSULE ORAL at 09:28

## 2023-05-03 RX ADMIN — LISINOPRIL 20 MG: 20 TABLET ORAL at 22:00

## 2023-05-03 RX ADMIN — METOPROLOL SUCCINATE 25 MG: 25 TABLET, EXTENDED RELEASE ORAL at 09:27

## 2023-05-03 NOTE — ASSESSMENT & PLAN NOTE
· Noted run of V  tach while in the ER, asymptomatic   · EKG with PACs, no ischemic changes; noted history of PVCs  · Echo in March of 2022 unremarkable  · Monitor and replete electrolytes as needed   · Telemetry monitoring unremarkable thus far, will continue  · Outpatient follow-up with repeat echo

## 2023-05-03 NOTE — PROGRESS NOTES
[facility] Neurology Progress note    Name: Tania Mathis   Age & Sex: 69 y o  male   MRN: [id number]  Unit/Bed#: RODRIGO   Encounter: [id number]    Recommendations for outpatient neurological follow up have yet to be determined  Pending for discharge: workup    Assessment plan:    * Weakness of right upper extremity  Assessment & Plan  · 68 yo male w/ hx of HTN, b/l rotator cuff tears, with b/l repairs w/ subsequent b/l bicep tendon ruptures who presents to ED this evening from EMG clinic due to subacute RUE weakness and hyperreflexia concerning for central process  Initially began to experience some RUE weakness approximately 3 years ago that worsened significantly over the last 3 to 4 weeks without any identifiable triggers  Associated w/ some numbness/tingling in RUE, RLE  Does report difficulty ambulating due to RLE weakness  Denies urinary retention/incontinence, but does endorse some chronic constipation  No head, neck, back trauma, infections, medication changes  · Follows w/ Ortho outpatient: had R shoulder CS injection on 3/30 w/ 1 week of relief  At the time, ortho team was concerned about C5/6 spontaneous nerve palsy due to prolonged neck extension (recent root canal procedure)  · Recently admitted to [address] [place] [address] from 4/14 - 4/16/2023 for RUE, RLE weakness, generalized fatigue, reduction in  strength, cramping, impaired functionality  During the admission, CTH negative, MRI C-spine with and without contrast showed no intrinsic cord pathology  MRI brachial plexus with and without contrast showed no signal abnormality or enhancement or compressive lesion along the R brachial plexus  He was seen by neurology team and patient at that visit: Was given Robaxin as needed and discharged with an outpatient EMG  · Family hx negative for muscle disease or any neuromuscular pathology       Workup:   · MRI C-spine with and without contrast: Multilevel cervical spondylitic degenerative change with mild to moderate canal stenosis and foraminal narrowing   No cord compression or severe foraminal nerve impingement  · MRI brachial plexus w/wout contrast: no signal abnormality or enhancement or compressive lesion along the R brachial plexus  · Normal/Negative serum studies: TSH, CK, CRP, ESR, folate, LUZMARIA, Vitamin D 25 hydroxy  · B12 1316  ·  (5/3/23)  · MR brain scan w and w/out contrast: Unremarkable  · CTA head/neck: Unremarkable  · EMG not consistent w/ ALS  Denervation consistent w/ central etiology noted  · Labwork: CBC, CMP grossly unremarkable  Exam: RUE weakness w/ hyporeflexia, decreased tone on RUE and decreased biceps bulk  Hyporeflexic on R biceps, BR, R +2 triceps  +2 LUE reflexes  +3 patellars b/l  Up-going plantars on Right  Decreased RUE, RLE sensation as compared to Left  Impression: Subacute on chronic progressive worsening RUE weakness + sensory loss (RLE> RUE) w/ negative MR brain, c-spine, brachial plexus scans and EMG findings  This may be a myogenic process that has been slowly progressive over time  Increased constipation over the last few weeks and increased urinary frequency appears unrelated to his overall constellation of symptoms, however, it would be reasonable to rule out intrinsic cord pathology in L, T spine  A repeat EMG may also be needed in order to further assess whether this is a myogenic or neurogenic process (in setting of some denervation changes seen in RUE)  Plan:   · MR T-spine and L-spine with and without contrast  · MR RUE w/ and w/out contrast to assess for myositis  · Repeat EMG in 8-10 weeks outpatient  · PT/OT/PMR   · Consider adding baclofen   · Continue to monitor and replete electrolytes as appropriate  · Rest of care as per primary team      Subjective:  Patient seen and examined at bedside  No significant events overnight  The patient reports restlessness and poor sleep from ED admission   He reports significant right shoulder pain, numbness and tingling in the RUE/RLE  Review of Systems   Constitutional: Positive for fatigue  Negative for activity change and appetite change  HENT: Negative for trouble swallowing  Eyes: Negative for photophobia and visual disturbance  Respiratory: Negative for chest tightness and shortness of breath  Cardiovascular: Negative for chest pain and palpitations  Gastrointestinal: Positive for constipation (worse than normal in the past few weeks)  Negative for abdominal distention  Genitourinary: Positive for frequency  Neurological: Positive for weakness  Negative for dizziness, seizures, facial asymmetry and speech difficulty  Psychiatric/Behavioral: Negative for decreased concentration  Objective:     Patient ID: Deborah Joyce is a 69 y o  male  Vitals:    23 0130 23 0704 23 0927 23 0930   BP:  142/65 137/74 137/74   BP Location:  Right arm  Right arm   Pulse: 58 64 84 86   Resp: 16 14  20   Temp:       SpO2: 97% 97%  97%      Temperature:   Temp (24hrs), Av 1 °F (36 7 °C), Min:98 1 °F (36 7 °C), Max:98 1 °F (36 7 °C)    Temperature: 98 1 °F (36 7 °C)    Physical exam:  Vitals reviewed  General Examination: No distress, cooperative  Pulm: no respiratory distress      Neurological exam:    Mental Status  A, Ox3  Follows simple, 3 step commands  Speech: fluent, normal rhythm, no dysarthria       Cranial Nerves  CN II: Visual fields full to confrontation  CN III, IV, VI: EOMI bilaterally  Normal lids and orbits bilaterally  Pupils reactive to light bilaterally  Physiologic aniscoria  No nystagmus  Normal saccades  Normal smooth pursuit    CN V: Facial sensation is normal    CN VII:  Right: There is no facial weakness or asymmetry  Left: There is no facial weakness or asymmetry  CN VIII: Audition grossly intact to voice  CN IX, X: Uvula midline   Palate elevates symmetrically  CN XI:  Right, left SCMs symmetric-able to shrug shoulders bilaterally  CN XII: Tongue midline without atrophy or fasciculations with appropriate movement      Motor  Decreased R biceps bulk in RUE, grossly normal bulk in R forearm  Decreased tone on RUE  No fasciculations present         Right Left   Shoulder abduction 1 5 (pain limited)   Shoulder adduction 1 5 (pain limited)   Elbow flexion 1 5   Elbow extension 1 5   Wrist flexion 2 5   Wrist extension 2 5   Hip flexion 5 5   Hip extension 5 5   Knee flexion 5 5   Knee extension 5 5   Plantar flexion 5 5   Dorsiflexion 5 5      Sensory  RUE, RLE: decreased sensation to pinprick (70%) as compared to Left       Reflexes Right Left   Brachioradialis     +1  +2   Biceps                                   +1  +2   Triceps  +2  +2   Patellar   +3  +3   Achilles   +2  +2       Upgoing toes on Right  Downgoing toes on Left  No clonus on my exam          Coordination  Finger-to-nose normal on L        Gait  Casual gait is cautious but normal including stance, stride, and arm swing on the left  Able to rise from chair without using arms          Labs: I have personally reviewed pertinent reports  and I have personally reviewed pertinent films in PACS  Results from last 7 days   Lab Units 05/02/23  1649   WBC Thousand/uL 7 53   HEMOGLOBIN g/dL 14 3   HEMATOCRIT % 40 9   PLATELETS Thousands/uL 324   NEUTROS PCT % 74   MONOS PCT % 7      Results from last 7 days   Lab Units 05/02/23  1649   SODIUM mmol/L 133*   POTASSIUM mmol/L 3 8   CHLORIDE mmol/L 106   CO2 mmol/L 24   BUN mg/dL 14   CREATININE mg/dL 0 62   CALCIUM mg/dL 9 6   ALK PHOS U/L 52   ALT U/L 42   AST U/L 35                          Imaging:     Radiology Results: I have personally reviewed pertinent reports  and I have personally reviewed pertinent films in PACS    MRI brain w contrast   Final Result by Denise Diallo MD (05/02 2311)      No enhancing lesion        Workstation performed: [id number]         MRI brain wo contrast   Final Result by Denise Diallo MD (05/02 2254)      No MR evidence of acute ischemia  Workstation performed: [id number]         CTA head and neck w wo contrast   Final Result by Nasir Wagner MD (05/02 1933)      No evidence of acute intracranial hemorrhage  No evidence of hemodynamic significant stenosis, aneurysm or dissection  Workstation performed: [id number]         MRI thoracic spine w wo contrast    (Results Pending)   MRI lumbar spine w wo contrast    (Results Pending)   MRI inpatient order    (Results Pending)       Other Diagnostic Testing: I have personally reviewed pertinent reports  Active Medications:     Current Facility-Administered Medications   Medication Dose Route Frequency   • acetaminophen (TYLENOL) tablet 975 mg  975 mg Oral Q6H PRN   • amLODIPine (NORVASC) tablet 5 mg  5 mg Oral Daily   • aspirin chewable tablet 81 mg  81 mg Oral Every Other Day   • busPIRone (BUSPAR) tablet 7 5 mg  7 5 mg Oral BID PRN   • gabapentin (NEURONTIN) capsule 300 mg  300 mg Oral BID   • heparin (porcine) subcutaneous injection 5,000 Units  5,000 Units Subcutaneous Q8H [address]   • lidocaine (LIDODERM) 5 % patch 1 patch  1 patch Topical Daily   • lisinopril (ZESTRIL) tablet 20 mg  20 mg Oral BID   • metoprolol succinate (TOPROL-XL) 24 hr tablet 25 mg  25 mg Oral Daily       Prior to Admission medications    Medication Sig Start Date End Date Taking?  Authorizing Provider   amLODIPine (NORVASC) 5 mg tablet Take 1 tablet (5 mg total) by mouth daily Prn BP> 150/90 3/27/23   Janit Art, DO   aspirin 81 mg chewable tablet Chew 1 tablet every other day    Historical Provider, MD   busPIRone (BUSPAR) 7 5 mg tablet Take 1 tablet (7 5 mg total) by mouth 2 (two) times a day as needed (Anxiety) 3/27/23   Janit Art, DO   Coenzyme Q10-Red Yeast Rice  MG CAPS Take 1 capsule by mouth daily    Historical Provider, MD   Docosahexaenoic Acid (DHA OMEGA 3) 100 MG CAPS Take 6 capsules by mouth daily    Historical Provider, MD fexofenadine (ALLEGRA) 180 MG tablet Take 1 tablet by mouth daily as needed 6/10/14   Historical Provider, MD   gabapentin (Neurontin) 300 mg capsule Take 1 capsule nightly for 4-5 days, then increase to 1 capsule in the mornign and 1 in the evening, then increase to 1 capsule TID  4/20/23   Benjamin Treviño MD   lidocaine (XYLOCAINE) 5 % ointment Apply topically as needed for mild pain  Patient not taking: Reported on 4/25/2023 3/27/23   Trista Maravilla DO   lisinopril (ZESTRIL) 20 mg tablet TAKE 1 TABLET TWICE A DAY 3/30/23   Trista Maravilla DO   meloxicam (Mobic) 15 mg tablet Take 1 tablet (15 mg total) by mouth daily  Patient not taking: Reported on 4/25/2023 4/20/23   Benjamin Treviño MD   metoprolol succinate (TOPROL-XL) 25 mg 24 hr tablet Take 1 tablet (25 mg total) by mouth daily 6/21/22 4/14/23  Trista Maravilla DO   [facility] Natural Products ([address]) CAPS Take 1 capsule by mouth daily    Historical Provider, MD   Multiple Vitamin tablet Take 1 tablet by mouth daily    Historical Provider, MD   pantoprazole (PROTONIX) 40 mg tablet Take 1 tablet (40 mg total) by mouth daily in the early morning Do not start before April 17, 2023    Patient not taking: Reported on 4/25/2023 4/17/23   Terrance Marina DO         VTE Pharmacologic Prophylaxis: Heparin  VTE Mechanical Prophylaxis: sequential compression device    ==  MD Rod [facility]'s Neurology Residency, PGY-II

## 2023-05-03 NOTE — UTILIZATION REVIEW
Initial Clinical Review    Admission: Date/Time/Statement:   Admission Orders (From admission, onward)     Ordered        05/02/23 1905  INPATIENT ADMISSION  Once                      Orders Placed This Encounter   Procedures   • INPATIENT ADMISSION     Standing Status:   Standing     Number of Occurrences:   1     Order Specific Question:   Level of Care     Answer:   Med Surg [16]     Order Specific Question:   Estimated length of stay     Answer:   More than 2 Midnights     Order Specific Question:   Certification     Answer:   I certify that inpatient services are medically necessary for this patient for a duration of greater than two midnights  See H&P and MD Progress Notes for additional information about the patient's course of treatment  ED Arrival Information     Expected   -    Arrival   5/2/2023 15:49    Acuity   Emergent            Means of arrival   Walk-In    Escorted by   Family Member    Service   Hospitalist    Admission type   Emergency            Arrival complaint   weakness in rt side           Chief Complaint   Patient presents with   • Extremity Weakness     Pt reports R sided weakness x 3 weeks, symptoms are getting worse, sent by neurology for MRI  Previously [facility] at [facility] when sx started  Initial Presentation: 69 y o  male with PMH of HTN, asthma, b/l rotator cuff tear w/ repairs presented to the ED from OP neurology office for worsening right upper and right lower extremity weakness for the past 3 to 4 weeks  Pt had outpatient EMG testing which noted concern for central process of weakness, as it appears he has upper motor neuron abnormalities  He started experiencing RUE weakness 3 years ago  It worsened progressively over the past 3 to 4 weeks, also associated with some numbness and tingling in his right upper and right lower extremity  Now also having difficulty ambulating secondary to his weakness  In the ED, noted Vtach on tele   On exam, aaox3,RUE 1-2/5 w/ relatively normal strength in right lower extremity  IV Mag sulfate given  Admitted as Inpatient for weakness of RUE  Plan; MRI brain without contrast, CT head and neck  check CK, aldolase  Neurology consult  05/02 Neurology Consult: Weakness of RUE: Recently admitted on 04/14-04/16 for RUE, RLE weakness, generalized fatigue, reduction in  strength, cramping, impaired functionality  CTH negative, MRI C-spine showed no intrinsic cord pathology  MRI brachial plexus showed no signal abnormality or enhancement or compressive lesion along the R brachial plexus  Seen by neurology and discharged w/ an OP EMG  Exam: RUE>>RLE weakness w/ hyporeflexia, decreased tone on RUE  Hyporeflexic on R biceps, BR, but Chavira's present  +3 patellars b/l  Up-going plantars on Right  Decreased RUE, RLE sensation as compared to Left  Plan: MRI brain w/wout contrast  CTA h/n  CK level  Continue to monitor and replete electrolytes as appropriate    Date: 05/03  Day 2:   IM Notes: Pt reports pain in his RUE  Reports difficulty walking due to RLE weakness and paresthesias but declines any recent falls or trauma  MRI brain and CTA h/n were unremarkable  CK wnl, aldolase pending  EKG with PACs, no ischemic changes  Plan: MRI T/L spine ordered for further eval  Cont gabapentin and prn analgesics, consider addition of muscle relaxer  PT/OT evaluations pending  Monitor and replete electrolytes as needed  Telemetry monitoring  PE: aaox3, depressed, flat affect, RUE weakness 2/5, RUE decreased tone      ED Triage Vitals   Temperature Pulse Respirations Blood Pressure SpO2   05/02/23 1556 05/02/23 1556 05/02/23 1556 05/02/23 1556 05/02/23 1556   98 1 °F (36 7 °C) 69 20 165/78 98 %      Temp src Heart Rate Source Patient Position - Orthostatic VS BP Location FiO2 (%)   -- 05/02/23 1730 05/02/23 1615 05/02/23 1615 --    Monitor Lying Right arm       Pain Score       05/03/23 0704       7          Wt Readings from Last 1 Encounters:   04/25/23 64 9 kg (143 lb 1 3 oz)     Additional Vital Signs:   Date/Time Temp Pulse Resp BP MAP (mmHg) SpO2 O2 Device Patient Position - Orthostatic VS   05/03/23 12:29:31 99 2 °F (37 3 °C) 70 -- 93/56 68 97 % -- --   05/03/23 0930 -- 86 20 137/74 94 97 % None (Room air) Lying   05/03/23 0927 -- 84 -- 137/74 -- -- -- --   05/03/23 0704 -- 64 14 142/65 94 97 % None (Room air) Lying   05/03/23 0130 -- 58 16 -- -- 97 % None (Room air) --   05/02/23 2120 -- 60 17 151/77 109 97 % None (Room air) Sitting   05/02/23 2119 -- -- -- 151/77 -- -- -- --   05/02/23 1830 -- 74 18 -- -- -- -- --   05/02/23 1800 -- 68 14 154/77 109 97 % None (Room air) Sitting   05/02/23 1730 -- 68 19 -- -- -- -- --   05/02/23 1615 -- 68 16 139/75 102 -- -- Lying       Pertinent Labs/Diagnostic Test Results:   MRI lumbar spine w wo contrast   Final Result by Doc Kauffman DO (05/03 1421)      Multilevel thoracolumbar spondylitic degenerative change with loss of disc height, annular bulging and minor endplate/facet degenerative change resulting in multilevel mild canal stenosis and foraminal narrowing  There is a complex solid and cystic mass identified within the upper pole of the right kidney, suspicious for renal neoplasm  Surgical consultation recommended  I personally discussed this study with Reynaldo Do  and [id number] [facility] on 5/3/2023  Workstation performed: [id number]         MRI thoracic spine w wo contrast   Final Result by Doc Kauffman DO (05/03 1421)   Addendum (preliminary) 1 of 1 by Aasa 43, DO (05/03 1421)   ADDENDUM:      There is a complex heterogeneous solid and cystic mass identified within    the upper pole of the right kidney with irregular central enhancement  Findings are suspicious for a renal neoplasm and surgical consultation is    recommended  This was also described    in the MRI lumbar spine report              I personally discussed this study with Reynaldo Do and Angela    Held on 5/3/2023  Final      Mild degenerative change without cord compression or discrete nerve impingement  Workstation performed: [id number]         MRI brain w contrast   Final Result by Deisy Lizarraga MD (05/02 2311)      No enhancing lesion  Workstation performed: [id number]         MRI brain wo contrast   Final Result by Deisy Lizarraga MD (05/02 2254)      No MR evidence of acute ischemia  Workstation performed: [id number]         CTA head and neck w wo contrast   Final Result by Deisy Lizarraga MD (05/02 1933)      No evidence of acute intracranial hemorrhage  No evidence of hemodynamic significant stenosis, aneurysm or dissection                    Workstation performed: [id number]         MRI inpatient order    (Results Pending)   CT chest abdomen pelvis w contrast    (Results Pending)     05/02 EKG result: Sinus rhythm with Premature atrial complexes      Results from last 7 days   Lab Units 05/02/23  1649   WBC Thousand/uL 7 53   HEMOGLOBIN g/dL 14 3   HEMATOCRIT % 40 9   PLATELETS Thousands/uL 324   NEUTROS ABS Thousands/µL 5 53         Results from last 7 days   Lab Units 05/02/23  1649   SODIUM mmol/L 133*   POTASSIUM mmol/L 3 8   CHLORIDE mmol/L 106   CO2 mmol/L 24   ANION GAP mmol/L 3*   BUN mg/dL 14   CREATININE mg/dL 0 62   EGFR ml/min/1 73sq m 101   CALCIUM mg/dL 9 6     Results from last 7 days   Lab Units 05/02/23  1649   AST U/L 35   ALT U/L 42   ALK PHOS U/L 52   TOTAL PROTEIN g/dL 6 9   ALBUMIN g/dL 3 7   TOTAL BILIRUBIN mg/dL 0 50     Results from last 7 days   Lab Units 05/02/23  1609   POC GLUCOSE mg/dl 102     Results from last 7 days   Lab Units 05/02/23  1649   GLUCOSE RANDOM mg/dL 99           Results from last 7 days   Lab Units 05/03/23  0419   CK TOTAL U/L 201     ED Treatment:   Medication Administration from 05/02/2023 1549 to 05/03/2023 1211       Date/Time Order Dose Route Action     05/02/2023 1755 EDT iohexol (OMNIPAQUE) 350 MG/ML injection (SINGLE-DOSE) 85 mL 85 mL Intravenous Given     05/03/2023 0928 EDT gabapentin (NEURONTIN) capsule 300 mg 300 mg Oral Given     05/02/2023 2119 EDT gabapentin (NEURONTIN) capsule 300 mg 300 mg Oral Given     05/03/2023 2109 EDT lisinopril (ZESTRIL) tablet 20 mg 20 mg Oral Given     05/02/2023 2119 EDT lisinopril (ZESTRIL) tablet 20 mg 20 mg Oral Given     05/03/2023 1954 EDT metoprolol succinate (TOPROL-XL) 24 hr tablet 25 mg 25 mg Oral Given     05/02/2023 2015 EDT potassium chloride (K-DUR,KLOR-CON) CR tablet 40 mEq 40 mEq Oral Given     05/02/2023 2115 EDT magnesium sulfate 2 g/50 mL IVPB (premix) 2 g 0 g Intravenous Stopped     05/02/2023 2015 EDT magnesium sulfate 2 g/50 mL IVPB (premix) 2 g 2 g Intravenous New Bag     05/03/2023 0510 EDT heparin (porcine) subcutaneous injection 5,000 Units 5,000 Units Subcutaneous Given     05/02/2023 2146 EDT Gadobutrol injection (SINGLE-DOSE) SOLN 6 mL 6 mL Intravenous Given     05/03/2023 1029 EDT lidocaine (LIDODERM) 5 % patch 1 patch 1 patch Topical Medication Applied     05/03/2023 1029 EDT LORazepam (ATIVAN) injection 1 mg 1 mg Intravenous Given     05/03/2023 1141 EDT Gadobutrol injection (SINGLE-DOSE) SOLN 6 5 mL 6 5 mL Intravenous Given        Past Medical History:   Diagnosis Date   • Allergic    • Arthritis    • Asthma    • Hypertension    • Impaired fasting glucose    • Mitral valve disorder    • Mitral valve prolapse    • Murmur, cardiac    • 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    • PAC (premature atrial contraction)    • PVC (premature ventricular contraction)    • Thyroid nodule      Present on Admission:  • Weakness of right upper extremity  • Rotator cuff tear arthropathy of right shoulder  • Muscle weakness  • Polyneuropathy  • NSVT (nonsustained ventricular tachycardia) ([facility])  • HTN (hypertension)      Admitting Diagnosis: V-tach ([facility]) [I47 20]  Weakness [R53 1]  Weakness of right upper extremity [R29 898]  Weakness of right lower extremity [L02 483]  Age/Sex: 69 y o  male  Admission Orders:  SCD  50 Hr telemetry monitoring  PT/OT  Daily wts  I/O      Scheduled Medications:  amLODIPine, 5 mg, Oral, Daily  aspirin, 81 mg, Oral, Every Other Day  gabapentin, 300 mg, Oral, BID  heparin (porcine), 5,000 Units, Subcutaneous, Q8H [employer]  lidocaine, 1 patch, Topical, Daily  lisinopril, 20 mg, Oral, BID  metoprolol succinate, 25 mg, Oral, Daily      Continuous IV Infusions: none     PRN Meds:  acetaminophen, 975 mg, Oral, Q6H PRN  busPIRone, 7 5 mg, Oral, BID PRN        IP CONSULT TO NEUROLOGY  IP CONSULT TO CASE MANAGEMENT    Network Utilization Review Department  ATTENTION: Please call with any questions or concerns to [phone] and carefully listen to the prompts so that you are directed to the right person  All voicemails are confidential   Rell Hartley all requests for admission clinical reviews, approved or denied determinations and any other requests to dedicated fax number below belonging to the campus where the patient is receiving treatment   List of dedicated fax numbers for the Facilities:  [address] DENIALS (Administrative/Medical Necessity) [phone]   [address] [place] [address] (Maternity/NICU/Pediatrics) [phone]   5 Joanne Vargas [phone]   [facility] [phone]   [address] [place] [id number] [place] [address] [facility] Cent[facility] [phone]   1559 [facility] Rox De León [facility] 134 815 [facility] [phone]

## 2023-05-03 NOTE — ASSESSMENT & PLAN NOTE
· Patient has significant history of bilateral rotator cuff tears with repairs and biceps tendon ruptures  Presented to the ER at the advice of his neurologist after outpatient testing with EMG noted concern for central process given subacute RUE weakness and hyperreflexia   · Patient symptoms started over 3 years ago with right upper extremity weakness, which has progressed over the past 3-4 weeks and now with associated right lower extremity numbness and tingling  Patient denies any urinary changes, he does have chronic constipation and notes recent difficulty in ambulating secondary to the RLE weakness  · Patient had a recent admission at [facility] for similar presentation, he had a negative work-up with CT head, MRI C-spine, MRI brachial plexus  · Patient admitted for MRI brain without contrast and CTA head and neck, both of which were unremarkable   · CK within normal limites, aldolase pending at this time   · Appreciate ongoing neurology recommendations   · Given new RLE symptoms have ordered MRI T/L spine for further evaluation, which is pending at this time  Note this would not explain progressive RUE weakness     · Consideration for repeat EMG as outpatient   · Continue with gabapentin and prn analgesics, consider addition of muscle relaxer   · PT/OT evaluations pending

## 2023-05-03 NOTE — PROGRESS NOTES
[address] [facility]  Progress Note  Name: Monique Abrams  MRN: [id number]  Unit/Bed#: ED 17 I Date of Admission: 5/2/2023   Date of Service: 5/3/2023 I Hospital Day: 1    Assessment/Plan   * Weakness of right upper extremity  Assessment & Plan  · Patient has significant history of bilateral rotator cuff tears with repairs and biceps tendon ruptures  Presented to the ER at the advice of his neurologist after outpatient testing with EMG noted concern for central process given subacute RUE weakness and hyperreflexia   · Patient symptoms started over 3 years ago with right upper extremity weakness, which has progressed over the past 3-4 weeks and now with associated right lower extremity numbness and tingling  Patient denies any urinary changes, he does have chronic constipation and notes recent difficulty in ambulating secondary to the RLE weakness  · Patient had a recent admission at [facility] for similar presentation, he had a negative work-up with CT head, MRI C-spine, MRI brachial plexus  · Patient admitted for MRI brain without contrast and CTA head and neck, both of which were unremarkable   · CK within normal limites, aldolase pending at this time   · Appreciate ongoing neurology recommendations   · Given new RLE symptoms have ordered MRI T/L spine for further evaluation, which is pending at this time  Note this would not explain progressive RUE weakness     · Consideration for repeat EMG as outpatient   · Continue with gabapentin and prn analgesics, consider addition of muscle relaxer   · PT/OT evaluations pending     NSVT (nonsustained ventricular tachycardia) (HCC)  Assessment & Plan  · Noted run of V  tach while in the ER, asymptomatic   · EKG with PACs, no ischemic changes; noted history of PVCs  · Echo in March of 2022 unremarkable  · Monitor and replete electrolytes as needed   · Telemetry monitoring unremarkable thus far, will continue  · Outpatient follow-up with repeat echo     HTN (hypertension)  Assessment & Plan  · BP acceptable, monitor routinely   · Continue PTA regimen: amlodipine 5 mg daily, lisinopril 20 mg twice daily, metoprolol 25 mg daily    Rotator cuff tear arthropathy of right shoulder  Assessment & Plan  · Noted history as outlined above          VTE Pharmacologic Prophylaxis:   Moderate Risk (Score 3-4) - Pharmacological DVT Prophylaxis Ordered: heparin  Patient Centered Rounds: I performed bedside rounds with nursing staff today  Discussions with Specialists or Other Care Team Provider: will f/u neurology recommendations     Education and Discussions with Family / Patient: Updated  (wife) at bedside  Total Time Spent on Date of Encounter in care of patient: 35 minutes This time was spent on one or more of the following: performing physical exam; counseling and coordination of care; obtaining or reviewing history; documenting in the medical record; reviewing/ordering tests, medications or procedures; communicating with other healthcare professionals and discussing with patient's family/caregivers  Current Length of Stay: 1 day(s)  Current Patient Status: Inpatient   Certification Statement: The patient will continue to require additional inpatient hospital stay due to pending neuro workup/clearance, awaiting therapy evals  Discharge Plan: Anticipate discharge in 24-48 hrs to discharge location to be determined pending rehab evaluations  Code Status: Level 1 - Full Code    Subjective:   Patient expressed frustration and feeling depressed being here in the hospital and notes he just wants to go home  He tells me he does not want to go for more MRIs, but is agreeable with prn anxiolytic to be given prior  He complains of pain in his RUE  I advised I just added Tylenol and Lidoderm patch but if he needs something more I can add additional prn medications   He notes trouble with walking due to RLE weakness and paresthesias but declines any recent falls or trauma  Objective:     Vitals:   Temp (24hrs), Av 1 °F (36 7 °C), Min:98 1 °F (36 7 °C), Max:98 1 °F (36 7 °C)    Temp:  [98 1 °F (36 7 °C)] 98 1 °F (36 7 °C)  HR:  [58-86] 86  Resp:  [14-20] 20  BP: (137-165)/(65-78) 137/74  SpO2:  [97 %-98 %] 97 %  There is no height or weight on file to calculate BMI  Input and Output Summary (last 24 hours):   No intake or output data in the 24 hours ending 23 1013    Physical Exam:   Physical Exam  Vitals reviewed  Constitutional:       General: He is not in acute distress  Cardiovascular:      Rate and Rhythm: Normal rate  Pulmonary:      Effort: Pulmonary effort is normal  No respiratory distress  Neurological:      Mental Status: He is alert and oriented to person, place, and time  Mental status is at baseline  Motor: Weakness (RUE 2/5) and abnormal muscle tone (RUE decresed tone) present  Psychiatric:         Mood and Affect: Mood is depressed  Affect is flat           Additional Data:     Labs:  Results from last 7 days   Lab Units 23  1649   WBC Thousand/uL 7 53   HEMOGLOBIN g/dL 14 3   HEMATOCRIT % 40 9   PLATELETS Thousands/uL 324   NEUTROS PCT % 74   LYMPHS PCT % 17   MONOS PCT % 7   EOS PCT % 1     Results from last 7 days   Lab Units 23  1649   SODIUM mmol/L 133*   POTASSIUM mmol/L 3 8   CHLORIDE mmol/L 106   CO2 mmol/L 24   BUN mg/dL 14   CREATININE mg/dL 0 62   ANION GAP mmol/L 3*   CALCIUM mg/dL 9 6   ALBUMIN g/dL 3 7   TOTAL BILIRUBIN mg/dL 0 50   ALK PHOS U/L 52   ALT U/L 42   AST U/L 35   GLUCOSE RANDOM mg/dL 99         Results from last 7 days   Lab Units 23  1609   POC GLUCOSE mg/dl 102               Lines/Drains:  Invasive Devices     Peripheral Intravenous Line  Duration           Peripheral IV 23 Right;Ventral (anterior) Forearm <1 day                  Telemetry:  Telemetry Orders (From admission, onward)             48 Hour Telemetry Monitoring  Continuous x 48 hours References:    Telemetry Guidelines   Question:  Reason for 48 Hour Telemetry  Answer:  Arrhythmias Requiring Medical Therapy (eg  SVT, Vtach/fib, Bradycardia, Uncontrolled A-fib)                 Telemetry Reviewed: Normal Sinus Rhythm  Indication for Continued Telemetry Use: Arrthymias requiring medical therapy             Imaging: Reviewed radiology reports from this admission including: ECHO    Recent Cultures (last 7 days):         Last 24 Hours Medication List:   Current Facility-Administered Medications   Medication Dose Route Frequency Provider Last Rate   • acetaminophen  975 mg Oral Q6H PRN Angela E JEAN Johns     • amLODIPine  5 mg Oral Daily Charli Banai, DO     • aspirin  81 mg Oral Every Other Day Charli Banai, DO     • busPIRone  7 5 mg Oral BID PRN Charli Banai, DO     • gabapentin  300 mg Oral BID Charli Banai, DO     • heparin (porcine)  5,000 Units Subcutaneous Q8H [facility] Charli Robin, DO     • lidocaine  1 patch Topical Daily Angela E JEAN Johns     • lisinopril  20 mg Oral BID Charli Kelly, DO     • LORazepam  1 mg Intravenous Once PRN Angela E JEAN Johns     • metoprolol succinate  25 mg Oral Daily Charlimonty Kelly, DO          Today, Patient Was Seen By: Tosha Murillo PA-C    **Please Note: This note may have been constructed using a voice recognition system  **

## 2023-05-03 NOTE — PLAN OF CARE
Problem: PAIN - ADULT  Goal: Verbalizes/displays adequate comfort level or baseline comfort level  Description: Interventions:  - Encourage patient to monitor pain and request assistance  - Assess pain using appropriate pain scale  - Administer analgesics based on type and severity of pain and evaluate response  - Implement non-pharmacological measures as appropriate and evaluate response  - Consider cultural and social influences on pain and pain management  - Notify physician/advanced practitioner if interventions unsuccessful or patient reports new pain  Outcome: Progressing     Problem: INFECTION - ADULT  Goal: Absence or prevention of progression during hospitalization  Description: INTERVENTIONS:  - Assess and monitor for signs and symptoms of infection  - Monitor lab/diagnostic results  - Monitor all insertion sites, i e  indwelling lines, tubes, and drains  - Monitor endotracheal if appropriate and nasal secretions for changes in amount and color  - [place] appropriate cooling/warming therapies per order  - Administer medications as ordered  - Instruct and encourage patient and family to use good hand hygiene technique  - Identify and instruct in appropriate isolation precautions for identified infection/condition  Outcome: Progressing     Problem: DISCHARGE PLANNING  Goal: Discharge to home or other facility with appropriate resources  Description: INTERVENTIONS:  - Identify barriers to discharge w/patient and caregiver  - Arrange for needed discharge resources and transportation as appropriate  - Identify discharge learning needs (meds, wound care, etc )  - Arrange for interpretive services to assist at discharge as needed  - Refer to Case Management Department for coordinating discharge planning if the patient needs post-hospital services based on physician/advanced practitioner order or complex needs related to functional status, cognitive ability, or social support system  Outcome: Progressing Problem: Knowledge Deficit  Goal: Patient/family/caregiver demonstrates understanding of disease process, treatment plan, medications, and discharge instructions  Description: Complete learning assessment and assess knowledge base  Interventions:  - Provide teaching at level of understanding  - Provide teaching via preferred learning methods  Outcome: Progressing     Problem: NEUROSENSORY - ADULT  Goal: Achieves stable or improved neurological status  Description: INTERVENTIONS  - Monitor and report changes in neurological status  - Monitor vital signs such as temperature, blood pressure, glucose, and any other labs ordered   - Initiate measures to prevent increased intracranial pressure  - Monitor for seizure activity and implement precautions if appropriate      Outcome: Progressing  Goal: Achieves maximal functionality and self care  Description: INTERVENTIONS  - Monitor swallowing and airway patency with patient fatigue and changes in neurological status  - Encourage and assist patient to increase activity and self care     - Encourage visually impaired, hearing impaired and aphasic patients to use assistive/communication devices  Outcome: Progressing     Problem: CARDIOVASCULAR - ADULT  Goal: Maintains optimal cardiac output and hemodynamic stability  Description: INTERVENTIONS:  - Monitor I/O, vital signs and rhythm  - Monitor for S/S and trends of decreased cardiac output  - Administer and titrate ordered vasoactive medications to optimize hemodynamic stability  - Assess quality of pulses, skin color and temperature  - Assess for signs of decreased coronary artery perfusion  - Instruct patient to report change in severity of symptoms  Outcome: Progressing  Goal: Absence of cardiac dysrhythmias or at baseline rhythm  Description: INTERVENTIONS:  - Continuous cardiac monitoring, vital signs, obtain 12 lead EKG if ordered  - Administer antiarrhythmic and heart rate control medications as ordered  - Monitor electrolytes and administer replacement therapy as ordered  Outcome: Progressing     Problem: Potential for Falls  Goal: Patient will remain free of falls  Description: INTERVENTIONS:  - Educate patient/family on patient safety including physical limitations  - Instruct patient to call for assistance with activity   - Consult OT/PT to assist with strengthening/mobility   - Keep Call bell within reach  - Keep bed low and locked with side rails adjusted as appropriate  - Keep care items and personal belongings within reach  - Initiate and maintain comfort rounds  - Make Fall Risk Sign visible to staff  - Offer Toileting every 2 Hours, in advance of need  - Initiate/Maintain bed alarm  - Obtain necessary fall risk management equipment: alarm  - Apply yellow socks and bracelet for high fall risk patients  - Consider moving patient to room near nurses station  Outcome: Progressing     Problem: MOBILITY - ADULT  Goal: Maintain or return to baseline ADL function  Description: INTERVENTIONS:  -  Assess patient's ability to carry out ADLs; assess patient's baseline for ADL function and identify physical deficits which impact ability to perform ADLs (bathing, care of mouth/teeth, toileting, grooming, dressing, etc )  - Assess/evaluate cause of self-care deficits   - Assess range of motion  - Assess patient's mobility; develop plan if impaired  - Assess patient's need for assistive devices and provide as appropriate  - Encourage maximum independence but intervene and supervise when necessary  - Involve family in performance of ADLs  - Assess for home care needs following discharge   - Consider OT consult to assist with ADL evaluation and planning for discharge  - Provide patient education as appropriate  Outcome: Progressing  Goal: Maintains/Returns to pre admission functional level  Description: INTERVENTIONS:  - Perform BMAT or MOVE assessment daily    - Set and communicate daily mobility goal to care team and patient/family/caregiver  - Collaborate with rehabilitation services on mobility goals if consulted  - Perform Range of Motion 4 times a day  - Reposition patient every 2 hours    - Dangle patient 1 times a day  - Stand patient 1 times a day  - Ambulate patient 1 times a day  - Out of bed to chair 1 times a day   - Out of bed for meals 1 times a day  - Out of bed for toileting  - Record patient progress and toleration of activity level   Outcome: Progressing

## 2023-05-03 NOTE — ASSESSMENT & PLAN NOTE
· BP acceptable, monitor routinely   · Continue PTA regimen: amlodipine 5 mg daily, lisinopril 20 mg twice daily, metoprolol 25 mg daily

## 2023-05-04 ENCOUNTER — APPOINTMENT (INPATIENT)
Dept: RADIOLOGY | Facility: HOSPITAL | Age: 68
End: 2023-05-04

## 2023-05-04 PROBLEM — N28.89 RIGHT RENAL MASS: Status: ACTIVE | Noted: 2023-05-04

## 2023-05-04 LAB
ALDOLASE SERPL-CCNC: 7 U/L (ref 3.3–10.3)
ANION GAP SERPL CALCULATED.3IONS-SCNC: 5 MMOL/L (ref 4–13)
BUN SERPL-MCNC: 22 MG/DL (ref 5–25)
CALCIUM SERPL-MCNC: 8.9 MG/DL (ref 8.3–10.1)
CHLORIDE SERPL-SCNC: 108 MMOL/L (ref 96–108)
CO2 SERPL-SCNC: 24 MMOL/L (ref 21–32)
CREAT SERPL-MCNC: 0.62 MG/DL (ref 0.6–1.3)
ERYTHROCYTE [DISTWIDTH] IN BLOOD BY AUTOMATED COUNT: 12.6 % (ref 11.6–15.1)
GFR SERPL CREATININE-BSD FRML MDRD: 101 ML/MIN/1.73SQ M
GLUCOSE SERPL-MCNC: 89 MG/DL (ref 65–140)
HCT VFR BLD AUTO: 39.9 % (ref 36.5–49.3)
HGB BLD-MCNC: 13.5 G/DL (ref 12–17)
MCH RBC QN AUTO: 30 PG (ref 26.8–34.3)
MCHC RBC AUTO-ENTMCNC: 33.8 G/DL (ref 31.4–37.4)
MCV RBC AUTO: 89 FL (ref 82–98)
PLATELET # BLD AUTO: 293 THOUSANDS/UL (ref 149–390)
PMV BLD AUTO: 9.6 FL (ref 8.9–12.7)
POTASSIUM SERPL-SCNC: 3.9 MMOL/L (ref 3.5–5.3)
RBC # BLD AUTO: 4.5 MILLION/UL (ref 3.88–5.62)
SODIUM SERPL-SCNC: 137 MMOL/L (ref 135–147)
WBC # BLD AUTO: 5.64 THOUSAND/UL (ref 4.31–10.16)

## 2023-05-04 RX ORDER — FAMOTIDINE 20 MG/1
20 TABLET, FILM COATED ORAL 2 TIMES DAILY
Status: DISCONTINUED | OUTPATIENT
Start: 2023-05-04 | End: 2023-05-04

## 2023-05-04 RX ORDER — GABAPENTIN 300 MG/1
300 CAPSULE ORAL
Status: DISCONTINUED | OUTPATIENT
Start: 2023-05-04 | End: 2023-05-05 | Stop reason: HOSPADM

## 2023-05-04 RX ORDER — METOPROLOL SUCCINATE 25 MG/1
25 TABLET, EXTENDED RELEASE ORAL
COMMUNITY
End: 2023-05-15 | Stop reason: SDUPTHER

## 2023-05-04 RX ORDER — PREDNISONE 20 MG/1
60 TABLET ORAL DAILY
Status: DISCONTINUED | OUTPATIENT
Start: 2023-05-04 | End: 2023-05-04

## 2023-05-04 RX ORDER — FAMOTIDINE 20 MG/1
20 TABLET, FILM COATED ORAL
Status: DISCONTINUED | OUTPATIENT
Start: 2023-05-04 | End: 2023-05-05 | Stop reason: HOSPADM

## 2023-05-04 RX ORDER — AMLODIPINE BESYLATE 5 MG/1
5 TABLET ORAL DAILY PRN
Status: DISCONTINUED | OUTPATIENT
Start: 2023-05-04 | End: 2023-05-05 | Stop reason: HOSPADM

## 2023-05-04 RX ORDER — PREDNISONE 20 MG/1
60 TABLET ORAL DAILY
Status: DISCONTINUED | OUTPATIENT
Start: 2023-05-04 | End: 2023-05-05 | Stop reason: HOSPADM

## 2023-05-04 RX ADMIN — LISINOPRIL 20 MG: 20 TABLET ORAL at 08:23

## 2023-05-04 RX ADMIN — HEPARIN SODIUM 5000 UNITS: 5000 INJECTION INTRAVENOUS; SUBCUTANEOUS at 06:03

## 2023-05-04 RX ADMIN — GABAPENTIN 300 MG: 300 CAPSULE ORAL at 08:20

## 2023-05-04 RX ADMIN — METOPROLOL SUCCINATE 25 MG: 25 TABLET, EXTENDED RELEASE ORAL at 08:24

## 2023-05-04 RX ADMIN — HEPARIN SODIUM 5000 UNITS: 5000 INJECTION INTRAVENOUS; SUBCUTANEOUS at 16:40

## 2023-05-04 RX ADMIN — FAMOTIDINE 20 MG: 20 TABLET, FILM COATED ORAL at 16:40

## 2023-05-04 RX ADMIN — HEPARIN SODIUM 5000 UNITS: 5000 INJECTION INTRAVENOUS; SUBCUTANEOUS at 21:44

## 2023-05-04 RX ADMIN — LISINOPRIL 20 MG: 20 TABLET ORAL at 21:43

## 2023-05-04 RX ADMIN — IOHEXOL 100 ML: 350 INJECTION, SOLUTION INTRAVENOUS at 13:55

## 2023-05-04 RX ADMIN — GABAPENTIN 300 MG: 300 CAPSULE ORAL at 21:43

## 2023-05-04 RX ADMIN — FAMOTIDINE 20 MG: 20 TABLET, FILM COATED ORAL at 11:14

## 2023-05-04 NOTE — ASSESSMENT & PLAN NOTE
· BP acceptable, monitor routinely   · Continue PTA regimen: , lisinopril 20 mg twice daily, metoprolol 25 mg daily, patient also reports taking amlodipine as needed for systolic blood pressure more than 150

## 2023-05-04 NOTE — QUICK NOTE
· Syx concerning for PTS: recommend prednisone 60 mg for 5 days, then taper 10 mg per week until see neuromuscular clinic outpatient  · Will also add famotidine for GI ppx while on steroids  · EMG ordered for outpatient (2 months)  · F/u with neurology clinic- scheduled for 5/16/23    · Rest of care per primary team

## 2023-05-04 NOTE — ASSESSMENT & PLAN NOTE
· Patient has significant history of bilateral rotator cuff tears with repairs and biceps tendon ruptures  Presented to the ER at the advice of his neurologist after outpatient testing with EMG noted concern for central process given subacute RUE weakness and hyperreflexia   · Patient symptoms started over 3 years ago with right upper extremity weakness, which has progressed over the past 3-4 weeks and now with associated right lower extremity numbness and tingling    Patient denies any urinary changes, he does have chronic constipation and notes recent difficulty in ambulating secondary to the RLE weakness  · Patient had a recent admission at [facility] for similar presentation, he had a negative work-up with CT head, MRI C-spine, MRI brachial plexus  · Patient admitted for MRI brain without contrast and CTA head and neck, both of which were unremarkable   · CK within normal limites, aldolase pending at this time   · Appreciate ongoing neurology recommendations   · Trial of steroid for brachial plexopathy, Pepcid was added for GI prophylaxis  · Consideration for repeat EMG as outpatient in 1 to 2 months  · Continue with gabapentin and prn analgesics, consider addition of muscle relaxer   · PT/OT evaluations

## 2023-05-04 NOTE — PHYSICAL THERAPY NOTE
Physical Therapy Evaluation    Patient Name: Megan RSOALES'S Date: 5/4/2023     Problem List  Principal Problem:    Weakness of right upper extremity  Active Problems:    HTN (hypertension)    NSVT (nonsustained ventricular tachycardia) (HCC)    Muscle weakness    Polyneuropathy    Rotator cuff tear arthropathy of right shoulder    Right renal mass       Past Medical History  Past Medical History:   Diagnosis Date   • Allergic    • Arthritis    • Asthma    • Hypertension    • Impaired fasting glucose    • Mitral valve disorder    • Mitral valve prolapse    • Murmur, cardiac    • 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    • PAC (premature atrial contraction)    • PVC (premature ventricular contraction)    • Thyroid nodule         Past Surgical History  Past Surgical History:   Procedure Laterality Date   • HAND SURGERY     • [place] COLONOSCOPY FLX DX W/COLLJ SPEC WHEN PFRMD N/A 2/9/2018    Procedure: COLONOSCOPY;  Surgeon: Primus Severs, MD;  Location: [facility]; Service: Gastroenterology   • PROSTATE BIOPSY     • SHOULDER SURGERY          Actual eval time: 8:   05/04/23 1400   PT Last Visit   PT Visit Date 05/04/23   Note Type   Note type Evaluation   Pain Assessment   Pain Assessment Tool 0-10   Pain Score No Pain   Restrictions/Precautions   Weight Bearing Precautions Per Order No   Braces or Orthoses Sling  (left UE sling with mobility to decreased risk of further glenohumeral subluxation)   Other Precautions Chair Alarm; Bed Alarm;Telemetry; Fall Risk  (+left UE hemiplegia, mild glenohumeral subluxation)   Home Living   Type of [address] [place] [address] One level  (pt lives in a [facility] with [facility])   Bathroom Shower/Tub Tub/shower unit   Bathroom Toilet Raised   Bathroom Equipment Grab bars in shower; Shower chair;Grab bars around toilet  (+getting Shower chair from friend)   [facility] Prior Function   Level of [place] Needs assistance with ADLs; Needs assistance with IADLS; Independent with functional mobility   Lives With Spouse   Receives Help From Family  (spouse works FT but has ability to [facility], needs to go into office at least 1x/wk)   IADLs Family/Friend/Other provides transportation; Family/Friend/Other provides meals; Family/Friend/Other provides medication management  (+drivers license, hasn't driven recently)   Falls in the last 6 months 0   Vocational Retired   General   Additional Pertinent History per chart review, complaints of RLE weakness however upon eval pt states issues are primarily RUE   Family/Caregiver Present Yes  (spouse)   Cognition   Overall Cognitive Status WFL   Arousal/Participation Alert   Attention Attends with cues to redirect   Orientation Level Oriented X4   Memory Within functional limits   Following Commands Follows all commands and directions without difficulty   RUE Assessment   RUE Assessment X  (please see OT assessment)   LUE Assessment   LUE Assessment WFL   RLE Assessment   RLE Assessment WFL  (4+/5 grossly)   LLE Assessment   LLE Assessment WFL  (5/5 grossly)   Vision-Basic Assessment   Current Vision Wears glasses all the time   Light Touch   RLE Light Touch Grossly intact   LLE Light Touch Grossly intact   Bed Mobility   Supine to Sit 6  Modified independent   Sit to Supine Unable to assess  (pt remained seated at EOB with all needs met and left UE supported on pillow)   Transfers   Sit to Stand 5  Supervision   Additional items Assist x 1   Stand to Sit 5  Supervision   Additional items Assist x 1   Additional Comments No AD +sling to right UE protection strategies   Ambulation/Elevation   Gait pattern Decreased foot clearance; Improper Weight shift   Gait Assistance 5  Supervision  (supervision progressing to MI)   Additional items Assist x 1   Assistive Device None   Distance 200'   Balance   Static Sitting Normal   Dynamic Sitting Good   Static Standing Fair +   Dynamic Standing Fair   Ambulatory Fair   Endurance Deficit   Endurance Deficit Yes   Activity Tolerance   Activity Tolerance Patient tolerated treatment well   Medical Staff Made Aware OT JAYCOB Cantu orientation   Nurse Made Aware RN cleared pt for therapy   Assessment   Prognosis Good   Problem List Decreased strength;Decreased endurance;Decreased coordination  (RUE)   Assessment Pt is a 69 y o  male admitted [facility] on 5/2/2023 with weakness of RUE x3 weeks  Per chart review, ? brachial plexopathy  Pt has the following comorbidities which affect their treatment: other active problems including R renal mass, RCT arthropathy of R shoulder, NSVT, HTN, h/o arthritis, asthma, HTN, mitral valve disorder, mitral valve prolapse, murmur, PAC, PVC, thyroid nodule  Pt has a moderate complexity clinical presentation due to Ongoing medical management for primary dx, Decreased activity tolerance compared to baseline, Increased assistance needed from caregiver at current time, Trending lab values, Diagnostic imaging pending, Continuous pulse oximetry monitoring  , and PMH  PT was consulted to evaluate pt's functional mobility and discharge needs  Upon evaluation, patient required mod I/S for all mobility as outlined above  Pt's functional impairments include: impaired RUE and mild RLE strength and RUE sensation  At conclusion of eval, pt remained seated in bed with phone, call bell, and all other personal needs within reach  The patient's AM-PAC Basic Mobility Inpatient Short Form Raw Score is 20  A Raw score of greater than 16 suggests the patient may benefit from discharge to home  Please also refer to the recommendation of the Physical Therapist for safe discharge planning  At this time, pt has no further acute care PT needs 2* being mod I/S level and having a supportive family who can assist as needed   Pt denies any concerns regarding their functional mobility or ability to return home safely "at this time  Recommend pt continues to mobilize with nursing and restorative staff during hospital stay  Please consult with any changes in mobility status  D/C recommendations are home with OPPT and support of wife     Barriers to Discharge None   Goals   Patient Goals Midge Salazar out what is going on\"   Recommendation   PT Discharge Recommendation Home with outpatient rehabilitation  (support from spouse)   Sandeep 8 in Flat Bed Without Bedrails 4   Lying on Back to Sitting on Edge of Flat Bed Without Bedrails 4   Moving Bed to Chair 3   Standing Up From Chair Using Arms 3   Walk in Room 3   Climb 3-5 Stairs With Railing 3   Basic Mobility Inpatient Raw Score 20   Basic Mobility Standardized Score 43 99   Highest Level Of Mobility   JH-HLM Goal 6: Walk 10 steps or more   JH-HLM Achieved 7: Walk 25 feet or more   Modified Runnels Scale   Modified Runnels Scale 3   Dionicio Workman, PT, DPT  *Lucas Moore DPT assisted with eval and documentation as part of staff orientation  "

## 2023-05-04 NOTE — ASSESSMENT & PLAN NOTE
Incidental finding on lumbar spine MRI , there is a complex solid and cystic mass identified within the upper pole of the right kidney, suspicious for renal neoplasm       Follow on chest abdomen pelvis CT scan  Ultimately urology eval

## 2023-05-04 NOTE — MALNUTRITION/BMI
This medical record reflects one or more clinical indicators suggestive of malnutrition     Malnutrition Findings:   Adult Malnutrition type: Chronic illness  Adult Degree of Malnutrition: Malnutrition of moderate degree  Malnutrition Characteristics: Fat loss, Muscle loss, Inadequate energy, Weight loss                  360 Statement: Moderate degree of malnutrition related to chronic illness and decreased appetite; decreased ability to use upper extremities for eating, evidenced by 17#(11%) wt loss over past year of which 7#(4 9%) wt loss over past 3 weeks  Exhibiting mild buccal fat pads loss and mild clavicle region muscle loss; Treating with Regular diet with chopped meats; Ensure Plus High Protein BID and Ensure Max x 1 meal     BMI Findings: Body mass index is 21 48 kg/m²  See Nutrition note dated 5/4/23 for additional details  Completed nutrition assessment is viewable in the nutrition documentation

## 2023-05-04 NOTE — PROGRESS NOTES
[facility]  Progress Note  Name: Jeanmarie Lugo  MRN: [id number]  Unit/Bed#: [facility] 299-73 I Date of Admission: 5/2/2023   Date of Service: 5/4/2023 I Hospital Day: 2    Assessment/Plan   * Weakness of right upper extremity  Assessment & Plan  · Patient has significant history of bilateral rotator cuff tears with repairs and biceps tendon ruptures  Presented to the ER at the advice of his neurologist after outpatient testing with EMG noted concern for central process given subacute RUE weakness and hyperreflexia   · Patient symptoms started over 3 years ago with right upper extremity weakness, which has progressed over the past 3-4 weeks and now with associated right lower extremity numbness and tingling  Patient denies any urinary changes, he does have chronic constipation and notes recent difficulty in ambulating secondary to the RLE weakness  · Patient had a recent admission at [facility] for similar presentation, he had a negative work-up with CT head, MRI C-spine, MRI brachial plexus  · Patient admitted for MRI brain without contrast and CTA head and neck, both of which were unremarkable   · CK within normal limites, aldolase pending at this time   · Appreciate ongoing neurology recommendations   · Trial of steroid for brachial plexopathy, Pepcid was added for GI prophylaxis  · Consideration for repeat EMG as outpatient in 1 to 2 months  · Continue with gabapentin and prn analgesics, consider addition of muscle relaxer   · PT/OT evaluations    Right renal mass  Assessment & Plan  Incidental finding on lumbar spine MRI , there is a complex solid and cystic mass identified within the upper pole of the right kidney, suspicious for renal neoplasm       Follow on chest abdomen pelvis CT scan  Ultimately urology eval    Rotator cuff tear arthropathy of right shoulder  Assessment & Plan  · Noted history as outlined above     NSVT (nonsustained ventricular tachycardia) [facility])  Assessment & Plan  · Noted run of V  tach while in the ER, asymptomatic   · EKG with PACs, no ischemic changes; noted history of PVCs  · Echo in 2022 unremarkable  · Monitor and replete electrolytes as needed   · Telemetry monitoring unremarkable thus far  · Outpatient follow-up with repeat echo     HTN (hypertension)  Assessment & Plan  · BP acceptable, monitor routinely   · Continue PTA regimen: , lisinopril 20 mg twice daily, metoprolol 25 mg daily, patient also reports taking amlodipine as needed for systolic blood pressure more than 150             VTE Pharmacologic Prophylaxis:   Moderate Risk (Score 3-4) - Pharmacological DVT Prophylaxis Ordered: heparin  Patient Centered Rounds: I performed bedside rounds with nursing staff today  Discussions with Specialists or Other Care Team Provider: Neurology    Education and Discussions with Family / Patient: Updated  (wife) at bedside  Total Time Spent on Date of Encounter in care of patient: 55 minutes This time was spent on one or more of the following: performing physical exam; counseling and coordination of care; obtaining or reviewing history; documenting in the medical record; reviewing/ordering tests, medications or procedures; communicating with other healthcare professionals and discussing with patient's family/caregivers  Current Length of Stay: 2 day(s)  Current Patient Status: Inpatient   Certification Statement: The patient will continue to require additional inpatient hospital stay due to Work-up for renal mass  Discharge Plan: Anticipate discharge in 24-48 hrs to home      Code Status: Level 1 - Full Code    Subjective:   Patient seen and examined  Comfortable in bed  Depressed being in the hospital and wants to go home  Continue to have right upper extremity pain and numbness    Objective:     Vitals:   Temp (24hrs), Av 3 °F (36 8 °C), Min:97 4 °F (36 3 °C), Max:99 2 °F (37 3 °C)    Temp:  [97 4 °F (36 3 °C)-99 2 °F (37 3 °C)] 97 4 °F (36 3 °C)  HR:  [54-80] 63  Resp:  [10-22] 17  BP: ()/(56-76) 119/68  SpO2:  [97 %-100 %] 97 %  Body mass index is 21 48 kg/m²  Input and Output Summary (last 24 hours):      Intake/Output Summary (Last 24 hours) at 5/4/2023 1147  Last data filed at 5/4/2023 0835  Gross per 24 hour   Intake 1399 ml   Output 2075 ml   Net -676 ml       Physical Exam:   Physical Exam     Patient is awake alert oriented no acute distress  Lung clear to auscultation bilateral  Heart positive S1-S2 no murmur  Abdominal soft nontender  Lower extremities no edema    Additional Data:     Labs:  Results from last 7 days   Lab Units 05/04/23  0534 05/02/23  1649   WBC Thousand/uL 5 64 7 53   HEMOGLOBIN g/dL 13 5 14 3   HEMATOCRIT % 39 9 40 9   PLATELETS Thousands/uL 293 324   NEUTROS PCT %  --  74   LYMPHS PCT %  --  17   MONOS PCT %  --  7   EOS PCT %  --  1     Results from last 7 days   Lab Units 05/04/23  0534 05/02/23  1649   SODIUM mmol/L 137 133*   POTASSIUM mmol/L 3 9 3 8   CHLORIDE mmol/L 108 106   CO2 mmol/L 24 24   BUN mg/dL 22 14   CREATININE mg/dL 0 62 0 62   ANION GAP mmol/L 5 3*   CALCIUM mg/dL 8 9 9 6   ALBUMIN g/dL  --  3 7   TOTAL BILIRUBIN mg/dL  --  0 50   ALK PHOS U/L  --  52   ALT U/L  --  42   AST U/L  --  35   GLUCOSE RANDOM mg/dL 89 99         Results from last 7 days   Lab Units 05/02/23  1609   POC GLUCOSE mg/dl 102               Lines/Drains:  Invasive Devices     Peripheral Intravenous Line  Duration           Peripheral IV 05/02/23 Right;Ventral (anterior) Forearm 1 day                      Imaging: Reviewed    Recent Cultures (last 7 days):         Last 24 Hours Medication List:   Current Facility-Administered Medications   Medication Dose Route Frequency Provider Last Rate   • acetaminophen  975 mg Oral Q6H PRN Angela Johns PA-C     • amLODIPine  5 mg Oral Daily PRN Ricki Kurtz DO     • aspirin  81 mg Oral Every Other Day Charli Kelly DO     • busPIRone  7 5 mg Oral BID PRN Charli Kelly, DO     • famotidine  20 mg Oral BID AC Vishal Yang MD     • gabapentin  300 mg Oral HS Brittni Lacey DO     • heparin (porcine)  5,000 Units Subcutaneous [facility] Charli Kelly, DO     • lidocaine  1 patch Topical Daily Angela Johns PA-C     • lisinopril  20 mg Oral BID Charli Kelly, DO     • metoprolol succinate  25 mg Oral Daily Charli Kelly, DO     • predniSONE  60 mg Oral Daily Vishal Yang MD          Today, Patient Was Seen By: Brittni Lacey DO    **Please Note: This note may have been constructed using a voice recognition system  **

## 2023-05-04 NOTE — PLAN OF CARE
Problem: PAIN - ADULT  Goal: Verbalizes/displays adequate comfort level or baseline comfort level  Description: Interventions:  - Encourage patient to monitor pain and request assistance  - Assess pain using appropriate pain scale  - Administer analgesics based on type and severity of pain and evaluate response  - Implement non-pharmacological measures as appropriate and evaluate response  - Consider cultural and social influences on pain and pain management  - Notify physician/advanced practitioner if interventions unsuccessful or patient reports new pain  Outcome: Progressing     Problem: INFECTION - ADULT  Goal: Absence or prevention of progression during hospitalization  Description: INTERVENTIONS:  - Assess and monitor for signs and symptoms of infection  - Monitor lab/diagnostic results  - Monitor all insertion sites, i e  indwelling lines, tubes, and drains  - Monitor endotracheal if appropriate and nasal secretions for changes in amount and color  - [place] appropriate cooling/warming therapies per order  - Administer medications as ordered  - Instruct and encourage patient and family to use good hand hygiene technique  - Identify and instruct in appropriate isolation precautions for identified infection/condition  Outcome: Progressing     Problem: DISCHARGE PLANNING  Goal: Discharge to home or other facility with appropriate resources  Description: INTERVENTIONS:  - Identify barriers to discharge w/patient and caregiver  - Arrange for needed discharge resources and transportation as appropriate  - Identify discharge learning needs (meds, wound care, etc )  - Arrange for interpretive services to assist at discharge as needed  - Refer to Case Management Department for coordinating discharge planning if the patient needs post-hospital services based on physician/advanced practitioner order or complex needs related to functional status, cognitive ability, or social support system  Outcome: Progressing Problem: Knowledge Deficit  Goal: Patient/family/caregiver demonstrates understanding of disease process, treatment plan, medications, and discharge instructions  Description: Complete learning assessment and assess knowledge base  Interventions:  - Provide teaching at level of understanding  - Provide teaching via preferred learning methods  Outcome: Progressing     Problem: NEUROSENSORY - ADULT  Goal: Achieves stable or improved neurological status  Description: INTERVENTIONS  - Monitor and report changes in neurological status  - Monitor vital signs such as temperature, blood pressure, glucose, and any other labs ordered   - Initiate measures to prevent increased intracranial pressure  - Monitor for seizure activity and implement precautions if appropriate      Outcome: Progressing  Goal: Achieves maximal functionality and self care  Description: INTERVENTIONS  - Monitor swallowing and airway patency with patient fatigue and changes in neurological status  - Encourage and assist patient to increase activity and self care     - Encourage visually impaired, hearing impaired and aphasic patients to use assistive/communication devices  Outcome: Progressing     Problem: CARDIOVASCULAR - ADULT  Goal: Maintains optimal cardiac output and hemodynamic stability  Description: INTERVENTIONS:  - Monitor I/O, vital signs and rhythm  - Monitor for S/S and trends of decreased cardiac output  - Administer and titrate ordered vasoactive medications to optimize hemodynamic stability  - Assess quality of pulses, skin color and temperature  - Assess for signs of decreased coronary artery perfusion  - Instruct patient to report change in severity of symptoms  Outcome: Progressing  Goal: Absence of cardiac dysrhythmias or at baseline rhythm  Description: INTERVENTIONS:  - Continuous cardiac monitoring, vital signs, obtain 12 lead EKG if ordered  - Administer antiarrhythmic and heart rate control medications as ordered  - Monitor electrolytes and administer replacement therapy as ordered  Outcome: Progressing     Problem: Potential for Falls  Goal: Patient will remain free of falls  Description: INTERVENTIONS:  - Educate patient/family on patient safety including physical limitations  - Instruct patient to call for assistance with activity   - Consult OT/PT to assist with strengthening/mobility   - Keep Call bell within reach  - Keep bed low and locked with side rails adjusted as appropriate  - Keep care items and personal belongings within reach  - Initiate and maintain comfort rounds  - Make Fall Risk Sign visible to staff  - Offer Toileting every 2 Hours, in advance of need  - Initiate/Maintain bed/chair alarm  - Obtain necessary fall risk management equipment: alarm  - Apply yellow socks and bracelet for high fall risk patients  - Consider moving patient to room near nurses station  Outcome: Progressing     Problem: MOBILITY - ADULT  Goal: Maintain or return to baseline ADL function  Description: INTERVENTIONS:  -  Assess patient's ability to carry out ADLs; assess patient's baseline for ADL function and identify physical deficits which impact ability to perform ADLs (bathing, care of mouth/teeth, toileting, grooming, dressing, etc )  - Assess/evaluate cause of self-care deficits   - Assess range of motion  - Assess patient's mobility; develop plan if impaired  - Assess patient's need for assistive devices and provide as appropriate  - Encourage maximum independence but intervene and supervise when necessary  - Involve family in performance of ADLs  - Assess for home care needs following discharge   - Consider OT consult to assist with ADL evaluation and planning for discharge  - Provide patient education as appropriate  Outcome: Progressing  Goal: Maintains/Returns to pre admission functional level  Description: INTERVENTIONS:  - Perform BMAT or MOVE assessment daily    - Set and communicate daily mobility goal to care team and patient/family/caregiver  - Collaborate with rehabilitation services on mobility goals if consulted  - Ambulate patient 3 times a day  - Out of bed to chair 3 times a day   - Out of bed for meals 3 times a day  - Out of bed for toileting  - Record patient progress and toleration of activity level   Outcome: Progressing     Problem: Nutrition/Hydration-ADULT  Goal: Nutrient/Hydration intake appropriate for improving, restoring or maintaining nutritional needs  Description: Monitor and assess patient's nutrition/hydration status for malnutrition  Collaborate with interdisciplinary team and initiate plan and interventions as ordered  Monitor patient's weight and dietary intake as ordered or per policy  Utilize nutrition screening tool and intervene as necessary  Determine patient's food preferences and provide high-protein, high-caloric foods as appropriate       INTERVENTIONS:  - Monitor oral intake, urinary output, labs, and treatment plans  - Assess nutrition and hydration status and recommend course of action  - Evaluate amount of meals eaten  - Assist patient with eating if necessary   - Allow adequate time for meals  - Recommend/ encourage appropriate diets, oral nutritional supplements, and vitamin/mineral supplements  - Order, calculate, and assess calorie counts as needed  - Recommend, monitor, and adjust tube feedings and TPN/PPN based on assessed needs  - Assess need for intravenous fluids  - Provide specific nutrition/hydration education as appropriate  - Include patient/family/caregiver in decisions related to nutrition  Outcome: Progressing

## 2023-05-04 NOTE — PLAN OF CARE
Problem: OCCUPATIONAL THERAPY ADULT  Goal: Performs self-care activities at highest level of function for planned discharge setting  See evaluation for individualized goals  Description: Treatment Interventions: ADL retraining, UE strengthening/ROM, Endurance training, Patient/family training, Equipment evaluation/education, Neuromuscular reeducation, Fine motor coordination activities, Compensatory technique education, Continued evaluation, Energy conservation, Activityengagement  Equipment Recommended:  (pt has all DME at baseline)       See flowsheet documentation for full assessment, interventions and recommendations  Note: Limitation: Decreased ADL status, Decreased UE ROM, Decreased UE strength, Decreased Safe judgement during ADL, Decreased endurance, Decreased sensation, Decreased fine motor control, Decreased self-care trans, Decreased high-level ADLs  Prognosis: Fair  Assessment: Pt is a 69 y o  male who was admitted to [facility] on 5/2/2023 with worsening right UE/LE weakness for past 3-4 weeks +EMG/upper neuron abnormalities,, numbness/tingling to right UE/LE shoulder, muscle weakness, PVC's, HTN   Pt's problem list also includes PMH of  has a past medical history of Allergic, Arthritis, Asthma, Hypertension, Impaired fasting glucose, Mitral valve disorder, Mitral valve prolapse, Murmur, cardiac, 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, PAC (premature atrial contraction), PVC (premature ventricular contraction), and Thyroid nodule    At baseline pt was completing spouse has been assisting with ADL's/IaDL's, no AD with functional mobility  Pt lives with spouse in a [facility] with 1STE Currently pt requires mod a 2* right UE hemiplegia for overall ADLS and S without AD and sling for RUE for functional mobility/transfers   Pt currently presents with impairments in the following categories -steps to enter environment, difficulty performing ADLS and difficulty performing IADLS  activity tolerance, endurance, UE strength, UE ROM, FMC and GMC  These impairments, as well as pt's fatigue, pain, abnormal tone, (R) hemiplegia and risk for falls  limit pt's ability to safely engage in all baseline areas of occupation, includingeating, grooming, bathing, dressing, toileting, functional mobility/transfers, laundry , driving, house maintenance, medication management, meal prep, cleaning, social participation  and leisure activities   The patient's raw score on the -PAC Daily Activity Inpatient Short Form is 14  A raw score of less than 19 suggests the patient may benefit from discharge to post-acute rehabilitation services  Please refer to the recommendation of the Occupational Therapist for safe discharge planning  From OT standpoint, recommend home with outpatient OT  upon D/C  OT will continue to follow to address the below stated goals       OT Discharge Recommendation: Out patient OT

## 2023-05-04 NOTE — ASSESSMENT & PLAN NOTE
· Noted run of V  tach while in the ER, asymptomatic   · EKG with PACs, no ischemic changes; noted history of PVCs  · Echo in March of 2022 unremarkable  · Monitor and replete electrolytes as needed   · Telemetry monitoring unremarkable thus far  · Outpatient follow-up with repeat echo

## 2023-05-04 NOTE — OCCUPATIONAL THERAPY NOTE
Occupational Therapy Evaluation     Patient Name: Zahira Finnegan  GPFLC'V Date: 5/4/2023  Problem List  Principal Problem:    Weakness of right upper extremity  Active Problems:    HTN (hypertension)    NSVT (nonsustained ventricular tachycardia) (HCC)    Muscle weakness    Polyneuropathy    Rotator cuff tear arthropathy of right shoulder    Right renal mass    Past Medical History  Past Medical History:   Diagnosis Date   • Allergic    • Arthritis    • Asthma    • Hypertension    • Impaired fasting glucose    • Mitral valve disorder    • Mitral valve prolapse    • Murmur, cardiac    • 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    • PAC (premature atrial contraction)    • PVC (premature ventricular contraction)    • Thyroid nodule      Past Surgical History  Past Surgical History:   Procedure Laterality Date   • HAND SURGERY     • MS COLONOSCOPY FLX DX W/COLLJ SPEC WHEN PFRMD N/A 2/9/2018    Procedure: COLONOSCOPY;  Surgeon: Darron Cowan MD;  Location: [facility]; Service: Gastroenterology   • PROSTATE BIOPSY     • SHOULDER SURGERY           05/04/23 0935   OT Last Visit   OT Visit Date 05/04/23   Note Type   Note type Evaluation   Pain Assessment   Pain Assessment Tool 0-10   Pain Score No Pain   Restrictions/Precautions   Weight Bearing Precautions Per Order No   Braces or Orthoses Sling  (left UE sling with mobiltiy to decreased risk of further glenohumeral subluxation )   Other Precautions Telemetry;Cognitive; Chair Alarm; Bed Alarm; Fall Risk;Pain  (+left UE hemiplegia, left glenohumeral subluxation)   Home Living   Type of [address] [place] [address] One level  (pt lives in a [facility] with [facility])   Bathroom Shower/Tub Tub/shower unit   Bathroom Toilet Raised   Bathroom Equipment Grab bars in shower; Shower chair;Grab bars around toilet  (+getting Shower chair from friend)   [address] [facility]   Prior Function   Level of Luce Needs assistance with ADLs;Needs assistance with IADLS   Lives With Spouse   Receives Help From Family  (spouse works part time (can work from home))   IADLs Family/Friend/Other provides transportation; Family/Friend/Other provides meals; Family/Friend/Other provides medication management  (+drivers license, hasnt driven recently)   Falls in the last 6 months 0   Vocational Retired   Lifestyle   Autonomy Recent assistance with ADL's (2* right UE weakness), assistance from spouse with IADL's, no AD with functional mobility, +drives -wife has been assisting with transportation needs   Reciprocal Relationships spouse   Service to Others retired   Intrinsic Gratification listening to radio, reading newspaper   General   Family/Caregiver Present Yes  (spouse)   ADL   Eating Assistance 5  Supervision/Setup   Eating Deficit Supervision/safety   Grooming Assistance 5  Supervision/Setup   UB Bathing Assistance 3  Moderate Assistance   LB [address] 3  Moderate Assistance   UB Dressing Assistance 3  Moderate Assistance   LB Dressing Assistance 2  Maximal Assistance   LB Dressing Deficit Don/doff R sock; Don/doff L sock  (Educated pt on one handed techniques using left hand-pt difficult with carryover continue to re-inforcement )   Toileting Assistance  5  Supervision/Setup   Bed Mobility   Supine to Sit 6  Modified independent   Sit to Supine Unable to assess   Additional Comments pt remained seated at EOB with all needs met and right UE supported on pillow for glenohumeral subluxation   Transfers   Sit to Stand 5  Supervision   Additional items Assist x 1   Stand to Sit 5  Supervision   Additional items Assist x 1   Additional Comments No AD +sling to right UE protection strategies   Functional Mobility   Functional Mobility 5  Supervision   Additional Comments S without AD +sling to right UE for protection strategy, household distance functional mobility   Additional items (No AD)   Balance   Static Sitting Normal   Dynamic Sitting Good Static Standing Fair +   Dynamic Standing Fair   Ambulatory Fair   Activity Tolerance   Activity Tolerance Patient tolerated treatment well   Medical Staff Made Aware PT gela/Jacinda due to the patient's co-morbidities, clinically unstable presentation, and present impairments which are a regression from the patient's baseline  Nurse Made Aware RN cleared pt for therapy   RUE Assessment   RUE Assessment X  (Right hand dominant -+Scapular elevation/retraction, othewise 0/5 strength (shoulder/elbow flexion, wrist/hand), hyoptonic, +1/2 finger width glenohumeral subluxation ,per OT outpatient not 2/24 +right scapular winging ) Continue to assess  LUE Assessment   LUE Assessment WFL   Hand Function   Gross Motor Coordination Impaired  (right UE)   Fine Motor Coordination Impaired  (right UE)   Sensation   Light Touch Partial deficits in the RUE  (Upon OT evaluation on 4/23/23 pt with diminished sensation along median nerve thumb, index, middle fingers, not ring/small fingers) Continue to assess  Vision-Basic Assessment   Current Vision Wears glasses all the time   Vision - Complex Assessment   Acuity Able to read clock/calendar on wall without difficulty   Perception   Inattention/Neglect Appears intact   Cognition   Overall Cognitive Status [facility]   Arousal/Participation Alert; Responsive; Cooperative   Attention Attends with cues to redirect   Orientation Level Oriented X4   Memory Within functional limits   Following Commands Follows all commands and directions without difficulty   Comments pt pleasant and cooperative, good historian/recall of current medical events with RUE , followed directives, good safety awareness  Assessment   Limitation Decreased ADL status; Decreased UE ROM; Decreased UE strength;Decreased Safe judgement during ADL;Decreased endurance;Decreased sensation;Decreased fine motor control;Decreased self-care trans;Decreased high-level ADLs   Prognosis Fair   Assessment Pt is a 69 y o  male who "was admitted to [address] [facility] on 5/2/2023 with worsening right UE/LE weakness for past 3-4 weeks +EMG/upper neuron abnormalities, numbness/tingling to right UE/LE shoulder, muscle weakness, PVC's, HTN   Pt's problem list also includes PMH of  has a past medical history of Allergic, Arthritis, Asthma, Hypertension, Impaired fasting glucose, Mitral valve disorder, Mitral valve prolapse, Murmur, cardiac, 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, PAC (premature atrial contraction), PVC (premature ventricular contraction), and Thyroid nodule    At baseline pt was completing spouse has been assisting with ADL's/IaDL's, no AD with functional mobility  Pt lives with spouse in a [facility] with [facility] Currently pt requires mod a 2* right UE hemiplegia for overall ADLS and S without AD and sling for RUE for functional mobility/transfers  Pt currently presents with impairments in the following categories -steps to enter environment, difficulty performing ADLS and difficulty performing IADLS  activity tolerance, endurance, UE strength, UE ROM, FMC and GMC  These impairments, as well as pt's fatigue, pain, abnormal tone, (R) hemiplegia and risk for falls  limit pt's ability to safely engage in all baseline areas of occupation, includingeating, grooming, bathing, dressing, toileting, functional mobility/transfers, laundry , driving, house maintenance, medication management, meal prep, cleaning, social participation  and leisure activities   The patient's raw score on the AM-PAC Daily Activity Inpatient Short Form is 14  A raw score of less than 19 suggests the patient may benefit from discharge to post-acute rehabilitation services  Please refer to the recommendation of the Occupational Therapist for safe discharge planning  From OT standpoint, recommend home with outpatient OT  upon D/C  OT will continue to follow to address the below stated goals     Goals   Patient Goals Timmy Dose this out with testing\"   LTG Time " Frame 10-14   Long Term Goal #1 see goals below   Plan   Treatment Interventions ADL retraining;UE strengthening/ROM; Endurance training;Patient/family training;Equipment evaluation/education; Neuromuscular reeducation; Fine motor coordination activities; Compensatory technique education;Continued evaluation; Energy conservation; Activityengagement   Goal Expiration Date 05/18/23   OT Frequency 3-5x/wk   Recommendation   OT Discharge Recommendation Out patient OT   Equipment Recommended (pt has all DME at baseline)   AM-PAC Daily Activity Inpatient   Lower Body Dressing 2   Bathing 2   Toileting 2   Upper Body Dressing 2   Grooming 3   Eating 3   Daily Activity Raw Score 14   Daily Activity Standardized Score (Calc for Raw Score >=11) 33 39   AM-PAC Applied Cognition Inpatient   Following a Speech/Presentation 4   Understanding Ordinary Conversation 4   Taking Medications 4   Remembering Where Things Are Placed or Put Away 4   Remembering List of 4-5 Errands 4   Taking Care of Complicated Tasks 4   Applied Cognition Raw Score 24   Applied Cognition Standardized Score 62 21   End of Consult   Patient Position at End of Consult Seated edge of bed; All needs within reach  (+spouse present, right UE supported on pillow)   Nurse Communication Nurse aware of consult    Occupational Therapy Goals:    *Mod I Adl's after setup with one handed techniques PRN  *Mod I toileting and clothing management   *Mod I functional mobility and transfers to/from all surfaces with Fair + dynamic balance and safety for participation in dynamic adls and iadl tasks   *Assess DME needs   *Increase activity tolerance to 25-30 minutes for participation in adls and enjoyable activities  *Demonstrate good carryover of pt/family education and training with good tolerance for increased safety and independence with ADL's/ADl's    *Patient will demonstrate 100% carryover of energy conservation techniques t/o functional I/ADL/leisure tasks w/o cues s/p skilled education to increase endurance during functional tasks  *Pt will tolerate mod manual NDT facilitation to right UE during functional ADL/leisure tasks with mod I to improve functional engagement  to increase neuroplastic recovery  *Pt will utilize RUE as a stabilizer assist with all self care/mobility activities w minimal cues  *Pt will demonstrate 100% carryover of one handed dressing techniques w/ mod I s/p skilled education w/o cues     Jeniffer Malone MOT, OTR/L

## 2023-05-05 ENCOUNTER — APPOINTMENT (OUTPATIENT)
Dept: OCCUPATIONAL THERAPY | Facility: MEDICAL CENTER | Age: 68
End: 2023-05-05
Payer: COMMERCIAL

## 2023-05-05 ENCOUNTER — TELEPHONE (OUTPATIENT)
Dept: NEUROLOGY | Facility: CLINIC | Age: 68
End: 2023-05-05

## 2023-05-05 ENCOUNTER — TELEPHONE (OUTPATIENT)
Dept: OTHER | Facility: HOSPITAL | Age: 68
End: 2023-05-05

## 2023-05-05 PROBLEM — E44.0 MODERATE PROTEIN-CALORIE MALNUTRITION (HCC): Status: ACTIVE | Noted: 2023-05-05

## 2023-05-05 LAB
BACTERIA UR QL AUTO: NORMAL /HPF
BILIRUB UR QL STRIP: NEGATIVE
CLARITY UR: CLEAR
COLOR UR: NORMAL
EST. AVERAGE GLUCOSE BLD GHB EST-MCNC: 97 MG/DL
GLUCOSE UR STRIP-MCNC: NEGATIVE MG/DL
HBA1C MFR BLD: 5 %
HGB UR QL STRIP.AUTO: NEGATIVE
KETONES UR STRIP-MCNC: NEGATIVE MG/DL
LEUKOCYTE ESTERASE UR QL STRIP: NEGATIVE
NITRITE UR QL STRIP: NEGATIVE
NON-SQ EPI CELLS URNS QL MICRO: NORMAL /HPF
PH UR STRIP.AUTO: 6.5 [PH]
PROT UR STRIP-MCNC: NEGATIVE MG/DL
RBC #/AREA URNS AUTO: NORMAL /HPF
SP GR UR STRIP.AUTO: 1.01 (ref 1–1.03)
UROBILINOGEN UR STRIP-ACNC: <2 MG/DL
WBC #/AREA URNS AUTO: NORMAL /HPF

## 2023-05-05 RX ORDER — POLYETHYLENE GLYCOL 3350 17 G/17G
17 POWDER, FOR SOLUTION ORAL DAILY
Status: DISCONTINUED | OUTPATIENT
Start: 2023-05-05 | End: 2023-05-05 | Stop reason: HOSPADM

## 2023-05-05 RX ORDER — PREDNISONE 10 MG/1
10 TABLET ORAL DAILY
Qty: 180 TABLET | Refills: 0 | Status: SHIPPED | OUTPATIENT
Start: 2023-05-05

## 2023-05-05 RX ORDER — POLYETHYLENE GLYCOL 3350 17 G/17G
17 POWDER, FOR SOLUTION ORAL DAILY
Refills: 0
Start: 2023-05-06

## 2023-05-05 RX ORDER — AMOXICILLIN 250 MG
1 CAPSULE ORAL 2 TIMES DAILY
Status: DISCONTINUED | OUTPATIENT
Start: 2023-05-05 | End: 2023-05-05 | Stop reason: HOSPADM

## 2023-05-05 RX ORDER — ACETAMINOPHEN 325 MG/1
650 TABLET ORAL EVERY 6 HOURS PRN
Refills: 0
Start: 2023-05-05

## 2023-05-05 RX ORDER — FAMOTIDINE 20 MG/1
20 TABLET, FILM COATED ORAL
Qty: 60 TABLET | Refills: 0 | Status: SHIPPED | OUTPATIENT
Start: 2023-05-05

## 2023-05-05 RX ORDER — AMOXICILLIN 250 MG
1 CAPSULE ORAL 2 TIMES DAILY
Refills: 0
Start: 2023-05-05

## 2023-05-05 RX ADMIN — FAMOTIDINE 20 MG: 20 TABLET, FILM COATED ORAL at 06:32

## 2023-05-05 RX ADMIN — FAMOTIDINE 20 MG: 20 TABLET, FILM COATED ORAL at 16:40

## 2023-05-05 RX ADMIN — METOPROLOL SUCCINATE 25 MG: 25 TABLET, EXTENDED RELEASE ORAL at 08:35

## 2023-05-05 RX ADMIN — LIDOCAINE 5% 1 PATCH: 700 PATCH TOPICAL at 08:34

## 2023-05-05 RX ADMIN — PREDNISONE 60 MG: 20 TABLET ORAL at 08:34

## 2023-05-05 RX ADMIN — SENNOSIDES AND DOCUSATE SODIUM 1 TABLET: 8.6; 5 TABLET ORAL at 11:02

## 2023-05-05 RX ADMIN — POLYETHYLENE GLYCOL 3350 17 G: 17 POWDER, FOR SOLUTION ORAL at 11:02

## 2023-05-05 RX ADMIN — HEPARIN SODIUM 5000 UNITS: 5000 INJECTION INTRAVENOUS; SUBCUTANEOUS at 14:30

## 2023-05-05 RX ADMIN — HEPARIN SODIUM 5000 UNITS: 5000 INJECTION INTRAVENOUS; SUBCUTANEOUS at 06:32

## 2023-05-05 RX ADMIN — LISINOPRIL 20 MG: 20 TABLET ORAL at 08:34

## 2023-05-05 NOTE — ASSESSMENT & PLAN NOTE
Malnutrition Findings:   Adult Malnutrition type: Chronic illness  Adult Degree of Malnutrition: Malnutrition of moderate degree  Malnutrition Characteristics: Fat loss, Muscle loss, Inadequate energy, Weight loss                  360 Statement: Moderate degree of malnutrition related to chronic illness and decreased appetite; decreased ability to use upper extremities for eating, evidenced by 17#(11%) wt loss over past year of which 7#(4 9%) wt loss over past 3 weeks  Exhibiting mild buccal fat pads loss and mild clavicle region muscle loss; Treating with Regular diet with chopped meats; Ensure Plus High Protein BID and Ensure Max x 1 meal     BMI Findings: Body mass index is 21 3 kg/m²       Continue with regular diet

## 2023-05-05 NOTE — TELEPHONE ENCOUNTER
Judy Greene is a 20-year-old male seen in urologic consultation at [facility] for incidental finding of renal mass  Will require MD only visit to discuss management options  Contact patient with office follow-up appointment date and time within 3 weeks  Thank you  Of note, patient seen in the past by Dr Maegan Monroy  Please schedule with another robotic surgeon such as Merline Servin or Hilda Barron

## 2023-05-05 NOTE — ASSESSMENT & PLAN NOTE
· Patient has significant history of bilateral rotator cuff tears with repairs and biceps tendon ruptures  Presented to the ER at the advice of his neurologist after outpatient testing with EMG noted concern for central process given subacute RUE weakness and hyperreflexia   · Patient symptoms started over 3 years ago with right upper extremity weakness, which has progressed over the past 3-4 weeks and now with associated right lower extremity numbness and tingling  Patient denies any urinary changes, he does have chronic constipation and notes recent difficulty in ambulating secondary to the RLE weakness  · Patient had a recent admission at [facility] for similar presentation, he had a negative work-up with CT head, MRI C-spine, MRI brachial plexus  · Patient admitted for MRI brain without contrast and CTA head and neck, both of which were unremarkable   · CK within normal limites, aldolase pending at this time   · Appreciate ongoing neurology recommendations   · Trial of steroid for brachial plexopathy, Pepcid was added for GI prophylaxis  • EMG ordered for outpatient (2 months)  • F/u with neurology clinic- scheduled for 5/16/23    • Discharge home

## 2023-05-05 NOTE — ASSESSMENT & PLAN NOTE
Incidental finding on lumbar spine MRI , there is a complex solid and cystic mass identified within the upper pole of the right kidney, suspicious for renal neoplasm  CT scan with 4 3 cm right renal lesion concerning for renal cell carcinoma without findings of metastatic disease in the chest, abdomen or pelvis     Urology urology consulted, plan for outpatient follow-up

## 2023-05-05 NOTE — CONSULTS
Consults: Chanell Dean 69 y o  male [id number]   Unit/Bed #: [facility] 714-01  Encounter: [id number]        Assessment  & Plan  :     Right renal mass:  -Originally being worked up for upper right extremity weakness MRI of the spine revealed complex solid cystic mass identified in the upper pole of the right kidney suspicious for renal neoplasm  -CT scan of chest abdomen and pelvis obtained revealing heterogeneous predominantly endophytic contouring deforming enhancing mass in the posterior right upper kidney measuring 4 3 x 4 0 x 3 8 cm  The mass extends into the renal sinus fat  The mass abuts the liver and the fat plane between the hepatic capsule and the renal mass is no greater then 1 mm  There is no hydronephrosis  There is no renal vein or IVC thrombus   -Discussed with patient and wife at bedside would recommend outpatient follow-up with  attending to discuss surgical options  Discussed in the setting of space-occupying lesions concerning for renal cell carcinoma mainstay of treatment is surgical removal whether this be partial nephrectomy versus radical nephrectomy will be determined by  attending  Discussed confirmation for renal cell carcinoma cannot be confirmed until tissue diagnosis is obtained  And this is typically obtained with surgical intervention  Biopsies have been considered in the past typically with patients who have severe comorbidities that risks of procedure may not benefit the patient  Therefore risk stratification is used in these patients  These risks are reviewed by  attending at time of consultation   -  No surgical intervention required this admission  Patient is cleared from urologic standpoint for discharge with close outpatient follow-up with  attending for surgical discussion  -UA obtained for evaluation of blood within urine  Subjective : Zach Cho  is a 69 y o  male currently admitted for right upper extremity    For the past medical history of hypertension bilateral rotator cuff tears of bilateral repairs with subsequent bilateral bicep tendon ruptures presented to the ED from EMG clinic due to subacute right upper extremity weakness and hyperreflexia concerning for central process  During patient's evaluation patient received an MRI of the spine which revealed an incidental finding of a complex solid cystic mass identified in the upper pole of the right kidney suspicious for renal neoplasm  Patient underwent additional evaluation with CT scan of chest abdomen pelvis which revealed a heterogeneous predominantly endophytic contouring deforming enhancing mass in the posterior right upper kidney measuring 4 3 x 4 0 x 3 8 cm  The mass extends into the renal sinus fat  The mass abuts the liver and the fat plane between the hepatic capsule and the renal masses no greater than 1 mm  No hydronephrosis  There is no renal vein or IVC thrombus  Patient evaluated at bedside noted to be worried about his most recent findings  He denies a prior smoking history  Denies any difficulty with urination or burning with urination or any visible blood per urethra/urine  He reports that he worked as a [employer] man typically works outside typically had exposures to lead in the past   No additional exposures to chemicals  He is noticeably overwhelmed with most recent changes in his right upper extremity strength and now with additional diagnosis of right renal mass              No Known Allergies   Current Outpatient Medications   Medication Instructions   • acetaminophen (TYLENOL) 650 mg, Oral, Every 6 hours PRN   • amLODIPine (NORVASC) 5 mg, Oral, Daily, Prn BP> 150/90   • aspirin 81 mg chewable tablet 1 tablet, Oral, Every other day   • busPIRone (BUSPAR) 7 5 mg, Oral, 2 times daily PRN   • Coenzyme Q10-Red Yeast Rice  MG CAPS 1 capsule, Oral, Daily   • Docosahexaenoic Acid (DHA OMEGA 3) 100 MG CAPS 6 capsules, Oral, Daily   • famotidine (PEPCID) 20 mg, Oral, 2 times daily before meals   • fexofenadine (ALLEGRA) 180 MG tablet 1 tablet, Oral, Daily PRN   • gabapentin (Neurontin) 300 mg capsule Take 1 capsule nightly for 4-5 days, then increase to 1 capsule in the mornign and 1 in the evening, then increase to 1 capsule TID  • lidocaine (XYLOCAINE) 5 % ointment Topical, As needed   • lisinopril (ZESTRIL) 20 mg tablet TAKE 1 TABLET TWICE A DAY   • meloxicam (MOBIC) 15 mg, Oral, Daily   • metoprolol succinate (TOPROL-XL) 25 mg, Oral, Daily   • metoprolol succinate (TOPROL-XL) 25 mg, Oral, Daily with breakfast   • Misc Natural Products (PROSTATE HEALTH) CAPS 1 capsule, Oral, Daily   • Multiple Vitamin tablet 1 tablet, Oral, Daily   • pantoprazole (PROTONIX) 40 mg, Oral, Daily (early morning)   • [START ON 5/6/2023] polyethylene glycol (MIRALAX) 17 g, Oral, Daily   • predniSONE 10 mg, Oral, Daily, Take 50 mg every day x4 days then taper by 10 mg every week till seen neurology   • senna-docusate sodium (SENOKOT S) 8 6-50 mg per tablet 1 tablet, Oral, 2 times daily      Past Medical History:   Diagnosis Date   • Allergic    • Arthritis    • Asthma    • Hypertension    • Impaired fasting glucose    • Mitral valve disorder    • Mitral valve prolapse    • Murmur, cardiac    • 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    • PAC (premature atrial contraction)    • PVC (premature ventricular contraction)    • Thyroid nodule      Past Surgical History:   Procedure Laterality Date   • HAND SURGERY     • WY COLONOSCOPY FLX DX W/COLLJ SPEC WHEN PFRMD N/A 2/9/2018    Procedure: COLONOSCOPY;  Surgeon: Rain Mireles MD;  Location: [facility];   Service: Gastroenterology   • PROSTATE BIOPSY     • SHOULDER SURGERY       Family History   Problem Relation Age of Onset   • Diabetes Sister    • Other Family         Stroke syndrome     Social History     Socioeconomic History   • Marital status: /Civil Union     Spouse name: None   • Number of children: None   • Years of education: None   • Highest education level: None   Occupational History   • None   Tobacco Use   • Smoking status: Never   • Smokeless tobacco: Never   Vaping Use   • Vaping Use: Never used   Substance and Sexual Activity   • Alcohol use: Yes     Alcohol/week: 3 0 standard drinks     Types: 3 Glasses of wine per week     Comment: 4 oz wine with dinner a few days a week   • Drug use: No   • Sexual activity: Yes     Partners: Female     Birth control/protection: None   Other Topics Concern   • None   Social History Narrative   • None     Social Determinants of Health     Financial Resource Strain: Low Risk    • Difficulty of Paying Living Expenses: Not very hard   Food Insecurity: Not on file   Transportation Needs: No Transportation Needs   • Lack of Transportation (Medical): No   • Lack of Transportation (Non-Medical): No   Physical Activity: Not on file   Stress: Not on file   Social Connections: Not on file   Intimate Partner Violence: Not on file   Housing Stability: Not on file        Review of Systems     Objective     Physical Exam  Constitutional:       General: He is not in acute distress  Appearance: He is normal weight  He is not ill-appearing, toxic-appearing or diaphoretic  HENT:      Head: Normocephalic and atraumatic  Right Ear: External ear normal       Left Ear: External ear normal       Nose: Nose normal       Mouth/Throat:      Pharynx: Oropharynx is clear  Eyes:      Conjunctiva/sclera: Conjunctivae normal    Cardiovascular:      Rate and Rhythm: Normal rate and regular rhythm  Pulses: Normal pulses  Heart sounds: No murmur heard  No friction rub  No gallop  Pulmonary:      Effort: Pulmonary effort is normal  No respiratory distress  Breath sounds: No wheezing, rhonchi or rales  Abdominal:      General: Bowel sounds are normal  There is no distension  Palpations: Abdomen is soft  There is no mass  Tenderness:  There is no abdominal tenderness  Musculoskeletal:      Cervical back: Normal range of motion  Neurological:      Mental Status: He is alert  Motor: Weakness present  Comments: Weakness and decreased range of motion for right upper extremity                Imaging:  CT CHEST, ABDOMEN AND PELVIS WITH IV CONTRAST     INDICATION:   further evaluation of renal mass      COMPARISON: CT abdomen pelvis February 19, 2011  No prior chest CT available for comparison      Correlation with MR thoracic spine May 3, 2023, MR brachial plexus April 15, 2023, thyroid ultrasound May 31, 2013     TECHNIQUE: CT examination of the chest, abdomen and pelvis was performed  In addition to portal venous phase postcontrast scanning through the abdomen and pelvis, delayed phase postcontrast scanning was performed through the upper abdominal viscera  Multiplanar 2D reformatted images were created from the source data      Radiation dose length product (DLP) for this visit:  885 6 mGy-cm   This examination, like all CT scans performed in the [facility], was performed utilizing techniques to minimize radiation dose exposure, including the use of iterative   reconstruction and automated exposure control      IV Contrast:  100 mL of iohexol (OMNIPAQUE)  Enteric Contrast: Enteric contrast was not administered      FINDINGS:     CHEST     LUNGS:  Lungs are clear  There is no tracheal or endobronchial lesion      PLEURA:  Unremarkable      HEART/GREAT VESSELS: Heart is unremarkable for patient's age  No thoracic aortic aneurysm      MEDIASTINUM AND VIOLETA:  Unremarkable      CHEST WALL AND LOWER NECK: Cyst along the posterior aspect of the right thyroid lobe measures 1 6 x 1 2 cm, unchanged from May 31, 2013      ABDOMEN     LIVER/BILIARY TREE:  Unremarkable      GALLBLADDER:  No calcified gallstones   No pericholecystic inflammatory change      SPLEEN:  Unremarkable      PANCREAS:  Unremarkable      ADRENAL GLANDS: Unremarkable      KIDNEYS/URETERS: Heterogeneous, predominantly endophytic, contour deforming enhancing mass in the posterior right upper kidney measures 4 3 x 4 0 x 3 8 cm (series 6, image 15; series 601, image 98)  The mass extends into the renal sinus fat  The mass   abuts the liver (series 602, image 146), and the fat plane between the hepatic capsule and the renal mass is no greater than 1 mm  There is no hydronephrosis  There is no renal vein or IVC thrombus      1 2 cm cyst in the posterior midpole of the right kidney (series 2, image 124)        No left hydronephrosis      STOMACH AND BOWEL:  Unremarkable      APPENDIX:  No findings to suggest appendicitis      ABDOMINOPELVIC CAVITY:  No ascites  No pneumoperitoneum  No lymphadenopathy      VESSELS: Atherosclerotic changes are present  No evidence of aneurysm      PELVIS     REPRODUCTIVE ORGANS:  Unremarkable for patient's age      URINARY BLADDER:  Unremarkable      ABDOMINAL WALL/INGUINAL REGIONS:  Unremarkable      OSSEOUS STRUCTURES: No acute fracture or destructive osseous lesion  Spinal degenerative changes are noted      IMPRESSION:     4 3 cm right renal lesion concerning for renal cell carcinoma without findings of metastatic disease in the chest, abdomen or pelvis   Urology consultation is advised        Labs:  Lab Results   Component Value Date    SODIUM 137 05/04/2023    K 3 9 05/04/2023     05/04/2023    CO2 24 05/04/2023    BUN 22 05/04/2023    CREATININE 0 62 05/04/2023    GLUC 89 05/04/2023    CALCIUM 8 9 05/04/2023         Lab Results   Component Value Date    WBC 5 64 05/04/2023    HGB 13 5 05/04/2023    HCT 39 9 05/04/2023    MCV 89 05/04/2023     05/04/2023         VTE Pharmacologic Prophylaxis: Heparin  VTE Mechanical Prophylaxis: sequential compression device     Varun Daily PA-C

## 2023-05-05 NOTE — PLAN OF CARE
Problem: PAIN - ADULT  Goal: Verbalizes/displays adequate comfort level or baseline comfort level  Description: Interventions:  - Encourage patient to monitor pain and request assistance  - Assess pain using appropriate pain scale  - Administer analgesics based on type and severity of pain and evaluate response  - Implement non-pharmacological measures as appropriate and evaluate response  - Consider cultural and social influences on pain and pain management  - Notify physician/advanced practitioner if interventions unsuccessful or patient reports new pain  Outcome: Progressing     Problem: INFECTION - ADULT  Goal: Absence or prevention of progression during hospitalization  Description: INTERVENTIONS:  - Assess and monitor for signs and symptoms of infection  - Monitor lab/diagnostic results  - Monitor all insertion sites, i e  indwelling lines, tubes, and drains  - Monitor endotracheal if appropriate and nasal secretions for changes in amount and color  - [place] appropriate cooling/warming therapies per order  - Administer medications as ordered  - Instruct and encourage patient and family to use good hand hygiene technique  - Identify and instruct in appropriate isolation precautions for identified infection/condition  Outcome: Progressing     Problem: NEUROSENSORY - ADULT  Goal: Achieves maximal functionality and self care  Description: INTERVENTIONS  - Monitor swallowing and airway patency with patient fatigue and changes in neurological status  - Encourage and assist patient to increase activity and self care     - Encourage visually impaired, hearing impaired and aphasic patients to use assistive/communication devices  Outcome: Progressing     Problem: MOBILITY - ADULT  Goal: Maintain or return to baseline ADL function  Description: INTERVENTIONS:  -  Assess patient's ability to carry out ADLs; assess patient's baseline for ADL function and identify physical deficits which impact ability to perform ADLs (bathing, care of mouth/teeth, toileting, grooming, dressing, etc )  - Assess/evaluate cause of self-care deficits   - Assess range of motion  - Assess patient's mobility; develop plan if impaired  - Assess patient's need for assistive devices and provide as appropriate  - Encourage maximum independence but intervene and supervise when necessary  - Involve family in performance of ADLs  - Assess for home care needs following discharge   - Consider OT consult to assist with ADL evaluation and planning for discharge  - Provide patient education as appropriate  Outcome: Progressing  Goal: Maintains/Returns to pre admission functional level  Description: INTERVENTIONS:  - Perform BMAT or MOVE assessment daily    - Set and communicate daily mobility goal to care team and patient/family/caregiver  - Collaborate with rehabilitation services on mobility goals if consulted  - Perform Range of Motion 3 times a day  - Reposition patient every 2 hours    - Dangle patient 3 times a day  - Stand patient 3 times a day  - Ambulate patient 3 times a day  - Out of bed to chair 3 times a day   - Out of bed for meals 3 times a day  - Out of bed for toileting  - Record patient progress and toleration of activity level   Outcome: Progressing

## 2023-05-05 NOTE — DISCHARGE SUMMARY
[address] [facility]  Discharge- Jaime Graff 1955, 69 y o  male MRN: [id number]  Unit/Bed#: [facility] 237-62 Encounter: [id number]  Primary Care Provider: Teddy Blackwell MD   Date and time admitted to hospital: 5/2/2023  4:03 PM    * Weakness of right upper extremity  Assessment & Plan  · Patient has significant history of bilateral rotator cuff tears with repairs and biceps tendon ruptures  Presented to the ER at the advice of his neurologist after outpatient testing with EMG noted concern for central process given subacute RUE weakness and hyperreflexia   · Patient symptoms started over 3 years ago with right upper extremity weakness, which has progressed over the past 3-4 weeks and now with associated right lower extremity numbness and tingling  Patient denies any urinary changes, he does have chronic constipation and notes recent difficulty in ambulating secondary to the RLE weakness  · Patient had a recent admission at [facility] for similar presentation, he had a negative work-up with CT head, MRI C-spine, MRI brachial plexus  · Patient admitted for MRI brain without contrast and CTA head and neck, both of which were unremarkable   · CK within normal limites, aldolase pending at this time   · Appreciate ongoing neurology recommendations   · Trial of steroid for brachial plexopathy, Pepcid was added for GI prophylaxis  • EMG ordered for outpatient (2 months)  • F/u with neurology clinic- scheduled for 5/16/23    • Discharge home    Moderate protein-calorie malnutrition ([facility] [address] )  Assessment & Plan  Malnutrition Findings:   Adult Malnutrition type: Chronic illness  Adult Degree of Malnutrition: Malnutrition of moderate degree  Malnutrition Characteristics: Fat loss, Muscle loss, Inadequate energy, Weight loss                  360 Statement: Moderate degree of malnutrition related to chronic illness and decreased appetite; decreased ability to use upper extremities for eating, evidenced by 17#(11%) wt loss over past year of which 7#(4 9%) wt loss over past 3 weeks  Exhibiting mild buccal fat pads loss and mild clavicle region muscle loss; Treating with Regular diet with chopped meats; Ensure Plus High Protein BID and Ensure Max x 1 meal     BMI Findings: Body mass index is 21 3 kg/m²  Continue with regular diet    Right renal mass  Assessment & Plan  Incidental finding on lumbar spine MRI , there is a complex solid and cystic mass identified within the upper pole of the right kidney, suspicious for renal neoplasm  CT scan with 4 3 cm right renal lesion concerning for renal cell carcinoma without findings of metastatic disease in the chest, abdomen or pelvis     Urology urology consulted, plan for outpatient follow-up      Rotator cuff tear arthropathy of right shoulder  Assessment & Plan  · Noted history as outlined above     NSVT (nonsustained ventricular tachycardia) (HCC)  Assessment & Plan  · Noted run of V  tach while in the ER, asymptomatic   · EKG with PACs, no ischemic changes; noted history of PVCs  · Echo in March of 2022 unremarkable  · Monitor and replete electrolytes as needed   · Telemetry monitoring unremarkable thus far  · Outpatient follow-up with repeat echo     HTN (hypertension)  Assessment & Plan  · BP acceptable, monitor routinely   · Continue PTA regimen: , lisinopril 20 mg twice daily, metoprolol 25 mg daily, patient also reports taking amlodipine as needed for systolic blood pressure more than 150    Medical Problems     Resolved Problems  Date Reviewed: 5/5/2023   None       Discharging Physician / Practitioner: Jamil Rene DO  PCP: Beata Zamorano MD  Admission Date:   Admission Orders (From admission, onward)     [facility]        [facility]                      Discharge Date: 05/05/23    Consultations During Hospital Stay:  · Neurology    Procedures Performed:   · Chest abdomen pelvis CT scan with right renal mass    Significant Findings / Test Results:   · As above    Incidental Findings:   · Right renal mass    Test Results Pending at Discharge (will require follow up):   · Urinalysis     Outpatient Tests Requested:  · Urology and neurology follow-up    Complications:  none    Reason for Admission:   Right upper extremity weakness  Brachial plexopathy    Hospital Course: Evi King is a 69 y o  male patient who originally presented to the hospital on 5/2/2023 due to right upper extremity weakness  Patient w/ hx of HTN, b/l rotator cuff tears, with b/l repairs w/ subsequent b/l bicep tendon ruptures who presents to ED  from EMG clinic due to subacute RUE weakness and hyperreflexia concerning for central process  Initially began to experience some RUE weakness approximately 3 years ago that worsened significantly over the last 3 to 4 weeks without any identifiable triggers  Associated w/ some numbness/tingling in RUE, RLE  Does report difficulty ambulating due to RLE weakness    Patient was admitted to the hospital evaluated by neurology and started on empiric steroids for brachial plexopathy diagnosis with the plan for outpatient follow-up and repeat EMG in 2 months    See dental finding of right kidney mass, discussed with urology with the plan for outpatient follow-up    Patient wants to leave the hospital today  Will be discharged on tapered dose of prednisone and Pepcid with outpatient neurology follow-up    Please see above list of diagnoses and related plan for additional information       Condition at Discharge: stable    Discharge Day Visit / Exam:   Subjective:    Patient is comfortable sitting in chair  No event overnight  Continues to have right arm weakness and fingers numbness  Wants to go home today  Vitals: Blood Pressure: 132/79 (05/05/23 1453)  Pulse: 66 (05/05/23 1453)  Temperature: 98 6 °F (37 °C) (05/05/23 1453)  Temp Source: Oral (05/04/23 2141)  Respirations: 16 (05/05/23 "7483)  Height: 5' 7\" (170 2 cm) (05/03/23 1515)  Weight - Scale: 61 7 kg (136 lb 0 4 oz) (05/05/23 0600)  SpO2: 96 % (05/05/23 1453)  Exam:   Physical Exam   Patient is awake alert oriented no acute distress  Lung clear to auscultation bilateral  Heart positive S1-S2 no murmur  Abdominal soft nontender  Lower extremities no edema  Right arm weakness    Discussion with Family: Updated  (wife) at bedside  Discharge instructions/Information to patient and family:   See after visit summary for information provided to patient and family  Provisions for Follow-Up Care:  See after visit summary for information related to follow-up care and any pertinent home health orders  Disposition:   Home    Planned Readmission: no     Discharge Statement:  I spent 45  minutes discharging the patient  This time was spent on the day of discharge  I had direct contact with the patient on the day of discharge  Greater than 50% of the total time was spent examining patient, answering all patient questions, arranging and discussing plan of care with patient as well as directly providing post-discharge instructions  Additional time then spent on discharge activities  Discharge Medications:  See after visit summary for reconciled discharge medications provided to patient and/or family        **Please Note: This note may have been constructed using a voice recognition system**    "

## 2023-05-05 NOTE — TELEPHONE ENCOUNTER
Received VM transcription:    Alexis Mendoza. Someone called us back earlier, but we missed the call and I just wanted to confirm that we're on a list with Dr. Tello to be seen today at [facility] in [place], we're in room 714. Again, if someone could call us back, we would appreciate it because we've been waiting for a day and a half now to see someone from Neurology. So thank you, and I appreciate it. Yoana.  -------------------------------------------------------    Spoke with pt and he says after EMG results came back, pt was advised to be admitted to hospital. Says they have been waiting for a day and a half to see the neurologist.    Advised him that unfortunately this caller is from the outpatient setting and unable to intercede with hospital processes. Pt expresses frustration stating that he and his wife would like to know more information regarding his diagnosis. Says they have been communicating with the nurses on the floor but have not received any time frame of when they will see neurologist. Pt asking with whom he can speak with that can advocate for them. Advised pt that they can always ask to voice their concerns with hospital supervisor or charge nurse. Pt says he will do so.

## 2023-05-05 NOTE — CASE MANAGEMENT
Case Management Assessment & Discharge Planning Note    Patient name Gayla Miranda  Location [facility] 714/[facility] 459-16 MRN [id number]  : 1955 Date 2023       Current Admission Date: 2023  Current Admission Diagnosis:Weakness of right upper extremity   Patient Active Problem List    Diagnosis Date Noted   • Moderate protein-calorie malnutrition ([facility] ) 2023   • Right renal mass 2023   • Carpal tunnel syndrome of right wrist    • Rotator cuff tear arthropathy of right shoulder 2023   • Radiculopathy, cervical region 2023   • Neuropathy of right upper extremity 2023   • Impaired sensation 2023   • Rotator cuff arthropathy of right shoulder 2023   • Chronic pain of both shoulders 2023   • Tendinitis of right rotator cuff 2023   • Weight loss 2023   • Acute pain of right shoulder 2023   • Polyneuropathy 2022   • Imbalance 2022   • Muscle weakness 2022   • COVID-19 2022   • Multiple bruises 2022   • Dizziness 2022   • Palpitations 2022   • Seasonal allergies 2022   • Shoulder pain, left 2022   • Rupture of right long head biceps tendon 2021   • Weakness of right upper extremity 2021   • Rotator cuff tear arthropathy of left shoulder 2021   • Fatigue 2021   • Postural hypotension 05/10/2021   • Rupture of left biceps tendon 05/10/2021   • Anxiety 2021   • Exposure to COVID-19 virus 07/15/2020   • Medicare annual wellness visit, subsequent 2019   • Iron deficiency anemia 2019   • Elevated blood sugar 2019   • NSVT (nonsustained ventricular tachycardia) ([facility] ) 2018   • Premature atrial contractions 2018   • HTN (hypertension) 2018   • Encounter for hepatitis C screening test for low risk patient 2018   • Need for pneumococcal vaccination 2018   • 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 2013 • Allergic rhinitis 12/03/2013      LOS (days): 3  Geometric Mean LOS (GMLOS) (days): 2 80  Days to GMLOS:0 2     OBJECTIVE:  PATIENT READMITTED TO HOSPITAL  Risk of Unplanned Readmission Score: 9 07      Current admission status: Inpatient    Preferred Pharmacy:   [facility], [address] [place] [address]  [address] [facility]  Phone: [phone] Fax: [phone]    [facility] [address] [place] [address]  Phone: [phone] Fax: [phone]    Primary Care Provider: Joelle Masterson MD    Primary Insurance: [employer] [facility]  Secondary Insurance:     ASSESSMENT:  [address] [facility], [address] [facility] Representative - Spouse   Primary Phone: [phone] (Mobile)  Home Phone: [phone]               Readmission Root Cause  30 Day Readmission: Yes  Who directed you to return to the hospital?: PCP  Did you understand whom to contact if you had questions or problems?: Yes  Did you get your prescriptions before you left the hospital?: Yes  Were you able to get your prescriptions filled when you left the hospital?: Yes  Did you take your medications as prescribed?: Yes  Were you able to get to your follow-up appointments?: Yes    Patient Information  Admitted from[de-identified] Home  Mental Status: Alert  During Assessment patient was accompanied by: Spouse  Assessment information provided by[de-identified] Patient, Spouse  Primary Caregiver: Self  Support Systems: Spouse/significant other, Family members  Home entry access options   Select all that apply : Stairs  Number of steps to enter home : 1  Type of Current Residence: [facility]  Living Arrangements: Lives w/ Spouse/significant other    Activities of Daily Living Prior to Admission  Functional Status: Assistance (wife assists with ADL's as needed)  Completes ADLs independently?: No  Level of ADL dependence: Assistance  Ambulates independently?: Yes  Does patient use assisted devices?: Yes  Assisted Devices (DME) used: Straight Cane  Does patient currently own DME?: Yes  What DME does the patient currently own?: Straight Cane (Grab bars in the bathroom)  Does patient have a history of Outpatient Therapy (PT/OT)?: Yes ([place] location)  Does the patient have a history of Short-Term Rehab?: No  Does patient have a history of HHC?: No  Does patient currently have [place] [id number]?: No    Patient Information Continued  Does patient have prescription coverage?: Yes  Does patient have a history of substance abuse?: No  Does patient have a history of Mental Health Diagnosis?: No    Means of Transportation  Means of Transport to Appts[de-identified] Family transport        DISCHARGE DETAILS:    Discharge planning discussed with[de-identified] Patient and spouse at bedside  Freedom of Choice: Yes  Comments - Freedom of Choice: therapy recommendations discussed for OP PT/OT  OP therapy list provided at bedside  Pt will make arrangements with [facility] OP therapy [place] location  CM contacted family/caregiver?: Yes (spouse at bedside)  Were Treatment Team discharge recommendations reviewed with patient/caregiver?: Yes  Did patient/caregiver verbalize understanding of patient care needs?: Yes  Were patient/caregiver advised of the risks associated with not following Treatment Team discharge recommendations?: Yes    Other Referral/Resources/Interventions Provided:  Interventions: Outpatient OT, Outpatient PT  Referral Comments: OP therapy list provided at bedside  Pt states he has transportation to and from OP therapy and will [facility] due to prior hx      Treatment Team Recommendation: Home  Discharge Destination Plan[de-identified] Home (home with OP therapy )

## 2023-05-06 VITALS
RESPIRATION RATE: 16 BRPM | BODY MASS INDEX: 21.35 KG/M2 | HEART RATE: 85 BPM | DIASTOLIC BLOOD PRESSURE: 76 MMHG | OXYGEN SATURATION: 97 % | WEIGHT: 136.02 LBS | SYSTOLIC BLOOD PRESSURE: 116 MMHG | TEMPERATURE: 98.4 F | HEIGHT: 67 IN

## 2023-05-06 NOTE — PROGRESS NOTES
Patient with 4 3 cm right renal mass  Please schedule office visit with me the week of May 15th for discussion of surgery

## 2023-05-08 ENCOUNTER — TRANSITIONAL CARE MANAGEMENT (OUTPATIENT)
Dept: INTERNAL MEDICINE CLINIC | Facility: CLINIC | Age: 68
End: 2023-05-08

## 2023-05-08 NOTE — TELEPHONE ENCOUNTER
Patient scheduled for 5/19/23 at 1130am with Dr Justino Jay in the [place] office  This was the soonest available appt with robot MD      Please confirm

## 2023-05-08 NOTE — TELEPHONE ENCOUNTER
Spoke with patient and confirmed 5/19/23 appointment at 11:30 with Dr Jayy Carcamo in [place] office  Office location was provided

## 2023-05-10 ENCOUNTER — OFFICE VISIT (OUTPATIENT)
Dept: INTERNAL MEDICINE CLINIC | Facility: CLINIC | Age: 68
End: 2023-05-10

## 2023-05-10 VITALS — WEIGHT: 137.4 LBS | HEART RATE: 54 BPM | BODY MASS INDEX: 21.56 KG/M2 | HEIGHT: 67 IN | OXYGEN SATURATION: 97 %

## 2023-05-10 DIAGNOSIS — R29.898 WEAKNESS OF RIGHT UPPER EXTREMITY: ICD-10-CM

## 2023-05-10 DIAGNOSIS — N28.89 RIGHT RENAL MASS: Primary | ICD-10-CM

## 2023-05-10 DIAGNOSIS — K59.00 CONSTIPATION, UNSPECIFIED CONSTIPATION TYPE: ICD-10-CM

## 2023-05-10 RX ORDER — BISACODYL 5 MG/1
5 TABLET, DELAYED RELEASE ORAL DAILY PRN
Qty: 30 TABLET | Refills: 0 | Status: SHIPPED | OUTPATIENT
Start: 2023-05-10

## 2023-05-10 NOTE — PATIENT INSTRUCTIONS
-Keep scheduled follow-ups with neurology and urology  -A referral has been made to physiatry  -A prescription for Dulcolax has been sent to your pharmacy to take as needed for constipation

## 2023-05-10 NOTE — PROGRESS NOTES
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   -Continue prednisone taper as prescribed by neurology  -Follow-up with neurology scheduled on 5/16  -Continue PT/OT  -A referral has been made to physiatry    Orders:  -     Ambulatory Referral to Physiatry; Future    3  Constipation, unspecified constipation type  Assessment & Plan:  -Provided patient with a prescription for Dulcolax to take as needed  -Encouraged patient to drink at least 64 ounces of water daily  -Recommended to use of an over-the-counter fiber supplement such as psyllium husk at least once a day    Orders:  -     bisacodyl (DULCOLAX) 5 mg EC tablet; Take 1 tablet (5 mg total) by mouth daily as needed for constipation         Subjective     Transitional Care Management Review:   Roya Blum is a 69 y o  male here for TCM follow up  The patient was admitted on 5/2 complaining of increasing right upper extremity pain and weakness  The day  of his admission he was seen by neurology for an EMG/NCS  During her exam he was noted to have signs on physical exam consistent with an upper motor neuron lesion  He was sent directly to the ED for an MRI of the brain  His MRI was unremarkable he did not show any evidence of an enhancing lesion  He subsequently underwent an MRI of the thoracic and lumbar spine which was notable for multilevel spondylitic degenerative change as well as a complex solid/cystic mass involving the upper pole the right kidney suspicious for renal neoplasm    Follow-up CT scan of the chest, abdomen and pelvis was consistent with likely renal cell carcinoma without findings of metastatic "disease  Prior to discharge home patient was evaluated by neurology who discussed PTS (neuralgic amyotrophy) as a potential diagnosis  He was  sent home on an extended prednisone taper instructed to follow-up with neurology on 5/16  Arrangements were also made for the patient to be evaluated by urology for surgical removal of his renal mass  During the TCM phone call patient stated:  TCM Call     Date and time call was made  5/8/2023 10:21 AM    Hospital care reviewed  Records not available    Patient [facility] hospitial[facility] at  [facility]    Date of Admission  05/02/23    Date of discharge  05/05/23    Diagnosis  Weakness of right upper extremity    Disposition  Home    Were the patients medications reviewed and updated  No    Current Symptoms  Constipation      TCM Call     Post hospital issues  None    Should patient be enrolled in anticoag monitoring? No    Scheduled for follow up?   Yes    Did you obtain your prescribed medications  Yes    Do you need help managing your prescriptions or medications  No    Is transportation to your appointment needed  No    I have advised the patient to call PCP with any new or worsening symptoms  Carmelita Pool - /[place]    Are you recieving any outpatient services  No    Are you recieving home care services  No    Have you fallen in the last 12 months  No    Interperter language line needed  No        HPI  Review of Systems ROS as per HPI    Objective     Pulse (!) 54   Ht 5' 7\" (1 702 m)   Wt 62 3 kg (137 lb 6 4 oz)   SpO2 97%   BMI 21 52 kg/m²      Physical Exam   General: NAD, Alert and oriented x3   HEENT: NCAT, EOMI, normal conjunctiva  Cardiovascular: RRR, normal S1 and S2, no m/r/g  Pulmonary: Normal respiratory effort, no wheezes, rales or rhonchi  Musculoskeletal: 5 out of 5 strength in left upper extremity and bilateral lower extremity  Neuro: Diffuse right upper extremity weakness with decreased sensation along touch as compared to the left, right " upper extremity biceps DTR 1+, 3+ patellar DTRs bilaterally, 2+ Achilles tendon DTRs bilaterally  Extremities: No lower extremity edema  Skin: Normal skin color, no rashes   Psychiatric: Normal mood and affect      Medications have been reviewed by provider in current encounter    Latoya Alvares MD

## 2023-05-11 NOTE — ASSESSMENT & PLAN NOTE
-CT concerning for renal cell carcinoma  -No evidence of metastatic disease seen on CT chest/abdomen/pelvis  -Urology evaluation scheduled 5/19

## 2023-05-11 NOTE — ASSESSMENT & PLAN NOTE
-Provided patient with a prescription for Dulcolax to take as needed  -Encouraged patient to drink at least 64 ounces of water daily  -Recommended to use of an over-the-counter fiber supplement such as psyllium husk at least once a day

## 2023-05-11 NOTE — ASSESSMENT & PLAN NOTE
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

## 2023-05-15 ENCOUNTER — TELEPHONE (OUTPATIENT)
Dept: NEUROLOGY | Facility: CLINIC | Age: 68
End: 2023-05-15

## 2023-05-15 DIAGNOSIS — R00.2 PALPITATIONS: ICD-10-CM

## 2023-05-15 DIAGNOSIS — I10 PRIMARY HYPERTENSION: Primary | ICD-10-CM

## 2023-05-15 RX ORDER — METOPROLOL SUCCINATE 25 MG/1
25 TABLET, EXTENDED RELEASE ORAL
Qty: 90 TABLET | Refills: 3 | Status: SHIPPED | OUTPATIENT
Start: 2023-05-15

## 2023-05-16 ENCOUNTER — OFFICE VISIT (OUTPATIENT)
Dept: NEUROLOGY | Facility: CLINIC | Age: 68
End: 2023-05-16

## 2023-05-16 VITALS
HEIGHT: 67 IN | SYSTOLIC BLOOD PRESSURE: 152 MMHG | BODY MASS INDEX: 21.5 KG/M2 | WEIGHT: 137 LBS | DIASTOLIC BLOOD PRESSURE: 84 MMHG

## 2023-05-16 DIAGNOSIS — R53.1 RIGHT SIDED WEAKNESS: ICD-10-CM

## 2023-05-16 DIAGNOSIS — G54.5 PARSONAGE-TURNER SYNDROME: Primary | ICD-10-CM

## 2023-05-16 DIAGNOSIS — M62.838 MUSCLE SPASMS OF NECK: ICD-10-CM

## 2023-05-16 DIAGNOSIS — R20.2 ARM PARESTHESIA, RIGHT: ICD-10-CM

## 2023-05-16 RX ORDER — GABAPENTIN 300 MG/1
300 CAPSULE ORAL
Qty: 30 CAPSULE | Refills: 1 | Status: SHIPPED | OUTPATIENT
Start: 2023-05-16

## 2023-05-16 RX ORDER — TIZANIDINE 2 MG/1
2 TABLET ORAL 3 TIMES DAILY
Qty: 90 TABLET | Refills: 1 | Status: SHIPPED | OUTPATIENT
Start: 2023-05-16 | End: 2023-06-15

## 2023-05-16 NOTE — ASSESSMENT & PLAN NOTE
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

## 2023-05-16 NOTE — PROGRESS NOTES
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)    Plan:  -Discussed plan with neurology attending, Dr Laurie Anthony  -Finish Prednisone tapered course  -Reassured the patient that this is not ALS and it will get better in a few months  -Will repeat EMG 1upper/1lower in a few months  -Recommend PT/OT and patient will have the first session soon    Follow up in about 3 months to re-assess         Diagnoses and all orders for this visit:    Parsonage-Cordero syndrome  -     tiZANidine (ZANAFLEX) 2 mg tablet; Take 1 tablet (2 mg total) by mouth 3 (three) times a day  -     EMG 1 Upper/1 Lower Neuropathy; Future    Right sided weakness  -     Ambulatory Referral to Neurology  -     EMG 1 Upper/1 Lower Neuropathy; Future    Muscle spasms of neck  -     tiZANidine (ZANAFLEX) 2 mg tablet; Take 1 tablet (2 mg total) by mouth 3 (three) times a day    Arm paresthesia, right  -     gabapentin (Neurontin) 300 mg capsule; Take 1 capsule (300 mg total) by mouth daily at bedtime  -     EMG 1 Upper/1 Lower Neuropathy; Future           Subjective:    70 yo M with nonsustained ventricular tachy, rotator cuff tear arthropathy of right shoulder, HTN, generalized weakness, dizziness, and balance issues is here for hospital follow up of right upper extremity weakness  Patient's last office visit on 11/29/2022 for generalized weakness, dizziness and balance issues and recommended patient to get EMG  Brief history:  In March, 2023 patient had R rotator cuff injury from lifting the bucket   On April 10, 2023, patient was getting a root canal and was in the chair for about 2 hours causing shoulder pain  In a day or two after, patient started noticing the weakness of right arm associated with pain  He went to the hospital on April 14 but workup was unremarkable  MRI c spine and MRI brachial plexus showed no acute abnormality  When patient had EMG, there was concern for central process given subacute RUE weakness and hyperreflexia  Patient was sent to ED for Mri brain w/wo, which was negative for any acute abnormality  Patient's RUE weakness is worsened  Trial of steroid was given for possible Parsonage-Cordero syndrome and discharged home  Today, patient is in acute stress due to unable to move his whole RUE except his fingers and pain in the shoulder  He has some right hand tingly and numbness  The following portions of the patient's history were reviewed and updated as appropriate: He  has a past medical history of Allergic, Arthritis, Asthma, Hypertension, Impaired fasting glucose, Mitral valve disorder, Mitral valve prolapse, Murmur, cardiac, 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, PAC (premature atrial contraction), PVC (premature ventricular contraction), and Thyroid nodule    He   Patient Active Problem List    Diagnosis Date Noted   • Parsonage-Cordero syndrome 05/16/2023   • Constipation 05/10/2023   • Moderate protein-calorie malnutrition ([facility] [address] ) 05/05/2023   • Right renal mass 05/04/2023   • Carpal tunnel syndrome of right wrist    • Rotator cuff tear arthropathy of right shoulder 04/27/2023   • Radiculopathy, cervical region 04/20/2023   • Neuropathy of right upper extremity 04/20/2023   • Impaired sensation 04/14/2023   • Rotator cuff arthropathy of right shoulder 04/13/2023   • Chronic pain of both shoulders 03/27/2023   • Tendinitis of right rotator cuff 03/27/2023   • Weight loss 03/27/2023   • Acute pain of right shoulder 03/19/2023   • Polyneuropathy 11/29/2022   • Imbalance 11/29/2022   • Muscle weakness 11/23/2022   • COVID-19 08/08/2022   • Multiple bruises 06/14/2022   • Dizziness 05/13/2022   • Palpitations 03/18/2022   • Seasonal allergies 03/01/2022   • Shoulder pain, left 03/01/2022   • Rupture of right long head biceps tendon 11/08/2021   • Weakness of right upper extremity 07/26/2021   • Rotator cuff tear arthropathy of left shoulder 07/19/2021   • Fatigue 07/01/2021   • Postural hypotension 05/10/2021   • Rupture of left biceps tendon 05/10/2021   • Anxiety 01/17/2021   • Exposure to COVID-19 virus 07/15/2020   • Medicare annual wellness visit, subsequent 03/05/2019   • Iron deficiency anemia 03/05/2019   • Elevated blood sugar 03/05/2019   • NSVT (nonsustained ventricular tachycardia) ([facility] [address] ) 08/29/2018   • Premature atrial contractions 08/29/2018   • HTN (hypertension) 08/13/2018   • Encounter for hepatitis C screening test for low risk patient 08/13/2018   • Need for pneumococcal vaccination 08/13/2018   • 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 12/30/2013   • Allergic rhinitis 12/03/2013     He  has a past surgical history that includes Hand surgery; Shoulder surgery; Prostate biopsy; and pr colonoscopy flx dx w/collj spec when pfrmd (N/A, 2/9/2018)  His family history includes Diabetes in his sister; Other in his family  He  reports that he has never smoked  He has never used smokeless tobacco  He reports current alcohol use of about 3 0 standard drinks per week  He reports that he does not use drugs    Current Outpatient Medications   Medication Sig Dispense Refill   • acetaminophen (TYLENOL) 325 mg tablet Take 2 tablets (650 mg total) by mouth every 6 (six) hours as needed for mild pain  0   • amLODIPine (NORVASC) 5 mg tablet Take 1 tablet (5 mg total) by mouth daily Prn BP> 150/90 (Patient taking differently: Take 5 mg by mouth as needed Prn BP> 150/90) 30 tablet 1   • aspirin 81 mg chewable tablet Chew 1 tablet every other day     • bisacodyl (DULCOLAX) 5 mg EC tablet Take 1 tablet (5 mg total) by mouth daily as needed for constipation 30 tablet 0   • busPIRone (BUSPAR) 7 5 mg tablet Take 1 tablet (7 5 mg total) by mouth 2 (two) times a day as needed (Anxiety) 60 tablet 1   • Coenzyme Q10-Red Yeast Rice  MG CAPS Take 1 capsule by mouth daily     • Docosahexaenoic Acid (DHA OMEGA 3) 100 MG CAPS Take 6 capsules by mouth daily     • famotidine (PEPCID) 20 mg tablet Take 1 tablet (20 mg total) by mouth 2 (two) times a day before meals 60 tablet 0   • fexofenadine (ALLEGRA) 180 MG tablet Take 1 tablet by mouth daily as needed     • gabapentin (Neurontin) 300 mg capsule Take 1 capsule (300 mg total) by mouth daily at bedtime 30 capsule 1   • lidocaine (XYLOCAINE) 5 % ointment Apply topically as needed for mild pain 35 44 g 3   • lisinopril (ZESTRIL) 20 mg tablet TAKE 1 TABLET TWICE A  tablet 3   • metoprolol succinate (TOPROL-XL) 25 mg 24 hr tablet Take 1 tablet (25 mg total) by mouth daily with breakfast 90 tablet 3   • Misc Natural Products (PROSTATE HEALTH) CAPS Take 1 capsule by mouth daily     • Multiple Vitamin tablet Take 1 tablet by mouth daily     • polyethylene glycol (MIRALAX) 17 g packet Take 17 g by mouth daily Do not start before May 6, 2023   0   • predniSONE 10 mg tablet Take 1 tablet (10 mg total) by mouth daily Take 50 mg every day x4 days then taper by 10 mg every week till seen neurology 180 tablet 0   • senna-docusate sodium (SENOKOT S) 8 6-50 mg per tablet Take 1 tablet by mouth 2 (two) times a day  0   • tiZANidine (ZANAFLEX) 2 mg tablet Take 1 tablet (2 mg total) by mouth 3 (three) times a day 90 tablet 1   • meloxicam (Mobic) 15 mg tablet Take 1 tablet (15 mg total) by mouth daily (Patient not taking: Reported on 4/25/2023) 30 tablet 0   • pantoprazole (PROTONIX) 40 mg tablet Take 1 tablet (40 mg total) by mouth daily in the early morning Do not start before April 17, 2023  (Patient not taking: Reported on 5/16/2023) 90 tablet 0     No current facility-administered medications for this visit  Current Outpatient Medications on File Prior to Visit   Medication Sig   • acetaminophen (TYLENOL) 325 mg tablet Take 2 tablets (650 mg total) by mouth every 6 (six) hours as needed for mild pain   • amLODIPine (NORVASC) 5 mg tablet Take 1 tablet (5 mg total) by mouth daily Prn BP> 150/90 (Patient taking differently: Take 5 mg by mouth as needed Prn BP> 150/90)   • aspirin 81 mg chewable tablet Chew 1 tablet every other day   • bisacodyl (DULCOLAX) 5 mg EC tablet Take 1 tablet (5 mg total) by mouth daily as needed for constipation   • busPIRone (BUSPAR) 7 5 mg tablet Take 1 tablet (7 5 mg total) by mouth 2 (two) times a day as needed (Anxiety)   • Coenzyme Q10-Red Yeast Rice  MG CAPS Take 1 capsule by mouth daily   • Docosahexaenoic Acid (DHA OMEGA 3) 100 MG CAPS Take 6 capsules by mouth daily   • famotidine (PEPCID) 20 mg tablet Take 1 tablet (20 mg total) by mouth 2 (two) times a day before meals   • fexofenadine (ALLEGRA) 180 MG tablet Take 1 tablet by mouth daily as needed   • lidocaine (XYLOCAINE) 5 % ointment Apply topically as needed for mild pain   • lisinopril (ZESTRIL) 20 mg tablet TAKE 1 TABLET TWICE A DAY   • metoprolol succinate (TOPROL-XL) 25 mg 24 hr tablet Take 1 tablet (25 mg total) by mouth daily with breakfast   • Misc Natural Products (PROSTATE HEALTH) CAPS Take 1 capsule by mouth daily   • Multiple Vitamin tablet Take 1 tablet by mouth daily   • polyethylene glycol (MIRALAX) 17 g packet Take 17 g by mouth daily Do not start before May 6, 2023  • predniSONE 10 mg tablet Take 1 tablet (10 mg total) by mouth daily Take 50 mg every day x4 days then taper by 10 mg every week till seen neurology   • senna-docusate sodium (SENOKOT S) 8 6-50 mg per tablet Take 1 tablet by mouth 2 (two) times a day   • [DISCONTINUED] gabapentin (Neurontin) 300 mg capsule Take 1 capsule nightly for 4-5 days, then increase to 1 capsule in the mornign and 1 in the evening, then increase to 1 capsule TID  "(Patient taking differently: daily at bedtime Take 1 capsule nightly for 4-5 days,)   • meloxicam (Mobic) 15 mg tablet Take 1 tablet (15 mg total) by mouth daily (Patient not taking: Reported on 4/25/2023)   • pantoprazole (PROTONIX) 40 mg tablet Take 1 tablet (40 mg total) by mouth daily in the early morning Do not start before April 17, 2023  (Patient not taking: Reported on 5/16/2023)     No current facility-administered medications on file prior to visit  He has No Known Allergies            Objective:    Blood pressure 152/84, height 5' 7\" (1 702 m), weight 62 1 kg (137 lb)  Physical Exam  Constitutional:       General: He is not in acute distress  HENT:      Head: Normocephalic and atraumatic  Nose: Nose normal       Mouth/Throat:      Mouth: Mucous membranes are moist       Pharynx: Oropharynx is clear  No oropharyngeal exudate or posterior oropharyngeal erythema  Eyes:      General: Lids are normal       Extraocular Movements: Extraocular movements intact  Pupils: Pupils are equal, round, and reactive to light  Cardiovascular:      Rate and Rhythm: Normal rate  Pulses: Normal pulses  Pulmonary:      Effort: Pulmonary effort is normal  No respiratory distress  Musculoskeletal:      Cervical back: Normal range of motion  Skin:     General: Skin is warm and dry  Neurological:      Mental Status: He is alert  Motor: Weakness present  Deep Tendon Reflexes:      Reflex Scores:       Tricep reflexes are 1+ on the right side and 2+ on the left side  Bicep reflexes are 1+ on the right side and 2+ on the left side  Brachioradialis reflexes are 1+ on the right side and 2+ on the left side  Patellar reflexes are 3+ on the right side and 3+ on the left side  Achilles reflexes are 2+ on the right side and 2+ on the left side    Psychiatric:         Mood and Affect: Mood normal          Speech: Speech normal          Neurological Exam  Mental Status  Awake, " alert and oriented to person, place and time  Alert  Oriented to person, place and time  Speech is normal  Language is fluent with no aphasia  Cranial Nerves  CN II: Visual acuity is normal  Visual fields full to confrontation  Right funduscopic exam: not visualized  Left funduscopic exam: not visualized  CN III, IV, VI: Extraocular movements intact bilaterally  Normal lids and orbits bilaterally  Pupils equal round and reactive to light bilaterally  CN V: Facial sensation is normal   CN VII: Full and symmetric facial movement  CN VIII: Hearing is normal   CN IX, X: Palate elevates symmetrically  CN XI: Shoulder shrug strength is normal   CN XII: Tongue midline without atrophy or fasciculations  Motor  Normal muscle bulk throughout  No fasciculations present  Normal muscle tone  No abnormal involuntary movements  Left UE: Left deltoid limited due to rotator cuff injury  Bicep and tricep 5/5  External shoulder rotator 5/5  Limited finger flexion of the 3rd and 4th digit due to injury  Rest of LUE 5/5    R side: bicep: 0/5  Tricep 1/5  Deltoid 0/5   finger flexion 4 to 4+/5  Finger extension 3/5  Finger abduction 3/5  Wrist flexion 4/5  Wrist extension 3-/5    Bilateral lower extremities 4+/5  Sensory  Sensation is intact to light touch, pinprick, vibration and proprioception in all four extremities  Reflexes                                            Right                      Left  Brachioradialis                    1+                         2+  Biceps                                 1+                         2+  Triceps                                1+                         2+  Patellar                                3+                         3+  Achilles                                2+                         2+  Plantar                           Equivocal                Equivocal    Right pathological reflexes: Diana's absent    Left pathological reflexes: Diana's absent  Gait  Casual gait is normal including stance, stride, and arm swing  ROS:    Review of Systems   Constitutional: Negative for chills and fever  HENT: Negative for ear pain and sore throat  Eyes: Negative for pain and visual disturbance  Respiratory: Negative for cough and shortness of breath  Cardiovascular: Negative for chest pain and palpitations  Gastrointestinal: Negative for abdominal pain and vomiting  Genitourinary: Negative for dysuria and hematuria  Musculoskeletal: Negative for arthralgias and back pain  Skin: Negative for color change and rash  Neurological: Positive for weakness  Negative for seizures and syncope  Right UE severe weakness   All other systems reviewed and are negative

## 2023-05-17 ENCOUNTER — EVALUATION (OUTPATIENT)
Dept: PHYSICAL THERAPY | Facility: MEDICAL CENTER | Age: 68
End: 2023-05-17

## 2023-05-17 ENCOUNTER — OFFICE VISIT (OUTPATIENT)
Dept: OCCUPATIONAL THERAPY | Facility: MEDICAL CENTER | Age: 68
End: 2023-05-17

## 2023-05-17 VITALS — SYSTOLIC BLOOD PRESSURE: 136 MMHG | DIASTOLIC BLOOD PRESSURE: 76 MMHG

## 2023-05-17 DIAGNOSIS — M12.811 ROTATOR CUFF ARTHROPATHY OF RIGHT SHOULDER: Primary | ICD-10-CM

## 2023-05-17 DIAGNOSIS — R53.1 RIGHT SIDED WEAKNESS: ICD-10-CM

## 2023-05-17 DIAGNOSIS — R29.898 WEAKNESS OF RIGHT UPPER EXTREMITY: ICD-10-CM

## 2023-05-17 NOTE — PROGRESS NOTES
"Occupational Therapy Daily Note:    Today's date: 2023  Patient name: Evi King  : 1955  MRN: [id number]  Referring provider: Amilcar Urrutia  Dx:   Encounter Diagnoses   Name Primary? • Rotator cuff arthropathy of right shoulder Yes   • Right sided weakness        POC START DATE: 2023  POC END DATE: 2023  NEXT PN DUE: 2023  FREQUENCY: 1-2x/wk for 12 weeks    Subjective: \"Things are just hard  \"    Objective:     Neuromuscular re-education:    Shoulder Shrugs Bilaterally  Completed x 10    Scapular Retractions Bilaterally  Completed x 10    Towel Slides on Table   Shoulder Flexion  Completed x 10    Towel Slides on Table  Shoulder Abduction  Completed x 10    Wrist extensions  R, Completed x 10    Wrist flexions  R, Completed x 10    Sandoval Tape for Shoulder Subluxation  Time: 10 minutes    Assessment: Tolerated treatment well  Pt presents wearing a sling fabricated with shirt and safety pin  Two finger subluxation noted on R, increased from last evaluation  Recommended for Giv Erna Sling  Educated pt on shoulder shrugs, scapular retractions, towel slides with flexion, and towel slides with abduction on table  [place] required for towel slides due to inability to flex or abduct shoulder  Provided with pink and yellow sponges to increase  stretch  Some activation of wrist flexion and extension  Pt was encouraged to complete  Patient would benefit from continued skilled OT  Plan: Continued skilled OT per POC    "

## 2023-05-17 NOTE — PROGRESS NOTES
PT Evaluation       Today's date: 2023  Patient name: Paolo Jacobs  : 1955  MRN: [id number]  Referring provider: Chelsie Lion DO  Dx:   Encounter Diagnosis     ICD-10-CM    1  Weakness of right upper extremity  [id number] Ambulatory referral to Physical Therapy            Assessment  Assessment details: Patient is a 69 y o  Male who presents to skilled outpatient PT with RUE weakness  Patient presents to evaluation with homemade sling and wife who assists with patient history  They report over the last month he has lost all strength and mobility of his RUE  MMT reveals 0/5 for right shoulder flexion/extension/abduction/adduction, 0/5 elbow flexion, 1/5 elbow extension, 0/5 wrist flexion/extension, and 3+/5  strength  Patient displays reduced sensation in RUE  When seated and standing, patient displays right shoulder depression and 2 finger width glenohumeral subluxation  Attempted to adjust patient sling to provide additional support, however patient was unable to tolerate due to increased pain  B/L LE MMT rates 4/5 to 5/5, but demonstrates reduced functional LE strength via 5xSTS  Based on his TUG time of 13 2 seconds, patient is at increased risk of falls  During mCTSIB, patient unable to maintain balance for 30 seconds during FTEC on foam condition suggesting increased reliance on visual and somatosensory input to maintain balance  Due to time constraints, plan to continue with further balance and gait assessments next session  Patient will benefit from skilled PT services to improve his RUE strength and mobility, improve his balance, reduce his risk of falls, and maximize his level of function and independence           Impairments: Abnormal coordination, Abnormal muscle tone, Abnormal or restricted ROM, Activity intolerance, Impaired balance, Impaired physical strength, Lacks appropriate HEP, Poor posture, Pain with function, Safety issue and Abnormal movement  Understanding of Dx/Px/POC: Good  Prognosis: Good    Patient verbalized understanding of POC  Please contact me if you have any questions or recommendations  Thank you for the referral and the opportunity to share in [address] care          Plan  Plan details:   Patient would benefit from: Skilled PT and Skilled OT  Planned modality interventions: Biofeedback  Planned therapy interventions: ADL training, Balance, Balance/WB training, Body mechanics training, Coordination, Functional ROM exercises, Gait training, HEP, Manual therapy, Motor coordination training, Neuromuscular re-education, Patient education, Postural training, Strengthening, Stretching, Therapeutic activities, Therapeutic exercises and Therapeutic training  Frequency: 2x/wk  Duration in weeks: 12  Plan of Care beginning date: 5/17/2023  Plan of Care expiration date: 12 weeks - 8/9/2023  Treatment plan discussed with: Patient and Family       Goals  Short Term Goals (4 weeks):    - Patient will improve time on TUG by 2 9 seconds from 13 2 seconds to 10 3 seconds to facilitate improved safety in all ambulation  - Patient will be independent in basic HEP 2-3 weeks  - Patient will improve 5xSTS score by 2 3 seconds from 13 34 seconds to 11 04 seconds to promote improved LE functional strength needed for ADLs  - Patient will complete 30 seconds on all conditions of mCTSIB to improve his static balance     Long Term Goals (12 weeks):  - Patient will be independent in a comprehensive home exercise program  - Patient will improve scoring on DGI by at least 2 6 points from baseline to 20/24 to progress safety  - Patient will improve gait speed by at least 0 18 m/s from baseline to 1 m/s to improve safety with community ambulation  - Patient will improve MAYORGA by at least 6 points from baseline to 50/56 in order to improve static balance and reduce risk for falls  - Patient will improve scoring on FGA by at least 4 points from baseline to  to progress safety with dynamic tasks  - Patient will be able to demonstrate HT in gait without veering  - Patient will improve 6 Minute Walk Test score by at least 190 feet from baseline to 1400 feet to promote improved cardiovascular endurance  - Patient will report 50% reduction in near falls in order to improve safety with functional tasks and reduce his risk for falls  - Patient will report going on walks at least 3 days per week to promote independence and improved cardiovascular endurance  - Patient will be able to ascend/descend stairs reciprocally with 1 UE assist to promote independence and safety with ADLs  - Patient will report 50% reduction in near falls when ambulating on uneven terrain      Cut off score    All date taken from [facility] Neuro Section or Rehab Measures      [place]/64  MDC: 6 pts  Age Norms:  61-76: M - 54   F - 55  70-79: M - 47   F - 53  80-89: M - 48   F - 50 5xSTS: Daisy et al 2010  MDC: 2 3 sec  Age Norms:  60-69: 11 1 sec  70-79: 12 6 sec  80-89: 14 8 sec   TUG  MDC: 4 14 sec  Cut off score:  >13 5 sec community dwelling adults  >32 2 frail elderly  <20 I for basic transfers  >30 dependent on transfers 10 Meter Walk Test: Janette Perez and Dewey vallejo 2011  MDC: 0 18 m/s  20-29: M - 1 35 m   F - 1 34 m  30-39: M - 1 43 m   F - 1 34 m  40-49: M - 1 43 m   F - 1 39 m  50-59: M - 1 43 m   F - 1 31 m  60-69: M - 1 34 m   F - 1 24 m  70-79: M - 1 26 m   F - 1 13 m  80-89: M - 0 97 m   F - 0 94 m    Household Ambulator < 0 4 m/s  Limited Community Ambulator 0 4 - 0 8 m/s  [employer] 0 8 - 1 2 m/s  Safely cross the street > 1 2 m/s   FGA  MCID: 4 pts  Geriatrics/community < 22/30 fall risk  Geriatrics/community < 20/30 unexplained falls    DGI  MDC: vestibular - 4 pts  MDC: geriatric/community - 3 pts  Falls risk <19/24 mCTSIB  Norm: 20-60 yrs  Eyes open firm: norm sway 0 21-0 48  Eyes closed firm: norm sway 0 48-0 99  Eyes open foam: norm sway 0 38-0 71  Eyes closed foam: norm sway 0 70-2 22   6 Minute Walk Test  MDC: 190 98 ft  MCID: 164 ft    Age Norms  61-76: [address] ft (571 80 m)  F - 8003 ft (537 98 m)  70-79: M - 1729 ft (527 00 m)  F - 1545 ft (470 92 m)  80-89: M - 1368 ft (416 97 m)  F - 1286 ft (391 97 m) ABC: [place] [address]  <67% increased risk for falls         Subjective    History of Present Illness  - Mechanism of injury: Patient reports right UE weakness  He recently saw neurologist who recommended PT and OT to assist with returning to his PLOF  He has no active ROM in his RUE  He reports symptoms started after root canal and has since deteriorated   Patient arrives to evaluation with wife who assists with medical history and homemade sling  - Assist level at home: assist from spouse  - Decreased fine motor tasks: Yes      Pain  - Current pain ratin-5/10      Objective     HR: 67 bpm  SpO2: 97%  BP: 136/76    UE MMT  - R Shoulder Flexion: 0/5    - R Shoulder Extension: 0/5   - R Shoulder Abduction: 0/5   - R Shoulder Adduction: 0/5   - R Elbow Extension: 1/5    - R Elbow Flexion: 0/5    - R Wrist Flexion: 0/5    - R Wrist Extension: 0/5    - R : 3+/5        LE MMT  - R Hip Flexion: 4/5  L Hip Flexion: 4/5  - R Hip Abduction: 4/5  L Hip Abduction: 4/5  - R Hip Adduction: 4/5  L Hip Adduction: 4/5  - R Knee Extension: 5/5  L Knee Extension: 5/5  - R Knee Flexion: 5/5  L Knee Flexion: 5/5  - R Ankle DF: 5/5   L Ankle DF: 5/5  - R Ankle PF: 5/5   L Ankle PF: 5/5    Sensation  - Light touch: reduced RUE  - Deep pressure: reduced RUE      Coordination  - Heel to Shin: WNL  - Alternate Toe Taps: WNL      Postural Screen  - Observation: right shoulder depression        Gait-- NEXT SESSION  - Abnormalities:            Outcome Measures Initial Eval  2023        5xSTS 13 34 sec        TUG  - Regular   13 2 sec        10 meter  ft/sec   m/s        MAYORGA /56        FGA /30        DGI /24        mCTSIB  - FTEO (firm)  - FTEC (firm)  - FTEO (foam)  - FTEC (foam)   30 sec  30 sec  30 sec  5 sec        6MWT  ft                                     Precautions:   Past Medical History:   Diagnosis Date   • Allergic    • Arthritis    • Asthma    • Hypertension    • Impaired fasting glucose    • Mitral valve disorder    • Mitral valve prolapse    • Murmur, cardiac    • 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    • PAC (premature atrial contraction)    • PVC (premature ventricular contraction)    • Thyroid nodule

## 2023-05-19 ENCOUNTER — OFFICE VISIT (OUTPATIENT)
Dept: UROLOGY | Facility: AMBULATORY SURGERY CENTER | Age: 68
End: 2023-05-19

## 2023-05-19 VITALS
OXYGEN SATURATION: 98 % | BODY MASS INDEX: 22.24 KG/M2 | HEART RATE: 70 BPM | SYSTOLIC BLOOD PRESSURE: 148 MMHG | WEIGHT: 142 LBS | DIASTOLIC BLOOD PRESSURE: 78 MMHG

## 2023-05-19 DIAGNOSIS — N28.89 RENAL MASS, RIGHT: Primary | ICD-10-CM

## 2023-05-19 RX ORDER — CEFAZOLIN SODIUM 1 G/50ML
1000 SOLUTION INTRAVENOUS ONCE
OUTPATIENT
Start: 2023-05-19 | End: 2023-05-19

## 2023-05-19 NOTE — PROGRESS NOTES
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    Plan  We had extensive discussion in the office today  We reviewed his CT images at length  We also reviewed his neurologic symptoms, evaluations, and diagnosis  He is quite depressed by these findings  We did discuss evaluation management of renal masses  We discussed that approximately 90% of these are malignant  His does have this appearance, especially with necrotic center and amorphous nonspherical appearance  Additionally it invades to the renal sinus  We discussed potential observation, percutaneous biopsy and/or management, and surgery either radical or partial nephrectomy  We discussed technique, risk, benefits of each of these in detail  He is not comfortable with observation and understands the implications of this  He would like to have his kidney removed  We discussed partial nephrectomy is potentially an option although be quite technically difficult and would risk margins  He is not interested in considering this  We discussed that percutaneous biopsy may be valuable however he would not be comfortable with this mass in place and understands that there is a potential for false negative finding  I do not think he is a candidate for cryotherapy unfortunately due to the size, shape, and location of the mass  In light of all of this discussion, we have decided that radical nephrectomy is appropriate for him  He would be a good candidate for robotic radical nephrectomy  However he does not feel he is up to it in the near future due to his neurologic condition  He is told that the neurologic condition will hopefully improve over the next 6 weeks  And he is due for a repeat EMG at that in that timeframe      We discussed follow-up CT or MRI to evaluate for any growth of the mass in the meantime and then tentative scheduling for nephrectomy possibly in August 2023  They are most comfortable with this approach  We discussed technique, risk, benefits of this procedure once again including potential complications  We discussed likely hospitalization which may be somewhat extended for him depending on his neurologic status  All questions answered to their satisfaction and they agree with the plan  History of Present Illness  Juan Marquez is a 77 y o  male who developed significant weakness of the right upper extremity to the point of near paralysis  He had extensive hospital work-up on 2 occasions with MRIs of the entire neurologic system, as well as EMG  He was not found to have motor neuron disease and 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  Treatment is conservative with steroids and physical therapy  He was incidentally found to have a renal mass  Lumbar MRI followed by dedicated renal CT revealed a greater than 4 cm amorphous right upper pole renal mass with necrotic center consistent with renal cell carcinoma  There is no evidence of any metastatic disease  Patient denies urinary symptoms  He is however overwhelmed by his neurologic condition as he and his wife do not have any local family and have a lot of land and home to care for          AUA SYMPTOM SCORE    Flowsheet Row Most Recent Value   AUA SYMPTOM SCORE    How often have you had a sensation of not emptying your bladder completely after you finished urinating? 0 (P)     How often have you had to urinate again less than two hours after you finished urinating? 2 (P)     How often have you found you stopped and started again several times when you urinate? 0 (P)     How often have you found it difficult to postpone urination? 1 (P)     How often have you had a weak urinary stream? 1 (P)     How often have you had to push or strain to begin urination? 0 (P)     How many times did you most typically get up to urinate from the time you went to bed at night until the time you got up in the morning? 2 (P)     Quality of Life: If you were to spend the rest of your life with your urinary condition just the way it is now, how would you feel about that? 2 (P)     AUA SYMPTOM SCORE 6 (P)           Review of Systems  Review of Systems   Constitutional:        Feeling generally unwell  HENT: Negative  Respiratory: Negative  Cardiovascular: Negative  Gastrointestinal: Negative  Genitourinary:        As per HPI   Musculoskeletal:        Debilitating right upper extremity pain  Right upper extremity paralysis, and partial left upper extremity symptoms as well  Skin: Negative  Neurological: Negative  Hematological: Negative  Psychiatric/Behavioral: Negative  Past Medical History  Past Medical History:   Diagnosis Date   • Allergic    • Arthritis    • Asthma    • Hypertension    • Impaired fasting glucose    • Mitral valve disorder    • Mitral valve prolapse    • Murmur, cardiac    • 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    • PAC (premature atrial contraction)    • PVC (premature ventricular contraction)    • Thyroid nodule        Past Social History  Past Surgical History:   Procedure Laterality Date   • HAND SURGERY     • WI COLONOSCOPY FLX DX W/COLLJ SPEC WHEN PFRMD N/A 2/9/2018    Procedure: COLONOSCOPY;  Surgeon: Rossana Bai MD;  Location: [facility];   Service: Gastroenterology   • PROSTATE BIOPSY     • SHOULDER SURGERY         Past Family History  Family History   Problem Relation Age of Onset   • Diabetes Sister    • Other Family         Stroke syndrome       Past Social history  Social History     Socioeconomic History   • Marital status: /Civil Union     Spouse name: Not on file   • Number of children: Not on file   • Years of education: Not on file   • Highest education level: Not on file   Occupational History   • Not on file   Tobacco Use   • Smoking status: Never   • Smokeless tobacco: Never   Vaping Use   • Vaping Use: Never used Substance and Sexual Activity   • Alcohol use: Yes     Alcohol/week: 3 0 standard drinks     Types: 3 Glasses of wine per week     Comment: 4 oz wine with dinner a few days a week   • Drug use: No   • Sexual activity: Yes     Partners: Female     Birth control/protection: None   Other Topics Concern   • Not on file   Social History Narrative   • Not on file     Social Determinants of Health     Financial Resource Strain: Low Risk    • Difficulty of Paying Living Expenses: Not very hard   Food Insecurity: Not on file   Transportation Needs: No Transportation Needs   • Lack of Transportation (Medical): No   • Lack of Transportation (Non-Medical):  No   Physical Activity: Not on file   Stress: Not on file   Social Connections: Not on file   Intimate Partner Violence: Not on file   Housing Stability: Not on file     Social History     Tobacco Use   Smoking Status Never   Smokeless Tobacco Never       Current Medications  Current Outpatient Medications   Medication Sig Dispense Refill   • acetaminophen (TYLENOL) 325 mg tablet Take 2 tablets (650 mg total) by mouth every 6 (six) hours as needed for mild pain  0   • amLODIPine (NORVASC) 5 mg tablet Take 1 tablet (5 mg total) by mouth daily Prn BP> 150/90 (Patient taking differently: Take 5 mg by mouth as needed Prn BP> 150/90) 30 tablet 1   • aspirin 81 mg chewable tablet Chew 1 tablet every other day     • bisacodyl (DULCOLAX) 5 mg EC tablet Take 1 tablet (5 mg total) by mouth daily as needed for constipation 30 tablet 0   • busPIRone (BUSPAR) 7 5 mg tablet Take 1 tablet (7 5 mg total) by mouth 2 (two) times a day as needed (Anxiety) 60 tablet 1   • Coenzyme Q10-Red Yeast Rice  MG CAPS Take 1 capsule by mouth daily     • Docosahexaenoic Acid (DHA OMEGA 3) 100 MG CAPS Take 6 capsules by mouth daily     • famotidine (PEPCID) 20 mg tablet Take 1 tablet (20 mg total) by mouth 2 (two) times a day before meals 60 tablet 0   • fexofenadine (ALLEGRA) 180 MG tablet Take 1 tablet by mouth daily as needed     • gabapentin (Neurontin) 300 mg capsule Take 1 capsule (300 mg total) by mouth daily at bedtime 30 capsule 1   • lidocaine (XYLOCAINE) 5 % ointment Apply topically as needed for mild pain 35 44 g 3   • lisinopril (ZESTRIL) 20 mg tablet TAKE 1 TABLET TWICE A  tablet 3   • metoprolol succinate (TOPROL-XL) 25 mg 24 hr tablet Take 1 tablet (25 mg total) by mouth daily with breakfast 90 tablet 3   • Misc Natural Products (PROSTATE HEALTH) CAPS Take 1 capsule by mouth daily     • Multiple Vitamin tablet Take 1 tablet by mouth daily     • polyethylene glycol (MIRALAX) 17 g packet Take 17 g by mouth daily Do not start before May 6, 2023   0   • predniSONE 10 mg tablet Take 1 tablet (10 mg total) by mouth daily Take 50 mg every day x4 days then taper by 10 mg every week till seen neurology 180 tablet 0   • senna-docusate sodium (SENOKOT S) 8 6-50 mg per tablet Take 1 tablet by mouth 2 (two) times a day  0   • tiZANidine (ZANAFLEX) 2 mg tablet Take 1 tablet (2 mg total) by mouth 3 (three) times a day 90 tablet 1   • meloxicam (Mobic) 15 mg tablet Take 1 tablet (15 mg total) by mouth daily (Patient not taking: Reported on 4/25/2023) 30 tablet 0   • pantoprazole (PROTONIX) 40 mg tablet Take 1 tablet (40 mg total) by mouth daily in the early morning Do not start before April 17, 2023  (Patient not taking: Reported on 5/16/2023) 90 tablet 0     No current facility-administered medications for this visit  Allergies  No Known Allergies    Past Medical History, Social History, Family History, medications and allergies were reviewed      Vitals  Vitals:    05/19/23 1123   BP: 148/78   BP Location: Left arm   Patient Position: Sitting   Cuff Size: Adult   Pulse: 70   SpO2: 98%   Weight: 64 4 kg (142 lb)       Physical Exam  Physical Exam      Results  Lab Results   Component Value Date    PSA 0 41 03/25/2023    PSA 0 35 10/23/2021    PSA 0 4 02/14/2020     Lab Results   Component Value Date    CALCIUM 8 9 05/04/2023     07/29/2017    K 3 9 05/04/2023    CO2 24 05/04/2023     05/04/2023    BUN 22 05/04/2023    CREATININE 0 62 05/04/2023     Lab Results   Component Value Date    WBC 5 64 05/04/2023    HGB 13 5 05/04/2023    HCT 39 9 05/04/2023    MCV 89 05/04/2023     05/04/2023

## 2023-05-19 NOTE — LETTER
For clarification make sure he know I want him to see dr Brook Snell regarding the aaa seen on US. 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    Plan  We had extensive discussion in the office today  We reviewed his CT images at length  We also reviewed his neurologic symptoms, evaluations, and diagnosis  He is quite depressed by these findings  We did discuss evaluation management of renal masses  We discussed that approximately 90% of these are malignant  His does have this appearance, especially with necrotic center and amorphous nonspherical appearance  Additionally it invades to the renal sinus  We discussed potential observation, percutaneous biopsy and/or management, and surgery either radical or partial nephrectomy  We discussed technique, risk, benefits of each of these in detail  He is not comfortable with observation and understands the implications of this  He would like to have his kidney removed  We discussed partial nephrectomy is potentially an option although be quite technically difficult and would risk margins  He is not interested in considering this  We discussed that percutaneous biopsy may be valuable however he would not be comfortable with this mass in place and understands that there is a potential for false negative finding    I do not think he is a candidate for cryotherapy unfortunately due to the size, shape, and location of the mass  In light of all of this discussion, we have decided that radical nephrectomy is appropriate for him  He would be a good candidate for robotic radical nephrectomy  However he does not feel he is up to it in the near future due to his neurologic condition  He is told that the neurologic condition will hopefully improve over the next 6 weeks  And he is due for a repeat EMG at that in that timeframe  We discussed follow-up CT or MRI to evaluate for any growth of the mass in the meantime and then tentative scheduling for nephrectomy possibly in August 2023  They are most comfortable with this approach  We discussed technique, risk, benefits of this procedure once again including potential complications  We discussed likely hospitalization which may be somewhat extended for him depending on his neurologic status  All questions answered to their satisfaction and they agree with the plan  History of Present Illness  Lorrain Saint is a 77 y o  male who developed significant weakness of the right upper extremity to the point of near paralysis  He had extensive hospital work-up on 2 occasions with MRIs of the entire neurologic system, as well as EMG  He was not found to have motor neuron disease and 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  Treatment is conservative with steroids and physical therapy  He was incidentally found to have a renal mass  Lumbar MRI followed by dedicated renal CT revealed a greater than 4 cm amorphous right upper pole renal mass with necrotic center consistent with renal cell carcinoma  There is no evidence of any metastatic disease  Patient denies urinary symptoms  He is however overwhelmed by his neurologic condition as he and his wife do not have any local family and have a lot of land and home to care for          AUA SYMPTOM SCORE    Flowsheet Row Most Recent Value   AUA SYMPTOM SCORE    How often have you had a sensation of not emptying your bladder completely after you finished urinating? 0 (P)     How often have you had to urinate again less than two hours after you finished urinating? 2 (P)     How often have you found you stopped and started again several times when you urinate? 0 (P)     How often have you found it difficult to postpone urination? 1 (P)     How often have you had a weak urinary stream? 1 (P)     How often have you had to push or strain to begin urination? 0 (P)     How many times did you most typically get up to urinate from the time you went to bed at night until the time you got up in the morning? 2 (P)     Quality of Life: If you were to spend the rest of your life with your urinary condition just the way it is now, how would you feel about that? 2 (P)     AUA SYMPTOM SCORE 6 (P)           Review of Systems  Review of Systems   Constitutional:        Feeling generally unwell  HENT: Negative  Respiratory: Negative  Cardiovascular: Negative  Gastrointestinal: Negative  Genitourinary:        As per HPI   Musculoskeletal:        Debilitating right upper extremity pain  Right upper extremity paralysis, and partial left upper extremity symptoms as well  Skin: Negative  Neurological: Negative  Hematological: Negative  Psychiatric/Behavioral: Negative  Past Medical History  Past Medical History:   Diagnosis Date   • Allergic    • Arthritis    • Asthma    • Hypertension    • Impaired fasting glucose    • Mitral valve disorder    • Mitral valve prolapse    • Murmur, cardiac    • 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    • PAC (premature atrial contraction)    • PVC (premature ventricular contraction)    • Thyroid nodule        Past Social History  Past Surgical History:   Procedure Laterality Date   • HAND SURGERY     • RI COLONOSCOPY FLX DX W/COLLJ SPEC WHEN PFRMD N/A 2/9/2018    Procedure: COLONOSCOPY;  Surgeon: Nataile Camacho MD;  Location: [facility];   Service: Gastroenterology   • PROSTATE BIOPSY     • SHOULDER SURGERY         Past Family History  Family History   Problem Relation Age of Onset   • Diabetes Sister    • Other Family         Stroke syndrome       Past Social history  Social History     Socioeconomic History   • Marital status: /Civil Union     Spouse name: Not on file   • Number of children: Not on file   • Years of education: Not on file   • Highest education level: Not on file   Occupational History   • Not on file   Tobacco Use   • Smoking status: Never   • Smokeless tobacco: Never   Vaping Use   • Vaping Use: Never used   Substance and Sexual Activity   • Alcohol use: Yes     Alcohol/week: 3 0 standard drinks     Types: 3 Glasses of wine per week     Comment: 4 oz wine with dinner a few days a week   • Drug use: No   • Sexual activity: Yes     Partners: Female     Birth control/protection: None   Other Topics Concern   • Not on file   Social History Narrative   • Not on file     Social Determinants of Health     Financial Resource Strain: Low Risk    • Difficulty of Paying Living Expenses: Not very hard   Food Insecurity: Not on file   Transportation Needs: No Transportation Needs   • Lack of Transportation (Medical): No   • Lack of Transportation (Non-Medical):  No   Physical Activity: Not on file   Stress: Not on file   Social Connections: Not on file   Intimate Partner Violence: Not on file   Housing Stability: Not on file     Social History     Tobacco Use   Smoking Status Never   Smokeless Tobacco Never       Current Medications  Current Outpatient Medications   Medication Sig Dispense Refill   • acetaminophen (TYLENOL) 325 mg tablet Take 2 tablets (650 mg total) by mouth every 6 (six) hours as needed for mild pain  0   • amLODIPine (NORVASC) 5 mg tablet Take 1 tablet (5 mg total) by mouth daily Prn BP> 150/90 (Patient taking differently: Take 5 mg by mouth as needed Prn BP> 150/90) 30 tablet 1   • aspirin 81 mg chewable tablet Chew 1 tablet every other day     • bisacodyl (DULCOLAX) 5 mg EC tablet Take 1 tablet (5 mg total) by mouth daily as needed for constipation 30 tablet 0   • busPIRone (BUSPAR) 7 5 mg tablet Take 1 tablet (7 5 mg total) by mouth 2 (two) times a day as needed (Anxiety) 60 tablet 1   • Coenzyme Q10-Red Yeast Rice  MG CAPS Take 1 capsule by mouth daily     • Docosahexaenoic Acid (DHA OMEGA 3) 100 MG CAPS Take 6 capsules by mouth daily     • famotidine (PEPCID) 20 mg tablet Take 1 tablet (20 mg total) by mouth 2 (two) times a day before meals 60 tablet 0   • fexofenadine (ALLEGRA) 180 MG tablet Take 1 tablet by mouth daily as needed     • gabapentin (Neurontin) 300 mg capsule Take 1 capsule (300 mg total) by mouth daily at bedtime 30 capsule 1   • lidocaine (XYLOCAINE) 5 % ointment Apply topically as needed for mild pain 35 44 g 3   • lisinopril (ZESTRIL) 20 mg tablet TAKE 1 TABLET TWICE A  tablet 3   • metoprolol succinate (TOPROL-XL) 25 mg 24 hr tablet Take 1 tablet (25 mg total) by mouth daily with breakfast 90 tablet 3   • Misc Natural Products (PROSTATE HEALTH) CAPS Take 1 capsule by mouth daily     • Multiple Vitamin tablet Take 1 tablet by mouth daily     • polyethylene glycol (MIRALAX) 17 g packet Take 17 g by mouth daily Do not start before May 6, 2023   0   • predniSONE 10 mg tablet Take 1 tablet (10 mg total) by mouth daily Take 50 mg every day x4 days then taper by 10 mg every week till seen neurology 180 tablet 0   • senna-docusate sodium (SENOKOT S) 8 6-50 mg per tablet Take 1 tablet by mouth 2 (two) times a day  0   • tiZANidine (ZANAFLEX) 2 mg tablet Take 1 tablet (2 mg total) by mouth 3 (three) times a day 90 tablet 1   • meloxicam (Mobic) 15 mg tablet Take 1 tablet (15 mg total) by mouth daily (Patient not taking: Reported on 4/25/2023) 30 tablet 0   • pantoprazole (PROTONIX) 40 mg tablet Take 1 tablet (40 mg total) by mouth daily in the early morning Do not start before April 17, 2023   (Patient not taking: Reported on 5/16/2023) 90 tablet 0     No current facility-administered medications for this visit  Allergies  No Known Allergies    Past Medical History, Social History, Family History, medications and allergies were reviewed      Vitals  Vitals:    05/19/23 1123   BP: 148/78   BP Location: Left arm   Patient Position: Sitting   Cuff Size: Adult   Pulse: 70   SpO2: 98%   Weight: 64 4 kg (142 lb)       Physical Exam  Physical Exam      Results  Lab Results   Component Value Date    PSA 0 41 03/25/2023    PSA 0 35 10/23/2021    PSA 0 4 02/14/2020     Lab Results   Component Value Date    CALCIUM 8 9 05/04/2023     07/29/2017    K 3 9 05/04/2023    CO2 24 05/04/2023     05/04/2023    BUN 22 05/04/2023    CREATININE 0 62 05/04/2023     Lab Results   Component Value Date    WBC 5 64 05/04/2023    HGB 13 5 05/04/2023    HCT 39 9 05/04/2023    MCV 89 05/04/2023     05/04/2023

## 2023-05-22 ENCOUNTER — OFFICE VISIT (OUTPATIENT)
Dept: PHYSICAL THERAPY | Facility: MEDICAL CENTER | Age: 68
End: 2023-05-22

## 2023-05-22 ENCOUNTER — OFFICE VISIT (OUTPATIENT)
Dept: OCCUPATIONAL THERAPY | Facility: MEDICAL CENTER | Age: 68
End: 2023-05-22

## 2023-05-22 DIAGNOSIS — R53.1 RIGHT SIDED WEAKNESS: ICD-10-CM

## 2023-05-22 DIAGNOSIS — R29.898 WEAKNESS OF RIGHT UPPER EXTREMITY: Primary | ICD-10-CM

## 2023-05-22 DIAGNOSIS — M12.811 ROTATOR CUFF ARTHROPATHY OF RIGHT SHOULDER: Primary | ICD-10-CM

## 2023-05-22 DIAGNOSIS — I10 ESSENTIAL HYPERTENSION: ICD-10-CM

## 2023-05-22 RX ORDER — AMLODIPINE BESYLATE 5 MG/1
5 TABLET ORAL DAILY
Qty: 30 TABLET | Refills: 5 | Status: SHIPPED | OUTPATIENT
Start: 2023-05-22

## 2023-05-22 NOTE — PROGRESS NOTES
"Occupational Therapy Daily Note:    Today's date: 2023  Patient name: Anish Brasher  : 1955  MRN: [id number]  Referring provider: Ashley Parson  Dx:   Encounter Diagnoses   Name Primary? • Rotator cuff arthropathy of right shoulder Yes   • Right sided weakness        POC START DATE: 2023  POC END DATE: 2023  NEXT PN DUE: 2023  FREQUENCY: 1-2x/wk for 12 weeks    Subjective: \"I have renal cell carcinoma so I need to have my kidney removed but I am am no shape to do that right now until my condition gets better  \"    Objective:     Neuromuscular re-education:    Towel Slides on Table   Shoulder Flexion  Completed x 10    Wrist extensions  R, Completed x 10    UE [place]   Mod A for facilitation of shoulder flexion  Time: 10 minutes    Sandoval Tape for Shoulder Subluxation  Time: 10 minutes    ADL Education on how to appropriately don and doff pants and shirt    Assessment: Tolerated treatment well  Pt and wife state that they ordered giv nathan sling  Able to facilitate scapular retraction independently with towel slides, unable to facilitate shoulder protraction and elbow extension independently with towel slides  Sandoval tape for shoulder subluxation used  Education on how to appropriately don and don shirt and pants  Contacted physicians to discuss use of electrical stimulation  Pt would benefit greatly from an electrical stimulation unit at home due to shoulder subluxation  Pt has a 0/4 MMT score in RUE, and disuse atrophy  Patient would benefit from continued skilled OT  Plan: Continued skilled OT per POC    "

## 2023-05-22 NOTE — PROGRESS NOTES
Daily Note     Today's date: 2023  Patient name: Rajendra Cabrales  : 1955  MRN: [id number]  Referring provider: Tina Zhang DO  Dx:   Encounter Diagnosis     ICD-10-CM    1  Weakness of right upper extremity  R29 898                      Subjective: Patient reports to treatment from OT with shoulder soreness      Objective: See treatment diary below    TA:  - See objective measures below  - HEP: STS, step taps, step ups    NMR:   - STS: 15x 2 sets  - FWD step taps: 20x  - LAT step taps: 20x  - Step ups: 20x          Assessment: Patient tolerated treatment well  Continued with testing, with patient scoring  on FGA suggesting that he is at HIGH risk of falls  Began patient with standing balance exercises, emphasizing reduced UE support  Provided patient with HEP handout, reviewed all exercises, and discussed safety of exercising at home, patient verbalized agreement  Patient will benefit from skilled PT services to improve his RUE strength and mobility, improve his balance, reduce his risk of falls, and maximize his level of function and independence  Plan: Continue per plan of care        Outcome Measures Initial Eval  2023        5xSTS 13 34 sec        TUG  - Regular   13 2 sec        10 meter  ft/sec   m/s 1 15 m/s       MAYORGA /56        FGA /       DGI /24        mCTSIB  - FTEO (firm)  - FTEC (firm)  - FTEO (foam)  - FTEC (foam)   30 sec  30 sec  30 sec  5 sec        6MWT  ft 1380 ft

## 2023-05-25 ENCOUNTER — OFFICE VISIT (OUTPATIENT)
Dept: INTERNAL MEDICINE CLINIC | Facility: CLINIC | Age: 68
End: 2023-05-25

## 2023-05-25 VITALS
WEIGHT: 138.6 LBS | HEIGHT: 67 IN | OXYGEN SATURATION: 97 % | SYSTOLIC BLOOD PRESSURE: 138 MMHG | DIASTOLIC BLOOD PRESSURE: 88 MMHG | HEART RATE: 71 BPM | BODY MASS INDEX: 21.75 KG/M2

## 2023-05-25 DIAGNOSIS — G54.5 PARSONAGE-TURNER SYNDROME: ICD-10-CM

## 2023-05-25 DIAGNOSIS — N28.89 RIGHT RENAL MASS: Primary | ICD-10-CM

## 2023-05-25 NOTE — PROGRESS NOTES
Name: Dmitri Watt      : 1955      MRN: [id number]  Encounter Provider: Brittanie Eng MD  Encounter Date: 2023   [facility]    Assessment & Plan     1  Right renal mass  Assessment & Plan:  -Appreciate urology eval  -Patient tentatively scheduled for radical nephrectomy in August  -Continue follow-up as scheduled      2  Parsonage-Cordero syndrome  Assessment & Plan:  - Patient reports no improvement in his right upper extremity weakness   -For pain increase gabapentin Mazin to 300 mg twice daily  Recommended scheduled Tylenol 1000 mg 3 times daily  If no improvement in pain consider tramadol as needed  -Prednisone taper per neurology  -Patient requesting a referral for second opinion  A referral has been made to [facility] neurology  Orders:  -     Ambulatory Referral to Neurology; Future           Subjective      HPI  Patient presents today as an acute visit  To [facility] since his last visit he was seen by urology and nephrology  Urology had an extensive discussion with the patient regarding the renal mass that was seen on his CT imaging  He was told based on the characteristics of his renal mass that approximately 90% of these are malignant  Given his current neuromuscular symptoms which are felt to possibly be secondary to Parsonage-Cordero syndrome he has opted to undergo a radical nephrectomy at a later date  His surgical date has been tentatively set for August     Regarding his right upper extremity weakness and paresthesias he reports there is been no improvement with PT/OT or his steroids  He reports he is currently on 20 mg of prednisone daily with plans to taper to 10 mg next week  He was seen by neurology on   Given the acuity of his right upper extremity weakness preceded by pain they feel the most likely diagnosis is Parsonage-Cordero syndrome  He reports that recommended repeat EMG in roughly 6 to 8 weeks        ROS as per HPI    Current Outpatient Medications on File Prior to Visit   Medication Sig   • acetaminophen (TYLENOL) 325 mg tablet Take 2 tablets (650 mg total) by mouth every 6 (six) hours as needed for mild pain   • amLODIPine (NORVASC) 5 mg tablet Take 1 tablet (5 mg total) by mouth daily Prn BP> 150/90   • aspirin 81 mg chewable tablet Chew 1 tablet every other day   • bisacodyl (DULCOLAX) 5 mg EC tablet Take 1 tablet (5 mg total) by mouth daily as needed for constipation   • busPIRone (BUSPAR) 7 5 mg tablet Take 1 tablet (7 5 mg total) by mouth 2 (two) times a day as needed (Anxiety)   • Coenzyme Q10-Red Yeast Rice  MG CAPS Take 1 capsule by mouth daily   • Docosahexaenoic Acid (DHA OMEGA 3) 100 MG CAPS Take 6 capsules by mouth daily   • famotidine (PEPCID) 20 mg tablet Take 1 tablet (20 mg total) by mouth 2 (two) times a day before meals   • fexofenadine (ALLEGRA) 180 MG tablet Take 1 tablet by mouth daily as needed   • gabapentin (Neurontin) 300 mg capsule Take 1 capsule (300 mg total) by mouth daily at bedtime   • lidocaine (XYLOCAINE) 5 % ointment Apply topically as needed for mild pain   • lisinopril (ZESTRIL) 20 mg tablet TAKE 1 TABLET TWICE A DAY   • metoprolol succinate (TOPROL-XL) 25 mg 24 hr tablet Take 1 tablet (25 mg total) by mouth daily with breakfast   • Misc Natural Products (PROSTATE HEALTH) CAPS Take 1 capsule by mouth daily   • Multiple Vitamin tablet Take 1 tablet by mouth daily   • polyethylene glycol (MIRALAX) 17 g packet Take 17 g by mouth daily Do not start before May 6, 2023     • predniSONE 10 mg tablet Take 1 tablet (10 mg total) by mouth daily Take 50 mg every day x4 days then taper by 10 mg every week till seen neurology   • senna-docusate sodium (SENOKOT S) 8 6-50 mg per tablet Take 1 tablet by mouth 2 (two) times a day   • tiZANidine (ZANAFLEX) 2 mg tablet Take 1 tablet (2 mg total) by mouth 3 (three) times a day   • meloxicam (Mobic) 15 mg tablet Take 1 tablet (15 mg total) by "mouth daily (Patient not taking: Reported on 4/25/2023)   • pantoprazole (PROTONIX) 40 mg tablet Take 1 tablet (40 mg total) by mouth daily in the early morning Do not start before April 17, 2023   (Patient not taking: Reported on 5/16/2023)       Objective     /88   Pulse 71   Ht 5' 7\" (1 702 m)   Wt 62 9 kg (138 lb 9 6 oz)   SpO2 97%   BMI 21 71 kg/m²     BP Readings from Last 3 Encounters:   05/25/23 138/88   05/19/23 148/78   05/17/23 136/76        Wt Readings from Last 3 Encounters:   05/25/23 62 9 kg (138 lb 9 6 oz)   05/19/23 64 4 kg (142 lb)   05/16/23 62 1 kg (137 lb)       Physical Exam    General: NAD, Alert and oriented x3   HEENT: NCAT, EOMI, normal conjunctiva  Cardiovascular: RRR, normal S1 and S2, no m/r/g  Pulmonary: Normal respiratory effort, no wheezes, rales or rhonchi  GI: Soft, nontender, nondistended, normoactive bowel sounds  MSK: Atrophy right upper extremity, right biceps fasciculations  Neuro: Diffuse right upper extremity weakness with decreased sensation along touch as compared to the left  Extremities: No lower extremity edema  Skin: Normal skin color, no rashes     Benjamin Santos MD  "

## 2023-05-25 NOTE — PATIENT INSTRUCTIONS
- This for you increase her gabapentin to 300 mg twice a day (1 dose in the morning and 1 in the evening  - Take Tylenol 1000 mg every 8 hours scheduled for pain  - If no improvement in pain can consider a trial of tramadol

## 2023-05-30 DIAGNOSIS — G54.5 PARSONAGE-TURNER SYNDROME: Primary | ICD-10-CM

## 2023-05-30 RX ORDER — TRAMADOL HYDROCHLORIDE 50 MG/1
50 TABLET ORAL EVERY 8 HOURS PRN
Qty: 90 TABLET | Refills: 1 | Status: SHIPPED | OUTPATIENT
Start: 2023-05-30 | End: 2023-06-29

## 2023-05-30 NOTE — ASSESSMENT & PLAN NOTE
-Appreciate urology eval  -Patient tentatively scheduled for radical nephrectomy in August  -Continue follow-up as scheduled

## 2023-05-30 NOTE — ASSESSMENT & PLAN NOTE
- Patient reports no improvement in his right upper extremity weakness   -For pain increase gabapentin Mazin to 300 mg twice daily  Recommended scheduled Tylenol 1000 mg 3 times daily  If no improvement in pain consider tramadol as needed  -Prednisone taper per neurology  -Patient requesting a referral for second opinion  A referral has been made to [facility] neurology

## 2023-05-30 NOTE — PROGRESS NOTES
Received a call from the patient, reports he has not noted any improvement over the last 1 month, prednisone is down to 10 mg a day as was recommended. Continues to have pain even with the sling. Continues to pursue physical therapy. Explained to him unfortunately the recovery of the nerve is a long process, and can take up to a year or longer, he will continue to taper of prednisone, 10 mg daily for the next 1 week and then discontinue. We will send him a prescription for tramadol to be used as needed for pain, has not been able to tolerate higher doses of gabapentin. We will plan for EMG by the end of next month.

## 2023-05-31 ENCOUNTER — OFFICE VISIT (OUTPATIENT)
Dept: OCCUPATIONAL THERAPY | Facility: MEDICAL CENTER | Age: 68
End: 2023-05-31

## 2023-05-31 ENCOUNTER — OFFICE VISIT (OUTPATIENT)
Dept: PHYSICAL THERAPY | Facility: MEDICAL CENTER | Age: 68
End: 2023-05-31

## 2023-05-31 DIAGNOSIS — M12.811 ROTATOR CUFF ARTHROPATHY OF RIGHT SHOULDER: ICD-10-CM

## 2023-05-31 DIAGNOSIS — G54.5 PARSONAGE-TURNER SYNDROME: Primary | ICD-10-CM

## 2023-05-31 DIAGNOSIS — R29.898 WEAKNESS OF RIGHT UPPER EXTREMITY: Primary | ICD-10-CM

## 2023-05-31 DIAGNOSIS — R53.1 RIGHT SIDED WEAKNESS: ICD-10-CM

## 2023-05-31 NOTE — PROGRESS NOTES
"  Daily Note     Today's date: 2023  Patient name: Archana Finn  : 1955  MRN: [id number]  Referring provider: Warden Trell DO  Dx:   Encounter Diagnosis     ICD-10-CM    1  Weakness of right upper extremity  R29 [id number]                      Subjective: Patient reports to treatment without sling donned, reports increased shoulder pain today      Objective: See treatment diary below    TA:  - Sling set up    NMR:   - STS: 15x 2 sets  - FWD step taps: 20x 4\" step  - LAT step taps: 20x 4\" step  - Step ups: 20x 4\" step    TE:  - Recumbent bike: 5:30 @ lvl 3 resistance, HR  bpm        Assessment: Patient tolerated treatment fairly  Due to arrival without sling, assisted patient with adjustments and proper fitting of sling for RUE, providing patient and wife with education regarding sling wear and benefits from additional support  Due to patient increased shoulder pain, maintained focus on standing balance providing occasional CGA with increased sway  Attempted recumbent bike however patient was only able to tolerate 5:30 before needing break due to increased pain  Patient will benefit from skilled PT services to improve his RUE strength and mobility, improve his balance, reduce his risk of falls, and maximize his level of function and independence  Plan: Continue per plan of care        Outcome Measures Initial Eval  2023        5xSTS 13 34 sec        TUG  - Regular   13 2 sec        10 meter  ft/sec   m/s 1 15 m/s       MAYORGA /56        FGA /30        DGI /24        mCTSIB  - FTEO (firm)  - FTEC (firm)  - FTEO (foam)  - FTEC (foam)   30 sec  30 sec  30 sec  5 sec        6MWT  ft 1380 ft                                 "

## 2023-05-31 NOTE — PROGRESS NOTES
"Occupational Therapy Daily Note:    Today's date: 2023  Patient name: Anil Adrian  : 1955  MRN: [id number]  Referring provider: Danilo Shanks  Dx:   Encounter Diagnoses   Name Primary? • Rotator cuff arthropathy of right shoulder    • Right sided weakness    • Parsonage-Cordero syndrome Yes       POC START DATE: 2023  POC END DATE: 2023  NEXT PN DUE: 2023  FREQUENCY: 1-2x/wk for 12 weeks    Subjective: \"How am I supposed to live with my arm like this? \"    Objective:     Neuromuscular re-education:    Provided pt with EMSI NMES E-stim unit  Education to wife and patient on E-stim unit for shoulder subluxation  Ensured proper Giv Erna Sling Fit  Time: 45 minutes    Therapist supervised patient with use of e-stim during today's treatment session  Skin integrity was assessed and appeared normal following use of modalities  Assessment: Tolerated treatment well  Provided pt with EMSI NMES e-stim unit for shoulder subluxation of R arm  Spent entirety of session educating patient on use of E-stim, electrode placement, and duration/frequency of e-stim  Pt has a diagnosis of renal cell carcinoma, however, e-stim was cleared by two different physicians  Patient would benefit from continued skilled OT  Plan: Continued skilled OT per POC    "

## 2023-06-02 ENCOUNTER — OFFICE VISIT (OUTPATIENT)
Dept: INTERNAL MEDICINE CLINIC | Facility: CLINIC | Age: 68
End: 2023-06-02
Payer: COMMERCIAL

## 2023-06-02 ENCOUNTER — PREP FOR PROCEDURE (OUTPATIENT)
Dept: UROLOGY | Facility: AMBULATORY SURGERY CENTER | Age: 68
End: 2023-06-02

## 2023-06-02 VITALS
DIASTOLIC BLOOD PRESSURE: 98 MMHG | SYSTOLIC BLOOD PRESSURE: 184 MMHG | BODY MASS INDEX: 21.55 KG/M2 | HEART RATE: 72 BPM | WEIGHT: 137.6 LBS | OXYGEN SATURATION: 99 %

## 2023-06-02 DIAGNOSIS — I10 PRIMARY HYPERTENSION: ICD-10-CM

## 2023-06-02 DIAGNOSIS — Z79.01 LONG TERM (CURRENT) USE OF ANTICOAGULANTS: ICD-10-CM

## 2023-06-02 DIAGNOSIS — K59.03 DRUG-INDUCED CONSTIPATION: ICD-10-CM

## 2023-06-02 DIAGNOSIS — R39.89 SUSPECTED UTI: ICD-10-CM

## 2023-06-02 DIAGNOSIS — N28.89 RIGHT RENAL MASS: ICD-10-CM

## 2023-06-02 DIAGNOSIS — N28.89 RENAL MASS, RIGHT: Primary | ICD-10-CM

## 2023-06-02 DIAGNOSIS — Z01.818 PREOP EXAMINATION: ICD-10-CM

## 2023-06-02 DIAGNOSIS — G54.5 PARSONAGE-TURNER SYNDROME: Primary | ICD-10-CM

## 2023-06-02 DIAGNOSIS — Z01.812 PRE-PROCEDURE LAB EXAM: ICD-10-CM

## 2023-06-02 PROCEDURE — 99214 OFFICE O/P EST MOD 30 MIN: CPT | Performed by: INTERNAL MEDICINE

## 2023-06-02 RX ORDER — DOCUSATE SODIUM 100 MG/1
100 CAPSULE, LIQUID FILLED ORAL 2 TIMES DAILY
Qty: 60 CAPSULE | Refills: 2 | Status: SHIPPED | OUTPATIENT
Start: 2023-06-02

## 2023-06-02 NOTE — PROGRESS NOTES
Assessment/Plan:    HTN (hypertension)  [place] hypertension superimposed hypertension continue Zestril 20 mg once daily the patient's home readings are stable and doing very well in the 100-20s over 60s to 70s continue to monitor home readings daily patient does have pain in the shoulder and is anxious coming in today    Parsonage-Cordero syndrome  Today we did review the neurology recommendations/MRIs reviewed his symptoms in detail he will be going for a second opinion at [facility] [place] with neurology which I think is very appropriate, I would like him to meet with physiatrist Dr Adri Ku also to evaluate I suspect he will need a new brace and tailored physical therapy  I suspect that the instability of the right shoulder is likely contributing to this condition    Patient will have further discussion with Dr Adri Ku    Constipation  Recommend Metamucil 1 teaspoon a ask water once daily, Colace 100 mg twice daily and if no bowel movement in 3 days use MiraLAX as directed    Right renal mass  Suspected renal cell carcinoma a long conversation about the CAT scan findings we also discussed treatment options at this point time patient would like to proceed with surgical intervention I have notified the urologist Dr Tiffanie Tyler         Problem List Items Addressed This Visit        Cardiovascular and Mediastinum    HTN (hypertension)     [place] hypertension superimposed hypertension continue Zestril 20 mg once daily the patient's home readings are stable and doing very well in the 100-20s over 60s to 70s continue to monitor home readings daily patient does have pain in the shoulder and is anxious coming in today            Nervous and Auditory    Parsonage-Cordero syndrome - Primary     Today we did review the neurology recommendations/MRIs reviewed his symptoms in detail he will be going for a second opinion at [facility] [place] with neurology which I think is very appropriate, I would like him to meet with physiatrist Dr Wiley Marte also to evaluate I suspect he will need a new brace and tailored physical therapy  I suspect that the instability of the right shoulder is likely contributing to this condition  Patient will have further discussion with Dr Ted Santos Referral to Orthopedic Surgery       Other    Right renal mass     Suspected renal cell carcinoma a long conversation about the CAT scan findings we also discussed treatment options at this point time patient would like to proceed with surgical intervention I have notified the urologist Dr Gomez Campbell         Constipation     Recommend Metamucil 1 teaspoon a ask water once daily, Colace 100 mg twice daily and if no bowel movement in 3 days use MiraLAX as directed         Relevant Medications    docusate sodium (COLACE) 100 mg capsule       Return to office 6  months  call if any problems  Subjective:      Patient ID: Angelica Stewart is a 77 y o  male  HPI 74-year old male coming in for a follow up visit regarding Parsonage-Cordero syndrome, constipation, primary hypertension/whitecoat hypertension, right renal mass; the patient reports me compliant taking medications without untoward side effects the  The patient is here to review his medical condition, update me on the medical condition and the patient reports me no hospitalizations and no ER visits  Today we did review his last several months he has developed right upper extremity weakness he is unable to move the arm at this point time he was hospitalized he did see neurology underwent MRIs  A right renal mass was elucidated  Neurologist do feel his right upper extremity weakness is the P[place]age Cordero syndrome  MRI of the brain is negative  Patient does report to me pain right upper extremity shoulder with movement  He has not tried the tramadol at this point in time    Patient does need surgery for the right shoulder the patient is reporting joint instability of the right shoulder reports he has tried the brace but not able to keep it on because it makes the pain worse  Patient does monitor the blood pressures at home routinely and the average blood pressures have been in the low 100s to 120s over 60s over 70s  Review of Systems   Constitutional: Negative for activity change, appetite change and unexpected weight change  HENT: Negative for congestion and postnasal drip  Eyes: Negative for visual disturbance  Respiratory: Negative for cough and shortness of breath  Cardiovascular: Negative for chest pain  Gastrointestinal: Negative for abdominal pain, diarrhea, nausea and vomiting  Musculoskeletal:        Right arm weakness   Neurological: Negative for dizziness, light-headedness and headaches  Right shoulder pain    Objective:    Return in about 4 weeks (around 6/30/2023)  No results found  No Known Allergies    Past Medical History:   Diagnosis Date   • Allergic    • Arthritis    • Asthma    • Hypertension    • Impaired fasting glucose    • Mitral valve disorder    • Mitral valve prolapse    • Murmur, cardiac    • 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    • PAC (premature atrial contraction)    • PVC (premature ventricular contraction)    • Thyroid nodule      Past Surgical History:   Procedure Laterality Date   • HAND SURGERY     • VA COLONOSCOPY FLX DX W/COLLJ SPEC WHEN PFRMD N/A 2/9/2018    Procedure: COLONOSCOPY;  Surgeon: Ting Baum MD;  Location: [facility];   Service: Gastroenterology   • PROSTATE BIOPSY     • SHOULDER SURGERY       Current Outpatient Medications on File Prior to Visit   Medication Sig Dispense Refill   • acetaminophen (TYLENOL) 325 mg tablet Take 2 tablets (650 mg total) by mouth every 6 (six) hours as needed for mild pain  0   • amLODIPine (NORVASC) 5 mg tablet Take 1 tablet (5 mg total) by mouth daily Prn BP> 150/90 30 tablet 5   • aspirin 81 mg chewable tablet Chew 1 tablet every other day     • bisacodyl (DULCOLAX) 5 mg EC tablet Take 1 tablet (5 mg total) by mouth daily as needed for constipation 30 tablet 0   • busPIRone (BUSPAR) 7 5 mg tablet Take 1 tablet (7 5 mg total) by mouth 2 (two) times a day as needed (Anxiety) 60 tablet 1   • Coenzyme Q10-Red Yeast Rice  MG CAPS Take 1 capsule by mouth daily     • Docosahexaenoic Acid (DHA OMEGA 3) 100 MG CAPS Take 6 capsules by mouth daily     • famotidine (PEPCID) 20 mg tablet Take 1 tablet (20 mg total) by mouth 2 (two) times a day before meals 60 tablet 0   • gabapentin (Neurontin) 300 mg capsule Take 1 capsule (300 mg total) by mouth daily at bedtime (Patient taking differently: Take 300 mg by mouth 2 (two) times a day) 30 capsule 1   • lisinopril (ZESTRIL) 20 mg tablet TAKE 1 TABLET TWICE A  tablet 3   • metoprolol succinate (TOPROL-XL) 25 mg 24 hr tablet Take 1 tablet (25 mg total) by mouth daily with breakfast 90 tablet 3   • Multiple Vitamin tablet Take 1 tablet by mouth daily     • predniSONE 10 mg tablet Take 1 tablet (10 mg total) by mouth daily Take 50 mg every day x4 days then taper by 10 mg every week till seen neurology 180 tablet 0   • tiZANidine (ZANAFLEX) 2 mg tablet Take 1 tablet (2 mg total) by mouth 3 (three) times a day 90 tablet 1   • fexofenadine (ALLEGRA) 180 MG tablet Take 1 tablet by mouth daily as needed (Patient not taking: Reported on 6/2/2023)     • lidocaine (XYLOCAINE) 5 % ointment Apply topically as needed for mild pain (Patient not taking: Reported on 6/2/2023) 35 44 g 3   • meloxicam (Mobic) 15 mg tablet Take 1 tablet (15 mg total) by mouth daily (Patient not taking: Reported on 4/25/2023) 30 tablet 0   • Misc Natural Products (PROSTATE HEALTH) CAPS Take 1 capsule by mouth daily (Patient not taking: Reported on 6/2/2023)     • pantoprazole (PROTONIX) 40 mg tablet Take 1 tablet (40 mg total) by mouth daily in the early morning Do not start before April 17, 2023   (Patient not taking: Reported on 6/2/2023) 90 tablet 0   • polyethylene glycol (MIRALAX) 17 g packet Take 17 g by mouth daily Do not start before May 6, 2023  (Patient not taking: Reported on 6/2/2023)  0   • senna-docusate sodium (SENOKOT S) 8 6-50 mg per tablet Take 1 tablet by mouth 2 (two) times a day (Patient not taking: Reported on 6/2/2023)  0   • traMADol (Ultram) 50 mg tablet Take 1 tablet (50 mg total) by mouth every 8 (eight) hours as needed for moderate pain (Patient not taking: Reported on 6/2/2023) 90 tablet 1     No current facility-administered medications on file prior to visit  Family History   Problem Relation Age of Onset   • Diabetes Sister    • Other Family         Stroke syndrome     Social History     Socioeconomic History   • Marital status: /Civil Union     Spouse name: Not on file   • Number of children: Not on file   • Years of education: Not on file   • Highest education level: Not on file   Occupational History   • Not on file   Tobacco Use   • Smoking status: Never   • Smokeless tobacco: Never   Vaping Use   • Vaping Use: Never used   Substance and Sexual Activity   • Alcohol use: Yes     Alcohol/week: 3 0 standard drinks of alcohol     Types: 3 Glasses of wine per week     Comment: 4 oz wine with dinner a few days a week   • Drug use: No   • Sexual activity: Yes     Partners: Female     Birth control/protection: None   Other Topics Concern   • Not on file   Social History Narrative   • Not on file     Social Determinants of Health     Financial Resource Strain: Low Risk  (8/15/2022)    Overall Financial Resource Strain (CARDIA)    • Difficulty of Paying Living Expenses: Not very hard   Food Insecurity: Not on file   Transportation Needs: No Transportation Needs (8/15/2022)    PRAPARE - Transportation    • Lack of Transportation (Medical): No    • Lack of Transportation (Non-Medical):  No   Physical Activity: Not on file   Stress: Not on file   Social Connections: Not on file   Intimate Partner Violence: Not on file   Housing Stability: Not on file     Vitals:    06/02/23 1113   BP: (!) 184/98   Pulse: 72   SpO2: 99%   Weight: 62 4 kg (137 lb 9 6 oz)     Results for orders placed or performed during the hospital encounter of 05/02/23   CBC and differential   Result Value Ref Range    WBC 7 53 4 31 - 10 16 Thousand/uL    RBC 4 74 3 88 - 5 62 Million/uL    Hemoglobin 14 3 12 0 - 17 0 g/dL    Hematocrit 40 9 36 5 - 49 3 %    MCV 86 82 - 98 fL    MCH 30 2 26 8 - 34 3 pg    MCHC 35 0 31 4 - 37 4 g/dL    RDW 12 8 11 6 - 15 1 %    MPV 8 7 (L) 8 9 - 12 7 fL    Platelets 605 174 - 052 Thousands/uL    nRBC 0 /100 WBCs    Neutrophils Relative 74 43 - 75 %    Immat GRANS % 0 0 - 2 %    Lymphocytes Relative 17 14 - 44 %    Monocytes Relative 7 4 - 12 %    Eosinophils Relative 1 0 - 6 %    Basophils Relative 1 0 - 1 %    Neutrophils Absolute 5 53 1 85 - 7 62 Thousands/µL    Immature Grans Absolute 0 02 0 00 - 0 20 Thousand/uL    Lymphocytes Absolute 1 31 0 60 - 4 47 Thousands/µL    Monocytes Absolute 0 54 0 17 - 1 22 Thousand/µL    Eosinophils Absolute 0 08 0 00 - 0 61 Thousand/µL    Basophils Absolute 0 05 0 00 - 0 10 Thousands/µL   Comprehensive metabolic panel   Result Value Ref Range    Sodium 133 (L) 135 - 147 mmol/L    Potassium 3 8 3 5 - 5 3 mmol/L    Chloride 106 96 - 108 mmol/L    CO2 24 21 - 32 mmol/L    ANION GAP 3 (L) 4 - 13 mmol/L    BUN 14 5 - 25 mg/dL    Creatinine 0 62 0 60 - 1 30 mg/dL    Glucose 99 65 - 140 mg/dL    Calcium 9 6 8 3 - 10 1 mg/dL    AST 35 5 - 45 U/L    ALT 42 12 - 78 U/L    Alkaline Phosphatase 52 46 - 116 U/L    Total Protein 6 9 6 4 - 8 4 g/dL    Albumin 3 7 3 5 - 5 0 g/dL    Total Bilirubin 0 50 0 20 - 1 00 mg/dL    eGFR 101 ml/min/1 73sq m   Hemoglobin A1C   Result Value Ref Range    Hemoglobin A1C 5 0 Normal 3 8-5 6%; PreDiabetic 5 7-6 4%;  Diabetic >=6 5%; Glycemic control for adults with diabetes <7 0% %    EAG 97 mg/dl   CK   Result Value Ref Range    Total  39 - 308 U/L   Aldolase Result Value Ref Range    Aldolase 7 0 3 3 - 10 3 U/L   Basic metabolic panel   Result Value Ref Range    Sodium 137 135 - 147 mmol/L    Potassium 3 9 3 5 - 5 3 mmol/L    Chloride 108 96 - 108 mmol/L    CO2 24 21 - 32 mmol/L    ANION GAP 5 4 - 13 mmol/L    BUN 22 5 - 25 mg/dL    Creatinine 0 62 0 60 - 1 30 mg/dL    Glucose 89 65 - 140 mg/dL    Calcium 8 9 8 3 - 10 1 mg/dL    eGFR 101 ml/min/1 73sq m   CBC   Result Value Ref Range    WBC 5 64 4 31 - 10 16 Thousand/uL    RBC 4 50 3 88 - 5 62 Million/uL    Hemoglobin 13 5 12 0 - 17 0 g/dL    Hematocrit 39 9 36 5 - 49 3 %    MCV 89 82 - 98 fL    MCH 30 0 26 8 - 34 3 pg    MCHC 33 8 31 4 - 37 4 g/dL    RDW 12 6 11 6 - 15 1 %    Platelets 124 835 - 480 Thousands/uL    MPV 9 6 8 9 - 12 7 fL   Urinalysis with microscopic   Result Value Ref Range    Color, UA Light Yellow     Clarity, UA Clear     Specific [place], UA 1 015 1 003 - 1 030    pH, UA 6 5 4 5, 5 0, 5 5, 6 0, 6 5, 7 0, 7 5, 8 0    Leukocytes, UA Negative Negative    Nitrite, UA Negative Negative    Protein, UA Negative Negative mg/dl    Glucose, UA Negative Negative mg/dl    Ketones, UA Negative Negative mg/dl    Urobilinogen, UA <2 0 <2 0 mg/dl mg/dl    Bilirubin, UA Negative Negative    Occult Blood, UA Negative Negative    RBC, UA None Seen None Seen, 1-2 /hpf    WBC, UA None Seen None Seen, 1-2 /hpf    Epithelial Cells None Seen None Seen, Occasional /hpf    Bacteria, UA None Seen None Seen, Occasional /hpf   ECG 12 lead   Result Value Ref Range    Ventricular Rate 66 BPM    Atrial Rate 66 BPM    MS Interval 180 ms    QRSD Interval 94 ms    QT Interval 392 ms    QTC Interval 410 ms    P Axis 40 degrees    QRS Axis 21 degrees    T Wave Axis 57 degrees   Fingerstick Glucose (POCT)   Result Value Ref Range    POC Glucose 102 65 - 140 mg/dl     Weight (last 2 days)     Date/Time Weight    06/02/23 1113 62 4 (137 6)        Body mass index is 21 55 kg/m²  BP ?     Temp      Pulse     Resp      SpO2        Vitals: 06/02/23 1113   Weight: 62 4 kg (137 lb 9 6 oz)     Vitals:    06/02/23 1113   Weight: 62 4 kg (137 lb 9 6 oz)       BP (!) 184/98   Pulse 72   Wt 62 4 kg (137 lb 9 6 oz)   SpO2 99%   BMI 21 55 kg/m²          Physical Exam  Vitals and nursing note reviewed  Constitutional:       General: He is not in acute distress  Appearance: Normal appearance  He is well-developed and normal weight  He is not ill-appearing, toxic-appearing or diaphoretic  HENT:      Head: Normocephalic and atraumatic  Right Ear: External ear normal       Left Ear: External ear normal    Eyes:      General: No scleral icterus  Right eye: No discharge  Left eye: No discharge  Conjunctiva/sclera: Conjunctivae normal       Pupils: Pupils are equal, round, and reactive to light  Cardiovascular:      Rate and Rhythm: Normal rate and regular rhythm  Heart sounds: Normal heart sounds  No murmur heard  No friction rub  No gallop  Pulmonary:      Effort: No respiratory distress  Breath sounds: No wheezing or rales  Abdominal:      General: Bowel sounds are normal  There is no distension  Palpations: Abdomen is soft  There is no mass  Tenderness: There is no abdominal tenderness  There is no guarding or rebound  Musculoskeletal:         General: No deformity  Cervical back: Neck supple  Lymphadenopathy:      Cervical: No cervical adenopathy  Neurological:      Mental Status: He is alert         Right upper extremity muscle strength 0 out of 5 fasciculations and atrophy noted

## 2023-06-02 NOTE — PATIENT INSTRUCTIONS
Please use Metamucil 1 teaspoon in 8 ounces water once daily along with Colace 100 mg twice daily hold for diarrhea if you do not have a bowel movement within 3 days then use MiraLAX as directed

## 2023-06-04 NOTE — ASSESSMENT & PLAN NOTE
Faviola [place] hypertension superimposed hypertension continue Zestril 20 mg once daily the patient's home readings are stable and doing very well in the 100-20s over 60s to 70s continue to monitor home readings daily patient does have pain in the shoulder and is anxious coming in today

## 2023-06-04 NOTE — ASSESSMENT & PLAN NOTE
Today we did review the neurology recommendations/MRIs reviewed his symptoms in detail he will be going for a second opinion at [address] [place] [address] with neurology which I think is very appropriate, I would like him to meet with physiatrist Dr Sherri Shaw also to evaluate I suspect he will need a new brace and tailored physical therapy  I suspect that the instability of the right shoulder is likely contributing to this condition    Patient will have further discussion with Dr Sherri Shaw

## 2023-06-04 NOTE — ASSESSMENT & PLAN NOTE
Suspected renal cell carcinoma a long conversation about the CAT scan findings we also discussed treatment options at this point time patient would like to proceed with surgical intervention I have notified the urologist Dr Noe Espinal

## 2023-06-04 NOTE — ASSESSMENT & PLAN NOTE
Recommend Metamucil 1 teaspoon a ask water once daily, Colace 100 mg twice daily and if no bowel movement in 3 days use MiraLAX as directed

## 2023-06-05 ENCOUNTER — TELEPHONE (OUTPATIENT)
Dept: UROLOGY | Facility: AMBULATORY SURGERY CENTER | Age: 68
End: 2023-06-05

## 2023-06-05 ENCOUNTER — OFFICE VISIT (OUTPATIENT)
Dept: OCCUPATIONAL THERAPY | Facility: MEDICAL CENTER | Age: 68
End: 2023-06-05
Payer: COMMERCIAL

## 2023-06-05 ENCOUNTER — OFFICE VISIT (OUTPATIENT)
Dept: PHYSICAL THERAPY | Facility: MEDICAL CENTER | Age: 68
End: 2023-06-05
Payer: COMMERCIAL

## 2023-06-05 DIAGNOSIS — G54.5 PARSONAGE-TURNER SYNDROME: Primary | ICD-10-CM

## 2023-06-05 DIAGNOSIS — R29.898 WEAKNESS OF RIGHT UPPER EXTREMITY: Primary | ICD-10-CM

## 2023-06-05 PROCEDURE — 97112 NEUROMUSCULAR REEDUCATION: CPT

## 2023-06-05 PROCEDURE — 97110 THERAPEUTIC EXERCISES: CPT | Performed by: PHYSICAL THERAPIST

## 2023-06-05 NOTE — PROGRESS NOTES
Daily Note     Today's date: 2023  Patient name: Catalina Cheung  : 1955  MRN: [id number]  Referring provider: Tee Nicolas DO  Dx:   Encounter Diagnosis     ICD-10-CM    1  Weakness of right upper extremity  R29 898                      Subjective: Patient reports to treatment with sling donned, reports increased shoulder pain today      Objective: See treatment diary below    TE:  - SLR: 20x 2 sets  - Supine ball squeeze: 10x 3 sets  - Supine clamshells: 10x 3 sets  - Supine resisted DF: 10x 2 sets w/ GTB        Assessment: Patient tolerated treatment fairly  Due to severe increased shoulder pain, avoided balance exercises and positioned patient in gravity eliminated position  Provided patient with LE exercises focusing on increasing strength which will in turn improve his mobility and allow for future balance work  Patient will benefit from skilled PT services to improve his RUE strength and mobility, improve his balance, reduce his risk of falls, and maximize his level of function and independence  Plan: Continue per plan of care        Outcome Measures Initial Eval  2023        5xSTS 13 34 sec        TUG  - Regular   13 2 sec        10 meter  ft/sec   m/s 1 15 m/s       MAYORGA /56        FG /       DGI /24        mCTSIB  - FTEO (firm)  - FTEC (firm)  - FTEO (foam)  - FTEC (foam)   30 sec  30 sec  30 sec  5 sec        6MWT  ft 1380 ft

## 2023-06-05 NOTE — PROGRESS NOTES
"Occupational Therapy Daily Note:    Today's date: 2023  Patient name: Zane William  : 1955  MRN: [id number]  Referring provider: Prem Watts  Dx:   Encounter Diagnosis   Name Primary? • Parsonage-Cordero syndrome Yes       POC START DATE: 2023  POC END DATE: 2023  NEXT PN DUE: 2023  FREQUENCY: 1-2x/wk for 12 weeks    Subjective: \"I am in about 9/10 pain right now  \"    Objective:     Neuromuscular re-education:    Reviewed and fitted proper Giv Erna Sling  Time: 5 minutes    Reviewed and ensured proper use of E-stim  Time: 5 minutes    E-stim for shoulder subluxation  Time: 20 minutes   In conjunction with shoulder shrugs and scapular retractions    Therapist supervised patient with use of e-stim during today's treatment session  Skin integrity was assessed and appeared normal following use of modalities  Assessment: Tolerated treatment well  Pt in significant pain today  Session was ended 15 minutes early due to pain  Reviewed Giv Erna fit and use of E-stim  Pt required prone rest back on table for pain management  Able to complete shoulder shrugs and scapular retractions with mod difficulty  RUE appeared to be more elevated and anterior today  Patient would benefit from continued skilled OT  Plan: Continued skilled OT per POC    "

## 2023-06-05 NOTE — TELEPHONE ENCOUNTER
Pt's wife returning call to LYNDON STEINBERG  [facility]  Communicated with Ss on Teams She was on the line with another patient and indicated that she soul call them back       Pt wife can be reached at [phone]

## 2023-06-05 NOTE — TELEPHONE ENCOUNTER
I called pt to discuss scheduling his Nephrectomy with Dr Delores Mejia  There was o answer so I did leave a voicemail asking pt to call our office back to schedule

## 2023-06-07 ENCOUNTER — TELEPHONE (OUTPATIENT)
Dept: NEUROLOGY | Facility: CLINIC | Age: 68
End: 2023-06-07

## 2023-06-07 NOTE — TELEPHONE ENCOUNTER
Mary Johnson called from Urology to get medical clearance for the patient  He is having surgery on 7/12 for robotic right nephrectomy  Patient was last seen 5/16/23 with Dr Tobais Ware  Can patient use this for the clearance? Please advise   Thank you

## 2023-06-07 NOTE — TELEPHONE ENCOUNTER
I spoke with pt 's wife and confirmed that I was holding 7/12/23 for pt 's surgery with Dr Mattie Mclaughlin  I informed her that this is pending a neurology clearance  Pt and his wife confirmed that 7/12/23 will work for them  I informed them that I will call them back as soon as I speak with his neurology office to make sure his clearance can be done in time  They will wait to hear back from me  I then called [address] [place] [address] Neurology and spoke with Parth Ledesma and she said that she will have a nurse look into pt 's chart and call me back to confirm if a new appt is needed  I then called pt 's wife back to inform her that I spoke with their office but need to wait for them to call me back before we can confirm scheduling surgery  She verbalized understanding and will wait to hear back from me

## 2023-06-09 NOTE — TELEPHONE ENCOUNTER
I called pt 's wife to confirm that per Neurology pt can proceed with surgery and is cleared from their stand point  I confirmed that pt is scheduled for his robotic assisted r  Radical nephrectomy at the [facility] on 7/12/23  I verbally went over all of pt 's pre op instructions and prep information with them  Pt is aware that he needs to be scheduled for a medical clearance appt and did give me permission to schedule the appt for him  Pt is scheduled for his clearance appt on 7/6/23 at 1:00PM  Pt is aware of his bowel prep for this procedure as well  Per request I will be mailing pt a copy of his surgical packet and instructed him to call our office with any questions or concerns regarding this procedure

## 2023-06-09 NOTE — TELEPHONE ENCOUNTER
Pt's wife called back to talk to [facility]  She was transferred to [facility]

## 2023-06-12 ENCOUNTER — OFFICE VISIT (OUTPATIENT)
Dept: PHYSICAL THERAPY | Facility: MEDICAL CENTER | Age: 68
End: 2023-06-12
Payer: COMMERCIAL

## 2023-06-12 ENCOUNTER — EVALUATION (OUTPATIENT)
Dept: OCCUPATIONAL THERAPY | Facility: MEDICAL CENTER | Age: 68
End: 2023-06-12
Payer: COMMERCIAL

## 2023-06-12 DIAGNOSIS — G54.5 PARSONAGE-TURNER SYNDROME: Primary | ICD-10-CM

## 2023-06-12 DIAGNOSIS — R29.898 WEAKNESS OF RIGHT UPPER EXTREMITY: Primary | ICD-10-CM

## 2023-06-12 DIAGNOSIS — M12.811 ROTATOR CUFF ARTHROPATHY OF RIGHT SHOULDER: ICD-10-CM

## 2023-06-12 DIAGNOSIS — R53.1 RIGHT SIDED WEAKNESS: ICD-10-CM

## 2023-06-12 PROCEDURE — 97112 NEUROMUSCULAR REEDUCATION: CPT

## 2023-06-12 PROCEDURE — 97110 THERAPEUTIC EXERCISES: CPT | Performed by: PHYSICAL THERAPIST

## 2023-06-12 PROCEDURE — 97112 NEUROMUSCULAR REEDUCATION: CPT | Performed by: PHYSICAL THERAPIST

## 2023-06-12 PROCEDURE — 97530 THERAPEUTIC ACTIVITIES: CPT | Performed by: PHYSICAL THERAPIST

## 2023-06-12 NOTE — PROGRESS NOTES
Daily Note     Today's date: 2023  Patient name: Luis Grace  : 1955  MRN: [id number]  Referring provider: Ulices Snyder DO  Dx:   Encounter Diagnosis     ICD-10-CM    1  Weakness of right upper extremity  [id number]                      Subjective: Patient reports to treatment with sling donned, reports increased shoulder pain today      Objective: See treatment diary below    TA  - Patient/caregiver education    TE/NMR  - Standing marching: 20x 3 sec holds  - Standing hip extension: 20x 3 sec holds  - Sidestepping: 10 laps x 10 ft  - Ambulation w/ 360 degree turns: 2 laps x 6 turns  - Standing heel/toe raise: 20x 3 sec holds  - Seated LAQ: 10x 3 sec holds        Assessment: Patient tolerated treatment well  Discussed with patient and caregiver ways to support patient UE, proper sling support, and techniques for reducing R hand swelling  Due to reduced pain level compared to previous treatment, standing exercises focusing on LE strength, static balance, and dynamic balance added today  360 degree turns used to challenge patient dynamic balance in CW and CCW directions, completing without LOB  Patient will benefit from skilled PT services to improve his RUE strength and mobility, improve his balance, reduce his risk of falls, and maximize his level of function and independence  Plan: Continue per plan of care        Outcome Measures Initial Eval  2023        5xSTS 13 34 sec        TUG  - Regular   13 2 sec        10 meter  ft/sec   m/s 1 15 m/s       MAYORGA /Rashaun               DGI /24        mCTSIB  - FTEO (firm)  - FTEC (firm)  - FTEO (foam)  - FTEC (foam)   30 sec  30 sec  30 sec  5 sec        6MWT  ft 1380 ft

## 2023-06-12 NOTE — PROGRESS NOTES
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]    POC START DATE: 2023  POC END DATE: 2023  NEXT PN DUE: 2023  FREQUENCY: 1-2x/wk for 12 weeks    SKILLED ANALYSIS:    Pt presents to OT evaluation on 2023 secondary to R sided weakness  Pt was in the hospital beginning on  due to the weakness  Pt and wife are present during evaluation  Wife stated that doctors ruled out a stroke when in the hospital  Pt states that the R sided weakness has been progressing for years  Appointment with orthopedics/spine physician tomorrow  EMG testing May 11th and will be following up with Neurologists  Ruled out brachial plexus injury via MRI  Reported: difficulty with balance, muscle cramps, brain fog, and severe constipation  Pt has full flaccidity of R arm  No notable subluxation currently, however, severe instability with subluxation precautions due to gravity pulling on arm  Consider Sallie Bingham, physician contacted  Pt exhibited ability to perform a shoulder shrug and scapular retraction  Unable to perform shoulder flexion, abduction, adduction  Unable to perform elbow flexion and extension  Unable to perform supination and pronation  Unable to perform wrist flexion and extension  Able to hold tennis ball, cylinder jar, and paper with resistance  Unable to hold pen with resistance  Sensation on ring and pinky finger normal  Diminished sensation on thumb, index, and middle (median nerve)  0/4 on MAS  Able to detect stimuli vision occluded on RUE at upper and lower arm  MOCA and reflexes to be assessed next visit  Unable to perform due to time constraint and extensive medical history   Pt will be seen for OT services on 2x/wk for 12 weeks to address ADL and IADL deficits, compensatory methods, adaptive equipment training, caregiver "education, and neuroplasticity relearning of RUE through the use of neuromuscular re-education exercise  POC was reviewed with the pt, pt understands and agrees with POC  Pt presents to OT re-evaluation on 6/12/2023 secondary to [address]  Pt is wearing Giv Erna Sling  Has edema through R hand due to sling and inability to raise arm  Pt has some scapular retraction and digital flexion and extension, but is severely limited  Pt has severe limitations with completing ADL and IADL tasks  Pt is having a nephrectomy on July 12th pending clearance to undergo  Obtained an E-stim unit for patient to address shoulder subluxation  Pt will be seen for OT services on 2x/wk for 12 weeks to address ADL and IADL deficits, compensatory methods, adaptive equipment training, caregiver education, and neuroplasticity relearning of RUE through the use of neuromuscular re-education exercise  POC was reviewed with the pt, pt understands and agrees with POC  Subjective     Pt presents to OT evaluation on 4/24/2023 secondary to R sided weakness  Pt was in the hospital beginning on 4/14 due to the weakness  Pt and wife are present during evaluation  Wife stated that doctors ruled out a stroke when in the hospital  Pt states that the R sided weakness has been progressing for years  Appointment with orthopedics/spine physician tomorrow  EMG testing May 11th  Brachial plexus results stated below  \"I went to the hospital because my arm got so weak  \" History of rotator cuff surgery in [id number] on L and was supposed to have surgery on the R  However, surgery was postponed due to the weakness  \"I am scared because everything in my R arm seems to be deteriorating  \" Difficulty with balance  Worry about falling  Muscle cramps  Potential cervical stenosis  \"Brain fog  \" Severe constipation  Therapist contacted PT from [facility] to discuss previous function of R arm   Therapist contacted orthopedic physician (pt will be seeing " "tomorrow)  At re-evaluation: Pt reports, \"Nothing has changed  If anything maybe a little worse  \"    Brachial Plexus Imaging: \"No discrete signal abnormality or abnormal enhancement identified along the course of the right brachial plexus  No compressive hematoma is identified  \"    Occupational Profile    ADLs: Difficulty with dressing and showering  IADLS: No driving  Work: Retired  Sleep: \"I have been sleeping pretty good  I have been taking Gabapentin  \"    PATIENT GOAL: \"To use my arm better  \"    HISTORY OF PRESENT ILLNESS:     PMH:   Past Medical History:   Diagnosis Date   • Allergic    • Arthritis    • Asthma    • Hypertension    • Impaired fasting glucose    • Mitral valve disorder    • Mitral valve prolapse    • Murmur, cardiac    • 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    • PAC (premature atrial contraction)    • PVC (premature ventricular contraction)    • Thyroid nodule        Past Surgical Hx:   Past Surgical History:   Procedure Laterality Date   • HAND SURGERY     • SD COLONOSCOPY FLX DX W/COLLJ SPEC WHEN PFRMD N/A 2018    Procedure: COLONOSCOPY;  Surgeon: Sean Sutherland MD;  Location: [facility]; Service: Gastroenterology   • PROSTATE BIOPSY     • SHOULDER SURGERY          Pain Levels:      Restin/10     Objective     9 HOLE PEG TEST: Unable  Did not assess  Functional Dexterity Test for in-hand manipulation: Unable  Did not assess  Further Observations in UE Assessment    Subluxation: Two finger subluxation  Scapular winging noted bilaterally    Flaccidity through R arm    MONOFILAMENTS/SENSORY TESTING:  RUE: 2 83 pinky and ring finger  3 61 index and middle  4 56 with thumb    LUE: 2 83 - Normal     Impairments Section:  UE Strength:  Unable to be assessed on R                Range of Motion:  AROM:   RUE - Unable to be completed in all planes due to flaccidity  -  shoulder flexion  - elbow flexion  -  elbow extension   -   wrist flexion  -  wrist extension  " MMT:  R UE: 0/5 in all pivots   -  shoulder flexion  - elbow flexion  -  elbow extension   -   wrist flexion  -  wrist extension    LUE within normal limits    L UE 4/5 in all pivots   -  shoulder flexion   - elbow flexion  -  elbow extension   -   wrist flexion  -  wrist extension    LUE within normal limits     Pt is R hand dominant    MODIFIED SURY SCALE:   Flaccid RUE 0/4 in all planes     Shoulder Shrugs: Able   Shoulder Retraction: Able   Shoulder joint: elevation    Tennis Ball, Paper, Cylinder  - Able to hold with resistance    Pen:  - Able to hold without resistance    GOALS:   Short Term Goals (within 4 weeks):  Pt will demonstrate scapular movements of upward rotation, downward rotation, elevation and depression on R within 4 weeks - NOT MET  Pt will demonstrate improved  and pinch strength on L as a compensatory method switching hand dominance within 4 weeks - NOT MET  Pt will demonstrate improved fine motor coordination on L as a compensatory method switching hand dominance within 4 weeks - NOT MET  Pt will be educated on adaptive equipment to improve independence with ADLs and IADLs - NOT MET  Pt will be educated on xiomara dressing techniques for ability to don and doff clothing independently within 4 weeks - NOT MET  Pt will demo with G carryover of Home Exercise Program to improve functional progression towards goals in Plan of care and for improved functional use of bilateral UE 4 weeks - NOT MET  Pt will demonstrate activation of muscles/reflexers of RUE for grasp/release patterns and for participation of R assist hand with daily tasks  - NOT MET  Pt will demonstrate compliance with sling recommendations and precautions for shoulder subluxation   - NOT MET    Long Term Goals: 8-12 weeks  Pt will demonstrate scapular movements of upward rotation, downward rotation, elevation and depression on R within 12 weeks - NOT MET  Pt will demonstrate improved  and pinch strength on L as a compensatory method switching hand dominance within 12 weeks - NOT MET  Pt will demonstrate improved fine motor coordination on L as a compensatory method switching hand dominance within 12 weeks - NOT MET  Pt will be educated on adaptive equipment to improve independence with ADLs and IADLs - NOT MET  Pt will be educated on xiomara dressing techniques for ability to don and doff clothing independently within 12 weeks - NOT MET  Pt will demo with G carryover of Home Exercise Program to improve functional progression towards goals in Plan of care and for improved functional use of bilateral UE 12 weeks - NOT MET  Pt will demonstrate activation of muscles/reflexers of RUE for grasp/release patterns and for participation of R assist hand with daily tasks  - NOT MET  Pt will demonstrate compliance with sling recommendations and precautions for shoulder subluxation  - NOT MET    COGNITIVE GOALS TO BE ADDED NEXT VISIT PENDING MOCA STANDARDIZED ASSESSMENT      OTHER PLANNED THERAPY INTERVENTIONS:   Supine, seated, and in stance neuro re-ed  Tricep AG  NMES/FES  FMC/prehension  Timed Trials  Manual tx  Hand to target  Sensory re-ed  Seated functional reach: crossing midline  Supine place and hold  WBearing strategies   Closed chain activities  Open chain activities

## 2023-06-15 ENCOUNTER — OFFICE VISIT (OUTPATIENT)
Dept: INTERNAL MEDICINE CLINIC | Facility: CLINIC | Age: 68
End: 2023-06-15
Payer: COMMERCIAL

## 2023-06-15 VITALS
BODY MASS INDEX: 21.66 KG/M2 | DIASTOLIC BLOOD PRESSURE: 80 MMHG | HEIGHT: 67 IN | HEART RATE: 94 BPM | SYSTOLIC BLOOD PRESSURE: 142 MMHG | WEIGHT: 138 LBS | OXYGEN SATURATION: 98 %

## 2023-06-15 DIAGNOSIS — K59.09 CHRONIC CONSTIPATION: Primary | ICD-10-CM

## 2023-06-15 PROCEDURE — 99213 OFFICE O/P EST LOW 20 MIN: CPT | Performed by: INTERNAL MEDICINE

## 2023-06-15 RX ORDER — MAGNESIUM CARB/ALUMINUM HYDROX 105-160MG
296 TABLET,CHEWABLE ORAL ONCE
Qty: 296 ML | Refills: 0 | Status: SHIPPED | OUTPATIENT
Start: 2023-06-15 | End: 2023-06-15

## 2023-06-15 NOTE — PATIENT INSTRUCTIONS
You will be given a prescription for magnesium citrate  Referral has been made to gastroenterology for further evaluation of your chronic constipation  Continue your current bowel regimen with Metamucil, docusate, MiraLAX and bisacodyl suppository

## 2023-06-15 NOTE — PROGRESS NOTES
Name: Nancy Pedersen      : 1955      MRN: [id number]  Encounter Provider: Michael Hicks MD  Encounter Date: 6/15/2023   [facility] [address]     1  Chronic constipation  -     magnesium citrate (CITROMA) 1 745 g/30 mL oral solution; Take 296 mL by mouth once for 1 dose  -     Ambulatory Referral to Gastroenterology; Future    The patient reports he was able to have a fairly large bowel movement today after initially passing very hard stool  Recommended he drink at least 64 ounces of water a day  Continue docusate twice daily  I have instructed him to take MiraLAX over the next 2 days to complete a total of 3 days  I will also give him a prescription for magnesium citrate in the event he suffers from significant constipation  He will also be given a referral to see GI for further evaluation and management  Subjective      HPI   Patient presents today as an acute visit complaining of severe constipation  He reports this is been an issue over the past 2 to 3 months  He has been taking MiraLAX daily, docusate twice a day, MiraLAX as needed and bisacodyl suppositories as needed  Today he reports he had a very hard stool that required significant effort to evacuate  He notes when he was able to there was a small amount of blood on top of the stool        ROS as per HPI    Current Outpatient Medications on File Prior to Visit   Medication Sig   • acetaminophen (TYLENOL) 325 mg tablet Take 2 tablets (650 mg total) by mouth every 6 (six) hours as needed for mild pain   • amLODIPine (NORVASC) 5 mg tablet Take 1 tablet (5 mg total) by mouth daily Prn BP> 150/90   • aspirin 81 mg chewable tablet Chew 1 tablet every other day   • bisacodyl (DULCOLAX) 5 mg EC tablet Take 1 tablet (5 mg total) by mouth daily as needed for constipation   • busPIRone (BUSPAR) 7 5 mg tablet Take 1 tablet (7 5 mg total) by mouth 2 (two) times a day as needed (Anxiety)   • Coenzyme Q10-Red Yeast Rice  MG CAPS Take 1 capsule by mouth daily   • Docosahexaenoic Acid (DHA OMEGA 3) 100 MG CAPS Take 6 capsules by mouth daily   • docusate sodium (COLACE) 100 mg capsule Take 1 capsule (100 mg total) by mouth 2 (two) times a day   • famotidine (PEPCID) 20 mg tablet Take 1 tablet (20 mg total) by mouth 2 (two) times a day before meals   • gabapentin (Neurontin) 300 mg capsule Take 1 capsule (300 mg total) by mouth daily at bedtime (Patient taking differently: Take 300 mg by mouth 2 (two) times a day)   • lisinopril (ZESTRIL) 20 mg tablet TAKE 1 TABLET TWICE A DAY   • metoprolol succinate (TOPROL-XL) 25 mg 24 hr tablet Take 1 tablet (25 mg total) by mouth daily with breakfast   • Multiple Vitamin tablet Take 1 tablet by mouth daily   • predniSONE 10 mg tablet Take 1 tablet (10 mg total) by mouth daily Take 50 mg every day x4 days then taper by 10 mg every week till seen neurology   • tiZANidine (ZANAFLEX) 2 mg tablet Take 1 tablet (2 mg total) by mouth 3 (three) times a day   • fexofenadine (ALLEGRA) 180 MG tablet Take 1 tablet by mouth daily as needed (Patient not taking: Reported on 6/2/2023)   • lidocaine (XYLOCAINE) 5 % ointment Apply topically as needed for mild pain (Patient not taking: Reported on 6/2/2023)   • meloxicam (Mobic) 15 mg tablet Take 1 tablet (15 mg total) by mouth daily (Patient not taking: Reported on 4/25/2023)   • Misc Natural Products (PROSTATE HEALTH) CAPS Take 1 capsule by mouth daily (Patient not taking: Reported on 6/2/2023)   • pantoprazole (PROTONIX) 40 mg tablet Take 1 tablet (40 mg total) by mouth daily in the early morning Do not start before April 17, 2023  (Patient not taking: Reported on 6/2/2023)   • polyethylene glycol (MIRALAX) 17 g packet Take 17 g by mouth daily Do not start before May 6, 2023   (Patient not taking: Reported on 6/2/2023)   • senna-docusate sodium (SENOKOT S) 8 6-50 mg per tablet Take 1 tablet by mouth 2 (two) times a "day (Patient not taking: Reported on 6/2/2023)   • traMADol (Ultram) 50 mg tablet Take 1 tablet (50 mg total) by mouth every 8 (eight) hours as needed for moderate pain (Patient not taking: Reported on 6/2/2023)       Objective     /80 Comment: Manual BP left arm  Pulse 94   Ht 5' 7\" (1 702 m)   Wt 62 6 kg (138 lb)   SpO2 98%   BMI 21 61 kg/m²     BP Readings from Last 3 Encounters:   06/15/23 142/80   06/02/23 (!) 184/98   05/25/23 138/88        Wt Readings from Last 3 Encounters:   06/15/23 62 6 kg (138 lb)   06/02/23 62 4 kg (137 lb 9 6 oz)   05/25/23 62 9 kg (138 lb 9 6 oz)       Physical Exam    General: NAD, Alert and oriented x3   HEENT: NCAT, EOMI, normal conjunctiva  Cardiovascular: RRR, normal S1 and S2, no m/r/g  Pulmonary: Normal respiratory effort, no wheezes, rales or rhonchi  GI: Soft, nontender, nondistended, hypoactive bowel sounds  Rectal: No anal fissures  MSK: Normal bulk and tone, right upper extremity atrophy with fasciculations over bicep, right arm and brace  Extremities: No lower extremity edema  Skin: Normal skin color, no rashes     Benjamin Montero MD  "

## 2023-06-16 ENCOUNTER — APPOINTMENT (OUTPATIENT)
Dept: LAB | Facility: MEDICAL CENTER | Age: 68
End: 2023-06-16
Payer: COMMERCIAL

## 2023-06-16 ENCOUNTER — LAB REQUISITION (OUTPATIENT)
Dept: LAB | Facility: HOSPITAL | Age: 68
End: 2023-06-16
Payer: COMMERCIAL

## 2023-06-16 DIAGNOSIS — Z01.812 ENCOUNTER FOR PREPROCEDURAL LABORATORY EXAMINATION: ICD-10-CM

## 2023-06-16 DIAGNOSIS — N28.89 RENAL MASS, RIGHT: ICD-10-CM

## 2023-06-16 DIAGNOSIS — Z01.818 ENCOUNTER FOR OTHER PREPROCEDURAL EXAMINATION: ICD-10-CM

## 2023-06-16 DIAGNOSIS — Z01.818 PREOP EXAMINATION: ICD-10-CM

## 2023-06-16 DIAGNOSIS — R39.89 SUSPECTED UTI: ICD-10-CM

## 2023-06-16 DIAGNOSIS — Z01.812 PRE-PROCEDURE LAB EXAM: ICD-10-CM

## 2023-06-16 DIAGNOSIS — N28.89 RENAL MASS: ICD-10-CM

## 2023-06-16 DIAGNOSIS — Z79.01 LONG TERM (CURRENT) USE OF ANTICOAGULANTS: ICD-10-CM

## 2023-06-16 DIAGNOSIS — N28.89 OTHER SPECIFIED DISORDERS OF KIDNEY AND URETER: ICD-10-CM

## 2023-06-16 LAB
ABO GROUP BLD: NORMAL
ANION GAP SERPL CALCULATED.3IONS-SCNC: 6 MMOL/L (ref 4–13)
APTT PPP: 32 SECONDS (ref 23–37)
BASOPHILS # BLD AUTO: 0.04 THOUSANDS/ÂΜL (ref 0–0.1)
BASOPHILS NFR BLD AUTO: 1 % (ref 0–1)
BLD GP AB SCN SERPL QL: NEGATIVE
BUN SERPL-MCNC: 12 MG/DL (ref 5–25)
CALCIUM SERPL-MCNC: 9.5 MG/DL (ref 8.3–10.1)
CHLORIDE SERPL-SCNC: 107 MMOL/L (ref 96–108)
CO2 SERPL-SCNC: 25 MMOL/L (ref 21–32)
CREAT SERPL-MCNC: 0.71 MG/DL (ref 0.6–1.3)
EOSINOPHIL # BLD AUTO: 0.08 THOUSAND/ÂΜL (ref 0–0.61)
EOSINOPHIL NFR BLD AUTO: 2 % (ref 0–6)
ERYTHROCYTE [DISTWIDTH] IN BLOOD BY AUTOMATED COUNT: 13.5 % (ref 11.6–15.1)
GFR SERPL CREATININE-BSD FRML MDRD: 95 ML/MIN/1.73SQ M
GLUCOSE P FAST SERPL-MCNC: 85 MG/DL (ref 65–99)
HCT VFR BLD AUTO: 44.6 % (ref 36.5–49.3)
HGB BLD-MCNC: 14.6 G/DL (ref 12–17)
IMM GRANULOCYTES # BLD AUTO: 0.02 THOUSAND/UL (ref 0–0.2)
IMM GRANULOCYTES NFR BLD AUTO: 0 % (ref 0–2)
INR PPP: 0.9 (ref 0.84–1.19)
LYMPHOCYTES # BLD AUTO: 1.32 THOUSANDS/ÂΜL (ref 0.6–4.47)
LYMPHOCYTES NFR BLD AUTO: 27 % (ref 14–44)
MCH RBC QN AUTO: 29.6 PG (ref 26.8–34.3)
MCHC RBC AUTO-ENTMCNC: 32.7 G/DL (ref 31.4–37.4)
MCV RBC AUTO: 90 FL (ref 82–98)
MONOCYTES # BLD AUTO: 0.51 THOUSAND/ÂΜL (ref 0.17–1.22)
MONOCYTES NFR BLD AUTO: 10 % (ref 4–12)
NEUTROPHILS # BLD AUTO: 3.01 THOUSANDS/ÂΜL (ref 1.85–7.62)
NEUTS SEG NFR BLD AUTO: 60 % (ref 43–75)
NRBC BLD AUTO-RTO: 0 /100 WBCS
PLATELET # BLD AUTO: 348 THOUSANDS/UL (ref 149–390)
PMV BLD AUTO: 9.8 FL (ref 8.9–12.7)
POTASSIUM SERPL-SCNC: 4.2 MMOL/L (ref 3.5–5.3)
PROTHROMBIN TIME: 12.3 SECONDS (ref 11.6–14.5)
RBC # BLD AUTO: 4.94 MILLION/UL (ref 3.88–5.62)
RH BLD: POSITIVE
SODIUM SERPL-SCNC: 138 MMOL/L (ref 135–147)
SPECIMEN EXPIRATION DATE: NORMAL
WBC # BLD AUTO: 4.98 THOUSAND/UL (ref 4.31–10.16)

## 2023-06-16 PROCEDURE — 36415 COLL VENOUS BLD VENIPUNCTURE: CPT

## 2023-06-16 PROCEDURE — 87086 URINE CULTURE/COLONY COUNT: CPT

## 2023-06-16 PROCEDURE — 85730 THROMBOPLASTIN TIME PARTIAL: CPT

## 2023-06-16 PROCEDURE — 86850 RBC ANTIBODY SCREEN: CPT | Performed by: UROLOGY

## 2023-06-16 PROCEDURE — 86901 BLOOD TYPING SEROLOGIC RH(D): CPT | Performed by: UROLOGY

## 2023-06-16 PROCEDURE — 85025 COMPLETE CBC W/AUTO DIFF WBC: CPT

## 2023-06-16 PROCEDURE — 85610 PROTHROMBIN TIME: CPT

## 2023-06-16 PROCEDURE — 86900 BLOOD TYPING SEROLOGIC ABO: CPT | Performed by: UROLOGY

## 2023-06-16 PROCEDURE — 80048 BASIC METABOLIC PNL TOTAL CA: CPT

## 2023-06-17 LAB — BACTERIA UR CULT: NORMAL

## 2023-06-19 ENCOUNTER — APPOINTMENT (OUTPATIENT)
Dept: PHYSICAL THERAPY | Facility: MEDICAL CENTER | Age: 68
End: 2023-06-19
Payer: COMMERCIAL

## 2023-06-19 ENCOUNTER — APPOINTMENT (OUTPATIENT)
Dept: OCCUPATIONAL THERAPY | Facility: MEDICAL CENTER | Age: 68
End: 2023-06-19
Payer: COMMERCIAL

## 2023-06-19 ENCOUNTER — TELEPHONE (OUTPATIENT)
Dept: INTERNAL MEDICINE CLINIC | Facility: CLINIC | Age: 68
End: 2023-06-19

## 2023-06-19 NOTE — TELEPHONE ENCOUNTER
----- Message from Billy Ortez sent at 6/19/2023  8:59 AM EDT -----  Regarding: FW: Constipation  Contact: [phone]    ----- Message -----  From: Elaine Sanchez  Sent: 6/19/2023   6:57 AM EDT  To: [facility]  Subject: Constipation                                     This is Edward Lazar  The last time Yanique Granger had a BM was Thursday, Aicha 15  I gave Yanique Granger an entire bottle of magnesium citrate yesterday - the entire 10 ounces at around 1PM   I did not give him anything else such as colace, metamucil or miralax  He still hasn't had a BM  He woke up this morning feeling very weak and dizzy  The dizziness passed after he had some water, but he says he feels very weak and as if his body is twisted to the left  This is new sensation  His BP was 134/61, [place] 64  Our blood pressure monitor showed he had an irregular heartbeat  It hasn't showed an irregular heartbeat for a while now  I'm not sure what to do for him  Is this a medical emergency? Should I give him another bottle of magnesium citrate? Should I give him something else or just let nature take its course? Please advise when you can  Thank you

## 2023-06-19 NOTE — TELEPHONE ENCOUNTER
Please instruct the patient to continue taking his docusate twice a day  He should also take the MiraLAX daily until he has a bowel movement  I also recommend that he try something from below such as a bisacodyl suppository or a fleets enema  He was given a referral to GI during his last visit, please see if they have contacted him to schedule an appointment

## 2023-06-23 ENCOUNTER — TELEPHONE (OUTPATIENT)
Dept: NEUROLOGY | Facility: CLINIC | Age: 68
End: 2023-06-23

## 2023-06-23 NOTE — TELEPHONE ENCOUNTER
Received VM transcription:    Sherylblaire, my name is Mark Mendoza. I'm calling for my  Oral Mendoza who's a patient of Dr. Gan. If somebody could give me a call, I'd appreciate it. I don't see Dr. Gan on his list of providers. Otherwise, I would have done this through [employer], but anyway, I have a question about one of the medications he's taking. And again, if someone could call me, I appreciate to speaking to somebody. His YOB: 1955. Thank you. Yoana.  -------------------------------------------------------    Spoke with pt's wife Mark and she says pt is taking the gabapentin and he seem to be having practically every s/e on the list they see when looking medication up.    Side effects include:  - change in balance,   - memory problems,   - things looking blurry   - muscle pain and weakness, seems to be getting weaker and weaker.   - Thirsty a lot, has dry mouth.     Pt on call with wife and confirms symptoms. Wife says most of this has been ongoing but for the last few weeks these symptoms have intensified after starting medication.    Pt states that his situation with his arm and weakness has gotten worse. Pt getting EMG Monday 6/26/23b but they wanted to bring this to Dr. Gan's attention about gabapentin.     Pt confirms he takes gabapentin 300 mg 1 capsule daily at bedtime.    Dr. Gan - Please advise.    Beto  [phone], ok to leave detailed message.

## 2023-06-26 ENCOUNTER — OFFICE VISIT (OUTPATIENT)
Dept: OCCUPATIONAL THERAPY | Facility: MEDICAL CENTER | Age: 68
End: 2023-06-26
Payer: COMMERCIAL

## 2023-06-26 ENCOUNTER — OFFICE VISIT (OUTPATIENT)
Dept: PHYSICAL THERAPY | Facility: MEDICAL CENTER | Age: 68
End: 2023-06-26
Payer: COMMERCIAL

## 2023-06-26 ENCOUNTER — HOSPITAL ENCOUNTER (OUTPATIENT)
Dept: NEUROLOGY | Facility: CLINIC | Age: 68
Discharge: HOME | End: 2023-06-26
Payer: COMMERCIAL

## 2023-06-26 DIAGNOSIS — R20.2 ARM PARESTHESIA, RIGHT: ICD-10-CM

## 2023-06-26 DIAGNOSIS — G54.5 PARSONAGE-TURNER SYNDROME: ICD-10-CM

## 2023-06-26 DIAGNOSIS — G54.5 PARSONAGE-TURNER SYNDROME: Primary | ICD-10-CM

## 2023-06-26 DIAGNOSIS — M12.811 ROTATOR CUFF ARTHROPATHY OF RIGHT SHOULDER: ICD-10-CM

## 2023-06-26 DIAGNOSIS — R53.1 RIGHT SIDED WEAKNESS: ICD-10-CM

## 2023-06-26 DIAGNOSIS — R29.898 WEAKNESS OF RIGHT UPPER EXTREMITY: Primary | ICD-10-CM

## 2023-06-26 PROCEDURE — 95886 MUSC TEST DONE W/N TEST COMP: CPT | Performed by: PSYCHIATRY & NEUROLOGY

## 2023-06-26 PROCEDURE — 97112 NEUROMUSCULAR REEDUCATION: CPT

## 2023-06-26 PROCEDURE — 95911 NRV CNDJ TEST 9-10 STUDIES: CPT | Performed by: PSYCHIATRY & NEUROLOGY

## 2023-06-26 PROCEDURE — 97530 THERAPEUTIC ACTIVITIES: CPT | Performed by: PHYSICAL THERAPIST

## 2023-06-26 PROCEDURE — 97140 MANUAL THERAPY 1/> REGIONS: CPT

## 2023-06-26 NOTE — PROGRESS NOTES
PT Progress/Discharge note      Today's date: 2023  Patient name: Javy Cunningham  : 1955  MRN: [id number]  Referring provider: Obed Peñaloza DO  Dx:   Encounter Diagnosis     ICD-10-CM    1  Weakness of right upper extremity  [id number]             Assessment  Assessment details: Patient is a 77 y o  Male who presents to skilled outpatient PT with RUE weakness  He arrives to treatment with his wife and RUE in sling stating he is in a lot of pain today because he had an EMG this morning  Since his initial evaluation he maintains the same MMT for his RUE  His TUG and 5xSTS scores maintain similar values with improvements/regressions not enough to reflect MDC  Patient did displays increased difficulty with his 6 MWT and 10 MWT, displaying slower santy and overall increased L foot drag  Since starting treatment patient reports compliance with his HEP and would like to continue with home exercises due to his upcoming kidney procedure  Reviewed with patient HEP, discussed exercises, and answered any questions the patient had  Discussed with patient and spouse plans to D/C from skilled PT services until cleared to return from surgery, patient and spouse in agreement with plan  At this time plan to D/C patient from skilled PT services  Impairments: Abnormal coordination, Abnormal muscle tone, Abnormal or restricted ROM, Activity intolerance, Impaired balance, Impaired physical strength, Lacks appropriate HEP, Poor posture, Pain with function, Safety issue and Abnormal movement  Understanding of Dx/Px/POC: Good  Prognosis: Good    Patient verbalized understanding of POC  Please contact me if you have any questions or recommendations  Thank you for the referral and the opportunity to share in Javy Cunningham care          Plan  Plan details:   Patient would benefit from: Skilled PT and Skilled OT  Planned modality interventions: Biofeedback  Planned therapy interventions: ADL training, Balance, Balance/WB training, Body mechanics training, Coordination, Functional ROM exercises, Gait training, HEP, Manual therapy, Motor coordination training, Neuromuscular re-education, Patient education, Postural training, Strengthening, Stretching, Therapeutic activities, Therapeutic exercises and Therapeutic training  Frequency: 2x/wk  Duration in weeks: 12  Plan of Care beginning date: 5/17/2023  Plan of Care expiration date: 12 weeks - 8/9/2023  Treatment plan discussed with: Patient and Family       Goals  Short Term Goals (4 weeks):    - Patient will improve time on TUG by 2 9 seconds from 13 2 seconds to 10 3 seconds to facilitate improved safety in all ambulation- NOT MET  - Patient will be independent in basic HEP 2-3 weeks- MET  - Patient will improve 5xSTS score by 2 3 seconds from 13 34 seconds to 11 04 seconds to promote improved LE functional strength needed for ADLs- NOT MET  - Patient will complete 30 seconds on all conditions of mCTSIB to improve his static balance- NOT MET    Long Term Goals (12 weeks):- NOT MET  - Patient will be independent in a comprehensive home exercise program  - Patient will improve scoring on DGI by at least 2 6 points from baseline to 20/24 to progress safety  - Patient will improve gait speed by at least 0 18 m/s from baseline to 1 m/s to improve safety with community ambulation  - Patient will improve MAYORGA by at least 6 points from baseline to 50/56 in order to improve static balance and reduce risk for falls  - Patient will improve scoring on FGA by at least 4 points from baseline to 24/30 to progress safety with dynamic tasks  - Patient will be able to demonstrate HT in gait without veering  - Patient will improve 6 Minute Walk Test score by at least 190 feet from baseline to 1400 feet to promote improved cardiovascular endurance  - Patient will report 50% reduction in near falls in order to improve safety with functional tasks and reduce his risk for falls  - Patient will report going on walks at least 3 days per week to promote independence and improved cardiovascular endurance  - Patient will be able to ascend/descend stairs reciprocally with 1 UE assist to promote independence and safety with ADLs  - Patient will report 50% reduction in near falls when ambulating on uneven terrain      Cut off score    All date taken from [facility] Neuro Section or Rehab Measures      [place]/56  MDC: 6 pts  Age Norms:  61-76: M - 54   F - 55  70-79: M - 47   F - 53  80-89: M - 53   F - 50 5xSTS: Daisy et al 2010  MDC: 2 3 sec  Age Norms:  60-69: 11 1 sec  70-79: 12 6 sec  80-89: 14 8 sec   TUG  MDC: 4 14 sec  Cut off score:  >13 5 sec community dwelling adults  >32 2 frail elderly  <20 I for basic transfers  >30 dependent on transfers 10 Meter Walk Test: [place] 2011  MDC: 0 18 m/s  20-29: M - 1 35 m   F - 1 34 m  30-39: M - 1 43 m   F - 1 34 m  40-49: M - 1 43 m   F - 1 39 m  50-59: M - 1 43 m   F - 1 31 m  60-69: M - 1 34 m   F - 1 24 m  70-79: M - 1 26 m   F - 1 13 m  80-89: M - 0 97 m   F - 0 94 m    Household Ambulator < 0 4 m/s  Limited Community Ambulator 0 4 - 0 8 m/s  [employer] Ambulator 0 8 - 1 2 m/s  Safely cross the street > 1 2 m/s   FGA  MCID: 4 pts  Geriatrics/community < 22/30 fall risk  Geriatrics/community < 20/30 unexplained falls    DGI  MDC: vestibular - 4 pts  MDC: geriatric/community - 3 pts  Falls risk <19/24 mCTSIB  Norm: 20-60 yrs  Eyes open firm: norm sway 0 21-0 48  Eyes closed firm: norm sway 0 48-0 99  Eyes open foam: norm sway 0 38-0 71  Eyes closed foam: norm sway 0 70-2 22   6 Minute Walk Test  MDC: 190 98 ft  MCID: 164 ft    Age Norms  61-76: M - 1876 ft (571 80 m)  F - 1765 ft (537 98 m)  70-79: M - 1729 ft (527 00 m)  F - 1545 ft (470 92 m)  80-89: M - 1368 ft (416 97 m)  F - 1286 ft (391 97 m) ABC: [facility], 2003  <67% increased risk for falls Subjective    History of Present Illness  - Mechanism of injury: Patient reports right UE weakness  He recently saw neurologist who recommended PT and OT to assist with returning to his PLOF  He has no active ROM in his RUE  He reports symptoms started after root canal and has since deteriorated  Patient arrives to evaluation with wife who assists with medical history and homemade sling    Update 23: Patient arrives to treatment with RUE sling, states he had an EMG this morning and is in a lot of pain  He states that he has not seen any improvements since starting therapy but does admit his legs are feeling better with strengthening exercises  He states that he wants to make today his last day because he will be having a procedure on his kidney next month     - Assist level at home: assist from spouse  - Decreased fine motor tasks: Yes      Pain  - Current pain ratin-5/10      Objective     UE MMT  - R Shoulder Flexion: 0/5    - R Shoulder Extension: 0/5   - R Shoulder Abduction: 0/5   - R Shoulder Adduction: 0/5   - R Elbow Extension: 1/5    - R Elbow Flexion: 0/5    - R Wrist Flexion: 0/5    - R Wrist Extension: 0/5    - R : 3+/5        LE MMT  - R Hip Flexion: 4/5  L Hip Flexion: 4/5  - R Hip Abduction: 4/5  L Hip Abduction: 4/5  - R Hip Adduction: 4/5  L Hip Adduction: 4/5  - R Knee Extension: 5/5  L Knee Extension: 5/5  - R Knee Flexion: 5/5  L Knee Flexion: 5/5  - R Ankle DF: 5/5   L Ankle DF: 5/5  - R Ankle PF: 5/5   L Ankle PF: 5/5    Sensation  - Light touch: reduced RUE  - Deep pressure: reduced RUE      Coordination  - Heel to Shin: WNL  - Alternate Toe Taps: WNL      Postural Screen  - Observation: right shoulder depression      Outcome Measures Initial Eval  2023  PN  23      5xSTS 13 34 sec  13 19 sec      TUG  - Regular   13 2 sec    13 6 sec      10 meter  ft/sec   m/s 1 15 m/s 0 87 m/s      MAYORGA /56        FGA /30        DG /        mCTSIB  - FTEO (firm)  - FTEC (firm)  - FTEO (foam)  - FTEC (foam)   30 sec  30 sec  30 sec  5 sec     30 sec  30 sec  30 sec  15 sec      6MWT  ft 1380 ft 975 ft                                  Precautions:   Past Medical History:   Diagnosis Date   • Allergic    • Arthritis    • Asthma    • Cancer ([address] )     kidney   • Hypertension    • Impaired fasting glucose    • Mitral valve disorder    • Mitral valve prolapse    • Murmur, cardiac    • 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    • PAC (premature atrial contraction)    • Parsonage-Cordero syndrome    • PVC (premature ventricular contraction)    • PVC (premature ventricular contraction)    • Thyroid nodule

## 2023-06-26 NOTE — PROGRESS NOTES
"Occupational Therapy Daily Note:    Today's date: 2023  Patient name: Abhishek Shea  : 1955  MRN: [id number]  Referring provider: Roxanne Womack  Dx:   Encounter Diagnoses   Name Primary? • Parsonage-Cordero syndrome Yes   • Rotator cuff arthropathy of right shoulder        POC START DATE: 2023  POC END DATE: 2023  NEXT PN DUE: 2023  FREQUENCY: 1-2x/wk for 12 weeks    Subjective: \"I am in about 9/10 pain right now  \"    Objective:     Manual Therapy:    Edema through hand   Upper Trapezius   Time: 10 minutes    PROM of wrist, elbow, fingers  Time: 5 minutes    Neuromuscular re-education:    E-stim for shoulder subluxation  Running throughout entirety of session    Upper Trapezius and Levator Scapula Stretches  3 x 20 second holds     Therapist supervised patient with use of e-stim during today's treatment session  Skin integrity was assessed and appeared normal following use of modalities  Assessment: Tolerated treatment well  Edema through hand, likely due to decreased elevation and movement through RUE  Educated pt on upper trapezius and levator scapula stretches due to tightness present  Pt will be discharged today due to undergoing nephrectomy  Patient would benefit from continued skilled OT  Plan: Pt will be discharged at this time due to undergoing Nephrectomy     "

## 2023-06-28 ENCOUNTER — OFFICE VISIT (OUTPATIENT)
Dept: OBGYN CLINIC | Facility: OTHER | Age: 68
End: 2023-06-28
Payer: COMMERCIAL

## 2023-06-28 VITALS
HEART RATE: 64 BPM | BODY MASS INDEX: 22.18 KG/M2 | HEIGHT: 66 IN | SYSTOLIC BLOOD PRESSURE: 123 MMHG | DIASTOLIC BLOOD PRESSURE: 72 MMHG | WEIGHT: 138 LBS

## 2023-06-28 DIAGNOSIS — M62.81 MUSCLE WEAKNESS OF RIGHT UPPER EXTREMITY: ICD-10-CM

## 2023-06-28 DIAGNOSIS — G54.5 PARSONAGE-TURNER SYNDROME: ICD-10-CM

## 2023-06-28 DIAGNOSIS — M19.011 GLENOHUMERAL ARTHRITIS, RIGHT: Primary | ICD-10-CM

## 2023-06-28 PROCEDURE — 99214 OFFICE O/P EST MOD 30 MIN: CPT | Performed by: ORTHOPAEDIC SURGERY

## 2023-06-28 NOTE — PROGRESS NOTES
Assessment:       1  Glenohumeral arthritis, right    2  Parsonage-Cordero syndrome    3  Muscle weakness of right upper extremity          Plan:        I had a very detailed discussion with the patient with regards to his right shoulder pain and arm weakness  I do think that this is primarily a neurological issue with Parsonage-Cordero syndrome  In this regard I recommend him to follow-up with neurology  I did explain that this condition may have a prolonged course and can sometimes take 2 to 3 years for gradual recovery  With regards to his right shoulder pain, he has not benefited in the past from subacromial cortisone injection  He may benefit from ultrasound-guided intra-articular cortisone injection but we will currently defer this on today's office visit due to his upcoming surgical intervention for renal cell cancer  He is recommended to consult his oncologist with regards to intra-articular cortisone injection following treatment for his renal cancer  He will call us back in this regard  I also recommend him to continue with physical/occupational therapy rehabilitation with regards to his generalized upper extremity weakness  Total time spent in today's office visit was over 30 minutes which included preencounter chart review, counseling as detailed and post encounter charting  Subjective:     Patient ID: Virgil Guy is a 77 y o  male  Chief Complaint:    HPI  Shama Whipple is a 24-year-old gentleman with a history of multiple medical comorbidities including a right upper extremity Parsonage-Cordero syndrome, right glenohumeral advanced degenerative arthropathy and a known history of previous right rotator cuff injury  In this regard he has been evaluated extensively in the past including a prior evaluation by Dr Arlyn Pascal for his right shoulder pain  He did receive a right subacromial cortisone injection earlier this year without much relief    Per Dr Alie Morales clinical assessment on 4/20/2023, a right shoulder replacement arthroplasty was postponed in view of the patient's ongoing neurological symptoms affecting the right upper extremity  The patient was subsequently evaluated by Dr Ashish Guerra and spine orthopedic surgery on 4/25/2023 who also did not recommend any surgical intervention in this regard and no severe foraminal stenosis was appreciated on patient's imaging studies of the cervical spine  In addition to the above, patient has been under the care of neurology with regards to Parsonage-Cordero syndrome including trial of parenteral steroids  Most recently he had follow-up EMG study of the right upper extremity on 6/26/2023 which was consistent with pan brachial plexopathy with ongoing denervation but early signs of reinnervation  Patient is currently doing occupational therapy rehabilitation  He is also on opioid analgesia for his pain control  Incidentally, he was also noted to have a renal cell malignancy for which he is scheduled to undergo surgical intervention in July 2023  Today, the patient's main concern is pain around the right shoulder area and generalized weakness of the right upper extremity  Social History     Occupational History   • Not on file   Tobacco Use   • Smoking status: Never   • Smokeless tobacco: Never   Vaping Use   • Vaping Use: Never used   Substance and Sexual Activity   • Alcohol use: Not Currently     Alcohol/week: 3 0 standard drinks of alcohol     Types: 3 Glasses of wine per week     Comment: 4 oz wine with dinner a few days a week   • Drug use: No   • Sexual activity: Yes     Partners: Female     Birth control/protection: None      Review of Systems        Objective:     Ortho ExamPhysical Exam  Cardiovascular:      Rate and Rhythm: Normal rate  Pulmonary:      Effort: Pulmonary effort is normal    Neurological:      Mental Status: He is alert           Right shoulder exam/right upper extremity exam:  There is a generalized muscle wasting noted including the right suprascapular and infrascapular region along with a diffuse muscle wasting of the entire right upper extremity compared to the contralateral side  There is a diffuse nondermatomal pattern of weakness of the right upper extremity involving nearly all muscle groups with at least 3/5 strength in all groups  Grossly limited right shoulder motion in all direction with pain  Inability to actively move the right shoulder against gravity  I have personally reviewed pertinent films in PACS

## 2023-06-29 ENCOUNTER — TELEPHONE (OUTPATIENT)
Dept: INTERNAL MEDICINE CLINIC | Facility: CLINIC | Age: 68
End: 2023-06-29

## 2023-06-29 NOTE — TELEPHONE ENCOUNTER
Spoke to the patient and his wife over the phone  They had concerns given his recent blood pressure readings  They reported his blood pressure reading yesterday with orthopedic surgery was 123/72  His subsequent readings have been 154/79, 90/60, 102/62 and 115/65  He denies any symptoms of hypotension  I recommended that he continue to take his blood pressure 2-3 times a day and to call back in on Monday with his readings    If he is having persistently low readings consider cutting back his as needed amlodipine dose to 2 5 mg

## 2023-07-06 ENCOUNTER — CONSULT (OUTPATIENT)
Dept: INTERNAL MEDICINE CLINIC | Facility: CLINIC | Age: 68
End: 2023-07-06
Payer: COMMERCIAL

## 2023-07-06 VITALS
DIASTOLIC BLOOD PRESSURE: 80 MMHG | OXYGEN SATURATION: 98 % | BODY MASS INDEX: 21.83 KG/M2 | WEIGHT: 135.8 LBS | HEIGHT: 66 IN | HEART RATE: 71 BPM | SYSTOLIC BLOOD PRESSURE: 142 MMHG

## 2023-07-06 DIAGNOSIS — I10 ESSENTIAL HYPERTENSION: ICD-10-CM

## 2023-07-06 DIAGNOSIS — F41.9 ANXIETY: ICD-10-CM

## 2023-07-06 DIAGNOSIS — Z01.818 PREOP EXAMINATION: Primary | ICD-10-CM

## 2023-07-06 DIAGNOSIS — M79.89 RIGHT LEG SWELLING: ICD-10-CM

## 2023-07-06 DIAGNOSIS — N28.89 RIGHT RENAL MASS: ICD-10-CM

## 2023-07-06 DIAGNOSIS — M79.89 SWELLING OF ARM: ICD-10-CM

## 2023-07-06 DIAGNOSIS — G54.5 PARSONAGE-TURNER SYNDROME: ICD-10-CM

## 2023-07-06 PROBLEM — Z20.822 EXPOSURE TO COVID-19 VIRUS: Status: RESOLVED | Noted: 2020-07-15 | Resolved: 2023-07-06

## 2023-07-06 PROBLEM — R73.9 ELEVATED BLOOD SUGAR: Status: RESOLVED | Noted: 2019-03-05 | Resolved: 2023-07-06

## 2023-07-06 PROBLEM — Z11.59 ENCOUNTER FOR HEPATITIS C SCREENING TEST FOR LOW RISK PATIENT: Status: RESOLVED | Noted: 2018-08-13 | Resolved: 2023-07-06

## 2023-07-06 PROBLEM — M12.811 ROTATOR CUFF ARTHROPATHY OF RIGHT SHOULDER: Status: RESOLVED | Noted: 2023-04-13 | Resolved: 2023-07-06

## 2023-07-06 PROBLEM — Z00.00 MEDICARE ANNUAL WELLNESS VISIT, SUBSEQUENT: Status: RESOLVED | Noted: 2019-03-05 | Resolved: 2023-07-06

## 2023-07-06 PROBLEM — Z23 NEED FOR PNEUMOCOCCAL VACCINATION: Status: RESOLVED | Noted: 2018-08-13 | Resolved: 2023-07-06

## 2023-07-06 PROCEDURE — 99215 OFFICE O/P EST HI 40 MIN: CPT | Performed by: INTERNAL MEDICINE

## 2023-07-06 RX ORDER — LISINOPRIL 10 MG/1
10 TABLET ORAL EVERY EVENING
COMMUNITY

## 2023-07-06 RX ORDER — LISINOPRIL 20 MG/1
20 TABLET ORAL EVERY MORNING
COMMUNITY

## 2023-07-06 NOTE — ASSESSMENT & PLAN NOTE
-Blood pressure controlled in office. Reviewed patient's blood pressure readings at home and he has several systolic readings in the low 100s typically in the morning. I have recommended he reduce his lisinopril dose from 20 mg twice a day down to 20 mg in the morning and 10 mg in the evening. He may continue to use amlodipine 5 mg daily as needed for blood pressure readings greater than 150/90. Continue current dose of Toprol.

## 2023-07-06 NOTE — ASSESSMENT & PLAN NOTE
-Patient is of low cardiac risk for the proposed procedure. He has been advised to hold aspirin and fish oil 7 days prior to his procedure. His preop labs and most recent EKG performed within the last 2 months have been reviewed. No further cardiac work-up indicated.

## 2023-07-06 NOTE — ASSESSMENT & PLAN NOTE
-Patient has been approved for surgery by neurology from a neurologic standpoint. There is currently no improvement in the patient's right upper extremity pain and weakness. Continue to follow with neurology as scheduled.

## 2023-07-06 NOTE — PATIENT INSTRUCTIONS
-You are approved for surgery  -Reduce your lisinopril dose from 20 mg twice a day to 20 mg in the morning and 10 mg in the evening.  -Continue the same dose of your Toprol  -Hold your baby aspirin and fish oil supplement 7 days prior to surgery

## 2023-07-06 NOTE — PROGRESS NOTES
Presurgical Evaluation    Subjective:      Patient ID: Linda Louis is a 77 y.o. male. Chief Complaint   Patient presents with   • Pre-op Exam     Pt R hand and R leg is swollen. Also pt say I feel more weak. HPI    The following portions of the patient's history were reviewed and updated as appropriate: allergies, current medications, past family history, past medical history, past social history, past surgical history and problem list.    Procedure date: 7/12/23    Surgeon:  Dr. Rafael Mojica procedure:  Right radical nephrectomy   Diagnosis for procedure:  Renal mass, rigt    The patient is seen for perioperative risk stratification. Patient reports no symptoms of chest pain at rest or with exertion, dyspnea at rest or with exertion, PND, claudication or palpitations. Patient denies any history of ischemic heart disease, CHF or diabetes. Patient denies any history of easy bruising, profuse bleeding from minor injuries or family history of bleeding disorder. Patient complains today that over the past few weeks he has been experiencing increased swelling in his right upper and right lower extremity. He notes with elevation the swelling improves.     Review of Systems  ROS as per HPI      Current Outpatient Medications   Medication Sig Dispense Refill   • acetaminophen (TYLENOL) 325 mg tablet Take 2 tablets (650 mg total) by mouth every 6 (six) hours as needed for mild pain  0   • amLODIPine (NORVASC) 5 mg tablet Take 1 tablet (5 mg total) by mouth daily Prn BP> 150/90 30 tablet 5   • aspirin 81 mg chewable tablet Chew 1 tablet every other day     • bisacodyl (DULCOLAX) 5 mg EC tablet Take 1 tablet (5 mg total) by mouth daily as needed for constipation 30 tablet 0   • busPIRone (BUSPAR) 7.5 mg tablet Take 1 tablet (7.5 mg total) by mouth 2 (two) times a day as needed (Anxiety) 60 tablet 1   • Coenzyme Q10 (CO Q 10 PO) Take by mouth in the morning     • Docosahexaenoic Acid (DHA OMEGA 3) 100 MG CAPS Take 6 capsules by mouth daily     • docusate sodium (COLACE) 100 mg capsule Take 1 capsule (100 mg total) by mouth 2 (two) times a day 60 capsule 2   • fexofenadine (ALLEGRA) 180 MG tablet Take 1 tablet by mouth daily as needed     • gabapentin (Neurontin) 300 mg capsule Take 1 capsule (300 mg total) by mouth daily at bedtime 30 capsule 1   • lidocaine (XYLOCAINE) 5 % ointment Apply topically as needed for mild pain 35.44 g 3   • lisinopril (ZESTRIL) 10 mg tablet Take 10 mg by mouth every evening     • lisinopril (ZESTRIL) 20 mg tablet Take 20 mg by mouth every morning     • metoprolol succinate (TOPROL-XL) 25 mg 24 hr tablet Take 1 tablet (25 mg total) by mouth daily with breakfast 90 tablet 3   • Multiple Vitamin tablet Take 1 tablet by mouth daily     • polyethylene glycol (MIRALAX) 17 g packet Take 17 g by mouth daily Do not start before May 6, 2023.  0   • tiZANidine (ZANAFLEX) 2 mg tablet Take 1 tablet (2 mg total) by mouth 3 (three) times a day (Patient taking differently: Take 2 mg by mouth if needed) 90 tablet 1   • magnesium citrate (CITROMA) 1.745 g/30 mL oral solution Take 296 mL by mouth once for 1 dose 296 mL 0     No current facility-administered medications for this visit. Allergies on file:   Patient has no known allergies.     Past Medical History:   Diagnosis Date   • Allergic    • Arthritis    • Asthma    • Cancer ([address])     kidney   • Hypertension    • Impaired fasting glucose    • Mitral valve disorder    • Mitral valve prolapse    • Murmur, cardiac    • 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    • PAC (premature atrial contraction)    • Parsonage-Cordero syndrome    • PVC (premature ventricular contraction)    • PVC (premature ventricular contraction)    • Renal cancer ([address]) 2023   • Thyroid nodule        Past Surgical History:   Procedure Laterality Date   • HAND SURGERY     • [facility] COLONOSCOPY FLX DX W/COLLJ SPEC WHEN PFRMD N/A 2/9/2018    Procedure: COLONOSCOPY; Surgeon: Luis Tony MD;  Location: [facility];   Service: Gastroenterology   • PROSTATE BIOPSY     • SHOULDER SURGERY         Family History   Problem Relation Age of Onset   • Diabetes Mother    • Stroke Mother    • Stroke Father    • Heart disease Father    • Diabetes Sister    • Other Family         Stroke syndrome       Social History     Tobacco Use   • Smoking status: Never   • Smokeless tobacco: Never   Vaping Use   • Vaping Use: Never used   Substance Use Topics   • Alcohol use: Not Currently     Alcohol/week: 3.0 standard drinks of alcohol     Types: 3 Glasses of wine per week     Comment: 4 oz wine with dinner a few days a week   • Drug use: No       Objective:    Vitals:    07/06/23 1302   BP: 142/80   Pulse: 71   SpO2: 98%   Weight: 61.6 kg (135 lb 12.8 oz)   Height: 5' 6" (1.676 m)       BP Readings from Last 3 Encounters:   07/06/23 142/80   06/28/23 123/72   06/15/23 142/80        Wt Readings from Last 3 Encounters:   07/06/23 61.6 kg (135 lb 12.8 oz)   06/28/23 62.6 kg (138 lb)   06/15/23 62.6 kg (138 lb)        Physical Exam      General: NAD, Alert and oriented x3   HEENT: NCAT, EOMI, normal conjunctiva  Cardiovascular: RRR, normal S1 and S2, no m/r/g  Pulmonary: Normal respiratory effort, no wheezes, rales or rhonchi  GI: Soft, nontender, nondistended, normoactive bowel sounds  Musculoskeletal: Right upper extremity muscle atrophy  Neuro: Right biceps fasciculations   Extremities: Right upper extremity edema worse in hand, trace to 1+ pitting edema in right lower extremity over mid shin  Skin: Normal skin color, no rashes   Psychiatric: Normal mood and affect      Preop labs/testing available and reviewed: yes    eGFR   Date Value Ref Range Status   06/16/2023 95 ml/min/1.73sq m Final     WBC   Date Value Ref Range Status   06/16/2023 4.98 4.31 - 10.16 Thousand/uL Final     Hemoglobin   Date Value Ref Range Status   06/16/2023 14.6 12.0 - 17.0 g/dL Final     Hematocrit   Date Value Ref Range Status   2023 44.6 36.5 - 49.3 % Final     Platelets   Date Value Ref Range Status   2023 348 149 - 390 Thousands/uL Final     INR   Date Value Ref Range Status   2023 0.90 0.84 - 1.19 Final           RCRI  High Risk surgery? 1 Point  CAD History:         1 Point   MI; Positive Stress Test; CP due to Mi;  Nitrate Usage to control Angina; Pathologic Q wave on EKG  CHF Active:         1 Point   Pulm Edema; Paroxysmal Nocturnal Dyspnea;  Bibasilar Rales (crackles);S3; CHF on CXR  Cerebrovascular Disease (TIA or CVA):     1 Point  DM on Insulin:        1 Point  Serum Creat >2.0 mg/dl:       1 Point          Total Points: 1     Scorin: Class I, Very Low Risk (0.4%)     1: Class II, Low risk (0.9%)     2: Class III Moderate (6.6%)     3: Class IV High (>11%)        Assessment/Plan:    Patient is medically optimized for the planned procedure. Final clearance pending review of his STAT duplex studies. 1. Preop examination  Assessment & Plan:  -Patient is of low cardiac risk for the proposed procedure. He has been advised to hold aspirin and fish oil 7 days prior to his procedure. His preop labs and most recent EKG performed within the last 2 months have been reviewed. No further cardiac work-up indicated. 2. Right renal mass  Assessment & Plan:  - Right radical nephrectomy via laparoscopic surgery proposed. 3. Parsonage-Cordero syndrome  Assessment & Plan:  -Patient has been approved for surgery by neurology from a neurologic standpoint. There is currently no improvement in the patient's right upper extremity pain and weakness. Continue to follow with neurology as scheduled. 4. Essential hypertension  Assessment & Plan:  -Blood pressure controlled in office. Reviewed patient's blood pressure readings at home and he has several systolic readings in the low 100s typically in the morning.   I have recommended he reduce his lisinopril dose from 20 mg twice a day down to 20 mg in the morning and 10 mg in the evening. He may continue to use amlodipine 5 mg daily as needed for blood pressure readings greater than 150/90. Continue current dose of Toprol. 5. Anxiety  Assessment & Plan:  - Continue BuSpar twice a day as needed for anxiety      6. Right leg swelling  -     VAS lower limb venous duplex study, unilateral/limited; Future; Expected date: 07/06/2023    7. Swelling of arm  Assessment & Plan:  -Suspect the swelling in his right arm is in part due to muscle atrophy with a loss of contractions to help facilitate venous return. Will obtain ultrasound to rule out DVT. Discussed keeping arm elevated to help alleviate swelling. Orders:  -     VAS upper limb venous duplex scan, unilateral/limited; Future; Expected date: 07/06/2023        I have spent a total time of 40 minutes on 07/06/23 in caring for this patient including Diagnostic results, Prognosis, Instructions for management, Risk factor reductions, Documenting in the medical record and Reviewing / ordering tests, medicine, procedures  .

## 2023-07-06 NOTE — ASSESSMENT & PLAN NOTE
-Suspect the swelling in his right arm is in part due to muscle atrophy with a loss of contractions to help facilitate venous return. Will obtain ultrasound to rule out DVT. Discussed keeping arm elevated to help alleviate swelling.

## 2023-07-07 ENCOUNTER — TELEPHONE (OUTPATIENT)
Dept: INTERNAL MEDICINE CLINIC | Facility: CLINIC | Age: 68
End: 2023-07-07

## 2023-07-07 ENCOUNTER — HOSPITAL ENCOUNTER (EMERGENCY)
Facility: HOSPITAL | Age: 68
Discharge: HOME | End: 2023-07-07
Attending: EMERGENCY MEDICINE
Payer: COMMERCIAL

## 2023-07-07 ENCOUNTER — HOSPITAL ENCOUNTER (OUTPATIENT)
Dept: VASCULAR ULTRASOUND | Facility: HOSPITAL | Age: 68
Discharge: HOME | End: 2023-07-07
Attending: INTERNAL MEDICINE
Payer: COMMERCIAL

## 2023-07-07 VITALS
TEMPERATURE: 98 F | RESPIRATION RATE: 20 BRPM | DIASTOLIC BLOOD PRESSURE: 71 MMHG | OXYGEN SATURATION: 100 % | SYSTOLIC BLOOD PRESSURE: 158 MMHG | HEART RATE: 59 BPM

## 2023-07-07 DIAGNOSIS — M79.89 SWELLING OF ARM: ICD-10-CM

## 2023-07-07 DIAGNOSIS — C64.9 RENAL CELL CARCINOMA (HCC): ICD-10-CM

## 2023-07-07 DIAGNOSIS — I82.629 DVT OF UPPER EXTREMITY (DEEP VEIN THROMBOSIS) (HCC): Primary | ICD-10-CM

## 2023-07-07 DIAGNOSIS — I82.621 ACUTE DEEP VEIN THROMBOSIS (DVT) OF BRACHIAL VEIN OF RIGHT UPPER EXTREMITY (HCC): Primary | ICD-10-CM

## 2023-07-07 DIAGNOSIS — M79.89 RIGHT LEG SWELLING: ICD-10-CM

## 2023-07-07 LAB
ALBUMIN SERPL BCP-MCNC: 4.1 G/DL (ref 3.5–5)
ALP SERPL-CCNC: 41 U/L (ref 34–104)
ALT SERPL W P-5'-P-CCNC: 29 U/L (ref 7–52)
ANION GAP SERPL CALCULATED.3IONS-SCNC: 6 MMOL/L
APTT PPP: 28 SECONDS (ref 23–37)
AST SERPL W P-5'-P-CCNC: 27 U/L (ref 13–39)
BASOPHILS # BLD AUTO: 0.04 THOUSANDS/ÂΜL (ref 0–0.1)
BASOPHILS NFR BLD AUTO: 1 % (ref 0–1)
BILIRUB SERPL-MCNC: 0.56 MG/DL (ref 0.2–1)
BUN SERPL-MCNC: 17 MG/DL (ref 5–25)
CALCIUM SERPL-MCNC: 9.3 MG/DL (ref 8.4–10.2)
CHLORIDE SERPL-SCNC: 104 MMOL/L (ref 96–108)
CO2 SERPL-SCNC: 26 MMOL/L (ref 21–32)
CREAT SERPL-MCNC: 0.56 MG/DL (ref 0.6–1.3)
EOSINOPHIL # BLD AUTO: 0.05 THOUSAND/ÂΜL (ref 0–0.61)
EOSINOPHIL NFR BLD AUTO: 1 % (ref 0–6)
ERYTHROCYTE [DISTWIDTH] IN BLOOD BY AUTOMATED COUNT: 13 % (ref 11.6–15.1)
GFR SERPL CREATININE-BSD FRML MDRD: 104 ML/MIN/1.73SQ M
GLUCOSE SERPL-MCNC: 91 MG/DL (ref 65–140)
HCT VFR BLD AUTO: 40.6 % (ref 36.5–49.3)
HGB BLD-MCNC: 13.4 G/DL (ref 12–17)
IMM GRANULOCYTES # BLD AUTO: 0.01 THOUSAND/UL (ref 0–0.2)
IMM GRANULOCYTES NFR BLD AUTO: 0 % (ref 0–2)
INR PPP: 0.95 (ref 0.84–1.19)
LYMPHOCYTES # BLD AUTO: 1.17 THOUSANDS/ÂΜL (ref 0.6–4.47)
LYMPHOCYTES NFR BLD AUTO: 23 % (ref 14–44)
MCH RBC QN AUTO: 29.9 PG (ref 26.8–34.3)
MCHC RBC AUTO-ENTMCNC: 33 G/DL (ref 31.4–37.4)
MCV RBC AUTO: 91 FL (ref 82–98)
MONOCYTES # BLD AUTO: 0.44 THOUSAND/ÂΜL (ref 0.17–1.22)
MONOCYTES NFR BLD AUTO: 9 % (ref 4–12)
NEUTROPHILS # BLD AUTO: 3.39 THOUSANDS/ÂΜL (ref 1.85–7.62)
NEUTS SEG NFR BLD AUTO: 66 % (ref 43–75)
NRBC BLD AUTO-RTO: 0 /100 WBCS
PLATELET # BLD AUTO: 299 THOUSANDS/UL (ref 149–390)
PMV BLD AUTO: 8.6 FL (ref 8.9–12.7)
POTASSIUM SERPL-SCNC: 3.9 MMOL/L (ref 3.5–5.3)
PROT SERPL-MCNC: 6.2 G/DL (ref 6.4–8.4)
PROTHROMBIN TIME: 12.9 SECONDS (ref 11.6–14.5)
RBC # BLD AUTO: 4.48 MILLION/UL (ref 3.88–5.62)
SODIUM SERPL-SCNC: 136 MMOL/L (ref 135–147)
WBC # BLD AUTO: 5.1 THOUSAND/UL (ref 4.31–10.16)

## 2023-07-07 PROCEDURE — 80053 COMPREHEN METABOLIC PANEL: CPT

## 2023-07-07 PROCEDURE — 93971 EXTREMITY STUDY: CPT | Performed by: SURGERY

## 2023-07-07 PROCEDURE — 93971 EXTREMITY STUDY: CPT

## 2023-07-07 PROCEDURE — 85730 THROMBOPLASTIN TIME PARTIAL: CPT

## 2023-07-07 PROCEDURE — 85610 PROTHROMBIN TIME: CPT

## 2023-07-07 PROCEDURE — 99283 EMERGENCY DEPT VISIT LOW MDM: CPT

## 2023-07-07 PROCEDURE — 85025 COMPLETE CBC W/AUTO DIFF WBC: CPT

## 2023-07-07 PROCEDURE — 36415 COLL VENOUS BLD VENIPUNCTURE: CPT

## 2023-07-07 PROCEDURE — 96374 THER/PROPH/DIAG INJ IV PUSH: CPT

## 2023-07-07 RX ORDER — OXYCODONE HYDROCHLORIDE 5 MG/1
5 TABLET ORAL ONCE
Status: COMPLETED | OUTPATIENT
Start: 2023-07-07 | End: 2023-07-07

## 2023-07-07 RX ORDER — HYDROMORPHONE HCL IN WATER/PF 6 MG/30 ML
0.2 PATIENT CONTROLLED ANALGESIA SYRINGE INTRAVENOUS ONCE
Status: COMPLETED | OUTPATIENT
Start: 2023-07-07 | End: 2023-07-07

## 2023-07-07 RX ADMIN — OXYCODONE HYDROCHLORIDE 5 MG: 5 TABLET ORAL at 19:19

## 2023-07-07 RX ADMIN — APIXABAN 10 MG: 5 TABLET, FILM COATED ORAL at 18:51

## 2023-07-07 RX ADMIN — HYDROMORPHONE HYDROCHLORIDE 0.2 MG: 0.2 INJECTION, SOLUTION INTRAMUSCULAR; INTRAVENOUS; SUBCUTANEOUS at 17:10

## 2023-07-07 NOTE — ED NOTES
Pt reports he's on the phone with his specialist, asking to be triage after      Farrah Kunz, WENDIE  07/07/23 2164

## 2023-07-07 NOTE — ED ATTENDING ATTESTATION
7/7/2023  IOscar, DO, saw and evaluated the patient. I have discussed the patient with the resident/non-physician practitioner and agree with the resident's/non-physician practitioner's findings, Plan of Care, and MDM as documented in the resident's/non-physician practitioner's note, except where noted. All available labs and Radiology studies were reviewed. I was present for key portions of any procedure(s) performed by the resident/non-physician practitioner and I was immediately available to provide assistance. At this point I agree with the current assessment done in the Emergency Department. I have conducted an independent evaluation of this patient a history and physical is as follows:    Patient is a 78-year-old male with a history of renal cell carcinoma who presents with right upper extremity pain. Patient states that he has had chronic right shoulder pain. He has worsening pain in his right upper arm. He was undergoing preoperative testing for a nephrectomy. He had a duplex of the right upper extremity and right lower extremity. He was diagnosed with a DVT in the right brachial vein as well as superficial thrombophlebitis in the cephalic vein. Patient denies chest pain or shortness of breath. He denies any history of bleeding, including hematuria. On exam, patient is in no acute distress. Heart is regular rate and rhythm. Breath sounds normal.  Patient has pain with active range of motion in the right shoulder. He has no tenderness to palpation along the deep venous system in the right upper extremity. No edema of the right upper extremity. Mild pitting edema of the right lower extremity. I reviewed ultrasound findings and there is DVT in brachial vein. This will require systemic anticoagulation. Case was discussed with urology, and patient does not have any contraindications to anticoagulation at this time. However his nephrectomy will need to be rescheduled.   We will start patient on Eliquis and have him follow-up with urology as well as PCP. He should return to ED immediately if he develops bleeding, including but not limited to hematuria. Patient understands risk, benefits and alternatives to systemic anticoagulation. He is in agreement with starting Eliquis. Portions of the above record have been created with voice recognition software. Occasional wrong word or "sound alike" substitutions may have occurred due to the inherent limitations of voice recognition software. Read the chart carefully and recognize, using context, where substitutions may have occurred.       ED Course         Critical Care Time  Procedures

## 2023-07-07 NOTE — TELEPHONE ENCOUNTER
Received call from [facility]. The patient has a positive right upper extremity doppler. Reviewed with Dr. Paxton Taylor she indicated the patient should go to ED for evaluation. The patient and his wife were contacted. They are going to the ED for eval. The patient and his wife were instructed to contacted the surgeon with the update.

## 2023-07-07 NOTE — DISCHARGE INSTRUCTIONS
Sending you home with blood thinners (eliquis): take 10mg twice daily for the first 7 days, then take 5mg twice daily after that    [address] [place] [address] ED IF YOU NOTICE BLOOD IN YOUR URINE, STOOLS, IF YOU HAVE NEW FLANK OR ABDOMINAL PAIN, CHEST PAIN, HEADACHE, COUGHING UP BLOOD      Blood thinners  help prevent blood clots. Clots can cause strokes, heart attacks, and death. The following are general safety guidelines to follow while you are taking a blood thinner:    Watch for bleeding and bruising while you take blood thinners. Watch for bleeding from your gums or nose. Watch for blood in your urine and bowel movements. Use a soft washcloth on your skin, and a soft toothbrush to brush your teeth. This can keep your skin and gums from bleeding. If you shave, use an electric shaver. Do not play contact sports. Tell your dentist and other healthcare providers that you take a blood thinner. Wear a bracelet or necklace that says you take this medicine. Do not start or stop any other medicines unless your healthcare provider tells you to. Many medicines cannot be used with blood thinners. Take your blood thinner exactly as prescribed by your healthcare provider. Do not skip does or take less than prescribed. Tell your provider right away if you forget to take your blood thinner, or if you take too much.

## 2023-07-07 NOTE — ED PROVIDER NOTES
History  Chief Complaint   Patient presents with   • Arm Pain     Pt reports having positive ultrasound for DVT or right arm. Pt states "my arm is killing me". Denies CP, SOB. Pt sent in by PCP     HPI 42-year-old male recently diagnosed with renal cell carcinoma presents to the emergency department for treatment of right upper extremity DVT that was found on outpatient imaging earlier today. He is not on anticoagulation. Denies prior history of DVT or PE. He is not having any pleuritic chest pain or shortness of breath. 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 Due to the right shoulder pain, he feels unable to use his right arm and renders it basically useless, he occasionally wears it in a sling. He has taken Tylenol and tramadol which have helped a little. He has 10 out of 10 pain currently. Prior to Admission Medications   Prescriptions Last Dose Informant Patient Reported? Taking?    Coenzyme Q10 (CO Q 10 PO)   Yes No   Sig: Take by mouth in the morning   Docosahexaenoic Acid (DHA OMEGA 3) 100 MG CAPS  Self Yes No   Sig: Take 6 capsules by mouth daily   Multiple Vitamin tablet  Self Yes No   Sig: Take 1 tablet by mouth daily   acetaminophen (TYLENOL) 325 mg tablet  Self No No   Sig: Take 2 tablets (650 mg total) by mouth every 6 (six) hours as needed for mild pain   amLODIPine (NORVASC) 5 mg tablet   No No   Sig: Take 1 tablet (5 mg total) by mouth daily Prn BP> 150/90   bisacodyl (DULCOLAX) 5 mg EC tablet  Self No No   Sig: Take 1 tablet (5 mg total) by mouth daily as needed for constipation   busPIRone (BUSPAR) 7.5 mg tablet  Self No No   Sig: Take 1 tablet (7.5 mg total) by mouth 2 (two) times a day as needed (Anxiety)   docusate sodium (COLACE) 100 mg capsule   No No   Sig: Take 1 capsule (100 mg total) by mouth 2 (two) times a day   fexofenadine (ALLEGRA) 180 MG tablet  Self Yes No   Sig: Take 1 tablet by mouth daily as needed gabapentin (Neurontin) 300 mg capsule  Self No No   Sig: Take 1 capsule (300 mg total) by mouth daily at bedtime   lidocaine (XYLOCAINE) 5 % ointment  Self No No   Sig: Apply topically as needed for mild pain   lisinopril (ZESTRIL) 10 mg tablet   Yes No   Sig: Take 10 mg by mouth every evening   lisinopril (ZESTRIL) 20 mg tablet   Yes No   Sig: Take 20 mg by mouth every morning   magnesium citrate (CITROMA) 1.745 g/30 mL oral solution   No No   Sig: Take 296 mL by mouth once for 1 dose   metoprolol succinate (TOPROL-XL) 25 mg 24 hr tablet  Self No No   Sig: Take 1 tablet (25 mg total) by mouth daily with breakfast   polyethylene glycol (MIRALAX) 17 g packet  Self No No   Sig: Take 17 g by mouth daily Do not start before May 6, 2023. tiZANidine (ZANAFLEX) 2 mg tablet  Self No No   Sig: Take 1 tablet (2 mg total) by mouth 3 (three) times a day   Patient taking differently: Take 2 mg by mouth if needed      Facility-Administered Medications: None       Past Medical History:   Diagnosis Date   • Allergic    • Arthritis    • Asthma    • Cancer ([address] [facility])     kidney   • Hypertension    • Impaired fasting glucose    • Mitral valve disorder    • Mitral valve prolapse    • Murmur, cardiac    • 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    • PAC (premature atrial contraction)    • Parsonage-Cordero syndrome    • PVC (premature ventricular contraction)    • PVC (premature ventricular contraction)    • Renal cancer ([address]) 2023   • Thyroid nodule        Past Surgical History:   Procedure Laterality Date   • HAND SURGERY     • NV COLONOSCOPY FLX DX W/COLLJ SPEC WHEN PFRMD N/A 2/9/2018    Procedure: COLONOSCOPY;  Surgeon: Osmin Mares MD;  Location: [facility];   Service: Gastroenterology   • PROSTATE BIOPSY     • SHOULDER SURGERY         Family History   Problem Relation Age of Onset   • Diabetes Mother    • Stroke Mother    • Stroke Father    • Heart disease Father    • Diabetes Sister    • Other Family         Stroke syndrome     I have reviewed and agree with the history as documented. E-Cigarette/Vaping   • E-Cigarette Use Never User      E-Cigarette/Vaping Substances   • Nicotine No    • THC No    • CBD No    • Flavoring No    • Other No    • Unknown No      Social History     Tobacco Use   • Smoking status: Never   • Smokeless tobacco: Never   Vaping Use   • Vaping Use: Never used   Substance Use Topics   • Alcohol use: Not Currently     Alcohol/week: 3.0 standard drinks of alcohol     Types: 3 Glasses of wine per week     Comment: 4 oz wine with dinner a few days a week   • Drug use: No        Review of Systems   Constitutional: Negative for chills and fever. HENT: Negative for ear pain and sore throat. Eyes: Negative for pain and visual disturbance. Respiratory: Negative for cough and shortness of breath. Cardiovascular: Negative for chest pain and leg swelling. Gastrointestinal: Negative for abdominal pain, blood in stool, constipation, diarrhea, nausea and vomiting. Genitourinary: Negative for dysuria and hematuria. Musculoskeletal: Negative for neck pain and neck stiffness. Right shoulder pain    Neurological: Negative for dizziness, syncope, weakness and light-headedness. All other systems reviewed and are negative. Physical Exam  ED Triage Vitals   Temperature Pulse Respirations Blood Pressure SpO2   07/07/23 1436 07/07/23 1434 07/07/23 1434 07/07/23 1434 07/07/23 1434   98 °F (36.7 °C) 72 19 (!) 201/92 99 %      Temp src Heart Rate Source Patient Position - Orthostatic VS BP Location FiO2 (%)   -- 07/07/23 1434 07/07/23 1434 07/07/23 1434 --    Monitor Sitting Left arm       Pain Score       07/07/23 1434       10 - Worst Possible Pain             Orthostatic Vital Signs  Vitals:    07/07/23 1434 07/07/23 1712 07/07/23 1821   BP: (!) 201/92 161/75 158/71   Pulse: 72  59   Patient Position - Orthostatic VS: Sitting  Lying       Physical Exam  Vitals and nursing note reviewed. Constitutional:       General: He is not in acute distress. Appearance: He is well-developed. He is ill-appearing (chronically, cachetic). HENT:      Head: Normocephalic and atraumatic. Eyes:      Extraocular Movements: Extraocular movements intact. Conjunctiva/sclera: Conjunctivae normal.   Cardiovascular:      Rate and Rhythm: Normal rate and regular rhythm. Heart sounds: Normal heart sounds. No murmur heard. Pulmonary:      Effort: Pulmonary effort is normal. No respiratory distress. Breath sounds: Normal breath sounds. No wheezing, rhonchi or rales. Abdominal:      Palpations: Abdomen is soft. Tenderness: There is no abdominal tenderness. There is no right CVA tenderness, left CVA tenderness, guarding or rebound. Musculoskeletal:         General: Tenderness (right shoulder) present. No swelling. Arms:       Cervical back: Normal range of motion and neck supple. Right lower leg: No edema. Left lower leg: No edema. Skin:     General: Skin is warm and dry. Capillary Refill: Capillary refill takes less than 2 seconds. Findings: No rash. Neurological:      General: No focal deficit present. Mental Status: He is alert and oriented to person, place, and time. Cranial Nerves: No cranial nerve deficit. Sensory: No sensory deficit.    Psychiatric:         Mood and Affect: Mood normal.         Behavior: Behavior normal.         ED Medications  Medications   HYDROmorphone HCl (DILAUDID) injection 0.2 mg (0.2 mg Intravenous Given 7/7/23 1710)   apixaban (ELIQUIS) tablet 10 mg (10 mg Oral Given 7/7/23 1851)   oxyCODONE (ROXICODONE) IR tablet 5 mg (5 mg Oral Given 7/7/23 1919)       Diagnostic Studies  Results Reviewed     Procedure Component Value Units Date/Time    Comprehensive metabolic panel [[id number]]  (Abnormal) Collected: 07/07/23 1710    Lab Status: Final result Specimen: Blood from Arm, Left Updated: 07/07/23 1739     Sodium 136 mmol/L Potassium 3.9 mmol/L      Chloride 104 mmol/L      CO2 26 mmol/L      ANION GAP 6 mmol/L      BUN 17 mg/dL      Creatinine 0.56 mg/dL      Glucose 91 mg/dL      Calcium 9.3 mg/dL      AST 27 U/L      ALT 29 U/L      Alkaline Phosphatase 41 U/L      Total Protein 6.2 g/dL      Albumin 4.1 g/dL      Total Bilirubin 0.56 mg/dL      eGFR 104 ml/min/1.73sq m     Narrative:      National Kidney Disease Foundation guidelines for Chronic Kidney Disease (CKD):   •  Stage 1 with normal or high GFR (GFR > 90 mL/min/1.73 square meters)  •  Stage 2 Mild CKD (GFR = 60-89 mL/min/1.73 square meters)  •  Stage 3A Moderate CKD (GFR = 45-59 mL/min/1.73 square meters)  •  Stage 3B Moderate CKD (GFR = 30-44 mL/min/1.73 square meters)  •  Stage 4 Severe CKD (GFR = 15-29 mL/min/1.73 square meters)  •  Stage 5 End Stage CKD (GFR <15 mL/min/1.73 square meters)  Note: GFR calculation is accurate only with a steady state creatinine    Protime-INR [[id number]]  (Normal) Collected: 07/07/23 1710    Lab Status: Final result Specimen: Blood from Arm, Left Updated: 07/07/23 1733     Protime 12.9 seconds      INR 0.95    APTT [[id number]]  (Normal) Collected: 07/07/23 1710    Lab Status: Final result Specimen: Blood from Arm, Left Updated: 07/07/23 1733     PTT 28 seconds     CBC and differential [[id number]]  (Abnormal) Collected: 07/07/23 1710    Lab Status: Final result Specimen: Blood from Arm, Left Updated: 07/07/23 1724     WBC 5.10 Thousand/uL      RBC 4.48 Million/uL      Hemoglobin 13.4 g/dL      Hematocrit 40.6 %      MCV 91 fL      MCH 29.9 pg      MCHC 33.0 g/dL      RDW 13.0 %      MPV 8.6 fL      Platelets 640 Thousands/uL      nRBC 0 /100 WBCs      Neutrophils Relative 66 %      Immat GRANS % 0 %      Lymphocytes Relative 23 %      Monocytes Relative 9 %      Eosinophils Relative 1 %      Basophils Relative 1 %      Neutrophils Absolute 3.39 Thousands/µL      Immature Grans Absolute 0.01 Thousand/uL      Lymphocytes Absolute 1.17 Thousands/µL      Monocytes Absolute 0.44 Thousand/µL      Eosinophils Absolute 0.05 Thousand/µL      Basophils Absolute 0.04 Thousands/µL                  No orders to display         Procedures  Procedures      ED Course  ED Course as of 07/09/23 1535   Fri Jul 07, 2023   1451 68yoM with recently diagnosed right sided RCC. Outpatient vascular duplex today showed DVT in the RUE. Reached out to Bashir Portillo NP with Urology regarding risk of RCC hemorrhage with A/C. Medical Decision Making  78-year-old male recently diagnosed with renal cell carcinoma presents to the emergency department for treatment of right upper extremity DVT that was found on outpatient imaging earlier today. Here in the department, patient is hemodynamically stable and afebrile, nontachycardic with normal work of breathing satting 100% on room air. He is alert and oriented x3, speaking to me in full sentences. He is holding his right arm in extension, holds very still, due to pain in the right shoulder when he moves it, this has been ongoing for some time now. He has strong distal radial pulses, normal sensation. The remainder of his exam is unremarkable. Labs here are unremarkable. Patient is started on Eliquis for DVT management. Discussion of risk benefits, patient is amenable to taking at this time. Additionally contacted urology on-call DARRYL regarding patient's surgery for renal cell carcinoma next week. She states that the surgeon is out of the office and will not be back until next week, however she will leave a priority message for him to contact the patient first thing Monday morning. She believes the procedure will be canceled. She does agree with anticoagulation for DVT at this time. Discussion of strict return precautions with the patient and his wife. Recommend prompt follow-up early next week with his PCP or surgeon.   Patient is discharged home in stable condition. Amount and/or Complexity of Data Reviewed  Labs: ordered. Risk  Prescription drug management. Disposition  Final diagnoses:   DVT of upper extremity (deep vein thrombosis) (HCC)   Renal cell carcinoma ([address])     Time reflects when diagnosis was documented in both MDM as applicable and the Disposition within this note     Time User Action Codes Description Comment    7/7/2023  6:33 PM Cami Nicolás Add [I82.629] DVT of upper extremity (deep vein thrombosis) ([address])     7/7/2023  6:33 PM Cami Monzon Add [C64.9] Renal cell carcinoma [facility])       ED Disposition     ED Disposition   Discharge    Condition   Stable    Date/Time   Fri Jul 7, 2023  6:33 PM    [address] discharge to home/self care.                Follow-up Information     Follow up With Specialties Details Why Contact Info    Your Surgeon -- Dr. Sol Malagon  Call in 2 days            Discharge Medication List as of 7/7/2023  6:49 PM      START taking these medications    Details   apixaban (ELIQUIS) 5 mg Take 2 tablets (10 mg total) by mouth 2 (two) times a day for 14 days Then 5 mg (one tablet) twice daily after 1 week., Starting Fri 7/7/2023, Until Fri 7/21/2023, Normal         CONTINUE these medications which have NOT CHANGED    Details   acetaminophen (TYLENOL) 325 mg tablet Take 2 tablets (650 mg total) by mouth every 6 (six) hours as needed for mild pain, Starting Fri 5/5/2023, No Print      amLODIPine (NORVASC) 5 mg tablet Take 1 tablet (5 mg total) by mouth daily Prn BP> 150/90, Starting Mon 5/22/2023, Normal      bisacodyl (DULCOLAX) 5 mg EC tablet Take 1 tablet (5 mg total) by mouth daily as needed for constipation, Starting Wed 5/10/2023, Normal      busPIRone (BUSPAR) 7.5 mg tablet Take 1 tablet (7.5 mg total) by mouth 2 (two) times a day as needed (Anxiety), Starting Mon 3/27/2023, Normal      Coenzyme Q10 (CO Q 10 PO) Take by mouth in the morning, Historical Med      Docosahexaenoic Acid (DHA OMEGA 3) 100 MG CAPS Take 6 capsules by mouth daily, Historical Med      docusate sodium (COLACE) 100 mg capsule Take 1 capsule (100 mg total) by mouth 2 (two) times a day, Starting Fri 6/2/2023, Normal      fexofenadine (ALLEGRA) 180 MG tablet Take 1 tablet by mouth daily as needed, Starting Tue 6/10/2014, Historical Med      gabapentin (Neurontin) 300 mg capsule Take 1 capsule (300 mg total) by mouth daily at bedtime, Starting Tue 5/16/2023, Normal      lidocaine (XYLOCAINE) 5 % ointment Apply topically as needed for mild pain, Starting Mon 3/27/2023, Normal      !! lisinopril (ZESTRIL) 10 mg tablet Take 10 mg by mouth every evening, Historical Med      !! lisinopril (ZESTRIL) 20 mg tablet Take 20 mg by mouth every morning, Historical Med      magnesium citrate (CITROMA) 1.745 g/30 mL oral solution Take 296 mL by mouth once for 1 dose, Starting Thu 6/15/2023, Normal      metoprolol succinate (TOPROL-XL) 25 mg 24 hr tablet Take 1 tablet (25 mg total) by mouth daily with breakfast, Starting Mon 5/15/2023, Normal      Multiple Vitamin tablet Take 1 tablet by mouth daily, Historical Med      polyethylene glycol (MIRALAX) 17 g packet Take 17 g by mouth daily Do not start before May 6, 2023., Starting Sat 5/6/2023, No Print      tiZANidine (ZANAFLEX) 2 mg tablet Take 1 tablet (2 mg total) by mouth 3 (three) times a day, Starting Tue 5/16/2023, Until Thu 7/6/2023, Normal       !! - Potential duplicate medications found. Please discuss with provider. No discharge procedures on file. PDMP Review       Value Time User    PDMP Reviewed  Yes 5/30/2023  5:14 PM Janak Wagner MD           ED Provider  Attending physically available and evaluated Linda Louis. I managed the patient along with the ED Attending.     Electronically Signed by         Anthony Rich MD  07/09/23 0402

## 2023-07-09 ENCOUNTER — HOSPITAL ENCOUNTER (OUTPATIENT)
Dept: MRI IMAGING | Facility: HOSPITAL | Age: 68
Discharge: HOME | End: 2023-07-09
Attending: UROLOGY

## 2023-07-09 DIAGNOSIS — N28.89 RENAL MASS, RIGHT: ICD-10-CM

## 2023-07-10 ENCOUNTER — TELEPHONE (OUTPATIENT)
Dept: INTERNAL MEDICINE CLINIC | Facility: CLINIC | Age: 68
End: 2023-07-10

## 2023-07-10 DIAGNOSIS — I82.621 ACUTE DEEP VEIN THROMBOSIS (DVT) OF BRACHIAL VEIN OF RIGHT UPPER EXTREMITY (HCC): Primary | ICD-10-CM

## 2023-07-10 NOTE — TELEPHONE ENCOUNTER
Please call the patient. Dr Stefanie Fierro is not available today. I do not think he can have the planned surgery in 2 days because of the newly found DVT in the arm and just starting Eliquis.  I will send a message to the urologist Dr Jayson Felix.

## 2023-07-10 NOTE — TELEPHONE ENCOUNTER
Pt calling in for an update, asking if someone can please review his message and get back to him ASAP - wanted it sent to Dr. Lisa De Leon as well since he saw her for this issue

## 2023-07-10 NOTE — TELEPHONE ENCOUNTER
Pt called stating he was told to go to the ER on Friday . Pt ended up having a blood clot  And is taking eliquis . Pt is asking if the results have been reviewed. I let patient know that DG was out of the office, hopefully in tomorrow.    Pt is awating surgery ( NEPHRECTOMY RADICAL LAPAROSCOPIC W/ ROBOTICS)  Please advise    Sending to covering provider

## 2023-07-10 NOTE — TELEPHONE ENCOUNTER
Please inform the patient that the surgery will be cancelled. He may hear from the urology office as well. Schedule a f/u with Dr Lien Javed this week or next.

## 2023-07-10 NOTE — TELEPHONE ENCOUNTER
Waiting to hear from Dr Poly Red if admission for heparin drip is possible so surgery can be performed as scheduled.

## 2023-07-11 LAB
ABO GROUP BLD: NORMAL
BLD GP AB SCN SERPL QL: NEGATIVE
RH BLD: POSITIVE
SPECIMEN EXPIRATION DATE: NORMAL

## 2023-07-11 NOTE — TELEPHONE ENCOUNTER
Notify patient I recommend he be seen by Hematology to discuss adequate treatment duration for his DVT prior to proceeding with his planned surgery. If he's in agreement a referral will be generated.

## 2023-07-11 NOTE — TELEPHONE ENCOUNTER
Please put a referral in for hematology. The patient has an appointment on Thursday with you. Did you still want to see him? Please advise.  Thank you

## 2023-07-12 ENCOUNTER — HOSPITAL ENCOUNTER (EMERGENCY)
Facility: HOSPITAL | Age: 68
Discharge: HOME | End: 2023-07-12
Attending: EMERGENCY MEDICINE
Payer: COMMERCIAL

## 2023-07-12 ENCOUNTER — TELEPHONE (OUTPATIENT)
Dept: INTERNAL MEDICINE CLINIC | Facility: CLINIC | Age: 68
End: 2023-07-12

## 2023-07-12 ENCOUNTER — OFFICE VISIT (OUTPATIENT)
Dept: URGENT CARE | Facility: MEDICAL CENTER | Age: 68
End: 2023-07-12
Payer: COMMERCIAL

## 2023-07-12 ENCOUNTER — APPOINTMENT (EMERGENCY)
Dept: RADIOLOGY | Facility: HOSPITAL | Age: 68
End: 2023-07-12
Payer: COMMERCIAL

## 2023-07-12 VITALS
WEIGHT: 135 LBS | DIASTOLIC BLOOD PRESSURE: 92 MMHG | SYSTOLIC BLOOD PRESSURE: 170 MMHG | BODY MASS INDEX: 21.69 KG/M2 | TEMPERATURE: 98.3 F | RESPIRATION RATE: 18 BRPM | HEART RATE: 82 BPM | OXYGEN SATURATION: 96 % | HEIGHT: 66 IN

## 2023-07-12 VITALS
RESPIRATION RATE: 16 BRPM | HEART RATE: 59 BPM | OXYGEN SATURATION: 98 % | DIASTOLIC BLOOD PRESSURE: 63 MMHG | SYSTOLIC BLOOD PRESSURE: 123 MMHG | TEMPERATURE: 97.9 F

## 2023-07-12 DIAGNOSIS — R52 UNCONTROLLED PAIN: Primary | ICD-10-CM
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
## 2023-07-12 DIAGNOSIS — S41.111A SKIN TEAR OF RIGHT UPPER ARM WITHOUT COMPLICATION: ICD-10-CM

## 2023-07-12 DIAGNOSIS — S51.011A LACERATION OF RIGHT ELBOW, INITIAL ENCOUNTER: ICD-10-CM

## 2023-07-12 DIAGNOSIS — M25.511 ACUTE PAIN OF RIGHT SHOULDER: ICD-10-CM

## 2023-07-12 DIAGNOSIS — S40.011A CONTUSION OF RIGHT SHOULDER, INITIAL ENCOUNTER: ICD-10-CM

## 2023-07-12 PROCEDURE — 99284 EMERGENCY DEPT VISIT MOD MDM: CPT

## 2023-07-12 PROCEDURE — 73030 X-RAY EXAM OF SHOULDER: CPT

## 2023-07-12 PROCEDURE — 99285 EMERGENCY DEPT VISIT HI MDM: CPT | Performed by: EMERGENCY MEDICINE

## 2023-07-12 PROCEDURE — 73080 X-RAY EXAM OF ELBOW: CPT

## 2023-07-12 PROCEDURE — 99213 OFFICE O/P EST LOW 20 MIN: CPT | Performed by: PHYSICIAN ASSISTANT

## 2023-07-12 RX ORDER — ACETAMINOPHEN 325 MG/1
650 TABLET ORAL ONCE
Status: COMPLETED | OUTPATIENT
Start: 2023-07-12 | End: 2023-07-12

## 2023-07-12 RX ORDER — OXYCODONE HYDROCHLORIDE 5 MG/1
2.5 TABLET ORAL EVERY 6 HOURS PRN
Qty: 7 TABLET | Refills: 0 | Status: SHIPPED | OUTPATIENT
Start: 2023-07-12

## 2023-07-12 RX ORDER — OXYCODONE HYDROCHLORIDE 5 MG/1
5 TABLET ORAL ONCE
Status: COMPLETED | OUTPATIENT
Start: 2023-07-12 | End: 2023-07-12

## 2023-07-12 RX ORDER — LIDOCAINE 50 MG/G
1 PATCH TOPICAL ONCE
Status: DISCONTINUED | OUTPATIENT
Start: 2023-07-12 | End: 2023-07-12 | Stop reason: HOSPADM

## 2023-07-12 RX ADMIN — OXYCODONE HYDROCHLORIDE 5 MG: 5 TABLET ORAL at 14:53

## 2023-07-12 RX ADMIN — ACETAMINOPHEN 650 MG: 325 TABLET, FILM COATED ORAL at 14:53

## 2023-07-12 RX ADMIN — LIDOCAINE 1 PATCH: 50 PATCH TOPICAL at 14:52

## 2023-07-12 NOTE — TELEPHONE ENCOUNTER
I triage this call , pt wife call in regards recent recommendation from [id number] [facility] to go to ER. I spoke with pt and his wife there is no  Confusion, blurred vision or any lump beside the laceration on his elbow.  I advise and recommend pt to go to [place] to be evaluated

## 2023-07-12 NOTE — DISCHARGE INSTRUCTIONS
Keep area covered & dry for ~24 hours. Then cleanse wound 1-2 times daily with mild soap & water. Pat dry and apply an over-the-counter antibacterial ointment (ie. bacitracin, triple antibiotic or neosporin) to the area to prevent infection. Cover with a nonstick gauze and wrap with gauze or Ace wrap. Avoid taping directly to skin to minimize further skin trauma. Use ice pack frequently for 15 to 20-minute intervals. Take acetaminophen 650 to 1000 mg orally every 6 hours to help with pain. You may use tramadol as previously prescribed for discomfort and if needed half a tablet of oxycodone for severe pain up to every 6 hours.

## 2023-07-12 NOTE — PATIENT INSTRUCTIONS
Status post fall  Referred to ER for further eval  Laceration to right elbow  Contusion right shoulder  Uncontrolled pain  Follow up with PCP in 3-5 days. Proceed to  ER if symptoms worsen.

## 2023-07-12 NOTE — PROGRESS NOTES
[facility]        NAME: Luis Armando Zimmerman is a 77 y.o. male  : 1955    MRN: [id number]  DATE: 2023  TIME: 11:24 AM    Assessment and Plan   Uncontrolled pain [R52]  1. Uncontrolled pain        2. Contusion of right shoulder, initial encounter        3. Laceration of right elbow, initial encounter              Patient Instructions     Status post fall  Referred to ER for further eval  Laceration to right elbow  Contusion right shoulder  Uncontrolled pain  Follow up with PCP in 3-5 days. Proceed to  ER if symptoms worsen. Chief Complaint     Chief Complaint   Patient presents with   • Fall     Pt. Lost his balance while walking to the bathroom. He scraped his right elbow on the carpet. He has a blood clot in his right arm and started Eliquis last week. He has swelling to B/L wrists that he reports is not new. History of Present Illness       55-year-old male on Eliquis who presents status post fall. Patient complaining of increased pain to right shoulder, laceration to right elbow. Patient denies head trauma, loss of consciousness      Review of Systems   Review of Systems   Constitutional: Negative. HENT: Negative. Eyes: Negative. Respiratory: Negative. Negative for apnea, cough, choking, chest tightness, shortness of breath, wheezing and stridor. Cardiovascular: Negative. Negative for chest pain. Musculoskeletal: Positive for arthralgias.          Current Medications       Current Outpatient Medications:   •  acetaminophen (TYLENOL) 325 mg tablet, Take 2 tablets (650 mg total) by mouth every 6 (six) hours as needed for mild pain, Disp: , Rfl: 0  •  amLODIPine (NORVASC) 5 mg tablet, Take 1 tablet (5 mg total) by mouth daily Prn BP> 150/90, Disp: 30 tablet, Rfl: 5  •  apixaban (ELIQUIS) 5 mg, Take 2 tablets (10 mg total) by mouth 2 (two) times a day for 14 days Then 5 mg (one tablet) twice daily after 1 week., Disp: 56 tablet, Rfl: 0  •  bisacodyl (DULCOLAX) 5 mg EC tablet, Take 1 tablet (5 mg total) by mouth daily as needed for constipation, Disp: 30 tablet, Rfl: 0  •  busPIRone (BUSPAR) 7.5 mg tablet, Take 1 tablet (7.5 mg total) by mouth 2 (two) times a day as needed (Anxiety), Disp: 60 tablet, Rfl: 1  •  Coenzyme Q10 (CO Q 10 PO), Take by mouth in the morning, Disp: , Rfl:   •  Docosahexaenoic Acid (DHA OMEGA 3) 100 MG CAPS, Take 6 capsules by mouth daily, Disp: , Rfl:   •  docusate sodium (COLACE) 100 mg capsule, Take 1 capsule (100 mg total) by mouth 2 (two) times a day, Disp: 60 capsule, Rfl: 2  •  fexofenadine (ALLEGRA) 180 MG tablet, Take 1 tablet by mouth daily as needed, Disp: , Rfl:   •  gabapentin (Neurontin) 300 mg capsule, Take 1 capsule (300 mg total) by mouth daily at bedtime, Disp: 30 capsule, Rfl: 1  •  lisinopril (ZESTRIL) 10 mg tablet, Take 10 mg by mouth every evening, Disp: , Rfl:   •  lisinopril (ZESTRIL) 20 mg tablet, Take 20 mg by mouth every morning, Disp: , Rfl:   •  metoprolol succinate (TOPROL-XL) 25 mg 24 hr tablet, Take 1 tablet (25 mg total) by mouth daily with breakfast, Disp: 90 tablet, Rfl: 3  •  Multiple Vitamin tablet, Take 1 tablet by mouth daily, Disp: , Rfl:   •  polyethylene glycol (MIRALAX) 17 g packet, Take 17 g by mouth daily Do not start before May 6, 2023., Disp: , Rfl: 0  •  tiZANidine (ZANAFLEX) 2 mg tablet, Take 1 tablet (2 mg total) by mouth 3 (three) times a day (Patient taking differently: Take 2 mg by mouth if needed), Disp: 90 tablet, Rfl: 1  •  lidocaine (XYLOCAINE) 5 % ointment, Apply topically as needed for mild pain (Patient not taking: Reported on 7/12/2023), Disp: 35.44 g, Rfl: 3  •  magnesium citrate (CITROMA) 1.745 g/30 mL oral solution, Take 296 mL by mouth once for 1 dose, Disp: 296 mL, Rfl: 0    Current Allergies     Allergies as of 07/12/2023   • (No Known Allergies)            The following portions of the patient's history were reviewed and updated as appropriate: allergies, current medications, past family history, past medical history, past social history, past surgical history and problem list.     Past Medical History:   Diagnosis Date   • Allergic    • Arthritis    • Asthma    • Cancer ([address])     kidney   • Hypertension    • Impaired fasting glucose    • Mitral valve disorder    • Mitral valve prolapse    • Murmur, cardiac    • 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    • PAC (premature atrial contraction)    • Parsonage-Cordero syndrome    • PVC (premature ventricular contraction)    • PVC (premature ventricular contraction)    • Renal cancer ([address]) 2023   • Thyroid nodule        Past Surgical History:   Procedure Laterality Date   • HAND SURGERY     • WA COLONOSCOPY FLX DX W/COLLJ SPEC WHEN PFRMD N/A 2/9/2018    Procedure: COLONOSCOPY;  Surgeon: Pasquale Beach MD;  Location: [facility]; Service: Gastroenterology   • PROSTATE BIOPSY     • SHOULDER SURGERY         Family History   Problem Relation Age of Onset   • Diabetes Mother    • Stroke Mother    • Stroke Father    • Heart disease Father    • Diabetes Sister    • Other Family         Stroke syndrome         Medications have been verified. Objective   /92   Pulse 82   Temp 98.3 °F (36.8 °C)   Resp 18   Ht 5' 6" (1.676 m)   Wt 61.2 kg (135 lb)   SpO2 96%   BMI 21.79 kg/m²        Physical Exam     Physical Exam  Constitutional:       General: He is not in acute distress. Appearance: Normal appearance. He is well-developed. He is not diaphoretic. HENT:      Head: Normocephalic and atraumatic. Cardiovascular:      Rate and Rhythm: Normal rate and regular rhythm. Heart sounds: Normal heart sounds. Pulmonary:      Effort: Pulmonary effort is normal. No respiratory distress. Breath sounds: Normal breath sounds. No wheezing or rales. Chest:      Chest wall: No tenderness. Musculoskeletal:        Arms:       Cervical back: Normal range of motion and neck supple.    Lymphadenopathy:      Cervical: No cervical adenopathy. Neurological:      Mental Status: He is alert.            Wife says that they will go to the emergency room

## 2023-07-13 ENCOUNTER — OFFICE VISIT (OUTPATIENT)
Dept: INTERNAL MEDICINE CLINIC | Facility: CLINIC | Age: 68
End: 2023-07-13
Payer: COMMERCIAL

## 2023-07-13 ENCOUNTER — HOME HEALTH ADMISSION (OUTPATIENT)
Dept: HOME HEALTH SERVICES | Facility: HOME HEALTHCARE | Age: 68
End: 2023-07-13
Payer: COMMERCIAL

## 2023-07-13 VITALS
HEART RATE: 76 BPM | OXYGEN SATURATION: 96 % | DIASTOLIC BLOOD PRESSURE: 84 MMHG | SYSTOLIC BLOOD PRESSURE: 142 MMHG | HEIGHT: 66 IN | WEIGHT: 135 LBS | BODY MASS INDEX: 21.69 KG/M2
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
## 2023-07-13 DIAGNOSIS — G54.5 PARSONAGE-TURNER SYNDROME: ICD-10-CM

## 2023-07-13 DIAGNOSIS — I82.621 ACUTE DEEP VEIN THROMBOSIS (DVT) OF BRACHIAL VEIN OF RIGHT UPPER EXTREMITY (HCC): Primary | ICD-10-CM

## 2023-07-13 DIAGNOSIS — S51.011D SKIN TEAR OF RIGHT ELBOW WITHOUT COMPLICATION, SUBSEQUENT ENCOUNTER: ICD-10-CM

## 2023-07-13 DIAGNOSIS — N28.89 RIGHT RENAL MASS: ICD-10-CM

## 2023-07-13 PROCEDURE — 99214 OFFICE O/P EST MOD 30 MIN: CPT | Performed by: INTERNAL MEDICINE

## 2023-07-13 NOTE — PROGRESS NOTES
Completely name: Krysta De Los Santos      : 1955      MRN: [id number]  Encounter Provider: Geovanna Perez MD  Encounter Date: 2023   Encounter department: [facility]     1. Acute deep vein thrombosis (DVT) of brachial vein of right upper extremity (HCC)  Assessment & Plan:  -DVT in the setting of right upper extremity immobility and presumed right renal cell carcinoma  -Continue Eliquis   -Referral made to hematology for recommendations as to when can proceed with right radical nephrectomy and perioperative management of Eliquis        2. Right renal mass  Assessment & Plan:  -Felt to be most likely renal cell carcinoma. Patient's planned surgery with urology for radical nephrectomy was canceled given new diagnosis of right upper extremity DVT. 3. Fall, subsequent encounter    4. Skin tear of right elbow without complication, subsequent encounter  Assessment & Plan:  - Patient's wound does not appear to be infected. I have given him instructions for wound care:  -First apply Xeroform dressing to cover the wound  -Apply 1 nonadherent pad  -Wrap with stretch gauze and secure with tape      Orders:  -     Referral to [address]; Future    5. Parsonage-Cordero syndrome  Assessment & Plan:  -Appreciate neurology follow-up             Subjective      HPI  Patient presents today for post ED follow-up. He has been seen in the emergency department twice over the past week. On  he was sent after was found to have a right upper extremity DVT on ultrasound. He was started on Eliquis. Yesterday he was seen in the ED after falling from standing height. Imaging of his right shoulder and elbow were obtained and showed no evidence of fracture. He was sent home with a small supply of oxycodone and a lidocaine patch. Today, the patient reports his main concern is a wound on his right elbow. He is requesting a to be evaluated.   He is concerned that it may be infected. ROS as per HPI    Current Outpatient Medications on File Prior to Visit   Medication Sig   • acetaminophen (TYLENOL) 325 mg tablet Take 2 tablets (650 mg total) by mouth every 6 (six) hours as needed for mild pain   • amLODIPine (NORVASC) 5 mg tablet Take 1 tablet (5 mg total) by mouth daily Prn BP> 150/90   • apixaban (ELIQUIS) 5 mg Take 2 tablets (10 mg total) by mouth 2 (two) times a day for 14 days Then 5 mg (one tablet) twice daily after 1 week. • bisacodyl (DULCOLAX) 5 mg EC tablet Take 1 tablet (5 mg total) by mouth daily as needed for constipation   • busPIRone (BUSPAR) 7.5 mg tablet Take 1 tablet (7.5 mg total) by mouth 2 (two) times a day as needed (Anxiety)   • Coenzyme Q10 (CO Q 10 PO) Take by mouth in the morning   • Docosahexaenoic Acid (DHA OMEGA 3) 100 MG CAPS Take 6 capsules by mouth daily   • docusate sodium (COLACE) 100 mg capsule Take 1 capsule (100 mg total) by mouth 2 (two) times a day   • fexofenadine (ALLEGRA) 180 MG tablet Take 1 tablet by mouth daily as needed   • gabapentin (Neurontin) 300 mg capsule Take 1 capsule (300 mg total) by mouth daily at bedtime   • lisinopril (ZESTRIL) 10 mg tablet Take 10 mg by mouth every evening   • lisinopril (ZESTRIL) 20 mg tablet Take 20 mg by mouth every morning   • metoprolol succinate (TOPROL-XL) 25 mg 24 hr tablet Take 1 tablet (25 mg total) by mouth daily with breakfast   • Multiple Vitamin tablet Take 1 tablet by mouth daily   • oxyCODONE (ROXICODONE) 5 immediate release tablet Take 0.5 tablets (2.5 mg total) by mouth every 6 (six) hours as needed for severe pain Max Daily Amount: 10 mg   • polyethylene glycol (MIRALAX) 17 g packet Take 17 g by mouth daily Do not start before May 6, 2023.    • lidocaine (XYLOCAINE) 5 % ointment Apply topically as needed for mild pain (Patient not taking: Reported on 7/12/2023)   • magnesium citrate (CITROMA) 1.745 g/30 mL oral solution Take 296 mL by mouth once for 1 dose   • tiZANidine (ZANAFLEX) 2 mg tablet Take 1 tablet (2 mg total) by mouth 3 (three) times a day (Patient taking differently: Take 2 mg by mouth if needed)       Objective     /84   Pulse 76   Ht 5' 6" (1.676 m)   Wt 61.2 kg (135 lb)   SpO2 96%   BMI 21.79 kg/m²          Wt Readings from Last 3 Encounters:   07/15/23 54.9 kg (121 lb 0.5 oz)   07/13/23 61.2 kg (135 lb)   07/12/23 61.2 kg (135 lb)       Physical Exam    General: NAD, Alert and oriented x3   HEENT: NCAT, EOMI, normal conjunctiva  Cardiovascular: RRR, normal S1 and S2, no m/r/g  Pulmonary: Normal respiratory effort, no wheezes, rales or rhonchi  GI: Soft, nontender, nondistended, normoactive bowel sounds  MSK: Right upper extremity atrophy  Neuro: Right biceps fasciculations  she is    Extremities: No lower extremity edema  Skin: Skin tear over right elbow    Diana Lima MD

## 2023-07-13 NOTE — PATIENT INSTRUCTIONS
-Please change wound dressing every 48 hours.   -First apply enough Xeroform dressing (yellow) to cover the wound  -And then apply 1 nonadherent pad  -And then wrap with stretch gauze and secure with tape  -Keep scheduled appointment with Hematology

## 2023-07-13 NOTE — ASSESSMENT & PLAN NOTE
-Felt to be most likely renal cell carcinoma. Patient's planned surgery with urology for radical nephrectomy was canceled given new diagnosis of right upper extremity DVT.

## 2023-07-14 ENCOUNTER — TELEPHONE (OUTPATIENT)
Dept: INTERNAL MEDICINE CLINIC | Facility: CLINIC | Age: 68
End: 2023-07-14

## 2023-07-14 NOTE — TELEPHONE ENCOUNTER
Telephone call from the patients wife, toilet for 2.5 hours. He is trying to disimpact his bowels. The patient's wife documented the last time he moved his bowels was on 7/10/23. The patient feels it was in the last couple of days. Today Taina Donahue had given the patient 3 colace and 8 oz of Miralax. Taina Donahue stated that there is a liquid leaking. There is no blood. I encouraged the patient to not sit on the toilet until I returned their call. I spoke with Dr. Roxy Anne he is recommending a suppository or enema. If he is unable to go or has pain he should go to the ED. I called and reviewed with the patient and Taina Donahue . He disimpacted himself and has moved his bowels. I encouraged the patient to restart his bowel regimen.

## 2023-07-15 ENCOUNTER — APPOINTMENT (EMERGENCY)
Dept: CT IMAGING | Facility: HOSPITAL | Age: 68
End: 2023-07-15
Payer: COMMERCIAL

## 2023-07-15 ENCOUNTER — HOME CARE VISIT (OUTPATIENT)
Dept: HOME HEALTH SERVICES | Facility: HOME HEALTHCARE | Age: 68
End: 2023-07-15

## 2023-07-15 ENCOUNTER — HOSPITAL ENCOUNTER (EMERGENCY)
Facility: HOSPITAL | Age: 68
Discharge: HOME | End: 2023-07-15
Attending: EMERGENCY MEDICINE | Admitting: EMERGENCY MEDICINE
Payer: COMMERCIAL

## 2023-07-15 VITALS
DIASTOLIC BLOOD PRESSURE: 59 MMHG | HEART RATE: 64 BPM | WEIGHT: 121.03 LBS | SYSTOLIC BLOOD PRESSURE: 119 MMHG | RESPIRATION RATE: 16 BRPM | BODY MASS INDEX: 19.54 KG/M2 | OXYGEN SATURATION: 99 % | TEMPERATURE: 98.1 F

## 2023-07-15 DIAGNOSIS — K56.41 FECAL IMPACTION (HCC): Primary | ICD-10-CM

## 2023-07-15 LAB
ALBUMIN SERPL BCP-MCNC: 4.2 G/DL (ref 3.5–5)
ALP SERPL-CCNC: 39 U/L (ref 34–104)
ALT SERPL W P-5'-P-CCNC: 24 U/L (ref 7–52)
ANION GAP SERPL CALCULATED.3IONS-SCNC: 9 MMOL/L
AST SERPL W P-5'-P-CCNC: 26 U/L (ref 13–39)
BASOPHILS # BLD AUTO: 0.02 THOUSANDS/ÂΜL (ref 0–0.1)
BASOPHILS NFR BLD AUTO: 0 % (ref 0–1)
BILIRUB SERPL-MCNC: 0.69 MG/DL (ref 0.2–1)
BUN SERPL-MCNC: 19 MG/DL (ref 5–25)
CALCIUM SERPL-MCNC: 9.3 MG/DL (ref 8.4–10.2)
CHLORIDE SERPL-SCNC: 103 MMOL/L (ref 96–108)
CO2 SERPL-SCNC: 21 MMOL/L (ref 21–32)
CREAT SERPL-MCNC: 0.57 MG/DL (ref 0.6–1.3)
EOSINOPHIL # BLD AUTO: 0.01 THOUSAND/ÂΜL (ref 0–0.61)
EOSINOPHIL NFR BLD AUTO: 0 % (ref 0–6)
ERYTHROCYTE [DISTWIDTH] IN BLOOD BY AUTOMATED COUNT: 13.1 % (ref 11.6–15.1)
GFR SERPL CREATININE-BSD FRML MDRD: 104 ML/MIN/1.73SQ M
GLUCOSE SERPL-MCNC: 117 MG/DL (ref 65–140)
HCT VFR BLD AUTO: 40.2 % (ref 36.5–49.3)
HGB BLD-MCNC: 13.5 G/DL (ref 12–17)
IMM GRANULOCYTES # BLD AUTO: 0.03 THOUSAND/UL (ref 0–0.2)
IMM GRANULOCYTES NFR BLD AUTO: 0 % (ref 0–2)
LYMPHOCYTES # BLD AUTO: 0.52 THOUSANDS/ÂΜL (ref 0.6–4.47)
LYMPHOCYTES NFR BLD AUTO: 6 % (ref 14–44)
MCH RBC QN AUTO: 30.3 PG (ref 26.8–34.3)
MCHC RBC AUTO-ENTMCNC: 33.6 G/DL (ref 31.4–37.4)
MCV RBC AUTO: 90 FL (ref 82–98)
MONOCYTES # BLD AUTO: 0.43 THOUSAND/ÂΜL (ref 0.17–1.22)
MONOCYTES NFR BLD AUTO: 5 % (ref 4–12)
NEUTROPHILS # BLD AUTO: 7.3 THOUSANDS/ÂΜL (ref 1.85–7.62)
NEUTS SEG NFR BLD AUTO: 89 % (ref 43–75)
NRBC BLD AUTO-RTO: 0 /100 WBCS
PLATELET # BLD AUTO: 317 THOUSANDS/UL (ref 149–390)
PMV BLD AUTO: 9.6 FL (ref 8.9–12.7)
POTASSIUM SERPL-SCNC: 4.3 MMOL/L (ref 3.5–5.3)
PROT SERPL-MCNC: 6.3 G/DL (ref 6.4–8.4)
RBC # BLD AUTO: 4.45 MILLION/UL (ref 3.88–5.62)
SODIUM SERPL-SCNC: 133 MMOL/L (ref 135–147)
WBC # BLD AUTO: 8.31 THOUSAND/UL (ref 4.31–10.16)

## 2023-07-15 PROCEDURE — 96374 THER/PROPH/DIAG INJ IV PUSH: CPT

## 2023-07-15 PROCEDURE — 85025 COMPLETE CBC W/AUTO DIFF WBC: CPT

## 2023-07-15 PROCEDURE — 36415 COLL VENOUS BLD VENIPUNCTURE: CPT

## 2023-07-15 PROCEDURE — 96361 HYDRATE IV INFUSION ADD-ON: CPT

## 2023-07-15 PROCEDURE — 99284 EMERGENCY DEPT VISIT MOD MDM: CPT

## 2023-07-15 PROCEDURE — 80053 COMPREHEN METABOLIC PANEL: CPT

## 2023-07-15 PROCEDURE — 74177 CT ABD & PELVIS W/CONTRAST: CPT

## 2023-07-15 PROCEDURE — G1004 CDSM NDSC: HCPCS

## 2023-07-15 RX ORDER — POLYETHYLENE GLYCOL 3350 17 G/17G
17 POWDER, FOR SOLUTION ORAL ONCE
Status: COMPLETED | OUTPATIENT
Start: 2023-07-15 | End: 2023-07-15

## 2023-07-15 RX ORDER — DOCUSATE SODIUM 100 MG/1
100 CAPSULE, LIQUID FILLED ORAL ONCE
Status: COMPLETED | OUTPATIENT
Start: 2023-07-15 | End: 2023-07-15

## 2023-07-15 RX ORDER — KETOROLAC TROMETHAMINE 30 MG/ML
15 INJECTION, SOLUTION INTRAMUSCULAR; INTRAVENOUS ONCE
Status: COMPLETED | OUTPATIENT
Start: 2023-07-15 | End: 2023-07-15

## 2023-07-15 RX ADMIN — KETOROLAC TROMETHAMINE 15 MG: 30 INJECTION, SOLUTION INTRAMUSCULAR at 17:38

## 2023-07-15 RX ADMIN — POLYETHYLENE GLYCOL 3350 17 G: 17 POWDER, FOR SOLUTION ORAL at 15:20

## 2023-07-15 RX ADMIN — SODIUM CHLORIDE 1000 ML: 0.9 INJECTION, SOLUTION INTRAVENOUS at 15:48

## 2023-07-15 RX ADMIN — IOHEXOL 75 ML: 350 INJECTION, SOLUTION INTRAVENOUS at 15:58

## 2023-07-15 RX ADMIN — DOCUSATE SODIUM 100 MG: 100 CAPSULE, LIQUID FILLED ORAL at 15:20

## 2023-07-15 NOTE — ED PROVIDER NOTES
History  Chief Complaint   Patient presents with   • Constipation     Pt reports having an impacted rectum. Pt tried a fleets enema at home with no relief. 64-year-old male presenting today with concerns of "impacted rectum" and constipation. Patient states that he has been taking MiraLAX and Colace currently without relief. Does take tramadol. States that he tried a Fleet enema at home today but states that "only minimize". Still feels like he needs to have a bowel movement. Denies any abdominal pain but does feel like he needs to have a problem. History of renal cell carcinoma, right upper arm DVT. Currently on blood thinners. Denies any chest pain, shortness of breath. Weakness has been persistent, not worsening since previously seen 2 days ago. No falls since being seen. Prior to Admission Medications   Prescriptions Last Dose Informant Patient Reported? Taking? Coenzyme Q10 (CO Q 10 PO)   Yes No   Sig: Take by mouth in the morning   Docosahexaenoic Acid (DHA OMEGA 3) 100 MG CAPS  Self Yes No   Sig: Take 6 capsules by mouth daily   Multiple Vitamin tablet  Self Yes No   Sig: Take 1 tablet by mouth daily   acetaminophen (TYLENOL) 325 mg tablet  Self No No   Sig: Take 2 tablets (650 mg total) by mouth every 6 (six) hours as needed for mild pain   amLODIPine (NORVASC) 5 mg tablet   No No   Sig: Take 1 tablet (5 mg total) by mouth daily Prn BP> 150/90   apixaban (ELIQUIS) 5 mg   No No   Sig: Take 2 tablets (10 mg total) by mouth 2 (two) times a day for 14 days Then 5 mg (one tablet) twice daily after 1 week.    bisacodyl (DULCOLAX) 5 mg EC tablet  Self No No   Sig: Take 1 tablet (5 mg total) by mouth daily as needed for constipation   busPIRone (BUSPAR) 7.5 mg tablet  Self No No   Sig: Take 1 tablet (7.5 mg total) by mouth 2 (two) times a day as needed (Anxiety)   docusate sodium (COLACE) 100 mg capsule   No No   Sig: Take 1 capsule (100 mg total) by mouth 2 (two) times a day   fexofenadine (ALLEGRA) 180 MG tablet  Self Yes No   Sig: Take 1 tablet by mouth daily as needed   gabapentin (Neurontin) 300 mg capsule  Self No No   Sig: Take 1 capsule (300 mg total) by mouth daily at bedtime   lidocaine (XYLOCAINE) 5 % ointment  Self No No   Sig: Apply topically as needed for mild pain   Patient not taking: Reported on 7/12/2023   lisinopril (ZESTRIL) 10 mg tablet   Yes No   Sig: Take 10 mg by mouth every evening   lisinopril (ZESTRIL) 20 mg tablet   Yes No   Sig: Take 20 mg by mouth every morning   magnesium citrate (CITROMA) 1.745 g/30 mL oral solution   No No   Sig: Take 296 mL by mouth once for 1 dose   metoprolol succinate (TOPROL-XL) 25 mg 24 hr tablet  Self No No   Sig: Take 1 tablet (25 mg total) by mouth daily with breakfast   oxyCODONE (ROXICODONE) 5 immediate release tablet   No No   Sig: Take 0.5 tablets (2.5 mg total) by mouth every 6 (six) hours as needed for severe pain Max Daily Amount: 10 mg   polyethylene glycol (MIRALAX) 17 g packet  Self No No   Sig: Take 17 g by mouth daily Do not start before May 6, 2023.    tiZANidine (ZANAFLEX) 2 mg tablet  Self No No   Sig: Take 1 tablet (2 mg total) by mouth 3 (three) times a day   Patient taking differently: Take 2 mg by mouth if needed      Facility-Administered Medications: None       Past Medical History:   Diagnosis Date   • Allergic    • Arthritis    • Asthma    • Cancer ([address])     kidney   • Hypertension    • Impaired fasting glucose    • Mitral valve disorder    • Mitral valve prolapse    • Murmur, cardiac    • 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    • PAC (premature atrial contraction)    • Parsonage-Cordero syndrome    • PVC (premature ventricular contraction)    • PVC (premature ventricular contraction)    • Renal cancer ([address]) 2023   • Thyroid nodule        Past Surgical History:   Procedure Laterality Date   • HAND SURGERY     • [facility] COLONOSCOPY FLX DX W/COLLJ SPEC WHEN PFRMD N/A 2/9/2018    Procedure: COLONOSCOPY;  Surgeon: Jamila Sheppard MD;  Location: [facility]; Service: Gastroenterology   • PROSTATE BIOPSY     • SHOULDER SURGERY         Family History   Problem Relation Age of Onset   • Diabetes Mother    • Stroke Mother    • Stroke Father    • Heart disease Father    • Diabetes Sister    • Other Family         Stroke syndrome     I have reviewed and agree with the history as documented. E-Cigarette/Vaping   • E-Cigarette Use Never User      E-Cigarette/Vaping Substances   • Nicotine No    • THC No    • CBD No    • Flavoring No    • Other No    • Unknown No      Social History     Tobacco Use   • Smoking status: Never   • Smokeless tobacco: Never   Vaping Use   • Vaping Use: Never used   Substance Use Topics   • Alcohol use: Not Currently     Alcohol/week: 3.0 standard drinks of alcohol     Types: 3 Glasses of wine per week     Comment: 4 oz wine with dinner a few days a week   • Drug use: No       Review of Systems   Constitutional: Negative for chills and fever. HENT: Negative for ear pain, sore throat and trouble swallowing. Eyes: Negative for pain and visual disturbance. Respiratory: Negative for cough and shortness of breath. Cardiovascular: Negative for chest pain and palpitations. Gastrointestinal: Positive for constipation. Negative for abdominal distention, abdominal pain, anal bleeding, blood in stool, diarrhea, nausea, rectal pain and vomiting. Genitourinary: Negative for dysuria and hematuria. Musculoskeletal: Negative for arthralgias and back pain. Skin: Negative for color change and rash. Neurological: Positive for tremors and weakness. Negative for dizziness, seizures, syncope, light-headedness and headaches. All other systems reviewed and are negative. Physical Exam  Physical Exam  Vitals and nursing note reviewed. Constitutional:       General: He is not in acute distress. Appearance: He is well-developed. He is ill-appearing. HENT:      Head: Normocephalic and atraumatic. Eyes:      Conjunctiva/sclera: Conjunctivae normal.   Cardiovascular:      Rate and Rhythm: Normal rate and regular rhythm. Heart sounds: No murmur heard. Pulmonary:      Effort: Pulmonary effort is normal. No respiratory distress. Breath sounds: Normal breath sounds. No stridor. No wheezing, rhonchi or rales. Chest:      Chest wall: No tenderness. Abdominal:      General: There is no distension. Palpations: Abdomen is soft. There is no mass. Tenderness: There is no abdominal tenderness. There is no right CVA tenderness, left CVA tenderness, guarding or rebound. Hernia: No hernia is present. Musculoskeletal:         General: No swelling, tenderness or signs of injury. Cervical back: Neck supple. Right lower leg: No edema. Left lower leg: No edema. Skin:     General: Skin is warm and dry. Capillary Refill: Capillary refill takes less than 2 seconds. Coloration: Skin is not jaundiced or pale. Findings: No bruising, erythema, lesion or rash. Neurological:      Mental Status: He is alert.    Psychiatric:         Mood and Affect: Mood normal.         Vital Signs  ED Triage Vitals   Temperature Pulse Respirations Blood Pressure SpO2   07/15/23 1318 07/15/23 1318 07/15/23 1318 07/15/23 1318 07/15/23 1318   98.1 °F (36.7 °C) 69 16 150/72 98 %      Temp Source Heart Rate Source Patient Position - Orthostatic VS BP Location FiO2 (%)   07/15/23 1318 07/15/23 1318 07/15/23 1318 07/15/23 1318 --   Oral Monitor Sitting Left arm       Pain Score       07/15/23 1738       5           Vitals:    07/15/23 1318 07/15/23 1500   BP: 150/72 119/59   Pulse: 69 64   Patient Position - Orthostatic VS: Sitting Sitting         Visual Acuity      ED Medications  Medications   polyethylene glycol (MIRALAX) packet 17 g (17 g Oral Given 7/15/23 1520)   docusate sodium (COLACE) capsule 100 mg (100 mg Oral Given 7/15/23 1520)   sodium chloride 0.9 % bolus 1,000 mL (0 mL Intravenous Stopped 7/15/23 1738)   iohexol (OMNIPAQUE) 350 MG/ML injection (SINGLE-DOSE) 75 mL (75 mL Intravenous Given 7/15/23 1558)   ketorolac (TORADOL) injection 15 mg (15 mg Intravenous Given 7/15/23 1738)       Diagnostic Studies  Results Reviewed     Procedure Component Value Units Date/Time    Comprehensive metabolic panel [[id number]]  (Abnormal) Collected: 07/15/23 1452    Lab Status: Final result Specimen: Blood from Arm, Left Updated: 07/15/23 1535     Sodium 133 mmol/L      Potassium 4.3 mmol/L      Chloride 103 mmol/L      CO2 21 mmol/L      ANION GAP 9 mmol/L      BUN 19 mg/dL      Creatinine 0.57 mg/dL      Glucose 117 mg/dL      Calcium 9.3 mg/dL      AST 26 U/L      ALT 24 U/L      Alkaline Phosphatase 39 U/L      Total Protein 6.3 g/dL      Albumin 4.2 g/dL      Total Bilirubin 0.69 mg/dL      eGFR 104 ml/min/1.73sq m     Narrative:      National Kidney Disease Foundation guidelines for Chronic Kidney Disease (CKD):   •  Stage 1 with normal or high GFR (GFR > 90 mL/min/1.73 square meters)  •  Stage 2 Mild CKD (GFR = 60-89 mL/min/1.73 square meters)  •  Stage 3A Moderate CKD (GFR = 45-59 mL/min/1.73 square meters)  •  Stage 3B Moderate CKD (GFR = 30-44 mL/min/1.73 square meters)  •  Stage 4 Severe CKD (GFR = 15-29 mL/min/1.73 square meters)  •  Stage 5 End Stage CKD (GFR <15 mL/min/1.73 square meters)  Note: GFR calculation is accurate only with a steady state creatinine    CBC and differential [[id number]]  (Abnormal) Collected: 07/15/23 1452    Lab Status: Final result Specimen: Blood from Arm, Left Updated: 07/15/23 1512     WBC 8.31 Thousand/uL      RBC 4.45 Million/uL      Hemoglobin 13.5 g/dL      Hematocrit 40.2 %      MCV 90 fL      MCH 30.3 pg      MCHC 33.6 g/dL      RDW 13.1 %      MPV 9.6 fL      Platelets 162 Thousands/uL      nRBC 0 /100 WBCs      Neutrophils Relative 89 %      Immat GRANS % 0 %      Lymphocytes Relative 6 %      Monocytes Relative 5 %      Eosinophils Relative 0 %      Basophils Relative 0 %      Neutrophils Absolute 7.30 Thousands/µL      Immature Grans Absolute 0.03 Thousand/uL      Lymphocytes Absolute 0.52 Thousands/µL      Monocytes Absolute 0.43 Thousand/µL      Eosinophils Absolute 0.01 Thousand/µL      Basophils Absolute 0.02 Thousands/µL                  CT abdomen pelvis with contrast   Final Result by Calixto Napoles MD (07/15 1639)      Large volume of stool in the rectum with some perirectal inflammatory changes which are new since the prior exam suggesting developing stercoral colitis. Constipation. Enlarging solid heterogeneous right upper pole renal mass most suggestive of malignancy. This was reported on the prior exam and is known to the clinical team as per notes in the electronic medical record            Workstation performed: [id number]                    Procedures  Procedures         ED Course  ED Course as of 07/16/23 1007   Sat Jul 15, 2023   1514 CBC and differential(!)  wnl                                             Medical Decision Making  77year old male presenting today with concerns of constipation worse over the last couple days. Patient states that has been on tramadol and has a normal constipation however this is much worse. Attempted Fleet enema. We will scan to rule out any obstructing masses. Patient does have a history of renal cell carcinoma. CBC, CMP. We will attempt MiraLAX and Colace prior to fecal disimpaction. Digital disimpaction unsuccessful, will follow up with soap suds enema. Soap suds enema successful for disimpaction, large bowel movement. Patient feeling much better. Will have him continue with miralax and colace as prescribed to prevent further constipation. Follow up with family doctor. Patient at time of discharge was well appearing and in no acute distress, all questions answered. Patient's vitals, lab/imaging results, diagnosis, and treatment plan were discussed with the patient.  All new/changed medications were discussed with patient, specifically, route of administration, how often and when to take, and where they can be picked up. Strict return precautions as well as close follow up with PCP was discussed with the patient and the patient was agreeable to my recommendations. Patient verbally acknowledged understanding of the above communications. All labs reviewed and utilized in the medical decision making process (if labs were ordered). Portions of the record may have been created with voice recognition software.  Occasional wrong word or "sound a like" substitutions may have occurred due to the inherent limitations of voice recognition software.  Read the chart carefully and recognize, using context, where substitutions have occurred. Amount and/or Complexity of Data Reviewed  Labs: ordered. Decision-making details documented in ED Course. Radiology: ordered. Risk  OTC drugs. Prescription drug management. Disposition  Final diagnoses:   Fecal impaction ([address])     Time reflects when diagnosis was documented in both MDM as applicable and the Disposition within this note     Time User Action Codes Description Comment    7/15/2023  6:20 PM Radhaaudi Leyva Add [K56.41] Fecal impaction [facility])       ED Disposition     ED Disposition   Discharge    Condition   Stable    Date/Time   Sat Jul 15, 2023  6:20 PM    [address] discharge to home/self care.                Follow-up Information     Follow up With Specialties Details Why Contact Info Additional Information    [address] [facility] Emergency Medicine Go to  If symptoms worsen [address] [facility], [address], [phone]    Abeba Means MD Internal Medicine Schedule an appointment as soon as possible for a visit  As needed [address] [place] [id number]  [phone]             Discharge Medication List as of 7/15/2023  6:20 PM      CONTINUE these medications which have NOT CHANGED    Details   acetaminophen (TYLENOL) 325 mg tablet Take 2 tablets (650 mg total) by mouth every 6 (six) hours as needed for mild pain, Starting Fri 5/5/2023, No Print      amLODIPine (NORVASC) 5 mg tablet Take 1 tablet (5 mg total) by mouth daily Prn BP> 150/90, Starting Mon 5/22/2023, Normal      apixaban (ELIQUIS) 5 mg Take 2 tablets (10 mg total) by mouth 2 (two) times a day for 14 days Then 5 mg (one tablet) twice daily after 1 week., Starting Fri 7/7/2023, Until Fri 7/21/2023, Normal      bisacodyl (DULCOLAX) 5 mg EC tablet Take 1 tablet (5 mg total) by mouth daily as needed for constipation, Starting Wed 5/10/2023, Normal      busPIRone (BUSPAR) 7.5 mg tablet Take 1 tablet (7.5 mg total) by mouth 2 (two) times a day as needed (Anxiety), Starting Mon 3/27/2023, Normal      Coenzyme Q10 (CO Q 10 PO) Take by mouth in the morning, Historical Med      Docosahexaenoic Acid (DHA OMEGA 3) 100 MG CAPS Take 6 capsules by mouth daily, Historical Med      docusate sodium (COLACE) 100 mg capsule Take 1 capsule (100 mg total) by mouth 2 (two) times a day, Starting Fri 6/2/2023, Normal      fexofenadine (ALLEGRA) 180 MG tablet Take 1 tablet by mouth daily as needed, Starting Tue 6/10/2014, Historical Med      gabapentin (Neurontin) 300 mg capsule Take 1 capsule (300 mg total) by mouth daily at bedtime, Starting Tue 5/16/2023, Normal      lidocaine (XYLOCAINE) 5 % ointment Apply topically as needed for mild pain, Starting Mon 3/27/2023, Normal      !! lisinopril (ZESTRIL) 10 mg tablet Take 10 mg by mouth every evening, Historical Med      !! lisinopril (ZESTRIL) 20 mg tablet Take 20 mg by mouth every morning, Historical Med      magnesium citrate (CITROMA) 1.745 g/30 mL oral solution Take 296 mL by mouth once for 1 dose, Starting Thu 6/15/2023, Normal      metoprolol succinate (TOPROL-XL) 25 mg 24 hr tablet Take 1 tablet (25 mg total) by mouth daily with breakfast, Starting Mon 5/15/2023, Normal      Multiple Vitamin tablet Take 1 tablet by mouth daily, Historical Med      oxyCODONE (ROXICODONE) 5 immediate release tablet Take 0.5 tablets (2.5 mg total) by mouth every 6 (six) hours as needed for severe pain Max Daily Amount: 10 mg, Starting Wed 7/12/2023, Normal      polyethylene glycol (MIRALAX) 17 g packet Take 17 g by mouth daily Do not start before May 6, 2023., Starting Sat 5/6/2023, No Print      tiZANidine (ZANAFLEX) 2 mg tablet Take 1 tablet (2 mg total) by mouth 3 (three) times a day, Starting Tue 5/16/2023, Until Wed 7/12/2023, Normal       !! - Potential duplicate medications found. Please discuss with provider. No discharge procedures on file.     PDMP Review       Value Time User    PDMP Reviewed  Yes 5/30/2023  5:14 PM Maria Elena Bond MD          ED Provider  Electronically Signed by           Mary Ellen Anderson PA-C  07/16/23 1007

## 2023-07-16 ENCOUNTER — NURSE TRIAGE (OUTPATIENT)
Dept: OTHER | Facility: OTHER | Age: 68
End: 2023-07-16

## 2023-07-16 ENCOUNTER — HOSPITAL ENCOUNTER (EMERGENCY)
Facility: HOSPITAL | Age: 68
Discharge: HOME | End: 2023-07-16
Attending: EMERGENCY MEDICINE | Admitting: EMERGENCY MEDICINE
Payer: COMMERCIAL

## 2023-07-16 VITALS
DIASTOLIC BLOOD PRESSURE: 84 MMHG | TEMPERATURE: 98.7 F | OXYGEN SATURATION: 99 % | SYSTOLIC BLOOD PRESSURE: 174 MMHG | HEART RATE: 63 BPM | RESPIRATION RATE: 16 BRPM

## 2023-07-16 DIAGNOSIS — H81.399 PERIPHERAL VERTIGO: ICD-10-CM

## 2023-07-16 DIAGNOSIS — R53.1 GENERALIZED WEAKNESS: Primary | ICD-10-CM

## 2023-07-16 PROBLEM — S51.011A SKIN TEAR OF RIGHT ELBOW WITHOUT COMPLICATION: Status: ACTIVE | Noted: 2023-07-16

## 2023-07-16 PROBLEM — I82.621 ACUTE DEEP VEIN THROMBOSIS (DVT) OF BRACHIAL VEIN OF RIGHT UPPER EXTREMITY (HCC): Status: ACTIVE | Noted: 2023-07-16

## 2023-07-16 LAB
2HR DELTA HS TROPONIN: 4 NG/L
ALBUMIN SERPL BCP-MCNC: 4 G/DL (ref 3.5–5)
ALP SERPL-CCNC: 42 U/L (ref 34–104)
ALT SERPL W P-5'-P-CCNC: 25 U/L (ref 7–52)
ANION GAP SERPL CALCULATED.3IONS-SCNC: 9 MMOL/L
AST SERPL W P-5'-P-CCNC: 23 U/L (ref 13–39)
BASOPHILS # BLD AUTO: 0.05 THOUSANDS/ÂΜL (ref 0–0.1)
BASOPHILS NFR BLD AUTO: 1 % (ref 0–1)
BILIRUB SERPL-MCNC: 0.7 MG/DL (ref 0.2–1)
BUN SERPL-MCNC: 13 MG/DL (ref 5–25)
CALCIUM SERPL-MCNC: 9.2 MG/DL (ref 8.4–10.2)
CARDIAC TROPONIN I PNL SERPL HS: 5 NG/L
CARDIAC TROPONIN I PNL SERPL HS: 9 NG/L
CHLORIDE SERPL-SCNC: 105 MMOL/L (ref 96–108)
CO2 SERPL-SCNC: 24 MMOL/L (ref 21–32)
CREAT SERPL-MCNC: 0.54 MG/DL (ref 0.6–1.3)
EOSINOPHIL # BLD AUTO: 0.11 THOUSAND/ÂΜL (ref 0–0.61)
EOSINOPHIL NFR BLD AUTO: 2 % (ref 0–6)
ERYTHROCYTE [DISTWIDTH] IN BLOOD BY AUTOMATED COUNT: 13.1 % (ref 11.6–15.1)
GFR SERPL CREATININE-BSD FRML MDRD: 106 ML/MIN/1.73SQ M
GLUCOSE SERPL-MCNC: 85 MG/DL (ref 65–140)
HCT VFR BLD AUTO: 39 % (ref 36.5–49.3)
HGB BLD-MCNC: 12.9 G/DL (ref 12–17)
IMM GRANULOCYTES # BLD AUTO: 0.02 THOUSAND/UL (ref 0–0.2)
IMM GRANULOCYTES NFR BLD AUTO: 0 % (ref 0–2)
LYMPHOCYTES # BLD AUTO: 1.07 THOUSANDS/ÂΜL (ref 0.6–4.47)
LYMPHOCYTES NFR BLD AUTO: 22 % (ref 14–44)
MAGNESIUM SERPL-MCNC: 2.1 MG/DL (ref 1.9–2.7)
MCH RBC QN AUTO: 29.9 PG (ref 26.8–34.3)
MCHC RBC AUTO-ENTMCNC: 33.1 G/DL (ref 31.4–37.4)
MCV RBC AUTO: 91 FL (ref 82–98)
MONOCYTES # BLD AUTO: 0.47 THOUSAND/ÂΜL (ref 0.17–1.22)
MONOCYTES NFR BLD AUTO: 10 % (ref 4–12)
NEUTROPHILS # BLD AUTO: 3.09 THOUSANDS/ÂΜL (ref 1.85–7.62)
NEUTS SEG NFR BLD AUTO: 65 % (ref 43–75)
NRBC BLD AUTO-RTO: 0 /100 WBCS
PLATELET # BLD AUTO: 298 THOUSANDS/UL (ref 149–390)
PMV BLD AUTO: 9.6 FL (ref 8.9–12.7)
POTASSIUM SERPL-SCNC: 3.7 MMOL/L (ref 3.5–5.3)
PROT SERPL-MCNC: 6.1 G/DL (ref 6.4–8.4)
RBC # BLD AUTO: 4.31 MILLION/UL (ref 3.88–5.62)
SODIUM SERPL-SCNC: 138 MMOL/L (ref 135–147)
WBC # BLD AUTO: 4.81 THOUSAND/UL (ref 4.31–10.16)

## 2023-07-16 PROCEDURE — 36415 COLL VENOUS BLD VENIPUNCTURE: CPT | Performed by: EMERGENCY MEDICINE

## 2023-07-16 PROCEDURE — 85025 COMPLETE CBC W/AUTO DIFF WBC: CPT | Performed by: EMERGENCY MEDICINE

## 2023-07-16 PROCEDURE — 93005 ELECTROCARDIOGRAM TRACING: CPT

## 2023-07-16 PROCEDURE — 99285 EMERGENCY DEPT VISIT HI MDM: CPT

## 2023-07-16 PROCEDURE — 84484 ASSAY OF TROPONIN QUANT: CPT | Performed by: EMERGENCY MEDICINE

## 2023-07-16 PROCEDURE — 96365 THER/PROPH/DIAG IV INF INIT: CPT

## 2023-07-16 PROCEDURE — 80053 COMPREHEN METABOLIC PANEL: CPT | Performed by: EMERGENCY MEDICINE

## 2023-07-16 PROCEDURE — 83735 ASSAY OF MAGNESIUM: CPT | Performed by: EMERGENCY MEDICINE

## 2023-07-16 RX ORDER — ACETAMINOPHEN 325 MG/1
650 TABLET ORAL ONCE
Status: COMPLETED | OUTPATIENT
Start: 2023-07-16 | End: 2023-07-16

## 2023-07-16 RX ORDER — MECLIZINE HYDROCHLORIDE 25 MG/1
25 TABLET ORAL 3 TIMES DAILY PRN
Qty: 30 TABLET | Refills: 0 | Status: SHIPPED | OUTPATIENT
Start: 2023-07-16

## 2023-07-16 RX ORDER — MECLIZINE HCL 12.5 MG/1
25 TABLET ORAL ONCE
Status: COMPLETED | OUTPATIENT
Start: 2023-07-16 | End: 2023-07-16

## 2023-07-16 RX ADMIN — ACETAMINOPHEN 650 MG: 325 TABLET, FILM COATED ORAL at 20:02

## 2023-07-16 RX ADMIN — SODIUM CHLORIDE, SODIUM LACTATE, POTASSIUM CHLORIDE, AND CALCIUM CHLORIDE 500 ML: .6; .31; .03; .02 INJECTION, SOLUTION INTRAVENOUS at 18:53

## 2023-07-16 RX ADMIN — MECLIZINE 25 MG: 12.5 TABLET ORAL at 18:51

## 2023-07-16 NOTE — ED PROVIDER NOTES
History  Chief Complaint   Patient presents with   • Weakness - Generalized     Ongoing. Pt having difficulty ambulating. Pt seen for same yesterday. This is a 68-year-old male patient with a relevant past medical history of hypertension, mitral valve prolapse, on Eliquis, renal cell carcinoma, DVT, presenting to the ED today for generalized weakness. Patient states that he associated with this generalized weakness has had some vertigo. Patient states that it is only positional, when he changes position from a lying down to seated or seated to standing position. He denies any other focal neurologic abnormalities. His wife presents with him and corroborates his history. He has not had any visual abnormalities, hearing abnormalities, chest pain shortness of breath, syncope or presyncope. Of note patient was here yesterday, had a fecal disimpaction with enema, and states that since the disimpaction he has had the symptoms. Prior to Admission Medications   Prescriptions Last Dose Informant Patient Reported? Taking? Coenzyme Q10 (CO Q 10 PO)   Yes No   Sig: Take by mouth in the morning   Docosahexaenoic Acid (DHA OMEGA 3) 100 MG CAPS  Self Yes No   Sig: Take 6 capsules by mouth daily   Multiple Vitamin tablet  Self Yes No   Sig: Take 1 tablet by mouth daily   acetaminophen (TYLENOL) 325 mg tablet  Self No No   Sig: Take 2 tablets (650 mg total) by mouth every 6 (six) hours as needed for mild pain   amLODIPine (NORVASC) 5 mg tablet   No No   Sig: Take 1 tablet (5 mg total) by mouth daily Prn BP> 150/90   apixaban (ELIQUIS) 5 mg   No No   Sig: Take 2 tablets (10 mg total) by mouth 2 (two) times a day for 14 days Then 5 mg (one tablet) twice daily after 1 week.    bisacodyl (DULCOLAX) 5 mg EC tablet  Self No No   Sig: Take 1 tablet (5 mg total) by mouth daily as needed for constipation   busPIRone (BUSPAR) 7.5 mg tablet  Self No No   Sig: Take 1 tablet (7.5 mg total) by mouth 2 (two) times a day as needed (Anxiety)   docusate sodium (COLACE) 100 mg capsule   No No   Sig: Take 1 capsule (100 mg total) by mouth 2 (two) times a day   fexofenadine (ALLEGRA) 180 MG tablet  Self Yes No   Sig: Take 1 tablet by mouth daily as needed   gabapentin (Neurontin) 300 mg capsule  Self No No   Sig: Take 1 capsule (300 mg total) by mouth daily at bedtime   lidocaine (XYLOCAINE) 5 % ointment  Self No No   Sig: Apply topically as needed for mild pain   Patient not taking: Reported on 7/12/2023   lisinopril (ZESTRIL) 10 mg tablet   Yes No   Sig: Take 10 mg by mouth every evening   lisinopril (ZESTRIL) 20 mg tablet   Yes No   Sig: Take 20 mg by mouth every morning   magnesium citrate (CITROMA) 1.745 g/30 mL oral solution   No No   Sig: Take 296 mL by mouth once for 1 dose   metoprolol succinate (TOPROL-XL) 25 mg 24 hr tablet  Self No No   Sig: Take 1 tablet (25 mg total) by mouth daily with breakfast   oxyCODONE (ROXICODONE) 5 immediate release tablet   No No   Sig: Take 0.5 tablets (2.5 mg total) by mouth every 6 (six) hours as needed for severe pain Max Daily Amount: 10 mg   polyethylene glycol (MIRALAX) 17 g packet  Self No No   Sig: Take 17 g by mouth daily Do not start before May 6, 2023.    tiZANidine (ZANAFLEX) 2 mg tablet  Self No No   Sig: Take 1 tablet (2 mg total) by mouth 3 (three) times a day   Patient taking differently: Take 2 mg by mouth if needed      Facility-Administered Medications: None       Past Medical History:   Diagnosis Date   • Allergic    • Arthritis    • Asthma    • Cancer ([address])     kidney   • Hypertension    • Impaired fasting glucose    • Mitral valve disorder    • Mitral valve prolapse    • Murmur, cardiac    • 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    • PAC (premature atrial contraction)    • Parsonage-Cordero syndrome    • PVC (premature ventricular contraction)    • PVC (premature ventricular contraction)    • Renal cancer ([address]) 2023   • Thyroid nodule        Past Surgical History: Procedure Laterality Date   • HAND SURGERY     • KS COLONOSCOPY FLX DX W/COLLJ SPEC WHEN PFRMD N/A 2/9/2018    Procedure: COLONOSCOPY;  Surgeon: Matthias Worthy MD;  Location: [facility]; Service: Gastroenterology   • PROSTATE BIOPSY     • SHOULDER SURGERY         Family History   Problem Relation Age of Onset   • Diabetes Mother    • Stroke Mother    • Stroke Father    • Heart disease Father    • Diabetes Sister    • Other Family         Stroke syndrome     I have reviewed and agree with the history as documented. E-Cigarette/Vaping   • E-Cigarette Use Never User      E-Cigarette/Vaping Substances   • Nicotine No    • THC No    • CBD No    • Flavoring No    • Other No    • Unknown No      Social History     Tobacco Use   • Smoking status: Never   • Smokeless tobacco: Never   Vaping Use   • Vaping Use: Never used   Substance Use Topics   • Alcohol use: Not Currently     Alcohol/week: 3.0 standard drinks of alcohol     Types: 3 Glasses of wine per week     Comment: 4 oz wine with dinner a few days a week   • Drug use: No       Review of Systems   Constitutional: Negative for chills and fever. HENT: Negative for ear pain and sore throat. Eyes: Negative for pain and visual disturbance. Respiratory: Negative for cough and shortness of breath. Cardiovascular: Negative for chest pain and palpitations. Gastrointestinal: Negative for abdominal pain and vomiting. Genitourinary: Negative for dysuria and hematuria. Musculoskeletal: Negative for arthralgias and back pain. Skin: Negative for color change and rash. Neurological: Positive for dizziness and weakness (Generalized). Negative for seizures and syncope. Psychiatric/Behavioral: Negative for agitation and confusion. All other systems reviewed and are negative. Physical Exam  Physical Exam  Vitals and nursing note reviewed. Constitutional:       General: He is not in acute distress. Appearance: Normal appearance.  He is well-developed and normal weight. He is not ill-appearing or toxic-appearing. HENT:      Head: Normocephalic and atraumatic. Right Ear: External ear normal.      Left Ear: External ear normal.      Nose: Nose normal. No congestion or rhinorrhea. Mouth/Throat:      Mouth: Mucous membranes are moist.      Pharynx: Oropharynx is clear. Eyes:      General: No scleral icterus. Conjunctiva/sclera: Conjunctivae normal.   Cardiovascular:      Rate and Rhythm: Normal rate and regular rhythm. Pulses: Normal pulses. Heart sounds: Normal heart sounds. No murmur heard. Pulmonary:      Effort: Pulmonary effort is normal. No respiratory distress. Breath sounds: Normal breath sounds. No wheezing or rales. Abdominal:      General: Abdomen is flat. Bowel sounds are normal. There is no distension. Palpations: Abdomen is soft. There is no mass. Tenderness: There is no abdominal tenderness. Musculoskeletal:         General: Normal range of motion. Cervical back: Normal range of motion and neck supple. No tenderness. Right lower leg: No edema. Left lower leg: No edema. Skin:     General: Skin is warm and dry. Capillary Refill: Capillary refill takes less than 2 seconds. Neurological:      General: No focal deficit present. Mental Status: He is alert and oriented to person, place, and time. Mental status is at baseline. GCS: GCS eye subscore is 4. GCS verbal subscore is 5. GCS motor subscore is 6. Cranial Nerves: No cranial nerve deficit. Sensory: Sensation is intact. No sensory deficit. Motor: Motor function is intact. No weakness. Gait: Gait normal.      Comments: Nystagmus when looking to the right,    Hints exam consistent with peripheral lesion, head impulse which was fatigable also when looking to the right. Psychiatric:         Mood and Affect: Mood normal.         Behavior: Behavior normal.         Thought Content:  Thought content normal. Judgment: Judgment normal.         Vital Signs  ED Triage Vitals [07/16/23 1630]   Temperature Pulse Respirations Blood Pressure SpO2   98.7 °F (37.1 °C) 67 20 152/72 98 %      Temp Source Heart Rate Source Patient Position - Orthostatic VS BP Location FiO2 (%)   Oral Monitor Sitting Left arm --      Pain Score       --           Vitals:    07/16/23 1749 07/16/23 1830 07/16/23 1900 07/16/23 2000   BP: 152/84 154/72 (!) 178/86 (!) 174/84   Pulse: 74 64 67 63   Patient Position - Orthostatic VS: Standing - Orthostatic VS            Visual Acuity      ED Medications  Medications   meclizine (ANTIVERT) tablet 25 mg (25 mg Oral Given 7/16/23 1851)   lactated ringers bolus 500 mL (0 mL Intravenous Stopped 7/16/23 2002)   acetaminophen (TYLENOL) tablet 650 mg (650 mg Oral Given 7/16/23 2002)       Diagnostic Studies  Results Reviewed     Procedure Component Value Units Date/Time    HS Troponin I 2hr [[id number]]  (Normal) Collected: 07/16/23 2006    Lab Status: Final result Specimen: Blood from Arm, Left Updated: 07/16/23 2031     hs TnI 2hr 9 ng/L      Delta 2hr hsTnI 4 ng/L     HS Troponin 0hr (reflex protocol) [[id number]]  (Normal) Collected: 07/16/23 1739    Lab Status: Final result Specimen: Blood from Arm, Right Updated: 07/16/23 1806     hs TnI 0hr 5 ng/L     Comprehensive metabolic panel [[id number]]  (Abnormal) Collected: 07/16/23 1739    Lab Status: Final result Specimen: Blood from Arm, Right Updated: 07/16/23 1800     Sodium 138 mmol/L      Potassium 3.7 mmol/L      Chloride 105 mmol/L      CO2 24 mmol/L      ANION GAP 9 mmol/L      BUN 13 mg/dL      Creatinine 0.54 mg/dL      Glucose 85 mg/dL      Calcium 9.2 mg/dL      AST 23 U/L      ALT 25 U/L      Alkaline Phosphatase 42 U/L      Total Protein 6.1 g/dL      Albumin 4.0 g/dL      Total Bilirubin 0.70 mg/dL      eGFR 106 ml/min/1.73sq m     Narrative:      [place] guidelines for Chronic Kidney Disease (CKD):   •  Stage 1 with normal or high GFR (GFR > 90 mL/min/1.73 square meters)  •  Stage 2 Mild CKD (GFR = 60-89 mL/min/1.73 square meters)  •  Stage 3A Moderate CKD (GFR = 45-59 mL/min/1.73 square meters)  •  Stage 3B Moderate CKD (GFR = 30-44 mL/min/1.73 square meters)  •  Stage 4 Severe CKD (GFR = 15-29 mL/min/1.73 square meters)  •  Stage 5 End Stage CKD (GFR <15 mL/min/1.73 square meters)  Note: GFR calculation is accurate only with a steady state creatinine    Magnesium [[id number]]  (Normal) Collected: 07/16/23 1739    Lab Status: Final result Specimen: Blood from Arm, Right Updated: 07/16/23 1800     Magnesium 2.1 mg/dL     CBC and differential [[id number]] Collected: 07/16/23 1739    Lab Status: Final result Specimen: Blood from Arm, Right Updated: 07/16/23 1749     WBC 4.81 Thousand/uL      RBC 4.31 Million/uL      Hemoglobin 12.9 g/dL      Hematocrit 39.0 %      MCV 91 fL      MCH 29.9 pg      MCHC 33.1 g/dL      RDW 13.1 %      MPV 9.6 fL      Platelets 114 Thousands/uL      nRBC 0 /100 WBCs      Neutrophils Relative 65 %      Immat GRANS % 0 %      Lymphocytes Relative 22 %      Monocytes Relative 10 %      Eosinophils Relative 2 %      Basophils Relative 1 %      Neutrophils Absolute 3.09 Thousands/µL      Immature Grans Absolute 0.02 Thousand/uL      Lymphocytes Absolute 1.07 Thousands/µL      Monocytes Absolute 0.47 Thousand/µL      Eosinophils Absolute 0.11 Thousand/µL      Basophils Absolute 0.05 Thousands/µL                  No orders to display              Procedures  Procedures         ED Course                                             Medical Decision Making  This is a 77-year-old male patient presenting to the ED for generalized weakness. He also has some associated vertigo. Patient denies any neurologic abnormalities associated. On exam he does not have any focal neurologic abnormalities.   He does have right-sided nystagmus, and on hints exam he does have corrective saccade to the right, which does seem to be fatigable. Otherwise he has good strength in his bilateral upper and lower extremities. He does not have any significant sensory deficits. His differential diagnosis includes: Vestibulitis versus labyrinthitis versus central vertigo versus other. With the lack of neurologic symptoms, as well as his hints exam being consistent with a peripheral cause, I do believe that he does not need any imaging. Patient had CBC, metabolic panel showing no significant abnormalities. His troponin did slightly elevated, but patient was not complaining of any chest pain. He did receive meclizine, and had an ambulatory trial while here in the ED, p.o. challenge, and passed. We did discuss potential hospitalization for rehab placement, but patient does not want to go for rehab, risk versus benefits of discharge versus hospitalization were discussed, shared decision making order gone and patient decided he would like to go home. The management plan was discussed in detail with the patient at bedside and all questions were answered. Strict ED return instructions were discussed at bedside. Prior to discharge, both verbal and written instructions were provided. We discussed the signs and symptoms that should prompt the patient to return to the ED. All questions were answered and the patient was comfortable with the plan of care and discharged home. The patient agrees to return to the Emergency Department for concerns and/or progression of illness. Amount and/or Complexity of Data Reviewed  Labs: ordered. Risk  OTC drugs.           Disposition  Final diagnoses:   Generalized weakness   Peripheral vertigo     Time reflects when diagnosis was documented in both MDM as applicable and the Disposition within this note     Time User Action Codes Description Comment    7/16/2023  9:23 PM Malik Hooks Add [R53.1] Generalized weakness     7/16/2023  9:23 PM Raimundo Crookschure Add [K88.733] Peripheral vertigo       ED Disposition     ED Disposition   Discharge    Condition   Stable    Date/Time   Sun Jul 16, 2023  9:23 PM    [address] discharge to home/self care.                Follow-up Information     Follow up With Specialties Details Why Contact Info    Benjamin Tate MD Internal Medicine Call in 1 day  [address] [place] [address]  [phone]            Discharge Medication List as of 7/16/2023  9:27 PM      START taking these medications    Details   meclizine (ANTIVERT) 25 mg tablet Take 1 tablet (25 mg total) by mouth 3 (three) times a day as needed for dizziness, Starting Sun 7/16/2023, Normal         CONTINUE these medications which have NOT CHANGED    Details   acetaminophen (TYLENOL) 325 mg tablet Take 2 tablets (650 mg total) by mouth every 6 (six) hours as needed for mild pain, Starting Fri 5/5/2023, No Print      amLODIPine (NORVASC) 5 mg tablet Take 1 tablet (5 mg total) by mouth daily Prn BP> 150/90, Starting Mon 5/22/2023, Normal      apixaban (ELIQUIS) 5 mg Take 2 tablets (10 mg total) by mouth 2 (two) times a day for 14 days Then 5 mg (one tablet) twice daily after 1 week., Starting Fri 7/7/2023, Until Fri 7/21/2023, Normal      bisacodyl (DULCOLAX) 5 mg EC tablet Take 1 tablet (5 mg total) by mouth daily as needed for constipation, Starting Wed 5/10/2023, Normal      busPIRone (BUSPAR) 7.5 mg tablet Take 1 tablet (7.5 mg total) by mouth 2 (two) times a day as needed (Anxiety), Starting Mon 3/27/2023, Normal      Coenzyme Q10 (CO Q 10 PO) Take by mouth in the morning, Historical Med      Docosahexaenoic Acid (DHA OMEGA 3) 100 MG CAPS Take 6 capsules by mouth daily, Historical Med      docusate sodium (COLACE) 100 mg capsule Take 1 capsule (100 mg total) by mouth 2 (two) times a day, Starting Fri 6/2/2023, Normal      fexofenadine (ALLEGRA) 180 MG tablet Take 1 tablet by mouth daily as needed, Starting Tue 6/10/2014, Historical Med      gabapentin (Neurontin) 300 mg capsule Take 1 capsule (300 mg total) by mouth daily at bedtime, Starting Tue 5/16/2023, Normal      lidocaine (XYLOCAINE) 5 % ointment Apply topically as needed for mild pain, Starting Mon 3/27/2023, Normal      !! lisinopril (ZESTRIL) 10 mg tablet Take 10 mg by mouth every evening, Historical Med      !! lisinopril (ZESTRIL) 20 mg tablet Take 20 mg by mouth every morning, Historical Med      magnesium citrate (CITROMA) 1.745 g/30 mL oral solution Take 296 mL by mouth once for 1 dose, Starting Thu 6/15/2023, Normal      metoprolol succinate (TOPROL-XL) 25 mg 24 hr tablet Take 1 tablet (25 mg total) by mouth daily with breakfast, Starting Mon 5/15/2023, Normal      Multiple Vitamin tablet Take 1 tablet by mouth daily, Historical Med      oxyCODONE (ROXICODONE) 5 immediate release tablet Take 0.5 tablets (2.5 mg total) by mouth every 6 (six) hours as needed for severe pain Max Daily Amount: 10 mg, Starting Wed 7/12/2023, Normal      polyethylene glycol (MIRALAX) 17 g packet Take 17 g by mouth daily Do not start before May 6, 2023., Starting Sat 5/6/2023, No Print      tiZANidine (ZANAFLEX) 2 mg tablet Take 1 tablet (2 mg total) by mouth 3 (three) times a day, Starting Tue 5/16/2023, Until Wed 7/12/2023, Normal       !! - Potential duplicate medications found. Please discuss with provider. No discharge procedures on file.     PDMP Review       Value Time User    PDMP Reviewed  Yes 5/30/2023  5:14 PM Wu Graham MD          ED Provider  Electronically Signed by           Harles Brittle, MD  07/17/23 2014

## 2023-07-16 NOTE — TELEPHONE ENCOUNTER
Reason for Disposition  • SEVERE dizziness (e.g., unable to stand, requires support to walk, feels like passing out now)    Answer Assessment - Initial Assessment Questions  1. DESCRIPTION: "Describe your dizziness."      Feels dizzy from lying to sitting and standing. Feels like going to fall over  2. LIGHTHEADED: "Do you feel lightheaded?" (e.g., somewhat faint, woozy, weak upon standing)     Yes  3. VERTIGO: "Do you feel like either you or the room is spinning or tilting?" (i.e. vertigo)      Denies  4. SEVERITY: "How bad is it?"  "Do you feel like you are going to faint?" "Can you stand and walk?"    - MILD: Feels slightly dizzy, but walking normally. - MODERATE: Feels very unsteady when walking, but not falling; interferes with normal activities (e.g., school, work) . - SEVERE: Unable to walk without falling, or requires assistance to walk without falling; feels like passing out now. Severe  5. ONSET:  "When did the dizziness begin?"      Started after enema yesterday in ED  6. AGGRAVATING FACTORS: "Does anything make it worse?" (e.g., standing, change in head position)     Change in whole body position  7. HEART RATE: "Can you tell me your heart rate?" "How many beats in 15 seconds?"  (Note: not all patients can do this)       /59 HR 60   /73 HR 58  8. CAUSE: "What do you think is causing the dizziness?"      Unsure  9. RECURRENT SYMPTOM: "Have you had dizziness before?" If Yes, ask: "When was the last time?" "What happened that time?"      Has had dizziness off and on but not like this  10. OTHER SYMPTOMS: "Do you have any other symptoms?" (e.g., fever, chest pain, vomiting, diarrhea, bleeding)       Chills, Temp 98.2 forehead.  Feels disoriented with dizziness    Protocols used: [facility]

## 2023-07-16 NOTE — TELEPHONE ENCOUNTER
Regarding: weak/ dizzy  ----- Message from Mendel Gibbs sent at 7/16/2023  2:30 PM EDT -----  " My  was in the ER yesterday and he still isn't feeling good today. He says that he feels weak and dizzy like he's about to fall over.  I'm wondering if I should take him back to the ER?"

## 2023-07-17 ENCOUNTER — HOME CARE VISIT (OUTPATIENT)
Dept: HOME HEALTH SERVICES | Facility: HOME HEALTHCARE | Age: 68
End: 2023-07-17
Payer: COMMERCIAL

## 2023-07-17 ENCOUNTER — TELEPHONE (OUTPATIENT)
Dept: INTERNAL MEDICINE CLINIC | Facility: CLINIC | Age: 68
End: 2023-07-17

## 2023-07-17 VITALS
RESPIRATION RATE: 18 BRPM | HEART RATE: 66 BPM | TEMPERATURE: 98.7 F | DIASTOLIC BLOOD PRESSURE: 68 MMHG | SYSTOLIC BLOOD PRESSURE: 122 MMHG | OXYGEN SATURATION: 97 %

## 2023-07-17 LAB
ATRIAL RATE: 59 BPM
P AXIS: 65 DEGREES
PR INTERVAL: 172 MS
QRS AXIS: 43 DEGREES
QRSD INTERVAL: 92 MS
QT INTERVAL: 412 MS
QTC INTERVAL: 407 MS
T WAVE AXIS: 59 DEGREES
VENTRICULAR RATE: 59 BPM

## 2023-07-17 PROCEDURE — 400013 VN SOC

## 2023-07-17 PROCEDURE — 93010 ELECTROCARDIOGRAM REPORT: CPT | Performed by: INTERNAL MEDICINE

## 2023-07-17 PROCEDURE — G0299 HHS/HOSPICE OF RN EA 15 MIN: HCPCS

## 2023-07-17 NOTE — TELEPHONE ENCOUNTER
Pt had questions regarding his Eliquis. He was in and out of the ER and was given two different directions 5 mg BID for either 7 days and 5 mg daily after that. The second directions state 10 mg BID for 14 days. Pt is currently 5 mg daily since Saturday.  He is not sure what to do Please advise

## 2023-07-17 NOTE — ASSESSMENT & PLAN NOTE
- Patient's wound does not appear to be infected.   I have given him instructions for wound care:  -First apply Xeroform dressing to cover the wound  -Apply 1 nonadherent pad  -Wrap with stretch gauze and secure with tape

## 2023-07-17 NOTE — DISCHARGE INSTRUCTIONS
You were seen in the ED for generalized weakness. You had blood work showing no concerning findings. You were diagnosed with vertigo, likely due to an inner ear problem. You have been discharged with meclizine to be taken as needed for vertigo. Please follow up with your primary care physician within the next 1 week for continued management of your conditions. Please come back to the ED if you develop uncontrollable pain, facial weakness, loss of consciousness, visual changes. Thank you very much for utilizing the ED this evening.

## 2023-07-17 NOTE — TELEPHONE ENCOUNTER
He should have taken 10 mg twice a day for 7 days followed by 5 mg twice a day. He should currently be on the 5 mg twice a day dosing.

## 2023-07-17 NOTE — ASSESSMENT & PLAN NOTE
-DVT in the setting of right upper extremity immobility and presumed right renal cell carcinoma  -Continue Eliquis   -Referral made to hematology for recommendations as to when can proceed with right radical nephrectomy and perioperative management of Eliquis

## 2023-07-18 ENCOUNTER — CONSULT (OUTPATIENT)
Dept: HEMATOLOGY ONCOLOGY | Facility: CLINIC | Age: 68
End: 2023-07-18
Payer: COMMERCIAL

## 2023-07-18 VITALS
TEMPERATURE: 98.6 F | BODY MASS INDEX: 20.57 KG/M2 | OXYGEN SATURATION: 99 % | WEIGHT: 128 LBS | HEART RATE: 79 BPM | SYSTOLIC BLOOD PRESSURE: 152 MMHG | DIASTOLIC BLOOD PRESSURE: 86 MMHG | HEIGHT: 66 IN | RESPIRATION RATE: 16 BRPM

## 2023-07-18 DIAGNOSIS — I82.621 ACUTE DEEP VEIN THROMBOSIS (DVT) OF BRACHIAL VEIN OF RIGHT UPPER EXTREMITY (HCC): ICD-10-CM

## 2023-07-18 PROCEDURE — 99204 OFFICE O/P NEW MOD 45 MIN: CPT | Performed by: INTERNAL MEDICINE

## 2023-07-18 NOTE — PROGRESS NOTES
David Filter  1955  [facility] [place]  [address] [place] [id number]    No chief complaint on file. History of Present Illness:    Mr. Ivet Spencer is a 78-year-old male with past medical history significant for HTN, chronic pain of both shoulders, polyneuropathy, Parsonage-Cordero syndrome and right renal mass. He was evaluated in the ER on 7/7/2023 after found to have a right upper extremity DVT on ultrasound. He was initiated on Eliquis. He is following with urology and planned radical nephrectomy which was canceled due to newly diagnosed right upper extremity DVT. He denies any prior history of DVT or PE. He does have chronic right shoulder pain that is currently being worked up as Parsonage-Cordero syndrome. He has had limited mobility in right upper extremity due to right shoulder pain. He occasionally wears it in a sling. Patient referred to hematology for recommendations as to when to proceed with right radical nephrectomy and perioperative management of Eliquis. Pt notes intermittent swelling of RUE. He is accompanied by wife for visit today. Review of Systems   Constitutional: Positive for fatigue. Respiratory: Negative. Negative for shortness of breath. Cardiovascular: Negative for chest pain and palpitations. Gastrointestinal: Positive for constipation. Negative for blood in stool. Genitourinary: Negative for hematuria. Musculoskeletal:        RUE swelling. Right elbow wound. Neurological: Positive for weakness. Hematological: Does not bruise/bleed easily.        Patient Active Problem List   Diagnosis   • 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   • HTN (hypertension)   • Allergic rhinitis   • NSVT (nonsustained ventricular tachycardia) (HCC)   • Premature atrial contractions   • Iron deficiency anemia   • Essential hypertension   • Anxiety   • Postural hypotension   • Rupture of left biceps tendon   • Fatigue   • Rotator cuff tear arthropathy of left shoulder   • Weakness of right upper extremity   • Rupture of right long head biceps tendon   • Seasonal allergies   • Shoulder pain, left   • Palpitations   • Dizziness   • Multiple bruises   • COVID-19   • Muscle weakness   • Polyneuropathy   • Imbalance   • Acute pain of right shoulder   • Chronic pain of both shoulders   • Tendinitis of right rotator cuff   • Weight loss   • Impaired sensation   • Radiculopathy, cervical region   • Neuropathy of right upper extremity   • Rotator cuff tear arthropathy of right shoulder   • Carpal tunnel syndrome of right wrist   • Right renal mass   • Moderate protein-calorie malnutrition (HCC)   • Constipation   • Parsonage-Cordero syndrome   • Brachial plexopathy without trauma   • Right leg swelling   • Swelling of arm   • Preop examination   • Skin tear of right elbow without complication   • Acute deep vein thrombosis (DVT) of brachial vein of right upper extremity (HCC)     Past Medical History:   Diagnosis Date   • Allergic    • Arthritis    • Asthma    • Cancer (HCC)     kidney   • Hypertension    • Impaired fasting glucose    • Mitral valve disorder    • Mitral valve prolapse    • Murmur, cardiac    • 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    • PAC (premature atrial contraction)    • Parsonage-Cordero syndrome    • PVC (premature ventricular contraction)    • PVC (premature ventricular contraction)    • Renal cancer ([address]) 2023   • Thyroid nodule      Past Surgical History:   Procedure Laterality Date   • HAND SURGERY     • VT COLONOSCOPY FLX DX W/COLLJ SPEC WHEN PFRMD N/A 2/9/2018    Procedure: COLONOSCOPY;  Surgeon: Julio César Blair MD;  Location: [facility];   Service: Gastroenterology   • PROSTATE BIOPSY     • SHOULDER SURGERY       Family History   Problem Relation Age of Onset   • Diabetes Mother    • Stroke Mother    • Stroke Father    • Heart disease Father    • Diabetes Sister    • Other Family         Stroke syndrome     Social History     Socioeconomic History   • Marital status: /Civil Union     Spouse name: Not on file   • Number of children: Not on file   • Years of education: Not on file   • Highest education level: Not on file   Occupational History   • Not on file   Tobacco Use   • Smoking status: Never   • Smokeless tobacco: Never   Vaping Use   • Vaping Use: Never used   Substance and Sexual Activity   • Alcohol use: Not Currently     Alcohol/week: 3.0 standard drinks of alcohol     Types: 3 Glasses of wine per week     Comment: 4 oz wine with dinner a few days a week   • Drug use: No   • Sexual activity: Yes     Partners: Female     Birth control/protection: None   Other Topics Concern   • Not on file   Social History Narrative   • Not on file     Social Determinants of Health     Financial Resource Strain: Low Risk  (8/15/2022)    Overall Financial Resource Strain (CARDIA)    • Difficulty of Paying Living Expenses: Not very hard   Food Insecurity: Not on file   Transportation Needs: No Transportation Needs (8/15/2022)    PRAPARE - Transportation    • Lack of Transportation (Medical): No    • Lack of Transportation (Non-Medical):  No   Physical Activity: Not on file   Stress: Not on file   Social Connections: Not on file   Intimate Partner Violence: Not on file   Housing Stability: Not on file       Current Outpatient Medications:   •  acetaminophen (TYLENOL) 325 mg tablet, Take 2 tablets (650 mg total) by mouth every 6 (six) hours as needed for mild pain, Disp: , Rfl: 0  •  amLODIPine (NORVASC) 5 mg tablet, Take 1 tablet (5 mg total) by mouth daily Prn BP> 150/90, Disp: 30 tablet, Rfl: 5  •  apixaban (ELIQUIS) 5 mg, Take 2 tablets (10 mg total) by mouth 2 (two) times a day for 14 days Then 5 mg (one tablet) twice daily after 1 week., Disp: 56 tablet, Rfl: 0  •  bisacodyl (DULCOLAX) 5 mg EC tablet, Take 1 tablet (5 mg total) by mouth daily as needed for constipation, Disp: 30 tablet, Rfl: 0  •  busPIRone (BUSPAR) 7.5 mg tablet, Take 1 tablet (7.5 mg total) by mouth 2 (two) times a day as needed (Anxiety), Disp: 60 tablet, Rfl: 1  •  Coenzyme Q10 (CO Q 10 PO), Take by mouth in the morning, Disp: , Rfl:   •  Docosahexaenoic Acid (DHA OMEGA 3) 100 MG CAPS, Take 6 capsules by mouth daily, Disp: , Rfl:   •  docusate sodium (COLACE) 100 mg capsule, Take 1 capsule (100 mg total) by mouth 2 (two) times a day, Disp: 60 capsule, Rfl: 2  •  fexofenadine (ALLEGRA) 180 MG tablet, Take 1 tablet by mouth daily as needed, Disp: , Rfl:   •  gabapentin (Neurontin) 300 mg capsule, Take 1 capsule (300 mg total) by mouth daily at bedtime, Disp: 30 capsule, Rfl: 1  •  lidocaine (XYLOCAINE) 5 % ointment, Apply topically as needed for mild pain (Patient not taking: Reported on 7/12/2023), Disp: 35.44 g, Rfl: 3  •  lisinopril (ZESTRIL) 10 mg tablet, Take 10 mg by mouth every evening, Disp: , Rfl:   •  lisinopril (ZESTRIL) 20 mg tablet, Take 20 mg by mouth every morning, Disp: , Rfl:   •  magnesium citrate (CITROMA) 1.745 g/30 mL oral solution, Take 296 mL by mouth once for 1 dose, Disp: 296 mL, Rfl: 0  •  meclizine (ANTIVERT) 25 mg tablet, Take 1 tablet (25 mg total) by mouth 3 (three) times a day as needed for dizziness, Disp: 30 tablet, Rfl: 0  •  metoprolol succinate (TOPROL-XL) 25 mg 24 hr tablet, Take 1 tablet (25 mg total) by mouth daily with breakfast, Disp: 90 tablet, Rfl: 3  •  Multiple Vitamin tablet, Take 1 tablet by mouth daily, Disp: , Rfl:   •  oxyCODONE (ROXICODONE) 5 immediate release tablet, Take 0.5 tablets (2.5 mg total) by mouth every 6 (six) hours as needed for severe pain Max Daily Amount: 10 mg, Disp: 7 tablet, Rfl: 0  •  polyethylene glycol (MIRALAX) 17 g packet, Take 17 g by mouth daily Do not start before May 6, 2023., Disp: , Rfl: 0  •  tiZANidine (ZANAFLEX) 2 mg tablet, Take 1 tablet (2 mg total) by mouth 3 (three) times a day (Patient taking differently: Take 2 mg by mouth if needed), Disp: 90 tablet, Rfl: 1  No Known Allergies  There were no vitals filed for this visit. Physical Exam  Vitals reviewed. Constitutional:       General: He is not in acute distress. Appearance: Normal appearance. HENT:      Head: Normocephalic and atraumatic. Mouth/Throat:      Mouth: Mucous membranes are moist.   Eyes:      Extraocular Movements: Extraocular movements intact. Conjunctiva/sclera: Conjunctivae normal.   Cardiovascular:      Rate and Rhythm: Normal rate. Pulmonary:      Effort: Pulmonary effort is normal.   Abdominal:      General: Abdomen is flat. Palpations: Abdomen is soft. Musculoskeletal:         General: Swelling (RUE swelling) present. Cervical back: Normal range of motion. Right lower leg: No edema. Left lower leg: No edema. Skin:     General: Skin is warm. Neurological:      General: No focal deficit present. Mental Status: He is alert and oriented to person, place, and time. Gait: Gait abnormal.   Psychiatric:         Thought Content: Thought content normal.         Discussion/Summary:    1. Right upper extremity DVT  Risk factors immobility, presumed right renal cell carcinoma. Denies any personal or family hx of thrombosis. Denies recent illness. Denies recent COVID.     7/7/2023: Duplex upper extremity:  RIGHT UPPER LIMB:  There is evidence of acute deep vein thrombosis in one of the brachial veins at  the distal upper arm to the elbow and in the ulnar veins at the proximal  forearm. There is evidence of acute superficial thrombophlebitis in the cephalic vein  branch at the proximal-mid forearm. LEFT UPPER LIMB LIMITED:  Evaluation shows no evidence of thrombus in the internal jugular vein,  subclavian vein, and the innominate vein. RUE DVT with risk factors of immobility, presumed right renal cell carcinoma. Will need anticoagulation for minimum of 3 months (final duration to be finalized pending further workup). Continue eliquis 5 mg BID. Notes compliance with eliquis. Lengthy d/w pt and wife. We discussed that the initial 3 months post thrombosis are more critical and interruption of anticoagulation is not generally recommended. However, pt has a renal mass that needs resection. Consider risks vs benefits of stopping anticoagulation. Will discuss timing of nephrectomy with urology. Referred to vascular surgery. 2.  Right renal mass  Following with urology (Dr Bia Nice). Planning to undergo radical right nephrectomy. 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

## 2023-07-18 NOTE — ED PROVIDER NOTES
History  Chief Complaint   Patient presents with   • Fall     Pt states he stumbled and fell, pain to right shoulder since fall. Reports that it is already disloacted and has been for months due to parsonage turners syndrome. Also reports blood clot in that right arm. Is on eliquis, denies head strike, syncope or LOC     Patient is a 78-year-old male who presents to the emergency department for evaluation with right shoulder and upper arm pain. He missed stepped this morning and fell down to the ground impacting the right shoulder. 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 He has been undergoing treatment for this with orthopedics and describes chronic dislocation. He has minimal ability to range the shoulder and reports elbow in fixed positioning. He did take tramadol without much relief in discomfort. He did sustained 3 skin tears to the outer aspect of the right upper arm just proximal to the elbow. These were already cleansed and bandaged. He and his wife inquire regarding any other treatment of these needed for healing. He does take Eliquis which was initiated a few months ago after identification of right upper extremity DVT. He denies having injured any other part of his body. He did not strike his head nor have any loss of consciousness. He has no chest pain nor difficulty breathing. No change in sensation in the arm distally. Prior to Admission Medications   Prescriptions Last Dose Informant Patient Reported? Taking?    Coenzyme Q10 (CO Q 10 PO)   Yes No   Sig: Take by mouth in the morning   Docosahexaenoic Acid (DHA OMEGA 3) 100 MG CAPS  Self Yes No   Sig: Take 6 capsules by mouth daily   Multiple Vitamin tablet  Self Yes No   Sig: Take 1 tablet by mouth daily   acetaminophen (TYLENOL) 325 mg tablet  Self No No   Sig: Take 2 tablets (650 mg total) by mouth every 6 (six) hours as needed for mild pain   amLODIPine (NORVASC) 5 mg tablet   No No Sig: Take 1 tablet (5 mg total) by mouth daily Prn BP> 150/90   apixaban (ELIQUIS) 5 mg   No No   Sig: Take 2 tablets (10 mg total) by mouth 2 (two) times a day for 14 days Then 5 mg (one tablet) twice daily after 1 week. bisacodyl (DULCOLAX) 5 mg EC tablet  Self No No   Sig: Take 1 tablet (5 mg total) by mouth daily as needed for constipation   busPIRone (BUSPAR) 7.5 mg tablet  Self No No   Sig: Take 1 tablet (7.5 mg total) by mouth 2 (two) times a day as needed (Anxiety)   docusate sodium (COLACE) 100 mg capsule   No No   Sig: Take 1 capsule (100 mg total) by mouth 2 (two) times a day   fexofenadine (ALLEGRA) 180 MG tablet  Self Yes No   Sig: Take 1 tablet by mouth daily as needed   gabapentin (Neurontin) 300 mg capsule  Self No No   Sig: Take 1 capsule (300 mg total) by mouth daily at bedtime   lidocaine (XYLOCAINE) 5 % ointment  Self No No   Sig: Apply topically as needed for mild pain   Patient not taking: Reported on 7/12/2023   lisinopril (ZESTRIL) 10 mg tablet   Yes No   Sig: Take 10 mg by mouth every evening   lisinopril (ZESTRIL) 20 mg tablet   Yes No   Sig: Take 20 mg by mouth every morning   magnesium citrate (CITROMA) 1.745 g/30 mL oral solution   No No   Sig: Take 296 mL by mouth once for 1 dose   metoprolol succinate (TOPROL-XL) 25 mg 24 hr tablet  Self No No   Sig: Take 1 tablet (25 mg total) by mouth daily with breakfast   polyethylene glycol (MIRALAX) 17 g packet  Self No No   Sig: Take 17 g by mouth daily Do not start before May 6, 2023.    tiZANidine (ZANAFLEX) 2 mg tablet  Self No No   Sig: Take 1 tablet (2 mg total) by mouth 3 (three) times a day   Patient taking differently: Take 2 mg by mouth if needed      Facility-Administered Medications: None       Past Medical History:   Diagnosis Date   • Allergic    • Arthritis    • Asthma    • Cancer ([address])     kidney   • Hypertension    • Impaired fasting glucose    • Mitral valve disorder    • Mitral valve prolapse    • Murmur, cardiac    • 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    • PAC (premature atrial contraction)    • Parsonage-Cordero syndrome    • PVC (premature ventricular contraction)    • PVC (premature ventricular contraction)    • Renal cancer ([address]) 2023   • Thyroid nodule        Past Surgical History:   Procedure Laterality Date   • HAND SURGERY     • MI COLONOSCOPY FLX DX W/COLLJ SPEC WHEN PFRMD N/A 2/9/2018    Procedure: COLONOSCOPY;  Surgeon: Jamila Sheppard MD;  Location: [facility]; Service: Gastroenterology   • PROSTATE BIOPSY     • SHOULDER SURGERY         Family History   Problem Relation Age of Onset   • Diabetes Mother    • Stroke Mother    • Stroke Father    • Heart disease Father    • Diabetes Sister    • Other Family         Stroke syndrome     I have reviewed and agree with the history as documented. E-Cigarette/Vaping   • E-Cigarette Use Never User      E-Cigarette/Vaping Substances   • Nicotine No    • THC No    • CBD No    • Flavoring No    • Other No    • Unknown No      Social History     Tobacco Use   • Smoking status: Never   • Smokeless tobacco: Never   Vaping Use   • Vaping Use: Never used   Substance Use Topics   • Alcohol use: Not Currently     Alcohol/week: 3.0 standard drinks of alcohol     Types: 3 Glasses of wine per week     Comment: 4 oz wine with dinner a few days a week   • Drug use: No       Review of Systems   All other systems reviewed and are negative. Physical Exam  Physical Exam  Vitals and nursing note reviewed. Constitutional:       General: He is in acute distress ( Appears very uncomfortable-laying still on stretcher). Appearance: Normal appearance. HENT:      Head: Normocephalic. Mouth/Throat:      Mouth: Mucous membranes are moist.   Eyes:      Extraocular Movements: Extraocular movements intact. Conjunctiva/sclera: Conjunctivae normal.   Cardiovascular:      Rate and Rhythm: Normal rate and regular rhythm. Heart sounds: Normal heart sounds. Pulmonary:      Effort: Pulmonary effort is normal.      Breath sounds: Normal breath sounds. Abdominal:      Palpations: Abdomen is soft. Tenderness: There is no abdominal tenderness. Musculoskeletal:      Cervical back: Normal range of motion and neck supple. No rigidity or tenderness. Skin:     General: Skin is warm and dry. Comments: 3 areas of superficial skin tear appreciated over the outer aspect of the right upper arm just above the elbow. No active bleeding. Superficial torn skin is thin though well covers the area of injury. No surrounding erythema or swelling. Nonadherent in place. The right shoulder is tender anteriorly and laterally. The right clavicle is nontender and without appreciated deformity. There is tenderness over the anterior aspect of the right upper arm extending to the elbow. No tenderness distal to the elbow. No focal bony tenderness appreciated. +2 bilateral radial and ulnar pulses. Good strength with bilateral handgrip, finger abduction and pincer grasp. Patient unable to range right elbow (chronic per his report). He is able to slightly abduct and flex the right shoulder-clear limited by discomfort. No cervical, thoracic or lumbar midline tenderness. Lower extremities and left upper extremity nontender to palpation. Neurological:      Mental Status: He is alert.    Psychiatric:         Mood and Affect: Mood normal.         Behavior: Behavior normal.         Vital Signs  ED Triage Vitals [07/12/23 1317]   Temperature Pulse Respirations Blood Pressure SpO2   97.9 °F (36.6 °C) 78 20 (!) 189/95 98 %      Temp Source Heart Rate Source Patient Position - Orthostatic VS BP Location FiO2 (%)   Oral Monitor Sitting Right arm --      Pain Score       10 - Worst Possible Pain           Vitals:    07/12/23 1317 07/12/23 1522   BP: (!) 189/95 123/63   Pulse: 78 59   Patient Position - Orthostatic VS: Sitting Lying         Visual Acuity      ED Medications  Medications   oxyCODONE (ROXICODONE) IR tablet 5 mg (5 mg Oral Given 7/12/23 1453)   acetaminophen (TYLENOL) tablet 650 mg (650 mg Oral Given 7/12/23 1453)       Diagnostic Studies  Results Reviewed     None                 XR elbow 3+ views RIGHT   Final Result by Miriam De Santiago MD (07/13 6967)      No acute osseous abnormality. Workstation performed: [id number]         XR shoulder 2+ views RIGHT   Final Result by Gino Connors MD (07/12 5920)      No acute osseous abnormality. Workstation performed: [id number]                    Procedures  Procedures         ED Course     Due to degree of discomfort and inadequate relief of pain with tramadol did administer dose of oxycodone. Acetaminophen additionally given and lidocaine patch applied. Patient notes that this has not typically helped much in the past nor has a lidocaine gel that he is quite open to trying it in the setting of acute injury. Differential diagnosis includes but is not limited to contusion, fracture of the distal clavicle, scapula or humerus. Must additionally consider strain, dislocation and radiculopathy. No evidence of hematoma or compartment syndrome. Patient did not strike his head, has no headache or neurological abnormalities suggestive of ICH. No acute abnormality identified on x-ray. Imaging was reviewed by myself and subsequently radiology rendered their report revealing no acute bony injury or dislocation. Patient had some improvement in discomfort and felt adequate relief for discharge. He will continue using acetaminophen, ice, lidocaine and if needed for severe discomfort oxycodone. NSAIDs contraindicated given anticoagulant use. He will follow-up with his physician at short interval.                          SBIRT 22yo+    Flowsheet Row Most Recent Value   Initial Alcohol Screen: US AUDIT-C     1. How often do you have a drink containing alcohol? 0 Filed at: 07/12/2023 1429   2. How many drinks containing alcohol do you have on a typical day you are drinking? 0 Filed at: 07/12/2023 1429   3a. Male UNDER 65: How often do you have five or more drinks on one occasion? 0 Filed at: 07/12/2023 1429   3b. FEMALE Any Age, or MALE 65+: How often do you have 4 or more drinks on one occassion? 0 Filed at: 07/12/2023 1429   Audit-C Score 0 Filed at: 07/12/2023 1429   PEGGY: How many times in the past year have you. .. Used an illegal drug or used a prescription medication for non-medical reasons? Never Filed at: 07/12/2023 1429                    MDM    Disposition  Final diagnoses:   Fall from standing   Acute pain of right shoulder   Skin tear of right upper arm without complication     Time reflects when diagnosis was documented in both MDM as applicable and the Disposition within this note     Time User Action Codes Description Comment    7/12/2023  4:57 PM Roberto Furlough A Add [I16. XXXA] Fall from standing, initial encounter     7/12/2023  4:57 PM Roberto Furlough A Remove [V76. XXXA] Fall from standing, initial encounter     7/12/2023  4:58 PM Roberto Furlough A Add [B31. XXXA] Fall from standing     7/12/2023  4:58 PM Roberto Furlough A Add [M25.511] Acute pain of right shoulder     7/12/2023  4:59 PM Roberto Furlough A Add [T70.027V] Skin tear of right upper arm without complication       ED Disposition     ED Disposition   Discharge    Condition   Stable    Date/Time   Wed Jul 12, 2023  4:57 PM    [address] discharge to home/self care.                Follow-up Information     Follow up With Specialties Details Why Contact Info    Benjamin Lima MD Internal Medicine Schedule an appointment as soon as possible for a visit   [address] [place] [address]  [phone]            Discharge Medication List as of 7/12/2023  5:09 PM      START taking these medications    Details   oxyCODONE (ROXICODONE) 5 immediate release tablet Take 0.5 tablets (2.5 mg total) by mouth every 6 (six) hours as needed for severe pain Max Daily Amount: 10 mg, Starting Wed 7/12/2023, Normal         CONTINUE these medications which have NOT CHANGED    Details   acetaminophen (TYLENOL) 325 mg tablet Take 2 tablets (650 mg total) by mouth every 6 (six) hours as needed for mild pain, Starting Fri 5/5/2023, No Print      amLODIPine (NORVASC) 5 mg tablet Take 1 tablet (5 mg total) by mouth daily Prn BP> 150/90, Starting Mon 5/22/2023, Normal      apixaban (ELIQUIS) 5 mg Take 2 tablets (10 mg total) by mouth 2 (two) times a day for 14 days Then 5 mg (one tablet) twice daily after 1 week., Starting Fri 7/7/2023, Until Fri 7/21/2023, Normal      bisacodyl (DULCOLAX) 5 mg EC tablet Take 1 tablet (5 mg total) by mouth daily as needed for constipation, Starting Wed 5/10/2023, Normal      busPIRone (BUSPAR) 7.5 mg tablet Take 1 tablet (7.5 mg total) by mouth 2 (two) times a day as needed (Anxiety), Starting Mon 3/27/2023, Normal      Coenzyme Q10 (CO Q 10 PO) Take by mouth in the morning, Historical Med      Docosahexaenoic Acid (DHA OMEGA 3) 100 MG CAPS Take 6 capsules by mouth daily, Historical Med      docusate sodium (COLACE) 100 mg capsule Take 1 capsule (100 mg total) by mouth 2 (two) times a day, Starting Fri 6/2/2023, Normal      fexofenadine (ALLEGRA) 180 MG tablet Take 1 tablet by mouth daily as needed, Starting Tue 6/10/2014, Historical Med      gabapentin (Neurontin) 300 mg capsule Take 1 capsule (300 mg total) by mouth daily at bedtime, Starting Tue 5/16/2023, Normal      lidocaine (XYLOCAINE) 5 % ointment Apply topically as needed for mild pain, Starting Mon 3/27/2023, Normal      !! lisinopril (ZESTRIL) 10 mg tablet Take 10 mg by mouth every evening, Historical Med      !! lisinopril (ZESTRIL) 20 mg tablet Take 20 mg by mouth every morning, Historical Med      magnesium citrate (CITROMA) 1.745 g/30 mL oral solution Take 296 mL by mouth once for 1 dose, Starting Thu 6/15/2023, Normal      metoprolol succinate (TOPROL-XL) 25 mg 24 hr tablet Take 1 tablet (25 mg total) by mouth daily with breakfast, Starting Mon 5/15/2023, Normal      Multiple Vitamin tablet Take 1 tablet by mouth daily, Historical Med      polyethylene glycol (MIRALAX) 17 g packet Take 17 g by mouth daily Do not start before May 6, 2023., Starting Sat 5/6/2023, No Print      tiZANidine (ZANAFLEX) 2 mg tablet Take 1 tablet (2 mg total) by mouth 3 (three) times a day, Starting Tue 5/16/2023, Until Wed 7/12/2023, Normal       !! - Potential duplicate medications found. Please discuss with provider. No discharge procedures on file.     PDMP Review       Value Time User    PDMP Reviewed  Yes 5/30/2023  5:14 PM Leland Vasquez MD          ED Provider  Electronically Signed by           Jessica Delarosa MD  07/17/23 3332

## 2023-07-19 ENCOUNTER — HOME CARE VISIT (OUTPATIENT)
Dept: HOME HEALTH SERVICES | Facility: HOME HEALTHCARE | Age: 68
End: 2023-07-19
Payer: COMMERCIAL

## 2023-07-19 VITALS
RESPIRATION RATE: 18 BRPM | HEART RATE: 68 BPM | TEMPERATURE: 98.8 F | SYSTOLIC BLOOD PRESSURE: 130 MMHG | DIASTOLIC BLOOD PRESSURE: 66 MMHG | OXYGEN SATURATION: 97 %

## 2023-07-19 DIAGNOSIS — R53.1 GENERALIZED WEAKNESS: ICD-10-CM

## 2023-07-19 DIAGNOSIS — G54.5 PARSONAGE-TURNER SYNDROME: Primary | ICD-10-CM
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
## 2023-07-19 DIAGNOSIS — I82.629 DVT OF UPPER EXTREMITY (DEEP VEIN THROMBOSIS) (HCC): ICD-10-CM

## 2023-07-19 PROCEDURE — G0299 HHS/HOSPICE OF RN EA 15 MIN: HCPCS

## 2023-07-21 ENCOUNTER — HOME CARE VISIT (OUTPATIENT)
Dept: HOME HEALTH SERVICES | Facility: HOME HEALTHCARE | Age: 68
End: 2023-07-21
Payer: COMMERCIAL

## 2023-07-21 VITALS
RESPIRATION RATE: 18 BRPM | SYSTOLIC BLOOD PRESSURE: 102 MMHG | OXYGEN SATURATION: 97 % | TEMPERATURE: 97.8 F | DIASTOLIC BLOOD PRESSURE: 60 MMHG | HEART RATE: 68 BPM

## 2023-07-21 VITALS
OXYGEN SATURATION: 97 % | RESPIRATION RATE: 18 BRPM | DIASTOLIC BLOOD PRESSURE: 60 MMHG | HEART RATE: 68 BPM | SYSTOLIC BLOOD PRESSURE: 102 MMHG | TEMPERATURE: 97.8 F

## 2023-07-21 PROCEDURE — G0300 HHS/HOSPICE OF LPN EA 15 MIN: HCPCS

## 2023-07-21 PROCEDURE — G0151 HHCP-SERV OF PT,EA 15 MIN: HCPCS

## 2023-07-24 ENCOUNTER — HOME CARE VISIT (OUTPATIENT)
Dept: HOME HEALTH SERVICES | Facility: HOME HEALTHCARE | Age: 68
End: 2023-07-24
Payer: COMMERCIAL

## 2023-07-24 VITALS — TEMPERATURE: 98.5 F | HEART RATE: 66 BPM | RESPIRATION RATE: 18 BRPM | OXYGEN SATURATION: 97 %

## 2023-07-24 PROCEDURE — G0299 HHS/HOSPICE OF RN EA 15 MIN: HCPCS

## 2023-07-24 PROCEDURE — G0152 HHCP-SERV OF OT,EA 15 MIN: HCPCS

## 2023-07-25 PROCEDURE — G0180 MD CERTIFICATION HHA PATIENT: HCPCS | Performed by: INTERNAL MEDICINE

## 2023-07-25 NOTE — CASE COMMUNICATION
OT evaluation completed 7/24/23 and Plan of Care established for 1week2 and 2week4 for ADL training, ADL transfer training, BUE ther ex with HEP for strengrthening, ther act with HEP for coordination and patient/caregiver education to fall prevention, stress management/relaxation, energy conservation and work simplification.

## 2023-07-26 ENCOUNTER — HOME CARE VISIT (OUTPATIENT)
Dept: HOME HEALTH SERVICES | Facility: HOME HEALTHCARE | Age: 68
End: 2023-07-26
Payer: COMMERCIAL

## 2023-07-26 VITALS
HEART RATE: 70 BPM | SYSTOLIC BLOOD PRESSURE: 122 MMHG | DIASTOLIC BLOOD PRESSURE: 62 MMHG | TEMPERATURE: 98.6 F | RESPIRATION RATE: 16 BRPM | OXYGEN SATURATION: 97 %

## 2023-07-26 VITALS — HEART RATE: 70 BPM | DIASTOLIC BLOOD PRESSURE: 60 MMHG | OXYGEN SATURATION: 99 % | SYSTOLIC BLOOD PRESSURE: 104 MMHG

## 2023-07-26 PROCEDURE — G0151 HHCP-SERV OF PT,EA 15 MIN: HCPCS

## 2023-07-26 PROCEDURE — G0299 HHS/HOSPICE OF RN EA 15 MIN: HCPCS

## 2023-07-26 NOTE — CASE COMMUNICATION
Patient will be moving after he closes on current house on 7/31. He and spouse will have access to their current house through first week of August.  They also have option for temporary stay with friends if necessary before move in completed. Please check with patient/spouse prior to visits to determine correct location to find patient. New address will be:  [address] [place] [address] [facility] [address] [place] [address].

## 2023-07-27 VITALS — OXYGEN SATURATION: 97 % | HEART RATE: 66 BPM

## 2023-07-28 ENCOUNTER — HOME CARE VISIT (OUTPATIENT)
Dept: HOME HEALTH SERVICES | Facility: HOME HEALTHCARE | Age: 68
End: 2023-07-28
Payer: COMMERCIAL

## 2023-07-28 VITALS — SYSTOLIC BLOOD PRESSURE: 112 MMHG | DIASTOLIC BLOOD PRESSURE: 68 MMHG | OXYGEN SATURATION: 98 % | HEART RATE: 68 BPM

## 2023-07-28 PROCEDURE — G0151 HHCP-SERV OF PT,EA 15 MIN: HCPCS

## 2023-07-29 ENCOUNTER — HOME CARE VISIT (OUTPATIENT)
Dept: HOME HEALTH SERVICES | Facility: HOME HEALTHCARE | Age: 68
End: 2023-07-29
Payer: COMMERCIAL

## 2023-07-29 VITALS
HEART RATE: 67 BPM | TEMPERATURE: 97.8 F | RESPIRATION RATE: 18 BRPM | DIASTOLIC BLOOD PRESSURE: 75 MMHG | SYSTOLIC BLOOD PRESSURE: 118 MMHG | OXYGEN SATURATION: 98 %

## 2023-07-29 PROCEDURE — G0299 HHS/HOSPICE OF RN EA 15 MIN: HCPCS

## 2023-07-31 ENCOUNTER — HOME CARE VISIT (OUTPATIENT)
Dept: HOME HEALTH SERVICES | Facility: HOME HEALTHCARE | Age: 68
End: 2023-07-31
Payer: COMMERCIAL

## 2023-08-01 ENCOUNTER — HOME CARE VISIT (OUTPATIENT)
Dept: HOME HEALTH SERVICES | Facility: HOME HEALTHCARE | Age: 68
End: 2023-08-01
Payer: COMMERCIAL

## 2023-08-02 DIAGNOSIS — G54.5 PARSONAGE-TURNER SYNDROME: Primary | ICD-10-CM

## 2023-08-02 RX ORDER — GABAPENTIN 100 MG/1
100 CAPSULE ORAL
Qty: 90 CAPSULE | Refills: 0 | Status: SHIPPED | OUTPATIENT
Start: 2023-08-02

## 2023-08-03 ENCOUNTER — HOME CARE VISIT (OUTPATIENT)
Dept: HOME HEALTH SERVICES | Facility: HOME HEALTHCARE | Age: 68
End: 2023-08-03
Payer: COMMERCIAL

## 2023-08-03 PROCEDURE — G0299 HHS/HOSPICE OF RN EA 15 MIN: HCPCS

## 2023-08-03 PROCEDURE — G0152 HHCP-SERV OF OT,EA 15 MIN: HCPCS

## 2023-08-04 ENCOUNTER — HOME CARE VISIT (OUTPATIENT)
Dept: HOME HEALTH SERVICES | Facility: HOME HEALTHCARE | Age: 68
End: 2023-08-04
Payer: COMMERCIAL

## 2023-08-04 VITALS
OXYGEN SATURATION: 97 % | TEMPERATURE: 97.3 F | RESPIRATION RATE: 16 BRPM | HEART RATE: 73 BPM | SYSTOLIC BLOOD PRESSURE: 106 MMHG | DIASTOLIC BLOOD PRESSURE: 58 MMHG

## 2023-08-04 PROCEDURE — G0151 HHCP-SERV OF PT,EA 15 MIN: HCPCS

## 2023-08-05 ENCOUNTER — HOSPITAL ENCOUNTER (INPATIENT)
Facility: HOSPITAL | Age: 68
LOS: 2 days | Discharge: HOME HEALTH | End: 2023-08-08
Attending: EMERGENCY MEDICINE | Admitting: HOSPITALIST
Payer: COMMERCIAL

## 2023-08-05 ENCOUNTER — APPOINTMENT (EMERGENCY)
Dept: RADIOLOGY | Facility: HOSPITAL | Age: 68
End: 2023-08-05
Payer: COMMERCIAL

## 2023-08-05 ENCOUNTER — APPOINTMENT (OUTPATIENT)
Dept: RADIOLOGY | Facility: HOSPITAL | Age: 68
End: 2023-08-05
Payer: COMMERCIAL

## 2023-08-05 DIAGNOSIS — R53.1 WEAKNESS: Primary | ICD-10-CM

## 2023-08-05 DIAGNOSIS — R63.4 WEIGHT LOSS: ICD-10-CM

## 2023-08-05 DIAGNOSIS — K59.00 CONSTIPATION: ICD-10-CM

## 2023-08-05 DIAGNOSIS — M25.519 SHOULDER PAIN: ICD-10-CM

## 2023-08-05 DIAGNOSIS — G56.91 NEUROPATHY OF RIGHT UPPER EXTREMITY: ICD-10-CM

## 2023-08-05 DIAGNOSIS — I82.621 ACUTE DEEP VEIN THROMBOSIS (DVT) OF BRACHIAL VEIN OF RIGHT UPPER EXTREMITY (HCC): ICD-10-CM

## 2023-08-05 PROBLEM — M79.601 DIFFUSE PAIN IN RIGHT UPPER EXTREMITY: Status: ACTIVE | Noted: 2023-03-19

## 2023-08-05 LAB
ALBUMIN SERPL BCP-MCNC: 4 G/DL (ref 3.5–5)
ALP SERPL-CCNC: 37 U/L (ref 34–104)
ALT SERPL W P-5'-P-CCNC: 24 U/L (ref 7–52)
ANION GAP SERPL CALCULATED.3IONS-SCNC: 7 MMOL/L
AST SERPL W P-5'-P-CCNC: 26 U/L (ref 13–39)
BASOPHILS # BLD AUTO: 0.03 THOUSANDS/ÂΜL (ref 0–0.1)
BASOPHILS NFR BLD AUTO: 1 % (ref 0–1)
BILIRUB SERPL-MCNC: 0.79 MG/DL (ref 0.2–1)
BUN SERPL-MCNC: 16 MG/DL (ref 5–25)
CALCIUM SERPL-MCNC: 9 MG/DL (ref 8.4–10.2)
CHLORIDE SERPL-SCNC: 104 MMOL/L (ref 96–108)
CO2 SERPL-SCNC: 25 MMOL/L (ref 21–32)
CREAT SERPL-MCNC: 0.54 MG/DL (ref 0.6–1.3)
EOSINOPHIL # BLD AUTO: 0.09 THOUSAND/ÂΜL (ref 0–0.61)
EOSINOPHIL NFR BLD AUTO: 2 % (ref 0–6)
ERYTHROCYTE [DISTWIDTH] IN BLOOD BY AUTOMATED COUNT: 12.9 % (ref 11.6–15.1)
GFR SERPL CREATININE-BSD FRML MDRD: 106 ML/MIN/1.73SQ M
GLUCOSE SERPL-MCNC: 100 MG/DL (ref 65–140)
HCT VFR BLD AUTO: 41.1 % (ref 36.5–49.3)
HGB BLD-MCNC: 13.7 G/DL (ref 12–17)
IMM GRANULOCYTES # BLD AUTO: 0.01 THOUSAND/UL (ref 0–0.2)
IMM GRANULOCYTES NFR BLD AUTO: 0 % (ref 0–2)
LYMPHOCYTES # BLD AUTO: 1.11 THOUSANDS/ÂΜL (ref 0.6–4.47)
LYMPHOCYTES NFR BLD AUTO: 29 % (ref 14–44)
MAGNESIUM SERPL-MCNC: 2.1 MG/DL (ref 1.9–2.7)
MCH RBC QN AUTO: 30.1 PG (ref 26.8–34.3)
MCHC RBC AUTO-ENTMCNC: 33.3 G/DL (ref 31.4–37.4)
MCV RBC AUTO: 90 FL (ref 82–98)
MONOCYTES # BLD AUTO: 0.32 THOUSAND/ÂΜL (ref 0.17–1.22)
MONOCYTES NFR BLD AUTO: 8 % (ref 4–12)
NEUTROPHILS # BLD AUTO: 2.34 THOUSANDS/ÂΜL (ref 1.85–7.62)
NEUTS SEG NFR BLD AUTO: 60 % (ref 43–75)
NRBC BLD AUTO-RTO: 0 /100 WBCS
PHOSPHATE SERPL-MCNC: 2.8 MG/DL (ref 2.3–4.1)
PLATELET # BLD AUTO: 293 THOUSANDS/UL (ref 149–390)
PMV BLD AUTO: 9.3 FL (ref 8.9–12.7)
POTASSIUM SERPL-SCNC: 3.7 MMOL/L (ref 3.5–5.3)
PROT SERPL-MCNC: 6 G/DL (ref 6.4–8.4)
RBC # BLD AUTO: 4.55 MILLION/UL (ref 3.88–5.62)
SODIUM SERPL-SCNC: 136 MMOL/L (ref 135–147)
WBC # BLD AUTO: 3.9 THOUSAND/UL (ref 4.31–10.16)

## 2023-08-05 PROCEDURE — 99223 1ST HOSP IP/OBS HIGH 75: CPT | Performed by: INTERNAL MEDICINE

## 2023-08-05 PROCEDURE — 84100 ASSAY OF PHOSPHORUS: CPT | Performed by: EMERGENCY MEDICINE

## 2023-08-05 PROCEDURE — 36415 COLL VENOUS BLD VENIPUNCTURE: CPT | Performed by: EMERGENCY MEDICINE

## 2023-08-05 PROCEDURE — 99284 EMERGENCY DEPT VISIT MOD MDM: CPT

## 2023-08-05 PROCEDURE — 85025 COMPLETE CBC W/AUTO DIFF WBC: CPT | Performed by: EMERGENCY MEDICINE

## 2023-08-05 PROCEDURE — 74018 RADEX ABDOMEN 1 VIEW: CPT

## 2023-08-05 PROCEDURE — 96374 THER/PROPH/DIAG INJ IV PUSH: CPT

## 2023-08-05 PROCEDURE — 80053 COMPREHEN METABOLIC PANEL: CPT | Performed by: EMERGENCY MEDICINE

## 2023-08-05 PROCEDURE — 83735 ASSAY OF MAGNESIUM: CPT | Performed by: EMERGENCY MEDICINE

## 2023-08-05 PROCEDURE — 99285 EMERGENCY DEPT VISIT HI MDM: CPT | Performed by: EMERGENCY MEDICINE

## 2023-08-05 PROCEDURE — 96361 HYDRATE IV INFUSION ADD-ON: CPT

## 2023-08-05 PROCEDURE — 96375 TX/PRO/DX INJ NEW DRUG ADDON: CPT

## 2023-08-05 PROCEDURE — 73030 X-RAY EXAM OF SHOULDER: CPT

## 2023-08-05 RX ORDER — KETOROLAC TROMETHAMINE 30 MG/ML
15 INJECTION, SOLUTION INTRAMUSCULAR; INTRAVENOUS ONCE
Status: COMPLETED | OUTPATIENT
Start: 2023-08-05 | End: 2023-08-05

## 2023-08-05 RX ORDER — LISINOPRIL 20 MG/1
20 TABLET ORAL EVERY MORNING
Status: DISCONTINUED | OUTPATIENT
Start: 2023-08-06 | End: 2023-08-08 | Stop reason: HOSPADM

## 2023-08-05 RX ORDER — ENOXAPARIN SODIUM 100 MG/ML
40 INJECTION SUBCUTANEOUS DAILY
Status: DISCONTINUED | OUTPATIENT
Start: 2023-08-05 | End: 2023-08-05

## 2023-08-05 RX ORDER — MECLIZINE HYDROCHLORIDE 25 MG/1
25 TABLET ORAL 3 TIMES DAILY PRN
Status: DISCONTINUED | OUTPATIENT
Start: 2023-08-05 | End: 2023-08-08 | Stop reason: HOSPADM

## 2023-08-05 RX ORDER — DOCUSATE SODIUM 100 MG/1
100 CAPSULE, LIQUID FILLED ORAL 2 TIMES DAILY
Status: DISCONTINUED | OUTPATIENT
Start: 2023-08-05 | End: 2023-08-08 | Stop reason: HOSPADM

## 2023-08-05 RX ORDER — OXYCODONE HYDROCHLORIDE 5 MG/1
5 TABLET ORAL EVERY 4 HOURS PRN
Status: DISCONTINUED | OUTPATIENT
Start: 2023-08-05 | End: 2023-08-08 | Stop reason: HOSPADM

## 2023-08-05 RX ORDER — METHOCARBAMOL 500 MG/1
500 TABLET, FILM COATED ORAL EVERY 6 HOURS PRN
Status: DISCONTINUED | OUTPATIENT
Start: 2023-08-05 | End: 2023-08-08 | Stop reason: HOSPADM

## 2023-08-05 RX ORDER — POLYETHYLENE GLYCOL 3350 17 G/17G
17 POWDER, FOR SOLUTION ORAL DAILY
Status: DISCONTINUED | OUTPATIENT
Start: 2023-08-05 | End: 2023-08-05

## 2023-08-05 RX ORDER — DEXTROSE AND SODIUM CHLORIDE 5; .9 G/100ML; G/100ML
125 INJECTION, SOLUTION INTRAVENOUS CONTINUOUS
Status: DISCONTINUED | OUTPATIENT
Start: 2023-08-05 | End: 2023-08-06

## 2023-08-05 RX ORDER — CALCIUM CARBONATE 500 MG/1
1000 TABLET, CHEWABLE ORAL DAILY PRN
Status: DISCONTINUED | OUTPATIENT
Start: 2023-08-05 | End: 2023-08-08 | Stop reason: HOSPADM

## 2023-08-05 RX ORDER — KETOROLAC TROMETHAMINE 30 MG/ML
15 INJECTION, SOLUTION INTRAMUSCULAR; INTRAVENOUS ONCE
Status: DISCONTINUED | OUTPATIENT
Start: 2023-08-05 | End: 2023-08-05

## 2023-08-05 RX ORDER — ONDANSETRON 2 MG/ML
4 INJECTION INTRAMUSCULAR; INTRAVENOUS EVERY 6 HOURS PRN
Status: DISCONTINUED | OUTPATIENT
Start: 2023-08-05 | End: 2023-08-08 | Stop reason: HOSPADM

## 2023-08-05 RX ORDER — TIZANIDINE 2 MG/1
4 TABLET ORAL 3 TIMES DAILY
Status: DISCONTINUED | OUTPATIENT
Start: 2023-08-05 | End: 2023-08-08 | Stop reason: HOSPADM

## 2023-08-05 RX ORDER — LORATADINE 10 MG/1
10 TABLET ORAL DAILY
Status: DISCONTINUED | OUTPATIENT
Start: 2023-08-05 | End: 2023-08-08 | Stop reason: HOSPADM

## 2023-08-05 RX ORDER — GABAPENTIN 100 MG/1
100 CAPSULE ORAL
Status: DISCONTINUED | OUTPATIENT
Start: 2023-08-05 | End: 2023-08-08 | Stop reason: HOSPADM

## 2023-08-05 RX ORDER — BUSPIRONE HYDROCHLORIDE 5 MG/1
7.5 TABLET ORAL 2 TIMES DAILY PRN
Status: DISCONTINUED | OUTPATIENT
Start: 2023-08-05 | End: 2023-08-08 | Stop reason: HOSPADM

## 2023-08-05 RX ORDER — KETOROLAC TROMETHAMINE 30 MG/ML
15 INJECTION, SOLUTION INTRAMUSCULAR; INTRAVENOUS EVERY 6 HOURS PRN
Status: ACTIVE | OUTPATIENT
Start: 2023-08-05 | End: 2023-08-07

## 2023-08-05 RX ORDER — HYDROMORPHONE HCL/PF 1 MG/ML
0.5 SYRINGE (ML) INJECTION EVERY 4 HOURS PRN
Status: DISCONTINUED | OUTPATIENT
Start: 2023-08-05 | End: 2023-08-08 | Stop reason: HOSPADM

## 2023-08-05 RX ORDER — POLYETHYLENE GLYCOL 3350 17 G/17G
17 POWDER, FOR SOLUTION ORAL DAILY
Status: DISCONTINUED | OUTPATIENT
Start: 2023-08-05 | End: 2023-08-08 | Stop reason: HOSPADM

## 2023-08-05 RX ORDER — LISINOPRIL 10 MG/1
10 TABLET ORAL EVERY EVENING
Status: DISCONTINUED | OUTPATIENT
Start: 2023-08-05 | End: 2023-08-06

## 2023-08-05 RX ORDER — METOPROLOL SUCCINATE 25 MG/1
25 TABLET, EXTENDED RELEASE ORAL
Status: DISCONTINUED | OUTPATIENT
Start: 2023-08-06 | End: 2023-08-08 | Stop reason: HOSPADM

## 2023-08-05 RX ORDER — BISACODYL 5 MG/1
5 TABLET, DELAYED RELEASE ORAL DAILY PRN
Status: DISCONTINUED | OUTPATIENT
Start: 2023-08-05 | End: 2023-08-08 | Stop reason: HOSPADM

## 2023-08-05 RX ORDER — AMLODIPINE BESYLATE 5 MG/1
5 TABLET ORAL DAILY
Status: DISCONTINUED | OUTPATIENT
Start: 2023-08-05 | End: 2023-08-08 | Stop reason: HOSPADM

## 2023-08-05 RX ORDER — HYDROMORPHONE HCL/PF 1 MG/ML
0.5 SYRINGE (ML) INJECTION ONCE
Status: COMPLETED | OUTPATIENT
Start: 2023-08-05 | End: 2023-08-05

## 2023-08-05 RX ADMIN — KETOROLAC TROMETHAMINE 15 MG: 30 INJECTION, SOLUTION INTRAMUSCULAR; INTRAVENOUS at 09:40

## 2023-08-05 RX ADMIN — TIZANIDINE 4 MG: 2 TABLET ORAL at 13:05

## 2023-08-05 RX ADMIN — DEXTROSE AND SODIUM CHLORIDE 125 ML/HR: 5; .9 INJECTION, SOLUTION INTRAVENOUS at 20:46

## 2023-08-05 RX ADMIN — TIZANIDINE 4 MG: 2 TABLET ORAL at 20:32

## 2023-08-05 RX ADMIN — HYDROMORPHONE HYDROCHLORIDE 0.5 MG: 1 INJECTION, SOLUTION INTRAMUSCULAR; INTRAVENOUS; SUBCUTANEOUS at 10:33

## 2023-08-05 RX ADMIN — OXYCODONE HYDROCHLORIDE 5 MG: 5 TABLET ORAL at 13:05

## 2023-08-05 RX ADMIN — DEXTROSE AND SODIUM CHLORIDE 125 ML/HR: 5; .9 INJECTION, SOLUTION INTRAVENOUS at 13:06

## 2023-08-05 RX ADMIN — DOCUSATE SODIUM 100 MG: 100 CAPSULE, LIQUID FILLED ORAL at 17:41

## 2023-08-05 RX ADMIN — LISINOPRIL 10 MG: 10 TABLET ORAL at 17:41

## 2023-08-05 RX ADMIN — APIXABAN 5 MG: 5 TABLET, FILM COATED ORAL at 17:41

## 2023-08-05 RX ADMIN — GABAPENTIN 100 MG: 100 CAPSULE ORAL at 21:08

## 2023-08-05 RX ADMIN — SODIUM CHLORIDE 1000 ML: 0.9 INJECTION, SOLUTION INTRAVENOUS at 09:49

## 2023-08-05 RX ADMIN — OXYCODONE HYDROCHLORIDE 5 MG: 5 TABLET ORAL at 17:57

## 2023-08-05 RX ADMIN — LORATADINE 10 MG: 10 TABLET ORAL at 15:36

## 2023-08-05 NOTE — ED PROVIDER NOTES
History  Chief Complaint   Patient presents with   • Constipation     Pt states he has not had bowel movement in a week. Reports having rectal bleeding this morning. Stool softeners unsuccessful. Denies abdominal pain, does have hemmroids. Maki Pineda is a 77 y.o. male who presents with the chief complaint of constipation. He reports this has been present for months but that for the past week he has not had a bowel movement, however he was able to have one this morning here in the emergency department. He has not been taking his colace and mirilax due to recently moving and the activities related to that. He reports he continues to lose weight (he eats a bowl of cheerios in the am, a small sandwich later in the day and two ensure shakes). He is now unable to get out of bed due to weakness. Wt Readings from Last 3 Encounters:  08/05/23 : 62.2 kg (137 lb 2 oz) - I suspect this is inaccurate, requested repeat weight  07/18/23 : 58.1 kg (128 lb)  07/15/23 : 54.9 kg (121 lb 0.5 oz)        History provided by:  Patient, medical records and spouse   used: No    Constipation  Severity:  Moderate  Time since last bowel movement:  1 hour  Timing:  Intermittent  Progression:  Partially resolved  Chronicity:  Chronic  Context: dehydration and dietary changes    Stool description:  Formed  Worsened by:  Nothing  Associated symptoms: anorexia and hematochezia    Associated symptoms: no diarrhea, no dysuria, no fever, no nausea and no vomiting        Prior to Admission Medications   Prescriptions Last Dose Informant Patient Reported? Taking?    Coenzyme Q10 (CO Q 10 PO)  Self Yes No   Sig: Take by mouth in the morning   Docosahexaenoic Acid (DHA OMEGA 3) 100 MG CAPS  Self Yes No   Sig: Take 6 capsules by mouth daily   Multiple Vitamin tablet  Self Yes No   Sig: Take 1 tablet by mouth daily   acetaminophen (TYLENOL) 325 mg tablet  Self No No   Sig: Take 2 tablets (650 mg total) by mouth every 6 (six) hours as needed for mild pain   amLODIPine (NORVASC) 5 mg tablet  Self No No   Sig: Take 1 tablet (5 mg total) by mouth daily Prn BP> 150/90   apixaban (ELIQUIS) 5 mg   No No   Sig: Take 1 tablet (5 mg total) by mouth 2 (two) times a day   bisacodyl (DULCOLAX) 5 mg EC tablet  Self No No   Sig: Take 1 tablet (5 mg total) by mouth daily as needed for constipation   busPIRone (BUSPAR) 7.5 mg tablet  Self No No   Sig: Take 1 tablet (7.5 mg total) by mouth 2 (two) times a day as needed (Anxiety)   docusate sodium (COLACE) 100 mg capsule  Self No No   Sig: Take 1 capsule (100 mg total) by mouth 2 (two) times a day   fexofenadine (ALLEGRA) 180 MG tablet  Self Yes No   Sig: Take 1 tablet by mouth daily as needed   gabapentin (Neurontin) 100 mg capsule   No No   Sig: Take 1 capsule (100 mg total) by mouth daily at bedtime   lidocaine (XYLOCAINE) 5 % ointment  Self No No   Sig: Apply topically as needed for mild pain   lisinopril (ZESTRIL) 10 mg tablet  Self Yes No   Sig: Take 10 mg by mouth every evening   lisinopril (ZESTRIL) 20 mg tablet  Self Yes No   Sig: Take 20 mg by mouth every morning   magnesium citrate (CITROMA) 1.745 g/30 mL oral solution   No No   Sig: Take 296 mL by mouth once for 1 dose   Patient not taking: Reported on 7/18/2023   meclizine (ANTIVERT) 25 mg tablet  Self No No   Sig: Take 1 tablet (25 mg total) by mouth 3 (three) times a day as needed for dizziness   metoprolol succinate (TOPROL-XL) 25 mg 24 hr tablet  Self No No   Sig: Take 1 tablet (25 mg total) by mouth daily with breakfast   oxyCODONE (ROXICODONE) 5 immediate release tablet  Self No No   Sig: Take 0.5 tablets (2.5 mg total) by mouth every 6 (six) hours as needed for severe pain Max Daily Amount: 10 mg   polyethylene glycol (MIRALAX) 17 g packet  Self No No   Sig: Take 17 g by mouth daily Do not start before May 6, 2023.    tiZANidine (ZANAFLEX) 2 mg tablet  Self No No   Sig: Take 1 tablet (2 mg total) by mouth 3 (three) times a day   Patient taking differently: Take 2 mg by mouth if needed      Facility-Administered Medications: None       Past Medical History:   Diagnosis Date   • Allergic    • Arthritis    • Asthma    • Cancer ([address])     kidney   • Hypertension    • Impaired fasting glucose    • Mitral valve disorder    • Mitral valve prolapse    • Murmur, cardiac    • 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    • PAC (premature atrial contraction)    • Parsonage-Cordero syndrome    • PVC (premature ventricular contraction)    • PVC (premature ventricular contraction)    • Renal cancer ([address]) 2023   • Thyroid nodule        Past Surgical History:   Procedure Laterality Date   • HAND SURGERY     • [place] COLONOSCOPY FLX DX W/COLLJ SPEC WHEN PFRMD N/A 2/9/2018    Procedure: COLONOSCOPY;  Surgeon: Nikos Anand MD;  Location: [facility]; Service: Gastroenterology   • PROSTATE BIOPSY     • SHOULDER SURGERY         Family History   Problem Relation Age of Onset   • Diabetes Mother    • Stroke Mother    • Stroke Father    • Heart disease Father    • Diabetes Sister    • Other Family         Stroke syndrome     I have reviewed and agree with the history as documented. E-Cigarette/Vaping   • E-Cigarette Use Never User      E-Cigarette/Vaping Substances   • Nicotine No    • THC No    • CBD No    • Flavoring No    • Other No    • Unknown No      Social History     Tobacco Use   • Smoking status: Never   • Smokeless tobacco: Never   Vaping Use   • Vaping Use: Never used   Substance Use Topics   • Alcohol use: Not Currently     Alcohol/week: 3.0 standard drinks of alcohol     Types: 3 Glasses of wine per week     Comment: 4 oz wine with dinner a few days a week   • Drug use: No       Review of Systems   Constitutional: Positive for fatigue and unexpected weight change. Negative for chills, diaphoresis and fever. Respiratory: Negative for shortness of breath. Cardiovascular: Negative for chest pain and palpitations.    Gastrointestinal: Positive for anal bleeding, anorexia, constipation and hematochezia. Negative for blood in stool, diarrhea, nausea and vomiting. Genitourinary: Negative for dysuria and frequency. Musculoskeletal: Positive for arthralgias (shoulder). Skin: Negative for rash. Neurological: Positive for weakness (generalized). All other systems reviewed and are negative. Physical Exam  Physical Exam  Vitals and nursing note reviewed. Constitutional:       General: He is in acute distress. Appearance: He is well-developed. Comments: Thin and malourished     HENT:      Head: Normocephalic and atraumatic. Eyes:      Extraocular Movements: Extraocular movements intact. Pupils: Pupils are equal, round, and reactive to light. Neck:      Vascular: No JVD. Cardiovascular:      Rate and Rhythm: Normal rate and regular rhythm. Heart sounds: Normal heart sounds. No murmur heard. No friction rub. No gallop. Pulmonary:      Effort: Pulmonary effort is normal. No respiratory distress. Breath sounds: Normal breath sounds. No wheezing or rales. Chest:      Chest wall: No tenderness. Abdominal:      General: There is no distension. Tenderness: There is no abdominal tenderness. There is no guarding. Musculoskeletal:         General: No tenderness. Normal range of motion. Cervical back: Normal range of motion. Skin:     General: Skin is warm and dry. Neurological:      General: No focal deficit present. Mental Status: He is alert and oriented to person, place, and time. Psychiatric:         Mood and Affect: Mood is depressed. Affect is blunt. Behavior: Behavior normal.         Thought Content:  Thought content normal.         Judgment: Judgment normal.         Vital Signs  ED Triage Vitals   Temperature Pulse Respirations Blood Pressure SpO2   08/05/23 0825 08/05/23 0825 08/05/23 0825 08/05/23 0825 08/05/23 0825   97.7 °F (36.5 °C) 69 16 130/83 97 %      Temp Source Heart Rate Source Patient Position - Orthostatic VS BP Location FiO2 (%)   08/05/23 0825 08/05/23 0825 08/05/23 1100 08/05/23 0825 --   Oral Monitor Lying Right arm       Pain Score       08/05/23 0825       No Pain           Vitals:    08/05/23 0825 08/05/23 1100 08/05/23 1130   BP: 130/83 122/64 153/74   Pulse: 69 55 65   Patient Position - Orthostatic VS:  Lying          Visual Acuity      ED Medications  Medications   ketorolac (TORADOL) injection 15 mg (15 mg Intravenous Given 8/5/23 0940)   sodium chloride 0.9 % bolus 1,000 mL (0 mL Intravenous Stopped 8/5/23 1133)   HYDROmorphone (DILAUDID) injection 0.5 mg (0.5 mg Intravenous Given 8/5/23 1033)       Diagnostic Studies  Results Reviewed     Procedure Component Value Units Date/Time    Comprehensive metabolic panel [[id number]]  (Abnormal) Collected: 08/05/23 0942    Lab Status: Final result Specimen: Blood from Arm, Right Updated: 08/05/23 1015     Sodium 136 mmol/L      Potassium 3.7 mmol/L      Chloride 104 mmol/L      CO2 25 mmol/L      ANION GAP 7 mmol/L      BUN 16 mg/dL      Creatinine 0.54 mg/dL      Glucose 100 mg/dL      Calcium 9.0 mg/dL      AST 26 U/L      ALT 24 U/L      Alkaline Phosphatase 37 U/L      Total Protein 6.0 g/dL      Albumin 4.0 g/dL      Total Bilirubin 0.79 mg/dL      eGFR 106 ml/min/1.73sq m     Narrative:      [place] guidelines for Chronic Kidney Disease (CKD):   •  Stage 1 with normal or high GFR (GFR > 90 mL/min/1.73 square meters)  •  Stage 2 Mild CKD (GFR = 60-89 mL/min/1.73 square meters)  •  Stage 3A Moderate CKD (GFR = 45-59 mL/min/1.73 square meters)  •  Stage 3B Moderate CKD (GFR = 30-44 mL/min/1.73 square meters)  •  Stage 4 Severe CKD (GFR = 15-29 mL/min/1.73 square meters)  •  Stage 5 End Stage CKD (GFR <15 mL/min/1.73 square meters)  Note: GFR calculation is accurate only with a steady state creatinine    Phosphorus [[id number]]  (Normal) Collected: 08/05/23 0942    Lab Status: Final result Specimen: Blood from Arm, Right Updated: 08/05/23 1015     Phosphorus 2.8 mg/dL     Magnesium [[id number]]  (Normal) Collected: 08/05/23 0942    Lab Status: Final result Specimen: Blood from Arm, Right Updated: 08/05/23 1015     Magnesium 2.1 mg/dL     CBC and differential [[id number]]  (Abnormal) Collected: 08/05/23 0942    Lab Status: Final result Specimen: Blood from Arm, Right Updated: 08/05/23 1004     WBC 3.90 Thousand/uL      RBC 4.55 Million/uL      Hemoglobin 13.7 g/dL      Hematocrit 41.1 %      MCV 90 fL      MCH 30.1 pg      MCHC 33.3 g/dL      RDW 12.9 %      MPV 9.3 fL      Platelets 852 Thousands/uL      nRBC 0 /100 WBCs      Neutrophils Relative 60 %      Immat GRANS % 0 %      Lymphocytes Relative 29 %      Monocytes Relative 8 %      Eosinophils Relative 2 %      Basophils Relative 1 %      Neutrophils Absolute 2.34 Thousands/µL      Immature Grans Absolute 0.01 Thousand/uL      Lymphocytes Absolute 1.11 Thousands/µL      Monocytes Absolute 0.32 Thousand/µL      Eosinophils Absolute 0.09 Thousand/µL      Basophils Absolute 0.03 Thousands/µL                  XR abdomen 1 view kub   ED Interpretation by Wu Smith MD (08/05 1122)   This film was interpreted independently by me. No evidence for obstruction, moderate stool burden noted. Procedures  Procedures         ED Course                                             Medical Decision Making  Background: 77 y.o. male presents with constipation, rectal bleeding, weight loss, weakness    Differential DX includes but is not limited to: calorie malnutrition, dehydration, anemia, metabolic derangement    Plan: cbc, cmp, mag, phos, kub, ivf, pain control for chronic shoulder    Amount and/or Complexity of Data Reviewed  Labs: ordered. Radiology: ordered. Risk  Prescription drug management.           Disposition  Final diagnoses:   Weakness   Constipation   Shoulder pain   Weight loss     Time reflects when diagnosis was documented in both MDM as applicable and the Disposition within this note     Time User Action Codes Description Comment    8/5/2023 11:36 AM Ruben Riff B Add [R53.1] Weakness     8/5/2023 11:36 AM Ruben Riff B Add [K59.00] Constipation     8/5/2023 11:36 AM Ruben Riff B Add [M25.519] Shoulder pain     8/5/2023 11:36 AM Ruben Riff B Add [R63.4] Weight loss       ED Disposition     ED Disposition   Admit    Condition   Stable    Date/Time   Sat Aug 5, 2023 11:36 AM    Comment   Case was discussed with Dr. Andria Prado and the patient's admission status was agreed to be Admission Status: observation status to the service of Dr. Andria Prado . Follow-up Information    None         Patient's Medications   Discharge Prescriptions    No medications on file       No discharge procedures on file.     PDMP Review       Value Time User    PDMP Reviewed  Yes 5/30/2023  5:14 PM Mariluz Frank MD          ED Provider  Electronically Signed by           Wu Smith MD  08/05/23 6528

## 2023-08-05 NOTE — ED NOTES
Pt reports no improvement in pain control, requesting additional medication. MD aware, no further orders at this time.        Terrie Bradshaw RN  08/05/23 7924

## 2023-08-05 NOTE — ED NOTES
Pt expressed need to have bowel movement with abd pain. Pt relieved large amount of stool and abd pain feels a lot better after occurrence.      Georgia Carroll  08/05/23 7970

## 2023-08-05 NOTE — ASSESSMENT & PLAN NOTE
· Patient has history of Parsonage-Cordero syndrome.   · Follows up with neurologist  · He reports worsening symptoms recent  · Neurology consulted for evaluation

## 2023-08-05 NOTE — ASSESSMENT & PLAN NOTE
· Patient came in with constipation  · Reports having bowel movement in ED  · Continue bowel regimen to prevent constipation

## 2023-08-05 NOTE — H&P
[address] [facility]  H&P  Name: Lorraine Lafleur 77 y.o. male I MRN: [id number]  Unit/Bed#: ED-29 I Date of Admission: 8/5/2023   Date of Service: 8/5/2023 I Hospital Day: 0      Assessment/Plan   * Diffuse pain in right upper extremity  Assessment & Plan  · Reports diffuse pain, spasm and upper extremity. Unable  to use right upper extremity. He has been taking tramadol and Zanaflex at home without much improvement. Trouble with ambulation as he is unable to use his right upper extremity. · Increase Zanaflex to 4 mg  · Add Robaxin as needed  · Consult neurology  · Dilaudid and Toradol for pain control  · PT OT evaluation    Acute deep vein thrombosis (DVT) of brachial vein of right upper extremity (HCC)  Assessment & Plan  · History of right upper extremity DVT. · On anticoagulation with Eliquis  · Continue    Brachial plexopathy without trauma  Assessment & Plan  · Patient has history of Parsonage-Cordero syndrome. · Follows up with neurologist  · He reports worsening symptoms recent  · Neurology consulted for evaluation    Constipation  Assessment & Plan  · Patient came in with constipation  · Reports having bowel movement in ED  · Continue bowel regimen to prevent constipation    Right renal mass  Assessment & Plan  · History of right renal mass. Being followed up by urologist.  Plans for radical nephrectomy as outpatient    Essential hypertension  Assessment & Plan  · Continue metoprolol and amlodipine  · Monitor blood pressure    VTE Pharmacologic Prophylaxis:   Moderate Risk (Score 3-4) - Pharmacological DVT Prophylaxis Ordered: apixaban (Eliquis). Code Status: Prior full code  Discussion with family: Updated  (wife) at bedside. Anticipated Length of Stay: Patient will be admitted on an observation basis with an anticipated length of stay of less than 2 midnights secondary to Pain control. Total Time Spent on Date of Encounter in care of patient:   This time was spent on one or more of the following: performing physical exam; counseling and coordination of care; obtaining or reviewing history; documenting in the medical record; reviewing/ordering tests, medications or procedures; communicating with other healthcare professionals and discussing with patient's family/caregivers. Chief Complaint: Weakness and right upper extremity pain and spasms    History of Present Illness:  Fiordaliza Singh is a 77 y.o. male with a PMH of Parsonage-Cordero syndrome, essential hypertension, renal mass, and history of DVT who presents with constipation, generalized weakness, inability to ambulate, spasms and pain right upper extremity for last 3 to 4 days. And has been having difficulty with ambulation. He had falls x2, denies injuring his shoulder. Constipated for last few days, on arrival in ED he had a bowel movement. Denies any fever chills or rigors. Has renal mass that was diagnosed recently. Waiting for renal nephrectomy as outpatient. Review of Systems:  Review of Systems  12 point review systems negative except noted  Past Medical and Surgical History:   Past Medical History:   Diagnosis Date   • Allergic    • Arthritis    • Asthma    • Cancer ([address])     kidney   • Hypertension    • Impaired fasting glucose    • Mitral valve disorder    • Mitral valve prolapse    • Murmur, cardiac    • 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    • PAC (premature atrial contraction)    • Parsonage-Cordero syndrome    • PVC (premature ventricular contraction)    • PVC (premature ventricular contraction)    • Renal cancer ([address]) 2023   • Thyroid nodule        Past Surgical History:   Procedure Laterality Date   • HAND SURGERY     • [place] COLONOSCOPY FLX DX W/COLLJ SPEC WHEN PFRMD N/A 2/9/2018    Procedure: COLONOSCOPY;  Surgeon: Julio César Blair MD;  Location: [facility];   Service: Gastroenterology   • PROSTATE BIOPSY     • SHOULDER SURGERY         Meds/Allergies:  Prior to Admission medications    Medication Sig Start Date End Date Taking?  Authorizing Provider   acetaminophen (TYLENOL) 325 mg tablet Take 2 tablets (650 mg total) by mouth every 6 (six) hours as needed for mild pain 5/5/23   Macario Shanks,    amLODIPine (NORVASC) 5 mg tablet Take 1 tablet (5 mg total) by mouth daily Prn BP> 150/90 5/22/23   Benjamin Botello MD   apixaban (ELIQUIS) 5 mg Take 1 tablet (5 mg total) by mouth 2 (two) times a day 7/19/23   Opal Rush MD   bisacodyl (DULCOLAX) 5 mg EC tablet Take 1 tablet (5 mg total) by mouth daily as needed for constipation 5/10/23   Benjamin Botello MD   busPIRone (BUSPAR) 7.5 mg tablet Take 1 tablet (7.5 mg total) by mouth 2 (two) times a day as needed (Anxiety) 3/27/23   Laura Willis DO   Coenzyme Q10 (CO Q 10 PO) Take by mouth in the morning    Historical Provider, MD   Docosahexaenoic Acid (DHA OMEGA 3) 100 MG CAPS Take 6 capsules by mouth daily    Historical Provider, MD   docusate sodium (COLACE) 100 mg capsule Take 1 capsule (100 mg total) by mouth 2 (two) times a day 6/2/23   Laura Willis DO   fexofenadine (ALLEGRA) 180 MG tablet Take 1 tablet by mouth daily as needed 6/10/14   Historical Provider, MD   gabapentin (Neurontin) 100 mg capsule Take 1 capsule (100 mg total) by mouth daily at bedtime 8/2/23   Benjamin Botello MD   lidocaine (XYLOCAINE) 5 % ointment Apply topically as needed for mild pain 3/27/23   Laura Willis DO   lisinopril (ZESTRIL) 10 mg tablet Take 10 mg by mouth every evening    Historical Provider, MD   lisinopril (ZESTRIL) 20 mg tablet Take 20 mg by mouth every morning    Historical Provider, MD   magnesium citrate (CITROMA) 1.745 g/30 mL oral solution Take 296 mL by mouth once for 1 dose  Patient not taking: Reported on 7/18/2023 6/15/23 6/15/23  Benjamin Brynn Median, MD   meclizine (ANTIVERT) 25 mg tablet Take 1 tablet (25 mg total) by mouth 3 (three) times a day as needed for dizziness 7/16/23   Malik Chinchilla MD   metoprolol succinate (TOPROL-XL) 25 mg 24 hr tablet Take 1 tablet (25 mg total) by mouth daily with breakfast 5/15/23   Benjamin Garrido MD   Multiple Vitamin tablet Take 1 tablet by mouth daily    Historical Provider, MD   oxyCODONE (ROXICODONE) 5 immediate release tablet Take 0.5 tablets (2.5 mg total) by mouth every 6 (six) hours as needed for severe pain Max Daily Amount: 10 mg 7/12/23   Martha Yanez MD   polyethylene glycol (MIRALAX) 17 g packet Take 17 g by mouth daily Do not start before May 6, 2023. 5/6/23   Andree Villalta DO   tiZANidine (ZANAFLEX) 2 mg tablet Take 1 tablet (2 mg total) by mouth 3 (three) times a day  Patient taking differently: Take 2 mg by mouth if needed 5/16/23 7/18/23  Charlene Lozano MD     I have reviewed home medications with patient personally.     Allergies: No Known Allergies    Social History:  Marital Status: /Civil Union   Occupation: Retired  Patient Pre-hospital Living Situation: Home  Patient Pre-hospital Level of Mobility: walks with person assist  Patient Pre-hospital Diet Restrictions:   Substance Use History:   Social History     Substance and Sexual Activity   Alcohol Use Not Currently   • Alcohol/week: 3.0 standard drinks of alcohol   • Types: 3 Glasses of wine per week    Comment: 4 oz wine with dinner a few days a week     Social History     Tobacco Use   Smoking Status Never   Smokeless Tobacco Never     Social History     Substance and Sexual Activity   Drug Use No       Family History:  Family History   Problem Relation Age of Onset   • Diabetes Mother    • Stroke Mother    • Stroke Father    • Heart disease Father    • Diabetes Sister    • Other Family         Stroke syndrome       Physical Exam:     Vitals:   Blood Pressure: 153/74 (08/05/23 1130)  Pulse: 65 (08/05/23 1130)  Temperature: 97.7 °F (36.5 °C) (08/05/23 0825)  Temp Source: Oral (08/05/23 0825)  Respirations: 16 (08/05/23 1130)  Weight - Scale: 62.2 kg (137 lb 2 oz) (08/05/23 0826)  SpO2: 98 % (08/05/23 1130)    Physical Exam     Gen.-Patient appears to be uncomfortable and in pain. BMI 22  He is unable to move his right shoulder. Active spasm of right upper extremity. Neck-stiff and spasmodice. No thyromegaly or lymphadenopathy  Lungs-Clear bilaterally without any wheeze or rales   Heart S1-S2, regular rate and rhythm, no murmurs  Abdomen-scaphoid soft nontender, no organomegaly. Bowel sounds present  Extremities-no cyanosi,  clubbing or edema  Skin- no rash  Neuro-nonfocal       Additional Data:     Lab Results:  Results from last 7 days   Lab Units 08/05/23  0942   WBC Thousand/uL 3.90*   HEMOGLOBIN g/dL 13.7   HEMATOCRIT % 41.1   PLATELETS Thousands/uL 293   NEUTROS PCT % 60   LYMPHS PCT % 29   MONOS PCT % 8   EOS PCT % 2     Results from last 7 days   Lab Units 08/05/23  0942   SODIUM mmol/L 136   POTASSIUM mmol/L 3.7   CHLORIDE mmol/L 104   CO2 mmol/L 25   BUN mg/dL 16   CREATININE mg/dL 0.54*   ANION GAP mmol/L 7   CALCIUM mg/dL 9.0   ALBUMIN g/dL 4.0   TOTAL BILIRUBIN mg/dL 0.79   ALK PHOS U/L 37   ALT U/L 24   AST U/L 26   GLUCOSE RANDOM mg/dL 100                       Lines/Drains:  Invasive Devices     Peripheral Intravenous Line  Duration           Peripheral IV 08/05/23 Distal;Left;Upper;Ventral (anterior) Arm <1 day                    Imaging:   XR abdomen 1 view kub   ED Interpretation by Brent Welch MD (08/05 1122)   This film was interpreted independently by me. No evidence for obstruction, moderate stool burden noted. XR shoulder 1 vw right    (Results Pending)       EKG and Other Studies Reviewed on Admission:   · EKG:     ** Please Note: This note has been constructed using a voice recognition system.  **

## 2023-08-05 NOTE — ASSESSMENT & PLAN NOTE
· Reports diffuse pain, spasm and upper extremity. Unable  to use right upper extremity. He has been taking tramadol and Zanaflex at home without much improvement. Trouble with ambulation as he is unable to use his right upper extremity.   · Increase Zanaflex to 4 mg  · Add Robaxin as needed  · Check x-ray right shoulder  · Consult neurology  · Dilaudid and Toradol for pain control  · PT OT evaluation

## 2023-08-05 NOTE — ASSESSMENT & PLAN NOTE
· History of right renal mass.   Being followed up by urologist.  Plans for radical nephrectomy as outpatient

## 2023-08-05 NOTE — ED NOTES
Pt produced large amount of stool provided into bedpan. Free of abd pain at this time.        Irma Charles RN  08/05/23 1914

## 2023-08-05 NOTE — CONSULTS
NEUROLOGY RESIDENCY CONSULT NOTE     Name: Lynn Mackenzie   Age & Sex: 77 y.o. 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 Impression: He has know right rotator cuff injury and he is having pain in his rotator cuff region. Parsonage-Cordero syndrome in R upper extremity is overall improving. He now has some muscle contraction in his right triceps. Plan:  -Discussed plan with neurology attending, Dr. Thomas Serrano  -Recommend nutrition eval due to malnourishment and adequate hydration  -PT/OT eval if able  -Bowel regimen to reduce constipation  -Consider CT chest/ab/pelvis w/wo contrast to revaluate renal mass and mets since it might likely be enlarging in the setting of worsening constipation and weight loss  -No new medical management per neurology  -Please call with questions/concerns        Lynn Mackenzie will need follow up in at the next regular appointment with Dr. Laith Kaur at the resident clinic on 8/15/2023. He will not require outpatient neurological testing. 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  Per patient's wife, patient also had some bleeding per rectum after BM and it was concerned due to Eliquis use. Patient was moved to a new place and he reported not drinking much water these days as they could not find the filter pitcher. He has appetite and eats however, he has been losing weight. Neurology consulted for further evaluation given hx of Parsonage-Cordero syndrome. Inpatient consult to Neurology  Consult performed by: Carmina Guzman MD  Consult ordered by: Adalberto Dumont MD        Historical Information   Past Medical History:   Diagnosis Date   • Allergic    • Arthritis    • Asthma    • Cancer [facility])     kidney   • Hypertension    • Impaired fasting glucose    • Mitral valve disorder    • Mitral valve prolapse    • Murmur, cardiac    • 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    • PAC (premature atrial contraction)    • Parsonage-Cordero syndrome    • PVC (premature ventricular contraction)    • PVC (premature ventricular contraction)    • Renal cancer ([address]) 2023   • Thyroid nodule      Past Surgical History:   Procedure Laterality Date   • HAND SURGERY     • SC COLONOSCOPY FLX DX W/COLLJ SPEC WHEN PFRMD N/A 2/9/2018    Procedure: COLONOSCOPY;  Surgeon: Panda Smith MD;  Location: [facility];   Service: Gastroenterology   • PROSTATE BIOPSY     • SHOULDER SURGERY       Social History   Social History     Substance and Sexual Activity   Alcohol Use Not Currently   • Alcohol/week: 3.0 standard drinks of alcohol   • Types: 3 Glasses of wine per week    Comment: 4 oz wine with dinner a few days a week     Social History     Substance and Sexual Activity   Drug Use No     E-Cigarette/Vaping   • E-Cigarette Use Never User      E-Cigarette/Vaping Substances   • Nicotine No    • THC No    • CBD No    • Flavoring No    • Other No    • Unknown No      Social History     Tobacco Use   Smoking Status Never   Smokeless Tobacco Never     Family History:   Family History   Problem Relation Age of Onset   • Diabetes Mother    • Stroke Mother    • Stroke Father    • Heart disease Father    • Diabetes Sister    • Other Family         Stroke syndrome     Meds/Allergies   PTA meds:   Prior to Admission Medications   Prescriptions Last Dose Informant Patient Reported? Taking?    Coenzyme Q10 (CO Q 10 PO)  Self Yes No   Sig: Take by mouth in the morning   Docosahexaenoic Acid (DHA OMEGA 3) 100 MG CAPS  Self Yes No   Sig: Take 6 capsules by mouth daily   Multiple Vitamin tablet  Self Yes No   Sig: Take 1 tablet by mouth daily   acetaminophen (TYLENOL) 325 mg tablet  Self No No   Sig: Take 2 tablets (650 mg total) by mouth every 6 (six) hours as needed for mild pain   amLODIPine (NORVASC) 5 mg tablet  Self No No   Sig: Take 1 tablet (5 mg total) by mouth daily Prn BP> 150/90   apixaban (ELIQUIS) 5 mg   No No   Sig: Take 1 tablet (5 mg total) by mouth 2 (two) times a day   bisacodyl (DULCOLAX) 5 mg EC tablet  Self No No   Sig: Take 1 tablet (5 mg total) by mouth daily as needed for constipation   busPIRone (BUSPAR) 7.5 mg tablet  Self No No   Sig: Take 1 tablet (7.5 mg total) by mouth 2 (two) times a day as needed (Anxiety)   docusate sodium (COLACE) 100 mg capsule  Self No No   Sig: Take 1 capsule (100 mg total) by mouth 2 (two) times a day   fexofenadine (ALLEGRA) 180 MG tablet  Self Yes No   Sig: Take 1 tablet by mouth daily as needed   gabapentin (Neurontin) 100 mg capsule   No No   Sig: Take 1 capsule (100 mg total) by mouth daily at bedtime   lidocaine (XYLOCAINE) 5 % ointment  Self No No   Sig: Apply topically as needed for mild pain   lisinopril (ZESTRIL) 10 mg tablet  Self Yes No   Sig: Take 10 mg by mouth every evening   lisinopril (ZESTRIL) 20 mg tablet  Self Yes No   Sig: Take 20 mg by mouth every morning   magnesium citrate (CITROMA) 1.745 g/30 mL oral solution   No No   Sig: Take 296 mL by mouth once for 1 dose   Patient not taking: Reported on 7/18/2023   meclizine (ANTIVERT) 25 mg tablet  Self No No   Sig: Take 1 tablet (25 mg total) by mouth 3 (three) times a day as needed for dizziness   metoprolol succinate (TOPROL-XL) 25 mg 24 hr tablet  Self No No   Sig: Take 1 tablet (25 mg total) by mouth daily with breakfast   oxyCODONE (ROXICODONE) 5 immediate release tablet  Self No No   Sig: Take 0.5 tablets (2.5 mg total) by mouth every 6 (six) hours as needed for severe pain Max Daily Amount: 10 mg   polyethylene glycol (MIRALAX) 17 g packet  Self No No   Sig: Take 17 g by mouth daily Do not start before May 6, 2023. tiZANidine (ZANAFLEX) 2 mg tablet  Self No No   Sig: Take 1 tablet (2 mg total) by mouth 3 (three) times a day   Patient taking differently: Take 2 mg by mouth if needed      Facility-Administered Medications: None     No Known Allergies    Review of previous medical records was completed. Review of Systems   Constitutional: Negative for chills and fever. HENT: Negative for ear pain and sore throat. Eyes: Negative for pain and visual disturbance. Respiratory: Negative for cough and shortness of breath. Cardiovascular: Negative for chest pain and palpitations. Gastrointestinal: Positive for anal bleeding and constipation. Negative for abdominal pain and vomiting. Genitourinary: Negative for dysuria and hematuria. Musculoskeletal: Negative for arthralgias and back pain. R shoulder pain worse than left   Skin: Negative for color change and rash. Neurological: Positive for weakness. Negative for seizures and syncope. All other systems reviewed and are negative. OBJECTIVE     Patient ID: Brittani Gamboa is a 77 y.o. male. Vitals:   Vitals:    08/05/23 1258 08/05/23 1523 08/05/23 1525 08/05/23 1740   BP: 158/84 92/51 98/54 126/70   BP Location:  Left arm Left arm    Pulse: 68 60  59   Resp: 18 18     Temp: 97.9 °F (36.6 °C) 97.8 °F (36.6 °C)     TempSrc:  Oral     SpO2: 97% 96%  98%   Weight:          Body mass index is 21.28 kg/m².      Intake/Output Summary (Last 24 hours) at 2023  Last data filed at 2023 1133  Gross per 24 hour   Intake 1000 ml   Output --   Net 1000 ml       Temperature:   Temp (24hrs), Av.8 °F (36.6 °C), Min:97.7 °F (36.5 °C), Max:97.9 °F (36.6 °C)    Temperature: 97.8 °F (36.6 °C)    Invasive Devices: Invasive Devices     Peripheral Intravenous Line  Duration           Peripheral IV 23 Distal;Left;Upper;Ventral (anterior) Arm <1 day                Physical Exam  Vitals and nursing note reviewed. Constitutional:       General: He is in acute distress. Appearance: He is well-developed. He is ill-appearing. Comments: malnourished   HENT:      Head: Normocephalic and atraumatic. Eyes:      Extraocular Movements: Extraocular movements intact and EOM normal.      Conjunctiva/sclera: Conjunctivae normal.      Pupils: Pupils are equal, round, and reactive to light. Cardiovascular:      Rate and Rhythm: Normal rate and regular rhythm. Heart sounds: No murmur heard. Pulmonary:      Effort: Pulmonary effort is normal. No respiratory distress. Breath sounds: Normal breath sounds. Abdominal:      Palpations: Abdomen is soft. Tenderness: There is no abdominal tenderness. Musculoskeletal:         General: No swelling. Cervical back: Neck supple. Skin:     General: Skin is warm and dry. Capillary Refill: Capillary refill takes less than 2 seconds. Neurological:      Mental Status: He is alert and oriented to person, place, and time. Deep Tendon Reflexes:      Reflex Scores:       Tricep reflexes are 1+ on the right side and 2+ on the left side. Bicep reflexes are 1+ on the right side and 2+ on the left side. Brachioradialis reflexes are 1+ on the right side and 2+ on the left side. Patellar reflexes are 3+ on the right side and 3+ on the left side. Achilles reflexes are 2+ on the right side and 2+ on the left side.   Psychiatric:         Mood and Affect: Mood normal. Speech: Speech normal.          Neurologic Exam     Mental Status   Oriented to person, place, and time. Speech: speech is normal   Level of consciousness: alert    Cranial Nerves     CN II   Visual fields full to confrontation. CN III, IV, VI   Pupils are equal, round, and reactive to light. Extraocular motions are normal.     CN V   Facial sensation intact. CN VII   Facial expression full, symmetric. CN VIII   CN VIII normal.     CN IX, X   CN IX normal.   CN X normal.     CN XI   Right trapezius strength: weak  Left trapezius strength: normal    CN XII   CN XII normal.     Motor Exam Muscle bulk and tone decreased in Right upper    RUE: can give a weak , triceps 2 to 3/5  LUE: 5/5    Bilateral lower extremities 5/5     Sensory Exam   Light touch normal.     Gait, Coordination, and Reflexes     Reflexes   Right brachioradialis: 1+  Left brachioradialis: 2+  Right biceps: 1+  Left biceps: 2+  Right triceps: 1+  Left triceps: 2+  Right patellar: 3+  Left patellar: 3+  Right achilles: 2+  Left achilles: 2+        Delete this line once neuro exam completed. LABORATORY DATA     Labs: I have personally reviewed pertinent reports. Results from last 7 days   Lab Units 08/05/23  0942   WBC Thousand/uL 3.90*   HEMOGLOBIN g/dL 13.7   HEMATOCRIT % 41.1   PLATELETS Thousands/uL 293   NEUTROS PCT % 60   MONOS PCT % 8   EOS PCT % 2      Results from last 7 days   Lab Units 08/05/23  0942   POTASSIUM mmol/L 3.7   CHLORIDE mmol/L 104   CO2 mmol/L 25   BUN mg/dL 16   CREATININE mg/dL 0.54*   CALCIUM mg/dL 9.0   ALK PHOS U/L 37   ALT U/L 24   AST U/L 26     Results from last 7 days   Lab Units 08/05/23  0942   MAGNESIUM mg/dL 2.1     Results from last 7 days   Lab Units 08/05/23  0942   PHOSPHORUS mg/dL 2.8                    IMAGING & DIAGNOSTIC TESTING     Radiology Results: I have personally reviewed pertinent reports.     XR abdomen 1 view kub   ED Interpretation by Catracho Samuels MD (08/05 1122) This film was interpreted independently by me. No evidence for obstruction, moderate stool burden noted. XR shoulder 2+ vw right    (Results Pending)       Other Diagnostic Testing: I have personally reviewed pertinent reports. ACTIVE MEDICATIONS     Current Facility-Administered Medications   Medication Dose Route Frequency   • amLODIPine (NORVASC) tablet 5 mg  5 mg Oral Daily   • apixaban (ELIQUIS) tablet 5 mg  5 mg Oral BID   • bisacodyl (DULCOLAX) EC tablet 5 mg  5 mg Oral Daily PRN   • busPIRone (BUSPAR) tablet 7.5 mg  7.5 mg Oral BID PRN   • calcium carbonate (TUMS) chewable tablet 1,000 mg  1,000 mg Oral Daily PRN   • dextrose 5 % and sodium chloride 0.9 % infusion  125 mL/hr Intravenous Continuous   • docusate sodium (COLACE) capsule 100 mg  100 mg Oral BID   • gabapentin (NEURONTIN) capsule 100 mg  100 mg Oral HS   • HYDROmorphone (DILAUDID) injection 0.5 mg  0.5 mg Intravenous Q4H PRN   • ketorolac (TORADOL) injection 15 mg  15 mg Intravenous Q6H PRN   • lisinopril (ZESTRIL) tablet 10 mg  10 mg Oral QPM   • [START ON 8/6/2023] lisinopril (ZESTRIL) tablet 20 mg  20 mg Oral QAM   • loratadine (CLARITIN) tablet 10 mg  10 mg Oral Daily   • meclizine (ANTIVERT) tablet 25 mg  25 mg Oral TID PRN   • methocarbamol (ROBAXIN) tablet 500 mg  500 mg Oral Q6H PRN   • [START ON 8/6/2023] metoprolol succinate (TOPROL-XL) 24 hr tablet 25 mg  25 mg Oral Daily With Breakfast   • ondansetron (ZOFRAN) injection 4 mg  4 mg Intravenous Q6H PRN   • oxyCODONE (ROXICODONE) IR tablet 5 mg  5 mg Oral Q4H PRN   • oxyCODONE (ROXICODONE) split tablet 2.5 mg  2.5 mg Oral Q6H PRN   • polyethylene glycol (MIRALAX) packet 17 g  17 g Oral Daily   • tiZANidine (ZANAFLEX) tablet 4 mg  4 mg Oral TID       Prior to Admission medications    Medication Sig Start Date End Date Taking?  Authorizing Provider   acetaminophen (TYLENOL) 325 mg tablet Take 2 tablets (650 mg total) by mouth every 6 (six) hours as needed for mild pain 5/5/23 Alexi Rock DO   amLODIPine (NORVASC) 5 mg tablet Take 1 tablet (5 mg total) by mouth daily Prn BP> 150/90 5/22/23   Benjamin Soto MD   apixaban (ELIQUIS) 5 mg Take 1 tablet (5 mg total) by mouth 2 (two) times a day 7/19/23   Abeba Means MD   bisacodyl (DULCOLAX) 5 mg EC tablet Take 1 tablet (5 mg total) by mouth daily as needed for constipation 5/10/23   Benjamin Soto MD   busPIRone (BUSPAR) 7.5 mg tablet Take 1 tablet (7.5 mg total) by mouth 2 (two) times a day as needed (Anxiety) 3/27/23   Maryam Arboleda DO   Coenzyme Q10 (CO Q 10 PO) Take by mouth in the morning    Historical Provider, MD   Docosahexaenoic Acid (DHA OMEGA 3) 100 MG CAPS Take 6 capsules by mouth daily    Historical Provider, MD   docusate sodium (COLACE) 100 mg capsule Take 1 capsule (100 mg total) by mouth 2 (two) times a day 6/2/23   Maryam Arboleda DO   fexofenadine (ALLEGRA) 180 MG tablet Take 1 tablet by mouth daily as needed 6/10/14   Historical Provider, MD   gabapentin (Neurontin) 100 mg capsule Take 1 capsule (100 mg total) by mouth daily at bedtime 8/2/23   Benjamin Soto MD   lidocaine (XYLOCAINE) 5 % ointment Apply topically as needed for mild pain 3/27/23   Maryam Arboleda DO   lisinopril (ZESTRIL) 10 mg tablet Take 10 mg by mouth every evening    Historical Provider, MD   lisinopril (ZESTRIL) 20 mg tablet Take 20 mg by mouth every morning    Historical Provider, MD   magnesium citrate (CITROMA) 1.745 g/30 mL oral solution Take 296 mL by mouth once for 1 dose  Patient not taking: Reported on 7/18/2023 6/15/23 6/15/23  Benjamin Soto MD   meclizine (ANTIVERT) 25 mg tablet Take 1 tablet (25 mg total) by mouth 3 (three) times a day as needed for dizziness 7/16/23   Vicki Forte MD   metoprolol succinate (TOPROL-XL) 25 mg 24 hr tablet Take 1 tablet (25 mg total) by mouth daily with breakfast 5/15/23   Benjamin Soto MD   Multiple Vitamin tablet Take 1 tablet by mouth daily    Historical Provider, MD   oxyCODONE (ROXICODONE) 5 immediate release tablet Take 0.5 tablets (2.5 mg total) by mouth every 6 (six) hours as needed for severe pain Max Daily Amount: 10 mg 7/12/23   Van Gama MD   polyethylene glycol (MIRALAX) 17 g packet Take 17 g by mouth daily Do not start before May 6, 2023.  5/6/23   Vernal Hollywood, DO   tiZANidine (ZANAFLEX) 2 mg tablet Take 1 tablet (2 mg total) by mouth 3 (three) times a day  Patient taking differently: Take 2 mg by mouth if needed 5/16/23 7/18/23  Amilcar Vivar MD         CODE STATUS & ADVANCED DIRECTIVES     Code Status: Level 1 - Full Code  Advance Directive and Living Will: Yes    Power of : Yes  POLST:        VTE Pharmacologic Prophylaxis: Apixaban (Eliquis)  VTE Mechanical Prophylaxis: sequential compression device    ==  MD [facility], PGY-3

## 2023-08-05 NOTE — ASSESSMENT & PLAN NOTE
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 Impression: He has know right rotator cuff injury and he is having pain in his rotator cuff region. Parsonage-Cordero syndrome in R upper extremity is overall improving. He now has some muscle contraction in his right triceps.      Plan:  -Discussed plan with neurology attending, Dr. Ariela Santos  -Recommend nutrition eval due to malnourishment and adequate hydration  -PT/OT eval if able  -Bowel regimen to reduce constipation  -Consider CT chest/ab/pelvis w/wo contrast to revaluate renal mass and mets since it might likely be enlarging in the setting of worsening constipation and weight loss  -No new medical management per neurology  -Please call with questions/concerns

## 2023-08-06 ENCOUNTER — HOME CARE VISIT (OUTPATIENT)
Dept: HOME HEALTH SERVICES | Facility: HOME HEALTHCARE | Age: 68
End: 2023-08-06
Payer: COMMERCIAL

## 2023-08-06 LAB
ANION GAP SERPL CALCULATED.3IONS-SCNC: 4 MMOL/L
BUN SERPL-MCNC: 9 MG/DL (ref 5–25)
CALCIUM SERPL-MCNC: 7.6 MG/DL (ref 8.4–10.2)
CHLORIDE SERPL-SCNC: 112 MMOL/L (ref 96–108)
CO2 SERPL-SCNC: 24 MMOL/L (ref 21–32)
CREAT SERPL-MCNC: 0.48 MG/DL (ref 0.6–1.3)
ERYTHROCYTE [DISTWIDTH] IN BLOOD BY AUTOMATED COUNT: 12.7 % (ref 11.6–15.1)
GFR SERPL CREATININE-BSD FRML MDRD: 111 ML/MIN/1.73SQ M
GLUCOSE P FAST SERPL-MCNC: 402 MG/DL (ref 65–99)
GLUCOSE SERPL-MCNC: 115 MG/DL (ref 65–140)
GLUCOSE SERPL-MCNC: 120 MG/DL (ref 65–140)
GLUCOSE SERPL-MCNC: 402 MG/DL (ref 65–140)
HCT VFR BLD AUTO: 34.2 % (ref 36.5–49.3)
HGB BLD-MCNC: 11.3 G/DL (ref 12–17)
MAGNESIUM SERPL-MCNC: 1.7 MG/DL (ref 1.9–2.7)
MCH RBC QN AUTO: 30.2 PG (ref 26.8–34.3)
MCHC RBC AUTO-ENTMCNC: 33 G/DL (ref 31.4–37.4)
MCV RBC AUTO: 91 FL (ref 82–98)
PLATELET # BLD AUTO: 217 THOUSANDS/UL (ref 149–390)
PMV BLD AUTO: 9.2 FL (ref 8.9–12.7)
POTASSIUM SERPL-SCNC: 3.5 MMOL/L (ref 3.5–5.3)
RBC # BLD AUTO: 3.74 MILLION/UL (ref 3.88–5.62)
SODIUM SERPL-SCNC: 140 MMOL/L (ref 135–147)
WBC # BLD AUTO: 3.9 THOUSAND/UL (ref 4.31–10.16)

## 2023-08-06 PROCEDURE — 83735 ASSAY OF MAGNESIUM: CPT | Performed by: INTERNAL MEDICINE

## 2023-08-06 PROCEDURE — 97163 PT EVAL HIGH COMPLEX 45 MIN: CPT

## 2023-08-06 PROCEDURE — 99232 SBSQ HOSP IP/OBS MODERATE 35: CPT | Performed by: HOSPITALIST

## 2023-08-06 PROCEDURE — 80048 BASIC METABOLIC PNL TOTAL CA: CPT | Performed by: INTERNAL MEDICINE

## 2023-08-06 PROCEDURE — 82948 REAGENT STRIP/BLOOD GLUCOSE: CPT

## 2023-08-06 PROCEDURE — 97116 GAIT TRAINING THERAPY: CPT

## 2023-08-06 PROCEDURE — 85027 COMPLETE CBC AUTOMATED: CPT | Performed by: INTERNAL MEDICINE

## 2023-08-06 RX ORDER — LANOLIN ALCOHOL/MO/W.PET/CERES
400 CREAM (GRAM) TOPICAL ONCE
Status: COMPLETED | OUTPATIENT
Start: 2023-08-06 | End: 2023-08-06

## 2023-08-06 RX ORDER — MAGNESIUM SULFATE HEPTAHYDRATE 40 MG/ML
2 INJECTION, SOLUTION INTRAVENOUS ONCE
Status: DISCONTINUED | OUTPATIENT
Start: 2023-08-06 | End: 2023-08-06

## 2023-08-06 RX ADMIN — APIXABAN 5 MG: 5 TABLET, FILM COATED ORAL at 08:26

## 2023-08-06 RX ADMIN — APIXABAN 5 MG: 5 TABLET, FILM COATED ORAL at 18:25

## 2023-08-06 RX ADMIN — LISINOPRIL 20 MG: 20 TABLET ORAL at 08:26

## 2023-08-06 RX ADMIN — LORATADINE 10 MG: 10 TABLET ORAL at 08:27

## 2023-08-06 RX ADMIN — TIZANIDINE 4 MG: 2 TABLET ORAL at 21:58

## 2023-08-06 RX ADMIN — GABAPENTIN 100 MG: 100 CAPSULE ORAL at 21:58

## 2023-08-06 RX ADMIN — Medication 400 MG: at 12:56

## 2023-08-06 RX ADMIN — DOCUSATE SODIUM 100 MG: 100 CAPSULE, LIQUID FILLED ORAL at 18:25

## 2023-08-06 RX ADMIN — METOPROLOL SUCCINATE 25 MG: 25 TABLET, EXTENDED RELEASE ORAL at 08:26

## 2023-08-06 RX ADMIN — DOCUSATE SODIUM 100 MG: 100 CAPSULE, LIQUID FILLED ORAL at 08:26

## 2023-08-06 NOTE — PROGRESS NOTES
[address]  Progress Note  Name: Vonnie Macedo  MRN: [id number]  Unit/Bed#: W -01 I Date of Admission: 8/5/2023   Date of Service: 8/6/2023 I Hospital Day: 0    Assessment/Plan   Acute deep vein thrombosis (DVT) of brachial vein of right upper extremity (HCC)  Assessment & Plan  Cont ac. Constipation  Assessment & Plan  Had a large bm with some blood, has a hx of hemorrhoids. Start on miralax. Right renal mass  Assessment & Plan  Was planned for nephrectomy, however, was rescheduled due to multiple hospitalizations and due to acute DVTs in RUE. Essential hypertension  Assessment & Plan  Cont amlodipine and lisinopril    * Diffuse pain in right upper extremity  Assessment & Plan  Pain improved. Likely due to rotator cuff tear and due to parsonage bejarano syndrome. However, ADLs are limited on LUE as well. Evaluated by PT will likely benefit from acute rehab given that he is fully dependent on help for adls and has had multiple falls. anemia - had bloody bm prior to admit, trend h/h daily. VTE Pharmacologic Prophylaxis: VTE Score: 4 Moderate Risk (Score 3-4) - Pharmacological DVT Prophylaxis Ordered: apixaban (Eliquis). Patient Centered Rounds: I performed bedside rounds with nursing staff today. Discussions with Specialists or Other Care Team Provider: PT     Education and Discussions with Family / Patient: Updated  (wife) at bedside. Total Time Spent on Date of Encounter in care of patient: 35 minutes This time was spent on one or more of the following: performing physical exam; counseling and coordination of care; obtaining or reviewing history; documenting in the medical record; reviewing/ordering tests, medications or procedures; communicating with other healthcare professionals and discussing with patient's family/caregivers.     Current Length of Stay: 0 day(s)  Current Patient Status: Inpatient   Certification Statement: The patient will continue to require additional inpatient hospital stay due to [facility] eval  Discharge Plan: Anticipate discharge in 24-48 hrs to rehab facility. Code Status: Level 1 - Full Code    Subjective:   Pt reports difficulty with ambulation, frequent falls. Had a bm and no further episodes since admission. bm in ER was bloody but reports hx of hemorrhoids. Objective:     Vitals:   Temp (24hrs), Av.2 °F (36.8 °C), Min:98.1 °F (36.7 °C), Max:98.2 °F (36.8 °C)    Temp:  [98.1 °F (36.7 °C)-98.2 °F (36.8 °C)] 98.2 °F (36.8 °C)  HR:  [52-67] 67  Resp:  [17-18] 18  BP: (114-134)/(62-73) 122/65  SpO2:  [96 %-98 %] 98 %  Body mass index is 21.35 kg/m². Input and Output Summary (last 24 hours): Intake/Output Summary (Last 24 hours) at 2023 1640  Last data filed at 2023 0900  Gross per 24 hour   Intake 2229.58 ml   Output 4375 ml   Net -2145.42 ml       Physical Exam:   R shoulder pain and minimal ROM. Limited L shoulder ROM. Frail. Appears cachectic.      Additional Data:     Labs:  Results from last 7 days   Lab Units 23  0434 23  0942   WBC Thousand/uL 3.90* 3.90*   HEMOGLOBIN g/dL 11.3* 13.7   HEMATOCRIT % 34.2* 41.1   PLATELETS Thousands/uL 217 293   NEUTROS PCT %  --  60   LYMPHS PCT %  --  29   MONOS PCT %  --  8   EOS PCT %  --  2     Results from last 7 days   Lab Units 23  0434 23  0942   SODIUM mmol/L 140 136   POTASSIUM mmol/L 3.5 3.7   CHLORIDE mmol/L 112* 104   CO2 mmol/L 24 25   BUN mg/dL 9 16   CREATININE mg/dL 0.48* 0.54*   ANION GAP mmol/L 4 7   CALCIUM mg/dL 7.6* 9.0   ALBUMIN g/dL  --  4.0   TOTAL BILIRUBIN mg/dL  --  0.79   ALK PHOS U/L  --  37   ALT U/L  --  24   AST U/L  --  26   GLUCOSE RANDOM mg/dL 402* 100         Results from last 7 days   Lab Units 23  1426   POC GLUCOSE mg/dl 115               Lines/Drains:  Invasive Devices     Peripheral Intravenous Line  Duration           Peripheral IV 23 Distal;Left;Upper;Ventral (anterior) Arm 1 day                      Imaging: Reviewed radiology reports from this admission including: shoulder xray and abdomen xray    Recent Cultures (last 7 days):         Last 24 Hours Medication List:   Current Facility-Administered Medications   Medication Dose Route Frequency Provider Last Rate   • amLODIPine  5 mg Oral Daily Eli Briscoe MD     • apixaban  5 mg Oral BID Eli Briscoe MD     • bisacodyl  5 mg Oral Daily PRN Eli Briscoe MD     • busPIRone  7.5 mg Oral BID PRN Eli Briscoe MD     • calcium carbonate  1,000 mg Oral Daily PRN Eli Briscoe MD     • docusate sodium  100 mg Oral BID Eli Briscoe MD     • gabapentin  100 mg Oral HS Eli Briscoe MD     • HYDROmorphone  0.5 mg Intravenous Q4H PRN Eli Briscoe MD     • ketorolac  15 mg Intravenous Q6H PRN Eli Briscoe MD     • lisinopril  20 mg Oral QAM Eli Briscoe MD     • loratadine  10 mg Oral Daily Eli Briscoe MD     • meclizine  25 mg Oral TID PRN Eli Briscoe MD     • methocarbamol  500 mg Oral Q6H PRN Eli Briscoe MD     • metoprolol succinate  25 mg Oral Daily With Breakfast Eli Briscoe MD     • ondansetron  4 mg Intravenous Q6H PRN Eli Briscoe MD     • oxyCODONE  5 mg Oral Q4H PRN Eli Briscoe MD     • oxyCODONE  2.5 mg Oral Q6H PRN Eli Briscoe MD     • polyethylene glycol  17 g Oral Daily Eli Briscoe MD     • tiZANidine  4 mg Oral TID Eli Briscoe MD          Today, Patient Was Seen By: Alcides Worrell MD    **Please Note: This note may have been constructed using a voice recognition system. **

## 2023-08-06 NOTE — DISCHARGE INSTR - AVS FIRST PAGE
Dear Oscar Callahan,     It was our pleasure to care for you here at [facility]. It is our hope that we were always able to exceed the expected standards for your care during your stay. You were hospitalized due to right arm pain. You were cared for on the 4th floor by Ramez Medina DO under the service of Jose Alberto Kim MD with the [facility] who covers for your primary care physician (PCP), Gina De Santiago MD, while you were hospitalized. If you have any questions or concerns related to this hospitalization, you may contact us at [phone]. For follow up as well as any medication refills, we recommend that you follow up with your primary care physician. A registered nurse will reach out to you by phone within a few days after your discharge to answer any additional questions that you may have after going home. However, at this time we provide for you here, the most important instructions / recommendations at discharge:     Notable Medication Adjustments -   None  Testing Required after Discharge -   None  Important follow up information -   Please follow-up with your primary care physician in 1 week. Please follow-up with your urologist regarding your kidney surgery. We have messaged your urologist regarding your upcoming procedure as well  Other Instructions -   Please return to the emergency room if you have any worsening right shoulder pain, falls, difficulty walking, abdominal pain, uncontrolled nausea and vomiting, chest pain, difficulty breathing, or any other symptoms concerning to you. Please review this entire after visit summary as additional general instructions including medication list, appointments, activity, diet, any pertinent wound care, and other additional recommendations from your care team that may be provided for you.       Sincerely,     Ramez Medina DO

## 2023-08-06 NOTE — ASSESSMENT & PLAN NOTE
Pain improved. Likely due to rotator cuff tear and due to parsonage bejarano syndrome. However, ADLs are limited on LUE as well. Evaluated by PT will likely benefit from acute rehab given that he is fully dependent on help for adls and has had multiple falls.

## 2023-08-06 NOTE — PLAN OF CARE
Problem: PHYSICAL THERAPY ADULT  Goal: Performs mobility at highest level of function for planned discharge setting. See evaluation for individualized goals. Description: Treatment/Interventions: Functional transfer training, LE strengthening/ROM, Therapeutic exercise, Endurance training, Cognitive reorientation, Patient/family training, Bed mobility, Gait training, Equipment eval/education, Spoke to nursing, Spoke to case management, Spoke to MD, Family, Continued evaluation  Equipment Recommended:  (Trials of increased supportive unilateral device versus BUE support of platform walker)       See flowsheet documentation for full assessment, interventions and recommendations. Note: Prognosis: Fair  Problem List: Decreased strength, Decreased range of motion, Decreased endurance, Impaired balance, Decreased coordination, Decreased mobility, Decreased cognition, Impaired sensation, Pain (Gait deviations)  Assessment: Pt is a 77 y.o. 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 Order placed for PT. Prior to admission: Pt lives with spouse in a one-story home that is handicapped Accessible with ramp to enter. Patient has needed increasingly more consistent physical assistance over the last 3 weeks, but prior to that was not using any DME and was independent with mobility. Upon evaluation: Pt requires only supervision for transfers, but assistance for ambulation with using only 1 extremity on a rolling walker. Pt's clinical presentation is currently unstable/unpredictable given the functional mobility deficits above, especially (but not limited to) weakness, decreased ROM and pain, and combined with medical complications of abnormal renal lab values, abnormal H&H, abnormal sodium values and Multiple emergency room visits over the last 4 weeks. [facility]   He is at risk for falls based on hx of falls, impaired balance, limited sensation/neuropathy and Fear and retreat. Additionally, patient appears to have a marked decline based on objective measures regarding balance performed during outpatient therapy earlier this year. During this admission, pt would benefit from continued skilled inpatient PT in the acute care setting in order to address deficits as defined above to maximize function and mobility. Recommendations:    From a PT standpoint, recommend next several sessions focus on continued gait training to pursue L RAD, Ther Ex, higher-level balance activities. Patient may benefit from PM&R consult especially based on his recent decline compared to outpatient PT notes. Based on progress compared to OPPT initial evaluation, patient may be a candidate for continued RUE therex to promote improvements in ROM and strength    Nursing Recommendations:   Mobility Plan as of 08/06/23: Pt is one-person assist with RW to/from recliner and BSC. Barriers to Discharge: Decreased caregiver support, Inaccessible home environment (Spouse reports that she can provide some physical assistance to patient, but patient has been needing more support at home over the last 3 weeks)     PT Discharge Recommendation: Post acute rehabilitation services (Based on current presentation if patient was medically ready for discharge today, patient will require postacute rehab. However, patient may make mobility progress over the next several days. Potential discharge home w/home family support HHPT)    See flowsheet documentation for full assessment.

## 2023-08-06 NOTE — PHYSICAL THERAPY NOTE
PHYSICAL THERAPY EVALUATION  NAME:  Vonnie Macedo  DATE: 08/06/23    AGE:   77 y.o. Mrn:   [id number]  Principal problem: Principal Problem:    Diffuse pain in right upper extremity  Active Problems:    Essential hypertension    Right renal mass    Constipation    Parsonage-Cordero syndrome    Brachial plexopathy without trauma    Acute deep vein thrombosis (DVT) of brachial vein of right upper extremity (HCC)      Vitals:    08/05/23 2132 08/06/23 0247 08/06/23 0600 08/06/23 0819   BP: 134/73 114/62  124/65   BP Location:       Pulse: 62 (!) 52  66   Resp: 18   17   Temp: 98.2 °F (36.8 °C)   98.1 °F (36.7 °C)   TempSrc:       SpO2: 96% 98%  97%   Weight:   60 kg (132 lb 4.4 oz)        Length Of Stay: 0  Performed at least 2 patient identifiers during session: Name and Birthday  PHYSICAL THERAPY EVALUATION :    08/06/23 1219   PT Last Visit   PT Visit Date 08/06/23   Note Type   Note type Evaluation   Pain Assessment   Pain Assessment Tool 0-10   Pain Score   (Did not quantify)   Pain Location/Orientation Orientation: Right;Location: Arm;Location: Shoulder   Pain Onset/Description Onset: Ongoing   Effect of Pain on Daily Activities Patient reports having increased pain with AROM/PROM especially at shoulder. Patient inconsistent with reports of better or worsening pain at right upper extremity with approximation. Patient's Stated Pain Goal No pain   Hospital Pain Intervention(s) Ambulation/increased activity; Medication (See MAR); Emotional support  (RN medicated patient during session.)   Restrictions/Precautions   Weight Bearing Precautions Per Order No   Other Precautions Bed Alarm; Chair Alarm; Fall Risk;Pain   Home Living   Type of [address] [place] [address] entrance; One level   Home Equipment (S)  Cane  (Patient reports only using single-point cane in the last 2 to 3 weeks)   Additional Comments Pt/spouse reports moving into a house less than a week ago, with boxes scattered throughout the home environment. .  No steps to manage. Prior Function   Level of [place] Needs assistance with functional mobility; Needs assistance with ADLs   Lives With (S)  Spouse  (provides A for some mobility prior to admission 80 to 90% of the time, but only within the last 2 to 3 weeks.)   IADLs Family/Friend/Other provides medication management; Family/Friend/Other provides transportation; Family/Friend/Other provides meals   Falls in the last 6 months 1 to 4   Comments last TUG score 13.2 seconds. During mCTSIB, patient unable to maintain balance for 30 seconds during FTEC on foam condition suggesting increased reliance on visual and somatosensory input to maintain balance. General   Additional Pertinent History Patient with history of Parsonage-Cordero syndrome. Has been seen by OP PT within the last several months. Per that assessment,: MMT reveals 0/5 for right shoulder flexion/extension/abduction/adduction, 0/5 elbow flexion, 1/5 elbow extension, 0/5 wrist flexion/extension, and 3+/5  strength. Patient displays reduced sensation in RUE. When seated and standing, patient displays right shoulder depression and 2 finger width glenohumeral subluxation."   Family/Caregiver Present Yes  (Spouse)   Cognition   Overall Cognitive Status Impaired   Arousal/Participation Alert   Memory Decreased recall of recent events; Unable to assess   Following Commands Follows one step commands with increased time or repetition   Subjective   Subjective Patient pleasant and cooperative. Upon entering room he was ambulating with PCA using a walker only with left upper extremity. He reports feeling off balance, especially with eyes closed conditions. Patient and spouse report he has had a gradual decline over the last 2 to 3 weeks, and several readmissions over that period time. RUE Assessment   RUE Assessment   (NT PROM @ shoulder due to pain, elbow limited @ extension, able to get 90 flexion.    forearm limited at pronation and supination.)   RUE Strength   R Shoulder Flexion 1/5  (At least)   R Shoulder ABduction 1/5  (At least)   R Elbow Flexion 3/5   R Forearm Pronation 3/5   R Forearm Supination 3/5   LUE Assessment   LUE Assessment WFL   Strength RLE   R Hip Flexion 4-/5   R Knee Extension 4-/5   R Ankle Dorsiflexion 4-/5   Strength LLE   L Hip Flexion 4/5   L Knee Extension 4/5   L Ankle Dorsiflexion 4/5   Coordination   Movements are Fluid and Coordinated 0   Light Touch   RLE Light Touch Grossly intact  (Reports chronic impairments @ RUE)   LLE Light Touch Grossly intact   Bed Mobility   Supine to Sit Unable to assess  (As patient was on edge of bed and ambulating upon entering room.)   Transfers   Sit to Stand 5  Supervision   Additional items Assist x 1; Impulsive; Increased time required  (Braces posterior legs on bed for support)   Stand to Sit 5  Supervision   Additional items Impulsive; Increased time required   Ambulation/Elevation   Gait pattern R Foot drag;Shuffling;Excessively slow; Step through pattern; Short stride  (Upon entering room, patient ambulating with rolling walker only using LUE with right upper extremity hanging down at side.)   Gait Assistance 4  Minimal assist   Additional items Assist x 1;Verbal cues   Assistive Device Rolling walker  (Without R UE support.)   Distance 30'   Stair Management Assistance Not tested  (Patient claims having stairs at his new home)   Balance   Static Sitting Good   Static Standing Fair -   Ambulatory Poor  (Multidirectional loss of balance)   Endurance Deficit   Endurance Deficit Yes   Endurance Deficit Description Limited ambulation distances, self-reported fatigue. Activity Tolerance   Activity Tolerance Patient limited by pain; Patient limited by fatigue   Medical Staff Made Aware Care coordination with case management, slim resident, Dr. Muna Odonnell including for definitive discharge recommendations and DME, recommendations for PM&R consult.    Nurse Made Aware Care coordination with PCA, RN   Assessment:   Pt is a 77 y.o. 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 Order placed for PT. Prior to admission: Pt lives with spouse in a one-story home that is handicapped Accessible with ramp to enter. Patient has needed increasingly more consistent physical assistance over the last 3 weeks, but prior to that was not using any DME and was independent with mobility. Upon evaluation: Pt requires only supervision for transfers, but assistance for ambulation with using only 1 extremity on a rolling walker. Pt's clinical presentation is currently unstable/unpredictable given the functional mobility deficits above, especially (but not limited to) weakness, decreased ROM and pain, and combined with medical complications of abnormal renal lab values, abnormal H&H, abnormal sodium values and Multiple emergency room visits over the last 4 weeks. Valentina Rodriguez He is at risk for falls based on hx of falls, impaired balance, limited sensation/neuropathy and Fear and retreat. Additionally, patient appears to have a marked decline based on objective measures regarding balance performed during outpatient therapy earlier this year. During this admission, pt would benefit from continued skilled inpatient PT in the acute care setting in order to address deficits as defined above to maximize function and mobility. Recommendations:    From a PT standpoint, recommend next several sessions focus on continued gait training to pursue L RAD, Ther Ex, higher-level balance activities. Patient may benefit from PM&R consult especially based on his recent decline compared to outpatient PT notes.   Based on progress compared to OPPT initial evaluation, patient may be a candidate for continued RUE therex to promote improvements in ROM and strength    Nursing Recommendations: Mobility Plan as of 08/06/23: Pt is one-person assist with RW to/from recliner and BSC. Prognosis Fair   Problem List Decreased strength;Decreased range of motion;Decreased endurance; Impaired balance;Decreased coordination;Decreased mobility; Decreased cognition; Impaired sensation;Pain  (Gait deviations)   Barriers to Discharge Decreased caregiver support; Inaccessible home environment  (Spouse reports that she can provide some physical assistance to patient, but patient has been needing more support at home over the last 3 weeks)   Goals   Patient Goals To walk better, not fall, less pain. STG Expiration Date 08/16/23   Short Term Goal #1 Pt will: Perform rolling  and supine<>sit bed mobility tasks with modified I to prepare for transfers and reposition in bed. Perform transfers with modified I to promote proper hand placement and approach. Perform ambulation with L RAD for at least 100 feet with Supervision to increase Indep in home environment and promote proper use of assistive device. Perform TUG balance test and progress to scores Closer to his baseline of 13.2 seconds to demonstrate less risk for falls. PT Treatment Day 1   Plan   Treatment/Interventions Functional transfer training;LE strengthening/ROM; Therapeutic exercise; Endurance training;Cognitive reorientation;Patient/family training;Bed mobility;Gait training;Equipment eval/education;Spoke to nursing;Spoke to case management;Spoke to MD;Family;Continued evaluation   PT Frequency 4-6x/wk   Recommendation   PT Discharge Recommendation Post acute rehabilitation services  (Based on current presentation if patient was medically ready for discharge today, patient will require postacute rehab. However, patient may make mobility progress over the next several days.   Potential discharge home w/home family support HHPT)   Equipment Recommended   (Trials of increased supportive unilateral device versus BUE support of platform walker)   Additional Comments Co-morbidities affecting pt's physical performance at time of assessment include but are not limited to: Hypertension, Parsonage-Cordero syndrome with OP PT and HH PT, PVC (premature ventricular contraction), PVC (premature ventricular contraction), Renal cancer (2023), Shoulder surgery. Personal factors affecting pt at time of IE include: advanced age, past experience, behavioral pattern, inability to perform IADLs, inability to perform ADLs, inability to ambulate household distances, inability to navigate community distances, recent fall(s) and Recent move into new home. AM-PAC Basic Mobility Inpatient   Turning in Flat Bed Without Bedrails 3   Lying on Back to Sitting on Edge of Flat Bed Without Bedrails 3   Moving Bed to Chair 3   Standing Up From Chair Using Arms 3   Walk in Room 3   Climb 3-5 Stairs With Railing 1   Basic Mobility Inpatient Raw Score 16   Basic Mobility Standardized Score 38.32   Highest Level Of Mobility   -HLM Goal 5: Stand one or more mins   JH-HLM Achieved 7: Walk 25 feet or more   Additional Treatment Session   Start Time 1242   End Time 1318   Treatment Assessment Patient reports less physical assistance and displays improvements in gait deviations when using BUE supportive device as opposed to unilateral device. Patient has difficulty with unilateral device due to multidirectional loss of balance, even with instructions to perform step to gait pattern. However, when provided platform rolling walker, patient had difficult time weightbearing into elbow into platform, and especially with maneuvering walker. Ideally patient may benefit from either another model of platform attachment for a rolling walker, or potentially with a platform attachment to another type of walker that has pivoting wheels for increased maneuverability. Skilled PT recommended to progress patient towards treatment goals.    Equipment Use Hemiwalker trial, rolling walker trial w/ platform   Additional Treatment Day 1   Exercises   Neuro re-ed Transfers with consistent S for sit to stand. Ambulation with hemiwalker on left side for 15 feet with min assist due to multidirectional loss of balance, difficulty clearing RLE> LLE. Ambulation with rolling walker with right platform attachment with min assist for 50% of the trial for walker management and balance. Patient had difficulty weightbearing in a horizontal plane into the platform attachment of the walker, and needed may need platform attachment but can be angled to allow less elbow flexion on right UE. Care coordination with resident and Dr. Porfirio Evans during treatment portion of session to provide recommendations for more definitive DME, and options for home versus rehab. End of Consult   Patient Position at End of Consult All needs within reach;Bed/Chair alarm activated;Seated edge of bed  (Spouse present.)   (Please find full objective findings from PT assessment regarding body systems outlined above). The patient's -[facility] Basic Mobility Inpatient Short Form Raw Score is 16. A Raw score of less than or equal to 16 suggests the patient may benefit from discharge to home, which DOES coincide with CURRENT above PT recommendations if spouse cannot continue to provide physical A     However please refer to therapist recommendation for discharge planning given other factors that may influence destination. Adapted from [employer] [facility] “6-Clicks” Basic Mobility and Daily Activity Scores With Discharge Destination. Physical Therapy, 2021;101:1-9. DOI: 10.1093/ptj/[email or web address]    Portions of the record may have been created with voice recognition software. Occasional wrong word or "sound a like" substitutions may have occurred due to the inherent limitations of voice recognition software.   Read the chart carefully and recognize, using context, where substitutions have occurred

## 2023-08-06 NOTE — CASE MANAGEMENT
Case Management Assessment & Discharge Planning Note    Patient name Thomas Velásquez  Location W /W -01 MRN [id number]  : 1955 Date 2023       Current Admission Date: 2023  Current Admission Diagnosis:Diffuse pain in right upper extremity   Patient Active Problem List    Diagnosis Date Noted   • Skin tear of right elbow without complication    • Acute deep vein thrombosis (DVT) of brachial vein of right upper extremity ([address]) 2023   • Right leg swelling 2023   • Swelling of arm 2023   • Preop examination 2023   • Brachial plexopathy without trauma    • Parsonage-Cordero syndrome 2023   • Constipation 05/10/2023   • Moderate protein-calorie malnutrition ([address]) 2023   • Right renal mass 2023   • Carpal tunnel syndrome of right wrist    • Rotator cuff tear arthropathy of right shoulder 2023   • Radiculopathy, cervical region 2023   • Neuropathy of right upper extremity 2023   • Impaired sensation 2023   • Chronic pain of both shoulders 2023   • Tendinitis of right rotator cuff 2023   • Weight loss 2023   • Diffuse pain in right upper extremity 2023   • Polyneuropathy 2022   • Imbalance 2022   • Muscle weakness 2022   • COVID-19 2022   • Multiple bruises 2022   • Dizziness 2022   • Palpitations 2022   • Seasonal allergies 2022   • Shoulder pain, left 2022   • Rupture of right long head biceps tendon 2021   • Weakness of right upper extremity 2021   • Rotator cuff tear arthropathy of left shoulder 2021   • Fatigue 2021   • Postural hypotension 05/10/2021   • Rupture of left biceps tendon 05/10/2021   • Anxiety 2021   • Essential hypertension 07/15/2020   • Iron deficiency anemia 2019   • NSVT (nonsustained ventricular tachycardia) ([address]) 2018   • Premature atrial contractions 2018   • HTN (hypertension) 08/13/2018   • 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 12/30/2013   • Allergic rhinitis 12/03/2013      LOS (days): 0  Geometric Mean LOS (GMLOS) (days):   Days to GMLOS:     OBJECTIVE:              Current admission status: Inpatient       Preferred Pharmacy:   [facility] 2051 [facility], 10 42 [facility] 80 [facility]  [address] [address]  Phone: [phone] Fax: [phone] [address], 210 [facility] 900 [address]  [facility] [address]  Phone: [phone] Fax: [phone]    Primary Care Provider: Thmoas Gupta MD    Primary Insurance: [facility]  Secondary Insurance:     ASSESSMENT:  [address], [address] Representative - Spouse   Primary Phone: [phone] (Mobile)  Home Phone: [phone]                              Patient Information  Admitted from[de-identified] Home  Mental Status: Alert  During Assessment patient was accompanied by: Spouse (Spouse Kelvin Honeycutt present via phone)  Assessment information provided by[de-identified] Patient, Spouse  Primary Caregiver: Self  Support Systems: Spouse/significant other, Family members  What city do you live in?: [place] ([place] [address] [place] [address] entry access options.  Select all that apply.: No steps to enter home  Type of Current Residence: [place]ie Rubinstein  In the last 12 months, was there a time when you were not able to pay the mortgage or rent on time?: No  In the last 12 months, was there a time when you did not have a steady place to sleep or slept in a shelter (including now)?: No  Homeless/housing insecurity resource given?: N/A  Living Arrangements: Lives w/ Spouse/significant other    Activities of Daily Living Prior to Admission  Functional Status: Independent  Completes ADLs independently?: Yes  Ambulates independently?: Yes  Does patient use assisted devices?: Yes  Assisted Devices (DME) used: Straight Cane  Does patient currently own DME?: Yes  What DME does the patient currently own?: Laurie Cane  Does patient have a history of Outpatient Therapy (PT/OT)?: Yes  Does the patient have a history of Short-Term Rehab?: No  Does patient have a history of HHC?: Yes  Does patient currently have [address] [place] [address] East?: Yes    Current Home Health Care  Type of Current Home Care Services: Home PT, Home OT, Nurse visit  [address][de-identified] [address] [place] [address] Provider[de-identified] PCP    Patient Information Continued  Income Source: Pension/[employer]  Does patient have prescription coverage?: Yes  Within the past 12 months, you worried that your food would run out before you got the money to buy more.: Never true  Within the past 12 months, the food you bought just didn't last and you didn't have money to get more.: Never true  Food insecurity resource given?: N/A  Does patient have a history of substance abuse?: No  Does patient have a history of Mental Health Diagnosis?: No         Means of Transportation  Means of Transport to [facility] [place][de-identified] Family transport  In the past 12 months, has lack of transportation kept you from medical appointments or from getting medications?: No  In the past 12 months, has lack of transportation kept you from meetings, work, or from getting things needed for daily living?: No  Was application for public transport provided?: N/A        DISCHARGE DETAILS:    Discharge planning discussed with[de-identified] Patient and spouse at bedside  Freedom of Choice: Yes  Comments - Freedom of Choice: Pt/spouse request JASE with at-home PT/OT/SN. CM contacted family/caregiver?: Yes  Were Treatment Team discharge recommendations reviewed with patient/caregiver?: Yes  Did patient/caregiver verbalize understanding of patient care needs?: Yes  Were patient/caregiver advised of the risks associated with not following Treatment Team discharge recommendations?: Yes    Contacts  Patient Contacts: Self, Tory Anthony  Contact Method:  In Person  Reason/Outcome: Discharge [address]         Is the patient interested in [facility] at discharge?: Yes  [facility] requested[de-identified] Occupational Therapy, Physical Therapy, [address] Agency Name[de-identified] Petr Provider[de-identified] PCP  Home Health Services Needed[de-identified] Evaluate Functional Status and Safety, Gait/ADL Training, Strengthening/Theraputic Exercises to Improve Function  Supporting Clincal Findings[de-identified] Limited Endurance, Fatigues Easliy in [employer]    DME Referral Provided  Referral made for DME?: No    Other Referral/Resources/Interventions Provided:  Interventions: Short Term Rehab, HHC  Referral Comments: Referral sent back to [facility] for [facility] of at-home PT/OT/SN at discharge. Treatment Team Recommendation: Short Term Rehab  Discharge Destination Plan[de-identified] Home with [address]                                         Additional Comments: CM spoke with patient and spouse (on speaker phone) at bedside to introduce role of CM and begin discharge planning. Pt resides with spouse in a 1L home with 0E. Pt reports being IPTA, uses [facility] for ambulation as needed. Pt reports feeling very dizzy and weak at the moment, but was observed by this CM walking in his room without an AD. Pt was evaluated by PT/OT team today and is recommended for STR vs. HHC services at discharge -- CM reviewed the process of STR placement (including blanket referrals and insurance authorization). Pt and spouse present report being very hesitant to agree to STR placement as they are anxious insurance will not approve the authorization -- CM advised them that pt would remain hospitalized until a determination is made, however patient and spouse state they would prefer he return home rather than await a determination. PM&R consult pending. Preference is to continue services with at-home PT/OT/SN via [facility]. Referral sent and accepted via [facility].                 Patient and spouse also presented with many questions regarding observation notice, such as how much Medicare will cover in terms of hospital bill. CM advised them he has been been switched to inpatient and [email or web address] will be billed for his hospitalization. Patient and spouse continued to ask many questions about financial responsibilities -- CM advised them to contact Financial Counselors with any questions regarding hospital finances. Contact information added to AVS for their review.

## 2023-08-06 NOTE — ASSESSMENT & PLAN NOTE
· Reports diffuse pain, spasm and upper extremity. Unable  to use right upper extremity. He has been taking tramadol and Zanaflex at home without much improvement. Trouble with ambulation as he is unable to use his right upper extremity. · Increase Zanaflex to 4 mg  · Add Robaxin as needed  · Dilaudid and Toradol for pain control  · XR right shoulder shows no acute displaced fracture. Moderate glenohumeral arthritis. Postsurgical changes from the rotator cuff repair with tendon anchors in place. Superior migration of the humeral head, suggesting rotator cuff retear.   · Disposition pending PT OT evaluation and PM&R consult

## 2023-08-06 NOTE — ASSESSMENT & PLAN NOTE
Was planned for nephrectomy, however, was rescheduled due to multiple hospitalizations and due to acute DVTs in RUE.

## 2023-08-06 NOTE — PLAN OF CARE
Problem: MOBILITY - ADULT  Goal: Maintain or return to baseline ADL function  Description: INTERVENTIONS:  -  Assess patient's ability to carry out ADLs; assess patient's baseline for ADL function and identify physical deficits which impact ability to perform ADLs (bathing, care of mouth/teeth, toileting, grooming, dressing, etc.)  - Assess/evaluate cause of self-care deficits   - Assess range of motion  - Assess patient's mobility; develop plan if impaired  - Assess patient's need for assistive devices and provide as appropriate  - Encourage maximum independence but intervene and supervise when necessary  - Involve family in performance of ADLs  - Assess for home care needs following discharge   - Consider OT consult to assist with ADL evaluation and planning for discharge  - Provide patient education as appropriate  Outcome: Progressing  Goal: Maintains/Returns to pre admission functional level  Description: INTERVENTIONS:  - Perform BMAT or MOVE assessment daily.   - Set and communicate daily mobility goal to care team and patient/family/caregiver. - Collaborate with rehabilitation services on mobility goals if consulted  - Perform Range of Motion 2 times a day. - Reposition patient every 2 hours.   - Dangle patient 1 times a day  - Stand patient 1 times a day  - Ambulate patient 1 times a day  - Out of bed to chair 1 times a day   - Out of bed for meals 1 times a day  - Out of bed for toileting  - Record patient progress and toleration of activity level   Outcome: Progressing

## 2023-08-07 ENCOUNTER — TELEPHONE (OUTPATIENT)
Dept: NEUROLOGY | Facility: CLINIC | Age: 68
End: 2023-08-07

## 2023-08-07 LAB
DME PARACHUTE DELIVERY DATE REQUESTED: NORMAL
DME PARACHUTE ITEM DESCRIPTION: NORMAL
DME PARACHUTE ORDER STATUS: NORMAL
DME PARACHUTE SUPPLIER NAME: NORMAL
DME PARACHUTE SUPPLIER PHONE: NORMAL
ERYTHROCYTE [DISTWIDTH] IN BLOOD BY AUTOMATED COUNT: 12.9 % (ref 11.6–15.1)
GLUCOSE SERPL-MCNC: 135 MG/DL (ref 65–140)
GLUCOSE SERPL-MCNC: 80 MG/DL (ref 65–140)
GLUCOSE SERPL-MCNC: 97 MG/DL (ref 65–140)
HCT VFR BLD AUTO: 37.3 % (ref 36.5–49.3)
HGB BLD-MCNC: 12.5 G/DL (ref 12–17)
MCH RBC QN AUTO: 30.6 PG (ref 26.8–34.3)
MCHC RBC AUTO-ENTMCNC: 33.5 G/DL (ref 31.4–37.4)
MCV RBC AUTO: 91 FL (ref 82–98)
PLATELET # BLD AUTO: 218 THOUSANDS/UL (ref 149–390)
PMV BLD AUTO: 8.9 FL (ref 8.9–12.7)
RBC # BLD AUTO: 4.08 MILLION/UL (ref 3.88–5.62)
WBC # BLD AUTO: 4.71 THOUSAND/UL (ref 4.31–10.16)

## 2023-08-07 PROCEDURE — 99222 1ST HOSP IP/OBS MODERATE 55: CPT | Performed by: NURSE PRACTITIONER

## 2023-08-07 PROCEDURE — 85027 COMPLETE CBC AUTOMATED: CPT | Performed by: HOSPITALIST

## 2023-08-07 PROCEDURE — 97167 OT EVAL HIGH COMPLEX 60 MIN: CPT

## 2023-08-07 PROCEDURE — 82948 REAGENT STRIP/BLOOD GLUCOSE: CPT

## 2023-08-07 PROCEDURE — 99232 SBSQ HOSP IP/OBS MODERATE 35: CPT | Performed by: HOSPITALIST

## 2023-08-07 RX ORDER — LANOLIN ALCOHOL/MO/W.PET/CERES
400 CREAM (GRAM) TOPICAL 2 TIMES DAILY
Status: DISCONTINUED | OUTPATIENT
Start: 2023-08-07 | End: 2023-08-08 | Stop reason: HOSPADM

## 2023-08-07 RX ADMIN — Medication 400 MG: at 18:10

## 2023-08-07 RX ADMIN — LORATADINE 10 MG: 10 TABLET ORAL at 09:40

## 2023-08-07 RX ADMIN — APIXABAN 5 MG: 5 TABLET, FILM COATED ORAL at 18:10

## 2023-08-07 RX ADMIN — METOPROLOL SUCCINATE 25 MG: 25 TABLET, EXTENDED RELEASE ORAL at 09:40

## 2023-08-07 RX ADMIN — APIXABAN 5 MG: 5 TABLET, FILM COATED ORAL at 09:40

## 2023-08-07 RX ADMIN — GABAPENTIN 100 MG: 100 CAPSULE ORAL at 22:03

## 2023-08-07 RX ADMIN — TIZANIDINE 4 MG: 2 TABLET ORAL at 22:03

## 2023-08-07 RX ADMIN — POLYETHYLENE GLYCOL 3350 17 G: 17 POWDER, FOR SOLUTION ORAL at 09:39

## 2023-08-07 RX ADMIN — DOCUSATE SODIUM 100 MG: 100 CAPSULE, LIQUID FILLED ORAL at 09:40

## 2023-08-07 RX ADMIN — LISINOPRIL 20 MG: 20 TABLET ORAL at 09:40

## 2023-08-07 RX ADMIN — DOCUSATE SODIUM 100 MG: 100 CAPSULE, LIQUID FILLED ORAL at 18:10

## 2023-08-07 NOTE — PROGRESS NOTES
[address] [facility]  Progress Note  Name: Sammy Jackson  MRN: [id number]  Unit/Bed#: W -01 I Date of Admission: 8/5/2023   Date of Service: 8/7/2023 I Hospital Day: 1    Assessment/Plan   Acute deep vein thrombosis (DVT) of brachial vein of right upper extremity (HCC)  Assessment & Plan  - History of RUE DVT  - Continue Eliquis 5 mg BID    Constipation  Assessment & Plan  - Patient came in with complaint of constipation  - Last bowel movement on 8/5/23  - Continue bowel regimen to prevent constipation    Right renal mass  Assessment & Plan  - History of right renal mass. CT abd/pelvis 7/15/23 - enlarging solid heterogeneous right upper pole renal mass most suggestive of malignancy. - Being followed up by urologist. Plans for radical nephrectomy as outpatient      * Diffuse pain in right upper extremity  Assessment & Plan  · Reports diffuse pain, spasm and upper extremity. Unable  to use right upper extremity. He has been taking tramadol and Zanaflex at home without much improvement. Trouble with ambulation as he is unable to use his right upper extremity. · Increase Zanaflex to 4 mg  · Add Robaxin as needed  · Dilaudid and Toradol for pain control  · XR right shoulder shows no acute displaced fracture. Moderate glenohumeral arthritis. Postsurgical changes from the rotator cuff repair with tendon anchors in place. Superior migration of the humeral head, suggesting rotator cuff retear. · Disposition pending PT OT evaluation and PM&R consult           VTE Pharmacologic Prophylaxis: VTE Score: 4 Moderate Risk (Score 3-4) - Pharmacological DVT Prophylaxis Ordered: apixaban (Eliquis). Patient Centered Rounds: I performed bedside rounds with nursing staff today. Discussions with Specialists or Other Care Team Provider: PM&R    Education and Discussions with Family / Patient: Updated  (wife) at bedside.     Current Length of Stay: 1 day(s)  Current Patient Status: Inpatient   Discharge Plan: Anticipate discharge in 24-48 hrs to home. Or home with home services or rehab. Pending disposition PM&R recommendation. Code Status: Level 1 - Full Code    Subjective:   Patient seen and evaluated at bedside. States that his right upper extremity pain is currently rated 6/10, which is increased compared to yesterday, which was 3/10. His pain is alleviated by laying flat. Muscle relaxants have also been helping, although he did say made him drowsy. Patient states that his last bowel movement was on 23. Says that he is interested in rehab but is worried about insurance and the costs. Objective:     Vitals:   Temp (24hrs), Av.2 °F (36.8 °C), Min:98.1 °F (36.7 °C), Max:98.2 °F (36.8 °C)    Temp:  [98.1 °F (36.7 °C)-98.2 °F (36.8 °C)] 98.1 °F (36.7 °C)  HR:  [63-67] 63  Resp:  [15-18] 15  BP: (122-131)/(65-86) 131/86  SpO2:  [97 %-98 %] 97 %  Body mass index is 21.35 kg/m². Input and Output Summary (last 24 hours): Intake/Output Summary (Last 24 hours) at 2023 1452  Last data filed at 2023 1700  Gross per 24 hour   Intake 240 ml   Output --   Net 240 ml       Physical Exam:   Physical Exam  Vitals and nursing note reviewed. Constitutional:       General: He is not in acute distress. Appearance: He is well-developed. HENT:      Head: Normocephalic and atraumatic. Eyes:      Conjunctiva/sclera: Conjunctivae normal.   Cardiovascular:      Rate and Rhythm: Normal rate and regular rhythm. Heart sounds: No murmur heard. Pulmonary:      Effort: Pulmonary effort is normal. No respiratory distress. Breath sounds: Normal breath sounds. Abdominal:      Palpations: Abdomen is soft. Tenderness: There is no abdominal tenderness. Musculoskeletal:         General: No swelling. Cervical back: Neck supple. Skin:     General: Skin is warm and dry. Capillary Refill: Capillary refill takes less than 2 seconds.    Neurological:      Mental Status: He is alert. Motor: Weakness (Decreased ROM in RUE) present. Psychiatric:         Mood and Affect: Mood normal.          Additional Data:     Labs:  Results from last 7 days   Lab Units 08/07/23  0444 08/06/23  0434 08/05/23  0942   WBC Thousand/uL 4.71   < > 3.90*   HEMOGLOBIN g/dL 12.5   < > 13.7   HEMATOCRIT % 37.3   < > 41.1   PLATELETS Thousands/uL 218   < > 293   NEUTROS PCT %  --   --  60   LYMPHS PCT %  --   --  29   MONOS PCT %  --   --  8   EOS PCT %  --   --  2    < > = values in this interval not displayed.      Results from last 7 days   Lab Units 08/06/23  0434 08/05/23  0942   SODIUM mmol/L 140 136   POTASSIUM mmol/L 3.5 3.7   CHLORIDE mmol/L 112* 104   CO2 mmol/L 24 25   BUN mg/dL 9 16   CREATININE mg/dL 0.48* 0.54*   ANION GAP mmol/L 4 7   CALCIUM mg/dL 7.6* 9.0   ALBUMIN g/dL  --  4.0   TOTAL BILIRUBIN mg/dL  --  0.79   ALK PHOS U/L  --  37   ALT U/L  --  24   AST U/L  --  26   GLUCOSE RANDOM mg/dL 402* 100         Results from last 7 days   Lab Units 08/07/23  1233 08/07/23  0622 08/06/23  1755 08/06/23  1426   POC GLUCOSE mg/dl 97 80 120 115               Lines/Drains:  Invasive Devices     None                       Imaging: Reviewed radiology reports from this admission including: chest xray    Recent Cultures (last 7 days):         Last 24 Hours Medication List:   Current Facility-Administered Medications   Medication Dose Route Frequency Provider Last Rate   • amLODIPine  5 mg Oral Daily Eli Briscoe MD     • apixaban  5 mg Oral BID Eli Briscoe MD     • bisacodyl  5 mg Oral Daily PRN Eli Briscoe MD     • busPIRone  7.5 mg Oral BID PRN Eli Briscoe MD     • calcium carbonate  1,000 mg Oral Daily PRN Eli Briscoe MD     • docusate sodium  100 mg Oral BID Eli Briscoe MD     • gabapentin  100 mg Oral HS Eli Briscoe MD     • HYDROmorphone  0.5 mg Intravenous Q4H PRN Eli Briscoe MD     • ketorolac  15 mg Intravenous Q6H PRN Eli Briscoe MD     • lisinopril  20 mg Oral QAM Eli Briscoe MD     • loratadine  10 mg Oral Daily Eli Briscoe MD     • meclizine  25 mg Oral TID PRN Eli Briscoe MD     • methocarbamol  500 mg Oral Q6H PRN Eli Briscoe MD     • metoprolol succinate  25 mg Oral Daily With Breakfast Eli Briscoe MD     • ondansetron  4 mg Intravenous Q6H PRN Eli Briscoe MD     • oxyCODONE  5 mg Oral Q4H PRN Eli Briscoe MD     • oxyCODONE  2.5 mg Oral Q6H PRN Eli Briscoe MD     • polyethylene glycol  17 g Oral Daily Eli Briscoe MD     • tiZANidine  4 mg Oral TID Eli Briscoe MD          Today, Patient Was Seen By: Riley Sheppard DO    **Please Note: This note may have been constructed using a voice recognition system. **

## 2023-08-07 NOTE — CASE MANAGEMENT
Case Management Progress Note    Patient name Ha Handley  Location W /W -01 MRN [id number]  : 1955 Date 2023       LOS (days): 1  Geometric Mean LOS (GMLOS) (days):   Days to [facility]:        OBJECTIVE:        Current admission status: Inpatient  Preferred Pharmacy:   [facility] 2051 [facility], [address] [facility]  224 [place] [address]  Phone: [phone] Fax: [phone] [address], 210 [facility] [address] [place] [address],[address]  [address] [place] [address]  Phone: [phone] Fax: [phone]    Primary Care Provider: Calos Cardona MD    Primary Insurance: [facility]  Secondary Insurance:     PROGRESS NOTE:  Multiple conversations with patient and wife today to discuss dcp. Patient aware that at this time OT is recommending [address] and PT is recommending STR vs HHC. Patient seen by PM&R and aware [facility] is not an appropriate referral, however per PM&R they are recommending TCF. Patient and wife would like if CM could place referrals for TCF/ SNF- placed in [address] Aware that patient will need auth and insurance will need to approve, and if not patient will need to go home with [address]. Order for walker was placed based upon patient initially wanting to go home- walker delivered to bedside, slip signed, copy provided and original placed in [place] folder. CM to follow up as able to continue with dcp.

## 2023-08-07 NOTE — PLAN OF CARE
Problem: OCCUPATIONAL THERAPY ADULT  Goal: Performs self-care activities at highest level of function for planned discharge setting. See evaluation for individualized goals. Description: Treatment Interventions: ADL retraining, Functional transfer training, Endurance training, Cognitive reorientation, Patient/family training, Equipment evaluation/education, Compensatory technique education, Activityengagement, Energy conservation          See flowsheet documentation for full assessment, interventions and recommendations. Note: Limitation: Decreased ADL status, Decreased UE strength, Decreased Safe judgement during ADL, Decreased cognition, Decreased endurance, Decreased self-care trans, Decreased high-level ADLs, Non-func R UE (impaired pain, balance, fxnl mobility, act jaqueline, FM coord, GM coord, standing jaqueline, insight, problem solving)  Prognosis: Good  Assessment: Pt is a 77 y.o. male seen for OT evaluation s/p admission to [facility] on 8/5/2023 due to Diffuse pain in right upper extremity. Personal and env factors supporting pt at time of IE include supportive wife to assist with ADLs as needed, accessible home environment and FFSU. Personal and env factors inhibiting engagement in occupations include difficulty completing ADLs and difficulty completing IADLs. Performance deficits that affect the pt’s occupational performance can be seen above. Due to pt's current functional limitations and medical complications pt is functioning below baseline. Pt would benefit from continued skilled OT treatment in order to maximize safety, independence and overall performance with ADLs, functional mobility and functional transfers in order to achieve highest level of function.      OT Discharge Recommendation: Home with home health rehabilitation (may benefit from follow up with OP OT for hand therapy)

## 2023-08-07 NOTE — TELEPHONE ENCOUNTER
----- Message from Anrdey Pittman MD sent at 8/7/2023  7:51 AM EDT -----  Regarding: Request to Postpone the Appointment  [facility] Neuro team,   This patient is in the hospital and I saw him on 8/4/2023 and stable from [address] syndrome standpoint. His outpatient Neuro appointment with me is in 10 days. Will you please reach out to the wife and postpone the appointment to 4-6 weeks? On Saturday, I talked to the patient and his wife that it was ok to reschedule the appointment with me. They were ok with it.      Thank you,  Nini Fleming.

## 2023-08-07 NOTE — TELEPHONE ENCOUNTER
Pt is an established pt and needs an HFU PER BELOW NOTES  , pt is now scheduled with , 11/7/2023, Candelaria Nelson, 3pm

## 2023-08-07 NOTE — PLAN OF CARE
Problem: MOBILITY - ADULT  Goal: Maintain or return to baseline ADL function  Description: INTERVENTIONS:  -  Assess patient's ability to carry out ADLs; assess patient's baseline for ADL function and identify physical deficits which impact ability to perform ADLs (bathing, care of mouth/teeth, toileting, grooming, dressing, etc.)  - Assess/evaluate cause of self-care deficits   - Assess range of motion  - Assess patient's mobility; develop plan if impaired  - Assess patient's need for assistive devices and provide as appropriate  - Encourage maximum independence but intervene and supervise when necessary  - Involve family in performance of ADLs  - Assess for home care needs following discharge   - Consider OT consult to assist with ADL evaluation and planning for discharge  - Provide patient education as appropriate  Outcome: Progressing  Goal: Maintains/Returns to pre admission functional level  Description: INTERVENTIONS:  - Perform BMAT or MOVE assessment daily.   - Set and communicate daily mobility goal to care team and patient/family/caregiver. - Collaborate with rehabilitation services on mobility goals if consulted  - Perform Range of Motion  times a day. - Reposition patient every  hours.   - Dangle patient  times a day  - Stand patient  times a day  - Ambulate patient  times a day  - Out of bed to chair  times a day   - Out of bed for meals times a day  - Out of bed for toileting  - Record patient progress and toleration of activity level   Outcome: Progressing     Problem: Prexisting or High Potential for Compromised Skin Integrity  Goal: Skin integrity is maintained or improved  Description: INTERVENTIONS:  - Identify patients at risk for skin breakdown  - Assess and monitor skin integrity  - Assess and monitor nutrition and hydration status  - Monitor labs   - Assess for incontinence   - Turn and reposition patient  - Assist with mobility/ambulation  - Relieve pressure over bony prominences  - Avoid friction and shearing  - Provide appropriate hygiene as needed including keeping skin clean and dry  - Evaluate need for skin moisturizer/barrier cream  - Collaborate with interdisciplinary team   - Patient/family teaching  - Consider wound care consult   Outcome: Progressing

## 2023-08-07 NOTE — CONSULTS
PHYSICAL MEDICINE AND REHABILITATION CONSULT NOTE  Krysta De Los Santos 77 y.o. male MRN: [id number]  Unit/Bed#: W -01 Encounter: [id number]    Requested by (Physician/Service): Seth De La Paz MD  Reason for Consultation:  Assessment of rehabilitation needs    Assessment:  Rehabilitation Diagnosis:   • Debility   • Impaired mobility and self care    Recommendations:  Rehabilitation Plan:  • Continue PT/OT (SLP) while on acute care. • Consider nutrition evaluation given loss of weight and decreased appetite. • Recommend possible psychiatry/counseling for coping as outpatient as patient does appear depressed/anxious about his recent medical diagnosis and decline in function. • The patient was previously independent prior to recent decline in function and has had re-peat admissions. He is currently minimal assist for UB/LB ADLs and ambulation. He would benefit from TCU/sub-acute level of inpatient rehabilitation for improved mobility and strengthening. He would require insurance approval. If not approved patient and spouse would like to d/c to home with home health aid and continued in home therapy. • Covid-19 Testing: [facility] inpatient rehabilitation units require testing within 48 hours of all potential admissions at this time. *Re-testing is NOT required for patients recovering from COVID-19 infection if isolation has been discontinued per CDC criteria. 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 Do not hesitate to contact service with further questions.       PRABHAKAR Mosher  PM&R    I have spent a total time of 30 minutes on 08/07/23 in caring for this patient including Patient and family education, Counseling / Coordination of care, Documenting in the medical record, Reviewing / ordering tests, medicine, procedures  , Obtaining or reviewing history   and Communicating with other healthcare professionals . History of Present Illness:  Thomas Velásquez is a 77 y.o. male with a PMH of right rotator cuff injury 4/2023, Parsonage-Bejarano syndrome, hypertension, renal mass and DVT who presented to the [facility] with constipation, generalized weakness, inability to ambulate, spasms and pain right upper extremity for 3 to 4 days. He reported 2 falls. He was recently diagnosed with renal mass and was to follow up with nephrectomy for renal nephrectomy. KUB showed moderate stool throughout the colon. He had a bowel movement in the ER. X-ray of the right shoulder showed no acute displaced fracture, moderate glenohumeral arthritis, post-surgical changes from the rotator cuff repair with tendon anchors in place. Chronic appearing erosion in the greater tuberosity, probably sequela of pressure erosion from the overlying acromion due to superior migration of the humeral head. Superior migration of the humeral head, suggesting rotator cuff re-tear. CT C/A/P was recommended to re-evaluate renal mass and mets as there is concern for enlarging mass in the setting of worsening constipation and dia loss. PM&R are consulted for rehabilitation recommendations. The patient was seen in his room with spouse at bedside. Spouse is very anxious about going home and having his spouse take care of him. He had had multiple admission with fall since July. They just recently moved and have multiple boxes in the home he has to navigate around. He reports he was previously independent until recently following Parsonage bejarano syndrome diagnosis. Review of Systems: 10 point ROS negative except for what is noted in HPI    Function:  Prior level of function and living situation: The patient lives in a one level home with ramped entrance.  He recently moved there. Spouse has recently been having to assist the patient with ADLs which he was previously independent with about 2 months ago. They have very limited support as siblings live over 1 1/2 hours away. Current level of function:  Physical Therapy: Supervision for transfers, minimal assist for ambulation   Occupational Therapy: Modified independent for eating, grooming, minimal assist for UB bathing/dressing, LB bathing/dressing, independent for toileting     Physical Exam:  /86   Pulse 63   Temp 98.1 °F (36.7 °C) (Oral)   Resp 15   Wt 60 kg (132 lb 4.4 oz)   SpO2 97%   BMI 21.35 kg/m²        Intake/Output Summary (Last 24 hours) at 8/7/2023 1240  Last data filed at 8/6/2023 1700  Gross per 24 hour   Intake 240 ml   Output --   Net 240 ml       Body mass index is 21.35 kg/m². Physical Exam  Constitutional:       General: He is not in acute distress. Appearance: He is not toxic-appearing. HENT:      Head: Normocephalic and atraumatic. Right Ear: External ear normal.      Left Ear: External ear normal.      Nose: Nose normal.      Mouth/Throat:      Mouth: Mucous membranes are moist.      Pharynx: Oropharynx is clear. Eyes:      Extraocular Movements: Extraocular movements intact. Pulmonary:      Effort: Pulmonary effort is normal. No respiratory distress. Abdominal:      General: There is no distension. Musculoskeletal:      Comments: RUE: SAB 1/5, EE/EF 3-/5, FF 3/5  RLE: 5/5 throughout  LUE: SAB 4/5, EE/EF 4/5, FF 4-/5  LLE: 5/5 throughout    Skin:     General: Skin is warm and dry. Neurological:      Mental Status: He is alert and oriented to person, place, and time. Psychiatric:         Mood and Affect: Mood is anxious and depressed.          Social History:    Social History     Socioeconomic History   • Marital status: /Civil Union     Spouse name: None   • Number of children: None   • Years of education: None   • Highest education level: None   Occupational History   • None   Tobacco Use   • Smoking status: Never   • Smokeless tobacco: Never   Vaping Use   • Vaping Use: Never used   Substance and Sexual Activity   • Alcohol use: Not Currently     Alcohol/week: 3.0 standard drinks of alcohol     Types: 3 Glasses of wine per week     Comment: 4 oz wine with dinner a few days a week   • Drug use: No   • Sexual activity: Yes     Partners: Female     Birth control/protection: None   Other Topics Concern   • None   Social History Narrative   • None     Social Determinants of Health     Financial Resource Strain: Low Risk  (8/15/2022)    Overall Financial Resource Strain (CARDIA)    • Difficulty of Paying Living Expenses: Not very hard   Food Insecurity: No Food Insecurity (8/6/2023)    Hunger Vital Sign    • Worried About Running Out of Food in the Last Year: Never true    • Ran Out of Food in the Last Year: Never true   Transportation Needs: No Transportation Needs (8/6/2023)    PRAPARE - Transportation    • Lack of Transportation (Medical): No    • Lack of Transportation (Non-Medical):  No   Physical Activity: Not on file   Stress: Not on file   Social Connections: Not on file   Intimate Partner Violence: Not on file   Housing Stability: Unknown (8/6/2023)    Housing Stability Vital Sign    • Unable to Pay for Housing in the Last Year: No    • Number of Places Lived in the Last Year: Not on file    • Unstable Housing in the Last Year: No        Family History:    Family History   Problem Relation Age of Onset   • Diabetes Mother    • Stroke Mother    • Stroke Father    • Heart disease Father    • Diabetes Sister    • Other Family         Stroke syndrome         Medications:     Current Facility-Administered Medications:   •  amLODIPine (NORVASC) tablet 5 mg, 5 mg, Oral, Daily, Eli Briscoe MD  •  apixaban (ELIQUIS) tablet 5 mg, 5 mg, Oral, BID, Eli Briscoe MD, 5 mg at 08/07/23 0940  •  bisacodyl (DULCOLAX) EC tablet 5 mg, 5 mg, Oral, Daily PRN, Flores Narayanan MD Rachna  •  busPIRone (BUSPAR) tablet 7.5 mg, 7.5 mg, Oral, BID PRN, Eli Briscoe MD  •  calcium carbonate (TUMS) chewable tablet 1,000 mg, 1,000 mg, Oral, Daily PRN, Eli Briscoe MD  •  docusate sodium (COLACE) capsule 100 mg, 100 mg, Oral, BID, Eli Briscoe MD, 100 mg at 08/07/23 0940  •  gabapentin (NEURONTIN) capsule 100 mg, 100 mg, Oral, HS, Eli Briscoe MD, 100 mg at 08/06/23 2158  •  HYDROmorphone (DILAUDID) injection 0.5 mg, 0.5 mg, Intravenous, Q4H PRN, Eli Briscoe MD  •  ketorolac (TORADOL) injection 15 mg, 15 mg, Intravenous, Q6H PRN, Eli Briscoe MD  •  lisinopril (ZESTRIL) tablet 20 mg, 20 mg, Oral, QAM, Eli Briscoe MD, 20 mg at 08/07/23 0940  •  loratadine (CLARITIN) tablet 10 mg, 10 mg, Oral, Daily, Eli Briscoe MD, 10 mg at 08/07/23 0940  •  meclizine (ANTIVERT) tablet 25 mg, 25 mg, Oral, TID PRN, Eli Briscoe MD  •  methocarbamol (ROBAXIN) tablet 500 mg, 500 mg, Oral, Q6H PRN, Eli Briscoe MD  •  metoprolol succinate (TOPROL-XL) 24 hr tablet 25 mg, 25 mg, Oral, Daily With Breakfast, Eli Briscoe MD, 25 mg at 08/07/23 0940  •  ondansetron (ZOFRAN) injection 4 mg, 4 mg, Intravenous, Q6H PRN, Eli Briscoe MD  •  oxyCODONE (ROXICODONE) IR tablet 5 mg, 5 mg, Oral, Q4H PRN, Eli Briscoe MD, 5 mg at 08/05/23 1757  •  oxyCODONE (ROXICODONE) split tablet 2.5 mg, 2.5 mg, Oral, Q6H PRN, Eli Briscoe MD  •  polyethylene glycol (MIRALAX) packet 17 g, 17 g, Oral, Daily, Eli Briscoe MD, 17 g at 08/07/23 2289  •  tiZANidine (ZANAFLEX) tablet 4 mg, 4 mg, Oral, TID, Eli Briscoe MD, 4 mg at 08/06/23 7594    Past Medical History:     Past Medical History:   Diagnosis Date   • Allergic    • Arthritis    • Asthma    • Cancer ([address] [facility])     kidney   • Hypertension    • Impaired fasting glucose    • Mitral valve disorder    • Mitral valve prolapse    • Murmur, cardiac    • 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    • PAC (premature atrial contraction)    • Parsonage-Cordero syndrome    • PVC (premature ventricular contraction)    • PVC (premature ventricular contraction)    • Renal cancer ([address]) 2023   • Thyroid nodule         Past Surgical History:     Past Surgical History:   Procedure Laterality Date   • HAND SURGERY     • ME COLONOSCOPY FLX DX W/COLLJ SPEC WHEN PFRMD N/A 2/9/2018    Procedure: COLONOSCOPY;  Surgeon: Michael Mejias MD;  Location: [facility]  GI LAB; Service: Gastroenterology   • PROSTATE BIOPSY     • SHOULDER SURGERY           Allergies:     No Known Allergies        LABORATORY RESULTS:      Lab Results   Component Value Date    HGB 12.5 08/07/2023    HGB 15.3 11/21/2015    HCT 37.3 08/07/2023    WBC 4.71 08/07/2023     Lab Results   Component Value Date    BUN 9 08/06/2023    BUN 14 03/25/2023     07/29/2017    K 3.5 08/06/2023    K 4.3 03/25/2023     (H) 08/06/2023     03/25/2023    CREATININE 0.48 (L) 08/06/2023    CREATININE 0.87 07/29/2017     Lab Results   Component Value Date    PROTIME 12.9 07/07/2023    INR 0.95 07/07/2023        DIAGNOSTIC STUDIES: Reviewed  XR shoulder 2+ vw right    Result Date: 8/7/2023  Impression: No acute displaced fracture Moderate glenohumeral arthritis Postsurgical changes from the rotator cuff repair with tendon anchors in place Chronic appearing erosion in the greater tuberosity, probably sequela of pressure erosion from the overlying acromion due to superior migration of the humeral head Superior migration of the humeral head, suggesting rotator cuff retear Resident: Rock Alejandra GRAY, the attending radiologist, have reviewed the images and agree with the final report above. Workstation performed: [id number]     XR abdomen 1 view kub    Result Date: 8/6/2023  Impression: Moderate stool throughout the colon.  Workstation performed: [id number]

## 2023-08-07 NOTE — ASSESSMENT & PLAN NOTE
- Patient came in with complaint of constipation  - Last bowel movement on 8/5/23  - Continue bowel regimen to prevent constipation

## 2023-08-07 NOTE — OCCUPATIONAL THERAPY NOTE
Occupational Therapy Evaluation     Patient Name: Ruben Vega  VGZAN'N Date: 8/7/2023  Problem List  Principal Problem:    Diffuse pain in right upper extremity  Active Problems:    Essential hypertension    Right renal mass    Constipation    Acute deep vein thrombosis (DVT) of brachial vein of right upper extremity [facility])    Past Medical History  Past Medical History:   Diagnosis Date    Allergic     Arthritis     Asthma     Cancer ([address])     kidney    Hypertension     Impaired fasting glucose     Mitral valve disorder     Mitral valve prolapse     Murmur, cardiac     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     PVC (premature ventricular contraction)     PVC (premature ventricular contraction)     Renal cancer ([address]) 2023    Thyroid nodule      Past Surgical History  Past Surgical History:   Procedure Laterality Date    HAND SURGERY      NH COLONOSCOPY FLX DX W/COLLJ SPEC WHEN PFRMD N/A 2/9/2018    Procedure: COLONOSCOPY;  Surgeon: Antonino Ellsworth MD;  Location: [facility]  GI LAB; Service: Gastroenterology    PROSTATE BIOPSY      SHOULDER SURGERY               08/07/23 0912   OT Last Visit   OT Visit Date 08/07/23   Note Type   Note type Evaluation   Pain Assessment   Pain Assessment Tool 0-10   Pain Score 6   Pain Location/Orientation Orientation: Right;Location: Arm   Hospital Pain Intervention(s) Cold applied; Ambulation/increased activity   Restrictions/Precautions   Weight Bearing Precautions Per Order No   Other Precautions Fall Risk;Pain   Home Living   Type of [address] [place] [address] Dr One level;Ramped entrance   Bathroom Shower/Tub Tub/shower unit   Bathroom Toilet Raised   Bathroom Accessibility [address]   Additional Comments moved into new home <1 week ago, having to maneuver around boxes.  Use of SPC for >2 weeks   Prior Function   Level of [place] Needs assistance with ADLs  (wife recently assisting with ADLs)   Lives With Spouse   Receives Help From Family   IADLs Family/Friend/Other provides transportation; Family/Friend/Other provides meals; Family/Friend/Other provides medication management  (wife (A) with IADLs)   Falls in the last 6 months 1 to 4   Vocational Retired   Comments Wife reports that she can assist with ADLs as needed   Lifestyle   Autonomy PTA pt living with wife in [address], pt requiring (A) with ADLs and IADLs, (+)falls, (-)drives, use of SPC at baseline   Reciprocal Relationships supportive wife, is able to work from home and assist pt as necessary   Service to Others retired   Intrinsic Gratification enjoys reading the newspaper   General   Family/Caregiver Present Yes  (wife)   Subjective   Subjective "I don't really think I need OT, I really just need someone to work on fixing my arm"   ADL   Eating Assistance 6  Modified independent   Grooming Assistance 6  Modified Independent   Grooming Deficit Increased time to complete  (brushing teeth standing at sink, adaptive techniques for opening containers)   [address] 4  Minimal Assistance   LB Bathing Assistance 4  Minimal Assistance   UB Dressing Assistance 4  Minimal Assistance   LB Dressing Assistance 4  Minimal Assistance   LB Dressing Deficit Thread RLE into pants; Thread LLE into pants;Pull up over hips  (wife assisting)   Toileting Assistance  7  Independent   Bed Mobility   Additional Comments sitting EOB upon arrival   Transfers   Sit to Stand 5  Supervision   Additional items Increased time required   Stand to Sit 5  Supervision   Additional items Increased time required   Additional Comments use of RW   Functional Mobility   Functional Mobility 5  Supervision   Additional Comments functional household distance   Additional items Rolling walker   Balance   Static Sitting Good   Dynamic Sitting Good   Static Standing Fair +   Dynamic Standing Fair +   Ambulatory Fair +   Activity Tolerance   Activity Tolerance Patient tolerated treatment well   Medical Staff Made Aware WENDIE Ziegler   RUE Assessment   RUE Assessment X  (forearm limited pro/supination, shldr limited ROM, able to reach to RW to grasp and reach to object on sink)   RUE Overall AROM   R Mass Grasp able to grap items with gross grasp fingers extended   LUE Assessment   LUE Assessment WFL   Hand Function   Gross Motor Coordination Impaired   Fine Motor Coordination Impaired   Hand Function Comments Pt is R handed, mainly using LUE, RUE for supporting   Cognition   Overall Cognitive Status Impaired   Arousal/Participation Alert; Cooperative   Attention Within functional limits   Orientation Level Oriented X4   Memory Within functional limits   Following Commands Follows one step commands with increased time or repetition   Comments Pleasant and cooperative, delayed responses to questions, looking to wife to assist with answering questions   Assessment   Limitation Decreased ADL status; Decreased UE strength;Decreased Safe judgement during ADL;Decreased cognition;Decreased endurance;Decreased self-care trans;Decreased high-level ADLs; Non-func R UE  (impaired pain, balance, fxnl mobility, act jaqueline, FM coord, GM coord, standing jaqueline, insight, problem solving)   Prognosis Good   Assessment Pt is a 77 y.o. male seen for OT evaluation s/p admission [facility] 8/5/2023 due to Diffuse pain in right upper extremity. Personal and env factors supporting pt at time of IE include supportive wife to assist with ADLs as needed, accessible home environment and FFSU. Personal and env factors inhibiting engagement in occupations include difficulty completing ADLs and difficulty completing IADLs. Performance deficits that affect the pt’s occupational performance can be seen above. Due to pt's current functional limitations and medical complications pt is functioning below baseline.  Pt would benefit from continued skilled OT treatment in order to maximize safety, independence and overall performance with ADLs, functional mobility and functional transfers in order to achieve highest level of function. Goals   Patient Goals to go home today   LTG Time Frame 10-14   Long Term Goal see goals listed below   Plan   Treatment Interventions ADL retraining;Functional transfer training; Endurance training;Cognitive reorientation;Patient/family training;Equipment evaluation/education; Compensatory technique education; Activityengagement; Energy conservation   Goal Expiration Date 08/17/23   OT Treatment Day 0   OT Frequency 2-3x/wk   Recommendation   OT Discharge Recommendation Home with home health rehabilitation  (may benefit from follow up with OP OT for hand therapy)   AM-PAC Daily Activity Inpatient   Lower Body Dressing 3   Bathing 3   Toileting 4   Upper Body Dressing 3   Grooming 4   Eating 4   Daily Activity Raw Score 21   Daily Activity Standardized Score (Calc for Raw Score >=11) 44.27   AM-PAC Applied Cognition Inpatient   Following a Speech/Presentation 3   Understanding Ordinary Conversation 4   Taking Medications 3   Remembering Where Things Are Placed or Put Away 2   Remembering List of 4-5 Errands 2   Taking Care of Complicated Tasks 2   Applied Cognition Raw Score 16   Applied Cognition Standardized Score 35.03   End of Consult   Patient Position at End of Consult Bedside chair; All needs within reach       GOALS:      -Patient will perform grooming tasks standing at sink with overall Mod I in order to increase overall independence    -Patient will be Mod I with UB dressing using AE and AD as needed in order to increase (I) with ADLs    -Patient will be Mod I with UB bathing using AE and AD as needed in order to increase (I) with ADLs    -Patient will be Mod I with LB dressing with use of AE and AD as needed in order to increase (I) with ADLs    -Patient will be Mod I with LB bathing with use of AE and AD as needed in order to increase (I) with ADLs    -Patient will complete toileting w/ Mod I w/ G hygiene/thoroughness in order to reduce caregiver burden    -Patient will demonstrate Mod I with bed mobility for ability to manage own comfort and initiate OOB tasks.     -Patient will perform functional transfers with Mod I to/from all surfaces using DME as needed in order to increase (I) with functional tasks    -Patient will be Mod I with functional mobility to/from bathroom for increased independence with toileting tasks    -Patient will engage in ongoing cognitive assessment in order to assist with safe discharge planning/recommendations.    -Patient will participate in 10m UE therex to increase overall stamina/activity tolerance for purposeful tasks        The patient's raw score on the -PAC Daily Activity Inpatient Short Form is 21. A raw score of greater than or equal to 19 suggests the patient may benefit from discharge to home. Please refer to the recommendation of the Occupational Therapist for safe discharge planning.       Helga Ayers MS, OTR/L

## 2023-08-08 ENCOUNTER — TELEPHONE (OUTPATIENT)
Dept: INTERNAL MEDICINE CLINIC | Facility: CLINIC | Age: 68
End: 2023-08-08

## 2023-08-08 VITALS
DIASTOLIC BLOOD PRESSURE: 84 MMHG | WEIGHT: 131.4 LBS | BODY MASS INDEX: 21.21 KG/M2 | RESPIRATION RATE: 17 BRPM | TEMPERATURE: 97.2 F | HEART RATE: 70 BPM | OXYGEN SATURATION: 96 % | SYSTOLIC BLOOD PRESSURE: 135 MMHG

## 2023-08-08 LAB
ANION GAP SERPL CALCULATED.3IONS-SCNC: 6 MMOL/L
BUN SERPL-MCNC: 18 MG/DL (ref 5–25)
CALCIUM SERPL-MCNC: 9 MG/DL (ref 8.4–10.2)
CHLORIDE SERPL-SCNC: 105 MMOL/L (ref 96–108)
CO2 SERPL-SCNC: 27 MMOL/L (ref 21–32)
CREAT SERPL-MCNC: 0.54 MG/DL (ref 0.6–1.3)
ERYTHROCYTE [DISTWIDTH] IN BLOOD BY AUTOMATED COUNT: 13.1 % (ref 11.6–15.1)
GFR SERPL CREATININE-BSD FRML MDRD: 106 ML/MIN/1.73SQ M
GLUCOSE SERPL-MCNC: 101 MG/DL (ref 65–140)
GLUCOSE SERPL-MCNC: 109 MG/DL (ref 65–140)
GLUCOSE SERPL-MCNC: 133 MG/DL (ref 65–140)
GLUCOSE SERPL-MCNC: 89 MG/DL (ref 65–140)
HCT VFR BLD AUTO: 38.6 % (ref 36.5–49.3)
HGB BLD-MCNC: 13 G/DL (ref 12–17)
MAGNESIUM SERPL-MCNC: 2.2 MG/DL (ref 1.9–2.7)
MCH RBC QN AUTO: 30.9 PG (ref 26.8–34.3)
MCHC RBC AUTO-ENTMCNC: 33.7 G/DL (ref 31.4–37.4)
MCV RBC AUTO: 92 FL (ref 82–98)
PLATELET # BLD AUTO: 237 THOUSANDS/UL (ref 149–390)
PMV BLD AUTO: 9.2 FL (ref 8.9–12.7)
POTASSIUM SERPL-SCNC: 3.9 MMOL/L (ref 3.5–5.3)
RBC # BLD AUTO: 4.21 MILLION/UL (ref 3.88–5.62)
SODIUM SERPL-SCNC: 138 MMOL/L (ref 135–147)
WBC # BLD AUTO: 5.04 THOUSAND/UL (ref 4.31–10.16)

## 2023-08-08 PROCEDURE — 97116 GAIT TRAINING THERAPY: CPT

## 2023-08-08 PROCEDURE — 83735 ASSAY OF MAGNESIUM: CPT

## 2023-08-08 PROCEDURE — 85027 COMPLETE CBC AUTOMATED: CPT

## 2023-08-08 PROCEDURE — 82948 REAGENT STRIP/BLOOD GLUCOSE: CPT

## 2023-08-08 PROCEDURE — 99238 HOSP IP/OBS DSCHRG MGMT 30/<: CPT | Performed by: HOSPITALIST

## 2023-08-08 PROCEDURE — 80048 BASIC METABOLIC PNL TOTAL CA: CPT

## 2023-08-08 RX ADMIN — DOCUSATE SODIUM 100 MG: 100 CAPSULE, LIQUID FILLED ORAL at 08:46

## 2023-08-08 RX ADMIN — APIXABAN 5 MG: 5 TABLET, FILM COATED ORAL at 08:45

## 2023-08-08 RX ADMIN — POLYETHYLENE GLYCOL 3350 17 G: 17 POWDER, FOR SOLUTION ORAL at 08:53

## 2023-08-08 RX ADMIN — METOPROLOL SUCCINATE 25 MG: 25 TABLET, EXTENDED RELEASE ORAL at 08:45

## 2023-08-08 RX ADMIN — LORATADINE 10 MG: 10 TABLET ORAL at 08:46

## 2023-08-08 RX ADMIN — LISINOPRIL 20 MG: 20 TABLET ORAL at 08:46

## 2023-08-08 RX ADMIN — Medication 400 MG: at 08:46

## 2023-08-08 NOTE — PLAN OF CARE
Problem: MOBILITY - ADULT  Goal: Maintain or return to baseline ADL function  Description: INTERVENTIONS:  -  Assess patient's ability to carry out ADLs; assess patient's baseline for ADL function and identify physical deficits which impact ability to perform ADLs (bathing, care of mouth/teeth, toileting, grooming, dressing, etc.)  - Assess/evaluate cause of self-care deficits   - Assess range of motion  - Assess patient's mobility; develop plan if impaired  - Assess patient's need for assistive devices and provide as appropriate  - Encourage maximum independence but intervene and supervise when necessary  - Involve family in performance of ADLs  - Assess for home care needs following discharge   - Consider OT consult to assist with ADL evaluation and planning for discharge  - Provide patient education as appropriate  Outcome: Adequate for Discharge  Goal: Maintains/Returns to pre admission functional level  Description: INTERVENTIONS:  - Perform BMAT or MOVE assessment daily.   - Set and communicate daily mobility goal to care team and patient/family/caregiver. - Collaborate with rehabilitation services on mobility goals if consulted  - Perform Range of Motion 2 times a day. - Reposition patient every 2 hours.   - Dangle patient 2 times a day  - Stand patient 3 times a day  - Ambulate patient 3 times a day  - Out of bed to chair 3 times a day   - Out of bed for meals 3 times a day  - Out of bed for toileting  - Record patient progress and toleration of activity level   Outcome: Adequate for Discharge     Problem: Prexisting or High Potential for Compromised Skin Integrity  Goal: Skin integrity is maintained or improved  Description: INTERVENTIONS:  - Identify patients at risk for skin breakdown  - Assess and monitor skin integrity  - Assess and monitor nutrition and hydration status  - Monitor labs   - Assess for incontinence   - Turn and reposition patient  - Assist with mobility/ambulation  - Relieve pressure over bony prominences  - Avoid friction and shearing  - Provide appropriate hygiene as needed including keeping skin clean and dry  - Evaluate need for skin moisturizer/barrier cream  - Collaborate with interdisciplinary team   - Patient/family teaching  - Consider wound care consult   Outcome: Adequate for Discharge

## 2023-08-08 NOTE — DISCHARGE SUMMARY
[address] [facility]  Discharge- [facility] 1955, 77 y.o. male MRN: [id number]  Unit/Bed#: W -01 Encounter: [id number]  Primary Care Provider: Geovanna Perez MD   Date and time admitted to hospital: 8/5/2023  8:22 AM    * Diffuse pain in right upper extremity  Assessment & Plan  · Reports diffuse pain, spasm and upper extremity. Unable  to use right upper extremity. He has been taking tramadol and Zanaflex at home without much improvement. Trouble with ambulation as he is unable to use his right upper extremity. · Increase Zanaflex to 4 mg  · Add Robaxin as needed  · Dilaudid and Toradol for pain control  · XR right shoulder shows no acute displaced fracture. Moderate glenohumeral arthritis. Postsurgical changes from the rotator cuff repair with tendon anchors in place. Superior migration of the humeral head, suggesting rotator cuff retear. · PM&R attempted and requested TCU placement however patient was not eligible at this time  · PT OT recommended home with home health    Acute deep vein thrombosis (DVT) of brachial vein of right upper extremity (HCC)  Assessment & Plan  - Continue Eliquis 5 mg BID    Constipation  Assessment & Plan  - Patient came in with complaint of constipation  - Last bowel movement on 8/5/23  - Continue bowel regimen to prevent constipation    Right renal mass  Assessment & Plan  - History of right renal mass. CT abd/pelvis 7/15/23 - enlarging solid heterogeneous right upper pole renal mass most suggestive of malignancy.   - Being followed up by urologist. Plans for radical nephrectomy as outpatient      Essential hypertension  Assessment & Plan  · Continue metoprolol and amlodipine  · Monitor blood pressure      Medical Problems     Resolved Problems  Date Reviewed: 8/8/2023   None       Discharging Resident: Ilan Almazan MD  Discharging Attending: No att. providers found  PCP: Geovanna Perez MD  Admission Date:   Admission Orders (From admission, onward)     Ordered        08/06/23 1331  Inpatient Admission  Once            08/05/23 1137  Place in Observation  Once                      Discharge Date: 08/08/23    Consultations During Hospital Stay:  PM&R  Procedures Performed:   · None    Significant Findings / Test Results:   XR shoulder 2+ vw right   Final Result by Bry Baum MD (08/07 1143)      No acute displaced fracture   Moderate glenohumeral arthritis   Postsurgical changes from the rotator cuff repair with tendon anchors in place   Chronic appearing erosion in the greater tuberosity, probably sequela of pressure erosion from the overlying acromion due to superior migration of the humeral head   Superior migration of the humeral head, suggesting rotator cuff retear      Resident: Juno Abdalla I, the attending radiologist, have reviewed the images and agree with the final report above. Workstation performed: [id number]         XR abdomen 1 view kub   ED Interpretation by Herman Aguirre MD (08/05 1122)   This film was interpreted independently by me. No evidence for obstruction, moderate stool burden noted. Final Result by Shireen Cuba MD (08/06 2622)      Moderate stool throughout the colon. Workstation performed: [id number]           ·     Incidental Findings:   · None  · I reviewed the above mentioned incidental findings with the patient and/or family and they expressed understanding. Test Results Pending at Discharge (will require follow up): · None     Outpatient Tests Requested:  · Patient will follow-up with urology for planned nephrectomy    Complications: None    Reason for Admission: Constipation and right upper arm pain    Hospital Course:    Megan Cam is a 77 y.o. male patient with a past medical history of Parsonage-Cordero syndrome, HTN, renal mass, right upper arm DVT on Eliquis, who originally presented to the hospital on 8/5/2023 due to constipation and generalized weakness as well as pain in the right upper extremity. Abdominal x-ray resulted as moderate stool throughout the colon. Patient was on a bowel regimen throughout hospitalization was able to have multiple bowel movements. Throughout hospitalization PM and R was consulted for possible TCU admission. Patient was pending acceptance and insurance approval, unfortunately patient was not eligible at this time. PT OT recommending home with home health for for further physical therapy needs. During hospitalization we reached out to urology for his scheduled nephrectomy, as patient has questioned the timing. This was recently put off due to his right upper arm DVT. Overall patient was stable for discharge      Please see above list of diagnoses and related plan for additional information. Condition at Discharge: stable    Discharge Day Visit / Exam:   Subjective: Patient was seen and examined at the bedside. Patient was resting comfortably no acute distress. Patient reports he still experiencing some right arm pain however is improved since hospitalization. Patient denies any numbness, weakness, tingling, abdominal pain, NVD, fevers, chills, or any other symptoms at this time. Vitals: Blood Pressure: 135/84 (08/08/23 0849)  Pulse: 70 (08/08/23 0849)  Temperature: (!) 97.2 °F (36.2 °C) (08/08/23 0733)  Temp Source: Oral (08/06/23 2211)  Respirations: 17 (08/08/23 0733)  Weight - Scale: 59.6 kg (131 lb 6.4 oz) (08/08/23 0600)  SpO2: 96 % (08/08/23 0849)  Exam:   Physical Exam  Vitals and nursing note reviewed. Constitutional:       General: He is not in acute distress. Appearance: He is well-developed. He is ill-appearing. He is not toxic-appearing or diaphoretic. HENT:      Head: Normocephalic and atraumatic. Mouth/Throat:      Mouth: Mucous membranes are moist.   Eyes:      Extraocular Movements: Extraocular movements intact.       Conjunctiva/sclera: Conjunctivae normal.      Pupils: Pupils are equal, round, and reactive to light. Cardiovascular:      Rate and Rhythm: Normal rate and regular rhythm. Heart sounds: No murmur heard. Pulmonary:      Effort: Pulmonary effort is normal. No respiratory distress. Breath sounds: Normal breath sounds. Abdominal:      General: Bowel sounds are normal.      Palpations: Abdomen is soft. Tenderness: There is no abdominal tenderness. There is no guarding or rebound. Musculoskeletal:         General: Normal range of motion. Cervical back: Neck supple. Right lower leg: No edema. Left lower leg: No edema. Skin:     General: Skin is warm and dry. Capillary Refill: Capillary refill takes less than 2 seconds. Neurological:      General: No focal deficit present. Mental Status: He is alert and oriented to person, place, and time. Cranial Nerves: No cranial nerve deficit. Sensory: No sensory deficit. Motor: Weakness (Decreased ROM in RUE) present. Coordination: Coordination normal.      Gait: Gait normal.   Psychiatric:         Mood and Affect: Mood normal.         Behavior: Behavior normal.         Thought Content: Thought content normal.          Discussion with Family: Updated  (wife) at bedside. Discharge instructions/Information to patient and family:   See after visit summary for information provided to patient and family. Provisions for Follow-Up Care:  See after visit summary for information related to follow-up care and any pertinent home health orders. Disposition:   Home with VNA Services (Reminder: Complete face to face encounter)    Planned Readmission: None    Discharge Medications:  See after visit summary for reconciled discharge medications provided to patient and/or family.       **Please Note: This note may have been constructed using a voice recognition system**

## 2023-08-08 NOTE — TELEPHONE ENCOUNTER
Patient is currently in hospital, will be discharging hopefully today he reports he had visiting nurses coming to his home prior to his current hospitalization and asked if you would set this up for him so when he comes home he would have this available? Please advise    Patient set up a tcm appointment with you in hopes that he is released later today. If this changes we'll update that appointment.

## 2023-08-08 NOTE — PLAN OF CARE
Problem: PHYSICAL THERAPY ADULT  Goal: Performs mobility at highest level of function for planned discharge setting. See evaluation for individualized goals. Description: Treatment/Interventions: Functional transfer training, LE strengthening/ROM, Therapeutic exercise, Endurance training, Cognitive reorientation, Patient/family training, Bed mobility, Gait training, Equipment eval/education, Spoke to nursing, Spoke to case management, Spoke to MD, Family, Continued evaluation  Equipment Recommended:  (Trials of increased supportive unilateral device versus BUE support of platform walker)       See flowsheet documentation for full assessment, interventions and recommendations. Note: Prognosis: Fair  Problem List: Decreased strength, Impaired judgement, Decreased cognition, Pain (gait deviations)  Assessment: PT interventions provided include: transfer, gait, balance, and endurance in order to challenge pt's activity tolerance and to maximize pt independence w/ functional mobility. In comparison to previous session, pt improved ambulatory balance, endurance, and overall tolerance to functional mobility. Pt demonstrated ability to ambulate into hallway w/ RW and supervision as well as ambulate w/in room w/o AD; no LOB observed. Pt's gait speed indicates he is a community ambulator. Pt continues to be functioning below baseline level, and remains limited in functional mobility due to impaired cognition, weakness, and pain. Pt will continue benefit from PT to promote independence w/ functional mobility and progress towards set goals. DC rec updated to HHPT w/ progression to OPPT. Barriers to Discharge:  (pt and pt's wife confirm pt has 0 JOE house)     PT Discharge Recommendation: Home with home health rehabilitation (w/ progressing to OPPT)    See flowsheet documentation for full assessment.

## 2023-08-08 NOTE — ASSESSMENT & PLAN NOTE
- History of right renal mass. CT abd/pelvis 7/15/23 - enlarging solid heterogeneous right upper pole renal mass most suggestive of malignancy.   - Being followed up by urologist. Plans for radical nephrectomy as outpatient

## 2023-08-08 NOTE — TELEPHONE ENCOUNTER
Please instruct the patient to discuss this with his discharging team in the hospital.  He should have an in hospital care manager that can assist resuming his home care services.

## 2023-08-08 NOTE — ASSESSMENT & PLAN NOTE
· Reports diffuse pain, spasm and upper extremity. Unable  to use right upper extremity. He has been taking tramadol and Zanaflex at home without much improvement. Trouble with ambulation as he is unable to use his right upper extremity. · Increase Zanaflex to 4 mg  · Add Robaxin as needed  · Dilaudid and Toradol for pain control  · XR right shoulder shows no acute displaced fracture. Moderate glenohumeral arthritis. Postsurgical changes from the rotator cuff repair with tendon anchors in place. Superior migration of the humeral head, suggesting rotator cuff retear.   · PM&R attempted and requested TCU placement however patient was not eligible at this time  · PT OT recommended home with home health

## 2023-08-08 NOTE — CASE MANAGEMENT
Case Management Discharge Planning Note    Patient name [place] Location W /W -70 MRN [id number]  : 1955 Date 2023       Current Admission Date: 2023  Current Admission Diagnosis:Diffuse pain in right upper extremity   Patient Active Problem List    Diagnosis Date Noted   • Skin tear of right elbow without complication    • Acute deep vein thrombosis (DVT) of brachial vein of right upper extremity ([address]) 2023   • Right leg swelling 2023   • Swelling of arm 2023   • Preop examination 2023   • Brachial plexopathy without trauma    • Parsonage-Cordero syndrome 2023   • Constipation 05/10/2023   • Moderate protein-calorie malnutrition ([address]) 2023   • Right renal mass 2023   • Carpal tunnel syndrome of right wrist    • Rotator cuff tear arthropathy of right shoulder 2023   • Radiculopathy, cervical region 2023   • Neuropathy of right upper extremity 2023   • Impaired sensation 2023   • Chronic pain of both shoulders 2023   • Tendinitis of right rotator cuff 2023   • Weight loss 2023   • Diffuse pain in right upper extremity 2023   • Polyneuropathy 2022   • Imbalance 2022   • Muscle weakness 2022   • COVID-19 2022   • Multiple bruises 2022   • Dizziness 2022   • Palpitations 2022   • Seasonal allergies 2022   • Shoulder pain, left 2022   • Rupture of right long head biceps tendon 2021   • Weakness of right upper extremity 2021   • Rotator cuff tear arthropathy of left shoulder 2021   • Fatigue 2021   • Postural hypotension 05/10/2021   • Rupture of left biceps tendon 05/10/2021   • Anxiety 2021   • Essential hypertension 07/15/2020   • Iron deficiency anemia 2019   • NSVT (nonsustained ventricular tachycardia) ([address]) 2018   • Premature atrial contractions 2018   • HTN (hypertension) 08/13/2018   • 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 12/30/2013   • Allergic rhinitis 12/03/2013      LOS (days): 2  Geometric Mean LOS (GMLOS) (days): 2.80  Days to GMLOS:0.8     OBJECTIVE:  Risk of Unplanned Readmission Score: 16.04         Current admission status: Inpatient   Preferred Pharmacy:   [facility], [address] [place] [address] 80 [facility]  [address]  Phone: [phone] Fax: [phone]    [address] [facility], 210 [facility] [address]  [address] [place] [address]  Phone: [phone] Fax: [phone]    Primary Care Provider: Thomas Gupta MD    Primary Insurance: [facility]  Secondary Insurance:     DISCHARGE DETAILS:    Discharge planning discussed with[de-identified] patient and spouse at bedside  Freedom of Choice: Yes  Comments - Freedom of Choice: Pt/spouse request JASE with at-home PT/OT/SN  CM contacted family/caregiver?: Yes  Were Treatment Team discharge recommendations reviewed with patient/caregiver?: Yes  Did patient/caregiver verbalize understanding of patient care needs?: Yes  Were patient/caregiver advised of the risks associated with not following Treatment Team discharge recommendations?: Yes    Contacts  Patient Contacts: Angela (wife)  Relationship to Patient[de-identified] Family  Contact Method:  In Person  Reason/Outcome: Continuity of Care, Emergency Contact, Referral, Discharge [facility]         Is the patient interested in [facility] at discharge?: Yes  [facility] requested[de-identified] [address], Nursing, Occupational Therapy, Physical [address] [facility] Name[de-identified] Petr Provider[de-identified] PCP  Home Health Services Needed[de-identified] Evaluate Functional Status and Safety, Gait/ADL Training, Strengthening/Theraputic Exercises to Improve Function  Homebound Criteria Met[de-identified] Uses an Assist Device (i.e. cane, walker, etc)  Supporting Clincal Findings[de-identified] Limited Endurance    DME Referral Provided  Referral made for DME?: Yes  DME referral completed for the following items[de-identified] Shantal Zacarias, Bedside [employer]  DME Supplier Name[de-identified] [employer]    Other Referral/Resources/Interventions Provided:  Interventions: HHC, DME  Referral Comments: Patient seen by PT today who is now recommending [address]. CM met with patient and wife at bedside to discuss- both wanting to go home with [facility] ([facility]- added to referral). CM provided wife with [employer] and non- skilled care givers. Patient also asking for [facility]- order via [place] and delivered to bedside- delivery ticket signed, patient copy provided and original placed in [place] folder. No further CM needs anticipated at this time. Would you like to participate in our [facility] [employer] service program?  : No - Declined    Treatment Team Recommendation: Home with [facility]  Discharge Destination Plan[de-identified] Home with [facility] at Discharge : Family      IMM Given (Date):: 08/08/23  IMM Given to[de-identified] Family (copy provided)  IMM reviewed with patient, patient agrees with discharge determination.

## 2023-08-08 NOTE — PHYSICAL THERAPY NOTE
PHYSICAL THERAPY TREATMENT NOTE          Patient Name: Lynn Mackenzie  CDGSM'M Date: 23 1114   PT Last Visit   PT Visit Date 23   Note Type   Note Type Treatment   Pain Assessment   Pain Assessment Tool 0-10   Pain Score 3   Pain Location/Orientation Orientation: Right;Location: Arm  (shoulder)   Pain Onset/Description Onset: Ongoing  (chronic)   Hospital Pain Intervention(s) Repositioned; Ambulation/increased activity   Restrictions/Precautions   Weight Bearing Precautions Per Order No   Other Precautions Cognitive; Fall Risk   General   Chart Reviewed Yes   Family/Caregiver Present Yes  (pt's wife)   Cognition   Overall Cognitive Status Impaired   Arousal/Participation Alert   Attention Within functional limits   Orientation Level Oriented X4   Following Commands Follows one step commands without difficulty   Comments Pt ID via name and ; pt agreeable to PT tx and mobility   Bed Mobility   Additional Comments pt ambulating out of room w/o AD upon PT arrival to room   Transfers   Sit to Stand 5  Supervision   Additional items Assist x 1; Increased time required;Verbal cues   Stand to Sit 5  Supervision   Additional items Assist x 1; Increased time required;Verbal cues   Ambulation/Elevation   Gait pattern Improper Weight shift;Decreased foot clearance; Short stride;Decreased hip extension;R Foot drag   Gait Assistance 5  Supervision   Additional items Assist x 1   Assistive Device Rolling walker;None   Distance 92'+554'+33'  (79' w/o AD, 280' w/ RW)   Ambulation/Elevation Additional Comments pt ambulating w/o AD upon PT arrival to room. using RW, pt ambulate into hallway w/ supervision, ambulated 79' w/in room w/o AD, no evidence of LOB.  gait speed: 1.016 meters/sec indicates pt is a community ambulator   Balance   Static Sitting Good   Dynamic Sitting Good   Static Standing [facility] Activity Tolerance   Activity Tolerance Patient tolerated treatment well   Medical Staff Made Aware Spoke to OT Sirena, [facility]   Nurse Made Aware RNs Ryan and Shireen Howe   Assessment   Prognosis Fair   Problem List Decreased strength; Impaired judgement;Decreased cognition;Pain  (gait deviations)   Assessment PT interventions provided include: transfer, gait, balance, and endurance in order to challenge pt's activity tolerance and to maximize pt independence w/ functional mobility. In comparison to previous session, pt improved ambulatory balance, endurance, and overall tolerance to functional mobility. Pt demonstrated ability to ambulate into hallway w/ RW and supervision as well as ambulate w/in room w/o AD; no LOB observed. Pt's gait speed indicates he is a community ambulator. Pt continues to be functioning below baseline level, and remains limited in functional mobility due to impaired cognition, weakness, and pain. Pt will continue benefit from PT to promote independence w/ functional mobility and progress towards set goals. DC rec updated to HHPT w/ progression to OPPT. Barriers to Discharge   (pt and pt's wife confirm pt has 0 JOE house)   Goals   Patient Goals to have home care   STG Expiration Date 08/18/23   Short Term Goal #1 Pt will: perform bed mobility w/ mod I to decrease pt's burden of care and increase pt's independence w/ repositioning in bed; perform transfers w/ mod I to promote increased OOB mobility; ambulate at least 350' w/ LRAD and mod I to increase pt's ambulatory endurance/tolerance; increase all balance ratings by at least 1 grade to decrease pt's risk of falls   PT Treatment Day 2   Plan   Treatment/Interventions Functional transfer training;LE strengthening/ROM; Therapeutic exercise; Endurance training;Patient/family training;Equipment eval/education; Bed mobility;Gait training; Compensatory technique education   PT Frequency 1-2x/wk   Recommendation   PT Discharge Recommendation Home with home health rehabilitation  (w/ progressing to OPPT)   [address] in Flat Bed Without Bedrails 4   Lying on Back to Sitting on Edge of Flat Bed Without Bedrails 3   Moving Bed to Chair 3   Standing Up From Chair Using Arms 3   Walk in Room 3   Climb 3-5 Stairs With Railing 3   Basic Mobility Inpatient Raw Score 19   Basic Mobility Standardized Score 42.48   Highest Level Of Mobility   JH-HLM Goal 6: Walk 10 steps or more   JH-HLM Achieved 8: Walk 250 feet ot more   Education   Education Provided Mobility training   Patient Demonstrates acceptance/verbal understanding   End of Consult   Patient Position at End of Consult Standing; All needs within reach  (pt's wife present in room, RNs confirmed pt ok to be unalarmed in room)       Patient: Gait Speed Interpretation:   <0.4 m/sec: household ambulator   0.4-0.8 m/sec: limited community ambulator   0.8-1.2 m/sec: community ambulator   >1.2 m/sec: able to safety cross street        The patient's AM-PAC Basic Mobility Inpatient Short Form Raw Score is 19. A Raw score of greater than 16 suggests the patient may benefit from discharge to home. Please also refer to the recommendation of the Physical Therapist for safe discharge planning. Pt will continue to benefit from skilled inpatient PT during this admission in order to facilitate progress towards goals and to maximize pt's independence w/ functional mobility.     DC rec: HHPT, progression to OPPT      Loretta Aguilar PT, DPT  08/08/23

## 2023-08-09 ENCOUNTER — HOME CARE VISIT (OUTPATIENT)
Dept: HOME HEALTH SERVICES | Facility: HOME HEALTHCARE | Age: 68
End: 2023-08-09
Payer: COMMERCIAL

## 2023-08-09 ENCOUNTER — TRANSITIONAL CARE MANAGEMENT (OUTPATIENT)
Dept: INTERNAL MEDICINE CLINIC | Facility: CLINIC | Age: 68
End: 2023-08-09

## 2023-08-09 PROCEDURE — G0299 HHS/HOSPICE OF RN EA 15 MIN: HCPCS

## 2023-08-09 NOTE — UTILIZATION REVIEW
NOTIFICATION OF ADMISSION DISCHARGE   This is a Notification of Discharge from [address] [place] [address]. Please be advised that this patient has been discharge from our facility. Below you will find the admission and discharge date and time including the patient’s disposition. UTILIZATION REVIEW CONTACT:  Gilma Antonio MA  Utilization   Network Utilization Review Department  Phone: [phone] x carefully listen to the prompts. All voicemails are confidential.  Email: [email or web address]     ADMISSION INFORMATION  PRESENTATION DATE: 8/5/2023  8:22 AM  OBERVATION ADMISSION DATE:   INPATIENT ADMISSION DATE: 8/6/23  1:31 PM   DISCHARGE DATE: 8/8/2023  3:42 PM   DISPOSITION:Home with [address] INFORMATION:  Send all requests for admission clinical reviews, approved or denied determinations and any other requests to dedicated fax number below belonging to the campus where the patient is receiving treatment.  List of dedicated fax numbers:  [place] DENIALS (Administrative/Medical Necessity) [phone] [facility] (Maternity/NICU/Pediatrics) [phone]   [facility] [phone]   [facility] [phone] 16[facility] [phone] [facility] [phone]   [facility] [phone]   [facility] 608 [facility] [phone]   506 [facility] [phone] 3441 [facility] [phone] 27[facility] [address] [facility] [phone]

## 2023-08-10 ENCOUNTER — TELEMEDICINE (OUTPATIENT)
Dept: INTERNAL MEDICINE CLINIC | Facility: CLINIC | Age: 68
End: 2023-08-10
Payer: COMMERCIAL

## 2023-08-10 VITALS
RESPIRATION RATE: 16 BRPM | TEMPERATURE: 97.5 F | HEART RATE: 74 BPM | SYSTOLIC BLOOD PRESSURE: 108 MMHG | DIASTOLIC BLOOD PRESSURE: 66 MMHG | OXYGEN SATURATION: 98 %

## 2023-08-10 VITALS — OXYGEN SATURATION: 98 % | HEART RATE: 71 BPM | SYSTOLIC BLOOD PRESSURE: 117 MMHG | DIASTOLIC BLOOD PRESSURE: 61 MMHG

## 2023-08-10 DIAGNOSIS — K59.00 CONSTIPATION, UNSPECIFIED CONSTIPATION TYPE: Primary | ICD-10-CM

## 2023-08-10 DIAGNOSIS — R63.4 WEIGHT LOSS: ICD-10-CM

## 2023-08-10 DIAGNOSIS — F51.01 PRIMARY INSOMNIA: ICD-10-CM

## 2023-08-10 DIAGNOSIS — I10 ESSENTIAL HYPERTENSION: ICD-10-CM

## 2023-08-10 DIAGNOSIS — I82.621 ACUTE DEEP VEIN THROMBOSIS (DVT) OF BRACHIAL VEIN OF RIGHT UPPER EXTREMITY (HCC): ICD-10-CM

## 2023-08-10 DIAGNOSIS — G54.5 PARSONAGE-TURNER SYNDROME: ICD-10-CM

## 2023-08-10 PROCEDURE — 99442 PR PHYS/QHP TELEPHONE EVALUATION 11-20 MIN: CPT | Performed by: INTERNAL MEDICINE

## 2023-08-10 RX ORDER — MIRTAZAPINE 7.5 MG/1
7.5 TABLET, FILM COATED ORAL
Qty: 30 TABLET | Refills: 0 | Status: SHIPPED | OUTPATIENT
Start: 2023-08-10

## 2023-08-10 NOTE — PROGRESS NOTES
Virtual Brief Visit    This Visit is being completed by telephone. The Patient is located at Home and in the following state in which I hold an active license [place]    The patient was identified by name and date of birth. David Maloney was informed that this is a telemedicine visit and that the visit is being conducted through the [employer]. He agrees to proceed. .  My office door was closed. No one else was in the room. He acknowledged consent and understanding of privacy and security of the video platform. The patient has agreed to participate and understands they can discontinue the visit at any time. Patient is aware this is a billable service. Phone visit was performed and lieu of a video visit due to patient having difficulty connecting for a video visit. David Maloney is a 77 y.o. male patient with a past medical history of Parsonage-Cordero syndrome, HTN, renal mass, right upper arm DVT on Eliquis, who originally presented to the hospital on 8/5/2023 due to constipation and generalized weakness as well as pain in the right upper extremity. His abdominal x-ray revealed moderate stool throughout the colon. He was put on a bowel regimen and had multiple bowel movements. Physiatry was consulted for possible TCU admission however acceptance was not granted to his insurance and he was deemed not eligible at this time. Currently the patient states his main concern is his weight loss. He reports his appetite is normal however he does endorse difficulty with feeding himself due to Parsonage-Cordero syndrome affecting his right upper extremity. He states he still has constipation however he has been moving his bowels every few days. He also complains of low blood pressure readings stating he has been getting SBP readings in the low 100s.        Assessment/Plan:    Problem List Items Addressed This Visit        Cardiovascular and Mediastinum    Essential hypertension -Reported blood pressure readings have been low normal. This may be contributing to the patient's overall fatigue and tiredness.   -Discontinue prn Amlodipine. Reduce lisinopril to 10mg BID. -Reassess BP in 1 week during f/u          Acute deep vein thrombosis (DVT) of brachial vein of right upper extremity ([address])     -Will reach out to Hematology to see when [facility] can be interrupted for patient to proceed with radical nephrectomy             Nervous and Auditory    Parsonage-Cordero syndrome     -Continue follow-up with Neurology as scheduled            Other    Weight loss     -Weight loss in the setting of presumed RCC  -Suspect possible underlying mood disorder as well such as depression due to medical condition.  -Will start Mirtazapine for mood and appetite stimulation          Constipation - Primary     -Continue current bowel regimen          Primary insomnia     -Start sedating dose of Mirtazapine 7.5mg qhs          Relevant Medications    mirtazapine (REMERON) 7.5 MG tablet       Recent Visits  Date Type Provider Dept   08/10/23 Telemedicine Benjamin Tinsley  [facility]   08/08/23 Telephone Benjamin Tinsley MD [address] recent visits within past 7 days and meeting all other requirements  Future Appointments  No visits were found meeting these conditions.   Showing future appointments within next 150 days and meeting all other requirements         Visit Time  Total Visit Duration: 20 minutes

## 2023-08-10 NOTE — PATIENT INSTRUCTIONS
-Your amlodipine has been discontinued  -Did decrease your lisinopril to 10 mg twice a day  -To help with mood, sleep and appetite a prescription for mirtazapine 7.5 mg nightly has been sent to your pharmacy  -Keep scheduled follow-up with me next week

## 2023-08-11 LAB
DME PARACHUTE DELIVERY DATE REQUESTED: NORMAL
DME PARACHUTE ITEM DESCRIPTION: NORMAL
DME PARACHUTE ORDER STATUS: NORMAL
DME PARACHUTE SUPPLIER NAME: NORMAL
DME PARACHUTE SUPPLIER PHONE: NORMAL

## 2023-08-12 ENCOUNTER — HOME CARE VISIT (OUTPATIENT)
Dept: HOME HEALTH SERVICES | Facility: HOME HEALTHCARE | Age: 68
End: 2023-08-12
Payer: COMMERCIAL

## 2023-08-12 VITALS — SYSTOLIC BLOOD PRESSURE: 138 MMHG | DIASTOLIC BLOOD PRESSURE: 82 MMHG | HEART RATE: 70 BPM | OXYGEN SATURATION: 98 %

## 2023-08-12 PROBLEM — F51.01 PRIMARY INSOMNIA: Status: ACTIVE | Noted: 2023-08-12

## 2023-08-12 PROCEDURE — G0151 HHCP-SERV OF PT,EA 15 MIN: HCPCS

## 2023-08-12 NOTE — ASSESSMENT & PLAN NOTE
-Reported blood pressure readings have been low normal. This may be contributing to the patient's overall fatigue and tiredness.   -Discontinue prn Amlodipine. Reduce lisinopril to 10mg BID.    -Reassess BP in 1 week during f/u

## 2023-08-12 NOTE — ASSESSMENT & PLAN NOTE
-Weight loss in the setting of presumed RCC  -Suspect possible underlying mood disorder as well such as depression due to medical condition.  -Will start Mirtazapine for mood and appetite stimulation

## 2023-08-12 NOTE — ASSESSMENT & PLAN NOTE
-Will reach out to Hematology to see when [facility] can be interrupted for patient to proceed with radical nephrectomy

## 2023-08-14 ENCOUNTER — HOME CARE VISIT (OUTPATIENT)
Dept: HOME HEALTH SERVICES | Facility: HOME HEALTHCARE | Age: 68
End: 2023-08-14
Payer: COMMERCIAL

## 2023-08-14 PROCEDURE — G0151 HHCP-SERV OF PT,EA 15 MIN: HCPCS

## 2023-08-15 ENCOUNTER — HOME CARE VISIT (OUTPATIENT)
Dept: HOME HEALTH SERVICES | Facility: HOME HEALTHCARE | Age: 68
End: 2023-08-15
Payer: COMMERCIAL

## 2023-08-15 VITALS
RESPIRATION RATE: 16 BRPM | TEMPERATURE: 97.1 F | SYSTOLIC BLOOD PRESSURE: 122 MMHG | DIASTOLIC BLOOD PRESSURE: 74 MMHG | HEART RATE: 80 BPM | OXYGEN SATURATION: 98 %

## 2023-08-15 VITALS — HEART RATE: 69 BPM | OXYGEN SATURATION: 98 %

## 2023-08-15 LAB
DME PARACHUTE DELIVERY DATE ACTUAL: NORMAL
DME PARACHUTE DELIVERY DATE REQUESTED: NORMAL
DME PARACHUTE ITEM DESCRIPTION: NORMAL
DME PARACHUTE ORDER STATUS: NORMAL
DME PARACHUTE SUPPLIER NAME: NORMAL
DME PARACHUTE SUPPLIER PHONE: NORMAL

## 2023-08-15 PROCEDURE — G0299 HHS/HOSPICE OF RN EA 15 MIN: HCPCS

## 2023-08-15 PROCEDURE — G0152 HHCP-SERV OF OT,EA 15 MIN: HCPCS

## 2023-08-16 ENCOUNTER — HOME CARE VISIT (OUTPATIENT)
Dept: HOME HEALTH SERVICES | Facility: HOME HEALTHCARE | Age: 68
End: 2023-08-16
Payer: COMMERCIAL

## 2023-08-16 PROCEDURE — G0151 HHCP-SERV OF PT,EA 15 MIN: HCPCS

## 2023-08-17 ENCOUNTER — HOME CARE VISIT (OUTPATIENT)
Dept: HOME HEALTH SERVICES | Facility: HOME HEALTHCARE | Age: 68
End: 2023-08-17
Payer: COMMERCIAL

## 2023-08-17 ENCOUNTER — OFFICE VISIT (OUTPATIENT)
Dept: INTERNAL MEDICINE CLINIC | Facility: CLINIC | Age: 68
End: 2023-08-17
Payer: COMMERCIAL

## 2023-08-17 VITALS
HEIGHT: 66 IN | WEIGHT: 130 LBS | OXYGEN SATURATION: 98 % | BODY MASS INDEX: 20.89 KG/M2 | DIASTOLIC BLOOD PRESSURE: 74 MMHG | HEART RATE: 71 BPM | SYSTOLIC BLOOD PRESSURE: 152 MMHG

## 2023-08-17 VITALS — DIASTOLIC BLOOD PRESSURE: 82 MMHG | SYSTOLIC BLOOD PRESSURE: 120 MMHG

## 2023-08-17 DIAGNOSIS — K59.00 CONSTIPATION, UNSPECIFIED CONSTIPATION TYPE: ICD-10-CM

## 2023-08-17 DIAGNOSIS — G47.00 INSOMNIA, UNSPECIFIED TYPE: ICD-10-CM

## 2023-08-17 DIAGNOSIS — I82.621 ACUTE DEEP VEIN THROMBOSIS (DVT) OF BRACHIAL VEIN OF RIGHT UPPER EXTREMITY (HCC): Primary | ICD-10-CM

## 2023-08-17 DIAGNOSIS — R45.89 DEPRESSED MOOD: ICD-10-CM

## 2023-08-17 DIAGNOSIS — R63.4 WEIGHT LOSS: ICD-10-CM

## 2023-08-17 PROCEDURE — G0152 HHCP-SERV OF OT,EA 15 MIN: HCPCS

## 2023-08-17 PROCEDURE — 99214 OFFICE O/P EST MOD 30 MIN: CPT | Performed by: INTERNAL MEDICINE

## 2023-08-17 NOTE — PROGRESS NOTES
Name: Naima Jurado      : 1955      MRN: [id number]  Encounter Provider: Gela Patel MD  Encounter Date: 2023   Encounter department: [facility]     1. Acute deep vein thrombosis (DVT) of brachial vein of right upper extremity (HCC)  Assessment & Plan:  -Continue Eliquis as prescribed  -Patient has eval pending by vascular surgery  -Tiger text sent to patient's hematologist to discuss when patient may be able to proceed with his radical nephrectomy      2. Constipation, unspecified constipation type  Assessment & Plan:  - Instructed patient to continue taking docusate twice a day and to start taking his MiraLAX on a daily basis. 3. Weight loss  Assessment & Plan:  -Patient's weight has remained stable over the past month.  -Again encouraged the patient to start Remeron to help with appetite. He is now agreement after discussing potential side effects related with medication. 4. Insomnia, unspecified type    5. Depressed mood  Assessment & Plan:  -In the setting of multiple medical conditions.  -Trial of mirtazapine as mentioned above             Subjective      HPI   Patient presents today for an office follow-up. His previous encounter was a week ago via telemedicine for follow-up posthospitalization. During the visit we discussed a trial of mirtazapine to help with mood, sleep and appetite. Patient reports he never started the medication due to potential concerns regarding side effects. In general he states constipation continues to be an issue. He endorses bowel movements every 2 to 3 days. He is taking docusate twice a day as prescribed. He reports he is using MiraLAX every 2 to 3 days. Regarding his presumed diagnosis of Parsonage-Cordero syndrome.   He feels he is beginning to get more function back in his right arm however he states he continues to experience significant pain emanating from his right shoulder. All other systems negative except for pertinent findings noted in HPI. Current Outpatient Medications on File Prior to Visit   Medication Sig   • acetaminophen (TYLENOL) 325 mg tablet Take 2 tablets (650 mg total) by mouth every 6 (six) hours as needed for mild pain   • apixaban (ELIQUIS) 5 mg Take 1 tablet (5 mg total) by mouth 2 (two) times a day   • bisacodyl (DULCOLAX) 5 mg EC tablet Take 1 tablet (5 mg total) by mouth daily as needed for constipation   • busPIRone (BUSPAR) 7.5 mg tablet Take 1 tablet (7.5 mg total) by mouth 2 (two) times a day as needed (Anxiety)   • Coenzyme Q10 (CO Q 10 PO) Take by mouth in the morning   • Docosahexaenoic Acid (DHA OMEGA 3) 100 MG CAPS Take 6 capsules by mouth daily   • docusate sodium (COLACE) 100 mg capsule Take 1 capsule (100 mg total) by mouth 2 (two) times a day   • fexofenadine (ALLEGRA) 180 MG tablet Take 1 tablet by mouth daily as needed   • gabapentin (Neurontin) 100 mg capsule Take 1 capsule (100 mg total) by mouth daily at bedtime   • lidocaine (XYLOCAINE) 5 % ointment Apply topically as needed for mild pain   • lisinopril (ZESTRIL) 10 mg tablet Take 10 mg by mouth 2 (two) times a day   • metoprolol succinate (TOPROL-XL) 25 mg 24 hr tablet Take 1 tablet (25 mg total) by mouth daily with breakfast   • mirtazapine (REMERON) 7.5 MG tablet Take 1 tablet (7.5 mg total) by mouth daily at bedtime   • Multiple Vitamin tablet Take 1 tablet by mouth daily   • polyethylene glycol (MIRALAX) 17 g packet Take 17 g by mouth daily Do not start before May 6, 2023.    • tiZANidine (ZANAFLEX) 2 mg tablet Take 1 tablet (2 mg total) by mouth 3 (three) times a day (Patient taking differently: Take 2 mg by mouth if needed)       Objective     /74   Pulse 71   Ht 5' 6" (1.676 m)   Wt 59 kg (130 lb)   SpO2 98%   BMI 20.98 kg/m²     BP Readings from Last 3 Encounters:   08/17/23 152/74   08/16/23 120/82   08/15/23 122/74        Wt Readings from Last 3 Encounters:   08/17/23 59 kg (130 lb)   08/08/23 59.6 kg (131 lb 6.4 oz)   07/18/23 58.1 kg (128 lb)       Physical Exam    General: NAD, Alert and oriented x3   HEENT: NCAT, EOMI, normal conjunctiva  Cardiovascular: RRR, normal S1 and S2, no m/r/g  Pulmonary: Normal respiratory effort, no wheezes, rales or rhonchi  GI: Soft, nontender, nondistended, normoactive bowel sounds  Neuro: Right upper extremity weakness  Extremities: No lower extremity edema  Skin: Normal skin color, no rashes     Benjamin Moura MD

## 2023-08-20 PROBLEM — R45.89 DEPRESSED MOOD: Status: ACTIVE | Noted: 2023-08-20

## 2023-08-20 PROBLEM — G47.00 INSOMNIA: Status: ACTIVE | Noted: 2023-08-12

## 2023-08-20 NOTE — ASSESSMENT & PLAN NOTE
- Instructed patient to continue taking docusate twice a day and to start taking his MiraLAX on a daily basis.

## 2023-08-20 NOTE — ASSESSMENT & PLAN NOTE
-Continue Eliquis as prescribed  -Patient has eval pending by vascular surgery  -Tiger text sent to patient's hematologist to discuss when patient may be able to proceed with his radical nephrectomy

## 2023-08-20 NOTE — ASSESSMENT & PLAN NOTE
-Patient's weight has remained stable over the past month.  -Again encouraged the patient to start Remeron to help with appetite. He is now agreement after discussing potential side effects related with medication.

## 2023-08-21 ENCOUNTER — HOME CARE VISIT (OUTPATIENT)
Dept: HOME HEALTH SERVICES | Facility: HOME HEALTHCARE | Age: 68
End: 2023-08-21
Payer: COMMERCIAL

## 2023-08-21 PROCEDURE — G0151 HHCP-SERV OF PT,EA 15 MIN: HCPCS

## 2023-08-21 NOTE — PROGRESS NOTES
Assessment/Plan:    Presents with acute deep vein thrombosis back in early July right brachial vein and small area of ulnar vein as well there is also superficial thrombophlebitis of the cephalic vein just below the elbow. No evidence of axillary or subclavian vein DVT. He also has a diagnosis of Parsonage-Cordero syndrome with acute brachial neuropathy and neuralgia remote possibility as causation for his upper extremity DVT. Overall 6 weeks of anticoagulation for upper extremity small vein DVT should be satisfactory. From a vascular standpoint his kidney surgery would be cleared from late August into early September. No further imaging is necessary from a deep vein thrombosis standpoint. Diagnoses and all orders for this visit:    Acute deep vein thrombosis (DVT) of brachial vein of right upper extremity [facility])  -     Ambulatory Referral to Vascular Surgery        Subjective:      Patient ID: Eros Dickinson is a 77 y.o. male. Pt is referred to our practice by hematology for RUE DVT. Pt is taking eliquis. Pt complains of R arm pain and swelling as well as numbness/tingling in the hand. HPI    The following portions of the patient's history were reviewed and updated as appropriate: allergies, current medications, past family history, past medical history, past social history, past surgical history and problem list.    Review of Systems      Objective:      /68   Pulse 69   Wt 59.4 kg (131 lb)   SpO2 98%   BMI 21.14 kg/m²          Physical Exam      Oriented x3 no evidence of clinical depression.     Eyes:  Sclera non-icteric    Skin: normal without evidence of inflammation    Neck is supple carotid pulses equal bilaterally no bruits heard, right upper extremity superficial phlebitis upper forearm cephalic vein and minimal deep vein thrombosis in the mid brachial vein and ulnar vein    Chest lungs clear, heart regular rhythm.,  Inability to mobilize either upper extremity due to shoulder joint pathology      Neurological exam intact cranial nerves 2-12 grossly intact no gross motor sensory deficits detected. Imaging viewed and reviewed with Patient    I have reviewed and made appropriate changes to the review of systems input by the medical assistant.     Vitals:    08/22/23 1432   BP: 130/68   Pulse: 69   SpO2: 98%   Weight: 59.4 kg (131 lb)       Patient Active Problem List   Diagnosis   • 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   • HTN (hypertension)   • Allergic rhinitis   • NSVT (nonsustained ventricular tachycardia) ([facility])   • Premature atrial contractions   • Iron deficiency anemia   • Essential hypertension   • Anxiety   • Postural hypotension   • Rupture of left biceps tendon   • Fatigue   • Rotator cuff tear arthropathy of left shoulder   • Weakness of right upper extremity   • Rupture of right long head biceps tendon   • Seasonal allergies   • Shoulder pain, left   • Palpitations   • Dizziness   • Multiple bruises   • COVID-19   • Muscle weakness   • Polyneuropathy   • Imbalance   • Diffuse pain in right upper extremity   • Chronic pain of both shoulders   • Tendinitis of right rotator cuff   • Weight loss   • Impaired sensation   • Radiculopathy, cervical region   • Neuropathy of right upper extremity   • Rotator cuff tear arthropathy of right shoulder   • Carpal tunnel syndrome of right wrist   • Right renal mass   • Moderate protein-calorie malnutrition (HCC)   • Constipation   • Parsonage-Cordero syndrome   • Brachial plexopathy without trauma   • Right leg swelling   • Swelling of arm   • Preop examination   • Skin tear of right elbow without complication   • Acute deep vein thrombosis (DVT) of brachial vein of right upper extremity (HCC)   • Insomnia   • Depressed mood       Past Surgical History:   Procedure Laterality Date   • HAND SURGERY     • KS COLONOSCOPY FLX DX W/COLLJ SPEC WHEN PFRMD N/A 2/9/2018    Procedure: COLONOSCOPY;  Surgeon: Ruslan Min MD; Location: [facility]; Service: Gastroenterology   • PROSTATE BIOPSY     • SHOULDER SURGERY         Family History   Problem Relation Age of Onset   • Diabetes Mother    • Stroke Mother    • Stroke Father    • Heart disease Father    • Diabetes Sister    • Other Family         Stroke syndrome       Social History     Socioeconomic History   • Marital status: /Civil Union     Spouse name: Not on file   • Number of children: Not on file   • Years of education: Not on file   • Highest education level: Not on file   Occupational History   • Not on file   Tobacco Use   • Smoking status: Never   • Smokeless tobacco: Never   Vaping Use   • Vaping Use: Never used   Substance and Sexual Activity   • Alcohol use: Not Currently     Alcohol/week: 3.0 standard drinks of alcohol     Types: 3 Glasses of wine per week     Comment: 4 oz wine with dinner a few days a week   • Drug use: No   • Sexual activity: Yes     Partners: Female     Birth control/protection: None   Other Topics Concern   • Not on file   Social History Narrative   • Not on file     Social Determinants of Health     Financial Resource Strain: Low Risk  (8/15/2022)    Overall Financial Resource Strain (CARDIA)    • Difficulty of Paying Living Expenses: Not very hard   Food Insecurity: No Food Insecurity (8/6/2023)    Hunger Vital Sign    • Worried About Running Out of Food in the Last Year: Never true    • Ran Out of Food in the Last Year: Never true   Transportation Needs: No Transportation Needs (8/6/2023)    PRAPARE - Transportation    • Lack of Transportation (Medical): No    • Lack of Transportation (Non-Medical):  No   Physical Activity: Not on file   Stress: Not on file   Social Connections: Not on file   Intimate Partner Violence: Not on file   Housing Stability: Unknown (8/6/2023)    Housing Stability Vital Sign    • Unable to Pay for Housing in the Last Year: No    • Number of Places Lived in the Last Year: Not on file    • Unstable Housing in the Last Year: No       No Known Allergies      Current Outpatient Medications:   •  acetaminophen (TYLENOL) 325 mg tablet, Take 2 tablets (650 mg total) by mouth every 6 (six) hours as needed for mild pain, Disp: , Rfl: 0  •  apixaban (ELIQUIS) 5 mg, Take 1 tablet (5 mg total) by mouth 2 (two) times a day, Disp: 60 tablet, Rfl: 1  •  bisacodyl (DULCOLAX) 5 mg EC tablet, Take 1 tablet (5 mg total) by mouth daily as needed for constipation, Disp: 30 tablet, Rfl: 0  •  busPIRone (BUSPAR) 7.5 mg tablet, Take 1 tablet (7.5 mg total) by mouth 2 (two) times a day as needed (Anxiety), Disp: 60 tablet, Rfl: 1  •  Docosahexaenoic Acid (DHA OMEGA 3) 100 MG CAPS, Take 6 capsules by mouth daily, Disp: , Rfl:   •  docusate sodium (COLACE) 100 mg capsule, Take 1 capsule (100 mg total) by mouth 2 (two) times a day, Disp: 60 capsule, Rfl: 2  •  fexofenadine (ALLEGRA) 180 MG tablet, Take 1 tablet by mouth daily as needed, Disp: , Rfl:   •  gabapentin (Neurontin) 100 mg capsule, Take 1 capsule (100 mg total) by mouth daily at bedtime, Disp: 90 capsule, Rfl: 0  •  lidocaine (XYLOCAINE) 5 % ointment, Apply topically as needed for mild pain, Disp: 35.44 g, Rfl: 3  •  lisinopril (ZESTRIL) 10 mg tablet, Take 10 mg by mouth 2 (two) times a day, Disp: , Rfl:   •  metoprolol succinate (TOPROL-XL) 25 mg 24 hr tablet, Take 1 tablet (25 mg total) by mouth daily with breakfast, Disp: 90 tablet, Rfl: 3  •  Multiple Vitamin tablet, Take 1 tablet by mouth daily, Disp: , Rfl:   •  polyethylene glycol (MIRALAX) 17 g packet, Take 17 g by mouth daily Do not start before May 6, 2023., Disp: , Rfl: 0  •  Coenzyme Q10 (CO Q 10 PO), Take by mouth in the morning (Patient not taking: Reported on 8/22/2023), Disp: , Rfl:   •  mirtazapine (REMERON) 7.5 MG tablet, Take 1 tablet (7.5 mg total) by mouth daily at bedtime (Patient not taking: Reported on 8/22/2023), Disp: 30 tablet, Rfl: 0  •  tiZANidine (ZANAFLEX) 2 mg tablet, Take 1 tablet (2 mg total) by mouth 3 (three) times a day (Patient taking differently: Take 2 mg by mouth if needed), Disp: 90 tablet, Rfl: 1

## 2023-08-22 ENCOUNTER — HOME CARE VISIT (OUTPATIENT)
Dept: HOME HEALTH SERVICES | Facility: HOME HEALTHCARE | Age: 68
End: 2023-08-22
Payer: COMMERCIAL

## 2023-08-22 ENCOUNTER — CONSULT (OUTPATIENT)
Dept: VASCULAR SURGERY | Facility: CLINIC | Age: 68
End: 2023-08-22
Payer: COMMERCIAL

## 2023-08-22 VITALS
HEART RATE: 69 BPM | OXYGEN SATURATION: 98 % | DIASTOLIC BLOOD PRESSURE: 68 MMHG | BODY MASS INDEX: 21.14 KG/M2 | SYSTOLIC BLOOD PRESSURE: 130 MMHG | WEIGHT: 131 LBS

## 2023-08-22 DIAGNOSIS — I82.621 ACUTE DEEP VEIN THROMBOSIS (DVT) OF BRACHIAL VEIN OF RIGHT UPPER EXTREMITY (HCC): ICD-10-CM

## 2023-08-22 PROCEDURE — 99203 OFFICE O/P NEW LOW 30 MIN: CPT | Performed by: SURGERY

## 2023-08-22 PROCEDURE — G0158 HHC OT ASSISTANT EA 15: HCPCS

## 2023-08-22 NOTE — ASSESSMENT & PLAN NOTE
Presents with acute deep vein thrombosis back in early July right brachial vein and small area of ulnar vein as well there is also superficial thrombophlebitis of the cephalic vein just below the elbow. No evidence of axillary or subclavian vein DVT. He also has a diagnosis of Parsonage-Cordero syndrome with acute brachial neuropathy and neuralgia remote possibility as causation for his upper extremity DVT. Overall 6 weeks of anticoagulation for upper extremity small vein DVT should be satisfactory. From a vascular standpoint his kidney surgery would be cleared from late August into early September. No further imaging is necessary from a deep vein thrombosis standpoint.

## 2023-08-24 ENCOUNTER — HOME CARE VISIT (OUTPATIENT)
Dept: HOME HEALTH SERVICES | Facility: HOME HEALTHCARE | Age: 68
End: 2023-08-24
Payer: COMMERCIAL

## 2023-08-24 VITALS
RESPIRATION RATE: 18 BRPM | DIASTOLIC BLOOD PRESSURE: 60 MMHG | TEMPERATURE: 98.8 F | SYSTOLIC BLOOD PRESSURE: 102 MMHG | HEART RATE: 64 BPM | OXYGEN SATURATION: 98 %

## 2023-08-24 PROCEDURE — G0158 HHC OT ASSISTANT EA 15: HCPCS

## 2023-08-24 PROCEDURE — G0300 HHS/HOSPICE OF LPN EA 15 MIN: HCPCS

## 2023-08-25 ENCOUNTER — HOME CARE VISIT (OUTPATIENT)
Dept: HOME HEALTH SERVICES | Facility: HOME HEALTHCARE | Age: 68
End: 2023-08-25
Payer: COMMERCIAL

## 2023-08-25 VITALS — DIASTOLIC BLOOD PRESSURE: 60 MMHG | HEART RATE: 60 BPM | SYSTOLIC BLOOD PRESSURE: 109 MMHG

## 2023-08-25 PROCEDURE — G0151 HHCP-SERV OF PT,EA 15 MIN: HCPCS

## 2023-08-28 ENCOUNTER — HOME CARE VISIT (OUTPATIENT)
Dept: HOME HEALTH SERVICES | Facility: HOME HEALTHCARE | Age: 68
End: 2023-08-28
Payer: COMMERCIAL

## 2023-08-28 VITALS — SYSTOLIC BLOOD PRESSURE: 108 MMHG | DIASTOLIC BLOOD PRESSURE: 68 MMHG

## 2023-08-28 PROCEDURE — G0151 HHCP-SERV OF PT,EA 15 MIN: HCPCS

## 2023-08-29 ENCOUNTER — HOME CARE VISIT (OUTPATIENT)
Dept: HOME HEALTH SERVICES | Facility: HOME HEALTHCARE | Age: 68
End: 2023-08-29
Payer: COMMERCIAL

## 2023-08-29 PROCEDURE — G0299 HHS/HOSPICE OF RN EA 15 MIN: HCPCS

## 2023-08-29 PROCEDURE — G0152 HHCP-SERV OF OT,EA 15 MIN: HCPCS

## 2023-08-30 ENCOUNTER — PREP FOR PROCEDURE (OUTPATIENT)
Dept: UROLOGY | Facility: AMBULATORY SURGERY CENTER | Age: 68
End: 2023-08-30

## 2023-08-30 ENCOUNTER — TELEPHONE (OUTPATIENT)
Dept: INTERNAL MEDICINE CLINIC | Facility: CLINIC | Age: 68
End: 2023-08-30

## 2023-08-30 VITALS
OXYGEN SATURATION: 96 % | SYSTOLIC BLOOD PRESSURE: 124 MMHG | DIASTOLIC BLOOD PRESSURE: 70 MMHG | TEMPERATURE: 97.5 F | RESPIRATION RATE: 16 BRPM | HEART RATE: 69 BPM

## 2023-08-30 DIAGNOSIS — Z01.818 PREOP EXAMINATION: ICD-10-CM

## 2023-08-30 DIAGNOSIS — Z79.01 LONG TERM (CURRENT) USE OF ANTICOAGULANTS: ICD-10-CM

## 2023-08-30 DIAGNOSIS — Z01.812 PRE-PROCEDURE LAB EXAM: ICD-10-CM

## 2023-08-30 DIAGNOSIS — R39.89 SUSPECTED UTI: ICD-10-CM

## 2023-08-30 DIAGNOSIS — N28.89 RENAL MASS, RIGHT: Primary | ICD-10-CM

## 2023-08-30 NOTE — TELEPHONE ENCOUNTER
The patient is having his right kidney removed, he would like to know if he should stop taking his eliquis and for how long. Please advise.  Thank you

## 2023-08-31 ENCOUNTER — TELEPHONE (OUTPATIENT)
Dept: INTERNAL MEDICINE CLINIC | Facility: CLINIC | Age: 68
End: 2023-08-31

## 2023-08-31 NOTE — TELEPHONE ENCOUNTER
----- Message from Tamara Rachel sent at 8/30/2023  1:58 PM EDT -----  Regarding: FW: Beck's upcoming kidney surgery / Eliquis  Contact: [phone]    ----- Message -----  From: aKtherine Banks  Sent: 8/30/2023   1:56 PM EDT  To: [facility]  Subject: Beck's upcoming kidney surgery / Eliquis         We scheduled Beck's kidney surgery for 9/20. Can you tell us when he should stop taking the Eliquis? Also, do you want to see him before the surgery? If so, we would like to get that appointment scheduled. Thank you.

## 2023-08-31 NOTE — TELEPHONE ENCOUNTER
Notify patient I have reviewed his consult note from vascular surgery. Given the size and location of his clot it was recommended that he complete a total of 6 weeks of anticoagulation. At this point he has completed almost 8 weeks of anticoagulation. He may discontinue his Eliquis now. Please schedule him to follow-up with me for preop clearance. His surgery is scheduled on 9/20 with urology.

## 2023-09-01 ENCOUNTER — HOME CARE VISIT (OUTPATIENT)
Dept: HOME HEALTH SERVICES | Facility: HOME HEALTHCARE | Age: 68
End: 2023-09-01
Payer: COMMERCIAL

## 2023-09-01 PROCEDURE — G0151 HHCP-SERV OF PT,EA 15 MIN: HCPCS

## 2023-09-05 ENCOUNTER — TELEPHONE (OUTPATIENT)
Dept: LAB | Facility: HOSPITAL | Age: 68
End: 2023-09-05

## 2023-09-06 ENCOUNTER — HOME CARE VISIT (OUTPATIENT)
Dept: HOME HEALTH SERVICES | Facility: HOME HEALTHCARE | Age: 68
End: 2023-09-06
Payer: COMMERCIAL

## 2023-09-06 VITALS — OXYGEN SATURATION: 97 % | HEART RATE: 64 BPM | SYSTOLIC BLOOD PRESSURE: 108 MMHG | DIASTOLIC BLOOD PRESSURE: 64 MMHG

## 2023-09-06 PROCEDURE — G0151 HHCP-SERV OF PT,EA 15 MIN: HCPCS

## 2023-09-07 NOTE — TELEPHONE ENCOUNTER
Angela called to cancel appt for 9/14/23 and will take patient to a [address] [place] [address].

## 2023-09-11 ENCOUNTER — APPOINTMENT (OUTPATIENT)
Dept: LAB | Facility: MEDICAL CENTER | Age: 68
End: 2023-09-11
Payer: COMMERCIAL

## 2023-09-11 ENCOUNTER — LAB REQUISITION (OUTPATIENT)
Dept: LAB | Facility: HOSPITAL | Age: 68
End: 2023-09-11
Payer: COMMERCIAL

## 2023-09-11 DIAGNOSIS — N28.89 RENAL MASS, RIGHT: ICD-10-CM

## 2023-09-11 DIAGNOSIS — Z01.818 PREOP EXAMINATION: ICD-10-CM

## 2023-09-11 DIAGNOSIS — Z01.812 PRE-PROCEDURE LAB EXAM: ICD-10-CM

## 2023-09-11 DIAGNOSIS — R39.89 SUSPECTED UTI: ICD-10-CM

## 2023-09-11 DIAGNOSIS — Z79.01 LONG TERM (CURRENT) USE OF ANTICOAGULANTS: ICD-10-CM

## 2023-09-11 DIAGNOSIS — Z01.818 ENCOUNTER FOR OTHER PREPROCEDURAL EXAMINATION: ICD-10-CM

## 2023-09-11 DIAGNOSIS — Z01.818 PRE-PROCEDURAL EXAMINATION: ICD-10-CM

## 2023-09-11 DIAGNOSIS — Z01.818 PRE-OP EXAMINATION: ICD-10-CM

## 2023-09-11 LAB
ABO GROUP BLD: NORMAL
APTT PPP: 32 SECONDS (ref 23–37)
BLD GP AB SCN SERPL QL: NEGATIVE
INR PPP: 0.99 (ref 0.84–1.19)
PROTHROMBIN TIME: 13.3 SECONDS (ref 11.6–14.5)
RH BLD: POSITIVE
SPECIMEN EXPIRATION DATE: NORMAL

## 2023-09-11 PROCEDURE — 85730 THROMBOPLASTIN TIME PARTIAL: CPT

## 2023-09-11 PROCEDURE — 36415 COLL VENOUS BLD VENIPUNCTURE: CPT

## 2023-09-11 PROCEDURE — 86901 BLOOD TYPING SEROLOGIC RH(D): CPT | Performed by: UROLOGY

## 2023-09-11 PROCEDURE — 85610 PROTHROMBIN TIME: CPT

## 2023-09-11 PROCEDURE — 87086 URINE CULTURE/COLONY COUNT: CPT

## 2023-09-11 PROCEDURE — 86850 RBC ANTIBODY SCREEN: CPT | Performed by: UROLOGY

## 2023-09-11 PROCEDURE — 86900 BLOOD TYPING SEROLOGIC ABO: CPT | Performed by: UROLOGY

## 2023-09-12 ENCOUNTER — OFFICE VISIT (OUTPATIENT)
Dept: HEMATOLOGY ONCOLOGY | Facility: CLINIC | Age: 68
End: 2023-09-12
Payer: COMMERCIAL

## 2023-09-12 VITALS
WEIGHT: 131 LBS | TEMPERATURE: 98.3 F | OXYGEN SATURATION: 98 % | BODY MASS INDEX: 21.05 KG/M2 | HEART RATE: 66 BPM | DIASTOLIC BLOOD PRESSURE: 74 MMHG | SYSTOLIC BLOOD PRESSURE: 118 MMHG | RESPIRATION RATE: 17 BRPM | HEIGHT: 66 IN

## 2023-09-12 DIAGNOSIS — I82.621 ACUTE DEEP VEIN THROMBOSIS (DVT) OF BRACHIAL VEIN OF RIGHT UPPER EXTREMITY (HCC): Primary | ICD-10-CM

## 2023-09-12 LAB — BACTERIA UR CULT: NORMAL

## 2023-09-12 PROCEDURE — 99213 OFFICE O/P EST LOW 20 MIN: CPT | Performed by: INTERNAL MEDICINE

## 2023-09-12 RX ORDER — TRAMADOL HYDROCHLORIDE 50 MG/1
50 TABLET ORAL EVERY 8 HOURS PRN
COMMUNITY

## 2023-09-12 NOTE — PROGRESS NOTES
Alfredo Cough  1955  [facility] [place] [facility] [id number]    Chief Complaint   Patient presents with   • Follow-up     Assessment/plan:  1. 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 Denies any personal or family hx of thrombosis. Denies recent illness. Denies recent COVID.      7/7/2023: Duplex upper extremity:  RIGHT UPPER LIMB:  There is evidence of acute deep vein thrombosis in one of the brachial veins at  the distal upper arm to the elbow and in the ulnar veins at the proximal  forearm. There is evidence of acute superficial thrombophlebitis in the cephalic vein  branch at the proximal-mid forearm.   LEFT UPPER LIMB LIMITED:  Evaluation shows no evidence of thrombus in the internal jugular vein,  subclavian vein, and the innominate vein.     He has been evaluated by vascular surgery. Given the size and location of this clot he was recommended to complete a total of 6 weeks of anticoagulation. He will continue to follow with his PCP. Hematology follow-up on an as-needed basis. 2.  Right renal mass  Following with urology (Dr Milvia Gutierres). Planning to undergo radical right nephrectomy. He is scheduled for surgery on 9/20/2023.     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 History of Present Illness:  Mr. Red Schlatter is a 75-year-old male with past medical history significant for HTN, chronic pain of both shoulders, polyneuropathy, Parsonage-Cordero syndrome and right renal mass. He was evaluated in the ER on 7/7/2023 after found to have a right upper extremity DVT on ultrasound. He was initiated on Eliquis. He is following with urology and planned radical nephrectomy which was canceled due to newly diagnosed right upper extremity DVT.   He denies any prior history of DVT or PE. He does have chronic right shoulder pain that is currently being worked up as Parsonage-Cordero syndrome. He has had limited mobility in right upper extremity due to right shoulder pain. He occasionally wears it in a sling.     In the interim, he was evaluated by vascular surgery who recommended 6 weeks of anticoagulation for upper extremity small vein DVT. No further imaging was recommended. Review of Systems:  Review of Systems   Constitutional: Positive for fatigue. Respiratory: Negative. Cardiovascular: Negative. Gastrointestinal: Negative.     Musculoskeletal:        Bilateral shoulder pain (chronic)       Patient Active Problem List   Diagnosis   • 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   • HTN (hypertension)   • Allergic rhinitis   • NSVT (nonsustained ventricular tachycardia) (HCC)   • Premature atrial contractions   • Iron deficiency anemia   • Essential hypertension   • Anxiety   • Postural hypotension   • Rupture of left biceps tendon   • Fatigue   • Rotator cuff tear arthropathy of left shoulder   • Weakness of right upper extremity   • Rupture of right long head biceps tendon   • Seasonal allergies   • Shoulder pain, left   • Palpitations   • Dizziness   • Multiple bruises   • COVID-19   • Muscle weakness   • Polyneuropathy   • Imbalance   • Diffuse pain in right upper extremity   • Chronic pain of both shoulders   • Tendinitis of right rotator cuff   • Weight loss   • Impaired sensation   • Radiculopathy, cervical region   • Neuropathy of right upper extremity   • Rotator cuff tear arthropathy of right shoulder   • Carpal tunnel syndrome of right wrist   • Right renal mass   • Moderate protein-calorie malnutrition (HCC)   • Constipation   • Parsonage-Cordero syndrome   • Brachial plexopathy without trauma   • Right leg swelling   • Swelling of arm   • Preop examination   • Skin tear of right elbow without complication   • Acute deep vein thrombosis (DVT) of brachial vein of right upper extremity (HCC)   • Insomnia   • Depressed mood     Past Medical History:   Diagnosis Date   • Allergic    • Arthritis    • Asthma    • Cancer ([address])     kidney   • Hypertension    • Impaired fasting glucose    • Mitral valve disorder    • Mitral valve prolapse    • Murmur, cardiac    • 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    • PAC (premature atrial contraction)    • Parsonage-Cordero syndrome    • PVC (premature ventricular contraction)    • PVC (premature ventricular contraction)    • Renal cancer ([address]) 2023   • Thyroid nodule      Past Surgical History:   Procedure Laterality Date   • HAND SURGERY     • ID COLONOSCOPY FLX DX W/COLLJ SPEC WHEN PFRMD N/A 2/9/2018    Procedure: COLONOSCOPY;  Surgeon: Brett Weathers MD;  Location: [facility];   Service: Gastroenterology   • PROSTATE BIOPSY     • SHOULDER SURGERY       Family History   Problem Relation Age of Onset   • Diabetes Mother    • Stroke Mother    • Stroke Father    • Heart disease Father    • Diabetes Sister    • Other Family         Stroke syndrome     Social History     Socioeconomic History   • Marital status: /Civil Union     Spouse name: Not on file   • Number of children: Not on file   • Years of education: Not on file   • Highest education level: Not on file   Occupational History   • Not on file   Tobacco Use   • Smoking status: Never   • Smokeless tobacco: Never   Vaping Use   • Vaping Use: Never used   Substance and Sexual Activity   • Alcohol use: Not Currently     Alcohol/week: 3.0 standard drinks of alcohol     Types: 3 Glasses of wine per week     Comment: 4 oz wine with dinner a few days a week   • Drug use: No   • Sexual activity: Yes     Partners: Female     Birth control/protection: None   Other Topics Concern   • Not on file   Social History Narrative   • Not on file     Social Determinants of Health     Financial Resource Strain: Low Risk  (8/15/2022)    Overall Financial Resource Strain (CARDIA)    • Difficulty of Paying Living Expenses: Not very hard   Food Insecurity: No Food Insecurity (8/6/2023)    Hunger Vital Sign    • Worried About Running Out of Food in the Last Year: Never true    • Ran Out of Food in the Last Year: Never true   Transportation Needs: No Transportation Needs (8/6/2023)    PRAPARE - Transportation    • Lack of Transportation (Medical): No    • Lack of Transportation (Non-Medical):  No   Physical Activity: Not on file   Stress: Not on file   Social Connections: Not on file   Intimate Partner Violence: Not on file   Housing Stability: Unknown (8/6/2023)    Housing Stability Vital Sign    • Unable to Pay for Housing in the Last Year: No    • Number of Places Lived in the Last Year: Not on file    • Unstable Housing in the Last Year: No       Current Outpatient Medications:   •  acetaminophen (TYLENOL) 325 mg tablet, Take 2 tablets (650 mg total) by mouth every 6 (six) hours as needed for mild pain, Disp: , Rfl: 0  •  bisacodyl (DULCOLAX) 5 mg EC tablet, Take 1 tablet (5 mg total) by mouth daily as needed for constipation, Disp: 30 tablet, Rfl: 0  •  busPIRone (BUSPAR) 7.5 mg tablet, Take 1 tablet (7.5 mg total) by mouth 2 (two) times a day as needed (Anxiety), Disp: 60 tablet, Rfl: 1  •  Docosahexaenoic Acid (DHA OMEGA 3) 100 MG CAPS, Take 6 capsules by mouth daily, Disp: , Rfl:   •  docusate sodium (COLACE) 100 mg capsule, Take 1 capsule (100 mg total) by mouth 2 (two) times a day, Disp: 60 capsule, Rfl: 2  •  fexofenadine (ALLEGRA) 180 MG tablet, Take 1 tablet by mouth daily as needed, Disp: , Rfl:   •  gabapentin (Neurontin) 100 mg capsule, Take 1 capsule (100 mg total) by mouth daily at bedtime, Disp: 90 capsule, Rfl: 0  •  lidocaine (XYLOCAINE) 5 % ointment, Apply topically as needed for mild pain, Disp: 35.44 g, Rfl: 3  •  lisinopril (ZESTRIL) 10 mg tablet, Take 10 mg by mouth 2 (two) times a day, Disp: , Rfl:   •  metoprolol succinate (TOPROL-XL) 25 mg 24 hr tablet, Take 1 tablet (25 mg total) by mouth daily with breakfast, Disp: 90 tablet, Rfl: 3  •  Multiple Vitamin tablet, Take 1 tablet by mouth daily, Disp: , Rfl:   •  polyethylene glycol (MIRALAX) 17 g packet, Take 17 g by mouth daily Do not start before May 6, 2023., Disp: , Rfl: 0  •  tiZANidine (ZANAFLEX) 2 mg tablet, Take 1 tablet (2 mg total) by mouth 3 (three) times a day (Patient taking differently: Take 2 mg by mouth if needed), Disp: 90 tablet, Rfl: 1  •  traMADol (ULTRAM) 50 mg tablet, Take 50 mg by mouth every 6 (six) hours as needed for moderate pain, Disp: , Rfl:   •  apixaban (ELIQUIS) 5 mg, Take 1 tablet (5 mg total) by mouth 2 (two) times a day (Patient not taking: Reported on 9/12/2023), Disp: 60 tablet, Rfl: 1  •  Coenzyme Q10 (CO Q 10 PO), Take by mouth in the morning (Patient not taking: Reported on 8/22/2023), Disp: , Rfl:   •  mirtazapine (REMERON) 7.5 MG tablet, Take 1 tablet (7.5 mg total) by mouth daily at bedtime (Patient not taking: Reported on 8/22/2023), Disp: 30 tablet, Rfl: 0  No Known Allergies  Vitals:    09/12/23 1307   BP: 118/74   Pulse: 66   Resp: 17   Temp: 98.3 °F (36.8 °C)   SpO2: 98%     Wt Readings from Last 3 Encounters:   09/12/23 59.4 kg (131 lb)   08/22/23 59.4 kg (131 lb)   08/17/23 59 kg (130 lb)           Physical Exam  Vitals reviewed. Constitutional:       General: He is not in acute distress. Appearance: Normal appearance. HENT:      Head: Normocephalic and atraumatic. Mouth/Throat:      Mouth: Mucous membranes are moist.   Eyes:      Extraocular Movements: Extraocular movements intact. Conjunctiva/sclera: Conjunctivae normal.   Cardiovascular:      Rate and Rhythm: Normal rate. Pulmonary:      Effort: Pulmonary effort is normal. No respiratory distress. Musculoskeletal:      Cervical back: Normal range of motion. Right lower leg: No edema. Left lower leg: No edema. Skin:     General: Skin is warm.       Coloration: Skin is not jaundiced. Neurological:      General: No focal deficit present. Mental Status: He is alert and oriented to person, place, and time. Mental status is at baseline. Psychiatric:         Thought Content: Thought content normal.         Return if symptoms worsen or fail to improve.

## 2023-09-13 ENCOUNTER — CONSULT (OUTPATIENT)
Dept: INTERNAL MEDICINE CLINIC | Facility: CLINIC | Age: 68
End: 2023-09-13
Payer: COMMERCIAL

## 2023-09-13 VITALS
HEIGHT: 66 IN | OXYGEN SATURATION: 97 % | HEART RATE: 64 BPM | WEIGHT: 131 LBS | SYSTOLIC BLOOD PRESSURE: 124 MMHG | DIASTOLIC BLOOD PRESSURE: 72 MMHG | BODY MASS INDEX: 21.05 KG/M2

## 2023-09-13 DIAGNOSIS — G54.5 PARSONAGE-TURNER SYNDROME: ICD-10-CM

## 2023-09-13 DIAGNOSIS — I10 ESSENTIAL HYPERTENSION: ICD-10-CM

## 2023-09-13 DIAGNOSIS — Z01.818 PREOP EXAMINATION: Primary | ICD-10-CM

## 2023-09-13 DIAGNOSIS — I82.621 ACUTE DEEP VEIN THROMBOSIS (DVT) OF BRACHIAL VEIN OF RIGHT UPPER EXTREMITY (HCC): ICD-10-CM

## 2023-09-13 DIAGNOSIS — N28.89 RIGHT RENAL MASS: ICD-10-CM

## 2023-09-13 PROCEDURE — 99214 OFFICE O/P EST MOD 30 MIN: CPT | Performed by: INTERNAL MEDICINE

## 2023-09-13 RX ORDER — LISINOPRIL 10 MG/1
10 TABLET ORAL DAILY
Qty: 90 TABLET | Refills: 0 | Status: SHIPPED | OUTPATIENT
Start: 2023-09-13 | End: 2023-09-22

## 2023-09-13 NOTE — ASSESSMENT & PLAN NOTE
-Appreciate neurology follow-up  -Limited mobility of right arm with right shoulder pain  -Continue tramadol as needed

## 2023-09-13 NOTE — H&P (VIEW-ONLY)
Presurgical Evaluation    Subjective:      Patient ID: Mariya Humphreys is a 77 y.o. male. Chief Complaint   Patient presents with   • Pre-op Exam     Pt still feeling weak and his shoulder still hurt. Also pt has fungus his R toe        HPI    The following portions of the patient's history were reviewed and updated as appropriate: allergies, current medications, past family history, past medical history, past social history, past surgical history and problem list.    Procedure date: 9/20/23    Surgeon:  Dr. Saira Whyte  Planned procedure:  Radical Nephrectomy   Diagnosis for procedure:  Right renal mass     The patient is seen for perioperative risk stratification. Patient reports no symptoms of chest pain at rest or with exertion, dyspnea at rest or with exertion, PND, lower extremity edema, claudication or palpitations. Patient has no history of ischemic heart disease, CHF or diabetes. Patient reports no history of undergoing a stress test, cardiac cath or coronary revascularization percutaneously or surgically. Patient denies any history of easy bruising, profuse bleeding from minor injuries or family history of bleeding disorder. Review of Systems  All other systems negative except for pertinent findings noted in HPI.          Current Outpatient Medications   Medication Sig Dispense Refill   • acetaminophen (TYLENOL) 325 mg tablet Take 2 tablets (650 mg total) by mouth every 6 (six) hours as needed for mild pain  0   • bisacodyl (DULCOLAX) 5 mg EC tablet Take 1 tablet (5 mg total) by mouth daily as needed for constipation 30 tablet 0   • busPIRone (BUSPAR) 7.5 mg tablet Take 1 tablet (7.5 mg total) by mouth 2 (two) times a day as needed (Anxiety) 60 tablet 1   • Docosahexaenoic Acid (DHA OMEGA 3) 100 MG CAPS Take 6 capsules by mouth daily     • docusate sodium (COLACE) 100 mg capsule Take 1 capsule (100 mg total) by mouth 2 (two) times a day 60 capsule 2   • fexofenadine (ALLEGRA) 180 MG tablet Take 1 tablet by mouth daily as needed     • gabapentin (Neurontin) 100 mg capsule Take 1 capsule (100 mg total) by mouth daily at bedtime 90 capsule 0   • lidocaine (XYLOCAINE) 5 % ointment Apply topically as needed for mild pain 35.44 g 3   • lisinopril (ZESTRIL) 10 mg tablet Take 1 tablet (10 mg total) by mouth daily 90 tablet 0   • metoprolol succinate (TOPROL-XL) 25 mg 24 hr tablet Take 1 tablet (25 mg total) by mouth daily with breakfast 90 tablet 3   • Multiple Vitamin tablet Take 1 tablet by mouth daily     • polyethylene glycol (MIRALAX) 17 g packet Take 17 g by mouth daily Do not start before May 6, 2023.  0   • tiZANidine (ZANAFLEX) 2 mg tablet Take 1 tablet (2 mg total) by mouth 3 (three) times a day (Patient taking differently: Take 2 mg by mouth if needed) 90 tablet 1   • traMADol (ULTRAM) 50 mg tablet Take 50 mg by mouth every 6 (six) hours as needed for moderate pain     • Coenzyme Q10 (CO Q 10 PO) Take by mouth in the morning (Patient not taking: Reported on 8/22/2023)       No current facility-administered medications for this visit. Allergies on file:   Patient has no known allergies. Past Medical History:   Diagnosis Date   • Allergic    • Arthritis    • Asthma    • Cancer ([address] [facility])     kidney   • Hypertension    • Impaired fasting glucose    • Mitral valve disorder    • Mitral valve prolapse    • Murmur, cardiac    • 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    • PAC (premature atrial contraction)    • Parsonage-Cordero syndrome    • PVC (premature ventricular contraction)    • PVC (premature ventricular contraction)    • Renal cancer ([address]) 2023   • Thyroid nodule        Past Surgical History:   Procedure Laterality Date   • HAND SURGERY     • [place] COLONOSCOPY FLX DX W/COLLJ SPEC WHEN PFRMD N/A 2/9/2018    Procedure: COLONOSCOPY;  Surgeon: Arnold Morales MD;  Location: [facility];   Service: Gastroenterology   • PROSTATE BIOPSY     • SHOULDER SURGERY         Family History   Problem Relation Age of Onset   • Diabetes Mother    • Stroke Mother    • Stroke Father    • Heart disease Father    • Diabetes Sister    • Other Family         Stroke syndrome       Social History     Tobacco Use   • Smoking status: Never   • Smokeless tobacco: Never   Vaping Use   • Vaping Use: Never used   Substance Use Topics   • Alcohol use: Not Currently     Alcohol/week: 3.0 standard drinks of alcohol     Types: 3 Glasses of wine per week     Comment: 4 oz wine with dinner a few days a week   • Drug use: No       Objective:    Vitals:    23 1338   BP: 124/72   Pulse: 64   SpO2: 97%   Weight: 59.4 kg (131 lb)   Height: 5' 6" (1.676 m)       BP Readings from Last 3 Encounters:   23 124/72   23 118/74   23 108/64        Wt Readings from Last 3 Encounters:   23 59.4 kg (131 lb)   23 59.4 kg (131 lb)   23 59.4 kg (131 lb)        Physical Exam      General: NAD, Alert and oriented x3   HEENT: NCAT, EOMI, normal conjunctiva  Cardiovascular: RRR, normal S1 and S2, no m/r/g  Pulmonary: Normal respiratory effort, no wheezes, rales or rhonchi  GI: Soft, nontender, nondistended, normoactive bowel sounds  Neuro: Right upper extremity weakness with fasciculations, ambulating with a cane for support  Extremities: No lower extremity edema  Skin: Normal skin color, no rashes       Preop labs/testing available and reviewed: yes          INR   Date Value Ref Range Status   2023 0.99 0.84 - 1.19 Final           RCRI  High Risk surgery? 1 Point  CAD History:         1 Point   MI; Positive Stress Test; CP due to Mi;  Nitrate Usage to control Angina;  Pathologic Q wave on EKG  CHF Active:         1 Point   Pulm Edema; Paroxysmal Nocturnal Dyspnea;  Bibasilar Rales (crackles);S3; CHF on CXR  Cerebrovascular Disease (TIA or CVA):     1 Point  DM on Insulin:        1 Point  Serum Creat >2.0 mg/dl:       1 Point          Total Points: 1     Scorin: Class I, Very Low Risk (0.4%)     1: Class II, Low risk (0.9%)     2: Class III Moderate (6.6%)     3: Class IV High (>11%)        Assessment/Plan:    Patient is medically optimized (cleared) for the planned procedure. 1. Preop examination  Assessment & Plan:  Patient is approved for the proposed procedure. Preoperative labs reviewed. Patient is however due for a BMP since it has been more than 30 days since his last study. Previous EKG from July reviewed. No additional cardiac work-up indicated. Orders:  -     Basic metabolic panel; Future    2. Right renal mass  Assessment & Plan:  -Presumed to be renal cell carcinoma  -Radical nephrectomy for post      3. Parsonage-Cordero syndrome  Assessment & Plan:  -Appreciate neurology follow-up  -Limited mobility of right arm with right shoulder pain  -Continue tramadol as needed      4. Acute deep vein thrombosis (DVT) of brachial vein of right upper extremity (HCC)  Assessment & Plan:  -Patient's Eliquis has been discontinued per vascular surgery recommendations. He completed a total of 6 weeks of therapy.       5. Essential hypertension  Assessment & Plan:  -I have instructed patient to discontinue his evening dose of lisinopril 10 mg daily since his morning blood pressure readings have been in the low 100s/50s.  -Continue beta-blocker in the perioperative period  -Hold lisinopril the morning of surgery

## 2023-09-13 NOTE — PATIENT INSTRUCTIONS
-So discontinue your evening dose of lisinopril. You should only take 10 mg once a day. -You will be given a lab slip to check a BMP. -Continue your metoprolol leading up to and the day of surgery.  -Hold lisinopril the morning of surgery.

## 2023-09-13 NOTE — ASSESSMENT & PLAN NOTE
Patient is approved for the proposed procedure. Preoperative labs reviewed. Patient is however due for a BMP since it has been more than 30 days since his last study. Previous EKG from July reviewed. No additional cardiac work-up indicated.

## 2023-09-13 NOTE — ASSESSMENT & PLAN NOTE
-Patient's Eliquis has been discontinued per vascular surgery recommendations. He completed a total of 6 weeks of therapy.

## 2023-09-13 NOTE — PROGRESS NOTES
Presurgical Evaluation    Subjective:      Patient ID: Saurabh Morris is a 77 y.o. male. Chief Complaint   Patient presents with   • Pre-op Exam     Pt still feeling weak and his shoulder still hurt. Also pt has fungus his R toe        HPI    The following portions of the patient's history were reviewed and updated as appropriate: allergies, current medications, past family history, past medical history, past social history, past surgical history and problem list.    Procedure date: 9/20/23    Surgeon:  Dr. Joslyn Cervantes  Planned procedure:  Radical Nephrectomy   Diagnosis for procedure:  Right renal mass     The patient is seen for perioperative risk stratification. Patient reports no symptoms of chest pain at rest or with exertion, dyspnea at rest or with exertion, PND, lower extremity edema, claudication or palpitations. Patient has no history of ischemic heart disease, CHF or diabetes. Patient reports no history of undergoing a stress test, cardiac cath or coronary revascularization percutaneously or surgically. Patient denies any history of easy bruising, profuse bleeding from minor injuries or family history of bleeding disorder. Review of Systems  All other systems negative except for pertinent findings noted in HPI.          Current Outpatient Medications   Medication Sig Dispense Refill   • acetaminophen (TYLENOL) 325 mg tablet Take 2 tablets (650 mg total) by mouth every 6 (six) hours as needed for mild pain  0   • bisacodyl (DULCOLAX) 5 mg EC tablet Take 1 tablet (5 mg total) by mouth daily as needed for constipation 30 tablet 0   • busPIRone (BUSPAR) 7.5 mg tablet Take 1 tablet (7.5 mg total) by mouth 2 (two) times a day as needed (Anxiety) 60 tablet 1   • Docosahexaenoic Acid (DHA OMEGA 3) 100 MG CAPS Take 6 capsules by mouth daily     • docusate sodium (COLACE) 100 mg capsule Take 1 capsule (100 mg total) by mouth 2 (two) times a day 60 capsule 2   • fexofenadine (ALLEGRA) 180 MG tablet Take 1 tablet by mouth daily as needed     • gabapentin (Neurontin) 100 mg capsule Take 1 capsule (100 mg total) by mouth daily at bedtime 90 capsule 0   • lidocaine (XYLOCAINE) 5 % ointment Apply topically as needed for mild pain 35.44 g 3   • lisinopril (ZESTRIL) 10 mg tablet Take 1 tablet (10 mg total) by mouth daily 90 tablet 0   • metoprolol succinate (TOPROL-XL) 25 mg 24 hr tablet Take 1 tablet (25 mg total) by mouth daily with breakfast 90 tablet 3   • Multiple Vitamin tablet Take 1 tablet by mouth daily     • polyethylene glycol (MIRALAX) 17 g packet Take 17 g by mouth daily Do not start before May 6, 2023.  0   • tiZANidine (ZANAFLEX) 2 mg tablet Take 1 tablet (2 mg total) by mouth 3 (three) times a day (Patient taking differently: Take 2 mg by mouth if needed) 90 tablet 1   • traMADol (ULTRAM) 50 mg tablet Take 50 mg by mouth every 6 (six) hours as needed for moderate pain     • Coenzyme Q10 (CO Q 10 PO) Take by mouth in the morning (Patient not taking: Reported on 8/22/2023)       No current facility-administered medications for this visit. Allergies on file:   Patient has no known allergies. Past Medical History:   Diagnosis Date   • Allergic    • Arthritis    • Asthma    • Cancer ([address])     kidney   • Hypertension    • Impaired fasting glucose    • Mitral valve disorder    • Mitral valve prolapse    • Murmur, cardiac    • 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    • PAC (premature atrial contraction)    • Parsonage-Cordero syndrome    • PVC (premature ventricular contraction)    • PVC (premature ventricular contraction)    • Renal cancer ([address]) 2023   • Thyroid nodule        Past Surgical History:   Procedure Laterality Date   • HAND SURGERY     • SD COLONOSCOPY FLX DX W/COLLJ SPEC WHEN PFRMD N/A 2/9/2018    Procedure: COLONOSCOPY;  Surgeon: Raheem Pat MD;  Location: [facility];   Service: Gastroenterology   • PROSTATE BIOPSY     • SHOULDER SURGERY         Family History   Problem Relation Age of Onset   • Diabetes Mother    • Stroke Mother    • Stroke Father    • Heart disease Father    • Diabetes Sister    • Other Family         Stroke syndrome       Social History     Tobacco Use   • Smoking status: Never   • Smokeless tobacco: Never   Vaping Use   • Vaping Use: Never used   Substance Use Topics   • Alcohol use: Not Currently     Alcohol/week: 3.0 standard drinks of alcohol     Types: 3 Glasses of wine per week     Comment: 4 oz wine with dinner a few days a week   • Drug use: No       Objective:    Vitals:    23 1338   BP: 124/72   Pulse: 64   SpO2: 97%   Weight: 59.4 kg (131 lb)   Height: 5' 6" (1.676 m)       BP Readings from Last 3 Encounters:   23 124/72   23 118/74   23 108/64        Wt Readings from Last 3 Encounters:   23 59.4 kg (131 lb)   23 59.4 kg (131 lb)   23 59.4 kg (131 lb)        Physical Exam      General: NAD, Alert and oriented x3   HEENT: NCAT, EOMI, normal conjunctiva  Cardiovascular: RRR, normal S1 and S2, no m/r/g  Pulmonary: Normal respiratory effort, no wheezes, rales or rhonchi  GI: Soft, nontender, nondistended, normoactive bowel sounds  Neuro: Right upper extremity weakness with fasciculations, ambulating with a cane for support  Extremities: No lower extremity edema  Skin: Normal skin color, no rashes       Preop labs/testing available and reviewed: yes          INR   Date Value Ref Range Status   2023 0.99 0.84 - 1.19 Final           RCRI  High Risk surgery? 1 Point  CAD History:         1 Point   MI; Positive Stress Test; CP due to Mi;  Nitrate Usage to control Angina;  Pathologic Q wave on EKG  CHF Active:         1 Point   Pulm Edema; Paroxysmal Nocturnal Dyspnea;  Bibasilar Rales (crackles);S3; CHF on CXR  Cerebrovascular Disease (TIA or CVA):     1 Point  DM on Insulin:        1 Point  Serum Creat >2.0 mg/dl:       1 Point          Total Points: 1     Scorin: Class I, Very Low Risk (0.4%)     1: Class II, Low risk (0.9%)     2: Class III Moderate (6.6%)     3: Class IV High (>11%)        Assessment/Plan:    Patient is medically optimized (cleared) for the planned procedure. 1. Preop examination  Assessment & Plan:  Patient is approved for the proposed procedure. Preoperative labs reviewed. Patient is however due for a BMP since it has been more than 30 days since his last study. Previous EKG from July reviewed. No additional cardiac work-up indicated. Orders:  -     Basic metabolic panel; Future    2. Right renal mass  Assessment & Plan:  -Presumed to be renal cell carcinoma  -Radical nephrectomy for post      3. Parsonage-Cordero syndrome  Assessment & Plan:  -Appreciate neurology follow-up  -Limited mobility of right arm with right shoulder pain  -Continue tramadol as needed      4. Acute deep vein thrombosis (DVT) of brachial vein of right upper extremity (HCC)  Assessment & Plan:  -Patient's Eliquis has been discontinued per vascular surgery recommendations. He completed a total of 6 weeks of therapy.       5. Essential hypertension  Assessment & Plan:  -I have instructed patient to discontinue his evening dose of lisinopril 10 mg daily since his morning blood pressure readings have been in the low 100s/50s.  -Continue beta-blocker in the perioperative period  -Hold lisinopril the morning of surgery

## 2023-09-13 NOTE — ASSESSMENT & PLAN NOTE
-I have instructed patient to discontinue his evening dose of lisinopril 10 mg daily since his morning blood pressure readings have been in the low 100s/50s.  -Continue beta-blocker in the perioperative period  -Hold lisinopril the morning of surgery

## 2023-09-14 ENCOUNTER — APPOINTMENT (OUTPATIENT)
Dept: LAB | Facility: HOSPITAL | Age: 68
DRG: 660 | End: 2023-09-14
Attending: UROLOGY
Payer: COMMERCIAL

## 2023-09-15 ENCOUNTER — ANESTHESIA EVENT (OUTPATIENT)
Dept: PERIOP | Facility: HOSPITAL | Age: 68
DRG: 660 | End: 2023-09-15
Payer: COMMERCIAL

## 2023-09-15 ENCOUNTER — APPOINTMENT (OUTPATIENT)
Dept: LAB | Facility: MEDICAL CENTER | Age: 68
End: 2023-09-15
Payer: COMMERCIAL

## 2023-09-15 DIAGNOSIS — Z01.818 PREOP EXAMINATION: ICD-10-CM

## 2023-09-15 LAB
ANION GAP SERPL CALCULATED.3IONS-SCNC: 8 MMOL/L
BUN SERPL-MCNC: 14 MG/DL (ref 5–25)
CALCIUM SERPL-MCNC: 9.7 MG/DL (ref 8.4–10.2)
CHLORIDE SERPL-SCNC: 102 MMOL/L (ref 96–108)
CO2 SERPL-SCNC: 28 MMOL/L (ref 21–32)
CREAT SERPL-MCNC: 0.62 MG/DL (ref 0.6–1.3)
GFR SERPL CREATININE-BSD FRML MDRD: 100 ML/MIN/1.73SQ M
GLUCOSE P FAST SERPL-MCNC: 93 MG/DL (ref 65–99)
POTASSIUM SERPL-SCNC: 4 MMOL/L (ref 3.5–5.3)
SODIUM SERPL-SCNC: 138 MMOL/L (ref 135–147)

## 2023-09-15 PROCEDURE — 36415 COLL VENOUS BLD VENIPUNCTURE: CPT

## 2023-09-15 PROCEDURE — 80048 BASIC METABOLIC PNL TOTAL CA: CPT

## 2023-09-15 RX ORDER — OXYCODONE HYDROCHLORIDE 5 MG/1
5 TABLET ORAL EVERY 6 HOURS PRN
COMMUNITY

## 2023-09-15 NOTE — PRE-PROCEDURE INSTRUCTIONS
Pre-Surgery Instructions:   Medication Instructions   • acetaminophen (TYLENOL) 325 mg tablet Uses PRN- OK to take day of surgery   • bisacodyl (DULCOLAX) 5 mg EC tablet Hold day of surgery. • busPIRone (BUSPAR) 7.5 mg tablet Uses PRN- OK to take day of surgery   • Docosahexaenoic Acid (DHA OMEGA 3) 100 MG CAPS Stop taking 5 days prior to surgery. • docusate sodium (COLACE) 100 mg capsule Hold day of surgery. • fexofenadine (ALLEGRA) 180 MG tablet Take day of surgery. • gabapentin (Neurontin) 100 mg capsule Uses PRN- OK to take day of surgery   • lisinopril (ZESTRIL) 10 mg tablet Hold day of surgery. • metoprolol succinate (TOPROL-XL) 25 mg 24 hr tablet Take day of surgery. • Multiple Vitamin tablet Stop taking 5 days prior to surgery. • oxyCODONE (ROXICODONE) 5 immediate release tablet Uses PRN- OK to take day of surgery   • polyethylene glycol (MIRALAX) 17 g packet Hold day of surgery. • tiZANidine (ZANAFLEX) 2 mg tablet Uses PRN- OK to take day of surgery   • traMADol (ULTRAM) 50 mg tablet Uses PRN- OK to take day of surgery      Medication instructions for day surgery reviewed. Please use only a sip of water to take your instructed medications. Avoid all over the counter vitamins, supplements and NSAIDS for one week prior to surgery per anesthesia guidelines. Tylenol is ok to take as needed. You will receive a call one business day prior to surgery with an arrival time and hospital directions. If your surgery is scheduled on a Monday, the hospital will be calling you on the Friday prior to your surgery. If you have not heard from anyone by 8pm, please call the hospital supervisor through the hospital  at [phone]. [place]alli Pace [phone]). Do not eat or drink anything after midnight the night before your surgery, including candy, mints, lifesavers, or chewing gum. Do not drink alcohol 24hrs before your surgery. Try not to smoke at least 24hrs before your surgery.        Follow the pre surgery showering instructions as listed in the [facility] Surgical Experience Booklet” or otherwise provided by your surgeon's office. Do not shave the surgical area 24 hours before surgery. Do not apply any lotions, creams, including makeup, cologne, deodorant, or perfumes after showering on the day of your surgery. No contact lenses, eye make-up, or artificial eyelashes. Remove nail polish, including gel polish, and any artificial, gel, or acrylic nails if possible. Remove all jewelry including rings and body piercing jewelry. Wear causal clothing that is easy to take on and off. Consider your type of surgery. Keep any valuables, jewelry, piercings at home. Please bring any specially ordered equipment (sling, braces) if indicated. Arrange for a responsible person to drive you to and from the hospital on the day of your surgery. Visitor Guidelines discussed. Call the surgeon's office with any new illnesses, exposures, or additional questions prior to surgery. Please reference your [facility] Surgical Experience Booklet” for additional information to prepare for your upcoming surgery. See Geriatric Assessment below. ..    • Lauri Total Score: 18  • PHQ- 9 Depression Scale: 6  • Nutrition Assessment Score:10  • METS:3.63  • Health goals:  -What are your overall health goals? (quit smoking, wt. loss, rest, decrease stress) - Patient wants his overall condition to improve    -What brings you strength? (family, friends, [employer], health) - [employer], wife    -What activities are important to you? (exercise, reading, travel, work) - Likes to read, but because of his condition, it is not always possible.   Has no use of R arm, and little use of L arm

## 2023-09-19 ENCOUNTER — NURSE TRIAGE (OUTPATIENT)
Dept: OTHER | Facility: OTHER | Age: 68
End: 2023-09-19

## 2023-09-19 NOTE — TELEPHONE ENCOUNTER
Regarding: Procedure tomorrow - bowel prep not working  ----- Message from Tyler Deshpande sent at 9/19/2023  6:44 PM EDT -----  " I am supposed to have a procedure tomorrow. I have taken all of my bowel prep medications and so far nothing has happened.  Should I still go to the procedure tomorrow?"

## 2023-09-19 NOTE — TELEPHONE ENCOUNTER
has a nephrectomy scheduled with Dr. Lis King for tomorrow morning. He was instructed to complete a bowel cleanse this evening. He notes he has taken 4 Dulcolax tablets and the entire amount of mirlax. He finished the prep about 6:30 and has had no BM yet. Patient concerned. On call provider notified    Reason for Disposition  • [1] Caller has URGENT medicine question about med that PCP or specialist prescribed AND [2] triager unable to answer question    Answer Assessment - Initial Assessment Questions  1.  NAME of MEDICATION: "What medicine are you calling about?"      Mirlax, dulcolax    Protocols used: MEDICATION QUESTION CALL-ADULT-

## 2023-09-20 ENCOUNTER — ANESTHESIA (OUTPATIENT)
Dept: PERIOP | Facility: HOSPITAL | Age: 68
DRG: 660 | End: 2023-09-20
Payer: COMMERCIAL

## 2023-09-20 ENCOUNTER — HOSPITAL ENCOUNTER (INPATIENT)
Facility: HOSPITAL | Age: 68
LOS: 2 days | Discharge: HOME HEALTH | DRG: 660 | End: 2023-09-22
Attending: UROLOGY | Admitting: UROLOGY
Payer: COMMERCIAL

## 2023-09-20 DIAGNOSIS — G62.9 POLYNEUROPATHY: ICD-10-CM

## 2023-09-20 DIAGNOSIS — C64.1 MALIGNANT NEOPLASM OF RIGHT KIDNEY (HCC): ICD-10-CM

## 2023-09-20 DIAGNOSIS — I10 PRIMARY HYPERTENSION: Primary | ICD-10-CM

## 2023-09-20 DIAGNOSIS — N28.89 RENAL MASS, RIGHT: ICD-10-CM

## 2023-09-20 PROBLEM — C64.9 RENAL CANCER (HCC): Status: ACTIVE | Noted: 2023-01-01

## 2023-09-20 LAB
ANION GAP SERPL CALCULATED.3IONS-SCNC: 5 MMOL/L
BASE EXCESS BLDA CALC-SCNC: 1 MMOL/L (ref -2–3)
BUN SERPL-MCNC: 15 MG/DL (ref 5–25)
CA-I BLD-SCNC: 1.23 MMOL/L (ref 1.12–1.32)
CALCIUM SERPL-MCNC: 8.4 MG/DL (ref 8.4–10.2)
CHLORIDE SERPL-SCNC: 104 MMOL/L (ref 96–108)
CO2 SERPL-SCNC: 28 MMOL/L (ref 21–32)
CREAT SERPL-MCNC: 0.64 MG/DL (ref 0.6–1.3)
ERYTHROCYTE [DISTWIDTH] IN BLOOD BY AUTOMATED COUNT: 12.4 % (ref 11.6–15.1)
GFR SERPL CREATININE-BSD FRML MDRD: 99 ML/MIN/1.73SQ M
GLUCOSE SERPL-MCNC: 155 MG/DL (ref 65–140)
GLUCOSE SERPL-MCNC: 178 MG/DL (ref 65–140)
HCO3 BLDA-SCNC: 26.8 MMOL/L (ref 22–28)
HCT VFR BLD AUTO: 38.3 % (ref 36.5–49.3)
HCT VFR BLD CALC: 35 % (ref 36.5–49.3)
HGB BLD-MCNC: 13.1 G/DL (ref 12–17)
HGB BLDA-MCNC: 11.9 G/DL (ref 12–17)
MCH RBC QN AUTO: 30.3 PG (ref 26.8–34.3)
MCHC RBC AUTO-ENTMCNC: 34.2 G/DL (ref 31.4–37.4)
MCV RBC AUTO: 89 FL (ref 82–98)
PCO2 BLD: 28 MMOL/L (ref 21–32)
PCO2 BLD: 48.1 MM HG (ref 36–44)
PH BLD: 7.35 [PH] (ref 7.35–7.45)
PLATELET # BLD AUTO: 218 THOUSANDS/UL (ref 149–390)
PMV BLD AUTO: 8.8 FL (ref 8.9–12.7)
PO2 BLD: >400 MM HG (ref 75–129)
POTASSIUM BLD-SCNC: 4.2 MMOL/L (ref 3.5–5.3)
POTASSIUM SERPL-SCNC: 3.9 MMOL/L (ref 3.5–5.3)
RBC # BLD AUTO: 4.33 MILLION/UL (ref 3.88–5.62)
SODIUM BLD-SCNC: 135 MMOL/L (ref 136–145)
SODIUM SERPL-SCNC: 137 MMOL/L (ref 135–147)
SPECIMEN SOURCE: ABNORMAL
WBC # BLD AUTO: 11.75 THOUSAND/UL (ref 4.31–10.16)

## 2023-09-20 PROCEDURE — 88342 IMHCHEM/IMCYTCHM 1ST ANTB: CPT | Performed by: PATHOLOGY

## 2023-09-20 PROCEDURE — 82803 BLOOD GASES ANY COMBINATION: CPT

## 2023-09-20 PROCEDURE — 84132 ASSAY OF SERUM POTASSIUM: CPT

## 2023-09-20 PROCEDURE — 50545 LAPARO RADICAL NEPHRECTOMY: CPT | Performed by: PHYSICIAN ASSISTANT

## 2023-09-20 PROCEDURE — S2900 ROBOTIC SURGICAL SYSTEM: HCPCS | Performed by: UROLOGY

## 2023-09-20 PROCEDURE — 88341 IMHCHEM/IMCYTCHM EA ADD ANTB: CPT | Performed by: PATHOLOGY

## 2023-09-20 PROCEDURE — 50545 LAPARO RADICAL NEPHRECTOMY: CPT | Performed by: UROLOGY

## 2023-09-20 PROCEDURE — 88307 TISSUE EXAM BY PATHOLOGIST: CPT | Performed by: PATHOLOGY

## 2023-09-20 PROCEDURE — 84295 ASSAY OF SERUM SODIUM: CPT

## 2023-09-20 PROCEDURE — NC001 PR NO CHARGE: Performed by: PHYSICIAN ASSISTANT

## 2023-09-20 PROCEDURE — 86923 COMPATIBILITY TEST ELECTRIC: CPT

## 2023-09-20 PROCEDURE — 85014 HEMATOCRIT: CPT

## 2023-09-20 PROCEDURE — 80048 BASIC METABOLIC PNL TOTAL CA: CPT | Performed by: UROLOGY

## 2023-09-20 PROCEDURE — 0DNW4ZZ RELEASE PERITONEUM, PERCUTANEOUS ENDOSCOPIC APPROACH: ICD-10-PCS | Performed by: UROLOGY

## 2023-09-20 PROCEDURE — 0TT04ZZ RESECTION OF RIGHT KIDNEY, PERCUTANEOUS ENDOSCOPIC APPROACH: ICD-10-PCS | Performed by: UROLOGY

## 2023-09-20 PROCEDURE — 99222 1ST HOSP IP/OBS MODERATE 55: CPT | Performed by: INTERNAL MEDICINE

## 2023-09-20 PROCEDURE — 82330 ASSAY OF CALCIUM: CPT

## 2023-09-20 PROCEDURE — 82947 ASSAY GLUCOSE BLOOD QUANT: CPT

## 2023-09-20 PROCEDURE — 8E0W4CZ ROBOTIC ASSISTED PROCEDURE OF TRUNK REGION, PERCUTANEOUS ENDOSCOPIC APPROACH: ICD-10-PCS | Performed by: UROLOGY

## 2023-09-20 PROCEDURE — 85027 COMPLETE CBC AUTOMATED: CPT | Performed by: UROLOGY

## 2023-09-20 RX ORDER — ONDANSETRON 2 MG/ML
4 INJECTION INTRAMUSCULAR; INTRAVENOUS ONCE AS NEEDED
Status: DISCONTINUED | OUTPATIENT
Start: 2023-09-20 | End: 2023-09-20 | Stop reason: HOSPADM

## 2023-09-20 RX ORDER — ONDANSETRON 2 MG/ML
4 INJECTION INTRAMUSCULAR; INTRAVENOUS EVERY 6 HOURS PRN
Status: DISCONTINUED | OUTPATIENT
Start: 2023-09-20 | End: 2023-09-22 | Stop reason: HOSPADM

## 2023-09-20 RX ORDER — ROCURONIUM BROMIDE 10 MG/ML
INJECTION, SOLUTION INTRAVENOUS AS NEEDED
Status: DISCONTINUED | OUTPATIENT
Start: 2023-09-20 | End: 2023-09-20

## 2023-09-20 RX ORDER — CEFAZOLIN SODIUM 2 G/50ML
2000 SOLUTION INTRAVENOUS ONCE
Status: COMPLETED | OUTPATIENT
Start: 2023-09-20 | End: 2023-09-20

## 2023-09-20 RX ORDER — CALCIUM CARBONATE 500 MG/1
1000 TABLET, CHEWABLE ORAL DAILY PRN
Status: DISCONTINUED | OUTPATIENT
Start: 2023-09-20 | End: 2023-09-22 | Stop reason: HOSPADM

## 2023-09-20 RX ORDER — ACETAMINOPHEN 325 MG/1
650 TABLET ORAL EVERY 6 HOURS SCHEDULED
Status: DISCONTINUED | OUTPATIENT
Start: 2023-09-20 | End: 2023-09-22 | Stop reason: HOSPADM

## 2023-09-20 RX ORDER — SODIUM CHLORIDE, SODIUM LACTATE, POTASSIUM CHLORIDE, CALCIUM CHLORIDE 600; 310; 30; 20 MG/100ML; MG/100ML; MG/100ML; MG/100ML
INJECTION, SOLUTION INTRAVENOUS CONTINUOUS PRN
Status: DISCONTINUED | OUTPATIENT
Start: 2023-09-20 | End: 2023-09-20

## 2023-09-20 RX ORDER — TIZANIDINE 4 MG/1
2 TABLET ORAL 3 TIMES DAILY PRN
Status: DISCONTINUED | OUTPATIENT
Start: 2023-09-20 | End: 2023-09-22 | Stop reason: HOSPADM

## 2023-09-20 RX ORDER — DOCUSATE SODIUM 100 MG/1
100 CAPSULE, LIQUID FILLED ORAL 2 TIMES DAILY
Status: DISCONTINUED | OUTPATIENT
Start: 2023-09-20 | End: 2023-09-22 | Stop reason: HOSPADM

## 2023-09-20 RX ORDER — ENOXAPARIN SODIUM 100 MG/ML
40 INJECTION SUBCUTANEOUS DAILY
Status: DISCONTINUED | OUTPATIENT
Start: 2023-09-21 | End: 2023-09-22 | Stop reason: HOSPADM

## 2023-09-20 RX ORDER — PANTOPRAZOLE SODIUM 40 MG/1
40 TABLET, DELAYED RELEASE ORAL DAILY
Status: DISCONTINUED | OUTPATIENT
Start: 2023-09-20 | End: 2023-09-22 | Stop reason: HOSPADM

## 2023-09-20 RX ORDER — EPHEDRINE SULFATE 50 MG/ML
INJECTION INTRAVENOUS AS NEEDED
Status: DISCONTINUED | OUTPATIENT
Start: 2023-09-20 | End: 2023-09-20

## 2023-09-20 RX ORDER — OXYCODONE HYDROCHLORIDE 5 MG/1
5 TABLET ORAL EVERY 4 HOURS PRN
Status: DISCONTINUED | OUTPATIENT
Start: 2023-09-20 | End: 2023-09-22 | Stop reason: HOSPADM

## 2023-09-20 RX ORDER — CHLORHEXIDINE GLUCONATE ORAL RINSE 1.2 MG/ML
15 SOLUTION DENTAL ONCE
Status: DISCONTINUED | OUTPATIENT
Start: 2023-09-20 | End: 2023-09-20

## 2023-09-20 RX ORDER — GABAPENTIN 100 MG/1
100 CAPSULE ORAL
Status: DISCONTINUED | OUTPATIENT
Start: 2023-09-20 | End: 2023-09-22 | Stop reason: HOSPADM

## 2023-09-20 RX ORDER — HYDROMORPHONE HYDROCHLORIDE 2 MG/ML
INJECTION, SOLUTION INTRAMUSCULAR; INTRAVENOUS; SUBCUTANEOUS AS NEEDED
Status: DISCONTINUED | OUTPATIENT
Start: 2023-09-20 | End: 2023-09-20

## 2023-09-20 RX ORDER — FENTANYL CITRATE 50 UG/ML
INJECTION, SOLUTION INTRAMUSCULAR; INTRAVENOUS AS NEEDED
Status: DISCONTINUED | OUTPATIENT
Start: 2023-09-20 | End: 2023-09-20

## 2023-09-20 RX ORDER — HYDROMORPHONE HCL/PF 1 MG/ML
0.5 SYRINGE (ML) INJECTION
Status: COMPLETED | OUTPATIENT
Start: 2023-09-20 | End: 2023-09-20

## 2023-09-20 RX ORDER — SODIUM CHLORIDE 9 MG/ML
100 INJECTION, SOLUTION INTRAVENOUS CONTINUOUS
Status: DISCONTINUED | OUTPATIENT
Start: 2023-09-20 | End: 2023-09-22

## 2023-09-20 RX ORDER — METOPROLOL SUCCINATE 25 MG/1
25 TABLET, EXTENDED RELEASE ORAL
Status: DISCONTINUED | OUTPATIENT
Start: 2023-09-21 | End: 2023-09-22 | Stop reason: HOSPADM

## 2023-09-20 RX ORDER — ONDANSETRON 2 MG/ML
INJECTION INTRAMUSCULAR; INTRAVENOUS AS NEEDED
Status: DISCONTINUED | OUTPATIENT
Start: 2023-09-20 | End: 2023-09-20

## 2023-09-20 RX ORDER — FENTANYL CITRATE/PF 50 MCG/ML
25 SYRINGE (ML) INJECTION
Status: COMPLETED | OUTPATIENT
Start: 2023-09-20 | End: 2023-09-20

## 2023-09-20 RX ORDER — FENTANYL CITRATE/PF 50 MCG/ML
25 SYRINGE (ML) INJECTION
Status: DISCONTINUED | OUTPATIENT
Start: 2023-09-20 | End: 2023-09-20 | Stop reason: HOSPADM

## 2023-09-20 RX ORDER — DEXAMETHASONE SODIUM PHOSPHATE 10 MG/ML
INJECTION, SOLUTION INTRAMUSCULAR; INTRAVENOUS AS NEEDED
Status: DISCONTINUED | OUTPATIENT
Start: 2023-09-20 | End: 2023-09-20

## 2023-09-20 RX ORDER — HYDROMORPHONE HCL/PF 1 MG/ML
0.5 SYRINGE (ML) INJECTION EVERY 2 HOUR PRN
Status: DISCONTINUED | OUTPATIENT
Start: 2023-09-20 | End: 2023-09-22 | Stop reason: HOSPADM

## 2023-09-20 RX ORDER — MAGNESIUM HYDROXIDE 1200 MG/15ML
LIQUID ORAL AS NEEDED
Status: DISCONTINUED | OUTPATIENT
Start: 2023-09-20 | End: 2023-09-20 | Stop reason: HOSPADM

## 2023-09-20 RX ORDER — METOCLOPRAMIDE HYDROCHLORIDE 5 MG/ML
10 INJECTION INTRAMUSCULAR; INTRAVENOUS ONCE AS NEEDED
Status: DISCONTINUED | OUTPATIENT
Start: 2023-09-20 | End: 2023-09-20 | Stop reason: HOSPADM

## 2023-09-20 RX ORDER — LIDOCAINE HYDROCHLORIDE 10 MG/ML
INJECTION, SOLUTION EPIDURAL; INFILTRATION; INTRACAUDAL; PERINEURAL AS NEEDED
Status: DISCONTINUED | OUTPATIENT
Start: 2023-09-20 | End: 2023-09-20

## 2023-09-20 RX ORDER — BUPIVACAINE HYDROCHLORIDE 2.5 MG/ML
INJECTION, SOLUTION EPIDURAL; INFILTRATION; INTRACAUDAL AS NEEDED
Status: DISCONTINUED | OUTPATIENT
Start: 2023-09-20 | End: 2023-09-20 | Stop reason: HOSPADM

## 2023-09-20 RX ORDER — PROPOFOL 10 MG/ML
INJECTION, EMULSION INTRAVENOUS AS NEEDED
Status: DISCONTINUED | OUTPATIENT
Start: 2023-09-20 | End: 2023-09-20

## 2023-09-20 RX ORDER — BUSPIRONE HYDROCHLORIDE 5 MG/1
7.5 TABLET ORAL 2 TIMES DAILY PRN
Status: DISCONTINUED | OUTPATIENT
Start: 2023-09-20 | End: 2023-09-22 | Stop reason: HOSPADM

## 2023-09-20 RX ADMIN — FENTANYL CITRATE 25 MCG: 50 INJECTION INTRAMUSCULAR; INTRAVENOUS at 12:57

## 2023-09-20 RX ADMIN — SUGAMMADEX 200 MG: 100 INJECTION, SOLUTION INTRAVENOUS at 11:44

## 2023-09-20 RX ADMIN — ROCURONIUM BROMIDE 10 MG: 10 INJECTION, SOLUTION INTRAVENOUS at 10:34

## 2023-09-20 RX ADMIN — FENTANYL CITRATE 25 MCG: 50 INJECTION INTRAMUSCULAR; INTRAVENOUS at 12:49

## 2023-09-20 RX ADMIN — HYDROMORPHONE HYDROCHLORIDE 0.5 MG: 1 INJECTION, SOLUTION INTRAMUSCULAR; INTRAVENOUS; SUBCUTANEOUS at 12:31

## 2023-09-20 RX ADMIN — DEXAMETHASONE SODIUM PHOSPHATE 10 MG: 10 INJECTION, SOLUTION INTRAMUSCULAR; INTRAVENOUS at 08:59

## 2023-09-20 RX ADMIN — OXYCODONE HYDROCHLORIDE 5 MG: 5 TABLET ORAL at 20:06

## 2023-09-20 RX ADMIN — FENTANYL CITRATE 25 MCG: 50 INJECTION INTRAMUSCULAR; INTRAVENOUS at 12:52

## 2023-09-20 RX ADMIN — FENTANYL CITRATE 25 MCG: 50 INJECTION INTRAMUSCULAR; INTRAVENOUS at 12:16

## 2023-09-20 RX ADMIN — ROCURONIUM BROMIDE 40 MG: 10 INJECTION, SOLUTION INTRAVENOUS at 08:39

## 2023-09-20 RX ADMIN — FENTANYL CITRATE 25 MCG: 50 INJECTION INTRAMUSCULAR; INTRAVENOUS at 12:13

## 2023-09-20 RX ADMIN — HYDROMORPHONE HYDROCHLORIDE 0.5 MG: 1 INJECTION, SOLUTION INTRAMUSCULAR; INTRAVENOUS; SUBCUTANEOUS at 12:26

## 2023-09-20 RX ADMIN — ROCURONIUM BROMIDE 20 MG: 10 INJECTION, SOLUTION INTRAVENOUS at 09:34

## 2023-09-20 RX ADMIN — FENTANYL CITRATE 100 MCG: 50 INJECTION, SOLUTION INTRAMUSCULAR; INTRAVENOUS at 08:37

## 2023-09-20 RX ADMIN — PROPOFOL 120 MG: 10 INJECTION, EMULSION INTRAVENOUS at 08:37

## 2023-09-20 RX ADMIN — HYDROMORPHONE HYDROCHLORIDE 0.5 MG: 2 INJECTION, SOLUTION INTRAMUSCULAR; INTRAVENOUS; SUBCUTANEOUS at 11:15

## 2023-09-20 RX ADMIN — HYDROMORPHONE HYDROCHLORIDE 0.5 MG: 1 INJECTION, SOLUTION INTRAMUSCULAR; INTRAVENOUS; SUBCUTANEOUS at 21:41

## 2023-09-20 RX ADMIN — PROPOFOL 30 MG: 10 INJECTION, EMULSION INTRAVENOUS at 08:39

## 2023-09-20 RX ADMIN — HYDROMORPHONE HYDROCHLORIDE 0.5 MG: 1 INJECTION, SOLUTION INTRAMUSCULAR; INTRAVENOUS; SUBCUTANEOUS at 13:50

## 2023-09-20 RX ADMIN — SODIUM CHLORIDE 100 ML/HR: 0.9 INJECTION, SOLUTION INTRAVENOUS at 13:53

## 2023-09-20 RX ADMIN — FENTANYL CITRATE 25 MCG: 50 INJECTION INTRAMUSCULAR; INTRAVENOUS at 13:05

## 2023-09-20 RX ADMIN — PHENYLEPHRINE HYDROCHLORIDE 50 MCG/MIN: 10 INJECTION INTRAVENOUS at 09:21

## 2023-09-20 RX ADMIN — FENTANYL CITRATE 25 MCG: 50 INJECTION INTRAMUSCULAR; INTRAVENOUS at 12:19

## 2023-09-20 RX ADMIN — HYDROMORPHONE HYDROCHLORIDE 0.5 MG: 1 INJECTION, SOLUTION INTRAMUSCULAR; INTRAVENOUS; SUBCUTANEOUS at 13:43

## 2023-09-20 RX ADMIN — ROCURONIUM BROMIDE 20 MG: 10 INJECTION, SOLUTION INTRAVENOUS at 10:13

## 2023-09-20 RX ADMIN — ONDANSETRON 4 MG: 2 INJECTION INTRAMUSCULAR; INTRAVENOUS at 11:15

## 2023-09-20 RX ADMIN — LIDOCAINE HYDROCHLORIDE 50 MG: 10 INJECTION, SOLUTION EPIDURAL; INFILTRATION; INTRACAUDAL; PERINEURAL at 08:37

## 2023-09-20 RX ADMIN — EPHEDRINE SULFATE 10 MG: 50 INJECTION INTRAVENOUS at 10:07

## 2023-09-20 RX ADMIN — SODIUM CHLORIDE, SODIUM LACTATE, POTASSIUM CHLORIDE, AND CALCIUM CHLORIDE: .6; .31; .03; .02 INJECTION, SOLUTION INTRAVENOUS at 08:33

## 2023-09-20 RX ADMIN — ROCURONIUM BROMIDE 10 MG: 10 INJECTION, SOLUTION INTRAVENOUS at 10:55

## 2023-09-20 RX ADMIN — CEFAZOLIN SODIUM 1000 MG: 2 SOLUTION INTRAVENOUS at 09:09

## 2023-09-20 RX ADMIN — FENTANYL CITRATE 25 MCG: 50 INJECTION INTRAMUSCULAR; INTRAVENOUS at 12:09

## 2023-09-20 RX ADMIN — SODIUM CHLORIDE 100 ML/HR: 0.9 INJECTION, SOLUTION INTRAVENOUS at 21:23

## 2023-09-20 RX ADMIN — GABAPENTIN 100 MG: 100 CAPSULE ORAL at 21:21

## 2023-09-20 NOTE — PLAN OF CARE
Problem: SAFETY ADULT  Goal: Maintain or return to baseline ADL function  Description: INTERVENTIONS:  -  Assess patient's ability to carry out ADLs; assess patient's baseline for ADL function and identify physical deficits which impact ability to perform ADLs (bathing, care of mouth/teeth, toileting, grooming, dressing, etc.)  - Assess/evaluate cause of self-care deficits   - Assess range of motion  - Assess patient's mobility; develop plan if impaired  - Assess patient's need for assistive devices and provide as appropriate  - Encourage maximum independence but intervene and supervise when necessary  - Involve family in performance of ADLs  - Assess for home care needs following discharge   - Consider OT consult to assist with ADL evaluation and planning for discharge  - Provide patient education as appropriate  Outcome: Progressing     Problem: PAIN - ADULT  Goal: Verbalizes/displays adequate comfort level or baseline comfort level  Description: Interventions:  - Encourage patient to monitor pain and request assistance  - Assess pain using appropriate pain scale  - Administer analgesics based on type and severity of pain and evaluate response  - Implement non-pharmacological measures as appropriate and evaluate response  - Consider cultural and social influences on pain and pain management  - Notify physician/advanced practitioner if interventions unsuccessful or patient reports new pain  Outcome: Progressing

## 2023-09-20 NOTE — OP NOTE
OPERATIVE REPORT  PATIENT NAME: Lillie Cabral    :  1955  MRN: [id number]  Pt Location: [facility] OR ROOM 14    SURGERY DATE: 2023    Surgeon(s) and Role:     * Lissett Delong MD - Primary     * Kelly Sal PA-C - Assisting  NOTE:  There were no qualified teaching residents to assist with this case    *The physician assistant was medically necessary and integral during the entire procedure to help maintain retraction irrigation suction and instrument insertion during the procedure      Preop Diagnosis:  Renal mass, right [N28.89]    Post-Op Diagnosis Codes:     * Renal mass, right [N28.89]    Procedure(s):  Right - NEPHRECTOMY RADICAL LAPAROSCOPIC W/ ROBOTICS    Specimen(s):  ID Type Source Tests Collected by Time Destination   1 :  Tissue Kidney, Right TISSUE EXAM Lissett Delong MD 2023 1110        Estimated Blood Loss:   150 mL    Drains:  Urethral Catheter Double-lumen; Latex 16 Fr. (Active)   Number of days: 0       Anesthesia Type:   General    Operative Indications:  Renal mass, right [N28.89]      Operative Findings:  Very large liver, adhesed to peritoneum in multiple locations. Right lower quadrant adhesions. Duplicated renal hilum. Complications:   None    Procedure and Technique:  The patient was identified, brought to the operating room and placed on the table in supine position. After induction of general anesthesia, a Robin catheter was placed and he was placed in the left lateral decubitus position. Gel rolls were placed behind the shoulder and hip. Axillary roll was placed. He was secured to the table with foam towels and cloth tape. Right arm placed over left and secured with lateral arm positioner. All joints and pressure points were padded with foam padding. He was prepped and draped in the usual sterile fashion. A formal timeout was performed. Veress needle was placed in the abdominal cavity.  The abdomen was insufflated to 15 mmHg pressure. A line was drawn on the abdomen along the right midclavicular line, and spacing determined for port placement. An 8 mm port was placed and the robotic laparoscope was placed. Abdominal adhesions along the right lower quadrant noted. Three additional 8 mm robotic ports and a 12 mm assistant port were then placed under direct vision. A 5 mm subxiphoid port was placed for the liver retraction instrument. The robot was then docked. From the robotic console I took down the hepatic flexure of the right colon with the cautery as well as a lower quadrant adhesion. The colon was reflected medially. The liver was very large, extending down to almost the anterior superior iliac spine. Significant dissection was required under the liver to release adhesions to the peritoneum and retract the liver somewhat. Even with complete release, including triangular ligament, liver could only be partially retracted enough to expose the lower and mid poles of the kidney. The duodenum was identified and carefully Kocherized. The vena cava was identified. Gerota's fascia was then entered. There was minimal to Gerota's fat. The ureter was identified doubly clipped and divided. Dissection continued superiorly. Duplicated hilum was identified with 2 sets of vein and artery. The lower pole portion was identified and stapled off. The main renal hilum was then identified. This was dissected out. There was some bleeding noted from a branch of the vein. The vascular staple load was then placed and this was controlled by stapling off the main artery and vein. There was good hemostasis at this point. Dissection continued around the upper pole of the kidney sparing the adrenal gland. The kidney was completely freed and placed in specimen bag. 20 mL of Surgiflow were placed covering both the hilum and the renal repair. Incision was then made connecting 2 of the right-sided port sites into the abdomen.   All ports were then removed. The specimen bag was retrieved. The assistant port was closed with Vicryl fascial suture. The primary incision was closed with running PDS suture in 2 layers. The incisions were all irrigated. All skin incisions were closed with 4-0 Monocryl subcuticular stitch and histoacryl. The patient tolerated the procedure well and was transferred to recovery room awake alert and in stable condition. All counts were correct at the end of the case ×2. I was present for the entire procedure.     Patient Disposition:  PACU         SIGNATURE: Eliseo Fowler MD  DATE: September 20, 2023  TIME: 12:06 PM

## 2023-09-20 NOTE — TELEPHONE ENCOUNTER
Per on call-   No need to cancel surgery at this time, patient may still have a BM overnight      Patient was advised of the above. Patient will call back early tomorrow morning if he still has not produced a BM.

## 2023-09-20 NOTE — RESPIRATORY THERAPY NOTE
RT Protocol Note  Belinda Chandler 77 y.o. male MRN: [id number]  Unit/Bed#: -01 Encounter: [id number]       09/20/23 1500   Respiratory Protocol   Protocol Initiated? Yes   Protocol Selection Respiratory   Language Barrier? No   Medical & Social History Reviewed? Yes   Diagnostic Studies Reviewed? Yes   Physical Assessment Performed? Yes   Respiratory Plan Discontinue Protocol   Respiratory Assessment   Assessment Type Assess only   General Appearance Drowsy   Respiratory Pattern Normal   Chest Assessment Chest expansion symmetrical   Bilateral Breath Sounds Clear   Location Specific No   Cough None   Resp Comments 77y.o. year old male who was admitted to [address] [place] [address] after presenting for elective right radical nephrectomy due to renal mass. No COPD, Resp Dxs or cigarette usage in this pt's hs. Last radiologic exam of the chest was from May and noted the following:  "Cyst along the posterior aspect of the right thyroid lobe measures 1.6 x 1.2 cm, unchanged from May 31, 2013."  No resp meds taken at home. Doing well on room air post. Will d/c RCP at this time.    O2 Device Room air         Assessment    Principal Problem:    Renal cancer ([address])  Active Problems:    HTN (hypertension)      Home Pulmonary Medications:  None       Past Medical History:   Diagnosis Date    Allergic     Arthritis     Asthma     Cancer ([address])     kidney    Hypertension     Impaired fasting glucose     Mitral valve disorder     Mitral valve prolapse     Murmur, cardiac     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     PVC (premature ventricular contraction)     PVC (premature ventricular contraction)     Renal cancer ([address]) 2023    Thyroid nodule      Social History     Socioeconomic History    Marital status: /Civil Union     Spouse name: None    Number of children: None    Years of education: None    Highest education level: None Occupational History    None   Tobacco Use    Smoking status: Never    Smokeless tobacco: Never   Vaping Use    Vaping Use: Never used   Substance and Sexual Activity    Alcohol use: Not Currently     Alcohol/week: 3.0 standard drinks of alcohol     Types: 3 Glasses of wine per week     Comment: 4 oz wine with dinner a few days a week    Drug use: No    Sexual activity: Yes     Partners: Female     Birth control/protection: None   Other Topics Concern    None   Social History Narrative    None     Social Determinants of Health     Financial Resource Strain: Low Risk  (8/15/2022)    Overall Financial Resource Strain (CARDIA)     Difficulty of Paying Living Expenses: Not very hard   Food Insecurity: No Food Insecurity (8/6/2023)    Hunger Vital Sign     Worried About Running Out of Food in the Last Year: Never true     Ran Out of Food in the Last Year: Never true   Transportation Needs: No Transportation Needs (8/6/2023)    PRAPARE - Transportation     Lack of Transportation (Medical): No     Lack of Transportation (Non-Medical): No   Physical Activity: Not on file   Stress: Not on file   Social Connections: Not on file   Intimate Partner Violence: Not on file   Housing Stability: Unknown (8/6/2023)    Housing Stability Vital Sign     Unable to Pay for Housing in the Last Year: No     Number of Places Lived in the Last Year: Not on file     Unstable Housing in the Last Year: No       Subjective         Objective    Physical Exam:   Assessment Type: Assess only  General Appearance: Drowsy  Respiratory Pattern: Normal  Chest Assessment: Chest expansion symmetrical  Bilateral Breath Sounds: Clear  Location Specific: No  Cough: None  O2 Device: Room air    Vitals:  Blood pressure 123/64, pulse 64, temperature 97.6 °F (36.4 °C), resp. rate 18, height 5' 6" (1.676 m), weight 59.4 kg (131 lb), SpO2 95 %. Imaging and other studies:  See notation below.     O2 Device: Room air     Plan    Respiratory Plan: Discontinue Protocol        Resp Comments: 77y.o. year old male who was admitted to [facility] after presenting for elective right radical nephrectomy due to renal mass. No COPD, Resp Dxs or cigarette usage in this pt's hs. Last radiologic exam of the chest was from May and noted the following:  "Cyst along the posterior aspect of the right thyroid lobe measures 1.6 x 1.2 cm, unchanged from May 31, 2013."  No resp meds taken at home. Doing well on room air post. Will d/c RCP at this time.

## 2023-09-20 NOTE — ANESTHESIA POSTPROCEDURE EVALUATION
Post-Op Assessment Note    CV Status:  Stable  Pain Score: 6    Pain management: adequate (still has shoulder pain)     Mental Status:  Awake and arousable   Hydration Status:  Euvolemic   PONV Controlled:  Controlled   Airway Patency:  Patent   Two or more mitigation strategies used for obstructive sleep apnea   Post Op Vitals Reviewed: Yes      Staff: Anesthesiologist   Reason for prolonged intubation > 24 hours:  N/aReason for prolonged intubation > 48 hours:  N/a      No notable events documented.     BP   137/78   Temp   97.1   Pulse 67   Resp 15   SpO2 97%

## 2023-09-20 NOTE — ANESTHESIA PROCEDURE NOTES
Arterial Line Insertion    Performed by: Santa Campos MD  Authorized by: Santa Campos MD  Consent: Verbal consent obtained. Risks and benefits: risks, benefits and alternatives were discussed  Consent given by: patient  Patient identity confirmed: arm band and verbally with patient  Preparation: Patient was prepped and draped in the usual sterile fashion.   Indications: hemodynamic monitoring  Orientation:  Left  Location: radial artery  Procedure Details:  Needle gauge: 20  Seldinger technique: Seldinger technique used  Number of attempts: 2    Post-procedure:  Post-procedure: dressing applied  Waveform: good waveform  Patient tolerance: Patient tolerated the procedure well with no immediate complications

## 2023-09-20 NOTE — INTERVAL H&P NOTE
H&P reviewed. After examining the patient I find no changes in the patients condition since the H&P had been written. Vitals:    09/20/23 0716   BP: 154/77   Pulse: 66   Resp: 18   Temp: 97.8 °F (36.6 °C)   SpO2: 99%     Proceed with right nephrectomy, robotic possible open.

## 2023-09-20 NOTE — CONSULTS
Consultation - Nephrology   Vee Rehman 77 y.o. male MRN: [id number]  Unit/Bed#: -01 Encounter: [id number]    ASSESSMENT:  1. Perioperative Management- to prevent significant KRISHNA  - hold lisinopril  - avoid hypotension  - agree with IVF  - creatinine 0.6 prior to surgery  - AM BMP to continue to monitor  - expect creatinine to rise tomorrow due to loss of renal mass  2. Hypertension- BP acceptable currently  - hold lisinopril  - continue metoprolol  3. Right Renal Mass- s/p right laparoscopic radical nephrectomy    PLAN:  Hold lisinopril  Agree with IVF  AM BMP  Avoid hypotension, IV contrast, nephrotoxins, NSAIDs    HISTORY OF PRESENT ILLNESS:  Requesting Physician: Claudeen Acres, MD  Reason for Consult: perioperative management    Vee Rehman is a 77y.o. year old male who was admitted to [address] [place] [address] after presenting for elective right radical nephrectomy due to renal mass. A renal consultation is requested today for assistance of perioperative management s/p nephrectomy. The patient is sleepy. He has no history of kidney problems. His wife states that he has some LE edema which has been ongoing prior to the surgery the past few weeks. He has no SOB. He does not take NSAIDs. He has been eating okay but anxious about the surgery so hasn't had an appetite the past few days.        PAST MEDICAL HISTORY:  Past Medical History:   Diagnosis Date   • Allergic    • Arthritis    • Asthma    • Cancer ([address] [facility])     kidney   • Hypertension    • Impaired fasting glucose    • Mitral valve disorder    • Mitral valve prolapse    • Murmur, cardiac    • 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    • PAC (premature atrial contraction)    • Parsonage-Cordero syndrome    • PVC (premature ventricular contraction)    • PVC (premature ventricular contraction)    • Renal cancer ([address] ) 2023   • Thyroid nodule        PAST SURGICAL HISTORY:  Past Surgical History:   Procedure Laterality Date   • HAND SURGERY     • NM COLONOSCOPY FLX DX W/COLLJ SPEC WHEN PFRMD N/A 2/9/2018    Procedure: COLONOSCOPY;  Surgeon: Brett Weathers MD;  Location: [facility];   Service: Gastroenterology   • PROSTATE BIOPSY     • SHOULDER SURGERY         ALLERGIES:  No Known Allergies    SOCIAL HISTORY:  Social History     Substance and Sexual Activity   Alcohol Use Not Currently   • Alcohol/week: 3.0 standard drinks of alcohol   • Types: 3 Glasses of wine per week    Comment: 4 oz wine with dinner a few days a week     Social History     Substance and Sexual Activity   Drug Use No     Social History     Tobacco Use   Smoking Status Never   Smokeless Tobacco Never       FAMILY HISTORY:  Family History   Problem Relation Age of Onset   • Diabetes Mother    • Stroke Mother    • Stroke Father    • Heart disease Father    • Diabetes Sister    • Other Family         Stroke syndrome       MEDICATIONS:    Current Facility-Administered Medications:   •  acetaminophen (TYLENOL) tablet 650 mg, 650 mg, Oral, Q6H [address], Yamile Groves MD  •  busPIRone (BUSPAR) tablet 7.5 mg, 7.5 mg, Oral, BID PRN, Yamile Groves MD  •  calcium carbonate (TUMS) chewable tablet 1,000 mg, 1,000 mg, Oral, Daily PRN, Yamile Groves MD  •  docusate sodium (COLACE) capsule 100 mg, 100 mg, Oral, BID, Yamile Groves MD  •  [START ON 9/21/2023] enoxaparin (LOVENOX) subcutaneous injection 40 mg, 40 mg, Subcutaneous, Daily, Yamile Groves MD  •  gabapentin (NEURONTIN) capsule 100 mg, 100 mg, Oral, HS, Yamile Groves MD  •  HYDROmorphone (DILAUDID) injection 0.5 mg, 0.5 mg, Intravenous, Q2H PRN, Yamile Groves MD  •  lactated ringers bolus 1,000 mL, 1,000 mL, Intravenous, Once PRN **AND** lactated ringers bolus 1,000 mL, 1,000 mL, Intravenous, Once PRN, Yamile Groves MD  •  [START ON 9/21/2023] metoprolol succinate (TOPROL-XL) 24 hr tablet 25 mg, 25 mg, Oral, Daily With Breakfast, Yamile Groves MD  •  ondansetron (ZOFRAN) injection 4 mg, 4 mg, Intravenous, Q6H PRN, Juan Marie MD  •  oxyCODONE (ROXICODONE) split tablet 2.5 mg, 2.5 mg, Oral, Q4H PRN **OR** oxyCODONE (ROXICODONE) IR tablet 5 mg, 5 mg, Oral, Q4H PRN, Juan Marie MD  •  pantoprazole (PROTONIX) EC tablet 40 mg, 40 mg, Oral, Daily, Juan Marie MD  •  sodium chloride 0.9 % bolus 1,000 mL, 1,000 mL, Intravenous, Once PRN **AND** sodium chloride 0.9 % bolus 1,000 mL, 1,000 mL, Intravenous, Once PRN, Juan Marie MD  •  sodium chloride 0.9 % infusion, 100 mL/hr, Intravenous, Continuous, Juan Marie MD, Last Rate: 100 mL/hr at 09/20/23 1353, 100 mL/hr at 09/20/23 1353  •  tiZANidine (ZANAFLEX) tablet 2 mg, 2 mg, Oral, TID PRN, Juan Marie MD    REVIEW OF SYSTEMS:  A complete 10 point review of systems was performed and found to be negative unless otherwise noted in the history of present illness. General: No fevers, chills. Cardiovascular:  No chest pain, + leg edema. Respiratory: No cough, sputum production,  No shortness of breath. Gastrointestinal:  No nausea/vomiting,  No diarrhea,  No abdominal pain. Genitourinary: No hematuria. No foamy urine.   No dysuria    PHYSICAL EXAM:  Current Weight: Weight - Scale: 59.4 kg (131 lb)  First Weight: Weight - Scale: 59.4 kg (131 lb)  Vitals:    09/20/23 1330 09/20/23 1345 09/20/23 1400 09/20/23 1435   BP: 128/71 125/63 115/64 123/64   Pulse: 64 66 68 64   Resp: 12 14 15 18   Temp:    97.6 °F (36.4 °C)   TempSrc:       SpO2: 95% 98% 96% 95%   Weight:       Height:           Intake/Output Summary (Last 24 hours) at 9/20/2023 1510  Last data filed at 9/20/2023 1126  Gross per 24 hour   Intake 2500 ml   Output 405 ml   Net 2095 ml     General: NAD  Skin: no rash  Eyes: anicteric  ENMT: mm moist  Neck: no masses  Respiratory: CTAB  CVS: RRR  Extremities: trace pedal edema  GI: soft nt nd  Neuro: alert awake  Psych: mood and affect appropriate Invasive Devices:   Urethral Catheter Double-lumen; Latex 16 Fr. (Active)   Reasons to continue Urinary Catheter  Post-operative urological requirements 09/20/23 1201   Goal for Removal No longer needed- Will place order to discontinue 09/20/23 1201   Site Assessment Clean;Skin intact 09/20/23 1201   Collection Container Standard drainage bag 09/20/23 1400   Securement Method Securing device (Describe) 09/20/23 1400     Lab Results:   Results from last 7 days   Lab Units 09/20/23  1335 09/15/23  1002   WBC Thousand/uL 11.75*  --    HEMOGLOBIN g/dL 13.1  --    HEMATOCRIT % 38.3  --    PLATELETS Thousands/uL 218  --    POTASSIUM mmol/L 3.9 4.0   CHLORIDE mmol/L 104 102   CO2 mmol/L 28 28   BUN mg/dL 15 14   CREATININE mg/dL 0.64 0.62   CALCIUM mg/dL 8.4 9.7     I have personally reviewed the blood work as stated above and in my note. I have personally reviewed urology note.

## 2023-09-20 NOTE — ANESTHESIA PREPROCEDURE EVALUATION
Procedure:  NEPHRECTOMY RADICAL LAPAROSCOPIC W/ ROBOTICS (Right: Abdomen)    Relevant Problems   CARDIO   (+) Acute deep vein thrombosis (DVT) of brachial vein of right upper extremity (HCC)   (+) Essential hypertension   (+) HTN (hypertension)   (+) Premature atrial contractions      /RENAL   (+) 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      HEMATOLOGY   (+) Iron deficiency anemia      MUSCULOSKELETAL   (+) Parsonage-Cordero syndrome      NEURO/PSYCH   (+) Anxiety   (+) Chronic pain of both shoulders   (+) Weakness of right upper extremity    Completed 6weeks of eliquis stopped taking 9/12. Has diffuse shoulder pain bilaterally with numbness down his entire right arm that he says is normal for him but it can get worse. 8/10 pain. He states that getting around can be difficult with his pain but walked up the steps  In the parking lot to the hospital today. Took metoprolol this morning. Physical Exam    Airway    Mallampati score: II  TM Distance: >3 FB  Neck ROM: full     Dental       Cardiovascular  Cardiovascular exam normal    Pulmonary  Pulmonary exam normal     Other Findings        Anesthesia Plan  ASA Score- 3     Anesthesia Type- general with ASA Monitors. Additional Monitors: arterial line. Airway Plan: ETT. Plan Factors-Exercise tolerance (METS): >4 METS. Chart reviewed. EKG reviewed. Existing labs reviewed. Patient summary reviewed. Patient is not a current smoker. Patient did not smoke on day of surgery. Obstructive sleep apnea risk education given perioperatively. Induction- intravenous. Postoperative Plan- Plan for postoperative opioid use. Planned trial extubation    Informed Consent- Anesthetic plan and risks discussed with patient and spouse. I personally reviewed this patient with the CRNA. Discussed and agreed on the Anesthesia Plan with the CRNA. Opal Chiu

## 2023-09-21 ENCOUNTER — HOME HEALTH ADMISSION (OUTPATIENT)
Dept: HOME HEALTH SERVICES | Facility: HOME HEALTHCARE | Age: 68
End: 2023-09-21
Payer: COMMERCIAL

## 2023-09-21 LAB
ABO GROUP BLD BPU: NORMAL
ABO GROUP BLD BPU: NORMAL
ANION GAP SERPL CALCULATED.3IONS-SCNC: 6 MMOL/L
BPU ID: NORMAL
BPU ID: NORMAL
BUN SERPL-MCNC: 14 MG/DL (ref 5–25)
CALCIUM SERPL-MCNC: 8.6 MG/DL (ref 8.4–10.2)
CHLORIDE SERPL-SCNC: 104 MMOL/L (ref 96–108)
CO2 SERPL-SCNC: 28 MMOL/L (ref 21–32)
CREAT SERPL-MCNC: 0.78 MG/DL (ref 0.6–1.3)
CROSSMATCH: NORMAL
CROSSMATCH: NORMAL
ERYTHROCYTE [DISTWIDTH] IN BLOOD BY AUTOMATED COUNT: 12.7 % (ref 11.6–15.1)
GFR SERPL CREATININE-BSD FRML MDRD: 91 ML/MIN/1.73SQ M
GLUCOSE SERPL-MCNC: 88 MG/DL (ref 65–140)
HCT VFR BLD AUTO: 38.8 % (ref 36.5–49.3)
HGB BLD-MCNC: 12.7 G/DL (ref 12–17)
MCH RBC QN AUTO: 30 PG (ref 26.8–34.3)
MCHC RBC AUTO-ENTMCNC: 32.7 G/DL (ref 31.4–37.4)
MCV RBC AUTO: 92 FL (ref 82–98)
PLATELET # BLD AUTO: 207 THOUSANDS/UL (ref 149–390)
PMV BLD AUTO: 9.5 FL (ref 8.9–12.7)
POTASSIUM SERPL-SCNC: 4.6 MMOL/L (ref 3.5–5.3)
RBC # BLD AUTO: 4.24 MILLION/UL (ref 3.88–5.62)
SODIUM SERPL-SCNC: 138 MMOL/L (ref 135–147)
UNIT DISPENSE STATUS: NORMAL
UNIT DISPENSE STATUS: NORMAL
UNIT PRODUCT CODE: NORMAL
UNIT PRODUCT CODE: NORMAL
UNIT PRODUCT VOLUME: 300 ML
UNIT PRODUCT VOLUME: 300 ML
UNIT RH: NORMAL
UNIT RH: NORMAL
WBC # BLD AUTO: 8.1 THOUSAND/UL (ref 4.31–10.16)

## 2023-09-21 PROCEDURE — 85027 COMPLETE CBC AUTOMATED: CPT | Performed by: UROLOGY

## 2023-09-21 PROCEDURE — 99232 SBSQ HOSP IP/OBS MODERATE 35: CPT | Performed by: INTERNAL MEDICINE

## 2023-09-21 PROCEDURE — 99024 POSTOP FOLLOW-UP VISIT: CPT | Performed by: NURSE PRACTITIONER

## 2023-09-21 PROCEDURE — 80048 BASIC METABOLIC PNL TOTAL CA: CPT | Performed by: UROLOGY

## 2023-09-21 PROCEDURE — 97163 PT EVAL HIGH COMPLEX 45 MIN: CPT

## 2023-09-21 PROCEDURE — 97167 OT EVAL HIGH COMPLEX 60 MIN: CPT

## 2023-09-21 RX ADMIN — GABAPENTIN 100 MG: 100 CAPSULE ORAL at 21:23

## 2023-09-21 RX ADMIN — METOPROLOL SUCCINATE 25 MG: 25 TABLET, FILM COATED, EXTENDED RELEASE ORAL at 09:00

## 2023-09-21 RX ADMIN — DOCUSATE SODIUM 100 MG: 100 CAPSULE, LIQUID FILLED ORAL at 09:00

## 2023-09-21 RX ADMIN — SODIUM CHLORIDE 100 ML/HR: 0.9 INJECTION, SOLUTION INTRAVENOUS at 19:52

## 2023-09-21 RX ADMIN — ACETAMINOPHEN 650 MG: 325 TABLET, FILM COATED ORAL at 12:51

## 2023-09-21 RX ADMIN — ENOXAPARIN SODIUM 40 MG: 40 INJECTION SUBCUTANEOUS at 09:00

## 2023-09-21 RX ADMIN — ACETAMINOPHEN 650 MG: 325 TABLET, FILM COATED ORAL at 17:35

## 2023-09-21 RX ADMIN — SODIUM CHLORIDE 100 ML/HR: 0.9 INJECTION, SOLUTION INTRAVENOUS at 09:00

## 2023-09-21 RX ADMIN — PANTOPRAZOLE SODIUM 40 MG: 40 TABLET, DELAYED RELEASE ORAL at 06:25

## 2023-09-21 RX ADMIN — ACETAMINOPHEN 650 MG: 325 TABLET, FILM COATED ORAL at 06:25

## 2023-09-21 NOTE — PROGRESS NOTES
Pt c/o abdominal pain, highly anxious and convinced "something must be wrong." RN explained pain is to be expected postoperatively and encouraged pt to take ordered pain medication. Pt finally agreeable. Vitals stable. Incisions D/C/I. Robin draining. Pt also concerned IVF is not infusing. Bag changed performed at this time (see MAR). Wife at bedside aware of all above. Will continue to monitor.

## 2023-09-21 NOTE — OCCUPATIONAL THERAPY NOTE
Occupational Therapy Evaluation     Patient Name: Fito Hines  FGQOU'L Date: 9/21/2023  Problem List  Principal Problem:    Renal cancer [facility])  Active Problems:    HTN (hypertension)    Past Medical History  Past Medical History:   Diagnosis Date   • Allergic    • Arthritis    • Asthma    • Cancer ([address])     kidney   • Hypertension    • Impaired fasting glucose    • Mitral valve disorder    • Mitral valve prolapse    • Murmur, cardiac    • 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    • PAC (premature atrial contraction)    • Parsonage-Cordero syndrome    • PVC (premature ventricular contraction)    • PVC (premature ventricular contraction)    • Renal cancer ([address]) 2023   • Thyroid nodule      Past Surgical History  Past Surgical History:   Procedure Laterality Date   • HAND SURGERY     • SD COLONOSCOPY FLX DX W/COLLJ SPEC WHEN PFRMD N/A 2/9/2018    Procedure: COLONOSCOPY;  Surgeon: Ava Oliva MD;  Location: [facility]; Service: Gastroenterology   • SD LAPAROSCOPY RADICAL NEPHRECTOMY Right 9/20/2023    Procedure: NEPHRECTOMY RADICAL LAPAROSCOPIC W/ ROBOTICS;  Surgeon: Neo Nayak MD;  Location: [facility];  Service: Urology   • PROSTATE BIOPSY     • SHOULDER SURGERY             09/21/23 1305   OT Last Visit   OT Visit Date 09/21/23   Note Type   Note type Evaluation   Pain Assessment   Pain Assessment Tool 0-10   Pain Score 8   Pain Location/Orientation Orientation: Right;Location: Shoulder; Location: Arm;Location: Generalized; Location: Abdomen   Effect of Pain on Daily Activities Impacts ability to engage in valued occupations   Hospital Pain Intervention(s) Repositioned; Ambulation/increased activity; Emotional support   Restrictions/Precautions   Weight Bearing Precautions Per Order No   Other Precautions Multiple lines;Pain   Home Living   Type of Home House  (1 story home with 0 JOE)   Home Layout One level;Performs ADLs on one level; Able to live on main level with bedroom/bathroom; Access   [place] Walk-in shower   Bathroom Toilet Raised   Bathroom Equipment Grab bars in shower; Shower chair;Commode   Bathroom Accessibility Accessible   Home Equipment Walker;Cane   Additional Comments Pt reports living with his wife in a 1 story home with 0 JOE; reports that he typically uses [facility] for functional mobility, but has RW that he uses prn, especially @ night when he gets up to go to the bathroom   Prior Function   Level of [place] Needs assistance with ADLs; Independent with functional mobility; Needs assistance with IADLS   Lives With Spouse   Receives Help From Family   IADLs Family/Friend/Other provides transportation; Family/Friend/Other provides meals; Family/Friend/Other provides medication management   Falls in the last 6 months 1 to 4  (Pt reporting 1 recent fall)   Vocational Retired   Comments (-) driving; pt reporting that his wife assists with all functional needs however he does the best he can to help out prn   Lifestyle   Autonomy PTA, pt requires assistance w/ ADLs/IADLs and uses SPC for functional mobility; (-) driving   Reciprocal Relationships Lives with his supportive wife   Service to Others Retired; reports previously working for [employer] Enjoys being active and living independently   Subjective   Subjective "They are trying to figure out what has been going on."   ADL   Where [address] [facility] 5  Supervision/Setup   Grooming Assistance 4  Minimal Assistance   [address] 3  Moderate Assistance   LB Bathing Assistance 3  Moderate Assistance   UB Dressing Assistance 3  Moderate Assistance   LB [address] 3  Moderate Assistance   [address]  3  Moderate Assistance   Functional Assistance 3  Moderate Assistance   Bed Mobility   Supine to Sit Unable to assess   Sit to Supine Unable to assess   Additional Comments Upon arrival, pt found sitting upright in recliner; @ end of session, pt left sitting upright in recliner with all functional needs in reach   Transfers   Sit to Stand 5  Supervision   Stand to Sit 5  Supervision   Additional Comments w/ no DME; pt reporting that because of his discomfort in RUE, he needs to complete multiple STS throughout the day for repositioning 2* pain management   Functional Mobility   Functional Mobility 4  Minimal assistance  (CGA)   Additional Comments Pt completed short household functional mobility w/ Min A x1   Additional items SPC   Balance   Static Sitting Fair +   Dynamic Sitting Fair   Static Standing Fair -   Dynamic Standing Fair -   Ambulatory Poor +   Activity Tolerance   Activity Tolerance Patient limited by pain   Medical Staff Made Aware Discussed with Pancho Howard DPT   Nurse Made Aware RN cleared   RUE Assessment   RUE Assessment X  (Decreased active shoulder flexion/ROM as pt guards arm in shoulder adduction and eblow flexion; decreased elbow ROM; noted increased swelling in R hand with decreased  strength; pmhx of rotator cuff injury)   LUE Assessment   LUE Assessment   (Decreased shoulder ROM 2* previous rotator cuff injury however elbow/digit ROM WFL)   Hand Function   Gross Motor Coordination Impaired   Fine Motor Coordination Impaired   Hand Function Comments Noted R hand swelling   Sensation   Light Touch Partial deficits in the RUE   Additional Comments Decreased sensation in R hand   Psychosocial   Psychosocial (WDL) WDL   Cognition   Overall Cognitive Status WFL   Arousal/Participation Alert; Responsive;Arousable; Cooperative   Attention Within functional limits   Orientation Level Oriented X4   Memory Within functional limits   Following Commands Follows all commands and directions without difficulty   Comments Pt very pleasant and cooperative   Assessment   Limitation Decreased ADL status; Decreased UE ROM; Decreased UE strength;Decreased sensation;Decreased fine motor control;Decreased self-care trans;Decreased high-level ADLs Prognosis Fair   Assessment Pt is a very pleasant and cooperative 78 yo male admitted to [facility] s/p "Right - NEPHRECTOMY RADICAL LAPAROSCOPIC W/ ROBOTICS" on 9/20. Pt  has a past medical history of Allergic, Arthritis, Asthma, Cancer ([address]), Hypertension, Impaired fasting glucose, Mitral valve disorder, Mitral valve prolapse, Murmur, cardiac, 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, PAC (premature atrial contraction), Parsonage-Cordero syndrome, PVC (premature ventricular contraction), PVC (premature ventricular contraction), Renal cancer ([address]) (2023), and Thyroid nodule. Pt with active OT orders in which OT consulted to assess pt's functional status and occupational performance to determine safe d/c needs. Pt lives with his wife in a 1 story home with 0 JOE. PTA, pt requires assistance w/ ADLs/IADLs and uses SPC vs RW for functional mobility. (-) driving. Currently, pt performing functional transfers w/ supervision, functional mobility w/ CGA, and UB/LB ADLs w/ Mod A. Pt demonstrates the following limitations/impairments which impact the pt's ability to engage in valued occupations: functional UE ROM, decreased sensation, fine motor skills/dexterity, endurance/activity tolerance, standing tolerance, functional reach, postural/trunk control, strength, and pain. From an OT standpoint, recommend discharge to home with home health OT once medically stable. The patient's raw score on the AM-PAC Daily Activity Inpatient Short Form is 15. A raw score of less than 19 suggests the patient may benefit from discharge to post-acute rehabilitation services however @ baseline, pt receives assistance w/ ADLs/IADLs therefore please refer to the recommendation of the Occupational Therapist for safe discharge planning.  Pt would benefit from skilled OT services 2-3x/wk to address immediate acute care needs and underlying performance skills to promote safety, decrease fall risk, and enhance occupational performance to return to PLOF. Goals to be met within the next 10-14 days. Goals   Patient Goals To figure out what is going on and regain independence   LTG Time Frame 10-14   Long Term Goal #1 See OT goals listed below   Plan   Treatment Interventions ADL retraining;Functional transfer training;UE strengthening/ROM; Endurance training;Patient/family training;Equipment evaluation/education; Fine motor coordination activities; Compensatory technique education;Continued evaluation; Energy conservation; Activityengagement   Goal Expiration Date 10/05/23   OT Frequency 2-3x/wk   Recommendation   OT Discharge Recommendation Home with home health rehabilitation (May benefit from continued outpatient OT when appropriate)   Equipment Recommended Other (comment)  (Has all appropriate DME @ home as pt reporting that his house is handicap accessible)   AM-PAC Daily Activity Inpatient   Lower Body Dressing 2   Bathing 2   Toileting 2   Upper Body Dressing 2   Grooming 3   Eating 4   Daily Activity Raw Score 15   Daily Activity Standardized Score (Calc for Raw Score >=11) 34.69   AM-PAC Applied Cognition Inpatient   Following a Speech/Presentation 4   Understanding Ordinary Conversation 4   Taking Medications 4   Remembering Where Things Are Placed or Put Away 4   Remembering List of 4-5 Errands 4   Taking Care of Complicated Tasks 4   Applied Cognition Raw Score 24   Applied Cognition Standardized Score 62.21   End of Consult   Education Provided Yes   Patient Position at End of Consult Bedside chair; All needs within reach   Nurse Communication Nurse aware of consult     OT GOALS:    Pt will improve functional mobility during ADL/IADL/leisure tasks with Mod I using AE/DME prn. Pt will improve activity tolerance/functional endurance during ADL/IADL/leisure tasks for at least 20 minutes to improve occupational performance and engagement in valued occupations using AE/DME prn.     Pt will engage in ongoing functional/formal cognitive assessments to assist with safe d/c planning and increase safety during functional tasks. Pt will improve dynamic standing balance for at least 15 minutes with Mod I during functional tasks to decrease fall risk and improve independence and engagement in ADL/IADL/leisure activities. Pt will follow 100% of multi-step commands in ADL/IADL/leisure activities to improve functional cognition used in functional daily routines. Pt will complete functional transfers on and off all surfaces used in daily routines with Mod I for safety to maximize functional/occupational performance. Pt will complete all bed mobility tasks with Mod I to serve as a prerequisite for EOB/OOB ADL/IADL/leisure tasks, optimize positioning/comfort, and increase functional independence. Pt will independently demonstrate good carryover of safety precautions and education/training during ADL/IADL/leisure tasks with energy conservation techniques s/p skilled instruction without verbal cues. Pt will increase UE/LE MMT strength by 1/2 grade to improve bilateral coordination in ADL/IADL/leisure tasks with S. Pt will demonstrate 100% carryover UE PROM/AAROM/AROM s/p education on exercise program to improve bilateral coordination in ADL/IADL/leisure tasks with S. Pt will complete UB ADL tasks with Mod I using AE/DME prn to increase functional independence in ADL/IADL/leisure tasks. Pt will complete LB ADL tasks with Mod I using AE/DME prn to increase functional independence in ADL/IADL/leisure tasks. Pt will complete toileting tasks with Mod I and good hygiene/thoroughness using AE/DME prn to increase functional independence. Pt will independently identify and utilize 2-3 positive coping strategies to enhance overall wellbeing and engagement in valued occupations.       Grace Lynch MS, OTR/L

## 2023-09-21 NOTE — PROGRESS NOTES
Progress Note - Urology  Albino Monge 1955, 77 y.o. 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 He complains of right shoulder discomfort and right hand numbness. 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 He feels this has been exacerbated. 24 HR EVENTS:   no significant events. Patient has  complaints of Right shoulder pain and right hand numbness, discomfort in abdomen with deep breath. Review of Systems   Constitutional: Positive for fatigue. Negative for activity change, appetite change, chills, fever and unexpected weight change. HENT: Negative for facial swelling. Eyes: Negative for discharge. Respiratory: Negative. Negative for cough and shortness of breath. Cardiovascular: Negative for chest pain and leg swelling. Gastrointestinal: Positive for abdominal pain and constipation. Negative for abdominal distention, diarrhea, nausea and vomiting. Endocrine: Negative. Genitourinary: Negative. Negative for decreased urine volume, dysuria, enuresis, flank pain, genital sores and hematuria. Musculoskeletal: Positive for arthralgias and gait problem. Negative for back pain and myalgias. Mild swelling of right hand   Skin: Negative for pallor and rash. Allergic/Immunologic: Negative. Negative for immunocompromised state. Neurological: Negative for facial asymmetry and speech difficulty.    Psychiatric/Behavioral: Negative for agitation and confusion. Objective:  Nursing Rounds:   Vitals: Blood pressure 118/62, pulse 62, temperature 98.2 °F (36.8 °C), resp. rate 20, height 5' 6" (1.676 m), weight 59.4 kg (131 lb), SpO2 96 %. ,Body mass index is 21.14 kg/m². INS & OUTS:  I/O last 24 hours: In: 2500 [I.V.:2500]  Out: 3183 [Urine:3505; Blood:150]    Physical Exam  Vitals and nursing note reviewed. Constitutional:       General: He is not in acute distress. Appearance: Normal appearance. He is not ill-appearing or toxic-appearing. HENT:      Head: Normocephalic. Cardiovascular:      Rate and Rhythm: Normal rate and regular rhythm. Heart sounds: Normal heart sounds. Pulmonary:      Effort: Pulmonary effort is normal. No respiratory distress. Breath sounds: Normal breath sounds. No wheezing, rhonchi or rales. Abdominal:      General: Abdomen is flat. Bowel sounds are normal. There is no distension. Palpations: Abdomen is soft. Tenderness: There is abdominal tenderness. There is no guarding or rebound. Comments: Mild subcu air right side of abdomen. Reports tenderness to palpation around surgical incisions. Abdomen is soft with no rebound or guarding. Genitourinary:     Comments: Robin catheter draining clear yellow urine  Musculoskeletal:         General: Swelling present. Right lower leg: No edema. Left lower leg: No edema. Comments: Mild right hand swelling. Right shoulder pain   Skin:     General: Skin is warm and dry. Neurological:      General: No focal deficit present. Mental Status: He is alert and oriented to person, place, and time. Psychiatric:         Mood and Affect: Mood normal.         Behavior: Behavior normal.         Thought Content:  Thought content normal.         Judgment: Judgment normal.           Labs:  Recent Labs     09/20/23  1335 09/21/23  0536   WBC 11.75* 8.10       Recent Labs     09/20/23  1049 09/20/23  1335 09/21/23  0536 HGB 11.9* 13.1 12.7     Recent Labs     09/20/23  1049 09/20/23  1335 09/21/23  0536   HCT 35* 38.3 38.8     Recent Labs     09/20/23  1335 09/21/23  0536   CREATININE 0.64 0.78     Lab Results   Component Value Date    HGB 12.7 09/21/2023    HCT 38.8 09/21/2023    WBC 8.10 09/21/2023     09/21/2023     Lab Results   Component Value Date     07/29/2017    K 4.6 09/21/2023     09/21/2023    CO2 28 09/21/2023    BUN 14 09/21/2023    CREATININE 0.78 09/21/2023    CALCIUM 8.6 09/21/2023    GLUCOSE 178 (H) 09/20/2023        History:    Past Medical History:   Diagnosis Date   • Allergic    • Arthritis    • Asthma    • Cancer ([address])     kidney   • Hypertension    • Impaired fasting glucose    • Mitral valve disorder    • Mitral valve prolapse    • Murmur, cardiac    • 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    • PAC (premature atrial contraction)    • Parsonage-Cordero syndrome    • PVC (premature ventricular contraction)    • PVC (premature ventricular contraction)    • Renal cancer ([address]) 2023   • Thyroid nodule      Past Surgical History:   Procedure Laterality Date   • HAND SURGERY     • [place] COLONOSCOPY FLX DX W/COLLJ SPEC WHEN PFRMD N/A 2/9/2018    Procedure: COLONOSCOPY;  Surgeon: Ofe Daily MD;  [facility];   Service: Gastroenterology   • PROSTATE BIOPSY     • SHOULDER SURGERY       Family History   Problem Relation Age of Onset   • Diabetes Mother    • Stroke Mother    • Stroke Father    • Heart disease Father    • Diabetes Sister    • Other Family         Stroke syndrome     Social History     Socioeconomic History   • Marital status: /Civil Union     Spouse name: None   • Number of children: None   • Years of education: None   • Highest education level: None   Occupational History   • None   Tobacco Use   • Smoking status: Never   • Smokeless tobacco: Never   Vaping Use   • Vaping Use: Never used   Substance and Sexual Activity   • Alcohol use: Not Currently Alcohol/week: 3.0 standard drinks of alcohol     Types: 3 Glasses of wine per week     Comment: 4 oz wine with dinner a few days a week   • Drug use: No   • Sexual activity: Yes     Partners: Female     Birth control/protection: None   Other Topics Concern   • None   Social History Narrative   • None     Social Determinants of Health     Financial Resource Strain: Low Risk  (8/15/2022)    Overall Financial Resource Strain (CARDIA)    • Difficulty of Paying Living Expenses: Not very hard   Food Insecurity: No Food Insecurity (8/6/2023)    Hunger Vital Sign    • Worried About Running Out of Food in the Last Year: Never true    • Ran Out of Food in the Last Year: Never true   Transportation Needs: No Transportation Needs (8/6/2023)    PRAPARE - Transportation    • Lack of Transportation (Medical): No    • Lack of Transportation (Non-Medical):  No   Physical Activity: Not on file   Stress: Not on file   Social Connections: Not on file   Intimate Partner Violence: Not on file   Housing Stability: Unknown (8/6/2023)    Housing Stability Vital Sign    • Unable to Pay for Housing in the Last Year: No    • Number of Places Lived in the Last Year: Not on file    • Unstable Housing in the Last Year: No       The following portions of the patient's history were reviewed and updated as appropriate: allergies, current medications, past family history, past medical history, past social history, past surgical history and problem list    PRABHAKAR De La Cruz  Date: 9/21/2023 Time: 10:28 AM

## 2023-09-21 NOTE — PLAN OF CARE
Problem: PHYSICAL THERAPY ADULT  Goal: Performs mobility at highest level of function for planned discharge setting. See evaluation for individualized goals. Description: Treatment/Interventions: ADL retraining, Functional transfer training, LE strengthening/ROM, Elevations, Therapeutic exercise, Endurance training, Bed mobility, Gait training, Spoke to nursing, OT          See flowsheet documentation for full assessment, interventions and recommendations. Note: Prognosis: Fair  Problem List: Decreased strength, Decreased range of motion, Decreased endurance, Impaired balance, Decreased mobility, Pain  Assessment: PT orders received and acknowledged. Patient was seen today for high complexity PT evaluation. High complexity evaluation due to Ongoing medical management for primary dx, Decreased activity tolerance compared to baseline, Fall risk, Increased assistance needed from caregiver at current time, Ongoing telemetry monitoring, Continuous pulse oximetry monitoring , s/p surgical intervention Patient is a 77 y.o. male  who was admitted to [address] [place] [address] on 9/20/2023  with Renal cancer ([address]) . Pt presents to [facility] for elective laparoscopic nephrectomy  . Patient performed functional mobility as described above. At baseline, pt resides with spouse in house and was requiring assistance with ADLs prior to hospital admission. Currently, upon initial examination, pt  is requiring supervision A for bed mobility skills; supervision for functional transfers and contact guard for ambulation with SPC. Patient currently presents below baseline with limitations in gait, balance, and transfers. Patient will benefit from continued PT services while in hospital in order to address remaining limitations. The patients AM-PAC Basic Mobility Inpatient Short From Raw Score is 20 . A Raw score of 20  suggests that the patient may benefit from discharge to home .  PT recommendation at this time is for home with HHPT services . Please also refer to the recommendation of the Physical Therapist for safe discharge planning. PT Discharge Recommendation: Home with home health rehabilitation    See flowsheet documentation for full assessment.

## 2023-09-21 NOTE — PROGRESS NOTES
-- Patient: Mariya Humphreys  -- MRN: [id number]  -- Aidin Request ID: [id number]  -- Level of care reserved: Gilbert Vargas  -- Partner Reserved: MAJO [facility]- ALL SAINTS, EARLINE,  [facility] [phone]  -- Clinical needs requested:  -- Geography searched: [phone]  -- Start of Service:  -- Request sent: 3:24pm EDT on 9/21/2023 by Eros Carr  -- Partner reserved: 3:49pm EDT on 9/21/2023 by Eros Carr  -- Choice list shared: 3:49pm EDT on 9/21/2023 by Eros Carr

## 2023-09-21 NOTE — UTILIZATION REVIEW
Initial Clinical Review    Elective IP surgical procedure  Age/Sex: 77 y.o. male  Surgery Date: 9/20/2023  Procedure: Right - NEPHRECTOMY RADICAL LAPAROSCOPIC W/ ROBOTICS  Anesthesia: General  Operative Findings:   Very large liver, adhesed to peritoneum in multiple locations. Right lower quadrant adhesions. Duplicated renal hilum. POD#1 Progress Note: No acute events overnight. On exam this AM pt c/o right shoulder discomfort and right hand numbness. He reports this has been ongoing it is r/t his Parsonage-Cordero syndrome. He feels this has been exacerbated. Hgb stable. WBC wnl. Advance diet. D/c nelson cath, void trial. Encourage incentive spirometry, OOB/ambulate. SCDs. Nephrology recommends d/c'ing IVFs if jaqueline diet. BP acceptable. Avoid hypotension, continue to hold lisinopril for now.      Admission Orders: Date/Time/Statement:   Admission Orders (From admission, onward)     Ordered        09/20/23 1206  Inpatient Admission  Once                      Orders Placed This Encounter   Procedures   • Inpatient Admission     Standing Status:   Standing     Number of Occurrences:   1     Order Specific Question:   Level of Care     Answer:   Med Surg [16]     Order Specific Question:   Estimated length of stay     Answer:   Inpatient Only Surgery     Vital Signs:   Date/Time Temp Pulse Resp BP MAP (mmHg) SpO2 O2 Device   09/21/23 07:15:15 98.2 °F (36.8 °C) 62 -- 118/62 81 96 % --   09/21/23 05:18:47 98.4 °F (36.9 °C) 60 20 129/69 89 100 % --   09/20/23 2141 -- -- -- 148/76 -- -- --   09/20/23 2000 -- -- -- -- -- -- None (Room air)   09/20/23 1739 -- -- -- 132/67 -- -- --   09/20/23 1636 -- -- -- -- -- -- None (Room air)   09/20/23 14:35:31 97.6 °F (36.4 °C) 64 18 123/64 84 95 % --   09/20/23 1400 -- 68 15 115/64 78 96 % None (Room air)   09/20/23 1345 -- 66 14 125/63 82 98 % None (Room air)   09/20/23 1330 -- 64 12 128/71 90 95 % None (Room air)   09/20/23 1315 -- 68 12 131/76 95 -- --   09/20/23 1300 -- 66 12 149/72 95 96 % None (Room air)   09/20/23 1245 97.7 °F (36.5 °C) 70 12 151/83 109 97 % None (Room air)   09/20/23 1230 -- 70 12 157/71 99 99 % None (Room air)   09/20/23 1215 -- 68 12 -- -- -- --   09/20/23 1201 97.1 °F (36.2 °C) Abnormal  72 -- 137/78 110 96 % None (Room air)   09/20/23 0716 97.8 °F (36.6 °C) 66 18 154/77 -- 99 % None (Room air)         Pertinent Labs/Diagnostic Test Results:   Results from last 7 days   Lab Units 09/21/23  0536 09/20/23  1335 09/20/23  1049   WBC Thousand/uL 8.10 11.75*  --    HEMOGLOBIN g/dL 12.7 13.1  --    I STAT HEMOGLOBIN g/dl  --   --  11.9*   HEMATOCRIT % 38.8 38.3  --    HEMATOCRIT, ISTAT %  --   --  35*   PLATELETS Thousands/uL 207 218  --      Results from last 7 days   Lab Units 09/21/23  0536 09/20/23  1335 09/20/23  1049 09/15/23  1002   SODIUM mmol/L 138 137  --  138   POTASSIUM mmol/L 4.6 3.9  --  4.0   CHLORIDE mmol/L 104 104  --  102   CO2 mmol/L 28 28  --  28   CO2, I-STAT mmol/L  --   --  28  --    ANION GAP mmol/L 6 5  --  8   BUN mg/dL 14 15  --  14   CREATININE mg/dL 0.78 0.64  --  0.62   EGFR ml/min/1.73sq m 91 99  --  100   CALCIUM mg/dL 8.6 8.4  --  9.7   CALCIUM, IONIZED, ISTAT mmol/L  --   --  1.23  --      Results from last 7 days   Lab Units 09/21/23  0536 09/20/23  1335   GLUCOSE RANDOM mg/dL 88 155*     Results from last 7 days   Lab Units 09/20/23  1049   I STAT BASE EXC mmol/L 1   ISTAT PH ART  7.354   I STAT ART PCO2 mm HG 48.1*   I STAT ART PO2 mm HG >400.0*   I STAT ART HCO3 mmol/L 26.8     Results from last 7 days   Lab Units 09/21/23  0716   UNIT PRODUCT CODE  R1629R05  [id number]   UNIT NUMBER  [id number]  [id number]   UNITABO  A  A   UNITRH  POS  POS   CROSSMATCH  Compatible  Compatible   UNIT DISPENSE STATUS  Return to Inv  Return to Inv   UNIT PRODUCT VOL ml 300  300     Scheduled Medications:  acetaminophen, 650 mg, Oral, Q6H AMERCIO  docusate sodium, 100 mg, Oral, BID  enoxaparin, 40 mg, Subcutaneous, Daily  gabapentin, 100 mg, Oral, HS  metoprolol succinate, 25 mg, Oral, Daily With Breakfast  pantoprazole, 40 mg, Oral, Daily    Continuous IV Infusions:  sodium chloride, 100 mL/hr, Intravenous, Continuous    PRN Meds:  busPIRone, 7.5 mg, Oral, BID PRN  calcium carbonate, 1,000 mg, Oral, Daily PRN  HYDROmorphone, 0.5 mg, Intravenous, Q2H PRN  lactated ringers, 1,000 mL, Intravenous, Once PRN  ondansetron, 4 mg, Intravenous, Q6H PRN  oxyCODONE, 2.5 mg, Oral, Q4H PRN   Or  oxyCODONE, 5 mg, Oral, Q4H PRN  sodium chloride, 1,000 mL, Intravenous, Once PRN    tiZANidine, 2 mg, Oral, TID PRN        Network Utilization Review Department  ATTENTION: Please call with any questions or concerns to [phone] and carefully listen to the prompts so that you are directed to the right person. All voicemails are confidential.  Hilario Thomas all requests for admission clinical reviews, approved or denied determinations and any other requests to dedicated fax number below belonging to the campus where the patient is receiving treatment.  List of dedicated fax numbers for the Facilities:  [place] DENIALS (Administrative/Medical Necessity) [phone]   [address] (Maternity/NICU/Pediatrics) [phone]   [address] [place] [address] [phone]   [facility] [address] [facility] [phone]   [facility] [id number] [place] [address] [phone]   [facility] [address] [facility] [address] [phone]

## 2023-09-21 NOTE — ASSESSMENT & PLAN NOTE
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

## 2023-09-21 NOTE — PHYSICAL THERAPY NOTE
Physical Therapy Evaluation     Patient's Name: Toño Varela    Admitting Diagnosis  Renal mass, right [N28.89]    Problem List  Patient Active Problem List   Diagnosis    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    HTN (hypertension)    Allergic rhinitis    NSVT (nonsustained ventricular tachycardia) (HCC)    Premature atrial contractions    Iron deficiency anemia    Essential hypertension    Anxiety    Postural hypotension    Rupture of left biceps tendon    Fatigue    Rotator cuff tear arthropathy of left shoulder    Weakness of right upper extremity    Rupture of right long head biceps tendon    Seasonal allergies    Shoulder pain, left    Palpitations    Dizziness    Multiple bruises    COVID-19    Muscle weakness    Polyneuropathy    Imbalance    Diffuse pain in right upper extremity    Chronic pain of both shoulders    Tendinitis of right rotator cuff    Weight loss    Impaired sensation    Radiculopathy, cervical region    Neuropathy of right upper extremity    Rotator cuff tear arthropathy of right shoulder    Carpal tunnel syndrome of right wrist    Right renal mass    Moderate protein-calorie malnutrition (HCC)    Constipation    Parsonage-Cordero syndrome    Brachial plexopathy without trauma    Right leg swelling    Swelling of arm    Preop examination    Skin tear of right elbow without complication    Acute deep vein thrombosis (DVT) of brachial vein of right upper extremity (HCC)    Insomnia    Depressed mood    Renal cancer ([address])       Past Medical History  Past Medical History:   Diagnosis Date    Allergic     Arthritis     Asthma     Cancer ([address])     kidney    Hypertension     Impaired fasting glucose     Mitral valve disorder     Mitral valve prolapse     Murmur, cardiac     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     PVC (premature ventricular contraction)     PVC (premature ventricular contraction)     Renal cancer ([address]) 2023    Thyroid nodule        Past Surgical History  Past Surgical History:   Procedure Laterality Date    HAND SURGERY      UT COLONOSCOPY FLX DX W/COLLJ SPEC WHEN PFRMD N/A 2/9/2018    Procedure: COLONOSCOPY;  Surgeon: Lisha Massey MD;  Location: [facility]; Service: Gastroenterology    UT LAPAROSCOPY RADICAL NEPHRECTOMY Right 9/20/2023    Procedure: NEPHRECTOMY RADICAL LAPAROSCOPIC W/ ROBOTICS;  Surgeon: Faheem Echeverria MD;  Location: [facility];  Service: Urology    PROSTATE BIOPSY      SHOULDER SURGERY            09/21/23 1210   PT Last Visit   PT Visit Date 09/21/23   Note Type   Note type Evaluation   Pain Assessment   Pain Assessment Tool 0-10   Pain Score 5   Pain Location/Orientation Location: Abdomen   Patient's Stated Pain Goal No pain   Hospital Pain Intervention(s) Repositioned; Ambulation/increased activity   Restrictions/Precautions   Weight Bearing Precautions Per Order No   Other Precautions Multiple lines;Pain; Fall Risk   Home Living   Type of [address] [place] [address] One level;Ramped entrance;Performs ADLs on one level   Bathroom Shower/Tub Walk-in shower   Bathroom Toilet Raised   Bathroom Equipment Grab bars in shower; Shower chair;Commode   [address] Cane;Walker  (primarily uses cane, but owns WC)   Prior Function   Level of [place] Needs assistance with ADLs; Needs assistance with IADLS; Independent with functional mobility   Lives With Spouse   Receives Help From Family   IADLs Family/Friend/Other provides transportation; Family/Friend/Other provides meals; Family/Friend/Other provides medication management   Falls in the last 6 months 1 to 4   Vocational Retired   General   Family/Caregiver Present Yes  (spouse)   Cognition   Overall Cognitive Status WFL   Arousal/Participation Alert   Attention Within functional limits   Orientation Level Oriented X4   Memory Within functional limits   Following Commands Follows all commands and directions without difficulty   Subjective   Subjective pt pleasant and cooperative throughout therapy session. pt received supine in bed   RLE Assessment   RLE Assessment X  (3+/5 grossly)   LLE Assessment   LLE Assessment X  (3+/5 grossly)   Bed Mobility   Supine to Sit 5  Supervision   Additional items Increased time required;Verbal cues   Sit to Supine Unable to assess   Transfers   Sit to Stand 5  Supervision   Additional items Increased time required;Verbal cues   Stand to Sit 5  Supervision   Additional items Increased time required;Verbal cues   Additional Comments transfers w/o AD   Ambulation/Elevation   Gait pattern Improper Weight shift; Poor UE support; Ataxia; Short stride;Narrow LJ; Decreased foot clearance; Inconsistent santy  (pt ataxic and ambulated holding SPC but rarely used for UE support during gait)   Gait Assistance   (contact guard)   Additional items Verbal cues; Tactile cues   Assistive Device Straight cane   Distance 60'   Stair Management Assistance Not tested   Balance   Static Sitting Fair +   Dynamic Sitting Fair   Static Standing Fair -   Dynamic Standing Poor +   Ambulatory Poor +   Endurance Deficit   Endurance Deficit Yes   Endurance Deficit Description generlized weakness, decreased exercise tolerance   Activity Tolerance   Activity Tolerance Patient limited by fatigue   Nurse Made Aware RN cleared and updated   Assessment   Prognosis Fair   Problem List Decreased strength;Decreased range of motion;Decreased endurance; Impaired balance;Decreased mobility;Pain   Assessment PT orders received and acknowledged. Patient was seen today for high complexity PT evaluation.  High complexity evaluation due to Ongoing medical management for primary dx, Decreased activity tolerance compared to baseline, Fall risk, Increased assistance needed from caregiver at current time, Ongoing telemetry monitoring, Continuous pulse oximetry monitoring , s/p surgical intervention Patient is a 77 y.o. male  who was admitted to [address] [place] [address] on 9/20/2023  with Renal cancer ([address]) . Pt presents to [facility] for elective laparoscopic nephrectomy  . Patient performed functional mobility as described above. At baseline, pt resides with spouse in house and was requiring assistance with ADLs prior to hospital admission. Currently, upon initial examination, pt  is requiring supervision A for bed mobility skills; supervision for functional transfers and contact guard for ambulation with SPC. Patient currently presents below baseline with limitations in gait, balance, and transfers. Patient will benefit from continued PT services while in hospital in order to address remaining limitations. The patients AM-PAC Basic Mobility Inpatient Short From Raw Score is 20 . A Raw score of 20  suggests that the patient may benefit from discharge to home . PT recommendation at this time is for home with HHPT services . Please also refer to the recommendation of the Physical Therapist for safe discharge planning. Goals   Patient Goals to go home   STG Expiration Date 10/05/23   Short Term Goal #1 Patient will be able to perform bed mobility tasks with modified independence in order to improve overall functional mobility and assist in safe d/c. STG 2 Patient will sit EOB for at least 25 minutes at modified independence level in order to strengthen abdominal musculature and assist in future transfers and ambulation. STG 3 Patient will be able to perform functional transfer with modified independence in order to improve overall functional mobility and assist in safe discharge. STG 4 Patient will be able to ambulate at least 300 feet with least restrictive device with modified independence assist in order to improve overall functional mobility and assist in safe discharge. STG 5 Patient will improve sitting/standing static/dynamic balance 1/2 grade in order to improve functional mobility and assist in safe discharge. STG 6 Patient will improve LE strength by 1/2 grade in order to improve functional mobility and assist in safe discharge. PT Treatment Day 0   Plan   Treatment/Interventions ADL retraining;Functional transfer training;LE strengthening/ROM; Elevations; Therapeutic exercise; Endurance training;Bed mobility;Gait training;Spoke to nursing;OT   PT Frequency 2-3x/wk   Recommendation   PT Discharge Recommendation Home with home health rehabilitation   AM-PAC Basic Mobility Inpatient   Turning in Flat Bed Without Bedrails 4   Lying on Back to Sitting on Edge of Flat Bed Without Bedrails 4   Moving Bed to Chair 4   Standing Up From Chair Using Arms 3   Walk in Room 3   Climb 3-5 Stairs With Railing 2   Basic Mobility Inpatient Raw Score 20   Basic Mobility Standardized Score 43.99   Highest Level Of Mobility   JH-HLM Goal 6: Walk 10 steps or more   JH-HLM Achieved 7: Walk 25 feet or more   End of Consult   Patient Position at End of Consult Bedside chair; All needs within reach         Sylvia Osborn, PT

## 2023-09-21 NOTE — MALNUTRITION/BMI
This medical record reflects one or more clinical indicators suggestive of malnutrition. Malnutrition Findings:   Adult Malnutrition type: Chronic illness  Adult Degree of Malnutrition: Malnutrition of moderate degree  Malnutrition Characteristics: Weight loss, Muscle loss                  360 Statement: Malnutrition of moderate degree in the context of chronic illness as evidenced by a 12% weight loss within the past 6 months and moderate muscle wasting observed in the temples and clavicle. Treatment with oral diet and nutrition supplementation. BMI Findings: Body mass index is 21.14 kg/m². See Nutrition note dated 9/21/23 for additional details. Completed nutrition assessment is viewable in the nutrition documentation.

## 2023-09-21 NOTE — PROGRESS NOTES
NEPHROLOGY PROGRESS NOTE   Katherine Banks 77 y.o. male MRN: [id number]  Unit/Bed#: -01 Encounter: [id number]  Reason for Consult: renal function monitoring    ASSESSMENT AND PLAN:  Patient is 57-year-old male with significant medical issues of hypertension for many years, presented for elective right radical nephrectomy. We are consulted for postop renal function management.     Status post right laparoscopic radical nephrectomy  -Baseline serum creatinine seems to be 0.6-0.8  -Creatinine fortunately remained stable 0.7 today  -Recommend to discontinue IV fluids if tolerating diet.  -Avoid nephrotoxin or NSAIDs. Agree with holding lisinopril for time being  -UA suzie in May 2023  -Avoid hypotension.     Hypertension  -Blood pressure acceptable and controlled. Avoid hypotension.  -Continue to hold lisinopril for time being and on discharge. This can be considered as outpatient. Currently on metoprolol. Discussed above plan in detail with primary team regarding monitoring renal function, holding lisinopril and they agree with above recommendations. SUBJECTIVE:  Patient seen and examined at bedside. Does have postop discomfort. Complaining of some shoulder pain. Denies any nausea or vomiting.   OBJECTIVE:  Current Weight: Weight - Scale: 59.4 kg (131 lb)  Vitals:    09/21/23 0715   BP: 118/62   Pulse: 62   Resp:    Temp: 98.2 °F (36.8 °C)   SpO2: 96%       Intake/Output Summary (Last 24 hours) at 9/21/2023 0936  Last data filed at 9/21/2023 0908  Gross per 24 hour   Intake 2500 ml   Output 3655 ml   Net -1155 ml     Wt Readings from Last 3 Encounters:   09/20/23 59.4 kg (131 lb)   09/13/23 59.4 kg (131 lb)   09/12/23 59.4 kg (131 lb)     Temp Readings from Last 3 Encounters:   09/21/23 98.2 °F (36.8 °C)   09/12/23 98.3 °F (36.8 °C)   08/29/23 97.5 °F (36.4 °C)     BP Readings from Last 3 Encounters:   09/21/23 118/62   09/13/23 124/72   09/12/23 118/74     Pulse Readings from Last 3 Encounters: 09/21/23 62   09/13/23 64   09/12/23 66        Physical Examination:  Eyes: No conjunctival pallor present  Neck: No obvious lymphadenopathy appreciated  Respiratory: Bilateral air entry present  CVS: No significant edema  GI: Soft, nondistended  CNS: Alert oriented x3  Skin: No new rash  Musculoskeletal: No obvious new gross deformity noted    Medications:    Current Facility-Administered Medications:   •  acetaminophen (TYLENOL) tablet 650 mg, 650 mg, Oral, Q6H [address], Taina Hart MD, 650 mg at 09/21/23 5026  •  busPIRone (BUSPAR) tablet 7.5 mg, 7.5 mg, Oral, BID PRN, Taina Hart MD  •  calcium carbonate (TUMS) chewable tablet 1,000 mg, 1,000 mg, Oral, Daily PRN, Taina Hart MD  •  docusate sodium (COLACE) capsule 100 mg, 100 mg, Oral, BID, Taina Hart MD, 100 mg at 09/21/23 0900  •  enoxaparin (LOVENOX) subcutaneous injection 40 mg, 40 mg, Subcutaneous, Daily, Taina Hart MD, 40 mg at 09/21/23 0900  •  gabapentin (NEURONTIN) capsule 100 mg, 100 mg, Oral, HS, Taina Hart MD, 100 mg at 09/20/23 2121  •  HYDROmorphone (DILAUDID) injection 0.5 mg, 0.5 mg, Intravenous, Q2H PRN, Taina Hart MD, 0.5 mg at 09/20/23 2141  •  lactated ringers bolus 1,000 mL, 1,000 mL, Intravenous, Once PRN **AND** lactated ringers bolus 1,000 mL, 1,000 mL, Intravenous, Once PRN, Taina Hart MD  •  metoprolol succinate (TOPROL-XL) 24 hr tablet 25 mg, 25 mg, Oral, Daily With Breakfast, Taina Hart MD, 25 mg at 09/21/23 0900  •  ondansetron (ZOFRAN) injection 4 mg, 4 mg, Intravenous, Q6H PRN, Taina Hart MD  •  oxyCODONE (ROXICODONE) split tablet 2.5 mg, 2.5 mg, Oral, Q4H PRN **OR** oxyCODONE (ROXICODONE) IR tablet 5 mg, 5 mg, Oral, Q4H PRN, Taina Hart MD, 5 mg at 09/20/23 2006  •  pantoprazole (PROTONIX) EC tablet 40 mg, 40 mg, Oral, Daily, Taina Hart MD, 40 mg at 09/21/23 7395  •  sodium chloride 0.9 % bolus 1,000 mL, 1,000 mL, Intravenous, Once PRN **AND** sodium chloride 0.9 % bolus 1,000 mL, 1,000 mL, Intravenous, Once PRN, Claudeen Acres, MD  •  sodium chloride 0.9 % infusion, 100 mL/hr, Intravenous, Continuous, Claudeen Acres, MD, Last Rate: 100 mL/hr at 09/21/23 0900, 100 mL/hr at 09/21/23 0900  •  tiZANidine (ZANAFLEX) tablet 2 mg, 2 mg, Oral, TID PRN, Claudeen Acres, MD    Laboratory Results:  Results from last 7 days   Lab Units 09/21/23  0536 09/20/23  1335 09/20/23  1049 09/15/23  1002   WBC Thousand/uL 8.10 11.75*  --   --    HEMOGLOBIN g/dL 12.7 13.1  --   --    I STAT HEMOGLOBIN g/dl  --   --  11.9*  --    HEMATOCRIT % 38.8 38.3  --   --    HEMATOCRIT, ISTAT %  --   --  35*  --    PLATELETS Thousands/uL 207 218  --   --    SODIUM mmol/L 138 137  --  138   POTASSIUM mmol/L 4.6 3.9  --  4.0   CHLORIDE mmol/L 104 104  --  102   CO2 mmol/L 28 28  --  28   CO2, I-STAT mmol/L  --   --  28  --    BUN mg/dL 14 15  --  14   CREATININE mg/dL 0.78 0.64  --  0.62   CALCIUM mg/dL 8.6 8.4  --  9.7   GLUCOSE, ISTAT mg/dl  --   --  178*  --        No orders to display       Portions of the record may have been created with voice recognition software. Occasional wrong word or "sound a like" substitutions may have occurred due to the inherent limitations of voice recognition software.  Read the chart carefully and recognize, using context, where substitutions have occurred.'

## 2023-09-21 NOTE — PLAN OF CARE
Problem: PAIN - ADULT  Goal: Verbalizes/displays adequate comfort level or baseline comfort level  Description: Interventions:  - Encourage patient to monitor pain and request assistance  - Assess pain using appropriate pain scale  - Administer analgesics based on type and severity of pain and evaluate response  - Implement non-pharmacological measures as appropriate and evaluate response  - Consider cultural and social influences on pain and pain management  - Notify physician/advanced practitioner if interventions unsuccessful or patient reports new pain  Outcome: Progressing     Problem: INFECTION - ADULT  Goal: Absence or prevention of progression during hospitalization  Description: INTERVENTIONS:  - Assess and monitor for signs and symptoms of infection  - Monitor lab/diagnostic results  - Monitor all insertion sites, i.e. indwelling lines, tubes, and drains  - Monitor endotracheal if appropriate and nasal secretions for changes in amount and color  - [place] appropriate cooling/warming therapies per order  - Administer medications as ordered  - Instruct and encourage patient and family to use good hand hygiene technique  - Identify and instruct in appropriate isolation precautions for identified infection/condition  Outcome: Progressing

## 2023-09-21 NOTE — PLAN OF CARE
Problem: OCCUPATIONAL THERAPY ADULT  Goal: Performs self-care activities at highest level of function for planned discharge setting. See evaluation for individualized goals. Description: Treatment Interventions: ADL retraining, Functional transfer training, UE strengthening/ROM, Endurance training, Patient/family training, Equipment evaluation/education, Fine motor coordination activities, Compensatory technique education, Continued evaluation, Energy conservation, Activityengagement  Equipment Recommended: Other (comment) (Has all appropriate DME @ home as pt reporting that his house is handicap accessible)       See flowsheet documentation for full assessment, interventions and recommendations. Note: Limitation: Decreased ADL status, Decreased UE ROM, Decreased UE strength, Decreased sensation, Decreased fine motor control, Decreased self-care trans, Decreased high-level ADLs  Prognosis: Fair  Assessment: Pt is a very pleasant and cooperative 76 yo male [facility] [place] s/p "Right - NEPHRECTOMY RADICAL LAPAROSCOPIC W/ ROBOTICS" on 9/20. Pt  has a past medical history of Allergic, Arthritis, Asthma, Cancer ([address]), Hypertension, Impaired fasting glucose, Mitral valve disorder, Mitral valve prolapse, Murmur, cardiac, 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, PAC (premature atrial contraction), Parsonage-Cordero syndrome, PVC (premature ventricular contraction), PVC (premature ventricular contraction), Renal cancer ([address]) (2023), and Thyroid nodule. Pt with active OT orders in which OT consulted to assess pt's functional status and occupational performance to determine safe d/c needs. Pt lives with his wife in a 1 story home with 0 JOE. PTA, pt requires assistance w/ ADLs/IADLs and uses SPC vs RW for functional mobility. (-) driving. Currently, pt performing functional transfers w/ supervision, functional mobility w/ CGA, and UB/LB ADLs w/ Mod A.  Pt demonstrates the following limitations/impairments which impact the pt's ability to engage in valued occupations: functional UE ROM, decreased sensation, fine motor skills/dexterity, endurance/activity tolerance, standing tolerance, functional reach, postural/trunk control, strength, and pain. From an OT standpoint, recommend discharge to home with home health OT once medically stable. The patient's raw score on the -PAC Daily Activity Inpatient Short Form is 15. A raw score of less than 19 suggests the patient may benefit from discharge to post-acute rehabilitation services however @ baseline, pt receives assistance w/ ADLs/IADLs therefore please refer to the recommendation of the Occupational Therapist for safe discharge planning. Pt would benefit from skilled OT services 2-3x/wk to address immediate acute care needs and underlying performance skills to promote safety, decrease fall risk, and enhance occupational performance to return to PLOF. Goals to be met within the next 10-14 days.      OT Discharge Recommendation: Home with home health rehabilitation (May benefit from continued outpatient OT when appropriate)

## 2023-09-21 NOTE — PROGRESS NOTES
Pt states pain is "much better" at this time. RN educated and encouraged pt to use incentive spirometer now that pain is improved. All other needs and concerns addressed at this time. Will continue to monitor.

## 2023-09-21 NOTE — CASE MANAGEMENT
Case Management Assessment & Discharge Planning Note    Patient name Mariya Humphreys  Location /-46 MRN [id number]  : 1955 Date 2023       Current Admission Date: 2023  Current Admission Diagnosis:Renal cancer [facility])   Patient Active Problem List    Diagnosis Date Noted   • Depressed mood 2023   • Insomnia 2023   • Skin tear of right elbow without complication    • Acute deep vein thrombosis (DVT) of brachial vein of right upper extremity ([address]) 2023   • Right leg swelling 2023   • Swelling of arm 2023   • Preop examination 2023   • Brachial plexopathy without trauma    • Parsonage-Cordero syndrome 2023   • Constipation 05/10/2023   • Moderate protein-calorie malnutrition ([address]) 2023   • Right renal mass 2023   • Carpal tunnel syndrome of right wrist    • Rotator cuff tear arthropathy of right shoulder 2023   • Radiculopathy, cervical region 2023   • Neuropathy of right upper extremity 2023   • Impaired sensation 2023   • Chronic pain of both shoulders 2023   • Tendinitis of right rotator cuff 2023   • Weight loss 2023   • Diffuse pain in right upper extremity 2023   • Renal cancer ([address])    • Polyneuropathy 2022   • Imbalance 2022   • Muscle weakness 2022   • COVID-19 2022   • Multiple bruises 2022   • Dizziness 2022   • Palpitations 2022   • Seasonal allergies 2022   • Shoulder pain, left 2022   • Rupture of right long head biceps tendon 2021   • Weakness of right upper extremity 2021   • Rotator cuff tear arthropathy of left shoulder 2021   • Fatigue 2021   • Postural hypotension 05/10/2021   • Rupture of left biceps tendon 05/10/2021   • Anxiety 2021   • Essential hypertension 07/15/2020   • Iron deficiency anemia 2019   • NSVT (nonsustained ventricular tachycardia) ([address]) 08/29/2018   • Premature atrial contractions 08/29/2018   • HTN (hypertension) 08/13/2018   • 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 12/30/2013   • Allergic rhinitis 12/03/2013      LOS (days): 1  Geometric Mean LOS (GMLOS) (days): 1.80  Days to GMLOS:0.7     OBJECTIVE:    Risk of Unplanned Readmission Score: 26.75         Current admission status: Inpatient       Preferred Pharmacy:   [address] [facility] [address] [address]  Phone: [phone] Fax: [phone]    Primary Care Provider: Percy Garcia MD    Primary Insurance: [facility]  Secondary Insurance:     ASSESSMENT:  [address], [address] Representative - Spouse   Primary Phone: [phone] (Mobile)                         Readmission Root Cause  30 Day Readmission: No    Patient Information  Admitted from[de-identified] Home  Mental Status: Alert  During Assessment patient was accompanied by: Not accompanied during assessment  Assessment information provided by[de-identified] Patient  Primary Caregiver: Spouse  Caregiver's Name[de-identified] Vann Saint Relationship to Patient[de-identified] Family Member  Caregiver's Telephone Number[de-identified] [phone]  Support Systems: Self, Spouse/significant other  [facility]: [address] do you live in?: [facility] entry access options.  Select all that apply.: No steps to enter home  Type of Current Residence: [place]  In the last 12 months, was there a time when you were not able to pay the mortgage or rent on time?: No  In the last 12 months, how many places have you lived?: 2  In the last 12 months, was there a time when you did not have a steady place to sleep or slept in a shelter (including now)?: No  Homeless/housing insecurity resource given?: N/A  Living Arrangements: Lives w/ Spouse/significant other  Is patient a ?: No    Activities of Daily Living Prior to Admission  Functional Status: Independent  Completes ADLs independently?: Yes  Ambulates independently?: Yes  Does patient use assisted devices?: Yes  Assisted Devices (DME) used: Straight Cane  Does patient currently own DME?: Yes  What DME does the patient currently own?: Shower Chair, Straight Cane, Walker, Bedside Commode  Does patient have a history of Outpatient Therapy (PT/OT)?: Yes  Does the patient have a history of Short-Term Rehab?: Yes  Does patient have a history of HHC?: Yes  Does patient currently have [facility] [address]?: No         Patient Information Continued  Income Source: Pension/FDC  Does patient have prescription coverage?: Yes  Within the past 12 months, you worried that your food would run out before you got the money to buy more.: Never true  Within the past 12 months, the food you bought just didn't last and you didn't have money to get more.: Never true  Food insecurity resource given?: N/A  Does patient receive dialysis treatments?: No  Does patient have a history of substance abuse?: No  Does patient have a history of Mental Health Diagnosis?: No         Means of Transportation  Means of Transport to Appts[de-identified] Family transport  In the past 12 months, has lack of transportation kept you from medical appointments or from getting medications?: No  In the past 12 months, has lack of transportation kept you from meetings, work, or from getting things needed for daily living?: No  Was application for public transport provided?: N/A        DISCHARGE DETAILS:    Discharge planning discussed with[de-identified] Pt  Freedom of Choice: Yes  Comments - Freedom of Choice: Pt agreeable to [address] [place] [address] referral, requesting CHARLI HUMPHREYS  CM contacted family/caregiver?: No- see comments  Were Treatment Team discharge recommendations reviewed with patient/caregiver?: Yes  Did patient/caregiver verbalize understanding of patient care needs?: Yes  Were patient/caregiver advised of the risks associated with not following Treatment Team discharge recommendations?: Yes    Contacts  Patient Contacts: Angela  Contact Method: Phone  Phone Number: [phone]  Reason/Outcome: Emergency [address]         Is the patient interested in [facility] at discharge?: Yes  [facility] requested[de-identified] Occupational Therapy, Physical Ullao Neetu Provider[de-identified] PCP  Home Health Services Needed[de-identified] Strengthening/Theraputic Exercises to Improve Function, Gait/ADL Training, Evaluate Functional Status and Safety  Homebound Criteria Met[de-identified] Uses an Assist Device (i.e. cane, walker, etc)         Other Referral/Resources/Interventions Provided:  Interventions: [facility]  Referral Comments: Pt agreeable to [facility] referrals, requesting [facility] as pt previously had them         Treatment Team Recommendation: Home with [address]  Discharge Destination Plan[de-identified] Home with [address] [facility] at Discharge : Family                                      Additional Comments: CM discussed [facility] options with pt, pt requesting [facility] as pt has previously had services with them.  CM completed [facility] referrals via [email or web address] [address]

## 2023-09-22 ENCOUNTER — NURSE TRIAGE (OUTPATIENT)
Dept: OTHER | Facility: OTHER | Age: 68
End: 2023-09-22

## 2023-09-22 VITALS
TEMPERATURE: 97.6 F | OXYGEN SATURATION: 95 % | HEART RATE: 69 BPM | WEIGHT: 131 LBS | RESPIRATION RATE: 19 BRPM | BODY MASS INDEX: 21.05 KG/M2 | HEIGHT: 66 IN | SYSTOLIC BLOOD PRESSURE: 136 MMHG | DIASTOLIC BLOOD PRESSURE: 73 MMHG

## 2023-09-22 LAB
ANION GAP SERPL CALCULATED.3IONS-SCNC: 4 MMOL/L
BUN SERPL-MCNC: 13 MG/DL (ref 5–25)
CALCIUM SERPL-MCNC: 8.9 MG/DL (ref 8.4–10.2)
CHLORIDE SERPL-SCNC: 108 MMOL/L (ref 96–108)
CO2 SERPL-SCNC: 27 MMOL/L (ref 21–32)
CREAT SERPL-MCNC: 0.85 MG/DL (ref 0.6–1.3)
GFR SERPL CREATININE-BSD FRML MDRD: 88 ML/MIN/1.73SQ M
GLUCOSE SERPL-MCNC: 115 MG/DL (ref 65–140)
POTASSIUM SERPL-SCNC: 3.8 MMOL/L (ref 3.5–5.3)
SODIUM SERPL-SCNC: 139 MMOL/L (ref 135–147)

## 2023-09-22 PROCEDURE — 99232 SBSQ HOSP IP/OBS MODERATE 35: CPT | Performed by: INTERNAL MEDICINE

## 2023-09-22 PROCEDURE — 80048 BASIC METABOLIC PNL TOTAL CA: CPT | Performed by: UROLOGY

## 2023-09-22 PROCEDURE — 97110 THERAPEUTIC EXERCISES: CPT

## 2023-09-22 PROCEDURE — 99024 POSTOP FOLLOW-UP VISIT: CPT | Performed by: NURSE PRACTITIONER

## 2023-09-22 PROCEDURE — 97116 GAIT TRAINING THERAPY: CPT

## 2023-09-22 PROCEDURE — NC001 PR NO CHARGE: Performed by: NURSE PRACTITIONER

## 2023-09-22 RX ADMIN — SODIUM CHLORIDE 100 ML/HR: 0.9 INJECTION, SOLUTION INTRAVENOUS at 04:53

## 2023-09-22 RX ADMIN — METOPROLOL SUCCINATE 25 MG: 25 TABLET, FILM COATED, EXTENDED RELEASE ORAL at 08:38

## 2023-09-22 RX ADMIN — ENOXAPARIN SODIUM 40 MG: 40 INJECTION SUBCUTANEOUS at 08:38

## 2023-09-22 RX ADMIN — PANTOPRAZOLE SODIUM 40 MG: 40 TABLET, DELAYED RELEASE ORAL at 05:00

## 2023-09-22 NOTE — TELEPHONE ENCOUNTER
Reason for Disposition  • [6] Systolic BP  >= 501 OR Diastolic >= 80 AND [9] pregnant    Answer Assessment - Initial Assessment Questions  1. BLOOD PRESSURE: "What is the blood pressure?" "Did you take at least two measurements 5 minutes apart?"      141/72 30 mins ago and 143/71 just now. 2. ONSET: "When did you take your blood pressure?"      States noticed steady increase in BP since hospital stay and since discontinuation of lisinopril. 3. HOW: "How did you obtain the blood pressure?" (e.g., visiting nurse, automatic home BP monitor)      Home BP monitor    4. HISTORY: "Do you have a history of high blood pressure?"      Hypertension    5. MEDICATIONS: "Are you taking any medications for blood pressure?" "Have you missed any doses recently?"      Currently on metoprolol    6. OTHER SYMPTOMS: "Do you have any symptoms?" (e.g., headache, chest pain, blurred vision, difficulty breathing, weakness)    Was just discharged today for radical rt nephrectomy. Is having some pain but says he cannot take pain mediation due to issues with constipation.     Protocols used: HIGH BLOOD PRESSURE-ADULT-AH

## 2023-09-22 NOTE — PLAN OF CARE
Problem: PHYSICAL THERAPY ADULT  Goal: Performs mobility at highest level of function for planned discharge setting. See evaluation for individualized goals. Description: Treatment/Interventions: ADL retraining, Functional transfer training, LE strengthening/ROM, Elevations, Therapeutic exercise, Endurance training, Bed mobility, Gait training, Spoke to nursing, OT          See flowsheet documentation for full assessment, interventions and recommendations. Outcome: Progressing  Note: Prognosis: Good  Problem List: Decreased strength, Decreased range of motion, Decreased endurance, Impaired balance, Decreased mobility, Pain  Assessment: Pt seen for PT treatment session this date with interventions consisting of gait training to normalize gait pattern to decrease fall risk and therapeutic exercise to improve endurance to improve functional mobility. Pt agreeable to PT treatment session upon arrival, pt found seated OOB in recliner, in no apparent distress. Since previous session, pt has made good progress as evidenced by increased distance ambulated, decreased assistance required with mobility, increased activity tolerance and improved balance  Barriers during this session include pain and fatigue. Pt continues to be functioning below baseline level, and remains limited 2* factors listed above and including impaired activity tolerance, pain and decreased mobility. Pt prognosis for achieving goals is good, pending pt progress with hospitalization/medical status improvements, and indicated by motivated to participate in therapy, ability to follow cues, supportive family and improvement with mobility status. PT will continue to see pt during current hospitalization in order to address the deficits listed above and provide interventions consistent w/ POC in effort to achieve goals.  Current goals and POC remain appropriate, pt continues to have rehab potential  Continue to recommend home with home health rehabilitation at time of d/c in order to maximize pt's functional independence and safety w/ mobility. Upon conclusion pt seated OOB in recliner. The patient's AM-PAC Basic Mobility Inpatient Short Form Raw Score is 23. A Raw score of greater than 16 suggests the patient may benefit from discharge to home. Please also refer to the recommendation of the Physical Therapist for safe discharge planning. RN verbalized pt appropriate for PT session. Barriers to Discharge: None     PT Discharge Recommendation: Home with home health rehabilitation    See flowsheet documentation for full assessment.

## 2023-09-22 NOTE — PROGRESS NOTES
NEPHROLOGY PROGRESS NOTE   Vee Rehman 77 y.o. male MRN: [id number]  Unit/Bed#: -01 Encounter: [id number]  Reason for Consult: Renal function monitoring    ASSESSMENT AND PLAN:Patient is 64-year-old male with significant medical issues of hypertension for many years, presented for elective right radical nephrectomy.  We are consulted for postop renal function management.     Status post right laparoscopic radical nephrectomy  -Baseline serum creatinine seems to be 0.6-0.8  -Creatinine fortunately remained stable 0.8 today. -Recommend to discontinue IV fluids if tolerating diet.  -Avoid nephrotoxin or NSAIDs. Continue holding lisinopril for time being  -[facility] in May 2023  -Avoid hypotension.     Hypertension  -Blood pressure acceptable and controlled. Avoid hypotension.  -Continue to hold lisinopril for time being and on discharge. This can be considered as outpatient. Currently on metoprolol. Discussed above plan in detail with primary team regarding discontinuing IV fluid, monitoring renal function and they agree with above recommendations. Double from renal standpoint. Will sign off. Please call with any questions. SUBJECTIVE:  Patient seen and examined at bedside. His postop discomfort seems to be slowly improving. Tolerating some diet. Denies chest pain, worsening shortness of breath.     OBJECTIVE:  Current Weight: Weight - Scale: 59.4 kg (131 lb)  Vitals:    09/22/23 0751   BP: 136/73   Pulse: 69   Resp: 19   Temp: 97.6 °F (36.4 °C)   SpO2: 95%       Intake/Output Summary (Last 24 hours) at 9/22/2023 0935  Last data filed at 9/22/2023 0850  Gross per 24 hour   Intake 1416 ml   Output 2695 ml   Net -1279 ml     Wt Readings from Last 3 Encounters:   09/20/23 59.4 kg (131 lb)   09/13/23 59.4 kg (131 lb)   09/12/23 59.4 kg (131 lb)     Temp Readings from Last 3 Encounters:   09/22/23 97.6 °F (36.4 °C)   09/12/23 98.3 °F (36.8 °C)   08/29/23 97.5 °F (36.4 °C)     BP Readings from Last 3 Encounters:   09/22/23 136/73   09/13/23 124/72   09/12/23 118/74     Pulse Readings from Last 3 Encounters:   09/22/23 69   09/13/23 64   09/12/23 66        Physical Examination:  Eyes: No conjunctival pallor present  Neck: No obvious lymphadenopathy appreciated  Respiratory: Bilateral air entry present  CVS: No significant edema  GI: Soft, nondistended  CNS: Active, alert, oriented  Skin: No new rash  Musculoskeletal: No obvious new gross deformity noted    Medications:    Current Facility-Administered Medications:   •  acetaminophen (TYLENOL) tablet 650 mg, 650 mg, Oral, Q6H [facility], Lissett Delong MD, 650 mg at 09/21/23 1735  •  busPIRone (BUSPAR) tablet 7.5 mg, 7.5 mg, Oral, BID PRN, Lissett Delong MD  •  calcium carbonate (TUMS) chewable tablet 1,000 mg, 1,000 mg, Oral, Daily PRN, Lissett Delong MD  •  docusate sodium (COLACE) capsule 100 mg, 100 mg, Oral, BID, Lissett Delong MD, 100 mg at 09/21/23 0900  •  enoxaparin (LOVENOX) subcutaneous injection 40 mg, 40 mg, Subcutaneous, Daily, Lissett Delong MD, 40 mg at 09/22/23 8088  •  gabapentin (NEURONTIN) capsule 100 mg, 100 mg, Oral, HS, Lissett Delong MD, 100 mg at 09/21/23 2123  •  HYDROmorphone (DILAUDID) injection 0.5 mg, 0.5 mg, Intravenous, Q2H PRN, Lissett Delong MD, 0.5 mg at 09/20/23 2141  •  metoprolol succinate (TOPROL-XL) 24 hr tablet 25 mg, 25 mg, Oral, Daily With Breakfast, Lissett Delong MD, 25 mg at 09/22/23 8204  •  ondansetron (ZOFRAN) injection 4 mg, 4 mg, Intravenous, Q6H PRN, Lissett Delong MD  •  oxyCODONE (ROXICODONE) split tablet 2.5 mg, 2.5 mg, Oral, Q4H PRN **OR** oxyCODONE (ROXICODONE) IR tablet 5 mg, 5 mg, Oral, Q4H PRN, Lissett Delong MD, 5 mg at 09/20/23 2006  •  pantoprazole (PROTONIX) EC tablet 40 mg, 40 mg, Oral, Daily, Lissett Delong MD, 40 mg at 09/22/23 0500  •  sodium chloride 0.9 % infusion, 100 mL/hr, Intravenous, Continuous, Dayami Diaz Christa Carias MD, Last Rate: 100 mL/hr at 09/22/23 0453, 100 mL/hr at 09/22/23 0453  •  tiZANidine (ZANAFLEX) tablet 2 mg, 2 mg, Oral, TID PRN, Bigg Simpson MD    Laboratory Results:  Results from last 7 days   Lab Units 09/22/23  0815 09/21/23  0536 09/20/23  1335 09/20/23  1049 09/15/23  1002   WBC Thousand/uL  --  8.10 11.75*  --   --    HEMOGLOBIN g/dL  --  12.7 13.1  --   --    I STAT HEMOGLOBIN g/dl  --   --   --  11.9*  --    HEMATOCRIT %  --  38.8 38.3  --   --    HEMATOCRIT, ISTAT %  --   --   --  35*  --    PLATELETS Thousands/uL  --  207 218  --   --    SODIUM mmol/L 139 138 137  --  138   POTASSIUM mmol/L 3.8 4.6 3.9  --  4.0   CHLORIDE mmol/L 108 104 104  --  102   CO2 mmol/L 27 28 28  --  28   CO2, I-STAT mmol/L  --   --   --  28  --    BUN mg/dL 13 14 15  --  14   CREATININE mg/dL 0.85 0.78 0.64  --  0.62   CALCIUM mg/dL 8.9 8.6 8.4  --  9.7   GLUCOSE, ISTAT mg/dl  --   --   --  178*  --        No orders to display       Portions of the record may have been created with voice recognition software. Occasional wrong word or "sound a like" substitutions may have occurred due to the inherent limitations of voice recognition software. Read the chart carefully and recognize, using context, where substitutions have occurred.

## 2023-09-22 NOTE — TELEPHONE ENCOUNTER
Regarding: Blood pressure rising  ----- Message from Roe Thao sent at 9/22/2023  7:26 PM EDT -----  "My  came home from the hospital today. They told him to stop his lisinopril, but his bp is going up. It's up to 141/72.  Can he go back to taking his medicine?"

## 2023-09-22 NOTE — PHYSICAL THERAPY NOTE
PT Treatment Note    NAME:  Kaern Saez  DATE: 09/22/23    AGE:   77 y.o. Mrn:   [id number]  ADMIT DX:  Renal mass, right [N28.89]  Performed at least 2 patient identifiers during session: Name and Birthday       09/22/23 1243   PT Last Visit   PT Visit Date 09/22/23   Note Type   Note Type Treatment   Pain Assessment   Pain Assessment Tool 0-10   Pain Score 3   Pain Location/Orientation Location: Abdomen   Pain Onset/Description Onset: Ongoing   Hospital Pain Intervention(s) Repositioned   Restrictions/Precautions   Weight Bearing Precautions Per Order No   Other Precautions Fall Risk;Pain;Limb alert   General   Chart Reviewed No   Family/Caregiver Present Yes   Cognition   Overall Cognitive Status WFL   Arousal/Participation Alert   Attention Within functional limits   Orientation Level Oriented X4   Memory Within functional limits   Following Commands Follows all commands and directions without difficulty   Transfers   Sit to Stand 6  Modified independent   Stand to Sit 6  Modified independent   Ambulation/Elevation   Gait pattern Excessively slow   Gait Assistance 5  Supervision   Additional items Verbal cues   Assistive Device Straight cane   Distance 90 ft   Ambulation/Elevation Additional Comments pt denied dizziness with movement; encouraged pt to continue self mobilization at home   Balance   Static Sitting Normal   Dynamic Sitting Good   Static Standing Fair   Dynamic Standing Fair -   Ambulatory Fair -   Endurance Deficit   Endurance Deficit Yes   Endurance Deficit Description pt reporting fatigue with ambulation   Activity Tolerance   Activity Tolerance Patient limited by fatigue;Patient limited by pain   Assessment   Prognosis Good   Assessment Pt seen for PT treatment session this date with interventions consisting of gait training to normalize gait pattern to decrease fall risk and therapeutic exercise to improve endurance to improve functional mobility.  Pt agreeable to PT treatment session upon arrival, pt found seated OOB in recliner, in no apparent distress. Since previous session, pt has made good progress as evidenced by increased distance ambulated, decreased assistance required with mobility, increased activity tolerance and improved balance  Barriers during this session include pain and fatigue. Pt continues to be functioning below baseline level, and remains limited 2* factors listed above and including impaired activity tolerance, pain and decreased mobility. Pt prognosis for achieving goals is good, pending pt progress with hospitalization/medical status improvements, and indicated by motivated to participate in therapy, ability to follow cues, supportive family and improvement with mobility status. PT will continue to see pt during current hospitalization in order to address the deficits listed above and provide interventions consistent w/ POC in effort to achieve goals. Current goals and POC remain appropriate, pt continues to have rehab potential  Continue to recommend home with home health rehabilitation at time of d/c in order to maximize pt's functional independence and safety w/ mobility. Upon conclusion pt seated OOB in recliner. The patient's AM-[facility] Basic Mobility Inpatient Short Form Raw Score is 23. A Raw score of greater than 16 suggests the patient may benefit from discharge to home. Please also refer to the recommendation of the Physical Therapist for safe discharge planning. RN verbalized pt appropriate for PT session.    Barriers to Discharge None   Goals   Patient Goals to go home today   PT Treatment Day 1   Plan   Progress Progressing toward goals   Recommendation   PT Discharge Recommendation Home with home health rehabilitation   Equipment Recommended   (pt owns a [facility])   AM-PAC Basic Mobility Inpatient   Turning in Flat Bed Without Bedrails 4   Lying on Back to Sitting on Edge of Flat Bed Without Bedrails 4   Moving Bed to Chair 4   Standing Up From Chair Using Arms 4   Walk in Room 4   Climb 3-5 Stairs With Railing 3   Basic Mobility Inpatient Raw Score 23   Basic Mobility Standardized Score 50.88   Highest Level Of Mobility   JH-HLM Goal 7: Walk 25 feet or more   JH-HLM Achieved 7: Walk 25 feet or more     Developed BLE HEP to maintain functional mobility. Exercises included: ankle pumps, LAQs, hip flexion, hip adduction, hip abduction, quad sets, and glut sets. Pt demonstrated understanding of exercises (5 reps of each) including repetitions, frequency, proper position and proper technique. Pt given written HEP with descriptions and pictures of exercises.     Time In: 1220  Time Out: 1243  Total Treatment Minutes: [address] [place], [place]

## 2023-09-22 NOTE — PROGRESS NOTES
Progress Note - Tori Barclay 77 y.o. male MRN: [id number]    Unit/Bed#: -01 Encounter: [id number]      Assessment:  Tori Barclay is a 49-year-old male with history of right renal mass postoperative day 2 right radical nephrectomy. Technical difficulties secondary to large liver adhesions but no complications. Afebrile, hemodynamically stable and adequately pain controlled. Excellent labs this AM.  Abdominal puncture is clean dry and intact. Seen by nephrology. Cleared for discharge. Robin removed that-patient voiding volitionally. Brisk urine output with 1.4 L. Plan:  ·  stable for discharge. · Discontinue lisinopril per nephrology. · Follow-up 10/4 at [facility] for path report. Subjective:   Denies fever, chills, nausea/vomiting. Tolerated breakfast.  Tylenol for pain. Bed and moving freely with the exception of right on stiffness--chronic issue     Objective:     Vitals: Blood pressure 136/73, pulse 69, temperature 97.6 °F (36.4 °C), resp. rate 19, height 5' 6" (1.676 m), weight 59.4 kg (131 lb), SpO2 95 %. ,Body mass index is 21.14 kg/m². Intake/Output Summary (Last 24 hours) at 9/22/2023 0839  Last data filed at 9/22/2023 0500  Gross per 24 hour   Intake 1000 ml   Output 1945 ml   Net -945 ml       Physical Exam: General appearance: alert and oriented, in no acute distress, appears stated age, cooperative and no distress  Head: Normocephalic, without obvious abnormality, atraumatic  Neck: no JVD and supple, symmetrical, trachea midline  Heart: regular rate and rhythm, S1, S2 normal, no murmur, click, rub or gallop  Abdomen: abnormal findings:  mild tenderness in the RLQ, in the lower abdomen and Puncture site/incision Dermabond clean dry and intact  Extremities: extremities normal, warm and well-perfused; no cyanosis, clubbing, or edema  Pulses: 2+ and symmetric  Neurologic: Grossly normal  Robin removed--voiding volitionally. Brisk urinary output.   Clear yellow urine     Invasive Devices     Peripheral Intravenous Line  Duration           Peripheral IV 09/20/23 Left Hand 2 days              Lab Results   Component Value Date    WBC 8.10 09/21/2023    HGB 12.7 09/21/2023    HCT 38.8 09/21/2023    MCV 92 09/21/2023     09/21/2023     Lab Results   Component Value Date    SODIUM 139 09/22/2023    K 3.8 09/22/2023     09/22/2023    CO2 27 09/22/2023    BUN 13 09/22/2023    CREATININE 0.85 09/22/2023    GLUC 115 09/22/2023    CALCIUM 8.9 09/22/2023         Lab, Imaging and other studies: I have personally reviewed pertinent reports.     VTE Pharmacologic Prophylaxis: Enoxaparin (Lovenox)  VTE Mechanical Prophylaxis: sequential compression device

## 2023-09-22 NOTE — DISCHARGE SUMMARY
Discharge Summary - Ginger Mcnulty 77 y.o. male MRN: [id number]    Unit/Bed#: -01 Encounter: [id number]    Admission Date:   Admission Orders (From admission, onward)     Ordered        09/20/23 1206  Inpatient Admission  Once                        Admitting Diagnosis: Renal mass, right [N28.89]    HPI:   Ginger Mcnulty  Is a 78-year-old male with history of right renal mass. Patient was evaluated. After next nation of risk versus benefits and potential complications patient/spouse were agreeable to proceed with surgery. Procedures Performed: No orders of the defined types were placed in this encounter. Right robotic laparoscopic assisted radical nephrectomy    Summary of Hospital Course: Patient was admitted electively 9/20 and taken to operative theater by Dr. Nikolas Tain for robotic assisted laparoscopic right radical nephrectomy. For surgical details please refer to operative report dictated by surgeon of record. Procedure was technically difficult with no complications. Patient remained afebrile, hemodynamically stable throughout hospital stay. He tolerated diet advancement. Robin catheter removed and patient was voiding volitionally and quantity of sufficient amounts. Pain was adequately controlled. He was followed by nephrology who deemed patient stable for discharge.   He was discharged the afternoon of postoperative day 2  We will follow-up in office for path review with Dr. David Domingo/4    Complications: None    Discharge Diagnosis:   Primary diagnosis right renal mass  Patient Active Problem List   Diagnosis   • 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   • HTN (hypertension)   • Allergic rhinitis   • NSVT (nonsustained ventricular tachycardia) ([facility])   • Premature atrial contractions   • Iron deficiency anemia   • Essential hypertension   • Anxiety   • Postural hypotension   • Rupture of left biceps tendon   • Fatigue   • Rotator cuff tear arthropathy of left shoulder   • Weakness of right upper extremity   • Rupture of right long head biceps tendon   • Seasonal allergies   • Shoulder pain, left   • Palpitations   • Dizziness   • Multiple bruises   • COVID-19   • Muscle weakness   • Polyneuropathy   • Imbalance   • Diffuse pain in right upper extremity   • Chronic pain of both shoulders   • Tendinitis of right rotator cuff   • Weight loss   • Impaired sensation   • Radiculopathy, cervical region   • Neuropathy of right upper extremity   • Rotator cuff tear arthropathy of right shoulder   • Carpal tunnel syndrome of right wrist   • Right renal mass   • Moderate protein-calorie malnutrition (HCC)   • Constipation   • Parsonage-Cordero syndrome   • Brachial plexopathy without trauma   • Right leg swelling   • Swelling of arm   • Preop examination   • Skin tear of right elbow without complication   • Acute deep vein thrombosis (DVT) of brachial vein of right upper extremity (HCC)   • Insomnia   • Depressed mood   • Renal cancer [facility])         Medical Problems     Resolved Problems  Date Reviewed: 9/20/2023   None         Condition at Discharge: good         Discharge instructions/Information to patient and family:   See after visit summary for information provided to patient and family. Provisions for Follow-Up Care:  See after visit summary for information related to follow-up care and any pertinent home health orders. PCP: Hernandez Andrews MD    Disposition: Home with home health    Planned Readmission: No      Discharge Statement   I spent 20 minutes discharging the patient. This time was spent on the day of discharge. I had direct contact with the patient on the day of discharge. Additional documentation is required if more than 30 minutes were spent on discharge. Discharge Medications:  See after visit summary for reconciled discharge medications provided to patient and family.

## 2023-09-22 NOTE — PLAN OF CARE
Patient is alert/oriented, afebrile, denies N/V, pain, and SOB on room air. Patient's abdominal surgical sites are clean, dry, and intact. Patient continues on IVF, and voiding without issues. Patient's spouse rooming in. Will continue to assess/monitor. Problem: PAIN - ADULT  Goal: Verbalizes/displays adequate comfort level or baseline comfort level  Description: Interventions:  - Encourage patient to monitor pain and request assistance  - Assess pain using appropriate pain scale  - Administer analgesics based on type and severity of pain and evaluate response  - Implement non-pharmacological measures as appropriate and evaluate response  - Consider cultural and social influences on pain and pain management  - Notify physician/advanced practitioner if interventions unsuccessful or patient reports new pain  Outcome: Progressing     Problem: DISCHARGE PLANNING  Goal: Discharge to home or other facility with appropriate resources  Description: INTERVENTIONS:  - Identify barriers to discharge w/patient and caregiver  - Arrange for needed discharge resources and transportation as appropriate  - Identify discharge learning needs (meds, wound care, etc.)  - Arrange for interpretive services to assist at discharge as needed  - Refer to Case Management Department for coordinating discharge planning if the patient needs post-hospital services based on physician/advanced practitioner order or complex needs related to functional status, cognitive ability, or social support system  Outcome: Progressing     Problem: Knowledge Deficit  Goal: Patient/family/caregiver demonstrates understanding of disease process, treatment plan, medications, and discharge instructions  Description: Complete learning assessment and assess knowledge base.   Interventions:  - Provide teaching at level of understanding  - Provide teaching via preferred learning methods  Outcome: Progressing

## 2023-09-23 ENCOUNTER — NURSE TRIAGE (OUTPATIENT)
Dept: OTHER | Facility: OTHER | Age: 68
End: 2023-09-23

## 2023-09-23 NOTE — TELEPHONE ENCOUNTER
Reason for Disposition  • [3] Systolic BP  >= 975 OR Diastolic >= 80 AND [6] taking BP medications  • Mild bruising near incision site    Answer Assessment - Initial Assessment Questions  1. BLOOD PRESSURE: "What is the blood pressure?" "Did you take at least two measurements 5 minutes apart?"      125/70 in the last 30 mins. This wk has noticed increase in systolic BP. Highest yesterday was 031 systolic. 2. ONSET: "When did you take your blood pressure?"      For the last few days noticed increase in BP      3. HOW: "How did you obtain the blood pressure?" (e.g., visiting nurse, automatic home BP monitor)      Home BP monitor. 4. HISTORY: "Do you have a history of high blood pressure?"      Hypertension    5. MEDICATIONS: "Are you taking any medications for blood pressure?" "Have you missed any doses recently?"      Metoprolol 25 mg 24 hr tab once daily    6. OTHER SYMPTOMS: "Do you have any symptoms?" (e.g., headache, chest pain, blurred vision, difficulty breathing, weakness)      No other symptoms    Answer Assessment - Initial Assessment Questions  1. SYMPTOM: "What's the main symptom you're concerned about?" (e.g., redness, pain, drainage)      Small amount of bruising and redness on lap incisions. 2. ONSET: "When did   start?"      Noticed today    3. SURGERY: "What surgery was performed?"      Laparoscopic radical nephrectomy. 4. DATE of SURGERY: "When was surgery performed?"       9/20    5. INCISION SITE: "Where is the incision located?"       Abdomen    6. REDNESS: "Is there any redness at the incision site?" If yes, ask: "How wide across is the redness?" (Inches, centimeters)       No spreading redness. No swelling    7. PAIN: "Is there any pain?" If Yes, ask: "How bad is it?"  (Scale 1-10; or mild, moderate, severe)      No pain    8. BLEEDING: "Is there any bleeding?" If Yes, ask: "How much?" and "Where?"      No bleeding    9.  DRAINAGE: "Is there any drainage from the incision site?" If yes, ask: "What color and how much?" (e.g., red, cloudy, pus; drops, teaspoon)      No drainage    10. FEVER: "Do you have a fever?" If Yes, ask: "What is your temperature, how was it measured, and when did it start?"        No fever. 11. OTHER SYMPTOMS: "Do you have any other symptoms?" (e.g., shaking chills, weakness, rash elsewhere on body)        No other symptoms. Protocols used: BLOOD PRESSURE - HIGH-ADULT-AH, POST-OP INCISION SYMPTOMS AND QUESTIONS-ADULT-AH      Pt states he was not given any instructions on whether or not he can shower. Did not see any restrictions with showering on AVS but did reach out to on call for Urology to make sure that showering is ok. Per Josh Santiago- it is absolutely fine to shower. Pt aware and verbalized understanding.

## 2023-09-24 ENCOUNTER — HOME CARE VISIT (OUTPATIENT)
Dept: HOME HEALTH SERVICES | Facility: HOME HEALTHCARE | Age: 68
End: 2023-09-24
Payer: COMMERCIAL

## 2023-09-24 VITALS
SYSTOLIC BLOOD PRESSURE: 122 MMHG | OXYGEN SATURATION: 99 % | RESPIRATION RATE: 18 BRPM | TEMPERATURE: 98.4 F | DIASTOLIC BLOOD PRESSURE: 60 MMHG | HEART RATE: 59 BPM

## 2023-09-24 PROCEDURE — G0299 HHS/HOSPICE OF RN EA 15 MIN: HCPCS

## 2023-09-24 PROCEDURE — 400013 VN SOC

## 2023-09-25 ENCOUNTER — TELEPHONE (OUTPATIENT)
Dept: INTERNAL MEDICINE CLINIC | Facility: CLINIC | Age: 68
End: 2023-09-25

## 2023-09-25 ENCOUNTER — TELEPHONE (OUTPATIENT)
Dept: NEPHROLOGY | Facility: CLINIC | Age: 68
End: 2023-09-25

## 2023-09-25 ENCOUNTER — HOME CARE VISIT (OUTPATIENT)
Dept: HOME HEALTH SERVICES | Facility: HOME HEALTHCARE | Age: 68
End: 2023-09-25
Payer: COMMERCIAL

## 2023-09-25 ENCOUNTER — TRANSITIONAL CARE MANAGEMENT (OUTPATIENT)
Dept: INTERNAL MEDICINE CLINIC | Facility: CLINIC | Age: 68
End: 2023-09-25

## 2023-09-25 DIAGNOSIS — I10 PRIMARY HYPERTENSION: Primary | ICD-10-CM

## 2023-09-25 DIAGNOSIS — I10 ESSENTIAL HYPERTENSION: ICD-10-CM

## 2023-09-25 NOTE — TELEPHONE ENCOUNTER
----- Message from Zoe Love PA-C sent at 9/22/2023  4:25 PM EDT -----  Please have pt do repeat BMP in 1 week. Will decide if f/u needed based on BMP results.

## 2023-09-25 NOTE — TELEPHONE ENCOUNTER
Spoke with pt and advise, He can eat a heart healthy diet that is low in sodium    Per Dr. Alex Boss   if his blood pressure is consistently over 150/90 pt should give us a call

## 2023-09-25 NOTE — TELEPHONE ENCOUNTER
Called patient and spoke with Angela went over the above message. Angela said they will do lab. No further questions at this time.

## 2023-09-25 NOTE — TELEPHONE ENCOUNTER
How have his blood pressures been? I suspect his lisinopril was discontinued since he only has 1 functional kidney now. For better blood pressure control we can start amlodipine 5 mg daily. He can eat a heart healthy diet that is low in sodium. Regarding his rash it is hard to say what it is without seeing it. He would need to come in for evaluation.

## 2023-09-25 NOTE — TELEPHONE ENCOUNTER
When TCM call was made patient has several questions: At discharge they discontinued lisinopril, he asked if you are agreeable to this? His BP is elevated. Will this medication be replace with another? What would be your recommendations for elevated BP? On left arm his wife noticed a red rash, it is not bumpy or raised. It is not hot to touch. It is on the arm he had IV hooked up to while in hospital but the rash is not in the area the IV was but on side.      Are there any diet restrictions with the procedure he had done while in hospital? Valtrex Counseling: I discussed with the patient the risks of valacyclovir including but not limited to kidney damage, nausea, vomiting and severe allergy.  The patient understands that if the infection seems to be worsening or is not improving, they are to call.

## 2023-09-25 NOTE — TELEPHONE ENCOUNTER
Patient made aware of Dr. Peña Spaulding note. States patient's most recent BP this morning was 116/ 66, HR 64. Made aware of the low sodium diet. Patient states that he was told to only take amlodipine if his BP was higher than 150/90. Please follow up.

## 2023-09-25 NOTE — CASE COMMUNICATION
[facility] has admitted your patient to [facility] service with the following disciplines:      SN, PT, OT and MSW  This report is informational only, no responses is needed  Primary focus of home health care: renal.  Patient stated goals of care: heal up and be able to function as prior to surgery; be able to get in and out of bed independently. Anticipated visit pattern and next visit date: 2w1, 1w4, Next SN visit 23. See  medication list - meds in home differ from AVS:  Medication not on MAR/Epic and AVS, but pt is still taking: Lidocaine 5% ointment and Miralex. - ok to take per TT from [address]. Medications on AVS and MAR/Epic, but pt is taking differently: tiZANidine (ZANAFLEX) 2 mg tablet () - pt takes as needed; acetaminophen (TYLENOL) 325 mg tablet - pt takes 1000mg as needed for pain. Significant clinical findings: Please b e advised, EMR (Epic) identified drug interactions for this patient based on current medication profile. Potential barriers to goal achievement: impaired mobility, pain, decreased endurance. Other pertinent information: pt and wife requested MSW consult - ok per TT from [address]. Thank you for allowing us to participate in the care of your patient.       Heather Adler RN

## 2023-09-26 ENCOUNTER — TELEPHONE (OUTPATIENT)
Dept: LAB | Facility: HOSPITAL | Age: 68
End: 2023-09-26

## 2023-09-27 ENCOUNTER — HOME CARE VISIT (OUTPATIENT)
Dept: HOME HEALTH SERVICES | Facility: HOME HEALTHCARE | Age: 68
End: 2023-09-27
Payer: COMMERCIAL

## 2023-09-27 PROCEDURE — 88342 IMHCHEM/IMCYTCHM 1ST ANTB: CPT | Performed by: PATHOLOGY

## 2023-09-27 PROCEDURE — 88341 IMHCHEM/IMCYTCHM EA ADD ANTB: CPT | Performed by: PATHOLOGY

## 2023-09-27 PROCEDURE — G0155 HHCP-SVS OF CSW,EA 15 MIN: HCPCS

## 2023-09-27 PROCEDURE — 88307 TISSUE EXAM BY PATHOLOGIST: CPT | Performed by: PATHOLOGY

## 2023-09-28 ENCOUNTER — HOME CARE VISIT (OUTPATIENT)
Dept: HOME HEALTH SERVICES | Facility: HOME HEALTHCARE | Age: 68
End: 2023-09-28
Payer: COMMERCIAL

## 2023-09-28 VITALS
TEMPERATURE: 97.7 F | HEART RATE: 58 BPM | SYSTOLIC BLOOD PRESSURE: 130 MMHG | RESPIRATION RATE: 16 BRPM | DIASTOLIC BLOOD PRESSURE: 76 MMHG | OXYGEN SATURATION: 98 %

## 2023-09-28 PROCEDURE — G0299 HHS/HOSPICE OF RN EA 15 MIN: HCPCS

## 2023-09-28 PROCEDURE — G0152 HHCP-SERV OF OT,EA 15 MIN: HCPCS

## 2023-09-29 ENCOUNTER — HOME CARE VISIT (OUTPATIENT)
Dept: HOME HEALTH SERVICES | Facility: HOME HEALTHCARE | Age: 68
End: 2023-09-29
Payer: COMMERCIAL

## 2023-09-29 ENCOUNTER — APPOINTMENT (OUTPATIENT)
Dept: LAB | Facility: HOSPITAL | Age: 68
End: 2023-09-29
Payer: COMMERCIAL

## 2023-09-29 VITALS — OXYGEN SATURATION: 97 % | DIASTOLIC BLOOD PRESSURE: 68 MMHG | HEART RATE: 68 BPM | SYSTOLIC BLOOD PRESSURE: 121 MMHG

## 2023-09-29 DIAGNOSIS — I10 ESSENTIAL HYPERTENSION: ICD-10-CM

## 2023-09-29 DIAGNOSIS — I10 PRIMARY HYPERTENSION: ICD-10-CM

## 2023-09-29 LAB
ANION GAP SERPL CALCULATED.3IONS-SCNC: 9 MMOL/L
BUN SERPL-MCNC: 18 MG/DL (ref 5–25)
CALCIUM SERPL-MCNC: 10 MG/DL (ref 8.4–10.2)
CHLORIDE SERPL-SCNC: 101 MMOL/L (ref 96–108)
CO2 SERPL-SCNC: 26 MMOL/L (ref 21–32)
CREAT SERPL-MCNC: 0.78 MG/DL (ref 0.6–1.3)
GFR SERPL CREATININE-BSD FRML MDRD: 91 ML/MIN/1.73SQ M
GLUCOSE SERPL-MCNC: 84 MG/DL (ref 65–140)
POTASSIUM SERPL-SCNC: 4.2 MMOL/L (ref 3.5–5.3)
SODIUM SERPL-SCNC: 136 MMOL/L (ref 135–147)

## 2023-09-29 PROCEDURE — G0151 HHCP-SERV OF PT,EA 15 MIN: HCPCS

## 2023-09-29 PROCEDURE — 36415 COLL VENOUS BLD VENIPUNCTURE: CPT

## 2023-09-29 PROCEDURE — 80048 BASIC METABOLIC PNL TOTAL CA: CPT

## 2023-10-02 PROCEDURE — G0180 MD CERTIFICATION HHA PATIENT: HCPCS | Performed by: UROLOGY

## 2023-10-03 ENCOUNTER — HOME CARE VISIT (OUTPATIENT)
Dept: HOME HEALTH SERVICES | Facility: HOME HEALTHCARE | Age: 68
End: 2023-10-03
Payer: COMMERCIAL

## 2023-10-03 PROCEDURE — G0299 HHS/HOSPICE OF RN EA 15 MIN: HCPCS

## 2023-10-04 ENCOUNTER — PATIENT OUTREACH (OUTPATIENT)
Dept: HEMATOLOGY ONCOLOGY | Facility: CLINIC | Age: 68
End: 2023-10-04

## 2023-10-04 ENCOUNTER — OFFICE VISIT (OUTPATIENT)
Dept: UROLOGY | Facility: AMBULATORY SURGERY CENTER | Age: 68
End: 2023-10-04

## 2023-10-04 ENCOUNTER — HOME CARE VISIT (OUTPATIENT)
Dept: HOME HEALTH SERVICES | Facility: HOME HEALTHCARE | Age: 68
End: 2023-10-04
Payer: COMMERCIAL

## 2023-10-04 VITALS
BODY MASS INDEX: 20.34 KG/M2 | WEIGHT: 126 LBS | DIASTOLIC BLOOD PRESSURE: 88 MMHG | OXYGEN SATURATION: 99 % | SYSTOLIC BLOOD PRESSURE: 150 MMHG | HEART RATE: 68 BPM

## 2023-10-04 VITALS
HEART RATE: 69 BPM | OXYGEN SATURATION: 98 % | DIASTOLIC BLOOD PRESSURE: 68 MMHG | RESPIRATION RATE: 16 BRPM | SYSTOLIC BLOOD PRESSURE: 122 MMHG | TEMPERATURE: 97.7 F

## 2023-10-04 DIAGNOSIS — C64.1 RENAL CELL CARCINOMA OF RIGHT KIDNEY (HCC): Primary | ICD-10-CM

## 2023-10-04 DIAGNOSIS — C64.1 PRIMARY CANCER OF RIGHT KIDNEY WITH METASTASIS FROM KIDNEY TO OTHER SITE (HCC): Primary | ICD-10-CM

## 2023-10-04 PROCEDURE — G0152 HHCP-SERV OF OT,EA 15 MIN: HCPCS

## 2023-10-04 PROCEDURE — 99024 POSTOP FOLLOW-UP VISIT: CPT | Performed by: UROLOGY

## 2023-10-04 NOTE — PROGRESS NOTES
Patient status post robotic right nephrectomy September 2023. He has done well postoperatively. No complaints. He has been losing some weight even prior to surgery likely due to his chronic condition. He reports he is eating well with good appetite. Normal bowel and bladder function. Postoperative GFR 90. On examination his abdomen is soft, nontender, nondistended. Incisions are healing well. There is no incisional hernia. We reviewed his pathology stage III renal cell carcinoma. Will require surveillance with follow-up CT chest, abdomen, pelvis in 3 months and then thereafter periodically for 5 years. Discussed potential oncology evaluation for consideration of adjuvant therapy, genetic evaluation, observation. Will forward to oncology nurse navigator and determine next steps. Patient and his wife understand and agree with the plan. All questions answered to their satisfaction.

## 2023-10-04 NOTE — PROGRESS NOTES
Call to pt and introduced myself as Nurse Navigator and explained my role in his care with coordinating appts, being a point of contact, providing support and connecting him with available resources as needed. General assessment completed and evaluated for any barriers to care. Referral to Oncology Social Work placed. Answered questions to pt's satisfaction. Reviewed upcoming appts. Provided support and encouraged to call with any questions or concerns. Future Appointments   Date Time Provider [address]   10/5/2023  1:20 PM Benjamin Florence MD [facility]   10/6/2023 11:00 AM Raghavendra Erazo OT [facility]   10/10/2023  7:00 AM Michael Malone RN [facility]   10/11/2023 To Be Determined Jude Blanes, OT [facility]   10/13/2023 To Be Determined Jude Blanes, OT [facility]   10/17/2023 To Be Determined Michael Malone RN [facility]   10/18/2023 To Be Determined Jude Blanes, OT [facility]   10/24/2023 10:40 AM DO STEVE Lafleur [facility]   10/24/2023 To Be Determined Michael Malone RN [facility]   10/25/2023 To Be Determined Jude Blanes, OT [facility]   11/7/2023  3:00 PM Nini Torrez MD [facility]   1/2/2024 11:30 AM AN CT WG 1 AN WG CT [facility] [place]   1/16/2024  3:00 PM MD Lg Hughes spoke directly with wife, Angela. Discussed oncology care coordination, the role in [facility] care. Questioned the need for medical oncology and was informed of the importance of this step in order to make decisions on adjunctive treatments. Wife reports understanding and asks to call back late today to discuss further. Call back with patient and wife. Pt reports Loosing weight. Worried about the body's ability to handle any medication. Explained importance of having medical oncology consult to gather more information.  Patient agreeable at the [facility]. Outreached Dr. Reggie Khanna and Sammie Brittle for scheduling options. Patient scheduled for Dr. Reggie Khanna 10/9/23 2:00 at [facility].  Patient/wife agreeable

## 2023-10-05 ENCOUNTER — OFFICE VISIT (OUTPATIENT)
Dept: INTERNAL MEDICINE CLINIC | Facility: CLINIC | Age: 68
End: 2023-10-05
Payer: COMMERCIAL

## 2023-10-05 ENCOUNTER — PATIENT OUTREACH (OUTPATIENT)
Dept: CASE MANAGEMENT | Facility: HOSPITAL | Age: 68
End: 2023-10-05

## 2023-10-05 VITALS
SYSTOLIC BLOOD PRESSURE: 126 MMHG | HEART RATE: 66 BPM | WEIGHT: 127 LBS | HEIGHT: 66 IN | DIASTOLIC BLOOD PRESSURE: 80 MMHG | BODY MASS INDEX: 20.41 KG/M2 | OXYGEN SATURATION: 99 %

## 2023-10-05 DIAGNOSIS — E44.0 MODERATE PROTEIN-CALORIE MALNUTRITION (HCC): ICD-10-CM

## 2023-10-05 DIAGNOSIS — C64.1 MALIGNANT NEOPLASM OF RIGHT KIDNEY (HCC): Primary | ICD-10-CM

## 2023-10-05 DIAGNOSIS — I10 ESSENTIAL HYPERTENSION: ICD-10-CM

## 2023-10-05 DIAGNOSIS — Z23 ENCOUNTER FOR IMMUNIZATION: ICD-10-CM

## 2023-10-05 DIAGNOSIS — G54.5 PARSONAGE-TURNER SYNDROME: ICD-10-CM

## 2023-10-05 DIAGNOSIS — R63.4 WEIGHT LOSS, ABNORMAL: ICD-10-CM

## 2023-10-05 PROCEDURE — 99214 OFFICE O/P EST MOD 30 MIN: CPT | Performed by: INTERNAL MEDICINE

## 2023-10-05 PROCEDURE — 90662 IIV NO PRSV INCREASED AG IM: CPT

## 2023-10-05 PROCEDURE — G0008 ADMIN INFLUENZA VIRUS VAC: HCPCS

## 2023-10-05 RX ORDER — AMLODIPINE BESYLATE 5 MG/1
5 TABLET ORAL DAILY
COMMUNITY

## 2023-10-05 NOTE — ASSESSMENT & PLAN NOTE
-Referral to nutrition as above.  -I suspect part of his weight loss may be due to his underlying depressed mood. -Patient previously given a trial of mirtazapine however he states he developed significant side effects on this. Discussed a possible trial of low-dose Zoloft 25 mg daily. He and his wife state that they will consider it.

## 2023-10-05 NOTE — ASSESSMENT & PLAN NOTE
-Status post right radical nephrectomy  -Patient was notified by urology that his pathology is consistent with stage III renal cell carcinoma. They have referred him to oncology for further evaluation and to determine if adjuvant therapy is needed.   -3-month surveillance CT scan has been scheduled

## 2023-10-05 NOTE — ASSESSMENT & PLAN NOTE
- Blood pressure well controlled  -Currently using amlodipine 5 mg as needed for systolic blood pressures above 150

## 2023-10-05 NOTE — PATIENT INSTRUCTIONS
-Consider initiating Zoloft for anxiety and depression   -A referral has been made to see a Nutritionist   -Keep scheduled appointment with Dr. Jessica Gastelum in Oncology

## 2023-10-05 NOTE — PROGRESS NOTES
Name: Manan Love      : 1955      MRN: [id number]  Encounter Provider: Lauren Elizalde MD  Encounter Date: 10/5/2023   [facility] department: [facility]    Assessment & Plan     1. Malignant neoplasm of right kidney [facility])  Assessment & Plan:  -Status post right radical nephrectomy  -Patient was notified by urology that his pathology is consistent with stage III renal cell carcinoma. They have referred him to oncology for further evaluation and to determine if adjuvant therapy is needed. -3-month surveillance CT scan has been scheduled    Orders:  -     Ambulatory Referral to Nutrition Services; Future    2. Essential hypertension  Assessment & Plan:  - Blood pressure well controlled  -Currently using amlodipine 5 mg as needed for systolic blood pressures above 150      3. Parsonage-Cordero syndrome  Assessment & Plan:  -Slowly improving  -Follow-up with neurology scheduled in November      4. Moderate protein-calorie malnutrition ([address])  Assessment & Plan:  -A referrals been made to nutrition      Orders:  -     Ambulatory Referral to Nutrition Services; Future    5. Weight loss, abnormal  Assessment & Plan:  -Referral to nutrition as above.  -I suspect part of his weight loss may be due to his underlying depressed mood. -Patient previously given a trial of mirtazapine however he states he developed significant side effects on this. Discussed a possible trial of low-dose Zoloft 25 mg daily. He and his wife state that they will consider it. Orders:  -     Ambulatory Referral to Nutrition Services; Future    6. Encounter for immunization  -     influenza vaccine, high-dose, PF 0.7 mL (FLUZONE HIGH-DOSE)           Subjective      HPI  Presents today for follow-up post hospitalization. He was admitted electively on  for a robotic assisted laparoscopic right radical nephrectomy. His procedure went well without complication.   Patient reports he was recently seen by his urologist and was told that the pathology from his procedure revealed stage III renal cell carcinoma. Since discharge the patient has been undergoing PT/OT at home. He also has skilled nursing coming in throughout the week. Overall he feels his pain is well controlled. He reports he has lost a few pounds since his procedure. He reports he has been trying to get protein in the form of shakes such as boost.    All other systems negative except for pertinent findings noted in HPI. Current Outpatient Medications on File Prior to Visit   Medication Sig   • acetaminophen (TYLENOL) 325 mg tablet Take 2 tablets (650 mg total) by mouth every 6 (six) hours as needed for mild pain (Patient taking differently: Take 650 mg by mouth every 6 (six) hours as needed for mild pain Pt takes 2X 500mg tabs as needed for pain.)   • amLODIPine (NORVASC) 5 mg tablet Take 5 mg by mouth daily Prn for SBP > 150   • bisacodyl (DULCOLAX) 5 mg EC tablet Take 1 tablet (5 mg total) by mouth daily as needed for constipation   • busPIRone (BUSPAR) 7.5 mg tablet Take 1 tablet (7.5 mg total) by mouth 2 (two) times a day as needed (Anxiety)   • Docosahexaenoic Acid (DHA OMEGA 3) 100 MG CAPS Take 6 capsules by mouth daily   • docusate sodium (COLACE) 100 mg capsule Take 1 capsule (100 mg total) by mouth 2 (two) times a day   • fexofenadine (ALLEGRA) 180 MG tablet Take 1 tablet by mouth daily as needed   • gabapentin (Neurontin) 100 mg capsule Take 1 capsule (100 mg total) by mouth daily at bedtime   • lidocaine 0.5 % topical gel Apply 1 Application topically as needed (pain).  Indications: pain   • metoprolol succinate (TOPROL-XL) 25 mg 24 hr tablet Take 1 tablet (25 mg total) by mouth daily with breakfast   • Multiple Vitamin tablet Take 1 tablet by mouth daily   • oxyCODONE (ROXICODONE) 5 immediate release tablet Take 5 mg by mouth every 6 (six) hours as needed for moderate pain 1/2 tab every 6 hr PRN severe pain   • tiZANidine (ZANAFLEX) 2 mg tablet Take 1 tablet (2 mg total) by mouth 3 (three) times a day (Patient taking differently: Take 2 mg by mouth if needed)   • traMADol (ULTRAM) 50 mg tablet Take 50 mg by mouth every 8 (eight) hours as needed for moderate pain   • polyethylene glycol (GLYCOLAX) 17 GM/SCOOP powder Take 17 g by mouth as needed (constipation).  Indications: Constipation (Patient not taking: Reported on 10/4/2023)   • [DISCONTINUED] Coenzyme Q10 (CO Q 10 PO) Take by mouth in the morning (Patient not taking: Reported on 8/22/2023)       Objective     /80   Pulse 66   Ht 5' 6" (1.676 m)   Wt 57.6 kg (127 lb)   SpO2 99%   BMI 20.50 kg/m²     BP Readings from Last 3 Encounters:   10/05/23 126/80   10/04/23 150/88   10/03/23 122/68        Wt Readings from Last 3 Encounters:   10/05/23 57.6 kg (127 lb)   10/04/23 57.2 kg (126 lb)   09/20/23 59.4 kg (131 lb)       Physical Exam    General: NAD  HEENT: NCAT, EOMI, normal conjunctiva  Cardiovascular: RRR, normal S1 and S2, no m/r/g  Pulmonary: Normal respiratory effort, no wheezes, rales or rhonchi  MSK: Decreased bulk and tone in right upper extremity, right biceps fasciculations  Neuro: Non-focal, ambulating without difficulty, non-antalgic gait  Extremities: No lower extremity edema  Skin: Surgical incisions on abdomen well-healed    Addis Knutson MD

## 2023-10-06 ENCOUNTER — HOME CARE VISIT (OUTPATIENT)
Dept: HOME HEALTH SERVICES | Facility: HOME HEALTHCARE | Age: 68
End: 2023-10-06
Payer: COMMERCIAL

## 2023-10-06 VITALS — OXYGEN SATURATION: 97 % | HEART RATE: 68 BPM | SYSTOLIC BLOOD PRESSURE: 120 MMHG | DIASTOLIC BLOOD PRESSURE: 70 MMHG

## 2023-10-06 PROCEDURE — G0152 HHCP-SERV OF OT,EA 15 MIN: HCPCS

## 2023-10-09 ENCOUNTER — HOME CARE VISIT (OUTPATIENT)
Dept: HOME HEALTH SERVICES | Facility: HOME HEALTHCARE | Age: 68
End: 2023-10-09
Payer: COMMERCIAL

## 2023-10-09 ENCOUNTER — CONSULT (OUTPATIENT)
Dept: HEMATOLOGY ONCOLOGY | Facility: CLINIC | Age: 68
End: 2023-10-09
Payer: COMMERCIAL

## 2023-10-09 VITALS
HEART RATE: 67 BPM | DIASTOLIC BLOOD PRESSURE: 80 MMHG | WEIGHT: 131 LBS | HEIGHT: 66 IN | RESPIRATION RATE: 17 BRPM | OXYGEN SATURATION: 98 % | BODY MASS INDEX: 21.05 KG/M2 | TEMPERATURE: 97.6 F | SYSTOLIC BLOOD PRESSURE: 130 MMHG

## 2023-10-09 VITALS — HEART RATE: 82 BPM | SYSTOLIC BLOOD PRESSURE: 115 MMHG | OXYGEN SATURATION: 97 % | DIASTOLIC BLOOD PRESSURE: 70 MMHG

## 2023-10-09 DIAGNOSIS — K59.00 CONSTIPATION, UNSPECIFIED CONSTIPATION TYPE: ICD-10-CM

## 2023-10-09 DIAGNOSIS — C64.1 MALIGNANT NEOPLASM OF RIGHT KIDNEY (HCC): Primary | ICD-10-CM

## 2023-10-09 DIAGNOSIS — G54.5 PARSONAGE-TURNER SYNDROME: ICD-10-CM

## 2023-10-09 DIAGNOSIS — C64.1 PRIMARY CANCER OF RIGHT KIDNEY WITH METASTASIS FROM KIDNEY TO OTHER SITE (HCC): ICD-10-CM

## 2023-10-09 DIAGNOSIS — F41.9 ANXIETY: ICD-10-CM

## 2023-10-09 PROCEDURE — 99204 OFFICE O/P NEW MOD 45 MIN: CPT | Performed by: INTERNAL MEDICINE

## 2023-10-09 PROCEDURE — G0152 HHCP-SERV OF OT,EA 15 MIN: HCPCS

## 2023-10-09 RX ORDER — BISACODYL 5 MG/1
5 TABLET, DELAYED RELEASE ORAL DAILY PRN
Qty: 30 TABLET | Refills: 0 | Status: SHIPPED | OUTPATIENT
Start: 2023-10-09

## 2023-10-09 RX ORDER — BUSPIRONE HYDROCHLORIDE 7.5 MG/1
7.5 TABLET ORAL 2 TIMES DAILY PRN
Qty: 60 TABLET | Refills: 0 | Status: SHIPPED | OUTPATIENT
Start: 2023-10-09

## 2023-10-09 RX ORDER — GABAPENTIN 100 MG/1
100 CAPSULE ORAL
Qty: 90 CAPSULE | Refills: 0 | Status: SHIPPED | OUTPATIENT
Start: 2023-10-09

## 2023-10-09 NOTE — PROGRESS NOTES
Hematology / Oncology Outpatient Consult Note    Maryam Delarosa 77 y.o. 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 Literature review determined that Parsonage-Cordero syndrome is a neurologic autoimmune disease characterized by rapid development of severe shoulder and arm pain followed by muscular atrophy in the areas affected. Typically it occurs in the brachial plexus, and extends from the spine to the neck, armpits, arms and controls movement and sensations in these areas. Male sex, surgery, strenuous exercise, minor trauma, anesthesia and or a viral infection or some of the risks factors. Although patient has stage III renal cell carcinoma status post radical right nephrectomy with risk features such as extension into renal vein we strongly believe that patient is not a candidate for immunotherapy as he has another autoimmune disease which is Parsonage-Cordero syndrome. We recommended patient should follow-up with neurology specialist who deals with Parsonage-Cordero syndrome/diarrheal disorders. Patient to follow-up with urology for serial scans for monitoring disease progression. Patient to follow-up with us on as-needed basis.       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 Prior to radical nephrectomy CT of the abdomen and pelvis (7/15/23) showed enlarging solid right upper pole renal mass suggesting malignancy. MRI brain, thoracic, lumbar spine shows mild degenerative change without cord compression or nerve impingement. Patient was recently diagnosed with Parsonage-Cordero syndrome and has been evaluated by neurology. Patient reports that he has been losing weight for the past 6 months. He is undergoing physical therapy for his right shoulder. He denies any chest pain, nausea, vomiting, headache, dizziness, falls. He does endorse change in appetite, weight loss, poor sensation in the right lower and right upper extremity. Objective:     Primary Diagnosis:  stage III (pT3a , pNX, cM0) right kidney s/p radical nephrectomy       Cancer Staging:  Cancer Staging   Renal cancer [facility])  Staging form: Kidney, AJCC 8th Edition  - Clinical: cT1b, cN0 - Signed by Tavo Avalos MD on 10/9/2023  Stage prefix: Initial diagnosis  Histologic grade (G): G3  Histologic grading system: 4 grade system        Previous Hematologic/ Oncologic Treatment:   none      Current Hematologic/ Oncologic Treatment:    none      Disease Status:   Stable      Test Results:    Pathology:  Final Diagnosis  A. 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 - Tumor invades renal sinus tissue with suggestion of vascular invasion (extension into the renal sinus/extension into major vein         [renal vein or its segmental branches, inferior vena cava]. )     - Surgical margin negative for tumor.         Physical Exam:      General Appearance:    Alert, oriented , flat affect       Eyes:    EOMI   Ears:    Normal external ear canals, both ears   Nose:   Nares normal, septum midline   Throat:   Mucosa moist. Pharynx without injection. Neck:   Supple       Lungs:     Clear to auscultation bilaterally   Chest Wall:    No tenderness or deformity    Heart:    Regular rate and rhythm       Abdomen:     Soft, non-tender, bowel sounds +, no organomegaly           Extremities:  Limited upper extremity range of motion specially in the right arm due to Parsonage-Cordero syndrome, extremities no cyanosis or edema       Skin:   no rash or icterus. Lymph nodes:   Cervical, supraclavicular, and axillary nodes normal   Neurologic:   CNII-XII intact        Breast exam:   NA         ROS: Review of Systems        Imaging: No results found.       Labs:   Lab Results   Component Value Date    WBC 8.10 09/21/2023    HGB 12.7 09/21/2023    HCT 38.8 09/21/2023    MCV 92 09/21/2023     09/21/2023     Lab Results   Component Value Date     07/29/2017    K 4.2 09/29/2023     09/29/2023    CO2 26 09/29/2023    ANIONGAP 20 11/21/2015    BUN 18 09/29/2023    CREATININE 0.78 09/29/2023    GLUCOSE 178 (H) 09/20/2023    GLUF 93 09/15/2023    CALCIUM 10.0 09/29/2023    AST 26 08/05/2023    ALT 24 08/05/2023    ALKPHOS 37 08/05/2023    PROT 6.8 07/29/2017    BILITOT 0.7 07/29/2017    EGFR 91 09/29/2023       No components found for: "CA27.29"    No components found for: ""    No results found for: "LDH"    No results found for: "SPEP", "UPEP"    Lab Results   Component Value Date    PSA 0.41 03/25/2023    PSA 0.35 10/23/2021    PSA 0.4 02/14/2020       No results found for: "CEA"    No components found for: ""    No results found for: ""    No results found for: "AFP"    No results found for: "HCG"    Lab Results   Component Value Date    IRON 122 07/24/2021    TIBC TNP 07/24/2021    FERRITIN 52 07/24/2021       Lab Results   Component Value Date    KNZOFEGT86 1,316 (H) 04/14/2023       Lab Results   Component Value Date    FOLATE >20.0 (H) 04/14/2023           Vital Sign:    Body surface area is 1.67 meters squared.     Wt Readings from Last 3 Encounters:   10/09/23 59.4 kg (131 lb)   10/05/23 57.6 kg (127 lb)   10/04/23 57.2 kg (126 lb)        Temp Readings from Last 3 Encounters:   10/09/23 97.6 °F (36.4 °C) (Temporal)   10/03/23 97.7 °F (36.5 °C)   09/28/23 97.7 °F (36.5 °C)        BP Readings from Last 3 Encounters:   10/09/23 130/80   10/09/23 115/70   10/06/23 120/70         Pulse Readings from Last 3 Encounters:   10/09/23 67   10/09/23 82   10/06/23 68     @LASTSAO2(3)@    Active Problems:   Patient Active Problem List   Diagnosis   • 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   • HTN (hypertension)   • Allergic rhinitis   • NSVT (nonsustained ventricular tachycardia) ([facility])   • Premature atrial contractions   • Iron deficiency anemia   • Essential hypertension   • Anxiety   • Postural hypotension   • Rupture of left biceps tendon   • Fatigue   • Rotator cuff tear arthropathy of left shoulder   • Weakness of right upper extremity   • Rupture of right long head biceps tendon   • Seasonal allergies   • Shoulder pain, left   • Palpitations   • Dizziness   • Multiple bruises   • COVID-19   • Muscle weakness   • Polyneuropathy   • Imbalance   • Diffuse pain in right upper extremity   • Chronic pain of both shoulders   • Tendinitis of right rotator cuff   • Impaired sensation   • Radiculopathy, cervical region   • Neuropathy of right upper extremity   • Rotator cuff tear arthropathy of right shoulder   • Carpal tunnel syndrome of right wrist   • Right renal mass   • Moderate protein-calorie malnutrition (HCC)   • Constipation   • Parsonage-Cordero syndrome   • Brachial plexopathy without trauma   • Right leg swelling   • Swelling of arm   • Preop examination   • Skin tear of right elbow without complication   • Acute deep vein thrombosis (DVT) of brachial vein of right upper extremity ([facility])   • Insomnia   • Depressed mood   • Renal cancer ([address])   • Weight loss, abnormal       Past Medical History:   Past Medical History:   Diagnosis Date   • Allergic    • Arthritis    • Asthma    • Cancer ([address])     kidney   • Hypertension    • Impaired fasting glucose    • Mitral valve disorder    • Mitral valve prolapse    • Murmur, cardiac    • 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    • PAC (premature atrial contraction)    • Parsonage-Cordero syndrome    • PVC (premature ventricular contraction)    • PVC (premature ventricular contraction)    • Renal cancer ([address]) 2023   • Thyroid nodule        Surgical History:   Past Surgical History:   Procedure Laterality Date   • HAND SURGERY     • VT COLONOSCOPY FLX DX W/COLLJ SPEC WHEN PFRMD N/A 2/9/2018    Procedure: COLONOSCOPY;  Surgeon: Michaela Umana MD;  Location: [facility]; Service: Gastroenterology   • VT LAPAROSCOPY RADICAL NEPHRECTOMY Right 9/20/2023    Procedure: NEPHRECTOMY RADICAL LAPAROSCOPIC W/ ROBOTICS;  Surgeon: Colton Bergeron MD;  Location: [facility];  Service: Urology   • PROSTATE BIOPSY     • SHOULDER SURGERY         Family History:    Family History   Problem Relation Age of Onset   • Diabetes Mother    • Stroke Mother    • Stroke Father    • Heart disease Father    • Diabetes Sister    • Other Family         Stroke syndrome       Cancer-related family history is not on file. Social History:   Social History     Socioeconomic History   • Marital status: /Civil Union     Spouse name: Not on file   • Number of children: Not on file   • Years of education: Not on file   • Highest education level: Not on file   Occupational History   • Not on file   Tobacco Use   • Smoking status: Never   • Smokeless tobacco: Never   Vaping Use   • Vaping Use: Never used   Substance and Sexual Activity   • Alcohol use: Not Currently     Alcohol/week: 3.0 standard drinks of alcohol     Types: 3 Glasses of wine per week     Comment: 4 oz wine with dinner a few days a week   • Drug use:  No • Sexual activity: Yes     Partners: Female     Birth control/protection: None   Other Topics Concern   • Not on file   Social History Narrative   • Not on file     Social Determinants of Health     Financial Resource Strain: Low Risk  (8/15/2022)    Overall Financial Resource Strain (CARDIA)    • Difficulty of Paying Living Expenses: Not very hard   Food Insecurity: No Food Insecurity (9/21/2023)    Hunger Vital Sign    • Worried About Running Out of Food in the Last Year: Never true    • Ran Out of Food in the Last Year: Never true   Transportation Needs: No Transportation Needs (9/21/2023)    PRAPARE - Transportation    • Lack of Transportation (Medical): No    • Lack of Transportation (Non-Medical):  No   Physical Activity: Not on file   Stress: Not on file   Social Connections: Not on file   Intimate Partner Violence: Not on file   Housing Stability: Low Risk  (9/21/2023)    Housing Stability Vital Sign    • Unable to Pay for Housing in the Last Year: No    • Number of Places Lived in the Last Year: 2    • Unstable Housing in the Last Year: No       Current Medications:   Current Outpatient Medications   Medication Sig Dispense Refill   • acetaminophen (TYLENOL) 325 mg tablet Take 2 tablets (650 mg total) by mouth every 6 (six) hours as needed for mild pain (Patient taking differently: Take 650 mg by mouth every 6 (six) hours as needed for mild pain Pt takes 2X 500mg tabs as needed for pain.)  0   • amLODIPine (NORVASC) 5 mg tablet Take 5 mg by mouth daily Prn for SBP > 150     • bisacodyl (DULCOLAX) 5 mg EC tablet Take 1 tablet (5 mg total) by mouth daily as needed for constipation 30 tablet 0   • busPIRone (BUSPAR) 7.5 mg tablet Take 1 tablet (7.5 mg total) by mouth 2 (two) times a day as needed (Anxiety) 60 tablet 1   • Docosahexaenoic Acid (DHA OMEGA 3) 100 MG CAPS Take 6 capsules by mouth daily     • docusate sodium (COLACE) 100 mg capsule Take 1 capsule (100 mg total) by mouth 2 (two) times a day 60 capsule 2   • fexofenadine (ALLEGRA) 180 MG tablet Take 1 tablet by mouth daily as needed     • gabapentin (Neurontin) 100 mg capsule Take 1 capsule (100 mg total) by mouth daily at bedtime 90 capsule 0   • lidocaine 0.5 % topical gel Apply 1 Application topically as needed (pain). Indications: pain     • metoprolol succinate (TOPROL-XL) 25 mg 24 hr tablet Take 1 tablet (25 mg total) by mouth daily with breakfast 90 tablet 3   • Multiple Vitamin tablet Take 1 tablet by mouth daily     • oxyCODONE (ROXICODONE) 5 immediate release tablet Take 5 mg by mouth every 6 (six) hours as needed for moderate pain 1/2 tab every 6 hr PRN severe pain     • traMADol (ULTRAM) 50 mg tablet Take 50 mg by mouth every 8 (eight) hours as needed for moderate pain     • polyethylene glycol (GLYCOLAX) 17 GM/SCOOP powder Take 17 g by mouth as needed (constipation). Indications: Constipation (Patient not taking: Reported on 10/4/2023)     • tiZANidine (ZANAFLEX) 2 mg tablet Take 1 tablet (2 mg total) by mouth 3 (three) times a day (Patient taking differently: Take 2 mg by mouth if needed) 90 tablet 1     No current facility-administered medications for this visit.        Allergies: No Known Allergies

## 2023-10-10 ENCOUNTER — PATIENT OUTREACH (OUTPATIENT)
Dept: HEMATOLOGY ONCOLOGY | Facility: CLINIC | Age: 68
End: 2023-10-10

## 2023-10-10 ENCOUNTER — DOCUMENTATION (OUTPATIENT)
Dept: NUTRITION | Facility: CLINIC | Age: 68
End: 2023-10-10

## 2023-10-10 ENCOUNTER — HOME CARE VISIT (OUTPATIENT)
Dept: HOME HEALTH SERVICES | Facility: HOME HEALTHCARE | Age: 68
End: 2023-10-10
Payer: COMMERCIAL

## 2023-10-10 DIAGNOSIS — C64.1 MALIGNANT NEOPLASM OF RIGHT KIDNEY (HCC): Primary | ICD-10-CM

## 2023-10-10 PROCEDURE — G0299 HHS/HOSPICE OF RN EA 15 MIN: HCPCS

## 2023-10-10 NOTE — PROGRESS NOTES
Ambulatory referral to oncology nutrition received for pt. Spoke with RN Navigator, Gary Gonzales., pt will not be receiving cancer tx at [facility]. Pt already has an Ambulatory referral to nutrition services for Medical Nutrition Therapy which is most appropriate for pt at this time. Provided Raji Loyola with the MNT brochure and suggested pt contact the individual department locations and/or central scheduling to set up consult ASAP.

## 2023-10-10 NOTE — PROGRESS NOTES
Outreach to patient after medical oncology consult. Upset as to the wrong information being given and having to correct the physician in regards to staging. After this issue, report a discussion about adding Keytruda, however with autoimmune disease this is contraindicated. Follow up questions include whether something other than Keytruda can be offered. ONN explained the NCCN guidelines and recommendations based on this framework. Patient talked about chemotherapy which is not likely an option given the patient's reported underlying deterioration and weight loss over the past few years. Lastly  Questions surround ability to have radiation done to the area. Education provided about radiation and the needs to have a targeted area. Overall discussion about risks vs benefits withing medicine and the challenges that present. Patient reports loosing weight for a number of years now secondary to the Parsonage-bejarano syndrome. States this is the larger of the ongoing issues and Dr. Pradeep Malone strongly suggested following up with neurologist for management options. Discussed Palliative Care and their roll in helping patients with chronic medical symptoms to include weight loss. consult agreed upon and will be scheduled. Dietician referral placed and virtual nutrition flyer sent via my chart. Surveillance per NCCN guidelines. Patient set up for BMP CT CAP as scheduled 1/2/24 with f/u at urology shortly after this time. Patient aware of ability to outreach ONN in the future if needed. Both are very appreciative of the time and call.

## 2023-10-11 VITALS
SYSTOLIC BLOOD PRESSURE: 112 MMHG | TEMPERATURE: 97.9 F | DIASTOLIC BLOOD PRESSURE: 62 MMHG | OXYGEN SATURATION: 98 % | HEART RATE: 50 BPM | RESPIRATION RATE: 16 BRPM

## 2023-10-17 ENCOUNTER — PATIENT OUTREACH (OUTPATIENT)
Dept: CASE MANAGEMENT | Facility: HOSPITAL | Age: 68
End: 2023-10-17

## 2023-10-17 NOTE — PROGRESS NOTES
Biopsychosocial and Barriers Assessment    Cancer Diagnosis: [address]  Home/Cell Phone: [phone]  Emergency Contact: Colby Edouard (wife)  Marital Status:   Interpretation concerns, speaks another language, preferred language: English  Cultural concerns: none  Ability to read or write: yes    Caregiver/Support: wife  Children:  Child/Elder care:    Housing: [employer]  Home Setup: 1st floor setup available  Lives With: wife  Daily Living Activities: needs assist  Durable Medical Equipment: walker, cane  Ambulation: uses cane in community    Preferred Pharmacy: [address] with insurance: [facility] Medicare PCP-$10, Spec-$15, ED-$25  High co-pays with medication coverage: None at this time  No medication coverage: N/A    Primary Care Provider: Addis Knutson MD  Hx of Home Health Care: yes-[facility] just closed  Hx of Short term rehab: none  Mental Health Hx: Anxiety-taking Buspar  Substance Abuse Hx: none  Employment:retired for age  [facility] Status/Location: not addressed today  Ability to pay bills:yes  POA/LW/AD:yes-documents scanned in EPIC  Transportation Plan/Concerns: wife can drive, also has 25 rides/year through insurance      What do you know about your Cancer Diagnosis    What has your doctor told you about your cancer diagnosis: Pt had a R nephrectomy, however would be receiving additional treatment except for Parsonage-Cordero Syndrome    What has your doctor told you about your cancer treatment: "There are no answers now. I have to go back in 3 months."    What specific concerns do you have about your diagnosis and treatment: "I just don't know what my plan will be, it is unsettling."    Have you been made aware of any hair loss associated with treatment: N/A    Additional Comments:  Supportive outreach call placed to Mr. Natanael Lafleur today. His wife answered call, but placed OSW on speaker for both pt and wife to participate in conversation.  Mr. Natanael Lafleur explained he has Parsonage-Cordero Syndrome, and has lost the ability to use his L arm. His R arm is weak. He discussed his recent consultation with medical oncology, and that he is not recommended to have chemotherapy or immunotherapy due to his medical co-morbidities. Discussed how this has caused a lot of fear and anxiety for both pt and wife. He follows neurology for his Parsonage-Cordero Syndrome and will see his provider next month. Inquired if he has resources for support for this Syndrome, or if he has spoken with the neurology SW team. Pt denied, but stated he will ask about this possibility. Mr. Mari Stearns feels "in limbo" with his situation. He stated "I am living day to day, hour to hour." Emotional support provided. Mrs. Mari Stearns asked if there are resources for his RCC diagnosis. OSW provided information for [facility], [facility], [facility] Howell, and [facility]. Both were very appreciative. They are open to continued calls from this writer. Will plan on outreach again in a few weeks.

## 2023-11-01 ENCOUNTER — ESTABLISHED COMPREHENSIVE EXAM (OUTPATIENT)
Dept: URBAN - METROPOLITAN AREA CLINIC 6 | Facility: CLINIC | Age: 68
End: 2023-11-01

## 2023-11-01 DIAGNOSIS — H43.813: ICD-10-CM

## 2023-11-01 DIAGNOSIS — H04.123: ICD-10-CM

## 2023-11-01 DIAGNOSIS — H40.023: ICD-10-CM

## 2023-11-01 DIAGNOSIS — H25.13: ICD-10-CM

## 2023-11-01 DIAGNOSIS — H02.831: ICD-10-CM

## 2023-11-01 DIAGNOSIS — H02.834: ICD-10-CM

## 2023-11-01 PROCEDURE — 92014 COMPRE OPH EXAM EST PT 1/>: CPT

## 2023-11-01 PROCEDURE — 92133 CPTRZD OPH DX IMG PST SGM ON: CPT

## 2023-11-01 PROCEDURE — 92015 DETERMINE REFRACTIVE STATE: CPT

## 2023-11-01 PROCEDURE — 76514 ECHO EXAM OF EYE THICKNESS: CPT

## 2023-11-01 ASSESSMENT — VISUAL ACUITY
OD_CC: 20/25
OS_CC: 20/20-2
OU_CC: J1+

## 2023-11-01 ASSESSMENT — PACHYMETRY
OD_CT_UM: 533
OS_CT_UM: 538

## 2023-11-01 ASSESSMENT — TONOMETRY
OD_IOP_MMHG: 21
OS_IOP_MMHG: 18

## 2023-11-03 ENCOUNTER — TELEPHONE (OUTPATIENT)
Dept: NEUROLOGY | Facility: CLINIC | Age: 68
End: 2023-11-03

## 2023-11-03 ENCOUNTER — OFFICE VISIT (OUTPATIENT)
Dept: INTERNAL MEDICINE CLINIC | Facility: CLINIC | Age: 68
End: 2023-11-03
Payer: COMMERCIAL

## 2023-11-03 VITALS
WEIGHT: 132.4 LBS | OXYGEN SATURATION: 99 % | BODY MASS INDEX: 21.28 KG/M2 | SYSTOLIC BLOOD PRESSURE: 142 MMHG | HEART RATE: 65 BPM | HEIGHT: 66 IN | DIASTOLIC BLOOD PRESSURE: 82 MMHG

## 2023-11-03 DIAGNOSIS — C64.1 MALIGNANT NEOPLASM OF RIGHT KIDNEY (HCC): ICD-10-CM

## 2023-11-03 DIAGNOSIS — I10 BENIGN ESSENTIAL HYPERTENSION: ICD-10-CM

## 2023-11-03 DIAGNOSIS — H40.003 GLAUCOMA SUSPECT OF BOTH EYES: ICD-10-CM

## 2023-11-03 DIAGNOSIS — Z00.00 MEDICARE ANNUAL WELLNESS VISIT, SUBSEQUENT: Primary | ICD-10-CM

## 2023-11-03 DIAGNOSIS — H04.123 DRY EYE SYNDROME OF BOTH EYES: ICD-10-CM

## 2023-11-03 DIAGNOSIS — G54.5 PARSONAGE-TURNER SYNDROME: ICD-10-CM

## 2023-11-03 DIAGNOSIS — F41.9 ANXIETY: ICD-10-CM

## 2023-11-03 PROBLEM — Z86.718 HISTORY OF DVT (DEEP VEIN THROMBOSIS): Status: ACTIVE | Noted: 2023-11-03

## 2023-11-03 PROBLEM — I82.621 ACUTE DEEP VEIN THROMBOSIS (DVT) OF BRACHIAL VEIN OF RIGHT UPPER EXTREMITY (HCC): Status: RESOLVED | Noted: 2023-07-16 | Resolved: 2023-11-03

## 2023-11-03 PROCEDURE — G0439 PPPS, SUBSEQ VISIT: HCPCS | Performed by: INTERNAL MEDICINE

## 2023-11-03 RX ORDER — CYCLOSPORINE 0.5 MG/ML
1 EMULSION OPHTHALMIC 2 TIMES DAILY
COMMUNITY
Start: 2023-11-03

## 2023-11-03 NOTE — ASSESSMENT & PLAN NOTE
-Appreciate follow-up with oncology and urology  -Patient has follow-up surveillance scan of chest/abdomen/pelvis scheduled for January

## 2023-11-03 NOTE — PATIENT INSTRUCTIONS
-Consider using Orajel for mouth sore  -Discuss possible bite guard with your dentist      Medicare Preventive Visit Patient Instructions  Thank you for completing your Welcome to Medicare Visit or Medicare Annual Wellness Visit today. Your next wellness visit will be due in one year (11/3/2024). The screening/preventive services that you may require over the next 5-10 years are detailed below. Some tests may not apply to you based off risk factors and/or age. Screening tests ordered at today's visit but not completed yet may show as past due. Also, please note that scanned in results may not display below. Preventive Screenings:  Service Recommendations Previous Testing/Comments   Colorectal Cancer Screening  Colonoscopy    Fecal Occult Blood Test (FOBT)/Fecal Immunochemical Test (FIT)  Fecal DNA/Cologuard Test  Flexible Sigmoidoscopy Age: 43-73 years old   Colonoscopy: every 10 years (May be performed more frequently if at higher risk)  OR  FOBT/FIT: every 1 year  OR  Cologuard: every 3 years  OR  Sigmoidoscopy: every 5 years  Screening may be recommended earlier than age 39 if at higher risk for colorectal cancer. Also, an individualized decision between you and your healthcare provider will decide whether screening between the ages of 77-80 would be appropriate.  Colonoscopy: 02/09/2018  FOBT/FIT: Not on file  Cologuard: Not on file  Sigmoidoscopy: Not on file          Prostate Cancer Screening Individualized decision between patient and health care provider in men between ages of 53-66   Medicare will cover every 12 months beginning on the day after your 50th birthday PSA: 0.41 ng/mL           Hepatitis C Screening Once for adults born between 1945 and 1965  More frequently in patients at high risk for Hepatitis C Hep C Antibody: 02/22/2019        Diabetes Screening 1-2 times per year if you're at risk for diabetes or have pre-diabetes Fasting glucose: 93 mg/dL (9/15/2023)  A1C: 5.0 % (5/2/2023) Cholesterol Screening Once every 5 years if you don't have a lipid disorder. May order more often based on risk factors. Lipid panel: 03/25/2023         Other Preventive Screenings Covered by Medicare:  Abdominal Aortic Aneurysm (AAA) Screening: covered once if your at risk. You're considered to be at risk if you have a family history of AAA or a male between the age of 70-76 who smoking at least 100 cigarettes in your lifetime. Lung Cancer Screening: covers low dose CT scan once per year if you meet all of the following conditions: (1) Age 48-67; (2) No signs or symptoms of lung cancer; (3) Current smoker or have quit smoking within the last 15 years; (4) You have a tobacco smoking history of at least 20 pack years (packs per day x number of years you smoked); (5) You get a written order from a healthcare provider. Glaucoma Screening: covered annually if you're considered high risk: (1) You have diabetes OR (2) Family history of glaucoma OR (3)  aged 48 and older OR (3)  American aged 72 and older  Osteoporosis Screening: covered every 2 years if you meet one of the following conditions: (1) Have a vertebral abnormality; (2) On glucocorticoid therapy for more than 3 months; (3) Have primary hyperparathyroidism; (4) On osteoporosis medications and need to assess response to drug therapy. HIV Screening: covered annually if you're between the age of 14-79. Also covered annually if you are younger than 13 and older than 72 with risk factors for HIV infection. For pregnant patients, it is covered up to 3 times per pregnancy.     Immunizations:  Immunization Recommendations   Influenza Vaccine Annual influenza vaccination during flu season is recommended for all persons aged >= 6 months who do not have contraindications   Pneumococcal Vaccine   * Pneumococcal conjugate vaccine = PCV13 (Prevnar 13), PCV15 (Vaxneuvance), PCV20 (Prevnar 20)  * Pneumococcal polysaccharide vaccine = PPSV23 (Pneumovax) Adults 58-03 yo with certain risk factors or if 69+ yo  If never received any pneumonia vaccine: recommend Prevnar 20 (PCV20)  Give PCV20 if previously received 1 dose of PCV13 or PPSV23   Hepatitis B Vaccine 3 dose series if at intermediate or high risk (ex: diabetes, end stage renal disease, liver disease)   Respiratory syncytial virus (RSV) Vaccine - COVERED BY MEDICARE PART D  * RSVPreF3 (Arexvy) CDC recommends that adults 61years of age and older may receive a single dose of RSV vaccine using shared clinical decision-making (SCDM)   Tetanus (Td) Vaccine - COST NOT COVERED BY MEDICARE PART B Following completion of primary series, a booster dose should be given every 10 years to maintain immunity against tetanus. Td may also be given as tetanus wound prophylaxis. Tdap Vaccine - COST NOT COVERED BY MEDICARE PART B Recommended at least once for all adults. For pregnant patients, recommended with each pregnancy. Shingles Vaccine (Shingrix) - COST NOT COVERED BY MEDICARE PART B  2 shot series recommended in those 19 years and older who have or will have weakened immune systems or those 50 years and older     Health Maintenance Due:      Topic Date Due    Colorectal Cancer Screening  02/09/2028    Hepatitis C Screening  Completed     Immunizations Due:      Topic Date Due    COVID-19 Vaccine (3 - Pfizer risk series) 05/31/2021     Advance Directives   What are advance directives? Advance directives are legal documents that state your wishes and plans for medical care. These plans are made ahead of time in case you lose your ability to make decisions for yourself. Advance directives can apply to any medical decision, such as the treatments you want, and if you want to donate organs. What are the types of advance directives? There are many types of advance directives, and each state has rules about how to use them. You may choose a combination of any of the following:  Living will:   This is a written record of the treatment you want. You can also choose which treatments you do not want, which to limit, and which to stop at a certain time. This includes surgery, medicine, IV fluid, and tube feedings. Durable power of  for [facility]): This is a written record that states who you want to make healthcare choices for you when you are unable to make them for yourself. This person, called a proxy, is usually a family member or a friend. You may choose more than 1 proxy. Do not resuscitate (DNR) order:  A DNR order is used in case your heart stops beating or you stop breathing. It is a request not to have certain forms of treatment, such as CPR. A DNR order may be included in other types of advance directives. Medical directive: This covers the care that you want if you are in a coma, near death, or unable to make decisions for yourself. You can list the treatments you want for each condition. Treatment may include pain medicine, surgery, blood transfusions, dialysis, IV or tube feedings, and a ventilator (breathing machine). Values history: This document has questions about your views, beliefs, and how you feel and think about life. This information can help others choose the care that you would choose. Why are advance directives important? An advance directive helps you control your care. Although spoken wishes may be used, it is better to have your wishes written down. Spoken wishes can be misunderstood, or not followed. Treatments may be given even if you do not want them. An advance directive may make it easier for your family to make difficult choices about your care. © Copyright [employer] 2018 Information is for End User's use only and may not be sold, redistributed or otherwise used for commercial purposes.  All illustrations and images included in CareNotes® are the copyrighted property of A.D.A.M., Inc. or  [employer]

## 2023-11-03 NOTE — PROGRESS NOTES
Assessment and Plan:     Problem List Items Addressed This Visit     Benign essential hypertension     -Blood pressure well controlled  -Continue current dose of Toprol with as needed amlodipine         Anxiety     -Controlled with prn Buspar         Parsonage-Cordero syndrome     -Right arm strength and functionality continues to improve  -Patient has follow-up scheduled with neurology next week         Renal cancer [facility])     -Appreciate follow-up with oncology and urology  -Patient has follow-up surveillance scan of chest/abdomen/pelvis scheduled for January         Glaucoma suspect of both eyes     -Diagnosed by Dr. Gregg Morrison  -Continue f/u as scheduled         Relevant Medications    cycloSPORINE (RESTASIS) 0.05 % ophthalmic emulsion    Dry eye syndrome of both eyes     -Recently started on Restasis by ophthalmology        Other Visit Diagnoses     Medicare annual wellness visit, subsequent    -  Primary             Preventive health issues were discussed with patient, and age appropriate screening tests were ordered as noted in patient's After Visit Summary. Personalized health advice and appropriate referrals for health education or preventive services given if needed, as noted in patient's After Visit Summary. History of Present Illness:     Patient presents for a Medicare Wellness Visit. Since the patient's last visit he was seen by oncology on 10/9. 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 The patient reports he has a surveillance CT scheduled through urology this upcoming January. He states he is scheduled to see neurology next week for follow-up of his Parsonage Cordero syndrome. In general he notes he continues to gain strength in his right arm however it is still considerably weaker than his left side.     Regarding his blood pressure he reports this has been well controlled since discontinuing his lisinopril. He is currently on Toprol 25 mg daily and has amlodipine 5 mg as needed if systolic blood pressures go above 150. HPI   Patient Care Team:  Atiya Chavarria MD as PCP - General (Internal Medicine)  Atiya Chavarria MD as PCP - [address] [place] [address] [facility] ([facility])  MD Trevor Harden CRNP Emelie Fling, Karle Mas, MD Lawrance Fitch, MD as Endoscopist  Liam Gonzalez RN as Nurse Navigator (Oncology)  ANUPAMA Casanova as  Care Manager (Oncology)  Bimal Feliciano RD (Nutrition)     Review of Systems:     Review of Systems  All other systems negative except for pertinent findings noted in HPI.         Problem List:     Patient Active Problem List   Diagnosis   • 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   • Allergic rhinitis   • Premature ventricular contractions   • Premature atrial contractions   • Iron deficiency anemia   • Benign essential hypertension   • Anxiety   • Postural hypotension   • Rupture of left biceps tendon   • Fatigue   • Rotator cuff tear arthropathy of left shoulder   • Weakness of right upper extremity   • Rupture of right long head biceps tendon   • Seasonal allergies   • Shoulder pain, left   • Palpitations   • Dizziness   • Multiple bruises   • COVID-19   • Muscle weakness   • Polyneuropathy   • Imbalance   • Diffuse pain in right upper extremity   • Chronic pain of both shoulders   • Tendinitis of right rotator cuff   • Impaired sensation   • Radiculopathy, cervical region   • Neuropathy of right upper extremity   • Rotator cuff tear arthropathy of right shoulder   • Carpal tunnel syndrome of right wrist   • Right renal mass   • Moderate protein-calorie malnutrition (HCC)   • Constipation   • Parsonage-Cordero syndrome   • Brachial plexopathy without trauma   • Right leg swelling   • Swelling of arm   • Preop examination   • Skin tear of right elbow without complication   • Insomnia   • Depressed mood   • Renal cancer ([address])   • Weight loss, abnormal   • History of DVT (deep vein thrombosis)   • Glaucoma suspect of both eyes   • Dry eye syndrome of both eyes      Past Medical and Surgical History:     Past Medical History:   Diagnosis Date   • Acute deep vein thrombosis (DVT) of brachial vein of right upper extremity ([address]) 07/16/2023   • Allergic    • Arthritis    • Asthma    • Cancer ([address])     kidney   • Hypertension    • Impaired fasting glucose    • Mitral valve disorder    • Mitral valve prolapse    • Murmur, cardiac    • 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    • PAC (premature atrial contraction)    • Parsonage-Cordero syndrome    • PVC (premature ventricular contraction)    • PVC (premature ventricular contraction)    • Renal cancer ([address]) 2023   • Thyroid nodule      Past Surgical History:   Procedure Laterality Date   • HAND SURGERY     • [place] COLONOSCOPY FLX DX W/COLLJ SPEC WHEN PFRMD N/A 2/9/2018    Procedure: COLONOSCOPY;  Surgeon: Yuliana Best MD;  Location: [facility];   Service: Gastroenterology   • [place] LAPAROSCOPY RADICAL NEPHRECTOMY Right 9/20/2023    Procedure: NEPHRECTOMY RADICAL LAPAROSCOPIC W/ ROBOTICS;  Surgeon: Mau Corrigan MD;  Location: [facility];  Service: Urology   • PROSTATE BIOPSY     • SHOULDER SURGERY        Family History:     Family History   Problem Relation Age of Onset   • Diabetes Mother    • Stroke Mother    • Stroke Father    • Heart disease Father    • Diabetes Sister    • Other Family         Stroke syndrome      Social History:     Social History     Socioeconomic History   • Marital status: /Civil Union     Spouse name: None   • Number of children: None   • Years of education: None   • Highest education level: None   Occupational History   • None   Tobacco Use   • Smoking status: Never   • Smokeless tobacco: Never   Vaping Use   • Vaping Use: Never used   Substance and Sexual Activity   • Alcohol use: Not Currently     Alcohol/week: 3.0 standard drinks of alcohol     Types: 3 Glasses of wine per week     Comment: 4 oz wine with dinner a few days a week   • Drug use: No   • Sexual activity: Yes     Partners: Female     Birth control/protection: None   Other Topics Concern   • None   Social History Narrative   • None     Social Determinants of Health     Financial Resource Strain: Low Risk  (11/3/2023)    Overall Financial Resource Strain (CARDIA)    • Difficulty of Paying Living Expenses: Not very hard   Food Insecurity: No Food Insecurity (9/21/2023)    Hunger Vital Sign    • Worried About Running Out of Food in the Last Year: Never true    • Ran Out of Food in the Last Year: Never true   Transportation Needs: No Transportation Needs (11/3/2023)    PRAPARE - Transportation    • Lack of Transportation (Medical): No    • Lack of Transportation (Non-Medical):  No   Physical Activity: Not on file   Stress: Not on file   Social Connections: Not on file   Intimate Partner Violence: Not on file   Housing Stability: Low Risk  (9/21/2023)    Housing Stability Vital Sign    • Unable to Pay for Housing in the Last Year: No    • Number of Places Lived in the Last Year: 2    • Unstable Housing in the Last Year: No      Medications and Allergies:     Current Outpatient Medications   Medication Sig Dispense Refill   • acetaminophen (TYLENOL) 325 mg tablet Take 2 tablets (650 mg total) by mouth every 6 (six) hours as needed for mild pain (Patient taking differently: Take 650 mg by mouth every 6 (six) hours as needed for mild pain Pt takes 2X 500mg tabs as needed for pain.)  0   • amLODIPine (NORVASC) 5 mg tablet Take 5 mg by mouth daily Prn for SBP > 150     • bisacodyl (DULCOLAX) 5 mg EC tablet Take 1 tablet (5 mg total) by mouth daily as needed for constipation 30 tablet 0   • busPIRone (BUSPAR) 7.5 mg tablet Take 1 tablet (7.5 mg total) by mouth 2 (two) times a day as needed (Anxiety) 60 tablet 0   • cycloSPORINE (RESTASIS) 0.05 % ophthalmic emulsion Administer 1 drop to both eyes 2 (two) times a day     • Docosahexaenoic Acid (DHA OMEGA 3) 100 MG CAPS Take 6 capsules by mouth daily     • docusate sodium (COLACE) 100 mg capsule Take 1 capsule (100 mg total) by mouth 2 (two) times a day 60 capsule 2   • fexofenadine (ALLEGRA) 180 MG tablet Take 1 tablet by mouth daily as needed     • gabapentin (Neurontin) 100 mg capsule Take 1 capsule (100 mg total) by mouth daily at bedtime 90 capsule 0   • lidocaine 0.5 % topical gel Apply 1 Application topically as needed (pain). Indications: pain     • metoprolol succinate (TOPROL-XL) 25 mg 24 hr tablet Take 1 tablet (25 mg total) by mouth daily with breakfast 90 tablet 3   • Multiple Vitamin tablet Take 1 tablet by mouth daily     • oxyCODONE (ROXICODONE) 5 immediate release tablet Take 5 mg by mouth every 6 (six) hours as needed for moderate pain 1/2 tab every 6 hr PRN severe pain     • tiZANidine (ZANAFLEX) 2 mg tablet Take 1 tablet (2 mg total) by mouth 3 (three) times a day (Patient taking differently: Take 2 mg by mouth if needed) 90 tablet 1   • traMADol (ULTRAM) 50 mg tablet Take 50 mg by mouth every 8 (eight) hours as needed for moderate pain     • polyethylene glycol (GLYCOLAX) 17 GM/SCOOP powder Take 17 g by mouth as needed (constipation). Indications: Constipation (Patient not taking: Reported on 10/4/2023)       No current facility-administered medications for this visit.      No Known Allergies   Immunizations:     Immunization History   Administered Date(s) Administered   • COVID-19 PFIZER VACCINE 0.3 ML IM 04/12/2021, 05/03/2021   • Influenza Split High Dose Preservative Free IM 03/02/2020   • Influenza, high dose seasonal 0.7 mL 10/14/2020, 10/02/2021, 09/19/2022, 10/05/2023   • Influenza, seasonal, injectable 10/16/2010, 11/05/2011   • Pneumococcal Conjugate 13-Valent 08/13/2018   • Pneumococcal Polysaccharide PPV23 09/16/2019   • TD (adult) Preservative Free 08/15/2020   • Td (adult), adsorbed 1955, 06/22/2010 • Tuberculin Skin Test-PPD Intradermal 03/03/2009   • influenza, trivalent, adjuvanted 11/19/2018      Health Maintenance:         Topic Date Due   • Colorectal Cancer Screening  02/09/2028   • Hepatitis C Screening  Completed         Topic Date Due   • COVID-19 Vaccine (3 - Pfizer risk series) 05/31/2021      Medicare Screening Tests and Risk Assessments:     Jadyn Best is here for his Subsequent Wellness visit. Health Risk Assessment:   Patient rates overall health as poor. Patient feels that their physical health rating is much worse. Patient is dissatisfied with their life. Eyesight was rated as slightly worse. Hearing was rated as same. Patient feels that their emotional and mental health rating is slightly worse. Patients states they are sometimes angry. Patient states they are sometimes unusually tired/fatigued. Pain experienced in the last 7 days has been some. Patient's pain rating has been 10/10. Patient states that he has experienced weight loss or gain in last 6 months. Pain varies from intense to tolerable. Fall Risk Screening: In the past year, patient has experienced: history of falling in past year      Home Safety:  Patient does not have trouble with stairs inside or outside of their home. Patient has working smoke alarms and has working carbon monoxide detector. Home safety hazards include: medications that cause fatigue. Nutrition:   Current diet is Regular, No Added Salt and Limited junk food. Medications:   Patient is currently taking over-the-counter supplements. OTC medications include: see medication list. Patient is able to manage medications. Activities of Daily Living (ADLs)/Instrumental Activities of Daily Living (IADLs):   Walk and transfer into and out of bed and chair?: Yes  Dress and groom yourself?: Yes    Bathe or shower yourself?: Yes    Feed yourself?  Yes  Do your laundry/housekeeping?: No  Manage your money, pay your bills and track your expenses?: Yes  Make your own meals?: No    Do your own shopping?: No    ADL comments: Need help with some dressing and showering. Durable Medical Equipment Suppliers  [employer]    Previous Hospitalizations:   Any hospitalizations or ED visits within the last 12 months?: Yes    How many hospitalizations have you had in the last year?: 3-4    Advance Care Planning:   Living will: Yes    Durable POA for healthcare: Yes    Advanced directive: Yes      PREVENTIVE SCREENINGS      Cardiovascular Screening:    General: Screening Current      Diabetes Screening:     General: Screening Current      Colorectal Cancer Screening:     General: Screening Current      Prostate Cancer Screening:    General: Screening Current      Abdominal Aortic Aneurysm (AAA) Screening:    Risk factors include: age between 70-77 yo        Lung Cancer Screening:     General: Screening Not Indicated      Hepatitis C Screening:    General: Screening Current    Screening, Brief Intervention, and Referral to Treatment (SBIRT)    Screening  Typical number of drinks in a day: 0  Typical number of drinks in a week: 0  Interpretation: Low risk drinking behavior. AUDIT-C Screenin) How often did you have a drink containing alcohol in the past year? monthly or less  2) How many drinks did you have on a typical day when you were drinking in the past year? 1 to 2  3) How often did you have 6 or more drinks on one occasion in the past year? never    AUDIT-C Score: 1  Interpretation: Score 0-3 (male): Negative screen for alcohol misuse    Single Item Drug Screening:  How often have you used an illegal drug (including marijuana) or a prescription medication for non-medical reasons in the past year? never    Single Item Drug Screen Score: 0  Interpretation: Negative screen for possible drug use disorder    Review of Current Opioid Use    Opioid Risk Tool (ORT) Interpretation: Complete Opioid Risk Tool (ORT)    No results found.      Physical Exam:     BP 142/82   Pulse 65   Ht 5' 6" (1.676 m)   Wt 60.1 kg (132 lb 6.4 oz)   SpO2 99%   BMI 21.37 kg/m²     Physical Exam   eneral: NAD  HEENT: NCAT, EOMI, normal conjunctiva  Cardiovascular: RRR, normal S1 and S2, no m/r/g  Pulmonary: Normal respiratory effort, no wheezes, rales or rhonchi  MSK: Decreased bulk and tone in right upper extremity  Neuro: Non-focal, ambulating without difficulty, non-antalgic gait  Extremities: No lower extremity edema      Benjamin Zhang MD

## 2023-11-03 NOTE — ASSESSMENT & PLAN NOTE
-Right arm strength and functionality continues to improve  -Patient has follow-up scheduled with neurology next week

## 2023-11-07 ENCOUNTER — PATIENT OUTREACH (OUTPATIENT)
Dept: CASE MANAGEMENT | Facility: HOSPITAL | Age: 68
End: 2023-11-07

## 2023-11-07 ENCOUNTER — OFFICE VISIT (OUTPATIENT)
Dept: NEUROLOGY | Facility: CLINIC | Age: 68
End: 2023-11-07
Payer: COMMERCIAL

## 2023-11-07 VITALS
HEART RATE: 65 BPM | DIASTOLIC BLOOD PRESSURE: 90 MMHG | WEIGHT: 136 LBS | SYSTOLIC BLOOD PRESSURE: 150 MMHG | HEIGHT: 66 IN | BODY MASS INDEX: 21.86 KG/M2

## 2023-11-07 DIAGNOSIS — G54.5 PARSONAGE-TURNER SYNDROME: Primary | ICD-10-CM

## 2023-11-07 DIAGNOSIS — R53.1 RIGHT SIDED WEAKNESS: ICD-10-CM

## 2023-11-07 DIAGNOSIS — G20.C PARKINSONISM: ICD-10-CM

## 2023-11-07 PROCEDURE — 99214 OFFICE O/P EST MOD 30 MIN: CPT | Performed by: PSYCHIATRY & NEUROLOGY

## 2023-11-07 NOTE — PATIENT INSTRUCTIONS
Please take Carbidopa-Levodopa (Sinemet)  three times daily    Physical therapy and occupational therapy    Follow up in about 4 months

## 2023-11-07 NOTE — PROGRESS NOTES
Supportive call placed to  And Mrs. Carlos A Red today. Both state Beck's status remains the same, although Ava Kim has noticed a slight improvement with Beck's strength in his right arm. She stated he is able to do a bit more without her needing to complete the task, however he does need hands on help with most of his ADLs and heavy IADLs. He meets with his neurologist later this afternoon. Beck shared feeling very defeated and sad about his functional decline and medical co-morbidities. He stated "the holidays are coming up, but I can't even think about that right now." OSW discussed resources for outpatient or telehealth counseling, which could be available to him on a regular basis. He declined stating he is having some issues trusting people and has developed "white-coat syndrome" after having to go to so many doctors. OSW explained resources are available to him should he change his mind. Encouraged Jovani Ramesh and Ava Kim to seek any small victories or positives that may creep in on a daily basis. Ava Kim gave the example of Beck's increased strength in his R arm and his ability to carry his cereal bowl to his chair on his own. OSW again reinforced finding some heather on a daily basis and they understood same. Provided emotional support and active listening. Offered empathy and validation of eBck's feelings. OSW offered to call him in about 1 month and he was very appreciative. Encouraged either of them to reach out in the meantime if needed.

## 2023-11-07 NOTE — PROGRESS NOTES
Patient ID: Eros Dickinson is a 77 y.o. male. Assessment/Plan:    Parsonage-Cordero syndrome  77 M renal cell carcinoma s/p right Nephrectomy on 9/20/23 is here for follow up of Parson[place] Cordero syndrome of his right harm. Overall, he is slowly improving. Plan:   -Discussed plan with neurology attending, Dr. Nadine Edwards  -Recommend resuming PT/OT  -No further changes currently    Follow up in about 4 months    Parkinsonism  Noted Parkinsonism features of  shuffling gait, reduced arm swings, bradykinesia, constipation, hypophonia at night    Will trial Sinemet 25-100mg TID  PT/OT       Diagnoses and all orders for this visit:    Parsonage-Cordero syndrome  -     Ambulatory Referral to Occupational Therapy; Future  -     Ambulatory Referral to Physical Therapy; Future    Right sided weakness  -     Ambulatory Referral to Occupational Therapy; Future  -     Ambulatory Referral to Physical Therapy; Future    Parkinsonism  -     carbidopa-levodopa (Sinemet)  mg per tablet; Take 1 tablet by mouth 3 (three) times a day         Subjective:    77 M renal cell carcinoma s/p right Nephrectomy on 9/20/23 is here for follow up of Parsonage Cordero syndrome of his right harm. Overall, he is doing better and looks better. He can move his right arm. Patient is noted by his wife that he shuffles when he walks. Patient also has constipation and softening of his voice at night. Brief Hx:  Patient started having dizzines, generalized weakness, eye twitching, and balance issues. IN May 2021, he got 2nd COVID vaccine. He felt generalized weakness overall associated with dizziness when eyes closed too. COVID positive in August 2022. In March, 2023, patient had R rotator cuff injury from lifting the bucket. On April 10, 2023, patient was getting a root canal and was in the chair for about 2 hours causing R shoulder pain. In a day or two after, patient started noticing the weakness of right arm associated with pain.  He went to the hospital on April 14 but MRI c spine and MRI brachial plexus showed no acute abnormality. When patient had EMG, there was concern for central process given subacute RUE weakness and hyperreflexia. Patient was sent to ED for Mri brain w/wo, which was negative for any acute abnormality. Patient's RUE weakness is worsened. Trial of steroid was given for possible Parsonage-Cordero syndrome and discharged home. The following portions of the patient's history were reviewed and updated as appropriate: He  has a past medical history of Acute deep vein thrombosis (DVT) of brachial vein of right upper extremity ([address]) (07/16/2023), Allergic, Arthritis, Asthma, Cancer ([address]), Hypertension, Impaired fasting glucose, Mitral valve disorder, Mitral valve prolapse, Murmur, cardiac, 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, PAC (premature atrial contraction), Parsonage-Cordero syndrome, PVC (premature ventricular contraction), PVC (premature ventricular contraction), Renal cancer ([address]) (2023), and Thyroid nodule.   He   Patient Active Problem List    Diagnosis Date Noted    Diffuse pain in right upper extremity 03/19/2023    Right sided weakness 11/07/2023    Parkinsonism 11/07/2023    History of DVT (deep vein thrombosis) 11/03/2023    Glaucoma suspect of both eyes 11/03/2023    Dry eye syndrome of both eyes 11/03/2023    Weight loss, abnormal 10/05/2023    Depressed mood 08/20/2023    Insomnia 08/12/2023    Skin tear of right elbow without complication 82/08/7466    Right leg swelling 07/06/2023    Swelling of arm 07/06/2023    Preop examination 07/06/2023    Brachial plexopathy without trauma     Parsonage-Cordero syndrome 05/16/2023    Constipation 05/10/2023    Moderate protein-calorie malnutrition ([address]) 05/05/2023    Right renal mass 05/04/2023    Carpal tunnel syndrome of right wrist     Rotator cuff tear arthropathy of right shoulder 04/27/2023    Radiculopathy, cervical region 04/20/2023 Neuropathy of right upper extremity 04/20/2023    Impaired sensation 04/14/2023    Chronic pain of both shoulders 03/27/2023    Tendinitis of right rotator cuff 03/27/2023    Renal cancer ([address]) 2023    Polyneuropathy 11/29/2022    Imbalance 11/29/2022    Muscle weakness 11/23/2022    COVID-19 08/08/2022    Multiple bruises 06/14/2022    Dizziness 05/13/2022    Palpitations 03/18/2022    Seasonal allergies 03/01/2022    Shoulder pain, left 03/01/2022    Rupture of right long head biceps tendon 11/08/2021    Weakness of right upper extremity 07/26/2021    Rotator cuff tear arthropathy of left shoulder 07/19/2021    Fatigue 07/01/2021    Postural hypotension 05/10/2021    Rupture of left biceps tendon 05/10/2021    Anxiety 01/17/2021    Benign essential hypertension 07/15/2020    Iron deficiency anemia 03/05/2019    Premature ventricular contractions 08/29/2018    Premature atrial contractions 08/29/2018    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 12/30/2013    Allergic rhinitis 12/03/2013     He  has a past surgical history that includes Hand surgery; Shoulder surgery; Prostate biopsy; pr colonoscopy flx dx w/collj spec when pfrmd (N/A, 2/9/2018); and pr laparoscopy radical nephrectomy (Right, 9/20/2023). His family history includes Diabetes in his mother and sister; Heart disease in his father; Other in his family; Stroke in his father and mother. He  reports that he has never smoked. He has never used smokeless tobacco. He reports that he does not currently use alcohol after a past usage of about 3.0 standard drinks of alcohol per week. He reports that he does not use drugs.   Current Outpatient Medications   Medication Sig Dispense Refill    acetaminophen (TYLENOL) 325 mg tablet Take 2 tablets (650 mg total) by mouth every 6 (six) hours as needed for mild pain (Patient taking differently: Take 650 mg by mouth every 6 (six) hours as needed for mild pain Pt takes 2X 500mg tabs as needed for pain.)  0 amLODIPine (NORVASC) 5 mg tablet Take 5 mg by mouth daily Prn for SBP > 150      bisacodyl (DULCOLAX) 5 mg EC tablet Take 1 tablet (5 mg total) by mouth daily as needed for constipation 30 tablet 0    busPIRone (BUSPAR) 7.5 mg tablet Take 1 tablet (7.5 mg total) by mouth 2 (two) times a day as needed (Anxiety) 60 tablet 0    carbidopa-levodopa (Sinemet)  mg per tablet Take 1 tablet by mouth 3 (three) times a day 90 tablet 5    cycloSPORINE (RESTASIS) 0.05 % ophthalmic emulsion Administer 1 drop to both eyes 2 (two) times a day      Docosahexaenoic Acid (DHA OMEGA 3) 100 MG CAPS Take 6 capsules by mouth daily      fexofenadine (ALLEGRA) 180 MG tablet Take 1 tablet by mouth daily as needed      gabapentin (Neurontin) 100 mg capsule Take 1 capsule (100 mg total) by mouth daily at bedtime 90 capsule 0    lidocaine 0.5 % topical gel Apply 1 Application topically as needed (pain). Indications: pain      metoprolol succinate (TOPROL-XL) 25 mg 24 hr tablet Take 1 tablet (25 mg total) by mouth daily with breakfast 90 tablet 3    Multiple Vitamin tablet Take 1 tablet by mouth daily      oxyCODONE (ROXICODONE) 5 immediate release tablet Take 5 mg by mouth every 6 (six) hours as needed for moderate pain 1/2 tab every 6 hr PRN severe pain      traMADol (ULTRAM) 50 mg tablet Take 50 mg by mouth every 8 (eight) hours as needed for moderate pain      docusate sodium (COLACE) 100 mg capsule Take 1 capsule (100 mg total) by mouth 2 (two) times a day (Patient not taking: Reported on 11/7/2023) 60 capsule 2    polyethylene glycol (GLYCOLAX) 17 GM/SCOOP powder Take 17 g by mouth as needed (constipation). Indications: Constipation (Patient not taking: Reported on 10/4/2023)      tiZANidine (ZANAFLEX) 2 mg tablet Take 1 tablet (2 mg total) by mouth 3 (three) times a day (Patient not taking: Reported on 11/7/2023) 90 tablet 1     No current facility-administered medications for this visit.      Current Outpatient Medications on File Prior to Visit   Medication Sig    acetaminophen (TYLENOL) 325 mg tablet Take 2 tablets (650 mg total) by mouth every 6 (six) hours as needed for mild pain (Patient taking differently: Take 650 mg by mouth every 6 (six) hours as needed for mild pain Pt takes 2X 500mg tabs as needed for pain.)    amLODIPine (NORVASC) 5 mg tablet Take 5 mg by mouth daily Prn for SBP > 150    bisacodyl (DULCOLAX) 5 mg EC tablet Take 1 tablet (5 mg total) by mouth daily as needed for constipation    busPIRone (BUSPAR) 7.5 mg tablet Take 1 tablet (7.5 mg total) by mouth 2 (two) times a day as needed (Anxiety)    cycloSPORINE (RESTASIS) 0.05 % ophthalmic emulsion Administer 1 drop to both eyes 2 (two) times a day    Docosahexaenoic Acid (DHA OMEGA 3) 100 MG CAPS Take 6 capsules by mouth daily    fexofenadine (ALLEGRA) 180 MG tablet Take 1 tablet by mouth daily as needed    gabapentin (Neurontin) 100 mg capsule Take 1 capsule (100 mg total) by mouth daily at bedtime    lidocaine 0.5 % topical gel Apply 1 Application topically as needed (pain). Indications: pain    metoprolol succinate (TOPROL-XL) 25 mg 24 hr tablet Take 1 tablet (25 mg total) by mouth daily with breakfast    Multiple Vitamin tablet Take 1 tablet by mouth daily    oxyCODONE (ROXICODONE) 5 immediate release tablet Take 5 mg by mouth every 6 (six) hours as needed for moderate pain 1/2 tab every 6 hr PRN severe pain    traMADol (ULTRAM) 50 mg tablet Take 50 mg by mouth every 8 (eight) hours as needed for moderate pain    docusate sodium (COLACE) 100 mg capsule Take 1 capsule (100 mg total) by mouth 2 (two) times a day (Patient not taking: Reported on 11/7/2023)    polyethylene glycol (GLYCOLAX) 17 GM/SCOOP powder Take 17 g by mouth as needed (constipation).  Indications: Constipation (Patient not taking: Reported on 10/4/2023)    tiZANidine (ZANAFLEX) 2 mg tablet Take 1 tablet (2 mg total) by mouth 3 (three) times a day (Patient not taking: Reported on 11/7/2023)     No current facility-administered medications on file prior to visit. He has No Known Allergies. .         Objective:    Blood pressure 150/90, pulse 65, height 5' 6" (1.676 m), weight 61.7 kg (136 lb). Physical Exam  Constitutional:       General: He is not in acute distress. HENT:      Head: Normocephalic and atraumatic. Nose: Nose normal.      Mouth/Throat:      Mouth: Mucous membranes are moist.      Pharynx: Oropharynx is clear. No oropharyngeal exudate or posterior oropharyngeal erythema. Eyes:      General: Lids are normal.      Extraocular Movements: Extraocular movements intact. Pupils: Pupils are equal, round, and reactive to light. Cardiovascular:      Rate and Rhythm: Normal rate. Pulses: Normal pulses. Pulmonary:      Effort: Pulmonary effort is normal. No respiratory distress. Musculoskeletal:      Cervical back: Normal range of motion. Skin:     General: Skin is warm and dry. Neurological:      Deep Tendon Reflexes:      Reflex Scores:       Tricep reflexes are 1+ on the right side and 2+ on the left side. Bicep reflexes are 1+ on the right side and 2+ on the left side. Brachioradialis reflexes are 1+ on the right side and 2+ on the left side. Patellar reflexes are 3+ on the right side and 3+ on the left side. Achilles reflexes are 2+ on the right side and 2+ on the left side. Psychiatric:         Mood and Affect: Mood normal.         Speech: Speech normal.         Neurological Exam  Mental Status  Awake, alert and oriented to person, place and time. Oriented to person, place and time. Speech is normal. Language is fluent with no aphasia. Cranial Nerves  CN II: Visual acuity is normal. Visual fields full to confrontation. Right funduscopic exam: not visualized. Left funduscopic exam: not visualized. CN III, IV, VI: Extraocular movements intact bilaterally. Normal lids and orbits bilaterally. Pupils equal round and reactive to light bilaterally.   CN V: Facial sensation is normal.  CN VII: Full and symmetric facial movement. CN VIII: Hearing is normal.  CN IX, X: Palate elevates symmetrically  CN XI: Shoulder shrug strength is normal.  CN XII: Tongue midline without atrophy or fasciculations. Motor  Normal muscle bulk throughout. No fasciculations present. Normal muscle tone. The following abnormal movements were seen: Bradykinesia especially on the left side. Strength is 5/5 in all four extremities except as noted. R deltoid 4-/5, biceps 5- /5, Triceps 4+/5. Sensory  Sensation is intact to light touch, pinprick, vibration and proprioception in all four extremities. Reflexes                                            Right                      Left  Brachioradialis                    1+                         2+  Biceps                                 1+                         2+  Triceps                                1+                         2+  Patellar                                3+                         3+  Achilles                                2+                         2+    Coordination  Right: Finger-to-nose normal.Left: Finger-to-nose normal.    Gait  Casual gait: Shuffling gait. Reduced right arm swing. Reduced left arm swing. Abnormal pull test.        ROS:    Review of Systems   Constitutional:  Negative for chills and fever. HENT:  Negative for ear pain and sore throat. Eyes:  Negative for pain and visual disturbance. Respiratory:  Negative for cough and shortness of breath. Cardiovascular:  Negative for chest pain and palpitations. Gastrointestinal:  Negative for abdominal pain and vomiting. Genitourinary:  Negative for dysuria and hematuria. Musculoskeletal:  Negative for arthralgias and back pain. Skin:  Negative for color change and rash. Neurological:  Positive for weakness. Negative for seizures, syncope and light-headedness. All other systems reviewed and are negative.

## 2023-11-10 DIAGNOSIS — B35.1 ONYCHOMYCOSIS: Primary | ICD-10-CM

## 2023-11-13 DIAGNOSIS — F41.9 ANXIETY: ICD-10-CM

## 2023-11-13 RX ORDER — BUSPIRONE HYDROCHLORIDE 7.5 MG/1
TABLET ORAL
Qty: 60 TABLET | Refills: 0 | Status: SHIPPED | OUTPATIENT
Start: 2023-11-13

## 2023-11-14 NOTE — ASSESSMENT & PLAN NOTE
69 y o  male with hypertension, asthma, PACs/PVCs, recent right rotator cuff injury on aspirin 81 mg who presented to the ED on 4/14/2023 due to progressive generalized weakness, right greater than left, over the past month to the point where he reports he is unable to brush his teeth or feed himself with his right hand  Additionally he reported a 10 pound weight loss due to difficulty feeding himself, mild confusion, right thumb and right lower extremity numbness and difficulty ambulating secondary to weakness of his right lower extremity  Work-up:  · CTH negative for acute abnormality  · MRI C-spine with and without contrast: Multilevel cervical spondylitic degenerative change with mild to moderate canal stenosis and foraminal narrowing  No cord compression or severe foraminal nerve impingement  · Normal/Negative serum studies: TSH, CK, CRP, ESR, folate, LUZMARIA, Vitamin D 25 hydroxy  · B12 1316  · VC 2 7L  · NIF -60cm H20    On neuro exam, patient's symptoms appear to be mostly isolated to his RUE, though strength testing was limited by pain  Systemic symptoms appear to be more likely related to patient's increased frustration and worry with his orthopaedic issues and pain  For full evaluation, will obtain MRI brachial plexus      Plan:  · Discontinue Gabapentin 300 mg 3 times daily  · Change Robaxin 500 mg every 12 hours prn  · Continue Steroid taper as recommended by Ortho  · Continue Magnesium as patient reports this improves symptoms  · Obtain MRI brachial plexus  · PT/OT  · Outpatient ortho follow-up no

## 2023-11-29 ENCOUNTER — CLINICAL SUPPORT (OUTPATIENT)
Dept: NUTRITION | Facility: HOSPITAL | Age: 68
End: 2023-11-29
Attending: INTERNAL MEDICINE
Payer: COMMERCIAL

## 2023-11-29 VITALS — BODY MASS INDEX: 21.98 KG/M2 | WEIGHT: 136.2 LBS

## 2023-11-29 DIAGNOSIS — E44.0 MODERATE PROTEIN-CALORIE MALNUTRITION (HCC): ICD-10-CM

## 2023-11-29 DIAGNOSIS — R63.4 WEIGHT LOSS, ABNORMAL: ICD-10-CM

## 2023-11-29 DIAGNOSIS — C64.1 MALIGNANT NEOPLASM OF RIGHT KIDNEY (HCC): ICD-10-CM

## 2023-11-29 PROCEDURE — 97802 MEDICAL NUTRITION INDIV IN: CPT

## 2023-11-29 NOTE — PROGRESS NOTES
Nutrition Assessment Form    Patient Name: Kaur Maya    YOB: 1955    Sex:  Male     Assessment Date: 11/29/2023  Start Time: 1:30 pm Stop Time: 2:30 pm Total Minutes: 60     Data:  Present at session: self and wife   Parent/Patient Concerns/reason for visit: "Trying to keep weight up and come up with a good diet"   Medical Dx/Reason for Referral:   C64.1 - Malignant neoplasm of right kidney ([address])  E44.0  - Moderate protein-calorie malnutrition ([address])  R63.4  - Weight loss, abnormal      Past Medical History:   Diagnosis Date    Acute deep vein thrombosis (DVT) of brachial vein of right upper extremity ([address]) 07/16/2023    Allergic     Arthritis     Asthma     Cancer ([address])     kidney    Hypertension     Impaired fasting glucose     Mitral valve disorder     Mitral valve prolapse     Murmur, cardiac     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     PVC (premature ventricular contraction)     PVC (premature ventricular contraction)     Renal cancer ([address]) 2023    Thyroid nodule        Current Outpatient Medications   Medication Sig Dispense Refill    acetaminophen (TYLENOL) 325 mg tablet Take 2 tablets (650 mg total) by mouth every 6 (six) hours as needed for mild pain (Patient taking differently: Take 650 mg by mouth every 6 (six) hours as needed for mild pain Pt takes 2X 500mg tabs as needed for pain.)  0    amLODIPine (NORVASC) 5 mg tablet Take 5 mg by mouth daily Prn for SBP > 150      bisacodyl (DULCOLAX) 5 mg EC tablet Take 1 tablet (5 mg total) by mouth daily as needed for constipation 30 tablet 0    busPIRone (BUSPAR) 7.5 mg tablet Take 1 tablet (7.5 mg total) by mouth 2 (two) times a day as needed for anxiety 60 tablet 0    carbidopa-levodopa (Sinemet)  mg per tablet Take 1 tablet by mouth 3 (three) times a day 90 tablet 5    cycloSPORINE (RESTASIS) 0.05 % ophthalmic emulsion Administer 1 drop to both eyes 2 (two) times a day      Docosahexaenoic Acid (DHA OMEGA 3) 100 MG CAPS Take 6 capsules by mouth daily      docusate sodium (COLACE) 100 mg capsule Take 1 capsule (100 mg total) by mouth 2 (two) times a day (Patient not taking: Reported on 11/7/2023) 60 capsule 2    fexofenadine (ALLEGRA) 180 MG tablet Take 1 tablet by mouth daily as needed      gabapentin (Neurontin) 100 mg capsule Take 1 capsule (100 mg total) by mouth daily at bedtime 90 capsule 0    lidocaine 0.5 % topical gel Apply 1 Application topically as needed (pain). Indications: pain      metoprolol succinate (TOPROL-XL) 25 mg 24 hr tablet Take 1 tablet (25 mg total) by mouth daily with breakfast 90 tablet 3    Multiple Vitamin tablet Take 1 tablet by mouth daily      oxyCODONE (ROXICODONE) 5 immediate release tablet Take 5 mg by mouth every 6 (six) hours as needed for moderate pain 1/2 tab every 6 hr PRN severe pain      polyethylene glycol (GLYCOLAX) 17 GM/SCOOP powder Take 17 g by mouth as needed (constipation). Indications: Constipation (Patient not taking: Reported on 10/4/2023)      tiZANidine (ZANAFLEX) 2 mg tablet Take 1 tablet (2 mg total) by mouth 3 (three) times a day (Patient not taking: Reported on 11/7/2023) 90 tablet 1    traMADol (ULTRAM) 50 mg tablet Take 50 mg by mouth every 8 (eight) hours as needed for moderate pain       No current facility-administered medications for this visit.         Additional Meds/Supplements:    Special Learning Needs/barriers to learning/any new barriers    Height: HC Readings from Last 5 Encounters:   No data found for [facility] [place], [facility].      Weight: Wt Readings from Last 10 Encounters:   11/07/23 61.7 kg (136 lb)   11/03/23 60.1 kg (132 lb 6.4 oz)   10/09/23 59.4 kg (131 lb)   10/05/23 57.6 kg (127 lb)   10/04/23 57.2 kg (126 lb)   09/20/23 59.4 kg (131 lb)   09/13/23 59.4 kg (131 lb)   09/12/23 59.4 kg (131 lb)   08/22/23 59.4 kg (131 lb)   08/17/23 59 kg (130 lb)     Estimated body mass index is 21.95 kg/m² as calculated from the following:    Height as of 11/7/23: 5' 6" (1.676 m). Weight as of 11/7/23: 61.7 kg (136 lb). Recent Weight Change: [x]Yes     []No  Amount:  10 lb weight gain X 2 months intentional       Energy Needs: [address] Equation:     No Known Allergies or intolerances    Social History     Substance and Sexual Activity   Alcohol Use Not Currently    Alcohol/week: 3.0 standard drinks of alcohol    Types: 3 Glasses of wine per week    Comment: 4 oz wine with dinner a few days a week    _NO ETOH   Social History     Tobacco Use   Smoking Status Never   Smokeless Tobacco Never       Who shops? spouse   Who cooks/cooking methods/Eating out/take out habits   spouse  Cooking methods: bake/dietz/air dietz/grill/boil/other________    Take out: _n/a__  Dining out ___n/a_    Exercise: In home PT - : tried to do exercises daily weights   Very little use of right arm, left arm limited  Balance exercises  Walk almost daily for ~0.5 mi   OT = dexterity exercises     Other: ie: Sleep habits/ stress level/ work habits household-lives with ?/ food security Goes to bed 11 pm  Wakes 7-7;30 am  Does not sleep through the night to use the bathroom at least once but is able to fall back to sleep depending on shoulder positioning    Prior Nutritional Counseling?  []Yes     [x]No  When:      Why:         Diet Hx:  Breakfast: 2 c bran cereal plus 1 cup cheerios, 1 banana w/  2 cups whole milk, 2 pieces rye toast, 1 TBSP peanut butter (off and on), apple sauce and belvita bar (off and on depending on appetite) Diet: Low salt    Lunch: Renal friendly Mom's Meal- small piece meatloaf, gravy, 1/2 c pasta, 1/2 cup carrots (total 600 kcal, 20g protein)     [place] sandwich on [employer], clark, roasted peppers, 1/2 cup no sugar added peaches       Beverage: 3 cups whole milk during the day (total including breakfast)       Dinner:2-3 cups salad- lettuce, onions, 1/2 apple, strawberries, blueberries, cucumbers, may add small tomatoes, occasionally feta cheese, small amount oil based dressings   2 pieces sausage, lima beans, 1/2 cup stuffing     2 cups salad, celery bean soup, 2 slices italian bread, zucchini bread    Eggplant parm      Beverage:  4-5 14 oz cups water per day      Snacks: AM - Glucerna in am  PM - Ensure Max, 1/2 apple  HS - apple turnover    Other Notes/ Initial Assessment:  Was 170 lb 2 years ago  Tries to avoid pain medication due to constipation Takes dulcolax and keep nikki regular  Has PET scan early Jan 2024 and then will decide Tx  Has blood work scheduled end of Dec 2023    RD discussed methods to increase kcals and protein per day. Add olive oil to vegetables, utilize avocado as a snack (made with balsamic vinegar or lime juice) Add greek yogurt or egg to breakfast, add nuts or nut butters with fruit as snack     Updated assessment (Follow up note only):     Nutrition Diagnosis:   Inadequate energy intake  related to increased energy needs as evidenced by  Estimated energy intake from diet less than needs based on estimated or measured resting metabolic rate       Any change or new dx since previous visit:     Nutrition Diagnosis:   N/a      Medical Nutrition Therapy Intervention:  [x]Individualized Meal Plan 4171-3929 kcal per day []Understanding Lab Values   []Basic Pathophysiology of Disease []Food/Medication Interactions   [x]Food Diary myfitnesspal []Exercise   []Lifestyle/Behavior Modification Techniques []Medication, Mechanism of Action   []Label Reading: CHO/ Na/ Fat/ other_________ []Self Blood Glucose Monitoring   []Weight/BMI Goals: gain/lose/maintain []Other -           Comprehension: []Excellent  []Very Good  [x]Good  []Fair   []Poor    Receptivity: []Excellent  []Very Good  [x]Good  []Fair   []Poor    Expected Compliance: []Excellent  []Very Good  [x]Good  []Fair   []Poor        Goals (initial)/ Progress made on previous goals/new goals: Increase daily kcals by 500 by next follow up   2.  Increase protein intake at breakfast by next follow up   3. No follow-ups on file.   Labs:  CMP  Lab Results   Component Value Date     07/29/2017    K 4.2 09/29/2023     09/29/2023    CO2 26 09/29/2023    ANIONGAP 20 11/21/2015    BUN 18 09/29/2023    CREATININE 0.78 09/29/2023    GLUCOSE 178 (H) 09/20/2023    GLUF 93 09/15/2023    CALCIUM 10.0 09/29/2023    AST 26 08/05/2023    ALT 24 08/05/2023    ALKPHOS 37 08/05/2023    PROT 6.8 07/29/2017    BILITOT 0.7 07/29/2017    EGFR 91 09/29/2023       BMP  Lab Results   Component Value Date    GLUCOSE 178 (H) 09/20/2023    CALCIUM 10.0 09/29/2023     07/29/2017    K 4.2 09/29/2023    CO2 26 09/29/2023     09/29/2023    BUN 18 09/29/2023    CREATININE 0.78 09/29/2023       Lipids  Lab Results   Component Value Date    CHOL 167 07/29/2017    CHOL 162 01/07/2017    CHOL 152 07/23/2016     Lab Results   Component Value Date    HDL 61 03/25/2023    HDL 64 09/17/2022    HDL 63 02/26/2022     Lab Results   Component Value Date    LDLCALC 82 03/25/2023    LDLCALC 76 09/17/2022    LDLCALC 78 02/26/2022     Lab Results   Component Value Date    TRIG 54 03/25/2023    TRIG 45 09/17/2022    TRIG 40 02/26/2022     No results found for: "CHOLHDL"    Hemoglobin A1C  Lab Results   Component Value Date    HGBA1C 5.0 05/02/2023       Fasting Glucose  Lab Results   Component Value Date    GLUF 93 09/15/2023       Insulin     Thyroid  No results found for: "TSH", "X8XWWTS", "Y8LAFKF", "THYROIDAB"    Hepatic Function Panel  Lab Results   Component Value Date    ALT 24 08/05/2023    AST 26 08/05/2023    ALKPHOS 37 08/05/2023    BILITOT 0.7 07/29/2017       Celiac Disease Antibody Panel  No results found for: "ENDOMYSIAL IGA", "GLIADIN IGA", "GLIADIN IGG", "IGA", "TISSUE TRANSGLUT AB", "TTG IGA"   Iron  Lab Results   Component Value Date    IRON 122 07/24/2021    TIBC TNP 07/24/2021    FERRITIN 52 07/24/2021            Gemma Bagley, [address] [facility] [address] [facility] [place]  [facility] [id number]

## 2023-12-07 ENCOUNTER — PATIENT OUTREACH (OUTPATIENT)
Dept: CASE MANAGEMENT | Facility: HOSPITAL | Age: 68
End: 2023-12-07

## 2023-12-07 NOTE — PROGRESS NOTES
Supportive call placed to  And Mrs. Red Schlatter today. Pt's wife answers the phone because of Bill's arm limitations, and then places the call on speaker. Both state his latest appointment with neurology resulted in a diagnosis of Parkinsonism, but the medication that was ordered caused a lot of side effects and he stopped taking them. He continues to feel like he's in a "catch-22" situation with the multiple medical co-morbidities he is dealing with on a daily basis. He has a CT scan scheduled for after the New Year to assess his RCC. He hopes the disease has not progressed. Neurology recommends outpatient PT and OT, however finding OT available near them is difficult. OSW presented the outpatient clinic options for him. He is willing to try [facility] which appears to be the closest location aside from [facility], but that OT is documented as being specialized in hand therapy. On a positive note, Pepenor Grandchild and his wife met with a dietician and felt very good about their consultation. He will return for more education and assessment on improving his diet and weight. Offered to call him next month after his scan and he is very appreciative.

## 2023-12-13 ENCOUNTER — OFFICE VISIT (OUTPATIENT)
Dept: PODIATRY | Facility: CLINIC | Age: 68
End: 2023-12-13
Payer: COMMERCIAL

## 2023-12-13 VITALS
HEART RATE: 67 BPM | DIASTOLIC BLOOD PRESSURE: 90 MMHG | SYSTOLIC BLOOD PRESSURE: 162 MMHG | BODY MASS INDEX: 21.39 KG/M2 | WEIGHT: 136.3 LBS | HEIGHT: 67 IN

## 2023-12-13 DIAGNOSIS — B35.1 ONYCHOMYCOSIS: ICD-10-CM

## 2023-12-13 PROCEDURE — 99202 OFFICE O/P NEW SF 15 MIN: CPT | Performed by: PODIATRIST

## 2023-12-13 NOTE — PROGRESS NOTES
Assessment/Plan:       Diagnoses and all orders for this visit:    Onychomycosis  -     Ambulatory Referral to Podiatry      PAtient failed Breanne Rodriguez and is not a candidate for lamisil. Recommend conservative care. Can keep thin with emery board    Reappoint as needed. Subjective:      Patient ID: Zeus Burns is a 77 y.o. male. PAtient presents with thick fungal toenails. He's had it for years. He has tried [employer] vicks vapo-rub. HE tried a painting the nail with a topical cream but it didn't work either. The following portions of the patient's history were reviewed and updated as appropriate: allergies, current medications, past family history, past medical history, past social history, past surgical history, and problem list.    Review of Systems   Constitutional: Negative. Gastrointestinal:  Negative for diarrhea, nausea and vomiting. Musculoskeletal:  Negative for arthralgias. Skin:  Positive for color change. Negative for wound. Objective:      /90   Pulse 67   Ht 5' 7" (1.702 m)   Wt 61.8 kg (136 lb 4.8 oz)   BMI 21.35 kg/m²          Physical Exam  Vitals reviewed. Cardiovascular:      Pulses: Normal pulses. Dorsalis pedis pulses are 2+ on the right side and 2+ on the left side. Posterior tibial pulses are 2+ on the right side and 2+ on the left side. Musculoskeletal:         General: No swelling or tenderness. Right foot: Normal range of motion. Left foot: Normal range of motion. Feet:      Right foot:      Protective Sensation: 10 sites tested. 10 sites sensed. Left foot:      Protective Sensation: 10 sites tested. 10 sites sensed. Toenail Condition: Fungal disease present. Skin:     Capillary Refill: Capillary refill takes less than 2 seconds. Neurological:      Mental Status: He is alert and oriented to person, place, and time. Sensory: No sensory deficit.

## 2023-12-14 ENCOUNTER — TELEPHONE (OUTPATIENT)
Age: 68
End: 2023-12-14

## 2023-12-14 DIAGNOSIS — I10 BENIGN ESSENTIAL HYPERTENSION: Primary | ICD-10-CM

## 2023-12-14 RX ORDER — AMLODIPINE BESYLATE 5 MG/1
5 TABLET ORAL DAILY
Qty: 60 TABLET | Refills: 0 | Status: SHIPPED | OUTPATIENT
Start: 2023-12-14 | End: 2023-12-15 | Stop reason: SDUPTHER

## 2023-12-14 NOTE — TELEPHONE ENCOUNTER
[address] calling and asking for an updated script to be ordered for amlodipine. It says that patient was supposed to be on 1 pill and a half.   Rose Mary Brothtabby

## 2023-12-15 ENCOUNTER — TELEPHONE (OUTPATIENT)
Age: 68
End: 2023-12-15

## 2023-12-15 RX ORDER — AMLODIPINE BESYLATE 5 MG/1
TABLET ORAL
Qty: 135 TABLET | Refills: 1 | Status: SHIPPED | OUTPATIENT
Start: 2023-12-15

## 2023-12-15 NOTE — TELEPHONE ENCOUNTER
Spoke with patient and his wife over the phone. They report he has been taking 7.5 mg of amlodipine daily and has been getting systolic readings in the 160L and 140s. I have sent in a new prescription reflecting that he is taking 1-1/2 tablets daily.

## 2023-12-15 NOTE — TELEPHONE ENCOUNTER
Pharmacy called in to follow up on prescription for amLODIPine (NORVASC) 5 mg tablet. Patient reports to pharmacy that PCP said dose is 1.5 tablets    Order is for 1 tablet. Please confirm with pharmacy and patient dose for amlodipine .

## 2024-01-02 ENCOUNTER — APPOINTMENT (OUTPATIENT)
Dept: LAB | Facility: MEDICAL CENTER | Age: 69
End: 2024-01-02
Payer: COMMERCIAL

## 2024-01-02 DIAGNOSIS — C64.1 RENAL CELL CARCINOMA OF RIGHT KIDNEY (HCC): ICD-10-CM

## 2024-01-02 LAB
ANION GAP SERPL CALCULATED.3IONS-SCNC: 12 MMOL/L
BUN SERPL-MCNC: 13 MG/DL (ref 5–25)
CALCIUM SERPL-MCNC: 10.1 MG/DL (ref 8.4–10.2)
CHLORIDE SERPL-SCNC: 101 MMOL/L (ref 96–108)
CO2 SERPL-SCNC: 26 MMOL/L (ref 21–32)
CREAT SERPL-MCNC: 0.87 MG/DL (ref 0.6–1.3)
GFR SERPL CREATININE-BSD FRML MDRD: 87 ML/MIN/1.73SQ M
GLUCOSE P FAST SERPL-MCNC: 98 MG/DL (ref 65–99)
POTASSIUM SERPL-SCNC: 4.1 MMOL/L (ref 3.5–5.3)
SODIUM SERPL-SCNC: 139 MMOL/L (ref 135–147)

## 2024-01-02 PROCEDURE — 36415 COLL VENOUS BLD VENIPUNCTURE: CPT

## 2024-01-02 PROCEDURE — 80048 BASIC METABOLIC PNL TOTAL CA: CPT

## 2024-01-03 ENCOUNTER — HOSPITAL ENCOUNTER (OUTPATIENT)
Dept: RADIOLOGY | Facility: MEDICAL CENTER | Age: 69
Discharge: HOME | End: 2024-01-03
Payer: COMMERCIAL

## 2024-01-03 DIAGNOSIS — C64.1 RENAL CELL CARCINOMA OF RIGHT KIDNEY (HCC): ICD-10-CM

## 2024-01-03 PROCEDURE — 74178 CT ABD&PLV WO CNTR FLWD CNTR: CPT

## 2024-01-03 PROCEDURE — 71260 CT THORAX DX C+: CPT

## 2024-01-03 PROCEDURE — G1004 CDSM NDSC: HCPCS

## 2024-01-03 RX ADMIN — IOHEXOL 70 ML: 350 INJECTION, SOLUTION INTRAVENOUS at 12:00

## 2024-01-11 ENCOUNTER — PATIENT OUTREACH (OUTPATIENT)
Dept: CASE MANAGEMENT | Facility: HOSPITAL | Age: 69
End: 2024-01-11

## 2024-01-11 NOTE — PROGRESS NOTES
"OSW called pt and wife today. Mr. Mendoza stated he is feeling very depressed lately. He stated the holidays were \"crummy\" and their development is going to be sold. Mark explained their home is theirs, but they pay lot rent to have the home on the development land. They received a notice that the property is going up for 's sale in March. There are about 40 or 50 mobile homes in the [place] community.   Both are very anxious about the CT scan that was done last week. OSW asked if they saw [facility] or reached out to the doctor, but both denied doing this. Beck has an appointment tomorrow with his PCP and may ask that provider for assistance with the test results. OSW explained they can send a [employer] message to his care team as well if there are any questions.  Beck shared he continues to be very sad about his physical limitations. It does not appear OT for hand therapy has been initiated.    Emotional support and validation of his feelings provided. Offered continued monthly calls and he is appreciative.  "

## 2024-01-12 ENCOUNTER — OFFICE VISIT (OUTPATIENT)
Dept: INTERNAL MEDICINE CLINIC | Facility: CLINIC | Age: 69
End: 2024-01-12
Payer: COMMERCIAL

## 2024-01-12 VITALS
DIASTOLIC BLOOD PRESSURE: 70 MMHG | WEIGHT: 139.8 LBS | BODY MASS INDEX: 21.94 KG/M2 | HEIGHT: 67 IN | OXYGEN SATURATION: 98 % | HEART RATE: 60 BPM | SYSTOLIC BLOOD PRESSURE: 142 MMHG

## 2024-01-12 DIAGNOSIS — C64.1 MALIGNANT NEOPLASM OF RIGHT KIDNEY (HCC): Primary | ICD-10-CM

## 2024-01-12 DIAGNOSIS — R91.1 PULMONARY NODULE: ICD-10-CM

## 2024-01-12 DIAGNOSIS — E44.0 MODERATE PROTEIN-CALORIE MALNUTRITION (HCC): ICD-10-CM

## 2024-01-12 DIAGNOSIS — I10 BENIGN ESSENTIAL HYPERTENSION: ICD-10-CM

## 2024-01-12 DIAGNOSIS — G54.5 PARSONAGE-TURNER SYNDROME: ICD-10-CM

## 2024-01-12 DIAGNOSIS — K13.70 MOUTH LESION: ICD-10-CM

## 2024-01-12 DIAGNOSIS — G20.C PARKINSONISM, UNSPECIFIED PARKINSONISM TYPE: ICD-10-CM

## 2024-01-12 PROCEDURE — 99214 OFFICE O/P EST MOD 30 MIN: CPT | Performed by: INTERNAL MEDICINE

## 2024-01-12 NOTE — ASSESSMENT & PLAN NOTE
-Suspect the mouth lesion is due to chronic trauma from his teeth rubbing along the mucosa  -Will refer to [facility] for further evaluation

## 2024-01-12 NOTE — ASSESSMENT & PLAN NOTE
-Will continue to follow  -Patient will have subsequent surveillance CT scans due to his history of renal cell carcinoma

## 2024-01-12 NOTE — ASSESSMENT & PLAN NOTE
-Status post nephrectomy  -Appreciate urology follow-up  -Surveillance CT scan negative for cancer recurrence

## 2024-01-12 NOTE — PROGRESS NOTES
Name: Oral Mendoza      : 1955      MRN: [id number]  Encounter Provider: Benjamin Granado MD  Encounter Date: 2024   [facility]    Assessment & Plan     1. Malignant neoplasm of right kidney (HCC)  Assessment & Plan:  -Status post nephrectomy  -Appreciate urology follow-up  -Surveillance CT scan negative for cancer recurrence      2. Benign essential hypertension  Assessment & Plan:  -Blood pressure well controlled  -Continue current antihypertensive regimen        3. Parsonage-Cordero syndrome  Assessment & Plan:  -Right upper extremity strength and mobility continues to improve      4. Pulmonary nodule  Assessment & Plan:  -Will continue to follow  -Patient will have subsequent surveillance CT scans due to his history of renal cell carcinoma      5. Parkinsonism, unspecified Parkinsonism type  Assessment & Plan:  -Discontinued Sinemet due to side effects  -Continue follow-up with neurology as scheduled      6. Mouth lesion  Assessment & Plan:  -Suspect the mouth lesion is due to chronic trauma from his teeth rubbing along the mucosa  -Will refer to [facility] for further evaluation    Orders:  -     Ambulatory Referral to Oral Maxillofacial Surgery; Future    7. Moderate protein-calorie malnutrition (HCC)  Assessment & Plan:  -Resolved  -Patient has gained 8 pounds since his last visit               Subjective      HPI  Patient presents today for chronic follow-up.  His history is most notable for renal cell carcinoma status post resection.  He recently underwent a surveillance CT of the chest, abdomen and pelvis which revealed no evidence of recurrent disease.  There was note of a small 3 mm pulmonary nodule.    Regarding his Parsonage-Cordero syndrome he reports he continues to gain strength and function of his right arm.  During his last visit he states he was diagnosed with Parkinson's-like features.  He was given a trial of carbidopa-levodopa.   He states he only took it for 2 weeks and discontinued it as he felt it was causing him to have heart palpitations in addition to raising his blood pressure.    Today he complains of a growth in his mouth on the inner portion of his lip.  He reports he first noticed it 2 months ago.  He believes it is due to his teeth constantly rubbing up against the inner aspect of his lip.      All other systems negative except for pertinent findings noted in HPI.       Current Outpatient Medications on File Prior to Visit   Medication Sig   • acetaminophen (TYLENOL) 325 mg tablet Take 2 tablets (650 mg total) by mouth every 6 (six) hours as needed for mild pain (Patient taking differently: Take 650 mg by mouth every 6 (six) hours as needed for mild pain Pt takes 2X 500mg tabs as needed for pain.)   • amLODIPine (NORVASC) 5 mg tablet Take 1.5 tablets (7.5mg) by mouth daily.   • bisacodyl (DULCOLAX) 5 mg EC tablet Take 1 tablet (5 mg total) by mouth daily as needed for constipation   • busPIRone (BUSPAR) 7.5 mg tablet Take 1 tablet (7.5 mg total) by mouth 2 (two) times a day as needed for anxiety   • cycloSPORINE (RESTASIS) 0.05 % ophthalmic emulsion Administer 1 drop to both eyes 2 (two) times a day   • Docosahexaenoic Acid (DHA OMEGA 3) 100 MG CAPS Take 6 capsules by mouth daily   • docusate sodium (COLACE) 100 mg capsule Take 1 capsule (100 mg total) by mouth 2 (two) times a day   • fexofenadine (ALLEGRA) 180 MG tablet Take 1 tablet by mouth daily as needed   • gabapentin (Neurontin) 100 mg capsule Take 1 capsule (100 mg total) by mouth daily at bedtime   • lidocaine 0.5 % topical gel Apply 1 Application topically as needed (pain). Indications: pain   • metoprolol succinate (TOPROL-XL) 25 mg 24 hr tablet Take 1 tablet (25 mg total) by mouth daily with breakfast   • Multiple Vitamin tablet Take 1 tablet by mouth daily   • oxyCODONE (ROXICODONE) 5 immediate release tablet Take 5 mg by mouth every 6 (six) hours as needed for  "moderate pain 1/2 tab every 6 hr PRN severe pain   • polyethylene glycol (GLYCOLAX) 17 GM/SCOOP powder Take 17 g by mouth as needed (constipation). Indications: Constipation (Patient not taking: Reported on 10/4/2023)   • tiZANidine (ZANAFLEX) 2 mg tablet Take 1 tablet (2 mg total) by mouth 3 (three) times a day (Patient not taking: Reported on 11/7/2023)   • traMADol (ULTRAM) 50 mg tablet Take 50 mg by mouth every 8 (eight) hours as needed for moderate pain (Patient not taking: Reported on 12/13/2023)   • [DISCONTINUED] carbidopa-levodopa (Sinemet)  mg per tablet Take 1 tablet by mouth 3 (three) times a day       Objective     /70   Pulse 60   Ht 5' 7\" (1.702 m)   Wt 63.4 kg (139 lb 12.8 oz)   SpO2 98%   BMI 21.90 kg/m²     BP Readings from Last 3 Encounters:   01/12/24 142/70   12/13/23 162/90   11/07/23 150/90        Wt Readings from Last 3 Encounters:   01/12/24 63.4 kg (139 lb 12.8 oz)   12/13/23 61.8 kg (136 lb 4.8 oz)   11/29/23 61.8 kg (136 lb 3.2 oz)       Physical Exam    General: NAD   HEENT: NCAT, EOMI, normal conjunctiva, 3 to 4 mm pedunculated lesion on the left inner lip  Cardiovascular: RRR, normal S1 and S2, no m/r/g  Pulmonary: Normal respiratory effort, no wheezes, rales or rhonchi  GI: Soft, nontender, nondistended, normoactive bowel sounds  MSK: Muscle muscle atrophy in the right arm as compared to left  Extremities: No lower extremity edema  Skin: Normal skin color, no rashes     Benjamin Granado MD  "

## 2024-01-24 DIAGNOSIS — G54.5 PARSONAGE-TURNER SYNDROME: ICD-10-CM

## 2024-01-24 RX ORDER — GABAPENTIN 100 MG/1
100 CAPSULE ORAL
Qty: 90 CAPSULE | Refills: 1 | Status: SHIPPED | OUTPATIENT
Start: 2024-01-24

## 2024-01-24 NOTE — TELEPHONE ENCOUNTER
Reason for call:   [x] Refill   [] Prior Auth  [] Other:     Office:   [x] PCP/Provider -   [] Specialty/Provider -     Medication:  gabapentin (Neurontin) 100 mg capsule      Quantity: #90    Pharmacy: [facility]     Does the patient have enough for 3 days?   [x] Yes   [] No - Send as HP to POD

## 2024-02-09 ENCOUNTER — PATIENT OUTREACH (OUTPATIENT)
Dept: CASE MANAGEMENT | Facility: HOSPITAL | Age: 69
End: 2024-02-09

## 2024-02-09 NOTE — PROGRESS NOTES
OSW placed call to [facility] today. They did not answer call, however a message was provided along with return call information. Will follow.

## 2024-02-27 ENCOUNTER — TELEPHONE (OUTPATIENT)
Dept: INTERNAL MEDICINE CLINIC | Facility: CLINIC | Age: 69
End: 2024-02-27

## 2024-02-27 NOTE — TELEPHONE ENCOUNTER
Patients wife called in. They printed out form for [employer] and need it filled out. Do they need an appointment or can they drop off for you fill out?    Please Advise

## 2024-03-07 ENCOUNTER — OFFICE VISIT (OUTPATIENT)
Dept: INTERNAL MEDICINE CLINIC | Facility: CLINIC | Age: 69
End: 2024-03-07
Payer: COMMERCIAL

## 2024-03-07 VITALS
HEIGHT: 67 IN | DIASTOLIC BLOOD PRESSURE: 82 MMHG | OXYGEN SATURATION: 99 % | WEIGHT: 142.4 LBS | BODY MASS INDEX: 22.35 KG/M2 | HEART RATE: 72 BPM | SYSTOLIC BLOOD PRESSURE: 138 MMHG

## 2024-03-07 DIAGNOSIS — Z13.6 SCREENING FOR CARDIOVASCULAR CONDITION: ICD-10-CM

## 2024-03-07 DIAGNOSIS — I10 BENIGN ESSENTIAL HYPERTENSION: Primary | ICD-10-CM

## 2024-03-07 DIAGNOSIS — G20.C PARKINSONISM, UNSPECIFIED PARKINSONISM TYPE: ICD-10-CM

## 2024-03-07 DIAGNOSIS — K59.00 CONSTIPATION, UNSPECIFIED CONSTIPATION TYPE: ICD-10-CM

## 2024-03-07 PROCEDURE — 99214 OFFICE O/P EST MOD 30 MIN: CPT | Performed by: INTERNAL MEDICINE

## 2024-03-07 PROCEDURE — G2211 COMPLEX E/M VISIT ADD ON: HCPCS | Performed by: INTERNAL MEDICINE

## 2024-03-07 RX ORDER — SENNOSIDES 8.6 MG
8.6 TABLET ORAL DAILY PRN
Qty: 30 TABLET | Refills: 0 | Status: SHIPPED | OUTPATIENT
Start: 2024-03-07

## 2024-03-07 RX ORDER — AMLODIPINE BESYLATE 5 MG/1
5 TABLET ORAL 2 TIMES DAILY
COMMUNITY

## 2024-03-07 NOTE — PROGRESS NOTES
Name: Oral Mendoza      : 1955      MRN: [id number]  Encounter Provider: Benjamin Granado MD  Encounter Date: 3/7/2024   Enc[facility] department: [facility]    Assessment & Plan     1. Benign essential hypertension  Assessment & Plan:  -Blood pressure well-controlled in office today.  Home readings reviewed with several blood pressures above goal with systolics in the 150s.  -I recommended split dosing amlodipine instructing the patient to take 5 mg in the morning and 5 mg in the evening.      2. Parkinsonism, unspecified Parkinsonism type  Assessment & Plan:  -Last seen by neurology in November.  Sinemet was recommended at the time however self discontinued the medication due to side effects.  -Patient is in the process of trying to schedule follow-up with Neurology.      3. Constipation, unspecified constipation type  Assessment & Plan:  -For constipation recommended a trial of senna daily as needed in addition to docusate 100 mg twice daily and MiraLAX as needed.    Orders:  -     senna (SENOKOT) 8.6 mg; Take 1 tablet (8.6 mg total) by mouth daily as needed for constipation    4. Screening for cardiovascular condition  -     Lipid Panel with Direct LDL reflex; Future         Subjective      HPI  Patient presents today as an acute visit to discuss his concerns regarding his blood pressure control.  He is accompanied today by his wife.  He reports over the past few weeks that his blood pressure readings have been higher than usual.  On several occasions he states his systolic reading has been above 150.  He is currently taking amlodipine 7.5 mg nightly.  With his current dose he states he often feels flushed shortly after administration.    He also complains of hard stools.  He states he is currently having bowel movements daily but that his stools are around and firm.  He is currently taking Dulcolax and MiraLAX as needed.      All other systems negative except for  pertinent findings noted in HPI.       Current Outpatient Medications on File Prior to Visit   Medication Sig   • acetaminophen (TYLENOL) 325 mg tablet Take 2 tablets (650 mg total) by mouth every 6 (six) hours as needed for mild pain (Patient taking differently: Take 650 mg by mouth every 6 (six) hours as needed for mild pain Pt takes 2X 500mg tabs as needed for pain.)   • amLODIPine (NORVASC) 5 mg tablet Take 5 mg by mouth 2 (two) times a day   • busPIRone (BUSPAR) 7.5 mg tablet Take 1 tablet (7.5 mg total) by mouth 2 (two) times a day as needed for anxiety   • cycloSPORINE (RESTASIS) 0.05 % ophthalmic emulsion Administer 1 drop to both eyes 2 (two) times a day   • Docosahexaenoic Acid (DHA OMEGA 3) 100 MG CAPS Take 6 capsules by mouth daily   • docusate sodium (COLACE) 100 mg capsule Take 1 capsule (100 mg total) by mouth 2 (two) times a day   • fexofenadine (ALLEGRA) 180 MG tablet Take 1 tablet by mouth daily as needed   • gabapentin (Neurontin) 100 mg capsule Take 1 capsule (100 mg total) by mouth daily at bedtime   • lidocaine 0.5 % topical gel Apply 1 Application topically as needed (pain). Indications: pain   • metoprolol succinate (TOPROL-XL) 25 mg 24 hr tablet Take 1 tablet (25 mg total) by mouth daily with breakfast   • Multiple Vitamin tablet Take 1 tablet by mouth daily   • oxyCODONE (ROXICODONE) 5 immediate release tablet Take 5 mg by mouth every 6 (six) hours as needed for moderate pain 1/2 tab every 6 hr PRN severe pain   • [DISCONTINUED] amLODIPine (NORVASC) 5 mg tablet Take 1.5 tablets (7.5mg) by mouth daily.   • [DISCONTINUED] bisacodyl (DULCOLAX) 5 mg EC tablet Take 1 tablet (5 mg total) by mouth daily as needed for constipation   • polyethylene glycol (GLYCOLAX) 17 GM/SCOOP powder Take 17 g by mouth as needed (constipation). Indications: Constipation (Patient not taking: Reported on 10/4/2023)   • tiZANidine (ZANAFLEX) 2 mg tablet Take 1 tablet (2 mg total) by mouth 3 (three) times a day  "(Patient not taking: Reported on 11/7/2023)   • traMADol (ULTRAM) 50 mg tablet Take 50 mg by mouth every 8 (eight) hours as needed for moderate pain (Patient not taking: Reported on 12/13/2023)       Objective     /82   Pulse 72   Ht 5' 7\" (1.702 m)   Wt 64.6 kg (142 lb 6.4 oz)   SpO2 99%   BMI 22.30 kg/m²     BP Readings from Last 3 Encounters:   03/07/24 138/82   01/12/24 142/70   12/13/23 162/90        Wt Readings from Last 3 Encounters:   03/07/24 64.6 kg (142 lb 6.4 oz)   01/12/24 63.4 kg (139 lb 12.8 oz)   12/13/23 61.8 kg (136 lb 4.8 oz)       Physical Exam    General: NAD  HEENT: NCAT, EOMI, normal conjunctiva  Cardiovascular: RRR, normal S1 and S2, no m/r/g  Pulmonary: Normal respiratory effort, no wheezes, rales or rhonchi  GI: Soft, nontender, nondistended, normoactive bowel sounds  Neuro: Right hand tremor  Extremities: No lower extremity edema  Skin: Normal skin color, no rashes     Benjamin Granado MD  "

## 2024-03-08 ENCOUNTER — PATIENT OUTREACH (OUTPATIENT)
Dept: CASE MANAGEMENT | Facility: HOSPITAL | Age: 69
End: 2024-03-08

## 2024-03-08 ENCOUNTER — TELEPHONE (OUTPATIENT)
Dept: NEUROLOGY | Facility: CLINIC | Age: 69
End: 2024-03-08

## 2024-03-08 NOTE — ASSESSMENT & PLAN NOTE
-Last seen by neurology in November.  Sinemet was recommended at the time however self discontinued the medication due to side effects.  -Patient is in the process of trying to schedule follow-up with Neurology.

## 2024-03-08 NOTE — TELEPHONE ENCOUNTER
Luciana Carrillo, \[facility]\""  P Neurology [facility]  Hello!  Mr. Mendoza shared he was supposed to get a call about scheduling a follow up appointment with his neurologist, but hasn't heard anything. His last appointment was in November.  Would anyone be able to reach out to him about setting up a visit?    Thanks!  Luciana        Called pt to make f/u with Dr. Mckinney. No answer LMOM.

## 2024-03-08 NOTE — ASSESSMENT & PLAN NOTE
-For constipation recommended a trial of senna daily as needed in addition to docusate 100 mg twice daily and MiraLAX as needed.

## 2024-03-08 NOTE — ASSESSMENT & PLAN NOTE
-Blood pressure well-controlled in office today.  Home readings reviewed with several blood pressures above goal with systolics in the 150s.  -I recommended split dosing amlodipine instructing the patient to take 5 mg in the morning and 5 mg in the evening.

## 2024-03-08 NOTE — PROGRESS NOTES
OSW placed supportive call to Mr. Mendoza today. LM on VM along with return call information. Will follow.    Addendum:    OSW received return call from both  And Mrs. Mendoza today. 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 They are very worried about what will happen after the sale is completed and if they will need to move. They both stated a friend of theirs went on hospice for cancer recently and they are saddened by this. Emotional support provided.   Mr. Mendoza explained he has a CT scan next month and hopes he is still cancer free. He shared he is supposed to follow up with neurology, but no one has called to set up an appointment. OSW offered to notify them and he is agreeable.  OSW will give him a call next month and he is appreciative. In basket sent to neurology clinical pool

## 2024-03-15 ENCOUNTER — OFFICE VISIT (OUTPATIENT)
Dept: CARDIOLOGY CLINIC | Facility: CLINIC | Age: 69
End: 2024-03-15
Payer: COMMERCIAL

## 2024-03-15 VITALS
HEIGHT: 67 IN | WEIGHT: 140.8 LBS | BODY MASS INDEX: 22.1 KG/M2 | DIASTOLIC BLOOD PRESSURE: 78 MMHG | SYSTOLIC BLOOD PRESSURE: 142 MMHG | OXYGEN SATURATION: 99 % | HEART RATE: 74 BPM

## 2024-03-15 DIAGNOSIS — I49.1 PREMATURE ATRIAL CONTRACTIONS: ICD-10-CM

## 2024-03-15 DIAGNOSIS — I49.3 PREMATURE VENTRICULAR CONTRACTIONS: ICD-10-CM

## 2024-03-15 DIAGNOSIS — I35.1 NONRHEUMATIC AORTIC VALVE INSUFFICIENCY: ICD-10-CM

## 2024-03-15 DIAGNOSIS — I10 BENIGN ESSENTIAL HYPERTENSION: ICD-10-CM

## 2024-03-15 PROCEDURE — 99214 OFFICE O/P EST MOD 30 MIN: CPT | Performed by: INTERNAL MEDICINE

## 2024-03-15 RX ORDER — CARVEDILOL 6.25 MG/1
6.25 TABLET ORAL 2 TIMES DAILY WITH MEALS
Qty: 60 TABLET | Refills: 11 | Status: SHIPPED | OUTPATIENT
Start: 2024-03-15

## 2024-03-15 NOTE — PROGRESS NOTES
Cardiology Follow Up    Oral Mendoza  1955  [id number]  [facility]  [address]    [facility] [address]  Phone#  [phone]  Fax#  [phone]        1. Benign essential hypertension  carvedilol (COREG) 6.25 mg tablet      2. Premature ventricular contractions  carvedilol (COREG) 6.25 mg tablet      3. Premature atrial contractions        4. Nonrheumatic aortic valve insufficiency              Discussion/Summary:  Mr. Mendoza is a pleasant 68-year-old male who presents to the office for routine follow-up.  Since his last visit he has been feeling relatively well from a cardiac perspective.       In 2022  he underwent a repeat echocardiogram revealing a mildly dilated ascending aorta and mild aortic insufficiency.  This will require ongoing surveillance.  He has had numerous imaging scans of his chest since that time documenting no aortic aneurysm.  After his next visit I will request a repeat echocardiogram for re-evaluation of his aortic insufficiency.    His blood pressure is high in the office today and when he checks it at home.  I will transition his metoprolol to carvedilol.  He will monitor his blood pressure at home and notify the office of his blood pressure readings and any worsening palpitations within the next few weeks.  A low-salt diet was reinforced.    His most recent lipids were reviewed.  Based on his 10-year risk statin therapy is indicated which he had declined in the past.     He will follow-up in one year or sooner if deemed necessary.       Interval History:   Mr. Mendoza is a pleasant 68-year-old male who presents to the office today for follow-up.     Since his last visit with me he was diagnosed with renal cell cancer and underwent a right nephrectomy.  He was also diagnosed with Parkinsonism features and is under the care of a neurologist.     He is sedentary due to issues with his  Parkinson's disease.  He has been ambulating with the aid of a cane.  With the activity he performs he denies any cardiopulmonary symptoms of chest pain or shortness of breath.  He denies any signs or symptoms of congestive heart failure including increasing lower extremity edema, paroxysmal nocturnal dyspnea, orthopnea, acute weight gain or increasing abdominal girth.  He reports some lightheadedness with standing but denies syncope or presyncope.  He denies sustained palpitations or symptoms of claudication.    He remains on metoprolol given palpitations.  They have improved.  He cannot quantify how frequently they are occurring.  They are triggered by stress and caffeine.      Problem List       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    Impaired fasting glucose    Hypertension    Encounter for hepatitis C screening test for low risk patient    Need for pneumococcal vaccination    Allergic rhinitis          Past Medical History:   Diagnosis Date    Acute deep vein thrombosis (DVT) of brachial vein of right upper extremity ([facility]) 07/16/2023    Allergic     Arthritis     Asthma     Cancer (HCC)     kidney    Hypertension     Impaired fasting glucose     Mitral valve disorder     Mitral valve prolapse     Murmur, cardiac     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     PVC (premature ventricular contraction)     PVC (premature ventricular contraction)     Renal cancer ([facility]) 2023    Thyroid nodule      Social History     Socioeconomic History    Marital status: /Civil Union     Spouse name: Not on file    Number of children: Not on file    Years of education: Not on file    Highest education level: Not on file   Occupational History    Not on file   Tobacco Use    Smoking status: Never    Smokeless tobacco: Never   Vaping Use    Vaping status: Never Used   Substance and Sexual Activity    Alcohol use: Not Currently      Alcohol/week: 3.0 standard drinks of alcohol     Types: 3 Glasses of wine per week     Comment: 4 oz wine with dinner a few days a week    Drug use: No    Sexual activity: Yes     Partners: Female     Birth control/protection: None   Other Topics Concern    Not on file   Social History Narrative    Not on file     Social Determinants of Health     Financial Resource Strain: Low Risk  (11/3/2023)    Overall Financial Resource Strain (CARDIA)     Difficulty of Paying Living Expenses: Not very hard   Food Insecurity: No Food Insecurity (9/21/2023)    Hunger Vital Sign     Worried About Running Out of Food in the Last Year: Never true     Ran Out of Food in the Last Year: Never true   Transportation Needs: No Transportation Needs (11/3/2023)    PRAPARE - Transportation     Lack of Transportation (Medical): No     Lack of Transportation (Non-Medical): No   Physical Activity: Not on file   Stress: Not on file   Social Connections: Not on file   Intimate Partner Violence: Not on file   Housing Stability: Low Risk  (9/21/2023)    Housing Stability Vital Sign     Unable to Pay for Housing in the Last Year: No     Number of Places Lived in the Last Year: 2     Unstable Housing in the Last Year: No      Family History   Problem Relation Age of Onset    Diabetes Mother     Stroke Mother     Stroke Father     Heart disease Father     Diabetes Sister     Other Family         Stroke syndrome     Past Surgical History:   Procedure Laterality Date    HAND SURGERY      KS COLONOSCOPY FLX DX W/COLLJ SPEC WHEN PFRMD N/A 2/9/2018    Procedure: COLONOSCOPY;  Surgeon: Joe Pabon MD;  Location: [facility];  Service: Gastroenterology    KS LAPAROSCOPY RADICAL NEPHRECTOMY Right 9/20/2023    Procedure: NEPHRECTOMY RADICAL LAPAROSCOPIC W/ ROBOTICS;  Surgeon: Dario Domingo MD;  Location: [facility];  Service: Urology    PROSTATE BIOPSY      SHOULDER SURGERY         Current Outpatient Medications:     acetaminophen (TYLENOL) 325  mg tablet, Take 2 tablets (650 mg total) by mouth every 6 (six) hours as needed for mild pain (Patient taking differently: Take 650 mg by mouth every 6 (six) hours as needed for mild pain Pt takes 2X 500mg tabs as needed for pain.), Disp: , Rfl: 0    amLODIPine (NORVASC) 5 mg tablet, Take 5 mg by mouth 2 (two) times a day, Disp: , Rfl:     busPIRone (BUSPAR) 7.5 mg tablet, Take 1 tablet (7.5 mg total) by mouth 2 (two) times a day as needed for anxiety, Disp: 60 tablet, Rfl: 0    carvedilol (COREG) 6.25 mg tablet, Take 1 tablet (6.25 mg total) by mouth 2 (two) times a day with meals, Disp: 60 tablet, Rfl: 11    cycloSPORINE (RESTASIS) 0.05 % ophthalmic emulsion, Administer 1 drop to both eyes 2 (two) times a day, Disp: , Rfl:     Docosahexaenoic Acid (DHA OMEGA 3) 100 MG CAPS, Take 6 capsules by mouth daily, Disp: , Rfl:     docusate sodium (COLACE) 100 mg capsule, Take 1 capsule (100 mg total) by mouth 2 (two) times a day, Disp: 60 capsule, Rfl: 2    fexofenadine (ALLEGRA) 180 MG tablet, Take 1 tablet by mouth daily as needed, Disp: , Rfl:     gabapentin (Neurontin) 100 mg capsule, Take 1 capsule (100 mg total) by mouth daily at bedtime, Disp: 90 capsule, Rfl: 1    lidocaine 0.5 % topical gel, Apply 1 Application topically as needed (pain). Indications: pain, Disp: , Rfl:     Multiple Vitamin tablet, Take 1 tablet by mouth daily, Disp: , Rfl:     oxyCODONE (ROXICODONE) 5 immediate release tablet, Take 5 mg by mouth every 6 (six) hours as needed for moderate pain 1/2 tab every 6 hr PRN severe pain, Disp: , Rfl:     polyethylene glycol (GLYCOLAX) 17 GM/SCOOP powder, Take 17 g by mouth as needed (constipation), Disp: , Rfl:     senna (SENOKOT) 8.6 mg, Take 1 tablet (8.6 mg total) by mouth daily as needed for constipation, Disp: 30 tablet, Rfl: 0    tiZANidine (ZANAFLEX) 2 mg tablet, Take 1 tablet (2 mg total) by mouth 3 (three) times a day (Patient not taking: Reported on 11/7/2023), Disp: 90 tablet, Rfl: 1    traMADol  "(ULTRAM) 50 mg tablet, Take 50 mg by mouth every 8 (eight) hours as needed for moderate pain (Patient not taking: Reported on 12/13/2023), Disp: , Rfl:   No Known Allergies      Labs:     Chemistry        Component Value Date/Time     07/29/2017 0917    K 4.1 01/02/2024 1052    K 4.3 03/25/2023 0901     01/02/2024 1052     03/25/2023 0901    CO2 26 01/02/2024 1052    CO2 28 09/20/2023 1049    CO2 26 03/25/2023 0901    BUN 13 01/02/2024 1052    BUN 14 03/25/2023 0901    CREATININE 0.87 01/02/2024 1052    CREATININE 0.87 07/29/2017 0917        Component Value Date/Time    CALCIUM 10.1 01/02/2024 1052    CALCIUM 9.7 03/25/2023 0901    ALKPHOS 37 08/05/2023 0942    ALKPHOS 56 03/25/2023 0901    AST 26 08/05/2023 0942    AST 32 03/25/2023 0901    ALT 24 08/05/2023 0942    ALT 37 03/25/2023 0901    BILITOT 0.7 07/29/2017 0917            Lab Results   Component Value Date    CHOL 167 07/29/2017    CHOL 162 01/07/2017    CHOL 152 07/23/2016     Lab Results   Component Value Date    HDL 61 03/25/2023    HDL 64 09/17/2022    HDL 63 02/26/2022     Lab Results   Component Value Date    LDLCALC 82 03/25/2023    LDLCALC 76 09/17/2022    LDLCALC 78 02/26/2022     Lab Results   Component Value Date    TRIG 54 03/25/2023    TRIG 45 09/17/2022    TRIG 40 02/26/2022     No results found for: \"CHOLHDL\"    Imaging: No results found.      Review of Systems   Cardiovascular:  Positive for irregular heartbeat and palpitations. Negative for chest pain, cyanosis and near-syncope.   Musculoskeletal:  Positive for arthritis and muscle weakness.   All other systems reviewed and are negative.      Vitals:    03/15/24 1435   BP: 142/78   Pulse:    SpO2:        Vitals:    03/15/24 1417   Weight: 63.9 kg (140 lb 12.8 oz)       Height: 5' 7\" (170.2 cm)   Body mass index is 22.05 kg/m².    Physical Exam:  General:  Alert and cooperative, appears stated age  HEENT:  PERRLA, EOMI, no scleral icterus, no conjunctival pallor  Neck:  No " lymphadenopathy, no thyromegaly, no carotid bruits, no elevated JVP  Heart:  Regular rate and rhythm, normal S1/S2, no S3/S4, no murmur  Lungs:  Clear to auscultation bilaterally   Abdomen:  Soft, non-tender, positive bowel sounds, no rebound or guarding,   no organomegaly   Extremities:  No clubbing, cyanosis or edema   Vascular:  2+ pedal pulses  Skin:  No rashes or lesions on exposed skin  Neurologic:  Cranial nerves II-XII grossly intact without focal deficits, resting tremor noted

## 2024-04-01 ENCOUNTER — NURSE TRIAGE (OUTPATIENT)
Age: 69
End: 2024-04-01

## 2024-04-01 ENCOUNTER — TELEPHONE (OUTPATIENT)
Age: 69
End: 2024-04-01

## 2024-04-01 DIAGNOSIS — I10 BENIGN ESSENTIAL HYPERTENSION: Primary | ICD-10-CM

## 2024-04-01 RX ORDER — AMLODIPINE BESYLATE 5 MG/1
5 TABLET ORAL 2 TIMES DAILY
Qty: 180 TABLET | Refills: 1 | Status: SHIPPED | OUTPATIENT
Start: 2024-04-01

## 2024-04-01 NOTE — TELEPHONE ENCOUNTER
Patient of Dr. Domingo, last seen 10/4/23      Patient scheduled for CT scan on 4/8/24 and looking for his b/w order. There is a BMP ordered that is active, however a CMP is needed?    Once blood work order complete patient request a call letting him know its there so he can go to the lab. Thank you

## 2024-04-01 NOTE — TELEPHONE ENCOUNTER
Notify patient the last time I saw him I recommended he take 5 mg of amlodipine in the morning and 5 mg in the evening.  During his last visit with cardiology they discontinued his metoprolol succinate in favor of carvedilol twice a day.

## 2024-04-01 NOTE — TELEPHONE ENCOUNTER
Patient is stating that Amlodipine was to be increased as of last appointment on 3/7/24, and also stated that Cardiologist now has him on Carvedilol 6.25mg.  Patient asked for someone to please contact him to to discuss what he is to be taking and get the prescription called int [facility].

## 2024-04-02 NOTE — TELEPHONE ENCOUNTER
Called Oral lynn and wife also on phone explained that th BMP is the correct lab work and it is in the systmem

## 2024-04-03 ENCOUNTER — APPOINTMENT (OUTPATIENT)
Dept: LAB | Facility: MEDICAL CENTER | Age: 69
End: 2024-04-03
Payer: COMMERCIAL

## 2024-04-03 DIAGNOSIS — N28.89 RENAL MASS, RIGHT: ICD-10-CM

## 2024-04-03 LAB
ANION GAP SERPL CALCULATED.3IONS-SCNC: 10 MMOL/L (ref 4–13)
BUN SERPL-MCNC: 15 MG/DL (ref 5–25)
CALCIUM SERPL-MCNC: 9.4 MG/DL (ref 8.4–10.2)
CHLORIDE SERPL-SCNC: 101 MMOL/L (ref 96–108)
CO2 SERPL-SCNC: 28 MMOL/L (ref 21–32)
CREAT SERPL-MCNC: 0.8 MG/DL (ref 0.6–1.3)
GFR SERPL CREATININE-BSD FRML MDRD: 90 ML/MIN/1.73SQ M
GLUCOSE P FAST SERPL-MCNC: 85 MG/DL (ref 65–99)
POTASSIUM SERPL-SCNC: 4.2 MMOL/L (ref 3.5–5.3)
SODIUM SERPL-SCNC: 139 MMOL/L (ref 135–147)

## 2024-04-03 PROCEDURE — 36415 COLL VENOUS BLD VENIPUNCTURE: CPT

## 2024-04-03 PROCEDURE — 80048 BASIC METABOLIC PNL TOTAL CA: CPT

## 2024-04-08 ENCOUNTER — HOSPITAL ENCOUNTER (OUTPATIENT)
Dept: RADIOLOGY | Facility: MEDICAL CENTER | Age: 69
Discharge: HOME | End: 2024-04-08
Payer: COMMERCIAL

## 2024-04-08 DIAGNOSIS — N28.89 RENAL MASS, RIGHT: ICD-10-CM

## 2024-04-08 PROCEDURE — 74178 CT ABD&PLV WO CNTR FLWD CNTR: CPT

## 2024-04-08 PROCEDURE — 71260 CT THORAX DX C+: CPT

## 2024-04-08 PROCEDURE — G1004 CDSM NDSC: HCPCS

## 2024-04-08 RX ADMIN — IOHEXOL 100 ML: 350 INJECTION, SOLUTION INTRAVENOUS at 11:25

## 2024-04-09 NOTE — TELEPHONE ENCOUNTER
Patient had an unknown missed call and called us to see if we had called with the results of the CT he had done yesterday, but I told him those results have not been released yet and it wasn't us that called him to give it a few more days to get the results of the CT.

## 2024-04-10 ENCOUNTER — PATIENT OUTREACH (OUTPATIENT)
Dept: CASE MANAGEMENT | Facility: HOSPITAL | Age: 69
End: 2024-04-10

## 2024-04-10 NOTE — PROGRESS NOTES
"OSW placed supportive outreach call to Beck and Mark today. Both participated in call as per previous conversations. Beck stated he has blurry vision in his eye today, and got an appointment with his eye doctor tomorrow. Asked if he is experiencing any other symptoms, like 1-sided weakness, cognitive changes, etc. however both stated no. He stated he continues to deal with his multiple medical issues (Parsonage-Cordero, Parkinson's, HTN, mouth problems) and \"living every day is very tough.\" OSW provided empathy and supportive listening. He stated he had his CT scan 2 days ago, but results are not available yet. Mark state she called the office yesterday, but was told it will take a few days for it to be read.   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   Mark stated they both take walks at least 1x/day around the neighborhood if the weather is nice. They try to take 2 walks as a daily goal. Discussed the importance of staying active and getting outside.     OSW offered to call next month and Beck is very agreeable. He knows to call OSW in the meantime should he have any other concerns.  "

## 2024-04-11 ENCOUNTER — PROBLEM (OUTPATIENT)
Dept: URBAN - METROPOLITAN AREA CLINIC 6 | Facility: CLINIC | Age: 69
End: 2024-04-11

## 2024-04-11 DIAGNOSIS — H16.213: ICD-10-CM

## 2024-04-11 DIAGNOSIS — H04.123: ICD-10-CM

## 2024-04-11 PROCEDURE — 92012 INTRM OPH EXAM EST PATIENT: CPT

## 2024-04-11 ASSESSMENT — TONOMETRY
OS_IOP_MMHG: 15
OD_IOP_MMHG: 17

## 2024-04-11 ASSESSMENT — VISUAL ACUITY
OS_CC: 20/20
OD_CC: 20/25

## 2024-04-26 ENCOUNTER — TELEPHONE (OUTPATIENT)
Dept: NEUROLOGY | Facility: CLINIC | Age: 69
End: 2024-04-26

## 2024-04-30 ENCOUNTER — OFFICE VISIT (OUTPATIENT)
Dept: NEUROLOGY | Facility: CLINIC | Age: 69
End: 2024-04-30
Payer: COMMERCIAL

## 2024-04-30 VITALS
HEIGHT: 67 IN | SYSTOLIC BLOOD PRESSURE: 144 MMHG | TEMPERATURE: 97.9 F | WEIGHT: 138 LBS | HEART RATE: 65 BPM | BODY MASS INDEX: 21.66 KG/M2 | DIASTOLIC BLOOD PRESSURE: 88 MMHG

## 2024-04-30 DIAGNOSIS — G54.5 PARSONAGE-TURNER SYNDROME: Primary | ICD-10-CM

## 2024-04-30 DIAGNOSIS — G20.A1 PARKINSON'S DISEASE WITHOUT DYSKINESIA, UNSPECIFIED WHETHER MANIFESTATIONS FLUCTUATE: ICD-10-CM

## 2024-04-30 PROCEDURE — 99214 OFFICE O/P EST MOD 30 MIN: CPT | Performed by: PSYCHIATRY & NEUROLOGY

## 2024-04-30 NOTE — ASSESSMENT & PLAN NOTE
Noted right arm resting tremor pronounced with distraction.  Bradykinesia and decrement in right finger tapping, hand opening and closing worse than left.  Rigidity noted in right upper and lower extremity worse than left.  Mild shuffling gait.    -Will re-trial Sinemet starting from low-dose 0.5 tab daily and slowly titrating up 0.5 tab per week to 1 tablet 3 times daily  -If patient has no improvement with Sinemet, will consider DaTscan at the next appointment  -Recommend warm prune juice and MiraLAX daily for constipation  -Physical therapy recommend big and loud therapy

## 2024-04-30 NOTE — PROGRESS NOTES
Patient ID: Oral Mendoza is a 68 y.o. male.    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 Last office visit was on 11/7/2023.    Right arm strength is significantly improved noted on physical examination at this visit.    -Recommend continuing physical therapy  -Follow-up with orthopedic for R rotator cuff repair if necessary    Follow-up with neurology in about 2 months    Parkinsonism  Noted right arm resting tremor pronounced with distraction.  Bradykinesia and decrement in right finger tapping, hand opening and closing worse than left.  Rigidity noted in right upper and lower extremity worse than left.  Mild shuffling gait.    -Will re-trial Sinemet starting from low-dose 0.5 tab daily and slowly titrating up 0.5 tab per week to 1 tablet 3 times daily  -If patient has no improvement with Sinemet, will consider DaTscan at the next appointment  -Recommend warm prune juice and MiraLAX daily for constipation  -Physical therapy recommend big and loud therapy       Diagnoses and all orders for this visit:    Parsonage-Cordero syndrome    Parkinson's disease without dyskinesia, unspecified whether manifestations fluctuate  -     carbidopa-levodopa (Sinemet)  mg per tablet; Take 1 tablet by mouth 3 (three) times a day           Subjective:    68-year-old male with renal cell carcinoma s/p right nephrectomy on 9/20/2023 and 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 Last office visit was on 11/7/2023.    Interval history  He could not tolerate Sinemet due to palpitation. He has been having constipation. He has BM every 5 days or so.     Today, patient reports that his right arm is improving but  "limited range of motion; still weak. Hypophonia at night. No REM sleep behaviors. No recent falls. He tries to go out for a walk. He did not have a chance to go back to physical therapy.    Brief history:  On April 10, 2023, patient was in the chair for a root canal for about 2 hours resulting in right shoulder pain. A day or 2 later, he started noticing the weakness of his right arm associated with pain.  He went to the hospital on April 14, 2023.  Extensive imaging including MRI brain, MRI C-spine, and MRI brachial plexus showed no acute abnormalities. He had EMG 1 upper limb and 1 lower limb twice.  The most recent one on 6/26/2023, EMG showed chronic pan brachial plexopathy with ongoing denervation and signs of early reinnervation.  Patient has been working with physical therapy.  Due to parkinsonism features of shuffling gait, reduced arm swings, bradykinesia, constipation and hypophonia at night, he was started on Sinemet  mg 3 times daily.    The following portions of the patient's history were reviewed and updated as appropriate: allergies, current medications, past family history, past medical history, past social history, past surgical history, and problem list.    Objective:    Blood pressure 144/88, pulse 65, temperature 97.9 °F (36.6 °C), height 5' 7\" (1.702 m), weight 62.6 kg (138 lb).    Physical Exam  Constitutional:       General: He is not in acute distress.  HENT:      Head: Normocephalic and atraumatic.      Nose: Nose normal.      Mouth/Throat:      Mouth: Mucous membranes are moist.      Pharynx: Oropharynx is clear. No oropharyngeal exudate or posterior oropharyngeal erythema.   Eyes:      General: Lids are normal.      Extraocular Movements: Extraocular movements intact.      Pupils: Pupils are equal, round, and reactive to light.   Cardiovascular:      Rate and Rhythm: Normal rate.      Pulses: Normal pulses.   Pulmonary:      Effort: Pulmonary effort is normal.   Musculoskeletal:      " Cervical back: Normal range of motion.   Skin:     General: Skin is warm and dry.   Neurological:      Mental Status: He is alert.      Deep Tendon Reflexes:      Reflex Scores:       Tricep reflexes are 1+ on the right side and 2+ on the left side.       Bicep reflexes are 1+ on the right side and 2+ on the left side.       Brachioradialis reflexes are 1+ on the right side and 2+ on the left side.       Patellar reflexes are 3+ on the right side and 3+ on the left side.       Achilles reflexes are 2+ on the right side and 2+ on the left side.  Psychiatric:         Mood and Affect: Mood normal.         Speech: Speech normal.         Neurological Exam  Mental Status  Alert. Oriented to person, place and time. Speech is normal.    Cranial Nerves  CN II: Visual acuity is normal. Visual fields full to confrontation. Right funduscopic exam: not visualized. Left funduscopic exam: not visualized.  CN III, IV, VI: Extraocular movements intact bilaterally. Normal lids and orbits bilaterally. Pupils equal round and reactive to light bilaterally.  CN V: Facial sensation is normal.  CN VII: Full and symmetric facial movement.  CN VIII: Hearing is normal.  CN IX, X: Palate elevates symmetrically  CN XI: Shoulder shrug strength is normal.  CN XII: Tongue midline without atrophy or fasciculations.    Motor  Normal muscle bulk throughout. No fasciculations present. Normal muscle tone. The following abnormal movements were seen: Mild resting tremor of right arm (pronounced with distraction).  Bradykinesia, decrement (right hand finger tapping, hand opening and closing worse than left). Rigidity with distraction (right upper and lower extremities worse than left)..    Right upper extremity 5-/5 overall  Left upper extremity 5/5    Both lower extremities: 5/5.    Sensory  Light touch is normal in upper and lower extremities.     Reflexes                                            Right                      Left  Brachioradialis                     1+                         2+  Biceps                                 1+                         2+  Triceps                                1+                         2+  Patellar                                3+                         3+  Achilles                                2+                         2+    Right pathological reflexes: Diana's absent. Ankle clonus absent.  Left pathological reflexes: Diana's absent. Ankle clonus absent.    Coordination  Right: Finger-to-nose normal. Difficulty due to rotator cuff injury.Left: Finger-to-nose normal.    Gait  Casual gait: Normal stance. Normal stride length. Shuffling gait.  Uses a cane to ambulate.        ROS:    Review of Systems   Constitutional:  Negative for chills and fever.   HENT:  Negative for ear pain and sore throat.    Eyes:  Negative for pain and visual disturbance.   Respiratory:  Negative for cough and shortness of breath.    Cardiovascular:  Negative for chest pain and palpitations.   Gastrointestinal:  Negative for abdominal pain and vomiting.   Genitourinary:  Negative for dysuria and hematuria.   Musculoskeletal:  Positive for arthralgias. Negative for back pain.   Skin:  Negative for color change and rash.   Neurological:  Positive for weakness. Negative for seizures and syncope.   All other systems reviewed and are negative.

## 2024-04-30 NOTE — ASSESSMENT & PLAN NOTE
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 Last office visit was on 11/7/2023.    Right arm strength is significantly improved noted on physical examination at this visit.    -Recommend continuing physical therapy  -Follow-up with orthopedic for R rotator cuff repair if necessary    Follow-up with neurology in about 2 months

## 2024-04-30 NOTE — PATIENT INSTRUCTIONS
Will re-trial Sinemet (carbidopa/levodopa) 25-100mg 0.5 tab daily with dinner for about a week then can do 0.5 tab twice daily for about a week, then 0.5 tab three times daily. If you tolerate it, you can take 0.5 tab, 0.5 tab, 1 tab for a week then 0.5 tab, 1 tab, 1 tab for a week then 1 tab three times daily     Drink warm prune juice daily and Miralax daily    Physical therapy if possible and continue exercises at home    Follow up in about 2 months

## 2024-05-02 ENCOUNTER — TELEPHONE (OUTPATIENT)
Dept: NEUROLOGY | Facility: CLINIC | Age: 69
End: 2024-05-02

## 2024-05-02 ENCOUNTER — APPOINTMENT (OUTPATIENT)
Dept: LAB | Facility: MEDICAL CENTER | Age: 69
End: 2024-05-02
Payer: COMMERCIAL

## 2024-05-02 NOTE — TELEPHONE ENCOUNTER
Patient called to schedule 2 month follow up appt with Dr Mckinney in [place] office.  Added patient to wait list

## 2024-05-08 ENCOUNTER — 6 MONTH FOLLOW UP (OUTPATIENT)
Dept: URBAN - METROPOLITAN AREA CLINIC 6 | Facility: CLINIC | Age: 69
End: 2024-05-08

## 2024-05-08 DIAGNOSIS — H25.13: ICD-10-CM

## 2024-05-08 DIAGNOSIS — H04.123: ICD-10-CM

## 2024-05-08 DIAGNOSIS — H02.834: ICD-10-CM

## 2024-05-08 DIAGNOSIS — H43.813: ICD-10-CM

## 2024-05-08 DIAGNOSIS — H40.023: ICD-10-CM

## 2024-05-08 DIAGNOSIS — H02.831: ICD-10-CM

## 2024-05-08 PROCEDURE — 92012 INTRM OPH EXAM EST PATIENT: CPT

## 2024-05-08 ASSESSMENT — VISUAL ACUITY
OS_CC: 20/25
OD_CC: 20/30+2

## 2024-05-08 ASSESSMENT — TONOMETRY
OD_IOP_MMHG: 22
OS_IOP_MMHG: 17

## 2024-05-09 ENCOUNTER — OFFICE VISIT (OUTPATIENT)
Dept: INTERNAL MEDICINE CLINIC | Facility: CLINIC | Age: 69
End: 2024-05-09
Payer: COMMERCIAL

## 2024-05-09 VITALS
OXYGEN SATURATION: 98 % | SYSTOLIC BLOOD PRESSURE: 130 MMHG | DIASTOLIC BLOOD PRESSURE: 72 MMHG | HEART RATE: 69 BPM | BODY MASS INDEX: 21.72 KG/M2 | WEIGHT: 138.4 LBS | HEIGHT: 67 IN

## 2024-05-09 DIAGNOSIS — I10 BENIGN ESSENTIAL HYPERTENSION: ICD-10-CM

## 2024-05-09 DIAGNOSIS — K82.4 GALLBLADDER POLYP: ICD-10-CM

## 2024-05-09 DIAGNOSIS — G54.5 PARSONAGE-TURNER SYNDROME: Primary | ICD-10-CM

## 2024-05-09 DIAGNOSIS — E78.5 HYPERLIPIDEMIA, UNSPECIFIED HYPERLIPIDEMIA TYPE: ICD-10-CM

## 2024-05-09 DIAGNOSIS — G20.A1 PARKINSON'S DISEASE, UNSPECIFIED WHETHER DYSKINESIA PRESENT, UNSPECIFIED WHETHER MANIFESTATIONS FLUCTUATE: ICD-10-CM

## 2024-05-09 DIAGNOSIS — C64.1 MALIGNANT NEOPLASM OF RIGHT KIDNEY (HCC): ICD-10-CM

## 2024-05-09 PROBLEM — Z53.20 STATIN DECLINED: Status: ACTIVE | Noted: 2024-05-09

## 2024-05-09 PROCEDURE — G2211 COMPLEX E/M VISIT ADD ON: HCPCS | Performed by: INTERNAL MEDICINE

## 2024-05-09 PROCEDURE — 99214 OFFICE O/P EST MOD 30 MIN: CPT | Performed by: INTERNAL MEDICINE

## 2024-05-09 NOTE — ASSESSMENT & PLAN NOTE
-Gallbladder abnormality concerning for polyp found incidentally on CT  -Right upper quadrant ultrasound ordered for further evaluation

## 2024-05-09 NOTE — PROGRESS NOTES
Name: Oral Mendoza      : 1955      MRN: [id number]  Encounter Provider: Benjamin Granado MD  Encounter Date: 2024   Encounter department: [facility]    Assessment & Plan     1. Parsonage-Cordero syndrome  Assessment & Plan:  -Continues to improve.  Right upper extremity strength has significantly increased since his last visit in our office.  -Appreciate neurology follow-up.      2. Malignant neoplasm of right kidney (HCC)  Assessment & Plan:  -Followed by urology  -Recent CT scan chest/abdomen/pelvis showed no evidence of recurrence or metastatic disease      3. Parkinson's disease, unspecified whether dyskinesia present, unspecified whether manifestations fluctuate  Assessment & Plan:  -Appreciate neurology follow-up  -Patient reports he has agreed to give carbidopa-levodopa another trial      4. Benign essential hypertension  Assessment & Plan:  -Blood pressure well controlled  -Continue current antihypertensive regimen        5. Gallbladder polyp  Assessment & Plan:  -Gallbladder abnormality concerning for polyp found incidentally on CT  -Right upper quadrant ultrasound ordered for further evaluation    Orders:  -     US right upper quadrant; Future; Expected date: 2024    6. Hyperlipidemia, unspecified hyperlipidemia type  Assessment & Plan:  -Patient declined statin  -Discussed dietary changes to help lower cholesterol    The 10-year ASCVD risk score (Fede YANEZ, et al., 2019) is: 20%    Values used to calculate the score:      Age: 68 years      Sex: Male      Is Non- : No      Diabetic: No      Tobacco smoker: No      Systolic Blood Pressure: 130 mmHg      Is BP treated: Yes      HDL Cholesterol: 60 mg/dL      Total Cholesterol: 204 mg/dL                 Subjective      HPI  Patient presents today for chronic follow-up.  His past medical history is notable for right-sided renal cell carcinoma status post radical nephrectomy.  He  recently underwent a surveillance CT this past month which showed no evidence of recurrent disease.  His imaging did incidentally reveal a gallbladder abnormality concerning for possible polyps.    Regarding his Parsonage-Cordero syndrome he states his right arm strength is improved considerably.  He is being followed by neurology for this in addition to parkinsonism.  The patient states after talking with his neurologist that he has decided to give carbidopa-levodopa a second trial.      All other systems negative except for pertinent findings noted in HPI.       Current Outpatient Medications on File Prior to Visit   Medication Sig   • acetaminophen (TYLENOL) 325 mg tablet Take 2 tablets (650 mg total) by mouth every 6 (six) hours as needed for mild pain (Patient taking differently: Take 650 mg by mouth every 6 (six) hours as needed for mild pain Pt takes 2X 500mg tabs as needed for pain.)   • amLODIPine (NORVASC) 5 mg tablet Take 1 tablet (5 mg total) by mouth 2 (two) times a day   • busPIRone (BUSPAR) 7.5 mg tablet Take 1 tablet (7.5 mg total) by mouth 2 (two) times a day as needed for anxiety   • carbidopa-levodopa (Sinemet)  mg per tablet Take 1 tablet by mouth 3 (three) times a day   • carvedilol (COREG) 6.25 mg tablet Take 1 tablet (6.25 mg total) by mouth 2 (two) times a day with meals   • cycloSPORINE (RESTASIS) 0.05 % ophthalmic emulsion Administer 1 drop to both eyes 2 (two) times a day   • Docosahexaenoic Acid (DHA OMEGA 3) 100 MG CAPS Take 6 capsules by mouth daily   • docusate sodium (COLACE) 100 mg capsule Take 1 capsule (100 mg total) by mouth 2 (two) times a day   • fexofenadine (ALLEGRA) 180 MG tablet Take 1 tablet by mouth daily as needed   • gabapentin (Neurontin) 100 mg capsule Take 1 capsule (100 mg total) by mouth daily at bedtime   • lidocaine 0.5 % topical gel Apply 1 Application topically as needed (pain). Indications: pain   • Multiple Vitamin tablet Take 1 tablet by mouth daily   •  "polyethylene glycol (GLYCOLAX) 17 GM/SCOOP powder Take 17 g by mouth as needed (constipation)   • senna (SENOKOT) 8.6 mg Take 1 tablet (8.6 mg total) by mouth daily as needed for constipation   • oxyCODONE (ROXICODONE) 5 immediate release tablet Take 5 mg by mouth every 6 (six) hours as needed for moderate pain 1/2 tab every 6 hr PRN severe pain (Patient not taking: Reported on 4/30/2024)   • tiZANidine (ZANAFLEX) 2 mg tablet Take 1 tablet (2 mg total) by mouth 3 (three) times a day (Patient not taking: Reported on 11/7/2023)   • traMADol (ULTRAM) 50 mg tablet Take 50 mg by mouth every 8 (eight) hours as needed for moderate pain (Patient not taking: Reported on 12/13/2023)       Objective     /72 (BP Location: Left arm, Patient Position: Sitting, Cuff Size: Standard)   Pulse 69   Ht 5' 7\" (1.702 m)   Wt 62.8 kg (138 lb 6.4 oz)   SpO2 98%   BMI 21.68 kg/m²     BP Readings from Last 3 Encounters:   05/09/24 130/72   04/30/24 144/88   03/15/24 142/78        Wt Readings from Last 3 Encounters:   05/09/24 62.8 kg (138 lb 6.4 oz)   04/30/24 62.6 kg (138 lb)   03/15/24 63.9 kg (140 lb 12.8 oz)       Physical Exam    General: NAD  HEENT: NCAT, EOMI, normal conjunctiva  Cardiovascular: RRR, normal S1 and S2, no m/r/g  Pulmonary: Normal respiratory effort, no wheezes, rales or rhonchi  MSK: Normal bulk and tone  Neuro: Bradykinesia, right hand tremor  Extremities: No lower extremity edema  Skin: Normal skin color, no rashes     Benjamin Granado MD  "

## 2024-05-09 NOTE — ASSESSMENT & PLAN NOTE
-Followed by urology  -Recent CT scan chest/abdomen/pelvis showed no evidence of recurrence or metastatic disease

## 2024-05-09 NOTE — ASSESSMENT & PLAN NOTE
-Patient declined statin  -Discussed dietary changes to help lower cholesterol    The 10-year ASCVD risk score (Fede YANEZ, et al., 2019) is: 20%    Values used to calculate the score:      Age: 68 years      Sex: Male      Is Non- : No      Diabetic: No      Tobacco smoker: No      Systolic Blood Pressure: 130 mmHg      Is BP treated: Yes      HDL Cholesterol: 60 mg/dL      Total Cholesterol: 204 mg/dL

## 2024-05-09 NOTE — ASSESSMENT & PLAN NOTE
-Appreciate neurology follow-up  -Patient reports he has agreed to give carbidopa-levodopa another trial

## 2024-05-09 NOTE — ASSESSMENT & PLAN NOTE
-Continues to improve.  Right upper extremity strength has significantly increased since his last visit in our office.  -Appreciate neurology follow-up.

## 2024-05-14 ENCOUNTER — PATIENT OUTREACH (OUTPATIENT)
Dept: CASE MANAGEMENT | Facility: HOSPITAL | Age: 69
End: 2024-05-14

## 2024-05-14 NOTE — PROGRESS NOTES
OSW placed supportive call to [facility] today. They did not answer, however a message was left on VM. Will follow.

## 2024-05-20 DIAGNOSIS — I10 PRIMARY HYPERTENSION: ICD-10-CM

## 2024-05-21 RX ORDER — METOPROLOL SUCCINATE 25 MG/1
25 TABLET, EXTENDED RELEASE ORAL
Qty: 90 TABLET | Refills: 3 | OUTPATIENT
Start: 2024-05-21

## 2024-05-28 ENCOUNTER — EVALUATION (OUTPATIENT)
Dept: PHYSICAL THERAPY | Facility: CLINIC | Age: 69
End: 2024-05-28
Payer: COMMERCIAL

## 2024-05-28 DIAGNOSIS — R53.1 RIGHT SIDED WEAKNESS: ICD-10-CM

## 2024-05-28 DIAGNOSIS — G54.5 PARSONAGE-TURNER SYNDROME: ICD-10-CM

## 2024-05-28 PROCEDURE — 97530 THERAPEUTIC ACTIVITIES: CPT | Performed by: PHYSICAL THERAPIST

## 2024-05-28 PROCEDURE — 97163 PT EVAL HIGH COMPLEX 45 MIN: CPT | Performed by: PHYSICAL THERAPIST

## 2024-05-28 NOTE — PROGRESS NOTES
PT Evaluation     Today's date: 2024  Patient name: Oral Mendoza  : 1955  MRN: [id number]  Referring provider: Benjamin Granado,*  Dx:   Encounter Diagnosis     ICD-10-CM    1. Right sided weakness  R53.1 Ambulatory Referral to Physical Therapy      2. Parsonage-Cordero syndrome  G54.5 Ambulatory Referral to Physical Therapy          Start Time: 1530  Stop Time: 1630  Total time in clinic (min): 60 minutes    Assessment  Impairments: abnormal gait, abnormal or restricted ROM, activity intolerance, impaired balance, impaired physical strength, lacks appropriate home exercise program, pain with function, weight-bearing intolerance, poor posture  and poor body mechanics  Functional limitations: walking, sit-stands, LE/UE dressing, bed mobility, getting in/out of car, showering    Assessment details: Patient is a 69 y.o. male who presents to outpatient with reports of LE weakness and balance impairment that has worsened in the past year. Patient arrives for initial evaluation ambulating with single point cane, with some shuffling gait and decreased foot clearance. Rounded shoulders, elevated shoulders, and guarded UE posture noted throughout. His current gait mechanics increases his falls risk, with increased TUG and 5x sit-stand times noted. Cuing required to avoid elevated/guarded shoulders posture. Please note that patient reports history of bilateral complete RTC tears, thus significant limitations in bilateral shoulder mobility and strength in all planes. Patient noted to have LE/glute strength close to WFLs. Balance impairments and slower speed noted while walking and with turning. Patient was recommended for LSVT BIG program, however he declined at this time as he is unable to commit to 4x/week schedule as per program. Thus, continuing with PT for about 1-2x/week and will re-assess in the future if patient is able and ready to go through with program. Patient lives with his wife and is  independent with ADLs, however requires assistance with certain activities such as UE dressing, bathing, etc. Patient will benefit from skilled PT services to improve LE flexibility/mobility, improve LE/glute strength, improve balance, improve safety, and improve ease and mechanics with ADLs to progress him towards max potential of PLOF. Patient would benefit from skilled PT services to address these impairments, to maximize function, and to reduce falls risk.  Thank you for the referral.    Barriers to therapy: Medical history  Understanding of Dx/Px/POC: good     Prognosis: good    Goals  Impairment Goals 4-6 weeks   In order to maximize function patient will be able to...   - Increase hip/LE strength to 4/5 throughout  - Demonstrate improved hip flexibility as demonstrated by increased ROM through therapeutic exercise  - Improved TUG time to < 15 sec to improve mobility and reduce falls risk with walking  - Improved 5x sit-stand time to < 15 sec to improve LE endurance and reduce falls risk with transfers    Functional Goals 6-8 weeks  In order to return to prior level of function patient will be able to...   - Participate in ADL's/IADL's/sport specific activities with no greater than 2/10 pain.    - Increase Functional Status Measure (FOTO) to: anticipated at discharge  - Demonstrate independence and compliant with HEP  - Demonstrate a squat and or sit to stand with good mechanics and eccentric control without pain/difficulty/compensation  - Demonstrate functional activities with good core and glute strength without compensation/pain/difficulty   - Ascend and descend stairs without increased pain/compensation/difficulty and a reciprocal gait pattern.  - Patient will be able to demonstrate good gait mechanics without compensations.     Plan  Patient would benefit from: skilled PT  Planned modality interventions: cryotherapy  Other planned modality interventions: moist heat    Planned therapy interventions: joint  "mobilization, manual therapy, neuromuscular re-education, patient education, strengthening, stretching, therapeutic activities, therapeutic exercise, home exercise program, functional ROM exercises, Sandoval taping, postural training, balance/weight bearing training, body mechanics training, flexibility, IASTM, kinesiology taping, massage, nerve gliding, transfer training and gait training    Frequency: 1-2x/week.  Duration in weeks: 4  Treatment plan discussed with: patient, PTA, referring physician and family  Plan details: Patient may do LSVT BIG program in the future as able and ready.        Subjective Evaluation    History of Present Illness  Mechanism of injury: Oral Mendoza is a 69 y.o. year-old male who presents to outpatient PT accompanied by his wife \"Kendrick\" with reports of LE weakness and balance impairments since last March. He was diagnosed with Parkinson-like symptoms, as well as Parsonage-Cordero syndrome, after which he noticed his condition worsening. He had follow-up with Neurology end of April and PCP in May and was recommended for PT at this time. Patient was recommended for LSVT BIG and LOUD program, however patient declined coming 4x week at this time.  Quality of life: good    Patient Goals  Patient goals for therapy: decreased pain, increased motion, improved balance, increased strength, independence with ADLs/IADLs and return to sport/leisure activities    Pain  Current pain rating: 3  At best pain rating: 3  At worst pain ratin  Location: Right knee, both shoulders  Quality: dull ache and throbbing  Aggravating factors: sitting, standing, stair climbing, walking and lifting  Progression: improved    Social Support  Steps to enter house: yes  Stairs in house: no   Lives with: spouse    Hand dominance: right    Treatments  Previous treatment: physical therapy and occupational therapy  Current treatment: physical therapy        Objective     Strength/Myotome Testing     Left " Shoulder     Planes of Motion   Flexion: 2-   Abduction: 2-     Right Shoulder     Planes of Motion   Flexion: 2-   Abduction: 2-     Left Elbow   Flexion: 4  Extension: 3+    Right Elbow   Flexion: 4  Extension: 3-    Left Hip   Planes of Motion   Flexion: 4-  Extension: 3  Abduction: 4-  External rotation: 4-    Right Hip   Planes of Motion   Flexion: 4-  Extension: 3  Abduction: 4-  External rotation: 4-    Left Knee   Flexion: 4  Extension: 4    Right Knee   Flexion: 4  Extension: 4    Left Ankle/Foot   Dorsiflexion: 4-  Plantar flexion: 3+    Right Ankle/Foot   Dorsiflexion: 4  Plantar flexion: 3+  Neuro Exam:     Sensation   Light touch LE: left WNL and right WNL    Coordination   Heel to shin: left WNL and right WNL  Finger to nose: left WNL and right WNL  Rapid alternating movements: UE WNL    Transfers   Sit to stand: minimum assist (uses 2 UE support)   Wheelchair to mat: minimum assist (close supervision from chair to table)   Mat to wheelchair: minimum assist (close supervision from chair to table)   Sit to supine: minimum assist (close supervision)   Supine to sit: minimum assist (close supervision)     Functional outcomes   5x sit to stand: 21 seconds with 2 UE support (seconds)  TU with single point cane (seconds)  Functional outcome comment: Rhomberg on firm: NV  Functional outcome gait comment: With single point cane: close supervision; exhibits forward head, rounded shoulders, and elevated shoulders posture throughout, shuffled gait pattern with decreased heel strike and foot clearance, decreased knee flexion in swing phase, decreased arm swing of contralateral UE not holding cane, slower pace with turns especially during TUG testing             Diagnosis: Parkinson-like symptoms  Precautions: HTN, hx of stage 3 kidney cancer, Parsonage-Cordero syndrome, hx of bilateral RTC complete tears, neuropathy, Right knee pain  ( * asterisk indicates given per HEP)  Next Physician Appointment:    [facility] HEP:     Manuals 5/28         LE stretching                    Neuro Re-Ed          LSVT seated FWD reach          LSVT standing BIG step FWD L &R          LSVT standing BIG step LAT to L & R          Heel raises          Side-stepping          Wobble Board          Tandem amb          mCTSIB          SLS activites                                        Ther Ex          Upper trap stretch          Chin tucks          Scap squeezes          Seated T/S extensions          Bridges          BKFO          Hip ADD ball squeeze                              Ther Activity          Bike for LE mobility          Gait Training          Stepping over hurdles                    Pt Ed POC, HEP         Re-Evaluation          Modalities

## 2024-05-30 ENCOUNTER — OFFICE VISIT (OUTPATIENT)
Dept: PHYSICAL THERAPY | Facility: CLINIC | Age: 69
End: 2024-05-30
Payer: COMMERCIAL

## 2024-05-30 DIAGNOSIS — R53.1 RIGHT SIDED WEAKNESS: Primary | ICD-10-CM

## 2024-05-30 DIAGNOSIS — G54.5 PARSONAGE-TURNER SYNDROME: ICD-10-CM

## 2024-05-30 PROCEDURE — 97112 NEUROMUSCULAR REEDUCATION: CPT | Performed by: PHYSICAL THERAPIST

## 2024-05-30 PROCEDURE — 97110 THERAPEUTIC EXERCISES: CPT | Performed by: PHYSICAL THERAPIST

## 2024-05-30 PROCEDURE — 97530 THERAPEUTIC ACTIVITIES: CPT | Performed by: PHYSICAL THERAPIST

## 2024-05-30 NOTE — PROGRESS NOTES
Daily Note     Today's date: 2024  Patient name: Oral Mendoza  : 1955  MRN: [id number]  Referring provider: Benjamin Granado,*  Dx:   Encounter Diagnosis     ICD-10-CM    1. Right sided weakness  R53.1       2. 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      Objective: See treatment diary below      Assessment: Patient understood all directions for exercises and altered his technique when cued. He continues to have difficulty with scap squeezes due to Right shoulder elevation, but improved with repetition. He displayed the ability to perform all exercises with minimal rest but rest between exercises was given by PT. Water was offered often but pt did not feel as though he needed any. Tolerated treatment well and was educated post session that he may experience some DOMS over the next few days and that it is normal for that to occur. PT further educated that if his DOMS is severe then we will decrease the intensity of his next session. Patient exhibited good technique with therapeutic exercises and would benefit from continued PT      Plan: Continue per plan of care.  Progress treatment as tolerated.       Diagnosis: Parkinson-like symptoms  Precautions: HTN, hx of stage 3 kidney cancer, Parsonage-Cordero syndrome, hx of bilateral RTC complete tears, neuropathy, Right knee pain  ( * asterisk indicates given per HEP)  Next Physician Appointment:   Mono HEP:     Manuals         LE stretching                    Neuro Re-Ed          LSVT seated FWD reach          LSVT standing BIG step FWD L &R          LSVT standing BIG step LAT to L & R          Heel raises  2x10        Side-stepping  @ table x4 laps w/ cues        Wobble Board          Tandem amb          mCTSIB          SLS activites          SLR  10x  "ea                            Ther Ex          Upper trap stretch  10\" x5 ea        Chin tucks  20x        Scap squeezes  Seated 2x10        Seated T/S extensions          LAQs  10x ea cues        Bridges          BKFO  10x ea        Hip ADD ball squeeze  20x                            Ther Activity          Bike for LE mobility          Gait Training          Stepping over hurdles                    Pt Ed POC, HEP HEP        Re-Evaluation          Modalities                                         "

## 2024-06-03 ENCOUNTER — OFFICE VISIT (OUTPATIENT)
Dept: PHYSICAL THERAPY | Facility: CLINIC | Age: 69
End: 2024-06-03
Payer: COMMERCIAL

## 2024-06-03 DIAGNOSIS — R53.1 RIGHT SIDED WEAKNESS: Primary | ICD-10-CM

## 2024-06-03 DIAGNOSIS — G54.5 PARSONAGE-TURNER SYNDROME: ICD-10-CM

## 2024-06-03 PROCEDURE — 97110 THERAPEUTIC EXERCISES: CPT | Performed by: PHYSICAL THERAPIST

## 2024-06-03 PROCEDURE — 97112 NEUROMUSCULAR REEDUCATION: CPT | Performed by: PHYSICAL THERAPIST

## 2024-06-03 NOTE — PROGRESS NOTES
"Daily Note     Today's date: 6/3/2024  Patient name: Oral Mendoza  : 1955  MRN: [id number]  Referring provider: Benjamin Granado,*  Dx:   Encounter Diagnosis     ICD-10-CM    1. Right sided weakness  R53.1       2. 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 When offered to try recumbent bike today, patient declined \"not sure if I'm up for that yet.\"      Objective: See treatment diary below      Assessment: Tolerated treatment well. Improved postural awareness for standing and seated exercises noted. Performed standing exercises with good tolerance, occasional seated rest breaks to relief fatigue. Added seated SB rollouts flexion to improve functional forward reaching and UE mobility as patient is able. Patient exhibited good technique with therapeutic exercises and would benefit from continued PT      Plan: Continue per plan of care.  Progress treatment as tolerated.       Diagnosis: Parkinson-like symptoms  Precautions: HTN, hx of stage 3 kidney cancer, Parsonage-Cordero syndrome, hx of bilateral RTC complete tears, neuropathy, Right knee pain  ( * asterisk indicates given per HEP)  Next Physician Appointment:   Mono HEP:     Manuals 5/28 5/30 6/3          LE stretching                          Neuro Re-Ed             LSVT seated FWD reach             LSVT standing BIG step FWD L &R             LSVT standing BIG step LAT to L & R             Seated Bicep Curls   3# x10 ea          Heel raises  2x10 2x10          Side-stepping  @ table x4 laps w/ cues @ mirror x3 laps w/ cues          Standing Marches   Alt x10ea 1 UE support          Wobble Board             Tandem amb             Biodex             Blaze Pods                                                    Ther Ex             Upper trap stretch  10\" x5 ea 10\" x5 ea          Chin tucks  20x           Scap " "squeezes  Seated 2x10 Seated 2x10          Seated T/S extensions             Seated SB rollouts   Flex 2x10          LAQs  10x ea cues 10x ea cues          Bridges             BKFO  10x ea GTB alt x10 ea          Hip ADD ball squeeze  20x 3\" x15          SLR  10x ea                                     Ther Activity             Bike for LE mobility   declined          Reaching tasks             Gait Training             Stepping over hurdles                          Pt Ed POC, HEP HEP           Re-Evaluation             Modalities                                                    "

## 2024-06-10 ENCOUNTER — OFFICE VISIT (OUTPATIENT)
Dept: PHYSICAL THERAPY | Facility: CLINIC | Age: 69
End: 2024-06-10
Payer: COMMERCIAL

## 2024-06-10 DIAGNOSIS — G54.5 PARSONAGE-TURNER SYNDROME: ICD-10-CM

## 2024-06-10 DIAGNOSIS — R53.1 RIGHT SIDED WEAKNESS: Primary | ICD-10-CM

## 2024-06-10 PROCEDURE — 97112 NEUROMUSCULAR REEDUCATION: CPT | Performed by: PHYSICAL THERAPIST

## 2024-06-10 PROCEDURE — 97110 THERAPEUTIC EXERCISES: CPT | Performed by: PHYSICAL THERAPIST

## 2024-06-10 NOTE — PROGRESS NOTES
"Daily Note     Today's date: 6/10/2024  Patient name: Oral Mendoza  : 1955  MRN: [id number]  Referring provider: Benjamin Granado,*  Dx:   Encounter Diagnosis     ICD-10-CM    1. Right sided weakness  R53.1       2. 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 He reports he doesn't have as much weakness today like he did last week.      Objective: See treatment diary below      Assessment: Tolerated treatment well. Initiated session with standing LE strengthening exercises. Patient performed side-stepping with improved form. Occasionally needed cuing to avoid forward hunched posture. Patient exhibited good technique with therapeutic exercises and would benefit from continued PT      Plan: Continue per plan of care.  Progress treatment as tolerated.       Diagnosis: Parkinson-like symptoms  Precautions: HTN, hx of stage 3 kidney cancer, Parsonage-Cordero syndrome, hx of bilateral RTC complete tears, neuropathy, Right knee pain  ( * asterisk indicates given per HEP)  Next Physician Appointment:   Mono HEP:     Manuals 5/28 5/30 6/3 6/10         LE stretching                          Neuro Re-Ed             LSVT seated FWD reach             LSVT standing BIG step FWD L &R             LSVT standing BIG step LAT to L & R             Seated TA ball press    5\" x15 cues         Seated Bicep Curls   3# x10 ea          Heel raises  2x10 2x10 2x10         Side-stepping  @ table x4 laps w/ cues @ mirror x3 laps w/ cues @ mirror x3 laps w/ cues         Standing Marches   Alt x10ea 1 UE support Alt x10ea 1 UE support         Standing Knee bends    AROM x10 ea         Wobble Board    2' ea         Tandem amb             Biodex             Blaze Pods                                                    Ther Ex             Upper trap stretch  10\" x5 ea 10\" x5 ea          Chin tucks  20x      " "     Scap squeezes  Seated 2x10 Seated 2x10          Seated T/S extensions             Seated SB rollouts   Flex 2x10 Flex 2x10         LAQs  10x ea cues 10x ea cues          Bridges    GTB x10         BKFO  10x ea GTB alt x10 ea GTB alt x10 ea         Hip ADD ball squeeze  20x 3\" x15 3\" 2x10         SLR  10x ea                                     Ther Activity             Bike for LE mobility   declined          Reaching tasks             Gait Training             Stepping over hurdles                          Pt Ed POC, HEP HEP           Re-Evaluation             Modalities                                                      "

## 2024-06-17 ENCOUNTER — PATIENT OUTREACH (OUTPATIENT)
Dept: CASE MANAGEMENT | Facility: HOSPITAL | Age: 69
End: 2024-06-17

## 2024-06-17 ENCOUNTER — OFFICE VISIT (OUTPATIENT)
Dept: PHYSICAL THERAPY | Facility: CLINIC | Age: 69
End: 2024-06-17
Payer: COMMERCIAL

## 2024-06-17 ENCOUNTER — TELEPHONE (OUTPATIENT)
Dept: NEUROLOGY | Facility: CLINIC | Age: 69
End: 2024-06-17

## 2024-06-17 DIAGNOSIS — R53.1 RIGHT SIDED WEAKNESS: Primary | ICD-10-CM

## 2024-06-17 DIAGNOSIS — G54.5 PARSONAGE-TURNER SYNDROME: ICD-10-CM

## 2024-06-17 PROCEDURE — 97110 THERAPEUTIC EXERCISES: CPT | Performed by: PHYSICAL THERAPIST

## 2024-06-17 PROCEDURE — 97112 NEUROMUSCULAR REEDUCATION: CPT | Performed by: PHYSICAL THERAPIST

## 2024-06-17 NOTE — TELEPHONE ENCOUNTER
IRVING called patient's spouse at [phone].  Patient has a follow up appointment on 8/20.  Spouse is wondering if there is a sooner apt available.      Clerical- Can you assist if possible?  Thank you.    Patient and spouse have some questions they are hoping someone clinically can assist with.   1-What is the progression like for PD and what should patient and spouse expect.   2-How can spouse best help him?  3- Patient was prescribed medication for PD but isn't taking it because of the side effects.  Is there another medication or can someone help us through pros and cons of medication.  Thank you in advance.    Discussed resources SW can provide.  Patient is not interested in counseling and while patient's spouse will try to get him to reconsider she said he can still make his own decisions and she will not force him to do therapy.  Patient is doing PT, which took some convincing.  Patient is doing well in PT and may consider OT in the near future.     Discussed support groups for patient and for spouse.  Spouse interested in support group information, and any other resources available.  Spouse would like the information sent to her home address.  Verified address.  IRVING to send the following-  -PD foundation information  -Parkinson and neuro support groups locally and virtually  -Parkinson relationships information  -Parkinson's tips     Information placed in to be scanned bin.  IRVING remains available.

## 2024-06-17 NOTE — TELEPHONE ENCOUNTER
----- Message from Debra GERARDO sent at 6/17/2024 11:20 AM EDT -----  Regarding: FW: Help with a neuro pt    ----- Message -----  From: ANPUAMA Braga  Sent: 6/17/2024  10:44 AM EDT  To: Sharla Davidson; IRVING Valdez  Subject: Help with a neuro pt                             Hi Ladies hope you are doing well!    Beck has been followed by me after his renal cell cancer, however he has been free of disease since his surgery and they are really just monitoring him at this point.    His biggest issues are his Parsonage-Cordero Syndrome and Parkinson's. These conditions are causing him the most distress and depression. His wife is his caregiver and she is also experiencing distress when Beck gets upset.   He is currently going to outpatient PT, but he isn't finding this terribly helpful.  I think his biggest issue is the fact that he cannot physically or mentally do what he did before his diseases.    They are looking for some coping strategies specific to those with Parkinson's and Parsonage-Cordero. If either of you have any ideas or thoughts that would be great!   Also-quick question, do you follow patients regularly for support?    THank you!  Luciana

## 2024-06-17 NOTE — PROGRESS NOTES
"Supportive call placed to Mr. Mendoza today. He and his wife spoke with OSW. Beck continues to endorse depression and is now stating his Parkinson's is causing \"diminishing mental capacity\" and his ability to cope is difficult. His wife Mark, also stated her anxiety increases when Beck is having difficult moments.   OSW noted Beck is going to outpatient PT, however he states it is hard and difficult to keep up.   In regards to his renal cancer, he remains free from disease at this time. His latest CT scan in April was negative. He does not have any medical oncology needs at this time.    OSW offered to reach out to neurology SW colleagues about their concerns with his neurological conditions and if there are strategies for coping. They are both agreeable and appreciative. In-basket sent to neurology SW team requesting assistance.   "

## 2024-06-17 NOTE — PROGRESS NOTES
"Daily Note     Today's date: 2024  Patient name: Oral Mendoza  : 1955  MRN: [id number]  Referring provider: Benjamin Granado,*  Dx:   Encounter Diagnosis     ICD-10-CM    1. Right sided weakness  R53.1       2. 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 Patient reports not taking his Carbidopa as prescribed because he is concerned about the side effects.      Objective: See treatment diary below      Assessment: Patient reported feeling slow and as if his thoughts were foggy. Tolerated treatment well. Patient was educated to contact his physician to discuss his concerns about his Carbidopa. Patient was able to complete all exercises and needed 1 short break to drink some water, Otherwise, patient exhibited good technique with therapeutic exercises and would benefit from continued PT      Plan: Continue per plan of care.      Diagnosis: Parkinson-like symptoms  Precautions: HTN, hx of stage 3 kidney cancer, Parsonage-Cordero syndrome, hx of bilateral RTC complete tears, neuropathy, Right knee pain  ( * asterisk indicates given per HEP)  Next Physician Appointment:   Mono HEP:     Manuals 5/28 5/30 6/3 6/10 6/17        LE stretching                          Neuro Re-Ed             LSVT seated FWD reach             LSVT standing BIG step FWD L &R             LSVT standing BIG step LAT to L & R             Seated TA ball press    5\" x15 cues 5\" x15 cues        Seated Bicep Curls   3# x10 ea          Heel raises  2x10 2x10 2x10 2x10        Side-stepping  @ table x4 laps w/ cues @ mirror x3 laps w/ cues @ mirror x3 laps w/ cues @ table x10 laps w/ cues        Standing Marches   Alt x10ea 1 UE support Alt x10ea 1 UE support Alt x10ea 1 UE support        Standing Knee bends    AROM x10 ea AROM x10 ea        Wobble Board    2' ea " "        Tandem amb             Biodex             Blaze Pods                                                    Ther Ex             Upper trap stretch  10\" x5 ea 10\" x5 ea  10\" x5 ea        Chin tucks  20x           Scap squeezes  Seated 2x10 Seated 2x10  Seated 2x10        Seated T/S extensions             Seated SB rollouts   Flex 2x10 Flex 2x10 Flex 2x10        LAQs  10x ea cues 10x ea cues  10x ea cues        Bridges    GTB x10 GTB x10 cues        BKFO  10x ea GTB alt x10 ea GTB alt x10 ea GTB alt x10 ea        Hip ADD ball squeeze  20x 3\" x15 3\" 2x10 5\" 2x10        SLR  10x ea                                     Ther Activity             Bike for LE mobility   declined          Reaching tasks             Gait Training             Stepping over hurdles                          Pt Ed POC, HEP HEP           Re-Evaluation             Modalities                                                        "

## 2024-06-19 NOTE — TELEPHONE ENCOUNTER
Called patient and lvm with our call back information. Only a week earlier availability as of now with Dr. Nini Fleming.

## 2024-06-24 ENCOUNTER — EVALUATION (OUTPATIENT)
Dept: PHYSICAL THERAPY | Facility: CLINIC | Age: 69
End: 2024-06-24
Payer: COMMERCIAL

## 2024-06-24 DIAGNOSIS — G54.5 PARSONAGE-TURNER SYNDROME: Primary | ICD-10-CM

## 2024-06-24 DIAGNOSIS — R53.1 RIGHT SIDED WEAKNESS: ICD-10-CM

## 2024-06-24 PROCEDURE — 97530 THERAPEUTIC ACTIVITIES: CPT | Performed by: PHYSICAL THERAPIST

## 2024-06-24 NOTE — PROGRESS NOTES
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 Right sided weakness  R53.1             Start Time: 1130  Stop Time: 1200  Total time in clinic (min): 30 minutes    Assessment  Impairments: abnormal gait, abnormal or restricted ROM, activity intolerance, impaired balance, impaired physical strength, lacks appropriate home exercise program, pain with function, weight-bearing intolerance, poor posture  and poor body mechanics  Functional limitations: walking, sit-stands, LE/UE dressing, bed mobility, getting in/out of car, showering    Assessment details: Patient is a 69 y.o. male who presents to outpatient with reports of LE weakness and balance impairment that has worsened in the past year. Patient arrives for re- evaluation ambulating with single point cane, with some shuffling gait but slight improved foot clearance. Patient uses sane on occasion or carries it in his hand while walking around PT clinic. He reports he feels a little more unsteady when he's not using a cane. Slightly improving UE and scapular posture, but continues to have guarded UE mechanics and slightly elevated shoulders in static posture. Improved TUG time noted to about 13 seconds without assistive device. He continues to have some LE fatigue with repeated 5x sit-stand testing. He continues to require close supervision with ambulating around PT clinic for patient's safety. Cuing required to avoid elevated/guarded shoulders posture. Please note that patient reports history of bilateral complete RTC tears, thus significant limitations in bilateral shoulder mobility and strength in all planes. Patient continues to exhibit balance impairments and slower speed noted while walking and with turning, which increase his falls risk with daily ambulation  "and negotiating obstacles in varying environments. Patient lives with his wife and is independent with ADLs, however requires assistance with certain activities such as UE dressing, bathing, etc. He has made good progress with set goals thus far, and has exhibited good tolerance with PT interventions and exercises. Patient will benefit from continued skilled PT services to further improve LE flexibility/mobility, improve LE/glute strength, improve balance, improve safety, and improve ease and mechanics with ADLs to progress him towards max potential of PLOF. Patient would benefit from skilled PT services to address these impairments, to maximize function, and to reduce falls risk.  Thank you for the referral.    Please note that patient reported feeling a little unsteady on arrival today \"the Parkinson's is catching up to me.\" BP assessed on arrival 128/82 mmHg and at end of session 118/80 mmHg. He reports he talked to his physician who recommended lowering dose of Carbidopa. He reports his BP was lower a few days ago and did not take one dose of Amlodipine, however continued back up after that day. Patient and patient's wife were educated to contact PCP prior to adjusting dosages of medications.     Barriers to therapy: Medical history  Understanding of Dx/Px/POC: good     Prognosis: good    Goals  Impairment Goals 4-6 weeks   In order to maximize function patient will be able to...   - Increase hip/LE strength to 4/5 throughout. Improved  - Demonstrate improved hip flexibility as demonstrated by increased ROM through therapeutic exercise. Slightly Improved  - Improved TUG time to < 15 sec to improve mobility and reduce falls risk with walking. Met, without AD  - Improved 5x sit-stand time to < 15 sec to improve LE endurance and reduce falls risk with transfers. Improved    Functional Goals 6-8 weeks  In order to return to prior level of function patient will be able to...   - Participate in ADL's/IADL's/sport " "specific activities with no greater than 2/10 pain. Ongoing  - Increase Functional Status Measure (FOTO) to: anticipated at discharge. Improved  - Demonstrate independence and compliant with HEP. Met  - Demonstrate a squat and or sit to stand with good mechanics and eccentric control without pain/difficulty/compensation. Improved   - Demonstrate functional activities with good core and glute strength without compensation/pain/difficulty . Improved  - Ascend and descend stairs without increased pain/compensation/difficulty and a reciprocal gait pattern. Slightly Improved  - Patient will be able to demonstrate good gait mechanics without compensations. Improved    Plan  Patient would benefit from: skilled PT  Planned modality interventions: cryotherapy  Other planned modality interventions: moist heat    Planned therapy interventions: joint mobilization, manual therapy, neuromuscular re-education, patient education, strengthening, stretching, therapeutic activities, therapeutic exercise, home exercise program, functional ROM exercises, Sandoval taping, postural training, balance/weight bearing training, body mechanics training, flexibility, IASTM, kinesiology taping, massage, nerve gliding, transfer training and gait training    Frequency: 1-2x/week.  Duration in weeks: 4  Treatment plan discussed with: patient, PTA, referring physician and family  Plan details: Patient may do LSVT BIG program in the future as able and ready.        Subjective Evaluation    History of Present Illness  Mechanism of injury: Oral Mendoza is a 69 y.o. year-old male who presents to outpatient PT accompanied by his wife \"Kendrick\" with reports of LE weakness and balance impairments since last March. He was diagnosed with Parkinson-like symptoms, as well as Parsonage-Cordero syndrome, after which he noticed his condition worsening. He had follow-up with Neurology end of April and PCP in May and was recommended for PT at this time. " Patient was recommended for LSVT BIG and LOUD program, however patient declined coming 4x week at this time.  Quality of life: good    Patient Goals  Patient goals for therapy: decreased pain, increased motion, improved balance, increased strength, independence with ADLs/IADLs and return to sport/leisure activities    Pain  Current pain rating: 3  At best pain rating: 3  At worst pain ratin  Location: Right knee, both shoulders  Quality: dull ache and throbbing  Aggravating factors: sitting, standing, stair climbing, walking and lifting  Progression: improved    Social Support  Steps to enter house: yes  Stairs in house: no   Lives with: spouse    Hand dominance: right    Treatments  Previous treatment: physical therapy and occupational therapy  Current treatment: physical therapy        Objective     Strength/Myotome Testing     Left Shoulder     Planes of Motion   Flexion: 2-   Abduction: 2-     Right Shoulder     Planes of Motion   Flexion: 2-   Abduction: 2-     Left Elbow   Flexion: 4  Extension: 3+    Right Elbow   Flexion: 4  Extension: 3-    Left Hip   Planes of Motion   Flexion: 4  Extension: 3+  Abduction: 4-  External rotation: 4-    Right Hip   Planes of Motion   Flexion: 4  Extension: 3+  Abduction: 4-  External rotation: 4-    Left Knee   Flexion: 4  Extension: 4    Right Knee   Flexion: 4  Extension: 4    Left Ankle/Foot   Dorsiflexion: 4-  Plantar flexion: 3+    Right Ankle/Foot   Dorsiflexion: 4  Plantar flexion: 3+  Neuro Exam:     Sensation   Light touch LE: left WNL and right WNL    Coordination   Heel to shin: left WNL and right WNL  Finger to nose: left WNL and right WNL  Rapid alternating movements: UE WNL    Transfers   Sit to stand: minimum assist (uses 2 UE support)   Wheelchair to mat: minimum assist (close supervision from chair to table)   Mat to wheelchair: minimum assist (close supervision from chair to table)   Sit to supine: minimum assist (close supervision)   Supine to sit:  "minimum assist (close supervision)     Functional outcomes   5x sit to stand: 20 seconds with 2 UE support (seconds)  TU without assistive device (seconds)  Functional outcome comment: Rhomberg on firm: NV  Functional outcome gait comment: With single point cane: close supervision; exhibits forward head, rounded shoulders, and elevated shoulders posture throughout, shuffled gait pattern with decreased heel strike and foot clearance, decreased knee flexion in swing phase, decreased arm swing of contralateral UE not holding cane, slower pace with turns especially during TUG testing             Diagnosis: Parkinson-like symptoms  Precautions: HTN, hx of stage 3 kidney cancer, Parsonage-Cordero syndrome, hx of bilateral RTC complete tears, neuropathy, Right knee pain  ( * asterisk indicates given per HEP)  Next Physician Appointment:   Mono HEP:     Manuals 5/28 5/30 6/3 6/10 6/17 6/24       LE stretching                          Neuro Re-Ed             LSVT seated FWD reach             LSVT standing BIG step FWD L &R             LSVT standing BIG step LAT to L & R             Seated TA ball press    5\" x15 cues 5\" x15 cues        Seated Bicep Curls   3# x10 ea          Heel raises  2x10 2x10 2x10 2x10        Side-stepping  @ table x4 laps w/ cues @ mirror x3 laps w/ cues @ mirror x3 laps w/ cues @ table x10 laps w/ cues        Standing Marches   Alt x10ea 1 UE support Alt x10ea 1 UE support Alt x10ea 1 UE support        Standing Knee bends    AROM x10 ea AROM x10 ea        Wobble Board    2' ea         Tandem amb             Biodex             Blaze Pods                                                    Ther Ex             Upper trap stretch  10\" x5 ea 10\" x5 ea  10\" x5 ea        Chin tucks  20x           Scap squeezes  Seated 2x10 Seated 2x10  Seated 2x10        Seated T/S extensions             Seated SB rollouts   Flex 2x10 Flex 2x10 Flex 2x10        LAQs  10x ea cues 10x ea cues  10x ea cues      " "  Bridges    GTB x10 GTB x10 cues        BKFO  10x ea GTB alt x10 ea GTB alt x10 ea GTB alt x10 ea        Hip ADD ball squeeze  20x 3\" x15 3\" 2x10 5\" 2x10        SLR  10x ea                                     Ther Activity             Bike for LE mobility   declined          Reaching tasks             Gait Training             Stepping over hurdles             BP check      128/82mmHg, 118/80 mmHg       SpO2 check      58bpm, 97% O2       Pt Ed POC, HEP HEP    POC       Re-Evaluation      DK       Modalities                                                "

## 2024-06-25 NOTE — TELEPHONE ENCOUNTER
SW called and spoke with patient and spouse on the phone.  Patient reviewed resources sent and was appreciative.  Spouse is looking to join a support group for caregivers.  No questions or needs at this time from [facility] but willing for this writer to check back in a few weeks.  SW remains available.

## 2024-07-01 ENCOUNTER — OFFICE VISIT (OUTPATIENT)
Dept: PHYSICAL THERAPY | Facility: CLINIC | Age: 69
End: 2024-07-01
Payer: COMMERCIAL

## 2024-07-01 DIAGNOSIS — R53.1 RIGHT SIDED WEAKNESS: ICD-10-CM

## 2024-07-01 DIAGNOSIS — G54.5 PARSONAGE-TURNER SYNDROME: Primary | ICD-10-CM

## 2024-07-01 PROCEDURE — 97530 THERAPEUTIC ACTIVITIES: CPT | Performed by: PHYSICAL THERAPIST

## 2024-07-01 PROCEDURE — 97110 THERAPEUTIC EXERCISES: CPT

## 2024-07-01 PROCEDURE — 97112 NEUROMUSCULAR REEDUCATION: CPT | Performed by: PHYSICAL THERAPIST

## 2024-07-01 NOTE — PROGRESS NOTES
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 Right sided weakness  R53.1           Start Time: 1145  Stop Time: 1230  Total time in clinic (min): 45 minutes    Subjective: Patient reports he feels like he is moving slower \"because of the Parkinson's\".      Objective: See treatment diary below      Assessment: Tolerated treatment well. Initiated session with postural exercises such as thoracic extension. Progressed scap squeezes with mid rows with yellow TB, some UE soreness noted but improved with rest. Added NuStep end of session with good tolerance, but only able to perform for 3 minutes. Patient exhibited good technique with therapeutic exercises and would benefit from continued PT      Plan: Continue per plan of care.  Progress treatment as tolerated.       Diagnosis: Parkinson-like symptoms  Precautions: HTN, hx of stage 3 kidney cancer, Parsonage-Cordero syndrome, hx of bilateral RTC complete tears, neuropathy, Right knee pain  ( * asterisk indicates given per HEP)  Next Physician Appointment:   Mono HEP:     Manuals 5/28 5/30 6/3 6/10 6/17 6/24 7/1         LE stretching                                Neuro Re-Ed                LSVT seated FWD reach                LSVT standing BIG step FWD L &R                LSVT standing BIG step LAT to L & R                Seated TA ball press    5\" x15 cues 5\" x15 cues  5\" x15 cues         Seated Bicep Curls   3# x10 ea    2# 2x10 ea         Heel raises  2x10 2x10 2x10 2x10  2x10         Side-stepping  @ table x4 laps w/ cues @ mirror x3 laps w/ cues @ mirror x3 laps w/ cues @ table x10 laps w/ cues           Standing Marches   Alt x10ea 1 UE support Alt x10ea 1 UE support Alt x10ea 1 UE support           Standing Knee bends    AROM x10 ea AROM x10 ea  AROM x15ea       " "  Cone taps from foam       2 cones x10ea         Wobble Board    2' ea            Tandem amb       FWD @ mirror x3 laps         Biodex                Blaze Pods                                                                Ther Ex                Upper trap stretch  10\" x5 ea 10\" x5 ea  10\" x5 ea           Chin tucks  20x     2x10         Scap squeezes  Seated 2x10 Seated 2x10  Seated 2x10           Seated Mid Rows       YTB x15         Seated T/S extensions       FR 2x10         Seated SB rollouts   Flex 2x10 Flex 2x10 Flex 2x10  Flex 2x10         LAQs  10x ea cues 10x ea cues  10x ea cues  2x10 ea         Bridges    GTB x10 GTB x10 cues           BKFO  10x ea GTB alt x10 ea GTB alt x10 ea GTB alt x10 ea           Hip ADD ball squeeze  20x 3\" x15 3\" 2x10 5\" 2x10           SLR  10x ea                                              Ther Activity                Bike for LE mobility   declined    [id number]         Reaching tasks                Gait Training                Stepping over hurdles                BP check      128/82mmHg, 118/80 mmHg          SpO2 check      58bpm, 97% O2          Pt Ed POC, HEP HEP    POC HEP         Re-Evaluation      DK          Modalities                                                         "

## 2024-07-08 ENCOUNTER — OFFICE VISIT (OUTPATIENT)
Dept: PHYSICAL THERAPY | Facility: CLINIC | Age: 69
End: 2024-07-08
Payer: COMMERCIAL

## 2024-07-08 DIAGNOSIS — G54.5 PARSONAGE-TURNER SYNDROME: Primary | ICD-10-CM

## 2024-07-08 DIAGNOSIS — R53.1 RIGHT SIDED WEAKNESS: ICD-10-CM

## 2024-07-08 PROCEDURE — 97110 THERAPEUTIC EXERCISES: CPT | Performed by: PHYSICAL THERAPIST

## 2024-07-08 PROCEDURE — 97530 THERAPEUTIC ACTIVITIES: CPT | Performed by: PHYSICAL THERAPIST

## 2024-07-08 PROCEDURE — 97112 NEUROMUSCULAR REEDUCATION: CPT | Performed by: PHYSICAL THERAPIST

## 2024-07-08 NOTE — PROGRESS NOTES
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 Right sided weakness  R53.1           Start Time: 1215  Stop Time: 1300  Total time in clinic (min): 45 minutes    Subjective: Patient reports \"I feel Parkinson's is getting me day by day.\"      Objective: See treatment diary below      Assessment: Tolerated treatment well. PT advised patient to contact PCP regarding current symptoms and Carbidopa/Levodopa currently taking. Patient performed NuStep beginning of session with improved tolerance Performed balance activities such as cone taps from foam surface with improved tolerance. He continues to require UE support to avoid LOB. Patient exhibited good technique with therapeutic exercises and would benefit from continued PT      Plan: Continue per plan of care.  Progress treatment as tolerated.       Diagnosis: Parkinson-like symptoms  Precautions: HTN, hx of stage 3 kidney cancer, Parsonage-Cordero syndrome, hx of bilateral RTC complete tears, neuropathy, Right knee pain  ( * asterisk indicates given per HEP)  Next Physician Appointment:   Mono HEP:     Manuals 6/3 6/10 6/17 6/24 7/1 7/8          LE stretching                                Neuro Re-Ed                LSVT seated FWD reach                LSVT standing BIG step FWD L &R                LSVT standing BIG step LAT to L & R                Seated TA ball press  5\" x15 cues 5\" x15 cues  5\" x15 cues 5\" x20 cues          Seated Bicep Curls 3# x10 ea    2# 2x10 ea           Heel raises 2x10 2x10 2x10  2x10           Side-stepping @ mirror x3 laps w/ cues @ mirror x3 laps w/ cues @ table x10 laps w/ cues             Standing Marches Alt x10ea 1 UE support Alt x10ea 1 UE support Alt x10ea 1 UE support             Standing Knee bends  AROM x10 ea AROM x10 ea  AROM x15ea   " "        Cone taps from foam     2 cones x10ea 2 cone from foam, FWD + LAT x10ea          Wobble Board  2' ea    2' ea          Tandem amb     FWD @ mirror x3 laps FWD @ mirror x3 laps          Biodex                Blaze Pods                                                                Ther Ex                Upper trap stretch 10\" x5 ea  10\" x5 ea             Chin tucks     2x10           Scap squeezes Seated 2x10  Seated 2x10             Seated Mid Rows     YTB x15 YTB 2x10          Seated T/S extensions     FR 2x10           Seated SB rollouts Flex 2x10 Flex 2x10 Flex 2x10  Flex 2x10 Flex 2x10          LAQs 10x ea cues  10x ea cues  2x10 ea           Bridges  GTB x10 GTB x10 cues             BKFO GTB alt x10 ea GTB alt x10 ea GTB alt x10 ea             Hip ADD ball squeeze 3\" x15 3\" 2x10 5\" 2x10             SLR                                                Ther Activity                Bike for LE mobility declined    [id number] NuStep x5min          Reaching tasks                Gait Training                Stepping over hurdles                BP check    128/82mmHg, 118/80 mmHg            SpO2 check    58bpm, 97% O2            Pt Ed    POC HEP Contact PCP          Re-Evaluation    DK            Modalities                                                           "

## 2024-07-15 ENCOUNTER — OFFICE VISIT (OUTPATIENT)
Dept: PHYSICAL THERAPY | Facility: CLINIC | Age: 69
End: 2024-07-15
Payer: COMMERCIAL

## 2024-07-15 DIAGNOSIS — R53.1 RIGHT SIDED WEAKNESS: Primary | ICD-10-CM

## 2024-07-15 PROCEDURE — 97530 THERAPEUTIC ACTIVITIES: CPT

## 2024-07-15 PROCEDURE — 97112 NEUROMUSCULAR REEDUCATION: CPT

## 2024-07-15 NOTE — PROGRESS NOTES
"Daily Note     Today's date: 7/15/2024  Patient name: Oral Mendoza  : 1955  MRN: [id number]  Referring provider: Benjamin Granado,*  Dx:   Encounter Diagnosis     ICD-10-CM    1. Right sided weakness  R53.1                      Subjective: Pt states that he is moving slower today, he isn't sure why.  Maybe the heat, maybe the Parkinson's.        Objective: See treatment diary below      Assessment: Tolerated treatment well.  Progressed pt with balance activities as tolerated.  Pt challenged with biodex and requires occasional UE support to finish maze.  Pt able to perform LOS without UE support.  CGS throughout for safety.  1 light UE support to maintain his balance during tandem balance.  Pt will benefit from continued therapy.        Plan: Continue per plan of care.      Diagnosis: Parkinson-like symptoms  Precautions: HTN, hx of stage 3 kidney cancer, Parsonage-Cordero syndrome, hx of bilateral RTC complete tears, neuropathy, Right knee pain  ( * asterisk indicates given per HEP)  Next Physician Appointment:   Mono HEP:     Manuals 6/3 6/10 6/17 6/24 7/1 7/8 7/15         LE stretching                                Neuro Re-Ed                LSVT seated FWD reach                LSVT standing BIG step FWD L &R                LSVT standing BIG step LAT to L & R                Seated TA ball press  5\" x15 cues 5\" x15 cues  5\" x15 cues 5\" x20 cues 5\"x10         Seated Bicep Curls 3# x10 ea    2# 2x10 ea           Heel raises 2x10 2x10 2x10  2x10           Side-stepping @ mirror x3 laps w/ cues @ mirror x3 laps w/ cues @ table x10 laps w/ cues    At table  X6 laps         Standing Marches Alt x10ea 1 UE support Alt x10ea 1 UE support Alt x10ea 1 UE support             Standing Knee bends  AROM x10 ea AROM x10 ea  AROM x15ea           Cone taps from foam     2 cones x10ea 2 cone from foam, FWD + LAT x10ea          Wobble Board  2' ea    2' ea 2' ea         Tandem amb     FWD @ mirror x3 laps " "FWD @ mirror x3 laps          Biodex       LOS, maze, WS 3xea         Blaze Pods                SLS       20\"x2 ea         Tandem balance       2x30\" ea 1 UE                         Ther Ex                Upper trap stretch 10\" x5 ea  10\" x5 ea             Chin tucks     2x10           Scap squeezes Seated 2x10  Seated 2x10             Seated Mid Rows     YTB x15 YTB 2x10          Seated T/S extensions     FR 2x10           Seated SB rollouts Flex 2x10 Flex 2x10 Flex 2x10  Flex 2x10 Flex 2x10 Flex  2x10         LAQs 10x ea cues  10x ea cues  2x10 ea           Bridges  GTB x10 GTB x10 cues             BKFO GTB alt x10 ea GTB alt x10 ea GTB alt x10 ea             Hip ADD ball squeeze 3\" x15 3\" 2x10 5\" 2x10             SLR                                                Ther Activity                Bike for LE mobility declined    [id number] NuStep x5min Nustep\x 5min           Reaching tasks                Gait Training                Stepping over hurdles       3x at table         BP check    128/82mmHg, 118/80 mmHg            SpO2 check    58bpm, 97% O2            Pt Ed    POC HEP Contact PCP          Re-Evaluation    DK            Modalities                                                             "

## 2024-07-22 ENCOUNTER — OFFICE VISIT (OUTPATIENT)
Dept: PHYSICAL THERAPY | Facility: CLINIC | Age: 69
End: 2024-07-22
Payer: COMMERCIAL

## 2024-07-22 DIAGNOSIS — R53.1 RIGHT SIDED WEAKNESS: Primary | ICD-10-CM

## 2024-07-22 DIAGNOSIS — G54.5 PARSONAGE-TURNER SYNDROME: ICD-10-CM

## 2024-07-22 PROCEDURE — 97112 NEUROMUSCULAR REEDUCATION: CPT

## 2024-07-22 PROCEDURE — 97110 THERAPEUTIC EXERCISES: CPT

## 2024-07-22 NOTE — PROGRESS NOTES
"Daily Note     Today's date: 2024  Patient name: Oral Mendoza  : 1955  MRN: [id number]  Referring provider: Benjamin Granado,*  Dx:   Encounter Diagnosis     ICD-10-CM    1. Right sided weakness  R53.1       2. 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 I'm not sure why\". He requests for today to be a light session as he's unsure how much activity he can tolerate. Patient mentions BLEs feeling sore following last session although, resolved within a few days.       Objective: See treatment diary below      Assessment: Patient tolerated treatment session fairly well. Session focused on improving strength, proprioception, and balance. Patient was monitored closely to ensure instructed exercises and dosage was tolerable to perform. Required consistent encouragement for less reliance of BUE assistance on external support with balance interventions. Seated rest breaks taken in between exercises to allow muscles to recover. Patient performed dynamic activities at a slow and steady pace although, did not display any LOB/unsteadiness. Patient demonstrated fatigue post-tx and would benefit from continued PT to improve level of function.       Plan: Continue per POC. Increase reps/resistance as tolerated.      Diagnosis: Parkinson-like symptoms  Precautions: HTN, hx of stage 3 kidney cancer, Parsonage-Cordero syndrome, hx of bilateral RTC complete tears, neuropathy, Right knee pain  ( * asterisk indicates given per HEP)  Next Physician Appointment:   Mono HEP:     Manuals 6/3 6/10 6/17 6/24 7/1 7/8 7/15 7/22        LE stretching                                Neuro Re-Ed                LSVT seated FWD reach                LSVT standing BIG step FWD L &R                LSVT standing BIG step LAT to L & R                Seated TA ball press  5\" " "x15 cues 5\" x15 cues  5\" x15 cues 5\" x20 cues 5\"x10 5\"x10        Seated Bicep Curls 3# x10 ea    2# 2x10 ea           Heel raises 2x10 2x10 2x10  2x10   2x10        Side-stepping @ mirror x3 laps w/ cues @ mirror x3 laps w/ cues @ table x10 laps w/ cues    At table  X6 laps @ mirror x3 laps w/ cues        Standing Marches Alt x10ea 1 UE support Alt x10ea 1 UE support Alt x10ea 1 UE support             Standing Knee bends  AROM x10 ea AROM x10 ea  AROM x15ea           Cone taps from foam     2 cones x10ea 2 cone from foam, FWD + LAT x10ea          Wobble Board  2' ea    2' ea 2' ea 2' ea        Tandem amb     FWD @ mirror x3 laps FWD @ mirror x3 laps  FWD @ mirror x3 laps        Biodex       LOS, maze, WS 3xea         Blaze Pods                SLS       20\"x2 ea         Tandem balance       2x30\" ea 1 UE 2x30\" ea 1 UE                        Ther Ex                Upper trap stretch 10\" x5 ea  10\" x5 ea             Chin tucks     2x10           Scap squeezes Seated 2x10  Seated 2x10             Seated Mid Rows     YTB x15 YTB 2x10          Seated T/S extensions     FR 2x10           Seated SB rollouts Flex 2x10 Flex 2x10 Flex 2x10  Flex 2x10 Flex 2x10 Flex  2x10 Flex  2x10        LAQs 10x ea cues  10x ea cues  2x10 ea           Bridges  GTB x10 GTB x10 cues             BKFO GTB alt x10 ea GTB alt x10 ea GTB alt x10 ea             Hip ADD ball squeeze 3\" x15 3\" 2x10 5\" 2x10             SLR                                                Ther Activity                Bike for LE mobility declined    [id number] NuStep x5min Nustep\x 5min   Nustep 5min        Reaching tasks                Gait Training                Stepping over hurdles       3x at table         BP check    128/82mmHg, 118/80 mmHg            SpO2 check    58bpm, 97% O2            Pt Ed    POC HEP Contact PCP          Re-Evaluation    DK            Modalities                                                               "

## 2024-07-23 DIAGNOSIS — G54.5 PARSONAGE-TURNER SYNDROME: ICD-10-CM

## 2024-07-23 RX ORDER — GABAPENTIN 100 MG/1
100 CAPSULE ORAL
Qty: 100 CAPSULE | Refills: 1 | Status: SHIPPED | OUTPATIENT
Start: 2024-07-23

## 2024-07-23 NOTE — TELEPHONE ENCOUNTER
Reason for call:   [x] Refill   [] Prior Auth  [] Other:     Office:   [] PCP/Provider -   [] Specialty/Provider -     Medication: GABAPENTIN    Dose/Frequency: 100    Quantity: 90    Pharmacy:   [id number] - [place] - [id number]  [address] [address]  Phone: [phone]  Fax: [phone]       Does the patient have enough for 3 days?   [x] Yes   [] No - Send as HP to POD

## 2024-07-29 ENCOUNTER — EVALUATION (OUTPATIENT)
Dept: PHYSICAL THERAPY | Facility: CLINIC | Age: 69
End: 2024-07-29
Payer: COMMERCIAL

## 2024-07-29 ENCOUNTER — PATIENT OUTREACH (OUTPATIENT)
Dept: CASE MANAGEMENT | Facility: OTHER | Age: 69
End: 2024-07-29

## 2024-07-29 DIAGNOSIS — R53.1 RIGHT SIDED WEAKNESS: Primary | ICD-10-CM

## 2024-07-29 DIAGNOSIS — G54.5 PARSONAGE-TURNER SYNDROME: ICD-10-CM

## 2024-07-29 PROCEDURE — 97530 THERAPEUTIC ACTIVITIES: CPT | Performed by: PHYSICAL THERAPIST

## 2024-07-29 PROCEDURE — 97110 THERAPEUTIC EXERCISES: CPT | Performed by: PHYSICAL THERAPIST

## 2024-07-29 NOTE — PROGRESS NOTES
PT Re-Evaluation     Today's date: 2024  Patient name: Oral Mendoza  : 1955  MRN: [id number]  Referring provider: Benjamin Granado,*  Dx:   Encounter Diagnosis     ICD-10-CM    1. Right sided weakness  R53.1       2. 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 male who presents to outpatient with reports of LE weakness and balance impairment that has worsened in the past year. Patient arrives for re- evaluation ambulating with single point cane, with improving overall gait mechanics and foot clearance. Patient requires less reliance on cane to walk short distances within clinic, however carries in his hand for safety and security. He reports he feels a little more unsteady when he's not using a cane. Slightly improving UE and scapular posture, but continues to have guarded UE mechanics and slightly elevated shoulders in static posture. Improving LE noted with MMT today close to WFLs in all planes. TUG time noted to be about 13 seconds without assistive device. Improved 5x sit-stand testing to 14 seconds, indicating improved LE endurance and mechanics with transfers with and without support.. He continues to require close supervision with ambulating around PT clinic for patient's safety. Cuing required to avoid elevated/guarded shoulders posture. Please note that patient reports history of bilateral complete RTC tears, thus significant limitations in bilateral shoulder mobility and strength in all  planes. Patient continues to exhibit balance impairments and slower speed noted while walking and with turning, which increase his falls risk with daily ambulation and negotiating obstacles in varying environments. Patient lives with his wife and is independent with ADLs, however requires assistance with certain activities such as UE dressing, bathing, etc. He has made good progress with set goals thus far, and has exhibited good tolerance with PT interventions and exercises. Patient will benefit from continued skilled PT services to further improve LE flexibility/mobility, improve LE/glute strength, improve balance, improve safety, and improve ease and mechanics with ADLs to progress him towards max potential of PLOF. Patient would benefit from skilled PT services to address these impairments, to maximize function, and to reduce falls risk.  Thank you for the referral.    Discussion with patient and his wife at length today regarding good functional progress he has made thus far, given other physical limitations and factors. He continues to feel as though he is not making much progress and reports having good and bad days. Patient and patient's wife were educated to contact PCP and/or neurologist regarding effects of medications at this time. They were also recommended and given information for behavioral health referral.    Barriers to therapy: Medical history  Understanding of Dx/Px/POC: good     Prognosis: good    Goals  Impairment Goals 4-6 weeks   In order to maximize function patient will be able to...   - Increase hip/LE strength to 4/5 throughout. Partially Met  - Demonstrate improved hip flexibility as demonstrated by increased ROM through therapeutic exercise. Slightly Improved  - Improved TUG time to < 15 sec to improve mobility and reduce falls risk with walking. Met, without AD  - Improved 5x sit-stand time to < 15 sec to improve LE endurance and reduce falls risk with transfers. Met    Functional  "Goals 6-8 weeks  In order to return to prior level of function patient will be able to...   - Participate in ADL's/IADL's/sport specific activities with no greater than 2/10 pain. Ongoing  - Increase Functional Status Measure (FOTO) to: anticipated at discharge. Improved  - Demonstrate independence and compliant with HEP. Met  - Demonstrate a squat and or sit to stand with good mechanics and eccentric control without pain/difficulty/compensation. Improved   - Demonstrate functional activities with good core and glute strength without compensation/pain/difficulty . Improved  - Ascend and descend stairs without increased pain/compensation/difficulty and a reciprocal gait pattern. Improved  - Patient will be able to demonstrate good gait mechanics without compensations. Improved    Plan  Patient would benefit from: skilled PT  Planned modality interventions: cryotherapy  Other planned modality interventions: moist heat    Planned therapy interventions: joint mobilization, manual therapy, neuromuscular re-education, patient education, strengthening, stretching, therapeutic activities, therapeutic exercise, home exercise program, functional ROM exercises, Sandoval taping, postural training, balance/weight bearing training, body mechanics training, flexibility, IASTM, kinesiology taping, massage, nerve gliding, transfer training and gait training    Frequency: 1-2x/week.  Duration in weeks: 4  Treatment plan discussed with: patient, PTA, referring physician and family  Plan details: Patient may do LSVT BIG program in the future as able and ready.        Subjective Evaluation    History of Present Illness  Mechanism of injury: Oral Mendoza is a 69 y.o. year-old male who presents to outpatient PT accompanied by his wife \"Kendrick\" with reports of LE weakness and balance impairments since last March. He was diagnosed with Parkinson-like symptoms, as well as Parsonage-Cordero syndrome, after which he noticed his condition " worsening. He had follow-up with Neurology end of April and PCP in May and was recommended for PT at this time. Patient was recommended for LSVT BIG and LOUD program, however patient declined coming 4x week at this time.  Quality of life: good    Patient Goals  Patient goals for therapy: decreased pain, increased motion, improved balance, increased strength, independence with ADLs/IADLs and return to sport/leisure activities    Pain  Current pain rating: 3  At best pain rating: 3  At worst pain ratin  Location: Right knee, both shoulders  Quality: dull ache and throbbing  Aggravating factors: sitting, standing, stair climbing, walking and lifting  Progression: improved    Social Support  Steps to enter house: yes  Stairs in house: no   Lives with: spouse    Hand dominance: right    Treatments  Previous treatment: physical therapy and occupational therapy  Current treatment: physical therapy        Objective     Strength/Myotome Testing     Left Shoulder     Planes of Motion   Flexion: 2-   Abduction: 2-     Right Shoulder     Planes of Motion   Flexion: 2-   Abduction: 2-     Left Elbow   Flexion: 4  Extension: 3+    Right Elbow   Flexion: 4  Extension: 3-    Left Hip   Planes of Motion   Flexion: 4  Extension: 3+  Abduction: 4-  External rotation: 4-    Right Hip   Planes of Motion   Flexion: 4  Extension: 3+  Abduction: 4-  External rotation: 4-    Left Knee   Flexion: 4  Extension: 4+    Right Knee   Flexion: 4  Extension: 4+    Left Ankle/Foot   Dorsiflexion: 4-  Plantar flexion: 3+    Right Ankle/Foot   Dorsiflexion: 4  Plantar flexion: 3+  Neuro Exam:     Sensation   Light touch LE: left WNL and right WNL    Coordination   Heel to shin: left WNL and right WNL  Finger to nose: left WNL and right WNL  Rapid alternating movements: UE WNL    Transfers   Sit to stand: minimum assist (uses 2 UE support)   Wheelchair to mat: minimum assist (close supervision from chair to table)   Mat to wheelchair: minimum  "assist (close supervision from chair to table)   Sit to supine: minimum assist (close supervision)   Supine to sit: minimum assist (close supervision)     Functional outcomes   5x sit to stand: 14 seconds with 2-0 UE support (seconds)  TU without assistive device (seconds)  Functional outcome comment: Rhomberg on firm: NV  Functional outcome gait comment: With single point cane: close supervision; exhibits forward head, rounded shoulders, and elevated shoulders posture throughout, shuffled gait pattern with decreased heel strike and foot clearance, decreased knee flexion in swing phase, decreased arm swing of contralateral UE not holding cane, slower pace with turns especially during TUG testing             Diagnosis: Parkinson-like symptoms  Precautions: HTN, hx of stage 3 kidney cancer, Parsonage-Cordero syndrome, hx of bilateral RTC complete tears, neuropathy, Right knee pain  ( * asterisk indicates given per HEP)  Next Physician Appointment:   Mono HEP:     Manuals 6/17 6/24 7/1 7/8 7/15 7/22 7/29          LE stretching                                  Neuro Re-Ed                 LSVT seated FWD reach                 LSVT standing BIG step FWD L &R                 LSVT standing BIG step LAT to L & R                 Seated TA ball press 5\" x15 cues  5\" x15 cues 5\" x20 cues 5\"x10 5\"x10           Seated Bicep Curls   2# 2x10 ea              Heel raises 2x10  2x10   2x10           Side-stepping @ table x10 laps w/ cues    At table  X6 laps @ mirror x3 laps w/ cues           Standing Marches Alt x10ea 1 UE support                Standing Knee bends AROM x10 ea  AROM x15ea              Cone taps from foam   2 cones x10ea 2 cone from foam, FWD + LAT x10ea             Wobble Board    2' ea 2' ea 2' ea           Tandem amb   FWD @ mirror x3 laps FWD @ mirror x3 laps  FWD @ mirror x3 laps           Biodex     RALPH, shayy, WS 3xea            Blaze Pods                 SLS     20\"x2 ea            Tandem balance     " "2x30\" ea 1 UE 2x30\" ea 1 UE                            Ther Ex                 Upper trap stretch 10\" x5 ea                Chin tucks   2x10              Scap squeezes Seated 2x10                Seated Mid Rows   YTB x15 YTB 2x10             Seated T/S extensions   FR 2x10              Seated SB rollouts Flex 2x10  Flex 2x10 Flex 2x10 Flex  2x10 Flex  2x10           LAQs 10x ea cues  2x10 ea              Bridges GTB x10 cues                BKFO GTB alt x10 ea                Hip ADD ball squeeze 5\" 2x10                SLR                                                   Ther Activity                 Bike for LE mobility   [id number] NuStep x5min Nustep\x 5min   Nustep 5min Nustep 5min          Reaching tasks                 Gait Training                 Stepping over hurdles     3x at table            BP check  128/82mmHg, 118/80 mmHg               SpO2 check  58bpm, 97% O2               Pt Ed  POC HEP Contact PCP   POC          Re-Evaluation  DK     DK          Modalities                                                              "

## 2024-07-29 NOTE — PROGRESS NOTES
OSW completed chart review today. Noted Neuro SW reached out to pt and wife and provided several resources related to pt's neuro dx and caregiver support to his wife. Appreciate the assistance from colleague. OSW will close to regular outreach at this time since pt is not receiving active cancer treatments and the primary reasons for his distress were related to his neurological medical co-morbidities. Should pt require support from an oncologic standpoint, the OSW team can be re-consulted to offer assistance.

## 2024-08-05 ENCOUNTER — OFFICE VISIT (OUTPATIENT)
Dept: PHYSICAL THERAPY | Facility: CLINIC | Age: 69
End: 2024-08-05
Payer: COMMERCIAL

## 2024-08-05 DIAGNOSIS — G54.5 PARSONAGE-TURNER SYNDROME: ICD-10-CM

## 2024-08-05 DIAGNOSIS — R53.1 RIGHT SIDED WEAKNESS: Primary | ICD-10-CM

## 2024-08-05 PROCEDURE — 97110 THERAPEUTIC EXERCISES: CPT

## 2024-08-05 PROCEDURE — 97112 NEUROMUSCULAR REEDUCATION: CPT

## 2024-08-05 NOTE — PROGRESS NOTES
"Daily Note     Today's date: 2024  Patient name: Oral Mendoza  : 1955  MRN: [id number]  Referring provider: Benjamin Granado,*  Dx:   Encounter Diagnosis     ICD-10-CM    1. Right sided weakness  R53.1       2. 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        Objective: See treatment diary below      Assessment: Tolerated treatment well.  Continued with outlined program to improve LE strength and balance.  Pt challenged with standing strengthening due to fatigue, however he was willing to attempt all.  B/L UE support throughout for support and safety due to fatigue.  Table strengthening to improve strength and control.  Discussed HEP with pt reporting understanding and compliance.  Pt progressing slowly towards his goals and will benefit from continued therapy.      Plan: Continue per plan of care.      Diagnosis: Parkinson-like symptoms  Precautions: HTN, hx of stage 3 kidney cancer, Parsonage-Cordero syndrome, hx of bilateral RTC complete tears, neuropathy, Right knee pain  ( * asterisk indicates given per HEP)  Next Physician Appointment:   Mono HEP:     Manuals 6/17 6/24 7/1 7/8 7/15 7/22 7/29 8/5         LE stretching                                  Neuro Re-Ed                 LSVT seated FWD reach                 LSVT standing BIG step FWD L &R                 LSVT standing BIG step LAT to L & R                 Seated TA ball press 5\" x15 cues  5\" x15 cues 5\" x20 cues 5\"x10 5\"x10  5\"x15         Seated Bicep Curls   2# 2x10 ea              Heel raises 2x10  2x10   2x10           Side-stepping @ table x10 laps w/ cues    At table  X6 laps @ mirror x3 laps w/ cues           Standing Marches Alt x10ea 1 UE support       Alt 10x ea slow and high         Standing Knee bends AROM x10 ea  AROM x15ea        " "      Cone taps from foam   2 cones x10ea 2 cone from foam, FWD + LAT x10ea             Wobble Board    2' ea 2' ea 2' ea  2' ea         Tandem amb   FWD @ mirror x3 laps FWD @ mirror x3 laps  FWD @ mirror x3 laps           Biodex     LOS, maze, WS 3xea            Blaze Pods                 SLS     20\"x2 ea   20\"x2 ea         Tandem balance     2x30\" ea 1 UE 2x30\" ea 1 UE  Foam 2x30\" ea                           Ther Ex                 Upper trap stretch 10\" x5 ea                Chin tucks   2x10              Scap squeezes Seated 2x10                Seated Mid Rows   YTB x15 YTB 2x10             Seated T/S extensions   FR 2x10              Seated SB rollouts Flex 2x10  Flex 2x10 Flex 2x10 Flex  2x10 Flex  2x10  Flex 2x10         LAQs 10x ea cues  2x10 ea              Bridges GTB x10 cues       GTB x15         BKFO GTB alt x10 ea       GTB  10x2 ea         Hip ADD ball squeeze 5\" 2x10                SLR        10x ea                                           Ther Activity                 Bike for LE mobility   [id number] NuStep x5min Nustep\x 5min   Nustep 5min  Nustep  5 min         Reaching tasks                 Gait Training                 Stepping over hurdles     3x at table            BP check  128/82mmHg, 118/80 mmHg               SpO2 check  58bpm, 97% O2               Pt Ed  POC HEP Contact PCP   POC HEP         Re-Evaluation  DK     DK          Modalities                                                              "

## 2024-08-12 ENCOUNTER — OFFICE VISIT (OUTPATIENT)
Dept: PHYSICAL THERAPY | Facility: CLINIC | Age: 69
End: 2024-08-12
Payer: COMMERCIAL

## 2024-08-12 DIAGNOSIS — G54.5 PARSONAGE-TURNER SYNDROME: ICD-10-CM

## 2024-08-12 DIAGNOSIS — R53.1 RIGHT SIDED WEAKNESS: Primary | ICD-10-CM

## 2024-08-12 PROCEDURE — 97112 NEUROMUSCULAR REEDUCATION: CPT | Performed by: PHYSICAL THERAPIST

## 2024-08-12 PROCEDURE — 97110 THERAPEUTIC EXERCISES: CPT | Performed by: PHYSICAL THERAPIST

## 2024-08-12 PROCEDURE — 97530 THERAPEUTIC ACTIVITIES: CPT | Performed by: PHYSICAL THERAPIST

## 2024-08-12 NOTE — PROGRESS NOTES
"Daily Note     Today's date: 2024  Patient name: Oral Mendoza  : 1955  MRN: [id number]  Referring provider: Benjamin Granado,*  Dx:   Encounter Diagnosis     ICD-10-CM    1. Right sided weakness  R53.1       2. 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 He reports he feels weakness overall today.      Objective: See treatment diary below      Assessment: Tolerated treatment well. Patient requested to leave 5 min early today due to some soreness and slight fatigue. She tolerated session overall today with improving form and mechanics with stepping over obstacles, stepping around cones, etc. Improved UE posture with improved thoracic posture/mobility. Patient exhibited good technique with therapeutic exercises and would benefit from continued PT      Plan: Continue per plan of care.  Progress treatment as tolerated.       Diagnosis: Parkinson-like symptoms  Precautions: HTN, hx of stage 3 kidney cancer, Parsonage-Cordero syndrome, hx of bilateral RTC complete tears, neuropathy, Right knee pain  ( * asterisk indicates given per HEP)  Next Physician Appointment:   Mono HEP:     Manuals 7/1 7/8 7/15 7/22 7/29 8/5 8/12          LE stretching                                  Neuro Re-Ed                 LSVT seated FWD reach                 LSVT standing BIG step FWD L &R                 LSVT standing BIG step LAT to L & R                 Seated TA ball press 5\" x15 cues 5\" x20 cues 5\"x10 5\"x10  5\"x15 5\"x15          Seated Bicep Curls 2# 2x10 ea                Heel raises 2x10   2x10   2x10          Side-stepping   At table  X6 laps @ mirror x3 laps w/ cues             Standing Marches      Alt 10x ea slow and high           Standing Knee bends AROM x15ea                Cone taps from foam 2 cones x10ea 2 cone from foam, FWD + LAT x10ea             " "  Wobble Board  2' ea 2' ea 2' ea  2' ea           Tandem amb FWD @ mirror x3 laps FWD @ mirror x3 laps  FWD @ mirror x3 laps   FWD @ mirror x3 laps          Biodex   LOS, maze, WS 3xea              Blaze Pods                 SLS   20\"x2 ea   20\"x2 ea           Tandem balance   2x30\" ea 1 UE 2x30\" ea 1 UE  Foam 2x30\" ea                             Ther Ex                 Upper trap stretch                 Chin tucks 2x10                Scap squeezes                 Seated Mid Rows YTB x15 YTB 2x10               Seated T/S extensions FR 2x10                Seated SB rollouts Flex 2x10 Flex 2x10 Flex  2x10 Flex  2x10  Flex 2x10 Flex 2x10          LAQs 2x10 ea                Bridges      GTB x15           BKFO      GTB  10x2 ea           Hip ADD ball squeeze                 SLR      10x ea                                             Ther Activity                 Bike for LE mobility [id number] NuStep x5min Nustep\x 5min   Nustep 5min  Nustep  5 min Nustep  5 min          Reaching tasks                 Gait Training       Stepping around cones blue line x3 laps          Stepping over hurdles   3x at table    Obstacle course @mirror x3 laps          BP check                 SpO2 check                 Pt Ed HEP Contact PCP   POC HEP           Re-Evaluation     DK            Modalities                                                                "

## 2024-08-14 ENCOUNTER — PATIENT MESSAGE (OUTPATIENT)
Dept: INTERNAL MEDICINE CLINIC | Facility: CLINIC | Age: 69
End: 2024-08-14

## 2024-08-14 ENCOUNTER — OFFICE VISIT (OUTPATIENT)
Dept: INTERNAL MEDICINE CLINIC | Facility: CLINIC | Age: 69
End: 2024-08-14
Payer: COMMERCIAL

## 2024-08-14 ENCOUNTER — TELEPHONE (OUTPATIENT)
Dept: NEUROLOGY | Facility: CLINIC | Age: 69
End: 2024-08-14

## 2024-08-14 VITALS
BODY MASS INDEX: 20.46 KG/M2 | SYSTOLIC BLOOD PRESSURE: 140 MMHG | HEART RATE: 60 BPM | WEIGHT: 135 LBS | HEIGHT: 68 IN | OXYGEN SATURATION: 98 % | DIASTOLIC BLOOD PRESSURE: 70 MMHG

## 2024-08-14 DIAGNOSIS — R53.83 FATIGUE, UNSPECIFIED TYPE: ICD-10-CM

## 2024-08-14 DIAGNOSIS — C64.1 MALIGNANT NEOPLASM OF RIGHT KIDNEY (HCC): ICD-10-CM

## 2024-08-14 DIAGNOSIS — I10 BENIGN ESSENTIAL HYPERTENSION: Primary | ICD-10-CM

## 2024-08-14 DIAGNOSIS — I10 BENIGN ESSENTIAL HYPERTENSION: ICD-10-CM

## 2024-08-14 DIAGNOSIS — G20.C PARKINSONISM, UNSPECIFIED PARKINSONISM TYPE: ICD-10-CM

## 2024-08-14 DIAGNOSIS — G54.5 PARSONAGE-TURNER SYNDROME: Primary | ICD-10-CM

## 2024-08-14 DIAGNOSIS — M20.61 DEFORMITY OF TOE OF RIGHT FOOT: ICD-10-CM

## 2024-08-14 PROCEDURE — 99214 OFFICE O/P EST MOD 30 MIN: CPT | Performed by: INTERNAL MEDICINE

## 2024-08-14 PROCEDURE — G2211 COMPLEX E/M VISIT ADD ON: HCPCS | Performed by: INTERNAL MEDICINE

## 2024-08-14 NOTE — TELEPHONE ENCOUNTER
Spoke with patient spouse and confirmed appointment with Dr. Mckinney 8/20 at [facility].

## 2024-08-14 NOTE — PROGRESS NOTES
Name: Oral Mendoza      : 1955      MRN: [id number]  Encounter Provider: Benjamin Granado MD  Encounter Date: 2024   Enc[facility]: [facility]    Assessment & Plan     1. Parsonage-Cordero syndrome  Assessment & Plan:  - strength on the right improved.  Patient still has weakness involving his right shoulder.  This is likely secondary to his known rotator cuff tears and biceps tendon rupture.  2. Parkinsonism, unspecified Parkinsonism type  Assessment & Plan:  -Patient only taking Sinemet once a day due to concern for side effects.  He believes it may be contributing to fatigue.  Patient scheduled to follow-up with his neurologist next week.  3. Benign essential hypertension  Assessment & Plan:  -Blood pressure well controlled  -Continue current antihypertensive regimen    4. Malignant neoplasm of right kidney (HCC)  Assessment & Plan:  -Status post nephrectomy.  Followed by urology.  5. Fatigue, unspecified type  Assessment & Plan:  -Check CBC, CMP and TSH level.  Patient reports bradycardia down to 49.  This may be contributing to his overall sluggishness and fatigue as well.  Patient's wife to send in his blood pressure readings and heart rates from the past month for review.  Consider decreasing carvedilol to 3.125 mg twice daily.  Orders:  -     CBC and differential; Future  -     Comprehensive metabolic panel; Future  -     TSH, 3rd generation with Free T4 reflex; Future  6. Deformity of toe of right foot  -     Ambulatory Referral to Podiatry; Future         Subjective      HPI  Patient presents today for chronic follow-up.  His past medical history is notable for Parsonage-Cordero syndrome affecting the right side, parkinsonism, malignant neoplasm of the right kidney status post nephrectomy and essential hypertension.    Overall over the past few months the patient reports he feels as though he is slowing down.  He states he is only taking his  Sinemet once a day since he believes it may be causing fatigue.  He reports he is scheduled follow-up with his neurologist next week to further discuss his symptoms.    Regarding his hypertension he feels his blood pressure is well-controlled.  He has however noted that his heart rates are typically in the 50s and on 1 occasion and dropped as low as 49.    Lastly, he complains of pain involving his right great toe and his second toe.  He reports his great toe crosses over his second toe and that his second toe is chronically deformed due to a fracture after dropping a heavy object on it several years ago.  He is using a toe spacer but still has discomfort.    All other systems negative except for pertinent findings noted in HPI.       Current Outpatient Medications on File Prior to Visit   Medication Sig   • acetaminophen (TYLENOL) 325 mg tablet Take 2 tablets (650 mg total) by mouth every 6 (six) hours as needed for mild pain (Patient taking differently: Take 650 mg by mouth every 6 (six) hours as needed for mild pain Pt takes 2X 500mg tabs as needed for pain.)   • amLODIPine (NORVASC) 5 mg tablet Take 1 tablet (5 mg total) by mouth 2 (two) times a day   • busPIRone (BUSPAR) 7.5 mg tablet Take 1 tablet (7.5 mg total) by mouth 2 (two) times a day as needed for anxiety   • carbidopa-levodopa (Sinemet)  mg per tablet Take 1 tablet by mouth 3 (three) times a day   • carvedilol (COREG) 6.25 mg tablet Take 1 tablet (6.25 mg total) by mouth 2 (two) times a day with meals   • cycloSPORINE (RESTASIS) 0.05 % ophthalmic emulsion Administer 1 drop to both eyes 2 (two) times a day   • Docosahexaenoic Acid (DHA OMEGA 3) 100 MG CAPS Take 6 capsules by mouth daily   • docusate sodium (COLACE) 100 mg capsule Take 1 capsule (100 mg total) by mouth 2 (two) times a day   • fexofenadine (ALLEGRA) 180 MG tablet Take 1 tablet by mouth daily as needed   • gabapentin (Neurontin) 100 mg capsule Take 1 capsule (100 mg total) by mouth  "daily at bedtime   • lidocaine 0.5 % topical gel Apply 1 Application topically as needed (pain). Indications: pain   • Multiple Vitamin tablet Take 1 tablet by mouth daily   • polyethylene glycol (GLYCOLAX) 17 GM/SCOOP powder Take 17 g by mouth as needed (constipation)   • senna (SENOKOT) 8.6 mg Take 1 tablet (8.6 mg total) by mouth daily as needed for constipation   • [DISCONTINUED] oxyCODONE (ROXICODONE) 5 immediate release tablet Take 5 mg by mouth every 6 (six) hours as needed for moderate pain 1/2 tab every 6 hr PRN severe pain (Patient not taking: Reported on 4/30/2024)   • [DISCONTINUED] tiZANidine (ZANAFLEX) 2 mg tablet Take 1 tablet (2 mg total) by mouth 3 (three) times a day (Patient not taking: Reported on 11/7/2023)   • [DISCONTINUED] traMADol (ULTRAM) 50 mg tablet Take 50 mg by mouth every 8 (eight) hours as needed for moderate pain (Patient not taking: Reported on 12/13/2023)       Objective     /70 (BP Location: Left arm, Patient Position: Sitting, Cuff Size: Adult)   Pulse 60   Ht 5' 7.72\" (1.72 m)   Wt 61.2 kg (135 lb)   SpO2 98%   BMI 20.70 kg/m²     BP Readings from Last 3 Encounters:   08/14/24 140/70   05/09/24 130/72   04/30/24 144/88        Wt Readings from Last 3 Encounters:   08/14/24 61.2 kg (135 lb)   05/09/24 62.8 kg (138 lb 6.4 oz)   04/30/24 62.6 kg (138 lb)       Physical Exam    General: NAD  HEENT: NCAT, EOMI, normal conjunctiva  Cardiovascular: RRR, normal S1 and S2, systolic ejection murmur  Pulmonary: Normal respiratory effort, no wheezes, rales or rhonchi  MSK: Deformity of second toe with valgus deformity of great toe on the right  Neuro: Bradycardic, resting tremor involving right upper extremity, ambulates with a cane for support  Extremities: No lower extremity edema  Skin: Normal skin color, no rashes     Benjamin Granado MD  "

## 2024-08-14 NOTE — ASSESSMENT & PLAN NOTE
-Patient only taking Sinemet once a day due to concern for side effects.  He believes it may be contributing to fatigue.  Patient scheduled to follow-up with his neurologist next week.

## 2024-08-14 NOTE — ASSESSMENT & PLAN NOTE
- strength on the right improved.  Patient still has weakness involving his right shoulder.  This is likely secondary to his known rotator cuff tears and biceps tendon rupture.

## 2024-08-14 NOTE — ASSESSMENT & PLAN NOTE
-Check CBC, CMP and TSH level.  Patient reports bradycardia down to 49.  This may be contributing to his overall sluggishness and fatigue as well.  Patient's wife to send in his blood pressure readings and heart rates from the past month for review.  Consider decreasing carvedilol to 3.125 mg twice daily.

## 2024-08-16 RX ORDER — CARVEDILOL 3.12 MG/1
3.12 TABLET ORAL 2 TIMES DAILY WITH MEALS
Qty: 180 TABLET | Refills: 0 | Status: SHIPPED | OUTPATIENT
Start: 2024-08-16

## 2024-08-19 ENCOUNTER — OFFICE VISIT (OUTPATIENT)
Dept: PHYSICAL THERAPY | Facility: CLINIC | Age: 69
End: 2024-08-19
Payer: COMMERCIAL

## 2024-08-19 DIAGNOSIS — G54.5 PARSONAGE-TURNER SYNDROME: ICD-10-CM

## 2024-08-19 DIAGNOSIS — R53.1 RIGHT SIDED WEAKNESS: Primary | ICD-10-CM

## 2024-08-19 PROCEDURE — 97110 THERAPEUTIC EXERCISES: CPT | Performed by: PHYSICAL THERAPIST

## 2024-08-19 PROCEDURE — 97112 NEUROMUSCULAR REEDUCATION: CPT | Performed by: PHYSICAL THERAPIST

## 2024-08-19 NOTE — PROGRESS NOTES
"Daily Note     Today's date: 2024  Patient name: Oral eMndoza  : 1955  MRN: [id number]  Referring provider: Benjamin Granado,*  Dx:   Encounter Diagnosis     ICD-10-CM    1. Right sided weakness  R53.1       2. Parsonage-Cordero syndrome  G54.5           Start Time: 1215  Stop Time: 1255  Total time in clinic (min): 40 minutes    Subjective: Patient reports feeling a little slow/shaky today.       Objective: See treatment diary below      Assessment: Tolerated treatment well. Initiated session standing exercises with good tolerance. Occasional rest breaks required due to slight fatigue. Worked on cone taps from floor FWD and lateral, but required light UE support to avoid LOB. Patient noted to have improving standing posture with reduced forward hunched/rounded back. Patient exhibited good technique with therapeutic exercises and would benefit from continued PT      Plan: Continue per plan of care.  Progress treatment as tolerated.       Diagnosis: Parkinson-like symptoms  Precautions: HTN, hx of stage 3 kidney cancer, Parsonage-Cordero syndrome, hx of bilateral RTC complete tears, neuropathy, Right knee pain  ( * asterisk indicates given per HEP)  Next Physician Appointment:   Mono HEP:     Manuals 7/1 7/8 7/15 7/22 7/29 8/5 8/12 8/19         LE stretching                                  Neuro Re-Ed                 LSVT seated FWD reach                 LSVT standing BIG step FWD L &R                 LSVT standing BIG step LAT to L & R                 Seated TA ball press 5\" x15 cues 5\" x20 cues 5\"x10 5\"x10  5\"x15 5\"x15 5\"x20         Seated Bicep Curls 2# 2x10 ea                Heel raises 2x10   2x10   2x10 2x10         Side-stepping   At table  X6 laps @ mirror x3 laps w/ cues    @ mirror x3 laps w/ cues         Standing Marches      Alt 10x ea slow and high  Alt x10ea high amp         Standing Knee bends AROM x15ea                Cone taps from foam 2 cones x10ea 2 cone from foam, " COLONOSCOPY INSTRUCTIONS - MORNING PROCEDURE  Rodolfo Orourke MD     A sedative will be given to you for the exam.  A responsible adult 18 years of age or older must accompany you from the hospital the day of your exam.  You may not leave by yourself or drive yourself home.  You may not drive for the rest of the day or return to work.  · If you do not have a  who is a responsible adult and who is 18 years of age or older, your appointment will be cancelled.   · You may take Tylenol (acetaminophen). Do not take aspirin the day of procedure.  · If you are diabetic, please see special instructions sheet.  · If you take blood thinners (Coumadin, Warfarin, Plavix), see special instructions sheet.  · You need to call your insurance company to verify coverage for your procedure.   · Medicare and state insurances do NOT need to verify coverage for your procedure.     WHAT YOU NEED TO DO: Please pick-up a Nulytely Prep Kit at the pharmacy your physician sent the prescription to.    DAY BEFORE EXAMINATION: ON , Tuesday October 15, 2019  • Mix PREP according to instructions and refrigerate.  • DO NOT EAT ANY SOLID FOOD ALL DAY.   • You may drink clear liquids, such as soda, clear fruit juice, coffee or tea without milk products, beef or chicken broth, Popsicles,Gatore Jeanne, Propell, Jell-O, and hard candy.  Avoid anything with RED coloring.  We encourage you to drink a lot of fluids for couple of days prior to procedure.  • Begin drinking the solution at 3:00 p.m (No later than 6:00pm).  Drink an 8-ounce glass every 15-20 minutes.  It is best to drink the whole glass rapidly rather than sipping small amounts. May use a straw.  • Continue drinking until the bottle is empty.  It usually takes 3 to 5 hours to completely drink the bottle, so give yourself plenty of time.  • You may drink clear liquids or suck on hard candy or Popsicles while drinking the Prep.  • Some people feel full or bloated after about 90 minutes of drinking  "FWD + LAT x10ea      Floor 2 cone FWD + LAT x15 ea         Wobble Board  2' ea 2' ea 2' ea  2' ea           Tandem amb FWD @ mirror x3 laps FWD @ mirror x3 laps  FWD @ mirror x3 laps   FWD @ mirror x3 laps FWD @ mirror x3 laps         Biodex   LOS, maze, WS 3xea              Blaze Pods                 SLS   20\"x2 ea   20\"x2 ea           Tandem balance   2x30\" ea 1 UE 2x30\" ea 1 UE  Foam 2x30\" ea                             Ther Ex                 Upper trap stretch                 Chin tucks 2x10                Scap squeezes        Stand 2x10         Seated Mid Rows YTB x15 YTB 2x10               Seated T/S extensions FR 2x10                Seated SB rollouts Flex 2x10 Flex 2x10 Flex  2x10 Flex  2x10  Flex 2x10 Flex 2x10 Flex 2x10         LAQs 2x10 ea                Bridges      GTB x15           BKFO      GTB  10x2 ea           Hip ADD ball squeeze                 SLR      10x ea                                             Ther Activity                 Bike for LE mobility LoRttat2nft NuStep x5min Nustep\x 5min   Nustep 5min  Nustep  5 min Nustep  5 min Nustep  5 min         Reaching tasks                 Gait Training       Stepping around cones blue line x3 laps          Stepping over hurdles   3x at table    Obstacle course @mirror x3 laps          BP check                 SpO2 check                 Pt Ed HEP Contact PCP   POC HEP           Re-Evaluation     DK            Modalities                                                                  " the Prep. This disappears with time, especially when you start passing stool.  If you feel bloated, stop drinking for about 30-45 minutes and see if you can pass some stool. The problem should resolve and you should be able to start drinking again.  If you still have problems, call us for instructions.    DAY OF EXAMINATION:  • Do not eat or drink anything after midnight the night before your exam.  • Take your regular blood pressure and heart medications on the morning of the test with a sip of water.    If you have any questions regarding these instructions, please call [phone].    Thank you for entrusting your care to [facility]

## 2024-08-20 ENCOUNTER — OFFICE VISIT (OUTPATIENT)
Dept: NEUROLOGY | Facility: CLINIC | Age: 69
End: 2024-08-20
Payer: COMMERCIAL

## 2024-08-20 VITALS
DIASTOLIC BLOOD PRESSURE: 84 MMHG | SYSTOLIC BLOOD PRESSURE: 148 MMHG | BODY MASS INDEX: 20.46 KG/M2 | HEIGHT: 68 IN | HEART RATE: 62 BPM | TEMPERATURE: 97.9 F | WEIGHT: 135 LBS

## 2024-08-20 DIAGNOSIS — G20.A1 PARKINSON'S DISEASE WITHOUT DYSKINESIA, UNSPECIFIED WHETHER MANIFESTATIONS FLUCTUATE: ICD-10-CM

## 2024-08-20 PROBLEM — G54.5 PARSONAGE-TURNER SYNDROME: Status: RESOLVED | Noted: 2023-05-16 | Resolved: 2024-08-20

## 2024-08-20 PROCEDURE — 99214 OFFICE O/P EST MOD 30 MIN: CPT | Performed by: PSYCHIATRY & NEUROLOGY

## 2024-08-20 NOTE — PROGRESS NOTES
Patient ID: Oral Mendoza is a 69 y.o. male.    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 Last office visit was on 4/30/2024.    Patient's right arm has the same strength as his left arm. However, he has unilateral symptoms of parkinson's in his right arm (resting tremor, rigidity, bradykinesia). He has stoop posture, less shuffling gait at this time since he has been working with PT    Plan:  -Discussed plan with neurology attending, Dr. Plaza. Please refer to the attestation for additional recommendation and plan  -Will have patient take Carbidopa/Levodopa (Sinemet) 25-100mg 0.5tablet TID (@7:30am, 1pm, and around 5pm)  - the side effects of hypotension and nausea from Sinemet.  -Continue decent hydration, nutrition intake  -Continue PT    Follow up in about 4 months. Please call with questions/concerns.       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 Last office visit was on 4/30/2024.    Since last visit, he reports feeling slow and rigid with his movement. He has a softer voice. He continues to have constipation. He tends to get irritated in the evening. Due to bradycardia, his Carvedilol is reduced. Otherwise, no new neurological changes or worsening symptoms.     Brief history:  On April 10, 2023, patient was in the chair for a root canal for about 2 hours resulting in right shoulder pain. A day or 2 later, he started noticing the weakness of his right arm associated with  "pain.  He went to the hospital on April 14, 2023.  Extensive imaging including MRI brain, MRI C-spine, and MRI brachial plexus showed no acute abnormalities. He had EMG 1 upper limb and 1 lower limb twice.  The most recent one on 6/26/2023, EMG showed chronic pan brachial plexopathy with ongoing denervation and signs of early reinnervation.  Patient has been working with physical therapy.  Due to parkinsonism features of shuffling gait, reduced arm swings, bradykinesia, constipation and hypophonia at night, he was started on Sinemet  mg, but patient is only taking 1 tablet in the evening.       The following portions of the patient's history were reviewed and updated as appropriate: allergies, current medications, past family history, past medical history, past social history, past surgical history, and problem list.     Objective:    Blood pressure 148/84, pulse 62, temperature 97.9 °F (36.6 °C), height 5' 7.72\" (1.72 m), weight 61.2 kg (135 lb).    Physical Exam  Constitutional:       General: He is not in acute distress.  HENT:      Head: Normocephalic and atraumatic.      Nose: Nose normal.      Mouth/Throat:      Pharynx: Oropharynx is clear. No oropharyngeal exudate or posterior oropharyngeal erythema.   Eyes:      General: Lids are normal.      Extraocular Movements: Extraocular movements intact.      Pupils: Pupils are equal, round, and reactive to light.   Cardiovascular:      Rate and Rhythm: Normal rate.      Pulses: Normal pulses.   Pulmonary:      Effort: Pulmonary effort is normal.   Musculoskeletal:      Cervical back: Normal range of motion.   Skin:     General: Skin is warm and dry.   Neurological:      Mental Status: He is alert.      Motor: Motor strength is normal.     Deep Tendon Reflexes:      Reflex Scores:       Tricep reflexes are 2+ on the right side and 2+ on the left side.       Bicep reflexes are 2+ on the right side and 2+ on the left side.       Brachioradialis reflexes are 2+ on " the right side and 2+ on the left side.       Patellar reflexes are 3+ on the right side and 3+ on the left side.       Achilles reflexes are 2+ on the right side and 2+ on the left side.  Psychiatric:         Mood and Affect: Mood normal.         Neurological Exam  Mental Status  Alert. Oriented to person, place and time. Speech: Hypophonia. Language is fluent with no aphasia.  Flat affect.    Cranial Nerves  CN II: Visual acuity is normal. Visual fields full to confrontation. Right funduscopic exam: not visualized. Left funduscopic exam: not visualized.  CN III, IV, VI: Extraocular movements intact bilaterally. Normal lids and orbits bilaterally. Pupils equal round and reactive to light bilaterally.  CN V: Facial sensation is normal.  CN VII: Full and symmetric facial movement.  CN VIII: Hearing is normal.  CN IX, X: Palate elevates symmetrically  CN XI: Shoulder shrug strength is normal.  CN XII: Tongue midline without atrophy or fasciculations.    Motor  Normal muscle bulk throughout. No fasciculations present. Normal muscle tone. The following abnormal movements were seen: Resting termor of right hand  Moderate bradykinesia in R hand (finger tapping, hand opening and closing, supination/pronation). Mild bradykinesia in L hand  Mild abnormal toe tapping and heel stomping in R leg. Normal in L leg    Rigidity RUE, RLE worse than left.   Strength is 5/5 throughout all four extremities.  Cannot adduct his shoulders much due to rotator cuff injuries but patient has full strength bilaterally.    Sensory  Light touch is normal in upper and lower extremities.     Reflexes                                            Right                      Left  Brachioradialis                    2+                         2+  Biceps                                 2+                         2+  Triceps                                2+                         2+  Patellar                                3+                          3+  Achilles                                2+                         2+  Cross adductor noted in patellar reflexes bilaterally.    Coordination  Right: Finger-to-nose normal.Left: Finger-to-nose normal.    Gait   Normal pull test.  Stoop posture, right hand in flex posture and no arm swing, less shuffling.      ROS:    Review of Systems   Constitutional:  Negative for chills and fever.   HENT:  Negative for ear pain and sore throat.    Eyes:  Negative for pain and visual disturbance.   Respiratory:  Negative for cough and shortness of breath.    Cardiovascular:  Negative for chest pain and palpitations.   Gastrointestinal:  Negative for abdominal pain and vomiting.   Genitourinary:  Negative for dysuria and hematuria.   Musculoskeletal:  Negative for back pain.        Shoulder pain   Skin:  Negative for color change and rash.   Neurological:  Positive for tremors. Negative for seizures and syncope.   All other systems reviewed and are negative.

## 2024-08-20 NOTE — ASSESSMENT & PLAN NOTE
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 Last office visit was on 4/30/2024.    Patient's right arm has the same strength as his left arm. However, he has unilateral symptoms of parkinson's in his right arm (resting tremor, rigidity, bradykinesia). He has stoop posture, less shuffling gait at this time since he has been working with PT    Plan:  -Discussed plan with neurology attending, Dr. Plaza. Please refer to the attestation for additional recommendation and plan  -Will have patient take Carbidopa/Levodopa (Sinemet) 25-100mg 0.5tablet TID (@7:30am, 1pm, and around 5pm)  - the side effects of hypotension and nausea from Sinemet.  -Continue decent hydration, nutrition intake  -Continue PT    Follow up in about 4 months. Please call with questions/concerns.

## 2024-08-20 NOTE — PATIENT INSTRUCTIONS
Carbidopa/Levodopa (Sinemet) 25-100mg 0.5 tablet at 7:30am, 1pm, around 5pm    Physical Therapy continuation    Follow up with me in about 4 months

## 2024-08-26 ENCOUNTER — EVALUATION (OUTPATIENT)
Dept: PHYSICAL THERAPY | Facility: CLINIC | Age: 69
End: 2024-08-26
Payer: COMMERCIAL

## 2024-08-26 DIAGNOSIS — G54.5 PARSONAGE-TURNER SYNDROME: ICD-10-CM

## 2024-08-26 DIAGNOSIS — R53.1 RIGHT SIDED WEAKNESS: Primary | ICD-10-CM

## 2024-08-26 PROCEDURE — 97530 THERAPEUTIC ACTIVITIES: CPT | Performed by: PHYSICAL THERAPIST

## 2024-08-26 PROCEDURE — 97112 NEUROMUSCULAR REEDUCATION: CPT | Performed by: PHYSICAL THERAPIST

## 2024-08-26 NOTE — PROGRESS NOTES
PT Re-Evaluation     Today's date: 2024  Patient name: Oral Mendoza  : 1955  MRN: [id number]  Referring provider: Benjamin Granado,*  Dx:   Encounter Diagnosis     ICD-10-CM    1. 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 male who presents to outpatient with reports of LE weakness and balance impairment that has worsened in the past year. Patient arrives for re- evaluation ambulating with single point cane, with improving overall gait mechanics and foot clearance. Patient requires less reliance on cane to walk short distances within clinic, however carries in his hand for safety and security. Improving overall postural mechanics noted, but continues to have stiffness in shoulders and decreased trunk rotation noted. Improving LE noted with MMT today close to WFLs in all planes. TUG time noted to be about 12 seconds without assistive device. Improved 5x sit-stand testing to 13 seconds, indicating improved LE endurance and mechanics with transfers with and without support. Please note that patient reports history of bilateral complete RTC tears, thus significant limitations in bilateral shoulder mobility and strength in all planes. Patient continues to exhibit balance impairments but improving speed and reduced unsteadiness noted while walking and with turning. He has better awareness of obstacle negotiation and navigation in busy environments. Patient lives with his wife  "and is independent with ADLs, however requires assistance with certain activities such as UE dressing, bathing, etc. He reports he has difficulty with getting up bed in the morning and getting up from couch in the evening due to stiffness and weakness as noted by patient. He has made good progress with set goals thus far, and has exhibited good tolerance with PT interventions and exercises. Patient will benefit from continued skilled PT services to further improve LE flexibility/mobility, improve LE/glute strength, improve balance, improve safety, and improve ease and mechanics with ADLs to progress him towards max potential of PLOF. Patient would benefit from skilled PT services to address these impairments, to maximize function, and to reduce falls risk.  Thank you for the referral.    Discussion with patient and his wife at length today regarding good functional progress he has made thus far, given other physical limitations and factors. He continues to feel as though he is not making much progress and reports having good and bad days. Discussion regarding core and functional transfer training for better confidence with bed mobility and other functional tasks that patient currently notes to be \"stiff and weak\" with. Patient and his wife were in agreement to POC and will re-evaluate patient's function, strength, balance, etc in 4 weeks.    Barriers to therapy: Medical history  Understanding of Dx/Px/POC: good     Prognosis: good    Goals  Impairment Goals 4-6 weeks   In order to maximize function patient will be able to...   - Increase hip/LE strength to 4/5 throughout. Partially Met  - Demonstrate improved hip flexibility as demonstrated by increased ROM through therapeutic exercise. Slightly Improved  - Improved TUG time to < 15 sec to improve mobility and reduce falls risk with walking. Met, without AD  - Improved 5x sit-stand time to < 15 sec to improve LE endurance and reduce falls risk with transfers. " "Met    Functional Goals 6-8 weeks  In order to return to prior level of function patient will be able to...   - Participate in ADL's/IADL's/sport specific activities with no greater than 2/10 pain. Ongoing  - Increase Functional Status Measure (FOTO) to: anticipated at discharge. Improved  - Demonstrate independence and compliant with HEP. Met  - Demonstrate a squat and or sit to stand with good mechanics and eccentric control without pain/difficulty/compensation. Improved   - Demonstrate functional activities with good core and glute strength without compensation/pain/difficulty . Improved  - Ascend and descend stairs without increased pain/compensation/difficulty and a reciprocal gait pattern. Improved  - Patient will be able to demonstrate good gait mechanics without compensations. Improved    Plan  Patient would benefit from: skilled PT  Planned modality interventions: cryotherapy  Other planned modality interventions: moist heat    Planned therapy interventions: joint mobilization, manual therapy, neuromuscular re-education, patient education, strengthening, stretching, therapeutic activities, therapeutic exercise, home exercise program, functional ROM exercises, Sandoval taping, postural training, balance/weight bearing training, body mechanics training, flexibility, IASTM, kinesiology taping, massage, nerve gliding, transfer training and gait training    Frequency: 1-2x/week.  Duration in weeks: 4  Treatment plan discussed with: patient, PTA, referring physician and family  Plan details: Patient may do LSVT BIG program in the future as able and ready.        Subjective Evaluation    History of Present Illness  Mechanism of injury: Oral Mendoza is a 69 y.o. year-old male who presents to outpatient PT accompanied by his wife \"Kendrick\" with reports of LE weakness and balance impairments since last March. He was diagnosed with Parkinson-like symptoms, as well as Parsonage-Cordero syndrome, after which he " noticed his condition worsening. He had follow-up with Neurology end of April and PCP in May and was recommended for PT at this time. Patient was recommended for LSVT BIG and LOUD program, however patient declined coming 4x week at this time.  Quality of life: good    Patient Goals  Patient goals for therapy: decreased pain, increased motion, improved balance, increased strength, independence with ADLs/IADLs and return to sport/leisure activities    Pain  Current pain rating: 3  At best pain rating: 3  At worst pain ratin  Location: Right knee, both shoulders  Quality: dull ache and throbbing  Aggravating factors: sitting, standing, stair climbing, walking and lifting  Progression: improved    Social Support  Steps to enter house: yes  Stairs in house: no   Lives with: spouse    Hand dominance: right    Treatments  Previous treatment: physical therapy and occupational therapy  Current treatment: physical therapy        Objective     Strength/Myotome Testing     Left Shoulder     Planes of Motion   Flexion: 2-   Abduction: 2-     Right Shoulder     Planes of Motion   Flexion: 2-   Abduction: 2-     Left Elbow   Flexion: 4  Extension: 3+    Right Elbow   Flexion: 4  Extension: 3-    Left Hip   Planes of Motion   Flexion: 4  Extension: 3+  Abduction: 4-  External rotation: 4-    Right Hip   Planes of Motion   Flexion: 4  Extension: 3+  Abduction: 4-  External rotation: 4-    Left Knee   Flexion: 4  Extension: 4+    Right Knee   Flexion: 4  Extension: 4+    Left Ankle/Foot   Dorsiflexion: 4-  Plantar flexion: 3+    Right Ankle/Foot   Dorsiflexion: 4  Plantar flexion: 3+  Neuro Exam:     Sensation   Light touch LE: left WNL and right WNL    Coordination   Heel to shin: left WNL and right WNL  Finger to nose: left WNL and right WNL  Rapid alternating movements: UE WNL    Transfers   Sit to stand: minimum assist (uses 2 UE support)   Wheelchair to mat: minimum assist (close supervision from chair to table)   Mat to  "wheelchair: minimum assist (close supervision from chair to table)   Sit to supine: minimum assist (close supervision)   Supine to sit: minimum assist (close supervision)     Functional outcomes   5x sit to stand: 13 seconds with 2-0 UE support (seconds)  TU without assistive device (seconds)  Functional outcome comment: Rhomberg on firm: NV  Functional outcome gait comment: With single point cane: close supervision; exhibits forward head, rounded shoulders, and elevated shoulders posture throughout, shuffled gait pattern with decreased heel strike and foot clearance, decreased knee flexion in swing phase, decreased arm swing of contralateral UE not holding cane, slower pace with turns especially during TUG testing             Diagnosis: Parkinson-like symptoms  Precautions: HTN, hx of stage 3 kidney cancer, Parsonage-Cordero syndrome, hx of bilateral RTC complete tears, neuropathy, Right knee pain  ( * asterisk indicates given per HEP)  Next Physician Appointment:   [facility]:     Manuals             LE stretching                                    Neuro Re-Ed                  LSVT seated FWD reach                  LSVT standing BIG step FWD L &R                  LSVT standing BIG step LAT to L & R                  Seated TA ball press 5\"x10  5\"x15 5\"x15 5\"x20             Seated Bicep Curls                  Heel raises 2x10   2x10 2x10             Side-stepping @ mirror x3 laps w/ cues    @ mirror x3 laps w/ cues             Standing Marches   Alt 10x ea slow and high  Alt x10ea high amp             Standing Knee bends                  Cone taps from foam     Floor 2 cone FWD + LAT x15 ea             Wobble Board 2' ea  2' ea   2' ea            Tandem amb FWD @ mirror x3 laps   FWD @ mirror x3 laps FWD @ mirror x3 laps FWD @ mirror x3 laps            Biodex                  Blaze Pods                  SLS   20\"x2 ea               Tandem balance 2x30\" ea 1 UE  Foam 2x30\" ea        "                           Ther Ex                  Upper trap stretch                  Chin tucks                  Scap squeezes     Stand 2x10             Seated Mid Rows                  Seated T/S extensions                  Seated SB rollouts Flex  2x10  Flex 2x10 Flex 2x10 Flex 2x10             LAQs                  Bridges   GTB x15               BKFO   GTB  10x2 ea               Hip ADD ball squeeze                  SLR   10x ea                                                   Ther Activity                  Bike for LE mobility Nustep 5min  Nustep  5 min Nustep  5 min Nustep  5 min Nustep  5 min            Reaching tasks                  Gait Training    Stepping around cones blue line x3 laps              Stepping over hurdles    Obstacle course @mirror x3 laps              BP check                  SpO2 check                  Pt Ed  POC HEP   POC            Re-Evaluation  DK    DK            Modalities

## 2024-09-03 ENCOUNTER — APPOINTMENT (OUTPATIENT)
Dept: PHYSICAL THERAPY | Facility: CLINIC | Age: 69
End: 2024-09-03
Payer: COMMERCIAL

## 2024-09-04 ENCOUNTER — TELEPHONE (OUTPATIENT)
Age: 69
End: 2024-09-04

## 2024-09-04 NOTE — TELEPHONE ENCOUNTER
Pt's wife returned the call to the office regarding an offered appointment for 9/17 with  with an arrival time of 1:45pm to accept. Please place the pt in the schedule.    WDL

## 2024-09-09 ENCOUNTER — OFFICE VISIT (OUTPATIENT)
Dept: PHYSICAL THERAPY | Facility: CLINIC | Age: 69
End: 2024-09-09
Payer: COMMERCIAL

## 2024-09-09 DIAGNOSIS — R53.1 RIGHT SIDED WEAKNESS: Primary | ICD-10-CM

## 2024-09-09 DIAGNOSIS — G54.5 PARSONAGE-TURNER SYNDROME: ICD-10-CM

## 2024-09-09 PROCEDURE — 97530 THERAPEUTIC ACTIVITIES: CPT

## 2024-09-09 PROCEDURE — 97112 NEUROMUSCULAR REEDUCATION: CPT

## 2024-09-09 NOTE — PROGRESS NOTES
"Daily Note     Today's date: 2024  Patient name: Oral Mendoza  : 1955  MRN: [id number]  Referring provider: Benjamin Granado,*  Dx:   Encounter Diagnosis     ICD-10-CM    1. 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         Objective: See treatment diary below      Assessment: Tolerated treatment well.  Focused on table strengthening to address pt's concerns regarding transfers.  Pt challenged with strengthening due to weakness, however is able to perform all with few cues for form.  Transfers from supine to S/L to seated practiced with pt having less difficulty and able to perform without assistance.  Cues were needed as pt is used to trying to sit up without log roll.  Pt able to perform taps to cone with light UE support of 1.  Pt progressing slowly towards his goals and will benefit from continued therapy.        Plan: Continue per plan of care.      Diagnosis: Parkinson-like symptoms  Precautions: HTN, hx of stage 3 kidney cancer, Parsonage-Cordero syndrome, hx of bilateral RTC complete tears, neuropathy, Right knee pain  ( * asterisk indicates given per HEP)  Next Physician Appointment:   [facility]:     Manuals          LE stretching                                Neuro Re-Ed                LSVT seated FWD reach                LSVT standing BIG step FWD L &R                LSVT standing BIG step LAT to L & R                Seated TA ball press 5\"x10  5\"x15 5\"x15 5\"x20  H/L   5\"x20         Bridges       20x         SLR +TA       10x2 ea         BKFO       GTB 15x ea         Heel raises 2x10   2x10 2x10           Side-stepping @ mirror x3 laps w/ cues    @ mirror x3 laps w/ cues           Standing Marches   Alt 10x ea slow and high  Alt x10ea high amp           Standing Knee bends                Cone taps from foam     Floor 2 cone " "FWD + LAT x15 ea           Wobble Board 2' ea  2' ea   2' ea 2' ea         Tandem amb FWD @ mirror x3 laps   FWD @ mirror x3 laps FWD @ mirror x3 laps FWD @ mirror x3 laps          Biodex                Blaze Pods                SLS   20\"x2 ea             Tandem balance 2x30\" ea 1 UE  Foam 2x30\" ea              LAQ (EOT core)       10x ea         Ther Ex                Upper trap stretch                Chin tucks                Scap squeezes     Stand 2x10           Seated Mid Rows                Seated T/S extensions                Seated SB rollouts Flex  2x10  Flex 2x10 Flex 2x10 Flex 2x10           LAQs                Bridges   GTB x15    above         BKFO   GTB  10x2 ea    above         Hip ADD ball squeeze                SLR   10x ea    above                                         Ther Activity                Bike for LE mobility Nustep 5min  Nustep  5 min Nustep  5 min Nustep  5 min Nustep  5 min Nustep   5 min L3         Reaching tasks                Gait Training    Stepping around cones blue line x3 laps            Stepping over hurdles    Obstacle course @mirror x3 laps            Lateral step overs       4\" 10x ea         Transfers        Supine to R S/L to sit         BP check                SpO2 check                Pt Ed  POC HEP   POC          Re-Evaluation  DK    DK          Modalities                                                           "

## 2024-09-16 ENCOUNTER — OFFICE VISIT (OUTPATIENT)
Dept: PHYSICAL THERAPY | Facility: CLINIC | Age: 69
End: 2024-09-16
Payer: COMMERCIAL

## 2024-09-16 DIAGNOSIS — R53.1 RIGHT SIDED WEAKNESS: Primary | ICD-10-CM

## 2024-09-16 PROCEDURE — 97112 NEUROMUSCULAR REEDUCATION: CPT

## 2024-09-16 PROCEDURE — 97530 THERAPEUTIC ACTIVITIES: CPT

## 2024-09-16 NOTE — PROGRESS NOTES
"Daily Note     Today's date: 2024  Patient name: Oral Mendoza  : 1955  MRN: [id number]  Referring provider: Benjamin Granado,*  Dx:   Encounter Diagnosis     ICD-10-CM    1. Right sided weakness  R53.1                      Subjective: Pt states that he is feeling a little  more shaky this morning.  He reports that he tried getting out of bed by rolling onto his side and it is helping and makes it easier.      Objective: See treatment diary below      Assessment: Tolerated treatment well.  Continued with outlined program to improve overall strength and balance.  Leg press added this visit with cues needed for controlled slow movements with good carryover noted.  Education provided on pt's sit to stand transfers to pause and improve his posture along with LE and trunk extension before he walks.  Pt reports understanding.  Will follow up and provide education/training nv as able.  Pt progressing slowly towards his goals and will benefit from continued therapy.      Plan: Continue per plan of care.       POC Expires Auth Status Start Date Expiration Date PT Visit Limit    2024 Approved (12) 2024 10/21/2024 [facility]   Date 2024      Used 1 2      Remaining 11 10         Diagnosis: Parkinson-like symptoms   Precautions: HTN, hx of stage 3 kidney cancer, Parsonage-Cordero syndrome, hx of bilateral RTC complete tears, neuropathy, Right knee pain   Next Physician's Appt:   [employer] HEP:   Manuals       LE stretching                Neuro Re-Ed        Seated TA ball press H/L   5\"x20       SLR + TA 10x2 ea 10x2 ea      Bridges 20x GTB 20x      BKFO GTB 15x ea GTB 20x ea      LAQs 10x ea 10x ea EOT      Wobble Board 2' ea 2' ea      2 cone taps  From foam 10x alt      Tandem amb                        Ther Ex        Scap squeezes        Seated SB rollouts        Heel Raises        Side-stepping        Standing knee bends        Standing marching                         Ther " "Activity              NuStep  Nustep   5 min L3  5 mins          Gait Training        Stepping over obstalce  Hurdles 4x ea FRWD +  Lat      Lateral Step overs 4\" 10x ea       Transfers/bed mobility Supine to R S/L to sit Sit to stand w/  pause and posture      Leg press  50# 10x  60# 10x      Pt Ed        Re-Evaluation             Modalities                             "

## 2024-09-17 ENCOUNTER — OFFICE VISIT (OUTPATIENT)
Dept: UROLOGY | Facility: AMBULATORY SURGERY CENTER | Age: 69
End: 2024-09-17
Payer: COMMERCIAL

## 2024-09-17 VITALS
HEART RATE: 62 BPM | OXYGEN SATURATION: 98 % | BODY MASS INDEX: 21.03 KG/M2 | WEIGHT: 134 LBS | HEIGHT: 67 IN | DIASTOLIC BLOOD PRESSURE: 88 MMHG | SYSTOLIC BLOOD PRESSURE: 148 MMHG

## 2024-09-17 DIAGNOSIS — N13.8 BENIGN PROSTATIC HYPERPLASIA WITH URINARY OBSTRUCTION: ICD-10-CM

## 2024-09-17 DIAGNOSIS — C64.1 RENAL CELL CARCINOMA OF RIGHT KIDNEY (HCC): Primary | ICD-10-CM

## 2024-09-17 DIAGNOSIS — N40.1 BENIGN PROSTATIC HYPERPLASIA WITH URINARY OBSTRUCTION: ICD-10-CM

## 2024-09-17 LAB
POST-VOID RESIDUAL VOLUME, ML POC: 62 ML
SL AMB  POCT GLUCOSE, UA: NORMAL
SL AMB LEUKOCYTE ESTERASE,UA: NORMAL
SL AMB POCT BILIRUBIN,UA: NORMAL
SL AMB POCT BLOOD,UA: NORMAL
SL AMB POCT CLARITY,UA: CLEAR
SL AMB POCT COLOR,UA: YELLOW
SL AMB POCT KETONES,UA: NORMAL
SL AMB POCT NITRITE,UA: NORMAL
SL AMB POCT PH,UA: 7
SL AMB POCT SPECIFIC GRAVITY,UA: 1
SL AMB POCT URINE PROTEIN: NORMAL
SL AMB POCT UROBILINOGEN: NORMAL

## 2024-09-17 PROCEDURE — 81002 URINALYSIS NONAUTO W/O SCOPE: CPT | Performed by: UROLOGY

## 2024-09-17 PROCEDURE — 51798 US URINE CAPACITY MEASURE: CPT | Performed by: UROLOGY

## 2024-09-17 PROCEDURE — 99214 OFFICE O/P EST MOD 30 MIN: CPT | Performed by: UROLOGY

## 2024-09-17 RX ORDER — TAMSULOSIN HYDROCHLORIDE 0.4 MG/1
0.4 CAPSULE ORAL
Qty: 90 CAPSULE | Refills: 3 | Status: SHIPPED | OUTPATIENT
Start: 2024-09-17 | End: 2025-09-12

## 2024-09-17 NOTE — PROGRESS NOTES
9/17/2024    Oral Mendoza  1955  [id number]      1. Renal cell carcinoma of right kidney (HCC)  -     POCT urine dip  -     POCT Measure PVR  -     CT chest abdomen pelvis w wo contrast; Future; Expected date: 10/17/2024  -     CT chest abdomen pelvis w wo contrast; Future; Expected date: 03/17/2025  2. Renal mass, right  -     POCT urine dip  -     POCT Measure PVR  3. Benign prostatic hyperplasia with urinary obstruction  -     tamsulosin (FLOMAX) 0.4 mg; Take 1 capsule (0.4 mg total) by mouth daily with dinner  We discussed CT findings as well as his other complaints and symptoms.  He is due for CT now and should have another 1 in 6 months and continue as standard for stage III renal cell carcinoma.    Regarding his urinary symptoms I think this is most related to his constipation.  Recommend good bowel cleanout followed by daily use of Colace as well as laxatives as needed.  He should notice improved voiding if he is not constipated, although he is certainly at risk for voiding dysfunction with Parkinson's.  Also discussed tamsulosin use and answered to their questions.  Though it may not help him, I did prescribe tamsulosin and he may stop if no relief after 1 to 2 weeks.      History of Present Illness  Oral is a 69 y.o. male with Stage T3a renal cell carcinoma s/p robotic right nephrectomy September 2023. 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  He complains of chronic constipation and lower urinary symptoms.     We reviewed his CT scan from April 2024 which revealed no evidence of cancer.  We also saw significant constipation at the time.  He is taking stool softeners and occasional MiraLAX but not successful.  Measured his prostate approximately 40 ml.      AUA SYMPTOM SCORE      Flowsheet Row Most Recent Value   AUA SYMPTOM SCORE    How often have you had a sensation of not emptying your bladder completely after you finished urinating? 0 (P)      How often have you had to urinate again less than two hours after you finished urinating? 2 (P)     How often have you found you stopped and started again several times when you urinate? 0 (P)     How often have you found it difficult to postpone urination? 1 (P)     How often have you had a weak urinary stream? 1 (P)     How often have you had to push or strain to begin urination? 0 (P)     How many times did you most typically get up to urinate from the time you went to bed at night until the time you got up in the morning? 1 (P)     Quality of Life: If you were to spend the rest of your life with your urinary condition just the way it is now, how would you feel about that? 2 (P)     AUA SYMPTOM SCORE 5 (P)              Review of Systems    Past Medical History  Past Medical History:   Diagnosis Date    Acute deep vein thrombosis (DVT) of brachial vein of right upper extremity (HCC) 07/16/2023    Allergic     Arthritis     Asthma     Cancer (HCC)     kidney    Hypertension     Impaired fasting glucose     Mitral valve disorder     Mitral valve prolapse     Murmur, cardiac     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     PVC (premature ventricular contraction)     PVC (premature ventricular contraction)     Renal cancer (HCC) 2023    Thyroid nodule        Past Social History  Past Surgical History:   Procedure Laterality Date    HAND SURGERY      MS COLONOSCOPY FLX DX W/COLLJ SPEC WHEN PFRMD N/A 2/9/2018    Procedure: COLONOSCOPY;  Surgeon: Joe Pabon MD;  Location: [facility];  Service: Gastroenterology    MS LAPAROSCOPY RADICAL NEPHRECTOMY Right 9/20/2023    Procedure: NEPHRECTOMY RADICAL LAPAROSCOPIC W/ ROBOTICS;  Surgeon: Dario Domingo MD;  Location: [facility];  Service: Urology    PROSTATE BIOPSY      SHOULDER SURGERY         Past Family History  Family History   Problem Relation Age of Onset    Diabetes Mother      Stroke Mother     Stroke Father     Heart disease Father     Diabetes Sister     Other Family         Stroke syndrome       Past Social history  Social History     Socioeconomic History    Marital status: /Civil Union     Spouse name: Not on file    Number of children: Not on file    Years of education: Not on file    Highest education level: Not on file   Occupational History    Not on file   Tobacco Use    Smoking status: Never     Passive exposure: Never    Smokeless tobacco: Never   Vaping Use    Vaping status: Never Used   Substance and Sexual Activity    Alcohol use: Not Currently     Alcohol/week: 3.0 standard drinks of alcohol     Types: 3 Glasses of wine per week     Comment: 4 oz wine with dinner a few days a week    Drug use: No    Sexual activity: Yes     Partners: Female     Birth control/protection: None   Other Topics Concern    Not on file   Social History Narrative    Not on file     Social Determinants of Health     Financial Resource Strain: Low Risk  (11/3/2023)    Overall Financial Resource Strain (CARDIA)     Difficulty of Paying Living Expenses: Not very hard   Food Insecurity: No Food Insecurity (9/21/2023)    Hunger Vital Sign     Worried About Running Out of Food in the Last Year: Never true     Ran Out of Food in the Last Year: Never true   Transportation Needs: No Transportation Needs (11/3/2023)    PRAPARE - Transportation     Lack of Transportation (Medical): No     Lack of Transportation (Non-Medical): No   Physical Activity: Not on file   Stress: Not on file   Social Connections: Not on file   Intimate Partner Violence: Not on file   Housing Stability: High Risk (9/21/2023)    Housing Stability Vital Sign     Unable to Pay for Housing in the Last Year: No     Number of Times Moved in the Last Year: 2     Homeless in the Last Year: No     Social History     Tobacco Use   Smoking Status Never    Passive exposure: Never   Smokeless Tobacco Never       Current  Medications  Current Outpatient Medications   Medication Sig Dispense Refill    acetaminophen (TYLENOL) 325 mg tablet Take 2 tablets (650 mg total) by mouth every 6 (six) hours as needed for mild pain (Patient taking differently: Take 500 mg by mouth every 6 (six) hours as needed for mild pain Pt takes 2X 500mg tabs as needed for pain.)  0    amLODIPine (NORVASC) 5 mg tablet Take 1 tablet (5 mg total) by mouth 2 (two) times a day 180 tablet 1    busPIRone (BUSPAR) 7.5 mg tablet Take 1 tablet (7.5 mg total) by mouth 2 (two) times a day as needed for anxiety 60 tablet 0    carbidopa-levodopa (Sinemet)  mg per tablet Take 1 tablet by mouth 3 (three) times a day (Patient taking differently: Take 1 tablet by mouth daily at bedtime) 90 tablet 3    carvedilol (Coreg) 3.125 mg tablet Take 1 tablet (3.125 mg total) by mouth 2 (two) times a day with meals 180 tablet 0    cycloSPORINE (RESTASIS) 0.05 % ophthalmic emulsion Administer 1 drop to both eyes 2 (two) times a day      Docosahexaenoic Acid (DHA OMEGA 3) 100 MG CAPS Take 4 capsules by mouth daily      docusate sodium (COLACE) 100 mg capsule Take 1 capsule (100 mg total) by mouth 2 (two) times a day (Patient taking differently: Take 100 mg by mouth if needed) 60 capsule 2    gabapentin (Neurontin) 100 mg capsule Take 1 capsule (100 mg total) by mouth daily at bedtime 100 capsule 1    lidocaine 0.5 % topical gel Apply 1 Application topically as needed (pain). Indications: pain      Multiple Vitamin tablet Take 1 tablet by mouth daily      polyethylene glycol (GLYCOLAX) 17 GM/SCOOP powder Take 17 g by mouth as needed (constipation)      senna (SENOKOT) 8.6 mg Take 1 tablet (8.6 mg total) by mouth daily as needed for constipation 30 tablet 0    tamsulosin (FLOMAX) 0.4 mg Take 1 capsule (0.4 mg total) by mouth daily with dinner 90 capsule 3    fexofenadine (ALLEGRA) 180 MG tablet Take 1 tablet by mouth daily as needed (Patient not taking: Reported on 9/17/2024)    "    No current facility-administered medications for this visit.       Allergies  No Known Allergies    Past Medical History, Social History, Family History, medications and allergies were reviewed.    Vitals  Vitals:    09/17/24 1406   BP: 148/88   BP Location: Left arm   Patient Position: Sitting   Cuff Size: Standard   Pulse: 62   SpO2: 98%   Weight: 60.8 kg (134 lb)   Height: 5' 7\" (1.702 m)       Physical Exam      Results  Lab Results   Component Value Date    PSA 0.41 03/25/2023    PSA 0.35 10/23/2021    PSA 0.4 02/14/2020     Lab Results   Component Value Date    GLUCOSE 178 (H) 09/20/2023    CALCIUM 9.4 04/03/2024     07/29/2017    K 4.2 04/03/2024    CO2 28 04/03/2024     04/03/2024    BUN 15 04/03/2024    CREATININE 0.80 04/03/2024     Lab Results   Component Value Date    WBC 8.10 09/21/2023    HGB 12.7 09/21/2023    HCT 38.8 09/21/2023    MCV 92 09/21/2023     09/21/2023           "

## 2024-09-20 DIAGNOSIS — I10 BENIGN ESSENTIAL HYPERTENSION: ICD-10-CM

## 2024-09-20 RX ORDER — AMLODIPINE BESYLATE 5 MG/1
5 TABLET ORAL 2 TIMES DAILY
Qty: 180 TABLET | Refills: 1 | Status: SHIPPED | OUTPATIENT
Start: 2024-09-20

## 2024-09-23 ENCOUNTER — EVALUATION (OUTPATIENT)
Dept: PHYSICAL THERAPY | Facility: CLINIC | Age: 69
End: 2024-09-23
Payer: COMMERCIAL

## 2024-09-23 DIAGNOSIS — G54.5 PARSONAGE-TURNER SYNDROME: ICD-10-CM

## 2024-09-23 DIAGNOSIS — R53.1 RIGHT SIDED WEAKNESS: Primary | ICD-10-CM

## 2024-09-23 PROCEDURE — 97112 NEUROMUSCULAR REEDUCATION: CPT | Performed by: PHYSICAL THERAPIST

## 2024-09-23 PROCEDURE — 97530 THERAPEUTIC ACTIVITIES: CPT | Performed by: PHYSICAL THERAPIST

## 2024-09-23 NOTE — PROGRESS NOTES
PT Re-Evaluation     Today's date: 2024  Patient name: Oral Mendoza  : 1955  MRN: [id number]  Referring provider: Benjamin Granado,*  Dx:   Encounter Diagnosis     ICD-10-CM    1. Right sided weakness  R53.1       2. Parsonage-Cordero syndrome  G54.5             Start Time: 1145  Stop Time: 1230  Total time in clinic (min): 45 minutes    Assessment  Impairments: abnormal gait, abnormal or restricted ROM, activity intolerance, impaired balance, impaired physical strength, lacks appropriate home exercise program, pain with function, weight-bearing intolerance, poor posture  and poor body mechanics  Functional limitations: walking, sit-stands, LE/UE dressing, bed mobility, getting in/out of car, showering    Assessment details: Patient is a 69 y.o. male who presents to outpatient with reports of LE weakness and balance impairment that has worsened in the past year. Patient arrives for re- evaluation ambulating with single point cane, with improving overall gait mechanics and foot clearance. Patient requires less reliance on cane to walk short distances within clinic, however carries in his hand for safety and security. Improving overall postural mechanics noted, but continues to have stiffness in shoulders and decreased trunk rotation noted. TUG time noted to be about 11 seconds without assistive device. No Changes in 5x sit-stand time, however able to perform today with minimal to no UE support required to complete transfers safely. Please note that patient reports history of bilateral complete RTC tears, thus significant limitations in bilateral shoulder mobility and strength in all planes. He reports primary concern is getting up from couch, especially after end of day, due to softer/less firm surface and lower height. Will continue to practice and educate on proper mechanics and technique in completing safely and independently. Patient lives with his wife and is independent with ADLs,  however requires assistance with certain activities such as UE dressing, bathing, etc.  He has made good progress with set goals thus far, and has exhibited good tolerance with PT interventions and exercises. Thank you for the referral.    Discussion with patient and his wife at length today regarding good functional progress he has made thus far, given other physical limitations and factors. He continues to feel as though he is not making much progress and reports having good and bad days. He reports compliance and understanding of continuing with HEP at home to maintain PT gains, which will also be discussed and reviewed at length at next visit. Patient and his wife were agreeable for discharge next visit, and may return for PT in the future if appropriate and necessary.  Barriers to therapy: Medical history  Understanding of Dx/Px/POC: good     Prognosis: good    Goals  Impairment Goals 4-6 weeks   In order to maximize function patient will be able to...   - Increase hip/LE strength to 4/5 throughout. Partially Met  - Demonstrate improved hip flexibility as demonstrated by increased ROM through therapeutic exercise. Improved  - Improved TUG time to < 15 sec to improve mobility and reduce falls risk with walking. Met, without AD  - Improved 5x sit-stand time to < 15 sec to improve LE endurance and reduce falls risk with transfers. Met    Functional Goals 6-8 weeks  In order to return to prior level of function patient will be able to...   - Participate in ADL's/IADL's/sport specific activities with no greater than 2/10 pain. Ongoing  - Increase Functional Status Measure (FOTO) to: anticipated at discharge. Improved  - Demonstrate independence and compliant with HEP. Met  - Demonstrate a squat and or sit to stand with good mechanics and eccentric control without pain/difficulty/compensation. Partially Met   - Demonstrate functional activities with good core and glute strength without compensation/pain/difficulty .  "Partially Met  - Ascend and descend stairs without increased pain/compensation/difficulty and a reciprocal gait pattern. Improved  - Patient will be able to demonstrate good gait mechanics without compensations. Partially Met    Plan  Patient would benefit from: skilled PT  Planned modality interventions: cryotherapy  Other planned modality interventions: moist heat    Planned therapy interventions: joint mobilization, manual therapy, neuromuscular re-education, patient education, strengthening, stretching, therapeutic activities, therapeutic exercise, home exercise program, functional ROM exercises, Sandoval taping, postural training, balance/weight bearing training, body mechanics training, flexibility, IASTM, kinesiology taping, massage, nerve gliding, transfer training and gait training    Frequency: 1-2x/week.  Duration in weeks: 4  Treatment plan discussed with: patient, PTA, referring physician and family  Plan details: Patient may do [facility] BIG program in the future as able and ready.        Subjective Evaluation    History of Present Illness  Mechanism of injury: Oral Mendoza is a 69 y.o. year-old male who presents to outpatient PT accompanied by his wife \"Kendrick\" with reports of LE weakness and balance impairments since last March. He was diagnosed with Parkinson-like symptoms, as well as Parsonage-Cordero syndrome, after which he noticed his condition worsening. He had follow-up with Neurology end of April and PCP in May and was recommended for PT at this time. Patient was recommended for [facility] BIG and LOUD program, however patient declined coming 4x week at this time.  Quality of life: good    Patient Goals  Patient goals for therapy: decreased pain, increased motion, improved balance, increased strength, independence with ADLs/IADLs and return to sport/leisure activities    Pain  Current pain rating: 3  At best pain rating: 3  At worst pain ratin  Location: Right knee, both shoulders  Quality: " dull ache and throbbing  Aggravating factors: sitting, standing, stair climbing, walking and lifting  Progression: improved    Social Support  Steps to enter house: yes  Stairs in house: no   Lives with: spouse    Hand dominance: right    Treatments  Previous treatment: physical therapy and occupational therapy  Current treatment: physical therapy        Objective     Strength/Myotome Testing     Left Shoulder     Planes of Motion   Flexion: 2-   Abduction: 2-     Right Shoulder     Planes of Motion   Flexion: 2-   Abduction: 2-     Left Elbow   Flexion: 4  Extension: 3+    Right Elbow   Flexion: 4  Extension: 3-    Left Hip   Planes of Motion   Flexion: 4  Extension: 3+  Abduction: 4-  External rotation: 4-    Right Hip   Planes of Motion   Flexion: 4  Extension: 3+  Abduction: 4-  External rotation: 4-    Left Knee   Flexion: 4  Extension: 4+    Right Knee   Flexion: 4  Extension: 4+    Left Ankle/Foot   Dorsiflexion: 4-  Plantar flexion: 3+    Right Ankle/Foot   Dorsiflexion: 4  Plantar flexion: 3+  Neuro Exam:     Sensation   Light touch LE: left WNL and right WNL    Coordination   Heel to shin: left WNL and right WNL  Finger to nose: left WNL and right WNL  Rapid alternating movements: UE WNL    Transfers   Sit to stand: minimum assist (uses 2 UE support)   Wheelchair to mat: minimum assist (close supervision from chair to table)   Mat to wheelchair: minimum assist (close supervision from chair to table)   Sit to supine: minimum assist (close supervision)   Supine to sit: minimum assist (close supervision)     Functional outcomes   5x sit to stand: 13 seconds without support (seconds)  TU without assistive device (seconds)  Functional outcome comment: [place]omberg on firm: NV  Functional outcome gait comment: With single point cane: close supervision; exhibits forward head, rounded shoulders, and elevated shoulders posture throughout, shuffled gait pattern with decreased heel strike and foot clearance,  "decreased knee flexion in swing phase, decreased arm swing of contralateral UE not holding cane, slower pace with turns especially during TUG testing              POC Expires Auth Status Start Date Expiration Date PT Visit Limit    9/26/2024 Approved (12) 9/1/2024 10/21/2024 BOMN   Date 9/9/2024 9/16/2024 9/23/2024     Used 1 2 3     Remaining 11 10 9        Diagnosis: Parkinson-like symptoms   Precautions: HTN, hx of stage 3 kidney cancer, Parsonage-Cordero syndrome, hx of bilateral RTC complete tears, neuropathy, Right knee pain   Next Physician's Appt:   ReClaims HEP:   Manuals 9/9 9/16 9/23     LE stretching                Neuro Re-Ed        Seated TA ball press H/L   5\"x20       SLR + TA 10x2 ea 10x2 ea      Bridges 20x GTB 20x      BKFO GTB 15x ea GTB 20x ea      LAQs 10x ea 10x ea EOT      Wobble Board 2' ea 2' ea 2' ea     2 cone taps  From foam 10x alt      Tandem amb                        Ther Ex        Scap squeezes   Seated 2x10     Seated SB rollouts        Heel Raises        Side-stepping        Standing knee bends        Standing marching   Amb w/ marches @ table x3 laps                      Ther Activity              NuStep  Nustep   5 min L3  5 mins   5 min       Gait Training   T/o session for high amp, posture     Stepping over obstalce  Hurdles 4x ea FRWD +  Lat      Lateral Step overs 4\" 10x ea       Transfers/bed mobility Supine to R S/L to sit Sit to stand w/  pause and posture STS from table w/ foam x10 cues     Leg press  50# 10x  60# 10x      Pt Ed   POC, HEP     Re-Evaluation      DK       Modalities                            "

## 2024-09-30 ENCOUNTER — OFFICE VISIT (OUTPATIENT)
Dept: PHYSICAL THERAPY | Facility: CLINIC | Age: 69
End: 2024-09-30
Payer: COMMERCIAL

## 2024-09-30 DIAGNOSIS — R53.1 RIGHT SIDED WEAKNESS: Primary | ICD-10-CM

## 2024-09-30 DIAGNOSIS — G54.5 PARSONAGE-TURNER SYNDROME: ICD-10-CM

## 2024-09-30 PROCEDURE — 97112 NEUROMUSCULAR REEDUCATION: CPT

## 2024-09-30 PROCEDURE — 97530 THERAPEUTIC ACTIVITIES: CPT

## 2024-09-30 NOTE — PROGRESS NOTES
"Discharge    Today's date: 2024  Patient name: Oral Mendoza  : 1955  MRN: [id number]  Referring provider: Benjamin Granado,*  Dx:   Encounter Diagnosis     ICD-10-CM    1. Right sided weakness  R53.1       2. Parsonage-Cordero syndrome  G54.5                      Subjective: Pt states that he is feeling a little tired and under the weather.  Not sick but just not moving well.        Objective: See treatment diary below      Assessment: Tolerated treatment well.  Session focus was balance activities along with reviewing ex's for pt's HEP.  Pt able to perform all with no complaints despite feeling fatigued.  Good form noted with minimal cues.  Pt reports understanding his HEP however he knows that he needs a little more consistency.  Pt reports feeling better at end of session and sees how exercise is beneficial to how he feels.   Pt will be discharged to his HEP at this time.      Plan: Discharge      POC Expires Auth Status Start Date Expiration Date PT Visit Limit    2024 Approved (12) 2024 10/21/2024 BOMN   Date 2024    Used 1 2 3 4    Remaining 11 10 9 8       Diagnosis: Parkinson-like symptoms   Precautions: HTN, hx of stage 3 kidney cancer, Parsonage-Cordero syndrome, hx of bilateral RTC complete tears, neuropathy, Right knee pain   Next Physician's Appt:   [employer] HEP:   Manuals     LE stretching                Neuro Re-Ed        Seated TA ball press H/L   5\"x20       SLR + TA 10x2 ea 10x2 ea  10x2 ea    Bridges 20x GTB 20x  GTB 20x    BKFO GTB 15x ea GTB 20x ea  GTB 20x ea    LAQs 10x ea 10x ea EOT      Wobble Board 2' ea 2' ea 2' ea 2' ea    2 cone taps  From foam 10x alt      Tandem amb        Tandem balance foam    2x30\" ea            Ther Ex        Scap squeezes   Seated 2x10 Seated 2x10    Seated SB rollouts        Heel Raises    20x    Side-stepping        Standing knee bends        Standing marching   Amb w/ marches " "@ table x3 laps Alt high march 10x ea                     Ther Activity              NuStep  Nustep   5 min L3  5 mins   5 min  bike 5 mins     Gait Training   T/o session for high amp, posture     Stepping over obstalce  Hurdles 4x ea FRWD +  Lat      Lateral Step overs 4\" 10x ea       Transfers/bed mobility Supine to R S/L to sit Sit to stand w/  pause and posture STS from table w/ foam x10 cues     Leg press  50# 10x  60# 10x      Pt Ed   POC, HEP HEP    Re-Evaluation      DK       Modalities                            "

## 2024-11-08 DIAGNOSIS — I10 BENIGN ESSENTIAL HYPERTENSION: ICD-10-CM

## 2024-11-09 RX ORDER — CARVEDILOL 3.12 MG/1
3.12 TABLET ORAL 2 TIMES DAILY WITH MEALS
Qty: 180 TABLET | Refills: 1 | Status: SHIPPED | OUTPATIENT
Start: 2024-11-09

## 2024-11-19 ENCOUNTER — RESULTS FOLLOW-UP (OUTPATIENT)
Dept: INTERNAL MEDICINE CLINIC | Facility: CLINIC | Age: 69
End: 2024-11-19

## 2024-11-19 ENCOUNTER — APPOINTMENT (OUTPATIENT)
Dept: LAB | Facility: MEDICAL CENTER | Age: 69
End: 2024-11-19
Payer: COMMERCIAL

## 2024-11-19 DIAGNOSIS — R53.83 FATIGUE, UNSPECIFIED TYPE: ICD-10-CM

## 2024-11-19 LAB
ALBUMIN SERPL BCG-MCNC: 4.7 G/DL (ref 3.5–5)
ALP SERPL-CCNC: 57 U/L (ref 34–104)
ALT SERPL W P-5'-P-CCNC: 27 U/L (ref 7–52)
ANION GAP SERPL CALCULATED.3IONS-SCNC: 8 MMOL/L (ref 4–13)
AST SERPL W P-5'-P-CCNC: 21 U/L (ref 13–39)
BASOPHILS # BLD AUTO: 0.05 THOUSANDS/ÂΜL (ref 0–0.1)
BASOPHILS NFR BLD AUTO: 1 % (ref 0–1)
BILIRUB SERPL-MCNC: 0.86 MG/DL (ref 0.2–1)
BUN SERPL-MCNC: 13 MG/DL (ref 5–25)
CALCIUM SERPL-MCNC: 9.4 MG/DL (ref 8.4–10.2)
CHLORIDE SERPL-SCNC: 102 MMOL/L (ref 96–108)
CO2 SERPL-SCNC: 28 MMOL/L (ref 21–32)
CREAT SERPL-MCNC: 0.78 MG/DL (ref 0.6–1.3)
EOSINOPHIL # BLD AUTO: 0.16 THOUSAND/ÂΜL (ref 0–0.61)
EOSINOPHIL NFR BLD AUTO: 3 % (ref 0–6)
ERYTHROCYTE [DISTWIDTH] IN BLOOD BY AUTOMATED COUNT: 12.7 % (ref 11.6–15.1)
GFR SERPL CREATININE-BSD FRML MDRD: 90 ML/MIN/1.73SQ M
GLUCOSE P FAST SERPL-MCNC: 94 MG/DL (ref 65–99)
HCT VFR BLD AUTO: 45.2 % (ref 36.5–49.3)
HGB BLD-MCNC: 15 G/DL (ref 12–17)
IMM GRANULOCYTES # BLD AUTO: 0.01 THOUSAND/UL (ref 0–0.2)
IMM GRANULOCYTES NFR BLD AUTO: 0 % (ref 0–2)
LYMPHOCYTES # BLD AUTO: 1.59 THOUSANDS/ÂΜL (ref 0.6–4.47)
LYMPHOCYTES NFR BLD AUTO: 29 % (ref 14–44)
MCH RBC QN AUTO: 29.7 PG (ref 26.8–34.3)
MCHC RBC AUTO-ENTMCNC: 33.2 G/DL (ref 31.4–37.4)
MCV RBC AUTO: 90 FL (ref 82–98)
MONOCYTES # BLD AUTO: 0.43 THOUSAND/ÂΜL (ref 0.17–1.22)
MONOCYTES NFR BLD AUTO: 8 % (ref 4–12)
NEUTROPHILS # BLD AUTO: 3.31 THOUSANDS/ÂΜL (ref 1.85–7.62)
NEUTS SEG NFR BLD AUTO: 59 % (ref 43–75)
NRBC BLD AUTO-RTO: 0 /100 WBCS
PLATELET # BLD AUTO: 272 THOUSANDS/UL (ref 149–390)
PMV BLD AUTO: 9.6 FL (ref 8.9–12.7)
POTASSIUM SERPL-SCNC: 4 MMOL/L (ref 3.5–5.3)
PROT SERPL-MCNC: 7.2 G/DL (ref 6.4–8.4)
RBC # BLD AUTO: 5.05 MILLION/UL (ref 3.88–5.62)
SODIUM SERPL-SCNC: 138 MMOL/L (ref 135–147)
TSH SERPL DL<=0.05 MIU/L-ACNC: 1.38 UIU/ML (ref 0.45–4.5)
WBC # BLD AUTO: 5.55 THOUSAND/UL (ref 4.31–10.16)

## 2024-11-19 PROCEDURE — 36415 COLL VENOUS BLD VENIPUNCTURE: CPT

## 2024-11-19 PROCEDURE — 84443 ASSAY THYROID STIM HORMONE: CPT

## 2024-11-19 PROCEDURE — 80053 COMPREHEN METABOLIC PANEL: CPT

## 2024-11-19 PROCEDURE — 85025 COMPLETE CBC W/AUTO DIFF WBC: CPT

## 2024-11-20 ENCOUNTER — 6 MONTH FOLLOW UP (OUTPATIENT)
Dept: URBAN - METROPOLITAN AREA CLINIC 6 | Facility: CLINIC | Age: 69
End: 2024-11-20

## 2024-11-20 DIAGNOSIS — H04.123: ICD-10-CM

## 2024-11-20 DIAGNOSIS — H02.831: ICD-10-CM

## 2024-11-20 DIAGNOSIS — H02.834: ICD-10-CM

## 2024-11-20 DIAGNOSIS — H52.13: ICD-10-CM

## 2024-11-20 DIAGNOSIS — H25.13: ICD-10-CM

## 2024-11-20 DIAGNOSIS — H40.023: ICD-10-CM

## 2024-11-20 DIAGNOSIS — H43.813: ICD-10-CM

## 2024-11-20 PROCEDURE — 92015 DETERMINE REFRACTIVE STATE: CPT

## 2024-11-20 PROCEDURE — 92014 COMPRE OPH EXAM EST PT 1/>: CPT

## 2024-11-20 PROCEDURE — 92133 CPTRZD OPH DX IMG PST SGM ON: CPT

## 2024-11-20 ASSESSMENT — TONOMETRY
OD_IOP_MMHG: 22
OS_IOP_MMHG: 19
OD_IOP_MMHG: 20
OS_IOP_MMHG: 18

## 2024-11-20 ASSESSMENT — VISUAL ACUITY
OD_CC: 20/40
OS_CC: 20/30

## 2024-11-22 ENCOUNTER — OFFICE VISIT (OUTPATIENT)
Dept: INTERNAL MEDICINE CLINIC | Facility: CLINIC | Age: 69
End: 2024-11-22
Payer: COMMERCIAL

## 2024-11-22 VITALS
WEIGHT: 132.8 LBS | HEART RATE: 61 BPM | BODY MASS INDEX: 20.84 KG/M2 | OXYGEN SATURATION: 98 % | HEIGHT: 67 IN | SYSTOLIC BLOOD PRESSURE: 134 MMHG | DIASTOLIC BLOOD PRESSURE: 80 MMHG

## 2024-11-22 DIAGNOSIS — K59.00 CONSTIPATION, UNSPECIFIED CONSTIPATION TYPE: ICD-10-CM

## 2024-11-22 DIAGNOSIS — G20.A1 PARKINSON'S DISEASE, UNSPECIFIED WHETHER DYSKINESIA PRESENT, UNSPECIFIED WHETHER MANIFESTATIONS FLUCTUATE (HCC): ICD-10-CM

## 2024-11-22 DIAGNOSIS — I10 BENIGN ESSENTIAL HYPERTENSION: ICD-10-CM

## 2024-11-22 DIAGNOSIS — C64.1 MALIGNANT NEOPLASM OF RIGHT KIDNEY (HCC): ICD-10-CM

## 2024-11-22 DIAGNOSIS — Z00.00 MEDICARE ANNUAL WELLNESS VISIT, SUBSEQUENT: Primary | ICD-10-CM

## 2024-11-22 DIAGNOSIS — Z23 ENCOUNTER FOR IMMUNIZATION: ICD-10-CM

## 2024-11-22 PROCEDURE — G0008 ADMIN INFLUENZA VIRUS VAC: HCPCS

## 2024-11-22 PROCEDURE — 90673 RIV3 VACCINE NO PRESERV IM: CPT

## 2024-11-22 PROCEDURE — G0439 PPPS, SUBSEQ VISIT: HCPCS | Performed by: INTERNAL MEDICINE

## 2024-11-22 NOTE — PROGRESS NOTES
Name: Oral Mendoza      : 1955      MRN: [id number]  Encounter Provider: Benjamin Granado MD  Encounter Date: 2024   Encounter department: [facility]  Medicare annual wellness visit, subsequent         Parkinson's disease, unspecified whether dyskinesia present, unspecified whether manifestations fluctuate (HCC)  -Currently on Sinemet as prescribed by neurology.  -Appreciate follow-up       Constipation, unspecified constipation type  -Discussed bowel regimen.  He is currently not taking senna.  We discussed how diseases such as Parkinson's can slow down GI transit time.  Encouraged him to use senna as needed in addition to MiraLAX if he has gone more than 3 days without a bowel movement.       Benign essential hypertension  -Blood pressure well controlled  -Continue current antihypertensive regimen       Encounter for immunization  -Patient given Flublok vaccine today.  He states that he does not want the high-dose 65 and over flu shot.       Malignant neoplasm of right kidney (HCC)  -Status post nephrectomy  -Followed closely by urology  -Due for surveillance CT scan prior to his next visit          Preventive health issues were discussed with patient, and age appropriate screening tests were ordered as noted in patient's After Visit Summary. Personalized health advice and appropriate referrals for health education or preventive services given if needed, as noted in patient's After Visit Summary.    History of Present Illness     HPI patient presents today for his Medicare wellness visit.  His past medical history is notable for renal cell carcinoma status post nephrectomy, parkinsonism, neuropathy involving the right upper extremity and essential hypertension.  Overall he states he has been feeling well.  His main complaint is constipation.  He states he has been using MiraLAX as needed which has helped him to move his bowels.  He has not  been taking Senokot.    He reports due to some urinary issues including nocturia and weak stream he was started on Flomax by his urologist.  He states his urologist felt as though his constipation may also be contributing.  Despite being prescribed the Flomax he reports he has not started it yet.    Patient Care Team:  Benjamin Granado MD as PCP - General (Internal Medicine)  Benjamin Granado MD as PCP - PCP-[facility] ([facility]  MD Fauzia Bledsoe CRNP Lynn Moran, DO Sinan Kutty, MD Sinan Kutty, MD as Endoscopist  Lynn Reese RD (Nutrition)  Nini Mckinney MD as Resident (Neurology)  Rehana Gan MD (Neurology)  Nini Mckinney MD as Resident (Neurology)    Review of Systems  Medical History Reviewed by provider this encounter:       Annual Wellness Visit Questionnaire   Oral is here for his Subsequent Wellness visit.     Health Risk Assessment:   Patient rates overall health as fair. Patient feels that their physical health rating is slightly worse. Patient is dissatisfied with their life. Eyesight was rated as slightly worse. Hearing was rated as slightly worse. Patient feels that their emotional and mental health rating is same. Patients states they are sometimes angry. Patient states they are often unusually tired/fatigued. Pain experienced in the last 7 days has been some. Patient's pain rating has been 5/10. Patient states that he has experienced no weight loss or gain in last 6 months.     Depression Screening:   PHQ-2 Score: 2      Fall Risk Screening:   In the past year, patient has experienced: no history of falling in past year      Home Safety:  Patient does not have trouble with stairs inside or outside of their home. Patient has working smoke alarms and has working carbon monoxide detector. Home safety hazards include: medications that cause fatigue.     Nutrition:   Current diet is Regular, Low Saturated Fat, Limited junk food and Other (please  comment). low salt    Medications:   Patient is currently taking over-the-counter supplements. OTC medications include: see medication list. Patient is able to manage medications.     Activities of Daily Living (ADLs)/Instrumental Activities of Daily Living (IADLs):   Walk and transfer into and out of bed and chair?: Yes  Dress and groom yourself?: Yes    Bathe or shower yourself?: No    Feed yourself? Yes  Do your laundry/housekeeping?: Yes  Manage your money, pay your bills and track your expenses?: Yes  Make your own meals?: Yes    Do your own shopping?: No    Durable Medical Equipment Suppliers  N/A    Previous Hospitalizations:   Any hospitalizations or ED visits within the last 12 months?: No      Advance Care Planning:   Living will: Yes    Durable POA for healthcare: Yes    Advanced directive: Yes      PREVENTIVE SCREENINGS      Cardiovascular Screening:    General: Screening Not Indicated and History Lipid Disorder      Diabetes Screening:     General: Screening Current      Colorectal Cancer Screening:     General: Screening Current      Abdominal Aortic Aneurysm (AAA) Screening:    Risk factors include: age between 65-74 yo        Lung Cancer Screening:     General: Screening Not Indicated      Hepatitis C Screening:    General: Screening Current    Screening, Brief Intervention, and Referral to Treatment (SBIRT)    Screening  Typical number of drinks in a day: 0  Typical number of drinks in a week: 0  Interpretation: Low risk drinking behavior.    AUDIT-C Screenin) How often did you have a drink containing alcohol in the past year? never  2) How many drinks did you have on a typical day when you were drinking in the past year? 0  3) How often did you have 6 or more drinks on one occasion in the past year? never    AUDIT-C Score: 0  Interpretation: Score 0-3 (male): Negative screen for alcohol misuse    Single Item Drug Screening:  How often have you used an illegal drug (including marijuana) or a  "prescription medication for non-medical reasons in the past year? never    Single Item Drug Screen Score: 0  Interpretation: Negative screen for possible drug use disorder    Social Drivers of Health     Financial Resource Strain: Low Risk  (11/3/2023)    Overall Financial Resource Strain (CARDIA)     Difficulty of Paying Living Expenses: Not very hard   Food Insecurity: No Food Insecurity (11/22/2024)    Hunger Vital Sign     Worried About Running Out of Food in the Last Year: Never true     Ran Out of Food in the Last Year: Never true   Transportation Needs: No Transportation Needs (11/22/2024)    PRAPARE - Transportation     Lack of Transportation (Medical): No     Lack of Transportation (Non-Medical): No   Housing Stability: Low Risk  (11/22/2024)    Housing Stability Vital Sign     Unable to Pay for Housing in the Last Year: No     Number of Times Moved in the Last Year: 0     Homeless in the Last Year: No   Utilities: Not At Risk (11/22/2024)    [facility] Utilities     Threatened with loss of utilities: No     No results found.    Objective   /80 (BP Location: Left arm, Patient Position: Sitting, Cuff Size: Standard)   Pulse 61   Ht 5' 7\" (1.702 m)   Wt 60.2 kg (132 lb 12.8 oz)   SpO2 98%   BMI 20.80 kg/m²     Physical Exam  General: NAD  HEENT: NCAT, EOMI, normal conjunctiva  Cardiovascular: RRR, normal S1 and S2, no m/r/g  Pulmonary: Normal respiratory effort, no wheezes, rales or rhonchi  GI: Soft, nontender, nondistended, normoactive bowel sounds  Musculoskeletal: Normal bulk and tone  Neuro: Bilateral hand tremor, right greater than left  Extremities: No lower extremity edema  Skin: Normal skin color, no rashes   Psychiatric: Normal mood and affect      "

## 2024-11-22 NOTE — ASSESSMENT & PLAN NOTE
-Discussed bowel regimen.  He is currently not taking senna.  We discussed how diseases such as Parkinson's can slow down GI transit time.  Encouraged him to use senna as needed in addition to MiraLAX if he has gone more than 3 days without a bowel movement.

## 2024-11-22 NOTE — PATIENT INSTRUCTIONS
-Take MiraLAX as needed for constipation.  -Please purchase over-the-counter senna as needed (Senokot) as this sends a signal to the bowel to speed up and should also help with constipation.  Medicare Preventive Visit Patient Instructions  Thank you for completing your Welcome to Medicare Visit or Medicare Annual Wellness Visit today. Your next wellness visit will be due in one year (11/23/2025).  The screening/preventive services that you may require over the next 5-10 years are detailed below. Some tests may not apply to you based off risk factors and/or age. Screening tests ordered at today's visit but not completed yet may show as past due. Also, please note that scanned in results may not display below.  Preventive Screenings:  Service Recommendations Previous Testing/Comments   Colorectal Cancer Screening  Colonoscopy    Fecal Occult Blood Test (FOBT)/Fecal Immunochemical Test (FIT)  Fecal DNA/Cologuard Test  Flexible Sigmoidoscopy Age: 45-75 years old   Colonoscopy: every 10 years (May be performed more frequently if at higher risk)  OR  FOBT/FIT: every 1 year  OR  Cologuard: every 3 years  OR  Sigmoidoscopy: every 5 years  Screening may be recommended earlier than age 45 if at higher risk for colorectal cancer. Also, an individualized decision between you and your healthcare provider will decide whether screening between the ages of 76-85 would be appropriate. Colonoscopy: 02/09/2018  FOBT/FIT: Not on file  Cologuard: Not on file  Sigmoidoscopy: Not on file    Screening Current     Prostate Cancer Screening Individualized decision between patient and health care provider in men between ages of 55-69   Medicare will cover every 12 months beginning on the day after your 50th birthday PSA: 0.41 ng/mL           Hepatitis C Screening Once for adults born between 1945 and 1965  More frequently in patients at high risk for Hepatitis C Hep C Antibody: 02/22/2019    Screening Current   Diabetes Screening 1-2 times  per year if you're at risk for diabetes or have pre-diabetes Fasting glucose: 94 mg/dL (11/19/2024)  A1C: 5.0 % (5/2/2023)  Screening Current   Cholesterol Screening Once every 5 years if you don't have a lipid disorder. May order more often based on risk factors. Lipid panel: 05/02/2024  Screening Not Indicated  History Lipid Disorder      Other Preventive Screenings Covered by Medicare:  Abdominal Aortic Aneurysm (AAA) Screening: covered once if your at risk. You're considered to be at risk if you have a family history of AAA or a male between the age of 65-75 who smoking at least 100 cigarettes in your lifetime.  Lung Cancer Screening: covers low dose CT scan once per year if you meet all of the following conditions: (1) Age 55-77; (2) No signs or symptoms of lung cancer; (3) Current smoker or have quit smoking within the last 15 years; (4) You have a tobacco smoking history of at least 20 pack years (packs per day x number of years you smoked); (5) You get a written order from a healthcare provider.  Glaucoma Screening: covered annually if you're considered high risk: (1) You have diabetes OR (2) Family history of glaucoma OR (3)  aged 50 and older OR (4)  American aged 65 and older  Osteoporosis Screening: covered every 2 years if you meet one of the following conditions: (1) Have a vertebral abnormality; (2) On glucocorticoid therapy for more than 3 months; (3) Have primary hyperparathyroidism; (4) On osteoporosis medications and need to assess response to drug therapy.  HIV Screening: covered annually if you're between the age of 15-65. Also covered annually if you are younger than 15 and older than 65 with risk factors for HIV infection. For pregnant patients, it is covered up to 3 times per pregnancy.    Immunizations:  Immunization Recommendations   Influenza Vaccine Annual influenza vaccination during flu season is recommended for all persons aged >= 6 months who do not have  contraindications   Pneumococcal Vaccine   * Pneumococcal conjugate vaccine = PCV13 (Prevnar 13), PCV15 (Vaxneuvance), PCV20 (Prevnar 20)  * Pneumococcal polysaccharide vaccine = PPSV23 (Pneumovax) Adults 19-65 yo with certain risk factors or if 65+ yo  If never received any pneumonia vaccine: recommend Prevnar 20 (PCV20)  Give PCV20 if previously received 1 dose of PCV13 or PPSV23   Hepatitis B Vaccine 3 dose series if at intermediate or high risk (ex: diabetes, end stage renal disease, liver disease)   Respiratory syncytial virus (RSV) Vaccine - COVERED BY MEDICARE PART D  * RSVPreF3 (Arexvy) CDC recommends that adults 60 years of age and older may receive a single dose of RSV vaccine using shared clinical decision-making (SCDM)   Tetanus (Td) Vaccine - COST NOT COVERED BY MEDICARE PART B Following completion of primary series, a booster dose should be given every 10 years to maintain immunity against tetanus. Td may also be given as tetanus wound prophylaxis.   Tdap Vaccine - COST NOT COVERED BY MEDICARE PART B Recommended at least once for all adults. For pregnant patients, recommended with each pregnancy.   Shingles Vaccine (Shingrix) - COST NOT COVERED BY MEDICARE PART B  2 shot series recommended in those 19 years and older who have or will have weakened immune systems or those 50 years and older     Health Maintenance Due:      Topic Date Due   • Colorectal Cancer Screening  02/09/2028   • Hepatitis C Screening  Completed     Immunizations Due:      Topic Date Due   • Influenza Vaccine (1) 09/01/2024   • COVID-19 Vaccine (3 - 2024-25 season) 09/01/2024     Advance Directives   What are advance directives?  Advance directives are legal documents that state your wishes and plans for medical care. These plans are made ahead of time in case you lose your ability to make decisions for yourself. Advance directives can apply to any medical decision, such as the treatments you want, and if you want to donate organs.    What are the types of advance directives?  There are many types of advance directives, and each state has rules about how to use them. You may choose a combination of any of the following:  Living will:  This is a written record of the treatment you want. You can also choose which treatments you do not want, which to limit, and which to stop at a certain time. This includes surgery, medicine, IV fluid, and tube feedings.   Durable power of  for healthcare (DPAHC):  This is a written record that states who you want to make healthcare choices for you when you are unable to make them for yourself. This person, called a proxy, is usually a family member or a friend. You may choose more than 1 proxy.  Do not resuscitate (DNR) order:  A DNR order is used in case your heart stops beating or you stop breathing. It is a request not to have certain forms of treatment, such as CPR. A DNR order may be included in other types of advance directives.  Medical directive:  This covers the care that you want if you are in a coma, near death, or unable to make decisions for yourself. You can list the treatments you want for each condition. Treatment may include pain medicine, surgery, blood transfusions, dialysis, IV or tube feedings, and a ventilator (breathing machine).  Values history:  This document has questions about your views, beliefs, and how you feel and think about life. This information can help others choose the care that you would choose.  Why are advance directives important?  An advance directive helps you control your care. Although spoken wishes may be used, it is better to have your wishes written down. Spoken wishes can be misunderstood, or not followed. Treatments may be given even if you do not want them. An advance directive may make it easier for your family to make difficult choices about your care.       © Copyright [employer] 2018 Information is for End User's use only and may not be sold,  redistributed or otherwise used for commercial purposes. All illustrations and images included in CareNotes® are the copyrighted property of [email or web address] or [employer]

## 2024-11-22 NOTE — ASSESSMENT & PLAN NOTE
-Status post nephrectomy  -Followed closely by urology  -Due for surveillance CT scan prior to his next visit

## 2024-12-17 ENCOUNTER — OFFICE VISIT (OUTPATIENT)
Dept: NEUROLOGY | Facility: CLINIC | Age: 69
End: 2024-12-17
Payer: COMMERCIAL

## 2024-12-17 VITALS
DIASTOLIC BLOOD PRESSURE: 84 MMHG | OXYGEN SATURATION: 98 % | BODY MASS INDEX: 20.94 KG/M2 | WEIGHT: 133.4 LBS | SYSTOLIC BLOOD PRESSURE: 142 MMHG | HEIGHT: 67 IN | HEART RATE: 65 BPM

## 2024-12-17 DIAGNOSIS — G20.C PARKINSONISM (HCC): Primary | ICD-10-CM

## 2024-12-17 DIAGNOSIS — G20.A1 PARKINSON'S DISEASE WITHOUT DYSKINESIA, UNSPECIFIED WHETHER MANIFESTATIONS FLUCTUATE (HCC): ICD-10-CM

## 2024-12-17 PROCEDURE — G2211 COMPLEX E/M VISIT ADD ON: HCPCS | Performed by: PSYCHIATRY & NEUROLOGY

## 2024-12-17 PROCEDURE — 99214 OFFICE O/P EST MOD 30 MIN: CPT | Performed by: PSYCHIATRY & NEUROLOGY

## 2024-12-17 RX ORDER — CARBIDOPA AND LEVODOPA 25; 100 MG/1; MG/1
1 TABLET ORAL 3 TIMES DAILY
Qty: 90 TABLET | Refills: 5 | Status: SHIPPED | OUTPATIENT
Start: 2024-12-17

## 2024-12-17 NOTE — ASSESSMENT & PLAN NOTE
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  Last office visit was on 8/20/2024.    On exam today, his symptoms seem to be better objectively compared to my last exam.    Plan  -Case discussed with Dr. Mcnally  -Recommend patient to try taking Sinemet  tablet 3 times daily if he can tolerate it  -Recommend to take magnesium oxide 400 mg daily to help with bowel movement  Continue taking MiraLAX daily  -Will get nuclear medicine consult for XUAN scan  -Referral to PT/OT for parkinsonism symptoms    Follow-up with me in about 6 months    Orders:    NM brain xuan scan w/Rx; Future    NM consultation for xuan scan; Future    Ambulatory Referral to Physical Therapy; Future    Ambulatory Referral to Occupational Therapy; Future

## 2024-12-17 NOTE — PATIENT INSTRUCTIONS
Recommend taking Sinemet 1 tablet 3 times daily    Can get over the counter Magnesium Oxide 400mg daily    Recommend daily bowel movement if possible    Will get Abdoul Scan for Parkinsonism features    PT referral     Follow up with me josé miguel chavez 6 months

## 2024-12-17 NOTE — PROGRESS NOTES
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  Last office visit was on 2024.    On exam today, his symptoms seem to be better objectively compared to my last exam.    Plan  -Case discussed with Dr. Mcnally  -Recommend patient to try taking Sinemet  tablet 3 times daily if he can tolerate it  -Recommend to take magnesium oxide 400 mg daily to help with bowel movement  Continue taking MiraLAX daily  -Will get nuclear medicine consult for JULIO scan  -Referral to PT/OT for parkinsonism symptoms    Follow-up with me in about 6 months    Orders:    NM brain julio scan w/Rx; Future    NM consultation for julio scan; Future    Ambulatory Referral to Physical Therapy; Future    Ambulatory Referral to Occupational Therapy; Future    Parkinson's disease without dyskinesia, unspecified whether manifestations fluctuate (HCC)    Orders:    carbidopa-levodopa (Sinemet)  mg per tablet; Take 1 tablet by mouth 3 (three) times a day      Patient Instructions   Recommend taking Sinemet 1 tablet 3 times daily    Can get over the counter Magnesium Oxide 400mg daily    Recommend daily bowel movement if possible    Will get Julio Scan for Parkinsonism features    PT referral     Follow up with me josé miguel bout 6 months     History of Present Illness   HPI    69-year-old male with renal cell carcinoma s/p right nephrectomy on 2023 and 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  Last office visit was on 2024.     Today, patient reports that he has a softer  voice. His right hand is having resting tremor. He continues to have constipation.  Patient has some depression/anxiety especially during the evening time. However he does not want to get treatment for that at this time. No REM sleep behavior disorder. No falls.  He is taking Sinemet  mg 1 tablet twice daily due to concerns for lightheadedness/dizziness.    Brief history:  On April 10, 2023, patient was in the chair for a root canal for about 2 hours resulting in right shoulder pain. A day or 2 later, he started noticing the weakness of his right arm associated with pain.  He went to the hospital on April 14, 2023.  Extensive imaging including MRI brain, MRI C-spine, and MRI brachial plexus showed no acute abnormalities. He had EMG 1 upper limb and 1 lower limb twice.  The most recent one on 6/26/2023, EMG showed chronic pan brachial plexopathy with ongoing denervation and signs of early reinnervation.  Patient has been working with physical therapy.  Due to parkinsonism features of shuffling gait, reduced arm swings, bradykinesia, constipation and hypophonia at night, he was started on Sinemet  mg, but patient was only taking 1 tablet in the evening initially then 1 tablet twice daily due to lightheadedness/dizziness.      Review of Systems   Constitutional:  Negative for chills and fever.   HENT:  Negative for ear pain and sore throat.    Eyes:  Negative for pain and visual disturbance.   Respiratory:  Negative for cough and shortness of breath.    Cardiovascular:  Negative for chest pain and palpitations.   Gastrointestinal:  Negative for abdominal pain and vomiting.   Genitourinary:  Negative for dysuria and hematuria.   Musculoskeletal:  Negative for arthralgias and back pain.   Skin:  Negative for color change and rash.   Neurological:  Positive for tremors. Negative for seizures, syncope, weakness and headaches.   All other systems reviewed and are negative.   I have personally reviewed the MA's  review of systems and made changes as necessary.    Current Outpatient Medications on File Prior to Visit   Medication Sig Dispense Refill    acetaminophen (TYLENOL) 325 mg tablet Take 2 tablets (650 mg total) by mouth every 6 (six) hours as needed for mild pain  0    amLODIPine (NORVASC) 5 mg tablet Take 1 tablet (5 mg total) by mouth 2 (two) times a day 180 tablet 1    busPIRone (BUSPAR) 7.5 mg tablet Take 1 tablet (7.5 mg total) by mouth 2 (two) times a day as needed for anxiety 60 tablet 0    carvedilol (Coreg) 3.125 mg tablet Take 1 tablet (3.125 mg total) by mouth 2 (two) times a day with meals 180 tablet 1    cycloSPORINE (RESTASIS) 0.05 % ophthalmic emulsion Administer 1 drop to both eyes 2 (two) times a day      Docosahexaenoic Acid (DHA OMEGA 3) 100 MG CAPS Take 4 capsules by mouth daily      docusate sodium (COLACE) 100 mg capsule Take 1 capsule (100 mg total) by mouth 2 (two) times a day 60 capsule 2    fexofenadine (ALLEGRA) 180 MG tablet Take 1 tablet by mouth daily as needed      gabapentin (Neurontin) 100 mg capsule Take 1 capsule (100 mg total) by mouth daily at bedtime 100 capsule 1    lidocaine 0.5 % topical gel Apply 1 Application topically as needed (pain). Indications: pain      Multiple Vitamin tablet Take 1 tablet by mouth daily      polyethylene glycol (GLYCOLAX) 17 GM/SCOOP powder Take 17 g by mouth as needed (constipation)      senna (SENOKOT) 8.6 mg Take 1 tablet (8.6 mg total) by mouth daily as needed for constipation 30 tablet 0    [DISCONTINUED] carbidopa-levodopa (Sinemet)  mg per tablet Take 1 tablet by mouth 3 (three) times a day (Patient taking differently: Take 1 tablet by mouth 2 (two) times a day) 90 tablet 3    tamsulosin (FLOMAX) 0.4 mg Take 1 capsule (0.4 mg total) by mouth daily with dinner (Patient not taking: Reported on 12/17/2024) 90 capsule 3     No current facility-administered medications on file prior to visit.         Objective   /84 (BP Location: Left  "arm, Patient Position: Sitting, Cuff Size: Standard)   Pulse 65   Ht 5' 7\" (1.702 m)   Wt 60.5 kg (133 lb 6.4 oz)   SpO2 98%   BMI 20.89 kg/m²     Physical Exam  Constitutional:       General: He is not in acute distress.  HENT:      Head: Normocephalic and atraumatic.      Nose: Nose normal.      Mouth/Throat:      Mouth: Mucous membranes are moist.      Pharynx: Oropharynx is clear. No oropharyngeal exudate or posterior oropharyngeal erythema.   Eyes:      General: Lids are normal.      Extraocular Movements: Extraocular movements intact.      Pupils: Pupils are equal, round, and reactive to light.   Cardiovascular:      Rate and Rhythm: Normal rate.      Pulses: Normal pulses.   Pulmonary:      Effort: Pulmonary effort is normal.   Musculoskeletal:      Cervical back: Normal range of motion.   Skin:     General: Skin is warm and dry.   Neurological:      Mental Status: He is alert.      Motor: Motor strength is normal.     Coordination: Romberg sign negative.      Deep Tendon Reflexes:      Reflex Scores:       Tricep reflexes are 2+ on the right side and 2+ on the left side.       Bicep reflexes are 2+ on the right side and 2+ on the left side.       Brachioradialis reflexes are 2+ on the right side and 2+ on the left side.       Patellar reflexes are 3+ on the right side and 3+ on the left side.       Achilles reflexes are 2+ on the right side and 2+ on the left side.  Psychiatric:         Mood and Affect: Mood normal.         Speech: Speech normal.       Neurological Exam  Mental Status  Alert. Oriented to person, place and time. Speech is normal. Speech: Hypophonic in the evening per patient. Language is fluent with no aphasia.    Cranial Nerves  CN II: Visual acuity is normal. Visual fields full to confrontation. Right funduscopic exam: not visualized. Left funduscopic exam: not visualized.  CN III, IV, VI: Extraocular movements intact bilaterally. Normal lids and orbits bilaterally. Pupils equal round " and reactive to light bilaterally.  CN V: Facial sensation is normal.  CN VII: Full and symmetric facial movement.  CN VIII: Hearing is normal.  CN IX, X: Palate elevates symmetrically  CN XI: Shoulder shrug strength is normal.  CN XII: Tongue midline without atrophy or fasciculations.    Motor  Normal muscle bulk throughout. No fasciculations present. Normal muscle tone. The following abnormal movements were seen: Last Sinemet dose was at 9am. Now it is 3:00pm  Right hand resting tremor, bradykinesia especially finger tapping, right leg toe tapping. Cogwheel rigidity is not really noted now.  Right wrist is a bit rigid however, patient had injury in surgery in that wrist.   Strength is 5/5 throughout all four extremities.    Sensory  Light touch is normal in upper and lower extremities.     Reflexes                                            Right                      Left  Brachioradialis                    2+                         2+  Biceps                                 2+                         2+  Triceps                                2+                         2+  Patellar                                3+                         3+  Achilles                                2+                         2+    Coordination  Right: Finger-to-nose normal.Left: Finger-to-nose normal.    Gait  Casual gait is normal including stance, stride, and arm swing. Romberg is absent.      Radiology Results Review : No pertinent imaging studies reviewed.

## 2024-12-17 NOTE — ASSESSMENT & PLAN NOTE
Orders:    carbidopa-levodopa (Sinemet)  mg per tablet; Take 1 tablet by mouth 3 (three) times a day

## 2024-12-29 ENCOUNTER — OFFICE VISIT (OUTPATIENT)
Dept: URGENT CARE | Facility: MEDICAL CENTER | Age: 69
End: 2024-12-29
Payer: COMMERCIAL

## 2024-12-29 VITALS
HEIGHT: 67 IN | RESPIRATION RATE: 20 BRPM | BODY MASS INDEX: 20.88 KG/M2 | TEMPERATURE: 98 F | DIASTOLIC BLOOD PRESSURE: 88 MMHG | HEART RATE: 60 BPM | WEIGHT: 133 LBS | SYSTOLIC BLOOD PRESSURE: 138 MMHG | OXYGEN SATURATION: 98 %

## 2024-12-29 DIAGNOSIS — L08.9 INFECTION OF GREAT TOE: Primary | ICD-10-CM

## 2024-12-29 PROCEDURE — 99213 OFFICE O/P EST LOW 20 MIN: CPT | Performed by: PHYSICIAN ASSISTANT

## 2024-12-29 NOTE — PATIENT INSTRUCTIONS
Called PT regarding message below. Mailbox full. Unable to leave message. If PT calls back please schedule PT.       ----- Message from Berny Boyer sent at 3/22/2022  9:46 AM EDT -----  Subject: Appointment Request    Reason for Call: Routine (Patient Request) No Script    QUESTIONS  Type of Appointment? Established Patient  Reason for appointment request? Available appointments did not meet   patient need  Additional Information for Provider? Patient needs and appointment for her   ear lobe, for any day next week while on spring break, please call back  ---------------------------------------------------------------------------  --------------  [address]  What is the best way for the office to contact you? OK to leave message on   voicemail  Preferred Call Back Phone Number? [phone]  ---------------------------------------------------------------------------  --------------  SCRIPT ANSWERS  Relationship to Patient? Self  (Is the patient requesting to see the provider for a procedure?)? No  (Is the patient requesting to see the provider urgently - today or   tomorrow. )? No  Have you been diagnosed with, awaiting test results for, or told that you   are suspected of having COVID-19 (Coronavirus)? (If patient has tested   negative or was tested as a requirement for work, school, or travel and   not based on symptoms, answer no)? No  Within the past 10 days have you developed any of the following symptoms   (answer no if symptoms have been present longer than 10 days or began   more than 10 days ago)? Fever or Chills, Cough, Shortness of breath or   difficulty breathing, Loss of taste or smell, Sore throat, Nasal   congestion, Sneezing or runny nose, Fatigue or generalized body aches   (answer no if pain is specific to a body part e.g. back pain), Diarrhea,   Headache? No  Have you had close contact with someone with COVID-19 in the last 7 days?    No  (Service Expert - click yes below to proceed with Iva Chris Warm water and Epsom salt soaks or warm compresses frequently    Take probiotics daily while taking the antibiotics    Follow-up with podiatry    Go to the emergency room if your symptoms are worsening   Business As Usual   Scheduling)?  Yes

## 2024-12-30 NOTE — PROGRESS NOTES
[facility]        NAME: Oral Mendoza is a 69 y.o. male  : 1955    MRN: [id number]  DATE: 2024  TIME: 11:21 AM    Assessment and Plan   Infection of great toe [L08.9]  1. Infection of great toe  amoxicillin-clavulanate (AUGMENTIN) 875-125 mg per tablet            Patient Instructions       Follow up with PCP in 3-5 days.  Proceed to  ER if symptoms worsen.    If tests have been performed at [facility], our office will contact you with results if changes need to be made to the care plan discussed with you at the visit.  You can review your full results on [facility].    Chief Complaint     Chief Complaint   Patient presents with   • Toe Pain     Patient states he has right greater toe pain; states he stubs it frequently due to parkinson's; right greater toe is red, painful, and oozing          History of Present Illness       Patient here for evaluation of redness and swelling of his right great toe.  Patient states he does stub his toe a lot due to the Parkinson's.  He states over the last couple days has gotten more red and swollen and is now oozing some pus from the wound.    Toe Pain       Review of Systems   Review of Systems   Constitutional: Negative.    Musculoskeletal:  Positive for arthralgias.   Skin:  Positive for color change. Negative for pallor and rash.       Current Medications       Current Outpatient Medications:   •  amoxicillin-clavulanate (AUGMENTIN) 875-125 mg per tablet, Take 1 tablet by mouth every 12 (twelve) hours for 10 days, Disp: 20 tablet, Rfl: 0  •  acetaminophen (TYLENOL) 325 mg tablet, Take 2 tablets (650 mg total) by mouth every 6 (six) hours as needed for mild pain, Disp: , Rfl: 0  •  amLODIPine (NORVASC) 5 mg tablet, Take 1 tablet (5 mg total) by mouth 2 (two) times a day, Disp: 180 tablet, Rfl: 1  •  busPIRone (BUSPAR) 7.5 mg tablet, Take 1 tablet (7.5 mg total) by mouth 2 (two) times a day as needed for anxiety, Disp: 60 tablet, Rfl:  0  •  carbidopa-levodopa (Sinemet)  mg per tablet, Take 1 tablet by mouth 3 (three) times a day, Disp: 90 tablet, Rfl: 5  •  carvedilol (Coreg) 3.125 mg tablet, Take 1 tablet (3.125 mg total) by mouth 2 (two) times a day with meals, Disp: 180 tablet, Rfl: 1  •  cycloSPORINE (RESTASIS) 0.05 % ophthalmic emulsion, Administer 1 drop to both eyes 2 (two) times a day, Disp: , Rfl:   •  Docosahexaenoic Acid (DHA OMEGA 3) 100 MG CAPS, Take 4 capsules by mouth daily, Disp: , Rfl:   •  docusate sodium (COLACE) 100 mg capsule, Take 1 capsule (100 mg total) by mouth 2 (two) times a day, Disp: 60 capsule, Rfl: 2  •  fexofenadine (ALLEGRA) 180 MG tablet, Take 1 tablet by mouth daily as needed, Disp: , Rfl:   •  gabapentin (Neurontin) 100 mg capsule, Take 1 capsule (100 mg total) by mouth daily at bedtime, Disp: 100 capsule, Rfl: 1  •  lidocaine 0.5 % topical gel, Apply 1 Application topically as needed (pain). Indications: pain, Disp: , Rfl:   •  Multiple Vitamin tablet, Take 1 tablet by mouth daily, Disp: , Rfl:   •  polyethylene glycol (GLYCOLAX) 17 GM/SCOOP powder, Take 17 g by mouth as needed (constipation), Disp: , Rfl:   •  senna (SENOKOT) 8.6 mg, Take 1 tablet (8.6 mg total) by mouth daily as needed for constipation, Disp: 30 tablet, Rfl: 0  •  tamsulosin (FLOMAX) 0.4 mg, Take 1 capsule (0.4 mg total) by mouth daily with dinner (Patient not taking: Reported on 12/17/2024), Disp: 90 capsule, Rfl: 3    Current Allergies     Allergies as of 12/29/2024   • (No Known Allergies)            The following portions of the patient's history were reviewed and updated as appropriate: allergies, current medications, past family history, past medical history, past social history, past surgical history and problem list.     Past Medical History:   Diagnosis Date   • Acute deep vein thrombosis (DVT) of brachial vein of right upper extremity (HCC) 07/16/2023   • Allergic    • Arthritis    • Asthma    • Cancer (HCC)     kidney   •  "Hypertension    • Impaired fasting glucose    • Mitral valve disorder    • Mitral valve prolapse    • Murmur, cardiac    • 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    • PAC (premature atrial contraction)    • Parsonage-Cordero syndrome    • PVC (premature ventricular contraction)    • PVC (premature ventricular contraction)    • Renal cancer (HCC) 2023   • Thyroid nodule        Past Surgical History:   Procedure Laterality Date   • HAND SURGERY     • SC COLONOSCOPY FLX DX W/COLLJ SPEC WHEN PFRMD N/A 2/9/2018    Procedure: COLONOSCOPY;  Surgeon: Joe Pabon MD;  Location: [facility];  Service: Gastroenterology   • SC LAPAROSCOPY RADICAL NEPHRECTOMY Right 9/20/2023    Procedure: NEPHRECTOMY RADICAL LAPAROSCOPIC W/ ROBOTICS;  Surgeon: Dario Domingo MD;  Location: [facility];  Service: Urology   • PROSTATE BIOPSY     • SHOULDER SURGERY         Family History   Problem Relation Age of Onset   • Diabetes Mother    • Stroke Mother    • Stroke Father    • Heart disease Father    • Diabetes Sister    • Other Family         Stroke syndrome         Medications have been verified.        Objective   /88   Pulse 60   Temp 98 °F (36.7 °C) (Temporal)   Resp 20   Ht 5' 7\" (1.702 m)   Wt 60.3 kg (133 lb)   SpO2 98%   BMI 20.83 kg/m²   No LMP for male patient.       Physical Exam     Physical Exam  Vitals and nursing note reviewed.   Constitutional:       General: He is not in acute distress.     Appearance: Normal appearance. He is well-developed. He is not ill-appearing, toxic-appearing or diaphoretic.   HENT:      Head: Normocephalic and atraumatic.   Eyes:      Extraocular Movements: Extraocular movements intact.      Conjunctiva/sclera: Conjunctivae normal.      Pupils: Pupils are equal, round, and reactive to light.   Musculoskeletal:      Comments: Right great toe with focal erythema, warmth and swelling of the distal toe.  Small amount of purulent drainage from the lateral aspect of the " great toenail.  No evidence of paronychia.   Skin:     General: Skin is warm and dry.   Neurological:      General: No focal deficit present.      Mental Status: He is alert and oriented to person, place, and time.   Psychiatric:         Mood and Affect: Mood normal.         Behavior: Behavior normal.         Thought Content: Thought content normal.         Judgment: Judgment normal.

## 2025-01-13 ENCOUNTER — TELEPHONE (OUTPATIENT)
Age: 70
End: 2025-01-13

## 2025-01-13 NOTE — TELEPHONE ENCOUNTER
Caller: Oral Mendoza    Doctor and/or Office: Dr. Potter/[facility]#: [phone]    Escalation: Appointment [facility]. He was given antibiotics but didn't take it because he was afraid of the side effects. His toe isnt getting any better or worse. Does he need a sooner appt than the one I was able to give him? Please return call. Thank  you

## 2025-01-17 ENCOUNTER — TELEPHONE (OUTPATIENT)
Age: 70
End: 2025-01-17

## 2025-01-17 DIAGNOSIS — L08.9 TOE INFECTION: Primary | ICD-10-CM

## 2025-01-17 DIAGNOSIS — C64.1 RENAL CELL CARCINOMA OF RIGHT KIDNEY (HCC): Primary | ICD-10-CM

## 2025-01-17 NOTE — TELEPHONE ENCOUNTER
The recommendation to start Augmentin was made almost 2 weeks ago.  Please check with the patient to see if his toe still looks infected.

## 2025-01-17 NOTE — TELEPHONE ENCOUNTER
Patient calling post UC visit 12/29/24 for which he was prescribed amoxicillin-clavulanate 875-125 mg tablet BID for great toe infection. Patient is really concerned about potential side effects so has not started the antibiotic. Wondering if ok to take or is there another antibiotic that provider could recommend that is not as strong. Please reach out to patient/wife with provider response.

## 2025-01-20 RX ORDER — CEPHALEXIN 500 MG/1
500 CAPSULE ORAL 4 TIMES DAILY
Qty: 28 CAPSULE | Refills: 0 | Status: SHIPPED | OUTPATIENT
Start: 2025-01-20 | End: 2025-01-27

## 2025-01-20 NOTE — TELEPHONE ENCOUNTER
Toe is about the same per patient and wife. They feel it is an ingrown toe nail and have appt next week, thursday, with podiatrist. They have not started the Augmentin due to side effects. They want to know if a half does can be taken for longer period of time? Please advise, thank you

## 2025-01-30 ENCOUNTER — OFFICE VISIT (OUTPATIENT)
Dept: PODIATRY | Facility: CLINIC | Age: 70
End: 2025-01-30
Payer: COMMERCIAL

## 2025-01-30 VITALS — HEIGHT: 67 IN | BODY MASS INDEX: 20.88 KG/M2 | WEIGHT: 133 LBS

## 2025-01-30 DIAGNOSIS — L60.0 INGROWN TOENAIL: Primary | ICD-10-CM

## 2025-01-30 PROCEDURE — 99213 OFFICE O/P EST LOW 20 MIN: CPT | Performed by: PODIATRIST

## 2025-01-30 NOTE — PATIENT INSTRUCTIONS
POST-OPERATIVE INSTRUCTIONS FOLLOWING NAIL REMOVAL    TODAY  Leave dressing intact. Rest. Take ibuprofen or tylenol as needed. By tomorrow most of the bleeding should stop. Some light bleeding the first 24 hours is normal.  TOMORROW AM  Remove the dressing, if it sticks, get it wet with water  Soak the toe in warm water for 10 minutes, you may add epsom salts if you want but they're not mandatory  Dry toe, apply small amount of neosporin or bacitracin or generic antibiotic ointment to the nail bed and cover with a standard bandaid  TOMORROW PM  Remove the dressing, if it sticks, get it wet with water  Soak the toe in warm water for 10 minutes, you may add epsom salts if you want  Dry toe, apply small amount of neosporin or bacitracin or generic antibiotic ointment to the nail bed and cover with a standard bandaid  NEXT 5 DAYS  Repeat steps 2 and 3.   After 5 days you can stop applying neosporin. Just clean the toe daily with soap and water and cover with a bandaid  Once all drainage stops, you can stop placing bandaid on the toe      You may notice some redness on the side of the nail bed. This is ok. Some minor bleeding or clear-yellowish drainage is normal. If your toe begins to drain thick, white creamy drainage (pus) call immediately. If you notice redness streaking onto the foot and ankle, call our office immediately. Infections are rare after these procedures but we may have to prescribe an antibiotic.

## 2025-01-30 NOTE — PROGRESS NOTES
Name: Oral Mendoza      : 1955      MRN: [id number]  Encounter Provider: Everardo Potter DPM  Encounter Date: 2025   Encounter department: [facility]  :  Assessment & Plan  Ingrown toenail  Slantback trimmed out of courtesy. Use toe spacer, provided today. No cellulitis on exam  POST-OPERATIVE INSTRUCTIONS FOLLOWING NAIL REMOVAL    TODAY  Leave dressing intact. Rest. Take ibuprofen or tylenol as needed. By tomorrow most of the bleeding should stop. Some light bleeding the first 24 hours is normal.  TOMORROW AM  Remove the dressing, if it sticks, get it wet with water  Soak the toe in warm water for 10 minutes, you may add epsom salts if you want but they're not mandatory  Dry toe, apply small amount of neosporin or bacitracin or generic antibiotic ointment to the nail bed and cover with a standard bandaid  TOMORROW PM  Remove the dressing, if it sticks, get it wet with water  Soak the toe in warm water for 10 minutes, you may add epsom salts if you want  Dry toe, apply small amount of neosporin or bacitracin or generic antibiotic ointment to the nail bed and cover with a standard bandaid  NEXT 5 DAYS  Repeat steps 2 and 3.   After 5 days you can stop applying neosporin. Just clean the toe daily with soap and water and cover with a bandaid  Once all drainage stops, you can stop placing bandaid on the toe      You may notice some redness on the side of the nail bed. This is ok. Some minor bleeding or clear-yellowish drainage is normal. If your toe begins to drain thick, white creamy drainage (pus) call immediately. If you notice redness streaking onto the foot and ankle, call our office immediately. Infections are rare after these procedures but we may have to prescribe an antibiotic.              History of Present Illness   HPI  Oral Mendoza is a 69 y.o. male who presents with an infection in his right great toe. His PCP put him on keflex. The nail might  "be ingrown.       Review of Systems  As stated in HPI, otherwise normal    Medical History Reviewed by provider this encounter:  Tobacco  Allergies  Meds  Problems  Med Hx  Surg Hx  Fam Hx           Objective   Ht 5' 7\" (1.702 m)   Wt 60.3 kg (133 lb)   BMI 20.83 kg/m²      Physical Exam  Vitals reviewed.   Cardiovascular:      Pulses:           Dorsalis pedis pulses are 2+ on the right side.        Posterior tibial pulses are 2+ on the right side.   Feet:      Right foot:      Toenail Condition: Right toenails are ingrown.   Neurological:      Mental Status: He is alert.           "

## 2025-02-19 DIAGNOSIS — G54.5 PARSONAGE-TURNER SYNDROME: ICD-10-CM

## 2025-02-20 RX ORDER — GABAPENTIN 100 MG/1
100 CAPSULE ORAL
Qty: 100 CAPSULE | Refills: 1 | Status: SHIPPED | OUTPATIENT
Start: 2025-02-20

## 2025-03-14 ENCOUNTER — APPOINTMENT (OUTPATIENT)
Dept: LAB | Facility: MEDICAL CENTER | Age: 70
End: 2025-03-14
Payer: COMMERCIAL

## 2025-03-14 DIAGNOSIS — C64.1 RENAL CELL CARCINOMA OF RIGHT KIDNEY (HCC): ICD-10-CM

## 2025-03-14 DIAGNOSIS — I10 BENIGN ESSENTIAL HYPERTENSION: ICD-10-CM

## 2025-03-14 LAB
ANION GAP SERPL CALCULATED.3IONS-SCNC: 7 MMOL/L (ref 4–13)
BUN SERPL-MCNC: 17 MG/DL (ref 5–25)
CALCIUM SERPL-MCNC: 9.7 MG/DL (ref 8.4–10.2)
CHLORIDE SERPL-SCNC: 100 MMOL/L (ref 96–108)
CO2 SERPL-SCNC: 31 MMOL/L (ref 21–32)
CREAT SERPL-MCNC: 0.79 MG/DL (ref 0.6–1.3)
GFR SERPL CREATININE-BSD FRML MDRD: 90 ML/MIN/1.73SQ M
GLUCOSE SERPL-MCNC: 93 MG/DL (ref 65–140)
POTASSIUM SERPL-SCNC: 4.4 MMOL/L (ref 3.5–5.3)
SODIUM SERPL-SCNC: 138 MMOL/L (ref 135–147)

## 2025-03-14 PROCEDURE — 80048 BASIC METABOLIC PNL TOTAL CA: CPT

## 2025-03-14 PROCEDURE — 36415 COLL VENOUS BLD VENIPUNCTURE: CPT

## 2025-03-14 RX ORDER — AMLODIPINE BESYLATE 5 MG/1
5 TABLET ORAL 2 TIMES DAILY
Qty: 180 TABLET | Refills: 1 | Status: SHIPPED | OUTPATIENT
Start: 2025-03-14

## 2025-03-20 ENCOUNTER — HOSPITAL ENCOUNTER (OUTPATIENT)
Dept: RADIOLOGY | Facility: MEDICAL CENTER | Age: 70
Discharge: HOME | End: 2025-03-20
Payer: COMMERCIAL

## 2025-03-20 DIAGNOSIS — C64.1 RENAL CELL CARCINOMA OF RIGHT KIDNEY (HCC): ICD-10-CM

## 2025-03-20 PROCEDURE — 74176 CT ABD & PELVIS W/O CONTRAST: CPT

## 2025-03-20 PROCEDURE — 71250 CT THORAX DX C-: CPT

## 2025-04-01 ENCOUNTER — OFFICE VISIT (OUTPATIENT)
Dept: INTERNAL MEDICINE CLINIC | Facility: CLINIC | Age: 70
End: 2025-04-01
Payer: COMMERCIAL

## 2025-04-01 VITALS
HEART RATE: 59 BPM | TEMPERATURE: 96.9 F | SYSTOLIC BLOOD PRESSURE: 138 MMHG | OXYGEN SATURATION: 98 % | HEIGHT: 67 IN | BODY MASS INDEX: 20.88 KG/M2 | WEIGHT: 133 LBS | DIASTOLIC BLOOD PRESSURE: 60 MMHG

## 2025-04-01 DIAGNOSIS — I10 BENIGN ESSENTIAL HYPERTENSION: Primary | ICD-10-CM

## 2025-04-01 DIAGNOSIS — L60.0 INGROWN TOENAIL WITHOUT INFECTION: ICD-10-CM

## 2025-04-01 DIAGNOSIS — K59.00 CONSTIPATION, UNSPECIFIED CONSTIPATION TYPE: ICD-10-CM

## 2025-04-01 DIAGNOSIS — C64.1 MALIGNANT NEOPLASM OF RIGHT KIDNEY (HCC): ICD-10-CM

## 2025-04-01 DIAGNOSIS — G20.A1 PARKINSON'S DISEASE, UNSPECIFIED WHETHER DYSKINESIA PRESENT, UNSPECIFIED WHETHER MANIFESTATIONS FLUCTUATE (HCC): ICD-10-CM

## 2025-04-01 PROCEDURE — 99214 OFFICE O/P EST MOD 30 MIN: CPT

## 2025-04-01 RX ORDER — SENNA AND DOCUSATE SODIUM 50; 8.6 MG/1; MG/1
1 TABLET, FILM COATED ORAL DAILY
Qty: 7 TABLET | Refills: 0 | Status: CANCELLED | OUTPATIENT
Start: 2025-04-01

## 2025-04-01 NOTE — PROGRESS NOTES
Name: Oral Mendoza      : 1955      MRN: [id number]  Encounter Provider: Pascale Gould MD  Encounter Date: 2025   Encounter department: [facility]  :  Assessment & Plan  Benign essential hypertension  Blood pressure well-controlled  Continue current antihypertensive regimen       Parkinson's disease, unspecified whether dyskinesia present, unspecified whether manifestations fluctuate (HCC)  Currently on Sinemet as prescribed by neurology.   Patient's next scheduled follow-up with in .  Patient advised to keep this appointment.       Constipation, unspecified constipation type  Patient still endorsing issues with constipation.  Last bowel movement was this morning however states it was small, hard, and he does not feel completely evacuated.  Recent CT scan 3/20 reviewed and patient has significant stool burden with stool in the rectum.  Patient was advised to   Ensure adequate hydration  May try over-the-counter Senokot 8.6 mg with Colace 50 mg twice a day as needed in addition to the MiraLAX  Also recommended glycerin suppository given significant stool burden.  May trial bisacodyl suppository if the glycerin is successful.  May also try magnesium oxide 400 mg daily and increase to 400 mg twice daily if needed       Ingrown toenail without infection  Patient seen by podiatry 2025 for ingrown toenail to right great toe.  Patient states ingrown toenail has reoccurred and the area is sore.  Has been trying Epsom salt soak with some relief.  Patient advised to continue soaking his foot and to follow-up with podiatry to have ingrown toenail trimmed.       Malignant neoplasm of right kidney (HCC)  Status post nephrectomy  Followed closely by urology  3/20 S/p surveillance CT CAP: No findings of malignancy in the chest, abdomen or pelvis.                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  Today patient reports frustration with chronic issues.  He feels his right upper extremity tremor has increased and that his balance is not as good as it was when he was engaging in physical therapy and walking outside when it was warmer.  He also endorses issues with a dry mouth as well as constipation.  Patient states he intermittently uses MiraLAX, Dulcolax, Colace.  His last bowel movement was this morning he states it was the size of a golf ball and hard.  He feels he is not completely evacuated.  Patient also reports right great toe soreness that he is attributing to ingrown toenail for which he has been soaking his foot in Epsom salt with some relief.    Review of Systems   Gastrointestinal:  Positive for constipation. Negative for abdominal distention and abdominal pain.   Musculoskeletal:         Right great toe pain and redness.   Skin:  Positive for color change.   Neurological:  Positive for tremors and weakness.   All other systems reviewed and are negative.      Objective   /60 (BP Location: Left arm, Patient Position: Sitting, Cuff Size: Standard)   Pulse 59   Temp (!) 96.9 °F (36.1 °C) (Tympanic)   Ht 5' 7\" (1.702 m)   Wt 60.3 kg (133 lb)   SpO2 98%   BMI 20.83 kg/m²      Physical Exam  Vitals and nursing note reviewed.   Constitutional:       Appearance: Normal appearance.   HENT:      Head: Normocephalic and atraumatic.      Mouth/Throat:      Mouth: Mucous membranes are moist.   Eyes:      Extraocular Movements: Extraocular movements intact.      Conjunctiva/sclera: Conjunctivae normal.      Pupils: Pupils are equal, round, and reactive to light.   Cardiovascular:      Rate and Rhythm: Normal rate and regular rhythm.   Pulmonary:      Effort: Pulmonary effort is normal. No respiratory distress.      Breath sounds: Normal breath sounds.   Abdominal:      General: Bowel sounds are normal. There is no distension. "      Palpations: Abdomen is soft.      Tenderness: There is no abdominal tenderness. There is no guarding.   Skin:     General: Skin is warm and dry.   Neurological:      General: No focal deficit present.      Mental Status: He is alert. Mental status is at baseline.      Motor: Tremor present.      Comments: Patient has a stooped posture   Psychiatric:         Mood and Affect: Mood normal.

## 2025-04-01 NOTE — ASSESSMENT & PLAN NOTE
Patient still endorsing issues with constipation.  Last bowel movement was this morning however states it was small, hard, and he does not feel completely evacuated.  Recent CT scan 3/20 reviewed and patient has significant stool burden with stool in the rectum.  Patient was advised to   Ensure adequate hydration  May try over-the-counter Senokot 8.6 mg with Colace 50 mg twice a day as needed in addition to the MiraLAX  Also recommended glycerin suppository given significant stool burden.  May trial bisacodyl suppository if the glycerin is successful.  May also try magnesium oxide 400 mg daily and increase to 400 mg twice daily if needed

## 2025-04-01 NOTE — ASSESSMENT & PLAN NOTE
Currently on Sinemet as prescribed by neurology.   Patient's next scheduled follow-up with in June.  Patient advised to keep this appointment.

## 2025-04-01 NOTE — ASSESSMENT & PLAN NOTE
Status post nephrectomy  Followed closely by urology  3/20 S/p surveillance CT CAP: No findings of malignancy in the chest, abdomen or pelvis.

## 2025-04-01 NOTE — PATIENT INSTRUCTIONS
For your dry mouth you can use Mouth Kote or Xylimelts   Also ensure you are drinking an adequate amount of water,   For your constipation you can take Senokot 8.6 mg with Colace 50 mg twice a day as needed  For constipation you can also try a glycerin suppository.    If the glycerin suppository is unsuccessful you can then try a bisacodyl suppository.  In addition you can try magnesium oxide 400 mg daily and increase to 400 mg twice daily if needed.  For your ingrown toenail you may continue to soak the foot in Epsom salt. Please follow up with your podiatrist to have you ingrown toenail addressed.

## 2025-04-03 ENCOUNTER — TELEPHONE (OUTPATIENT)
Age: 70
End: 2025-04-03

## 2025-04-03 DIAGNOSIS — L03.039 PARONYCHIA OF GREAT TOE: Primary | ICD-10-CM

## 2025-04-03 RX ORDER — CEPHALEXIN 500 MG/1
500 CAPSULE ORAL EVERY 6 HOURS SCHEDULED
Qty: 28 CAPSULE | Refills: 0 | Status: SHIPPED | OUTPATIENT
Start: 2025-04-03 | End: 2025-04-10

## 2025-04-03 NOTE — TELEPHONE ENCOUNTER
I spoke with the pt and advised them to call their PCP and see if they can be seen or sent in an abx until he sees podiatry, he was agreeable but still wanted me to ask if he can be forced on to you schedule? He stated the toe is red and draining.

## 2025-04-03 NOTE — TELEPHONE ENCOUNTER
A prescription for Keflex has been sent.  Please encourage patient to continue to perform warm water foot soaks with Epsom salt 3 times a day.

## 2025-04-03 NOTE — TELEPHONE ENCOUNTER
Pt was seen [facility] and told to go to podiatry for an infected toe nail. Pt could not get in until 4/18 and they told him to call his pcp to see if he can get an abx. Pt uses [facility] [address]. Please, advise.

## 2025-04-03 NOTE — TELEPHONE ENCOUNTER
Called patient and he advised me that his toe now has redness and discharge. The patient's wife said she will send a photo through [employer] and is hoping  can prescribe an antibiotic or something for it. Patient also advised me that it hurts more now.

## 2025-04-03 NOTE — TELEPHONE ENCOUNTER
Caller: Oral Mendoza    Doctor: Dr. Potter    Reason for call: Ramu right great toe has an infected ingrown nail.  Can he be forced on?  Thank you.     Call back#: [phone]

## 2025-04-03 NOTE — TELEPHONE ENCOUNTER
The patient's toe was not infected when I evaluated him with my resident.  There was no redness, warmth to touch or drainage at the site.  We discussed with him that it looked like his toenail was ingrown and that he should follow-up with podiatry.  If his clinical picture has changed please notify me.

## 2025-04-10 DIAGNOSIS — F41.9 ANXIETY: ICD-10-CM

## 2025-04-10 DIAGNOSIS — I10 BENIGN ESSENTIAL HYPERTENSION: ICD-10-CM

## 2025-04-11 RX ORDER — CARVEDILOL 3.12 MG/1
3.12 TABLET ORAL 2 TIMES DAILY WITH MEALS
Qty: 180 TABLET | Refills: 1 | Status: SHIPPED | OUTPATIENT
Start: 2025-04-11

## 2025-04-11 RX ORDER — BUSPIRONE HYDROCHLORIDE 7.5 MG/1
7.5 TABLET ORAL 2 TIMES DAILY
Qty: 60 TABLET | Refills: 0 | Status: SHIPPED | OUTPATIENT
Start: 2025-04-11

## 2025-04-15 ENCOUNTER — RA CDI HCC (OUTPATIENT)
Dept: OTHER | Facility: HOSPITAL | Age: 70
End: 2025-04-15

## 2025-04-15 NOTE — PROGRESS NOTES
HCC coding opportunities          Chart Reviewed number of suggestions sent to Provider: 2   G81.91 Please review Neuro visit 5/23 as dx R29.898 used ( right side weakness)   I47.20 PLease review visit 10/9/23     [facility] audit   Patients Insurance     Medicare Insurance: [employer]vantage

## 2025-04-22 ENCOUNTER — HOSPITAL ENCOUNTER (OUTPATIENT)
Dept: RADIOLOGY | Facility: HOSPITAL | Age: 70
Discharge: HOME | End: 2025-04-22

## 2025-04-22 DIAGNOSIS — G20.C PARKINSONISM (HCC): ICD-10-CM

## 2025-04-24 ENCOUNTER — OFFICE VISIT (OUTPATIENT)
Dept: PODIATRY | Facility: CLINIC | Age: 70
End: 2025-04-24
Payer: COMMERCIAL

## 2025-04-24 VITALS — WEIGHT: 133 LBS | HEIGHT: 67 IN | BODY MASS INDEX: 20.88 KG/M2

## 2025-04-24 DIAGNOSIS — L60.0 INGROWN TOENAIL: Primary | ICD-10-CM

## 2025-04-24 PROCEDURE — 11750 EXCISION NAIL&NAIL MATRIX: CPT | Performed by: PODIATRIST

## 2025-04-24 PROCEDURE — 99213 OFFICE O/P EST LOW 20 MIN: CPT | Performed by: PODIATRIST

## 2025-04-24 RX ORDER — LIDOCAINE HYDROCHLORIDE 10 MG/ML
5 INJECTION, SOLUTION INFILTRATION; PERINEURAL ONCE
Status: COMPLETED | OUTPATIENT
Start: 2025-04-24 | End: 2025-04-24

## 2025-04-24 RX ORDER — CEFADROXIL 500 MG/1
500 CAPSULE ORAL EVERY 12 HOURS SCHEDULED
Qty: 10 CAPSULE | Refills: 0 | Status: SHIPPED | OUTPATIENT
Start: 2025-04-24 | End: 2025-04-29

## 2025-04-24 RX ADMIN — LIDOCAINE HYDROCHLORIDE 5 ML: 10 INJECTION, SOLUTION INFILTRATION; PERINEURAL at 10:11

## 2025-04-24 NOTE — PROGRESS NOTES
"Name: Oral Mendoza      : 1955      MRN: [id number]  Encounter Provider: Everardo Potter DPM  Encounter Date: 2025   Encounter department: [facility]  :  Assessment & Plan  Ingrown toenail  Medial and lateral right hallux ingrown nail borders removed (see procedure documentation below).  Duricef ordered for a 5 day course. To be taken only if purulence is noticed at home.  Home care instructions given.  Orders:  •  lidocaine (XYLOCAINE) 1 % injection 5 mL  •  Nail removal  •  cefadroxil (DURICEF) 500 mg capsule; Take 1 capsule (500 mg total) by mouth every 12 (twelve) hours for 5 days    Nail removal    Date/Time: 2025 9:15 AM    Performed by: Everardo Potter DPM  Authorized by: Everardo Potter DPM    Patient location:  ClinicUniversal Protocol:  procedure performed by consultantConsent: Verbal consent obtained.  Risks and benefits: risks, benefits and alternatives were discussed  Consent given by: patient  Time out: Immediately prior to procedure a \"time out\" was called to verify the correct patient, procedure, equipment, support staff and site/side marked as required.  Timeout called at: 2025 9:41 AM.  Patient understanding: patient states understanding of the procedure being performed    Location:     Foot:  R big toe  Pre-procedure details:     Skin preparation:  Betadine  Anesthesia (see MAR for exact dosages):     Anesthesia method:  Local infiltration    Local anesthetic:  Lidocaine 1% w/o epi  Nail Removal:     Nail removed:  Partial    Nail removed location: Medial and lateral.    Nail bed sutured: no    Ingrown nail:     Wedge excision of skin: no      Nail matrix removed or ablated:  Partial  Post-procedure details:     Dressing:  4x4 sterile gauze, antibiotic ointment and gauze roll    Patient tolerance of procedure:  Tolerated well, no immediate complications        History of Present Illness   HPI  Oral Mendoza is a 69 " "y.o. male who presents with a complaint of right great toenail pain. The pain had resolved, but has returned again for the past few months. He reports he has been soaking the right foot in epsom salt baths.  History obtained from: patient    Review of Systems  As stated in HPI, otherwise normal    Medical History Reviewed by provider this encounter:  Tobacco  Allergies  Meds  Problems  Med Hx  Surg Hx  Fam Hx         Objective   Ht 5' 7\" (1.702 m)   Wt 60.3 kg (133 lb)   BMI 20.83 kg/m²      Physical Exam  Vitals reviewed.   Constitutional:       General: He is not in acute distress.  Cardiovascular:      Rate and Rhythm: Normal rate.      Pulses: Normal pulses.           Dorsalis pedis pulses are 2+ on the right side and 2+ on the left side.        Posterior tibial pulses are 2+ on the right side and 2+ on the left side.   Pulmonary:      Effort: Pulmonary effort is normal. No respiratory distress.   Musculoskeletal:         General: No deformity.      Right foot: No deformity.      Left foot: No deformity.   Feet:      Right foot:      Skin integrity: Erythema (erythema to the medial and lateral nail border of the right hallux) and warmth present. No skin breakdown or callus.      Toenail Condition: Right toenails are ingrown.      Comments: There is no purulence noted from right hallux nail.  Skin:     Capillary Refill: Capillary refill takes less than 2 seconds.   Neurological:      Mental Status: He is alert and oriented to person, place, and time.      Sensory: No sensory deficit.      Gait: Gait normal.           "

## 2025-04-24 NOTE — PATIENT INSTRUCTIONS
TODAY  Leave dressing intact. Rest. Take ibuprofen or tylenol as needed. By tomorrow most of the bleeding should stop. Some light bleeding the first 24 hours is normal.   TOMORROW AM  Remove the dressing, if it sticks, get it wet with water  Soak the toe in warm water for 10 minutes, you may add epsom salts if you want but they're not mandatory  Dry toe, apply small amount of neosporin or bacitracin or generic antibiotic ointment to the nail bed and cover with a standard bandaid  TOMORROW PM  Remove the dressing, if it sticks, get it wet with water  Soak the toe in warm water for 10 minutes, you may add epsom salts if you want  Dry toe, apply small amount of neosporin or bacitracin or generic antibiotic ointment to the nail bed and cover with a standard bandaid  NEXT 3 weeks  Repeat steps 2 and 3.   After 3 weeks you can stop applying antibiotic ointment. Just clean the toe daily with soap and water and cover with a bandaid  Once all drainage stops, you can stop placing bandaid on the toe      You may notice some redness on the side of the nail bed. This is ok. Some minor bleeding or clear-yellowish drainage is normal. If your toe begins to drain thick, white, creamy drainage (pus) call immediately. If you notice redness streaking onto the foot and ankle, call our office immediately. Infections are rare after these procedures but we may have to prescribe an antibiotic.

## 2025-05-23 ENCOUNTER — OFFICE VISIT (OUTPATIENT)
Dept: URGENT CARE | Facility: CLINIC | Age: 70
End: 2025-05-23
Payer: COMMERCIAL

## 2025-05-23 ENCOUNTER — APPOINTMENT (EMERGENCY)
Dept: CT IMAGING | Facility: HOSPITAL | Age: 70
DRG: 438 | End: 2025-05-23
Payer: COMMERCIAL

## 2025-05-23 ENCOUNTER — APPOINTMENT (EMERGENCY)
Dept: ULTRASOUND IMAGING | Facility: HOSPITAL | Age: 70
DRG: 438 | End: 2025-05-23
Payer: COMMERCIAL

## 2025-05-23 ENCOUNTER — HOSPITAL ENCOUNTER (INPATIENT)
Facility: HOSPITAL | Age: 70
LOS: 1 days | Discharge: HOME | DRG: 438 | End: 2025-05-25
Attending: EMERGENCY MEDICINE | Admitting: SURGERY
Payer: COMMERCIAL

## 2025-05-23 VITALS
TEMPERATURE: 97.5 F | DIASTOLIC BLOOD PRESSURE: 80 MMHG | WEIGHT: 133 LBS | SYSTOLIC BLOOD PRESSURE: 130 MMHG | BODY MASS INDEX: 20.88 KG/M2 | RESPIRATION RATE: 16 BRPM | HEIGHT: 67 IN | HEART RATE: 61 BPM | OXYGEN SATURATION: 99 %

## 2025-05-23 DIAGNOSIS — R10.11 RUQ PAIN: Primary | ICD-10-CM

## 2025-05-23 DIAGNOSIS — R10.9 ABDOMINAL PAIN: Primary | ICD-10-CM

## 2025-05-23 DIAGNOSIS — K85.90 PANCREATITIS: ICD-10-CM

## 2025-05-23 DIAGNOSIS — K86.2 PANCREATIC CYST: ICD-10-CM

## 2025-05-23 DIAGNOSIS — K59.09 OTHER CONSTIPATION: ICD-10-CM

## 2025-05-23 LAB
ALBUMIN SERPL BCG-MCNC: 4.4 G/DL (ref 3.5–5)
ALP SERPL-CCNC: 66 U/L (ref 34–104)
ALT SERPL W P-5'-P-CCNC: 75 U/L (ref 7–52)
ANION GAP SERPL CALCULATED.3IONS-SCNC: 6 MMOL/L (ref 4–13)
AST SERPL W P-5'-P-CCNC: 107 U/L (ref 13–39)
BASOPHILS # BLD AUTO: 0.04 THOUSANDS/ÂΜL (ref 0–0.1)
BASOPHILS NFR BLD AUTO: 0 % (ref 0–1)
BILIRUB SERPL-MCNC: 1.41 MG/DL (ref 0.2–1)
BUN SERPL-MCNC: 18 MG/DL (ref 5–25)
CALCIUM SERPL-MCNC: 9.1 MG/DL (ref 8.4–10.2)
CHLORIDE SERPL-SCNC: 103 MMOL/L (ref 96–108)
CO2 SERPL-SCNC: 26 MMOL/L (ref 21–32)
CREAT SERPL-MCNC: 0.73 MG/DL (ref 0.6–1.3)
EOSINOPHIL # BLD AUTO: 0.13 THOUSAND/ÂΜL (ref 0–0.61)
EOSINOPHIL NFR BLD AUTO: 1 % (ref 0–6)
ERYTHROCYTE [DISTWIDTH] IN BLOOD BY AUTOMATED COUNT: 12.7 % (ref 11.6–15.1)
GFR SERPL CREATININE-BSD FRML MDRD: 92 ML/MIN/1.73SQ M
GLUCOSE SERPL-MCNC: 92 MG/DL (ref 65–140)
HCT VFR BLD AUTO: 40.9 % (ref 36.5–49.3)
HGB BLD-MCNC: 13.9 G/DL (ref 12–17)
IMM GRANULOCYTES # BLD AUTO: 0.02 THOUSAND/UL (ref 0–0.2)
IMM GRANULOCYTES NFR BLD AUTO: 0 % (ref 0–2)
LIPASE SERPL-CCNC: 6539 U/L (ref 11–82)
LYMPHOCYTES # BLD AUTO: 1.34 THOUSANDS/ÂΜL (ref 0.6–4.47)
LYMPHOCYTES NFR BLD AUTO: 15 % (ref 14–44)
MCH RBC QN AUTO: 29.9 PG (ref 26.8–34.3)
MCHC RBC AUTO-ENTMCNC: 34 G/DL (ref 31.4–37.4)
MCV RBC AUTO: 88 FL (ref 82–98)
MONOCYTES # BLD AUTO: 0.73 THOUSAND/ÂΜL (ref 0.17–1.22)
MONOCYTES NFR BLD AUTO: 8 % (ref 4–12)
NEUTROPHILS # BLD AUTO: 6.77 THOUSANDS/ÂΜL (ref 1.85–7.62)
NEUTS SEG NFR BLD AUTO: 76 % (ref 43–75)
NRBC BLD AUTO-RTO: 0 /100 WBCS
PLATELET # BLD AUTO: 235 THOUSANDS/UL (ref 149–390)
PMV BLD AUTO: 9 FL (ref 8.9–12.7)
POTASSIUM SERPL-SCNC: 3.7 MMOL/L (ref 3.5–5.3)
PROT SERPL-MCNC: 6.7 G/DL (ref 6.4–8.4)
RBC # BLD AUTO: 4.65 MILLION/UL (ref 3.88–5.62)
SODIUM SERPL-SCNC: 135 MMOL/L (ref 135–147)
WBC # BLD AUTO: 9.03 THOUSAND/UL (ref 4.31–10.16)

## 2025-05-23 PROCEDURE — 36415 COLL VENOUS BLD VENIPUNCTURE: CPT

## 2025-05-23 PROCEDURE — 99215 OFFICE O/P EST HI 40 MIN: CPT | Performed by: NURSE PRACTITIONER

## 2025-05-23 PROCEDURE — 96361 HYDRATE IV INFUSION ADD-ON: CPT

## 2025-05-23 PROCEDURE — 83690 ASSAY OF LIPASE: CPT

## 2025-05-23 PROCEDURE — 85025 COMPLETE CBC W/AUTO DIFF WBC: CPT

## 2025-05-23 PROCEDURE — 96360 HYDRATION IV INFUSION INIT: CPT

## 2025-05-23 PROCEDURE — 99285 EMERGENCY DEPT VISIT HI MDM: CPT | Performed by: EMERGENCY MEDICINE

## 2025-05-23 PROCEDURE — 76705 ECHO EXAM OF ABDOMEN: CPT

## 2025-05-23 PROCEDURE — 74177 CT ABD & PELVIS W/CONTRAST: CPT

## 2025-05-23 PROCEDURE — 99285 EMERGENCY DEPT VISIT HI MDM: CPT

## 2025-05-23 PROCEDURE — 80053 COMPREHEN METABOLIC PANEL: CPT

## 2025-05-23 RX ADMIN — SODIUM CHLORIDE 1000 ML: 0.9 INJECTION, SOLUTION INTRAVENOUS at 17:49

## 2025-05-23 RX ADMIN — IOHEXOL 85 ML: 350 INJECTION, SOLUTION INTRAVENOUS at 18:55

## 2025-05-23 NOTE — PATIENT INSTRUCTIONS
You are to go to [facility] ED now for higher level of care and treatment  You are not to eat, drink or void until seen by ED

## 2025-05-23 NOTE — ED ATTENDING ATTESTATION
5/23/2025  I, Mae Crowell, DO, saw and evaluated the patient. I have discussed the patient with the resident/non-physician practitioner and agree with the resident's/non-physician practitioner's findings, Plan of Care, and MDM as documented in the resident's/non-physician practitioner's note, except where noted. All available labs and Radiology studies were reviewed.  I was present for key portions of any procedure(s) performed by the resident/non-physician practitioner and I was immediately available to provide assistance.       At this point I agree with the current assessment done in the Emergency Department.  I have conducted an independent evaluation of this patient a history and physical is as follows:    ED Course   70 year old male presents to the emergency department accompanied by his wife for evaluation of intermittent right upper quadrant abdominal pain.  Patient states today he developed severe right upper quadrant and epigastric abdominal pain.  He attempted to eat a small bowl of cereal without resolution of pain.  He notes that taking a deep breath aggravates the pain.  He drank a small amount of ginger ale around lunchtime without improvement in pain.  He has had mild nausea but no vomiting.  He felt slightly chilled but has not had fever.  He reports decreased appetite today and does not feel that he is made a normal amount of urine but has not drink very much fluid..  Patient has baseline constipation, no BM for 5 days but this is not uncommon.  He denies chest pain.  No diaphoresis.  No shortness of breath.  Patient states pain is improved compared to earlier he did not take anything for pain prior to arrival.  PMHX: right nephrectomy, Parkinson's disease, chronic constipation    Physical exam: Patient is awake and alert elderly and frail in appearance.  Mucous membranes are slightly dry.  Neck is supple.  Heart is regular rate and rhythm.  Lungs are clear to auscultation.  Abdomen is softly  distended with tenderness in the epigastric region and right upper quadrant.  No rebound or guarding.  No focal motor or sensory deficits.  Skin is warm and dry    Assessment/plan: 70-year-old male presents with intermittent epigastric and right upper quadrant pain.  Differential diagnosis includes but is not limited to acute biliary colic, pancreatitis, bowel obstruction, adhesions, colitis.  Will check CT scan abdomen pelvis, labs, offer pain medicine and reassess.    Update: Patient with elevated lipase and slight bump in transaminases.  Will proceed with ultrasound right upper quadrant and have patient be evaluated by general surgery for admission for acute pancreatitis.      Critical Care Time  Procedures

## 2025-05-23 NOTE — PROGRESS NOTES
[facility]  Name: Oral Mendoza      : 1955      MRN: [id number]  Encounter Provider: PRABHAKAR Olivares  Encounter Date: 2025   Encounter department: [facility]  :  Assessment & Plan  RUQ pain    Orders:    Transfer to other facility    Other constipation    Orders:    Transfer to other facility        Patient Instructions    You are to go to [facility] ED now for higher level of care and treatment  You are not to eat, drink or void until seen by ED       Follow up with PCP in 3-5 days.  Proceed to  ER if symptoms worsen.    If tests are performed, our office will contact you with results only if changes need to made to the care plan discussed with you at the visit. [facility]    Chief Complaint:   Chief Complaint   Patient presents with    Abdominal Pain     Abdominal pain started this morning. Pt denies any dizziness, nausea, SOB, or urinary s/s.      History of Present Illness   This is a 70 year old male who wife brings to the care now due to RUQ abdominal pain that started this morning.  Pt denies fevers, chills, n/v/d.  He does state he has not had a BM since Monday.   He denies taking anything.  Wife is concerned that pt could have a gallbladder problem.  They deny any known gallstone hx.  Pt has renal CA.  PMH is listed and reviewed.           Review of Systems   Constitutional: Negative.    HENT: Negative.     Eyes: Negative.    Respiratory: Negative.     Cardiovascular: Negative.    Gastrointestinal:  Positive for abdominal pain and constipation.   Endocrine: Negative.    Genitourinary: Negative.    Musculoskeletal: Negative.    Skin: Negative.    Allergic/Immunologic: Negative.    Neurological: Negative.    Hematological: Negative.    Psychiatric/Behavioral: Negative.       Past Medical History   Past Medical History[1]  Past Surgical History[2]  Family History[3]  he reports that he has never  "smoked. He has never been exposed to tobacco smoke. He has never used smokeless tobacco. He reports current alcohol use of about 1.0 standard drink of alcohol per week. He reports that he does not use drugs.  Current Outpatient Medications   Medication Instructions    acetaminophen (TYLENOL) 650 mg, Oral, Every 6 hours PRN    amLODIPine (NORVASC) 5 mg, Oral, 2 times daily    busPIRone (BUSPAR) 7.5 mg, Oral, 2 times daily    carbidopa-levodopa (Sinemet)  mg per tablet 1 tablet, Oral, 3 times daily    carvedilol (COREG) 3.125 mg, Oral, 2 times daily with meals    cycloSPORINE (RESTASIS) 0.05 % ophthalmic emulsion 1 drop, 2 times daily    Docosahexaenoic Acid (DHA OMEGA 3) 100 MG CAPS 4 capsules, Daily    docusate sodium (COLACE) 100 mg, Oral, 2 times daily    fexofenadine (ALLEGRA) 180 MG tablet 1 tablet, Daily PRN    gabapentin (NEURONTIN) 100 mg, Oral, Daily at bedtime    lidocaine 0.5 % topical gel 1 Application, As needed    Multiple Vitamin tablet 1 tablet, Daily    polyethylene glycol (GLYCOLAX) 17 g, As needed    senna (SENOKOT) 8.6 mg, Oral, Daily PRN   Allergies[4]     Objective   /80   Pulse 61   Temp 97.5 °F (36.4 °C)   Resp 16   Ht 5' 7\" (1.702 m)   Wt 60.3 kg (133 lb)   SpO2 99%   BMI 20.83 kg/m²      Physical Exam  Vitals and nursing note reviewed.   Constitutional:       General: He is not in acute distress.     Appearance: He is well-developed. He is not ill-appearing, toxic-appearing or diaphoretic.      Comments: Thin frail appearing male      HENT:      Head: Normocephalic and atraumatic.     Eyes:      Extraocular Movements: Extraocular movements intact.       Cardiovascular:      Rate and Rhythm: Normal rate and regular rhythm.      Heart sounds: Normal heart sounds.   Pulmonary:      Effort: Pulmonary effort is normal.      Breath sounds: Normal breath sounds.   Abdominal:      General: Abdomen is flat. Bowel sounds are decreased.      Tenderness: There is abdominal tenderness. " "     Comments: TTP RUQ    ? Hough sign      Skin:     General: Skin is warm and dry.      Capillary Refill: Capillary refill takes less than 2 seconds.     Neurological:      General: No focal deficit present.      Mental Status: He is alert and oriented to person, place, and time.     Psychiatric:         Mood and Affect: Mood normal.         Behavior: Behavior normal.         Discussed with pt and wife that pt would benefit from a higher level of care and evaluation with treatment due to c/o and needing testing that [facility] does not provide.   Wife agrees.  She states that she will take pt to [facility] by private car. She has declined EMS.  ADT 21 completed.           Portions of the record may have been created with voice recognition software.  Occasional wrong word or \"sound a like\" substitutions may have occurred due to the inherent limitations of voice recognition software.  Read the chart carefully and recognize, using context, where substitutions have occurred.         [1]   Past Medical History:  Diagnosis Date    Acute deep vein thrombosis (DVT) of brachial vein of right upper extremity (HCC) 07/16/2023    Allergic     Arthritis     Asthma     Cancer (HCC)     kidney    Chronic kidney disease 4/2023    renal cancer    Hypertension     Impaired fasting glucose     Mitral valve disorder     Mitral valve prolapse     Murmur, cardiac     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     Onychomycosis 5 years ago    used topical solution that did not work.    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     PVC (premature ventricular contraction)     PVC (premature ventricular contraction)     Renal cancer ([facility]) 2023    Thyroid nodule    [2]   Past Surgical History:  Procedure Laterality Date    ABDOMINAL SURGERY  nephrectomy - rt kidney    9/23    COLONOSCOPY  2008    HAND SURGERY      NE COLONOSCOPY FLX DX W/COLLJ SPEC WHEN PFRMD N/A 02/09/2018    Procedure: COLONOSCOPY;  " Surgeon: Joe Pabon MD;  Location: [facility];  Service: Gastroenterology    NY LAPAROSCOPY RADICAL NEPHRECTOMY Right 09/20/2023    Procedure: NEPHRECTOMY RADICAL LAPAROSCOPIC W/ ROBOTICS;  Surgeon: Dario Domingo MD;  Location: [facility];  Service: Urology    PROSTATE BIOPSY      SHOULDER SURGERY      TONSILLECTOMY  ???    childhood   [3]   Family History  Problem Relation Name Age of Onset    Diabetes Mother Mom     Stroke Mother Mom     Hypertension Mother Mom     Stroke Father Dad     Heart disease Father Dad     Hypertension Father Dad     Diabetes Sister Sister     Hypertension Sister Sister     Heart disease Family Dad    [4] No Known Allergies

## 2025-05-24 ENCOUNTER — APPOINTMENT (INPATIENT)
Dept: MRI IMAGING | Facility: HOSPITAL | Age: 70
DRG: 438 | End: 2025-05-24
Payer: COMMERCIAL

## 2025-05-24 PROBLEM — K85.10 GALLSTONE PANCREATITIS: Status: ACTIVE | Noted: 2025-05-24

## 2025-05-24 PROBLEM — K86.2 PANCREAS CYST: Status: ACTIVE | Noted: 2025-05-24

## 2025-05-24 PROBLEM — R79.89 ELEVATED LIVER FUNCTION TESTS: Status: ACTIVE | Noted: 2025-05-24

## 2025-05-24 PROBLEM — K59.01 SLOW TRANSIT CONSTIPATION: Status: ACTIVE | Noted: 2023-05-10

## 2025-05-24 LAB
ANION GAP SERPL CALCULATED.3IONS-SCNC: 9 MMOL/L (ref 4–13)
BASOPHILS # BLD AUTO: 0.04 THOUSANDS/ÂΜL (ref 0–0.1)
BASOPHILS NFR BLD AUTO: 1 % (ref 0–1)
BUN SERPL-MCNC: 14 MG/DL (ref 5–25)
CALCIUM SERPL-MCNC: 8.9 MG/DL (ref 8.4–10.2)
CHLORIDE SERPL-SCNC: 106 MMOL/L (ref 96–108)
CO2 SERPL-SCNC: 24 MMOL/L (ref 21–32)
CREAT SERPL-MCNC: 0.69 MG/DL (ref 0.6–1.3)
EOSINOPHIL # BLD AUTO: 0.09 THOUSAND/ÂΜL (ref 0–0.61)
EOSINOPHIL NFR BLD AUTO: 1 % (ref 0–6)
ERYTHROCYTE [DISTWIDTH] IN BLOOD BY AUTOMATED COUNT: 12.6 % (ref 11.6–15.1)
GFR SERPL CREATININE-BSD FRML MDRD: 94 ML/MIN/1.73SQ M
GLUCOSE SERPL-MCNC: 73 MG/DL (ref 65–140)
HCT VFR BLD AUTO: 42 % (ref 36.5–49.3)
HGB BLD-MCNC: 14.2 G/DL (ref 12–17)
IMM GRANULOCYTES # BLD AUTO: 0.02 THOUSAND/UL (ref 0–0.2)
IMM GRANULOCYTES NFR BLD AUTO: 0 % (ref 0–2)
LYMPHOCYTES # BLD AUTO: 1.85 THOUSANDS/ÂΜL (ref 0.6–4.47)
LYMPHOCYTES NFR BLD AUTO: 29 % (ref 14–44)
MAGNESIUM SERPL-MCNC: 2.2 MG/DL (ref 1.9–2.7)
MCH RBC QN AUTO: 30 PG (ref 26.8–34.3)
MCHC RBC AUTO-ENTMCNC: 33.8 G/DL (ref 31.4–37.4)
MCV RBC AUTO: 89 FL (ref 82–98)
MONOCYTES # BLD AUTO: 0.48 THOUSAND/ÂΜL (ref 0.17–1.22)
MONOCYTES NFR BLD AUTO: 7 % (ref 4–12)
NEUTROPHILS # BLD AUTO: 3.98 THOUSANDS/ÂΜL (ref 1.85–7.62)
NEUTS SEG NFR BLD AUTO: 62 % (ref 43–75)
NRBC BLD AUTO-RTO: 0 /100 WBCS
PHOSPHATE SERPL-MCNC: 2.8 MG/DL (ref 2.3–4.1)
PLATELET # BLD AUTO: 234 THOUSANDS/UL (ref 149–390)
PMV BLD AUTO: 9.5 FL (ref 8.9–12.7)
POTASSIUM SERPL-SCNC: 3.8 MMOL/L (ref 3.5–5.3)
RBC # BLD AUTO: 4.74 MILLION/UL (ref 3.88–5.62)
SODIUM SERPL-SCNC: 139 MMOL/L (ref 135–147)
WBC # BLD AUTO: 6.46 THOUSAND/UL (ref 4.31–10.16)

## 2025-05-24 PROCEDURE — 80048 BASIC METABOLIC PNL TOTAL CA: CPT

## 2025-05-24 PROCEDURE — 99223 1ST HOSP IP/OBS HIGH 75: CPT | Performed by: INTERNAL MEDICINE

## 2025-05-24 PROCEDURE — 85025 COMPLETE CBC W/AUTO DIFF WBC: CPT

## 2025-05-24 PROCEDURE — A9585 GADOBUTROL INJECTION: HCPCS | Performed by: INTERNAL MEDICINE

## 2025-05-24 PROCEDURE — 83735 ASSAY OF MAGNESIUM: CPT

## 2025-05-24 PROCEDURE — 99222 1ST HOSP IP/OBS MODERATE 55: CPT | Performed by: SURGERY

## 2025-05-24 PROCEDURE — 84100 ASSAY OF PHOSPHORUS: CPT

## 2025-05-24 PROCEDURE — 74183 MRI ABD W/O CNTR FLWD CNTR: CPT

## 2025-05-24 RX ORDER — ONDANSETRON 2 MG/ML
4 INJECTION INTRAMUSCULAR; INTRAVENOUS EVERY 6 HOURS PRN
Status: DISCONTINUED | OUTPATIENT
Start: 2025-05-24 | End: 2025-05-25 | Stop reason: HOSPADM

## 2025-05-24 RX ORDER — HYDROMORPHONE HCL IN WATER/PF 6 MG/30 ML
0.2 PATIENT CONTROLLED ANALGESIA SYRINGE INTRAVENOUS
Status: DISCONTINUED | OUTPATIENT
Start: 2025-05-24 | End: 2025-05-25 | Stop reason: HOSPADM

## 2025-05-24 RX ORDER — METHOCARBAMOL 500 MG/1
500 TABLET, FILM COATED ORAL EVERY 6 HOURS SCHEDULED
Status: DISCONTINUED | OUTPATIENT
Start: 2025-05-24 | End: 2025-05-25 | Stop reason: HOSPADM

## 2025-05-24 RX ORDER — AMLODIPINE BESYLATE 5 MG/1
5 TABLET ORAL 2 TIMES DAILY
Status: DISCONTINUED | OUTPATIENT
Start: 2025-05-24 | End: 2025-05-25 | Stop reason: HOSPADM

## 2025-05-24 RX ORDER — POTASSIUM CHLORIDE 1500 MG/1
40 TABLET, EXTENDED RELEASE ORAL ONCE
Status: COMPLETED | OUTPATIENT
Start: 2025-05-24 | End: 2025-05-24

## 2025-05-24 RX ORDER — METRONIDAZOLE 500 MG/100ML
500 INJECTION, SOLUTION INTRAVENOUS EVERY 12 HOURS
Status: DISCONTINUED | OUTPATIENT
Start: 2025-05-24 | End: 2025-05-25

## 2025-05-24 RX ORDER — ACETAMINOPHEN 325 MG/1
975 TABLET ORAL EVERY 6 HOURS
Status: DISCONTINUED | OUTPATIENT
Start: 2025-05-24 | End: 2025-05-25 | Stop reason: HOSPADM

## 2025-05-24 RX ORDER — GABAPENTIN 100 MG/1
100 CAPSULE ORAL 3 TIMES DAILY
Status: DISCONTINUED | OUTPATIENT
Start: 2025-05-24 | End: 2025-05-25 | Stop reason: HOSPADM

## 2025-05-24 RX ORDER — GABAPENTIN 100 MG/1
100 CAPSULE ORAL
Status: DISCONTINUED | OUTPATIENT
Start: 2025-05-24 | End: 2025-05-25 | Stop reason: HOSPADM

## 2025-05-24 RX ORDER — ACETAMINOPHEN 325 MG/1
650 TABLET ORAL EVERY 6 HOURS PRN
Status: DISCONTINUED | OUTPATIENT
Start: 2025-05-24 | End: 2025-05-24 | Stop reason: SDUPTHER

## 2025-05-24 RX ORDER — DOCUSATE SODIUM 100 MG/1
100 CAPSULE, LIQUID FILLED ORAL 2 TIMES DAILY
Status: DISCONTINUED | OUTPATIENT
Start: 2025-05-24 | End: 2025-05-25 | Stop reason: HOSPADM

## 2025-05-24 RX ORDER — CARVEDILOL 3.12 MG/1
3.12 TABLET ORAL 2 TIMES DAILY WITH MEALS
Status: DISCONTINUED | OUTPATIENT
Start: 2025-05-24 | End: 2025-05-25 | Stop reason: HOSPADM

## 2025-05-24 RX ORDER — SODIUM CHLORIDE, SODIUM GLUCONATE, SODIUM ACETATE, POTASSIUM CHLORIDE, MAGNESIUM CHLORIDE, SODIUM PHOSPHATE, DIBASIC, AND POTASSIUM PHOSPHATE .53; .5; .37; .037; .03; .012; .00082 G/100ML; G/100ML; G/100ML; G/100ML; G/100ML; G/100ML; G/100ML
125 INJECTION, SOLUTION INTRAVENOUS CONTINUOUS
Status: DISCONTINUED | OUTPATIENT
Start: 2025-05-24 | End: 2025-05-25

## 2025-05-24 RX ORDER — POLYETHYLENE GLYCOL 3350 17 G/17G
17 POWDER, FOR SOLUTION ORAL DAILY PRN
Status: DISCONTINUED | OUTPATIENT
Start: 2025-05-24 | End: 2025-05-25 | Stop reason: HOSPADM

## 2025-05-24 RX ORDER — CARBIDOPA AND LEVODOPA 25; 100 MG/1; MG/1
1 TABLET ORAL 3 TIMES DAILY
Status: DISCONTINUED | OUTPATIENT
Start: 2025-05-24 | End: 2025-05-25 | Stop reason: HOSPADM

## 2025-05-24 RX ORDER — GADOBUTROL 604.72 MG/ML
6 INJECTION INTRAVENOUS
Status: COMPLETED | OUTPATIENT
Start: 2025-05-24 | End: 2025-05-24

## 2025-05-24 RX ORDER — ENOXAPARIN SODIUM 100 MG/ML
40 INJECTION SUBCUTANEOUS DAILY
Status: DISCONTINUED | OUTPATIENT
Start: 2025-05-24 | End: 2025-05-25 | Stop reason: HOSPADM

## 2025-05-24 RX ORDER — BUSPIRONE HYDROCHLORIDE 5 MG/1
7.5 TABLET ORAL 2 TIMES DAILY
Status: DISCONTINUED | OUTPATIENT
Start: 2025-05-24 | End: 2025-05-25 | Stop reason: HOSPADM

## 2025-05-24 RX ORDER — OXYCODONE HYDROCHLORIDE 5 MG/1
5 TABLET ORAL EVERY 4 HOURS PRN
Status: DISCONTINUED | OUTPATIENT
Start: 2025-05-24 | End: 2025-05-25 | Stop reason: HOSPADM

## 2025-05-24 RX ORDER — LUBIPROSTONE 24 UG/1
24 CAPSULE ORAL 2 TIMES DAILY WITH MEALS
Status: DISCONTINUED | OUTPATIENT
Start: 2025-05-24 | End: 2025-05-25 | Stop reason: HOSPADM

## 2025-05-24 RX ORDER — SENNOSIDES 8.6 MG
8.6 TABLET ORAL DAILY PRN
Status: DISCONTINUED | OUTPATIENT
Start: 2025-05-24 | End: 2025-05-25 | Stop reason: HOSPADM

## 2025-05-24 RX ADMIN — CARVEDILOL 3.12 MG: 3.12 TABLET, FILM COATED ORAL at 09:10

## 2025-05-24 RX ADMIN — CEFTRIAXONE SODIUM 1000 MG: 10 INJECTION, POWDER, FOR SOLUTION INTRAVENOUS at 02:36

## 2025-05-24 RX ADMIN — DOCUSATE SODIUM 100 MG: 100 CAPSULE, LIQUID FILLED ORAL at 09:10

## 2025-05-24 RX ADMIN — CARBIDOPA AND LEVODOPA 1 TABLET: 25; 100 TABLET ORAL at 09:09

## 2025-05-24 RX ADMIN — METRONIDAZOLE 500 MG: 500 INJECTION, SOLUTION INTRAVENOUS at 14:18

## 2025-05-24 RX ADMIN — ACETAMINOPHEN 975 MG: 325 TABLET ORAL at 06:00

## 2025-05-24 RX ADMIN — METHOCARBAMOL 500 MG: 500 TABLET ORAL at 14:17

## 2025-05-24 RX ADMIN — BUSPIRONE HYDROCHLORIDE 7.5 MG: 5 TABLET ORAL at 09:27

## 2025-05-24 RX ADMIN — BUSPIRONE HYDROCHLORIDE 7.5 MG: 5 TABLET ORAL at 01:39

## 2025-05-24 RX ADMIN — LUBIPROSTONE 24 MCG: 24 CAPSULE, GELATIN COATED ORAL at 17:33

## 2025-05-24 RX ADMIN — SODIUM CHLORIDE, SODIUM GLUCONATE, SODIUM ACETATE, POTASSIUM CHLORIDE, MAGNESIUM CHLORIDE, SODIUM PHOSPHATE, DIBASIC, AND POTASSIUM PHOSPHATE 125 ML/HR: .53; .5; .37; .037; .03; .012; .00082 INJECTION, SOLUTION INTRAVENOUS at 21:14

## 2025-05-24 RX ADMIN — DOCUSATE SODIUM 100 MG: 100 CAPSULE, LIQUID FILLED ORAL at 17:32

## 2025-05-24 RX ADMIN — GABAPENTIN 100 MG: 100 CAPSULE ORAL at 01:41

## 2025-05-24 RX ADMIN — ENOXAPARIN SODIUM 40 MG: 40 INJECTION SUBCUTANEOUS at 09:08

## 2025-05-24 RX ADMIN — METRONIDAZOLE 500 MG: 500 INJECTION, SOLUTION INTRAVENOUS at 01:55

## 2025-05-24 RX ADMIN — POTASSIUM CHLORIDE 40 MEQ: 1500 TABLET, EXTENDED RELEASE ORAL at 09:09

## 2025-05-24 RX ADMIN — GABAPENTIN 100 MG: 100 CAPSULE ORAL at 09:10

## 2025-05-24 RX ADMIN — OXYCODONE HYDROCHLORIDE 5 MG: 5 TABLET ORAL at 09:10

## 2025-05-24 RX ADMIN — AMLODIPINE BESYLATE 5 MG: 5 TABLET ORAL at 21:14

## 2025-05-24 RX ADMIN — AMLODIPINE BESYLATE 5 MG: 5 TABLET ORAL at 01:39

## 2025-05-24 RX ADMIN — CARVEDILOL 3.12 MG: 3.12 TABLET, FILM COATED ORAL at 17:45

## 2025-05-24 RX ADMIN — GADOBUTROL 6 ML: 604.72 INJECTION INTRAVENOUS at 12:10

## 2025-05-24 RX ADMIN — AMLODIPINE BESYLATE 5 MG: 5 TABLET ORAL at 09:27

## 2025-05-24 RX ADMIN — METHOCARBAMOL 500 MG: 500 TABLET ORAL at 01:39

## 2025-05-24 RX ADMIN — SODIUM CHLORIDE, SODIUM GLUCONATE, SODIUM ACETATE, POTASSIUM CHLORIDE, MAGNESIUM CHLORIDE, SODIUM PHOSPHATE, DIBASIC, AND POTASSIUM PHOSPHATE 125 ML/HR: .53; .5; .37; .037; .03; .012; .00082 INJECTION, SOLUTION INTRAVENOUS at 02:20

## 2025-05-24 RX ADMIN — ACETAMINOPHEN 975 MG: 325 TABLET ORAL at 14:17

## 2025-05-24 RX ADMIN — CARBIDOPA AND LEVODOPA 1 TABLET: 25; 100 TABLET ORAL at 17:32

## 2025-05-24 RX ADMIN — CARBIDOPA AND LEVODOPA 1 TABLET: 25; 100 TABLET ORAL at 21:14

## 2025-05-24 RX ADMIN — GABAPENTIN 100 MG: 100 CAPSULE ORAL at 21:14

## 2025-05-24 RX ADMIN — METHOCARBAMOL 500 MG: 500 TABLET ORAL at 09:10

## 2025-05-24 RX ADMIN — BUSPIRONE HYDROCHLORIDE 7.5 MG: 5 TABLET ORAL at 21:14

## 2025-05-24 NOTE — ASSESSMENT & PLAN NOTE
1 day pain  Lipase 6500, tibili 1.4    Plan:  Admit to surgery  NPO/IVG  Ctx/flagyl  MRCP. If negative, lap rodney. If positive for CBD stones, GI c/s

## 2025-05-24 NOTE — ASSESSMENT & PLAN NOTE
- May 23, 2025  ALT 75 alk phos 66 albumin 4.4 total bilirubin 1.41  -Continue to follow liver functions daily

## 2025-05-24 NOTE — PLAN OF CARE
Problem: PAIN - ADULT  Goal: Verbalizes/displays adequate comfort level or baseline comfort level  Description: Interventions:  - Encourage patient to monitor pain and request assistance  - Assess pain using appropriate pain scale  - Administer analgesics as ordered based on type and severity of pain and evaluate response  - Implement non-pharmacological measures as appropriate and evaluate response  - Consider cultural and social influences on pain and pain management  - Notify physician/advanced practitioner if interventions unsuccessful or patient reports new pain  - Educate patient/family on pain management process including their role and importance of  reporting pain   - Provide non-pharmacologic/complimentary pain relief interventions  Outcome: Progressing     Problem: INFECTION - ADULT  Goal: Absence or prevention of progression during hospitalization  Description: INTERVENTIONS:  - Assess and monitor for signs and symptoms of infection  - Monitor lab/diagnostic results  - Monitor all insertion sites, i.e. indwelling lines, tubes, and drains  - Monitor endotracheal if appropriate and nasal secretions for changes in amount and color  - [place] appropriate cooling/warming therapies per order  - Administer medications as ordered  - Instruct and encourage patient and family to use good hand hygiene technique  - Identify and instruct in appropriate isolation precautions for identified infection/condition  Outcome: Progressing  Goal: Absence of fever/infection during neutropenic period  Description: INTERVENTIONS:  - Monitor WBC  - Perform strict hand hygiene  - Limit to healthy visitors only  - No plants, dried, fresh or silk flowers with mosley in patient room  Outcome: Progressing     Problem: SAFETY ADULT  Goal: Patient will remain free of falls  Description: INTERVENTIONS:  - Educate patient/family on patient safety including physical limitations  - Instruct patient to call for assistance with activity   -  Consider consulting OT/PT to assist with strengthening/mobility based on AM PAC & JH-HLM score  - Consult OT/PT to assist with strengthening/mobility   - Keep Call bell within reach  - Keep bed low and locked with side rails adjusted as appropriate  - Keep care items and personal belongings within reach  - Initiate and maintain comfort rounds  - Make Fall Risk Sign visible to staff  - Offer Toileting every 2 Hours, in advance of need  - Initiate/Maintain bed alarm  - Obtain necessary fall risk management equipment  - Apply yellow socks and bracelet for high fall risk patients  - Consider moving patient to room near nurses station  Outcome: Progressing  Goal: Maintain or return to baseline ADL function  Description: INTERVENTIONS:  -  Assess patient's ability to carry out ADLs; assess patient's baseline for ADL function and identify physical deficits which impact ability to perform ADLs (bathing, care of mouth/teeth, toileting, grooming, dressing, etc.)  - Assess/evaluate cause of self-care deficits   - Assess range of motion  - Assess patient's mobility; develop plan if impaired  - Assess patient's need for assistive devices and provide as appropriate  - Encourage maximum independence but intervene and supervise when necessary  - Involve family in performance of ADLs  - Assess for home care needs following discharge   - Consider OT consult to assist with ADL evaluation and planning for discharge  - Provide patient education as appropriate  - Monitor functional capacity and physical performance, use of AM PAC & JH-HLM   - Monitor gait, balance and fatigue with ambulation    Outcome: Progressing  Goal: Maintains/Returns to pre admission functional level  Description: INTERVENTIONS:  - Perform AM-PAC 6 Click Basic Mobility/ Daily Activity assessment daily.  - Set and communicate daily mobility goal to care team and patient/family/caregiver.   - Collaborate with rehabilitation services on mobility goals if consulted  -  Perform Range of Motion 2 times a day.  - Reposition patient every 2 hours.  - Dangle patient 2 times a day  - Stand patient 2 times a day  - Ambulate patient 3 times a day  - Out of bed to chair 3 times a day   - Out of bed for meals 3 times a day  - Out of bed for toileting  - Record patient progress and toleration of activity level   Outcome: Progressing     Problem: DISCHARGE PLANNING  Goal: Discharge to home or other facility with appropriate resources  Description: INTERVENTIONS:  - Identify barriers to discharge w/patient and caregiver  - Arrange for needed discharge resources and transportation as appropriate  - Identify discharge learning needs (meds, wound care, etc.)  - Arrange for interpretive services to assist at discharge as needed  - Refer to Case Management Department for coordinating discharge planning if the patient needs post-hospital services based on physician/advanced practitioner order or complex needs related to functional status, cognitive ability, or social support system  Outcome: Progressing     Problem: Knowledge Deficit  Goal: Patient/family/caregiver demonstrates understanding of disease process, treatment plan, medications, and discharge instructions  Description: Complete learning assessment and assess knowledge base.  Interventions:  - Provide teaching at level of understanding  - Provide teaching via preferred learning methods  Outcome: Progressing

## 2025-05-24 NOTE — ASSESSMENT & PLAN NOTE
- Lipase 6539 on admission   - Would repeat lipase tomorrow  -Elevation in LFTs but no leukocytosis or fever  - MRI MRCP ordered  -No new medications recently  -Agree IgG4 subclass and triglycerides to be checked

## 2025-05-24 NOTE — CONSULTS
Consultation - Gastroenterology   Name: Oral Mendoza 70 y.o. male I MRN: [id number]  Unit/Bed#: S -01 I Date of Admission: 5/23/2025   Date of Service: 5/24/2025 I Hospital Day: 0   Inpatient consult to gastroenterology  Consult performed by: Rolan Jett PA-C  Consult ordered by: Roman Donohue MD        Physician Requesting Evaluation: Roman Donohue MD   Reason for Evaluation / Principal Problem: Pancreatic cyst, pancreatitis    Assessment & Plan  Gallstone pancreatitis  - Lipase 6539 on admission   - Would repeat lipase tomorrow  -Elevation in LFTs but no leukocytosis or fever  - MRI MRCP ordered  -No new medications recently  -Agree IgG4 subclass and triglycerides to be checked  Pancreas cyst  - CT shows new 6 mm cyst in the head of the pancreas with no pancreatic duct dilation.  Gallbladder also distended but no pericholecystic fluid.  No wall thickening and possible small polyps on the wall of the gallbladder.  -MRI MRCP has been ordered  - Ultrasound right upper quadrant shows small gallbladder polyps measuring up to 6 mm and top normal thickness of gallbladder wall without findings of cholecystitis  Elevated liver function tests  - May 23, 2025  ALT 75 alk phos 66 albumin 4.4 total bilirubin 1.41  -Continue to follow liver functions daily  Slow transit constipation  -Likely exacerbated by Parkinson's   - can continue Colace 100 mg twice daily  -Add Amitiza 24 mcg twice daily  -Consider Motegrity as an outpatient      History of Present Illness   HPI:  Oral Mendoza is a 70 y.o. male with past medical history of renal cell carcinoma status post right nephrectomy in 2023, mitral valve prolapse, hypertension, asthma, arthritis who presents on Friday, May 23, 2025 with intermittent right upper quadrant pain and nausea that is worsened over the past day.  His lipase was found to be 6,500.  Liver function test showed , ALT 75, alk phos 66, albumin 4.4  and total bilirubin 1.41.  CT shows new 6 mm cyst in the head of the pancreas with no pancreatic duct dilation.  Gallbladder also distended but no pericholecystic fluid.  No wall thickening and possible small polyps on the wall of the gallbladder.  MRI MRCP has been ordered.  Ultrasound right upper quadrant shows small gallbladder polyps measuring up to 6 mm and top normal thickness of gallbladder wall without findings of cholecystitis.    Currently he denies any abdominal pain, nausea vomiting, change in bowels black or bloody stool.  He does report chronic constipation for which he generally only takes MiraLAX and Colace over-the-counter but not particularly helpful.  He is not on any chronic pain medicines at home    Review of Systems   Constitutional:  Positive for appetite change. Negative for activity change, chills, diaphoresis, fatigue and unexpected weight change.   HENT:  Negative for mouth sores, rhinorrhea, sore throat and trouble swallowing.    Eyes:  Negative for discharge, redness and visual disturbance.   Respiratory:  Negative for apnea, cough, choking, chest tightness and shortness of breath.    Cardiovascular:  Negative for chest pain, palpitations and leg swelling.   Gastrointestinal:  Positive for abdominal pain and nausea. Negative for abdominal distention, anal bleeding, blood in stool, constipation, diarrhea, rectal pain and vomiting.   Genitourinary:  Negative for difficulty urinating, dysuria, frequency and hematuria.   Musculoskeletal:  Negative for arthralgias, back pain, gait problem, joint swelling and myalgias.   Skin:  Negative for color change, pallor and rash.   Allergic/Immunologic: Negative for environmental allergies and food allergies.   Neurological:  Negative for dizziness, tremors, weakness, light-headedness, numbness and headaches.   Psychiatric/Behavioral:  Negative for agitation, behavioral problems, confusion and sleep disturbance. The patient is not nervous/anxious.       Historical Information   Past Medical History[1]  Past Surgical History[2]  Social History[3]  E-Cigarette/Vaping    E-Cigarette Use Never User      E-Cigarette/Vaping Substances    Nicotine No     THC No     CBD No     Flavoring No        Social History[4]    Current Facility-Administered Medications:     acetaminophen (TYLENOL) tablet 975 mg, Q6H    amLODIPine (NORVASC) tablet 5 mg, BID    busPIRone (BUSPAR) tablet 7.5 mg, BID    carbidopa-levodopa (SINEMET)  mg per tablet 1 tablet, TID    carvedilol (COREG) tablet 3.125 mg, BID With Meals    ceftriaxone (ROCEPHIN) 1 g/50 mL in dextrose IVPB, Q24H, Last Rate: 1,000 mg (05/24/25 0236)    docusate sodium (COLACE) capsule 100 mg, BID    enoxaparin (LOVENOX) subcutaneous injection 40 mg, Daily    gabapentin (NEURONTIN) capsule 100 mg, HS    gabapentin (NEURONTIN) capsule 100 mg, TID    HYDROmorphone HCl (DILAUDID) injection 0.2 mg, Q3H PRN    methocarbamol (ROBAXIN) tablet 500 mg, Q6H AMERICO    metroNIDAZOLE (FLAGYL) IVPB (premix) 500 mg 100 mL, Q12H, Last Rate: 500 mg (05/24/25 0155)    multi-electrolyte (Plasmalyte-A/Isolyte-S PH 7.4/Normosol-R) IV solution, Continuous, Last Rate: 125 mL/hr (05/24/25 0220)    ondansetron (ZOFRAN) injection 4 mg, Q6H PRN    oxyCODONE (ROXICODONE) IR tablet 5 mg, Q4H PRN    oxyCODONE (ROXICODONE) split tablet 2.5 mg, Q4H PRN    polyethylene glycol (GLYCOLAX) bowel prep 17 g, Daily PRN    potassium chloride (Klor-Con M20) CR tablet 40 mEq, Once    senna (SENOKOT) tablet 8.6 mg, Daily PRN  Prior to Admission Medications   Prescriptions Last Dose Informant Patient Reported? Taking?   Docosahexaenoic Acid (DHA OMEGA 3) 100 MG CAPS Past Week Self Yes Yes   Sig: Take 4 capsules by mouth in the morning.   Multiple Vitamin tablet Past Week Self Yes Yes   Sig: Take 1 tablet by mouth in the morning.   acetaminophen (TYLENOL) 325 mg tablet Past Week Self No Yes   Sig: Take 2 tablets (650 mg total) by mouth every 6 (six) hours as needed  for mild pain   amLODIPine (NORVASC) 5 mg tablet 5/23/2025 Morning  No Yes   Sig: Take 1 tablet (5 mg total) by mouth 2 (two) times a day   busPIRone (BUSPAR) 7.5 mg tablet Past Week  No Yes   Sig: Take 1 tablet (7.5 mg total) by mouth 2 (two) times a day   carbidopa-levodopa (Sinemet)  mg per tablet 5/23/2025 Morning  No Yes   Sig: Take 1 tablet by mouth 3 (three) times a day   carvedilol (Coreg) 3.125 mg tablet 5/23/2025 Morning  No Yes   Sig: Take 1 tablet (3.125 mg total) by mouth 2 (two) times a day with meals   cycloSPORINE (RESTASIS) 0.05 % ophthalmic emulsion 5/23/2025 Noon Self Yes Yes   Sig: Administer 1 drop to both eyes in the morning and 1 drop before bedtime.   docusate sodium (COLACE) 100 mg capsule 5/23/2025 Self No Yes   Sig: Take 1 capsule (100 mg total) by mouth 2 (two) times a day   fexofenadine (ALLEGRA) 180 MG tablet 5/23/2025 Self Yes Yes   Sig: Take 1 tablet by mouth daily as needed   gabapentin (Neurontin) 100 mg capsule 5/23/2025 Morning  No Yes   Sig: Take 1 capsule (100 mg total) by mouth daily at bedtime   lidocaine 0.5 % topical gel Not Taking Self Yes No   Sig: Apply 1 Application topically as needed (pain). Indications: pain   Patient not taking: Reported on 5/24/2025   polyethylene glycol (GLYCOLAX) 17 GM/SCOOP powder Past Week Self Yes Yes   Sig: Take 17 g by mouth as needed (constipation)   senna (SENOKOT) 8.6 mg Past Week Self No Yes   Sig: Take 1 tablet (8.6 mg total) by mouth daily as needed for constipation      Facility-Administered Medications: None     Patient has no known allergies.    Objective :  Temp:  [97.5 °F (36.4 °C)-98.3 °F (36.8 °C)] 98.3 °F (36.8 °C)  HR:  [61-68] 64  BP: (130-166)/(74-88) 152/78  Resp:  [16-18] 18  SpO2:  [92 %-99 %] 92 %  O2 Device: None (Room air)    Physical Exam  Constitutional:       General: He is not in acute distress.     Appearance: Normal appearance. He is not ill-appearing.   HENT:      Head: Normocephalic and atraumatic.      Eyes:      General: No scleral icterus.     Conjunctiva/sclera: Conjunctivae normal.       Cardiovascular:      Rate and Rhythm: Normal rate and regular rhythm.   Pulmonary:      Effort: Pulmonary effort is normal. No respiratory distress.      Breath sounds: Normal breath sounds.   Abdominal:      General: Bowel sounds are normal. There is no distension.      Palpations: Abdomen is soft.      Tenderness: There is no abdominal tenderness. There is no guarding.     Skin:     General: Skin is warm and dry.     Neurological:      Mental Status: He is alert and oriented to person, place, and time.      Comments: Baseline right hand tremor   Psychiatric:         Mood and Affect: Mood normal.         Behavior: Behavior normal.           Lab Results: I have reviewed the following results:CBC/BMP:   .     05/24/25  0516   WBC 6.46   HGB 14.2   HCT 42.0      SODIUM 139   K 3.8      CO2 24   BUN 14   CREATININE 0.69   GLUC 73   MG 2.2   PHOS 2.8    , LFTs:   .     05/23/25  1718   *   ALT 75*   ALB 4.4   TBILI 1.41*   ALKPHOS 66    , PTT/INR:No new results in last 24 hours. , Lipase:   Lab Results   Component Value Date    LIPASE 6,539 (H) 05/23/2025       Imaging Results Review: I reviewed radiology reports from this admission including: CT abdomen/pelvis and Ultrasound(s).  Other Study Results Review: No additional pertinent studies reviewed.        Rolan Jett PA-C  [facility] - Gastroentrology         [1]   Past Medical History:  Diagnosis Date    Acute deep vein thrombosis (DVT) of brachial vein of right upper extremity (HCC) 07/16/2023    Allergic     Arthritis     Asthma     Cancer (HCC)     kidney    Chronic kidney disease 4/2023    renal cancer    Hypertension     Impaired fasting glucose     Mitral valve disorder     Mitral valve prolapse     Murmur, cardiac     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     Onychomycosis 5 years ago    used topical  solution that did not work.    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     PVC (premature ventricular contraction)     PVC (premature ventricular contraction)     Renal cancer (HCC) 2023    Thyroid nodule    [2]   Past Surgical History:  Procedure Laterality Date    ABDOMINAL SURGERY  nephrectomy - rt kidney    9/23    COLONOSCOPY  2008    HAND SURGERY      NV COLONOSCOPY FLX DX W/COLLJ SPEC WHEN PFRMD N/A 02/09/2018    Procedure: COLONOSCOPY;  Surgeon: Joe Pabon MD;  Location: [facility];  Service: Gastroenterology    NV LAPAROSCOPY RADICAL NEPHRECTOMY Right 09/20/2023    Procedure: NEPHRECTOMY RADICAL LAPAROSCOPIC W/ ROBOTICS;  Surgeon: Dario Domingo MD;  Location: [facility];  Service: Urology    PROSTATE BIOPSY      SHOULDER SURGERY      TONSILLECTOMY  ???    childhood   [3]   Social History  Tobacco Use    Smoking status: Never     Passive exposure: Never    Smokeless tobacco: Never   Vaping Use    Vaping status: Never Used   Substance and Sexual Activity    Alcohol use: Yes     Alcohol/week: 1.0 standard drink of alcohol     Types: 1 Glasses of wine per week     Comment: 4 oz wine with dinner a few days a week    Drug use: No    Sexual activity: Yes     Partners: Female     Birth control/protection: None   [4]   Social History  Tobacco Use    Smoking status: Never     Passive exposure: Never    Smokeless tobacco: Never   Vaping Use    Vaping status: Never Used   Substance and Sexual Activity    Alcohol use: Yes     Alcohol/week: 1.0 standard drink of alcohol     Types: 1 Glasses of wine per week     Comment: 4 oz wine with dinner a few days a week    Drug use: No    Sexual activity: Yes     Partners: Female     Birth control/protection: None

## 2025-05-24 NOTE — ASSESSMENT & PLAN NOTE
- CT shows new 6 mm cyst in the head of the pancreas with no pancreatic duct dilation.  Gallbladder also distended but no pericholecystic fluid.  No wall thickening and possible small polyps on the wall of the gallbladder.  -MRI MRCP has been ordered  - Ultrasound right upper quadrant shows small gallbladder polyps measuring up to 6 mm and top normal thickness of gallbladder wall without findings of cholecystitis

## 2025-05-24 NOTE — H&P
H&P - Surgery-General   Name: Oral Mendoza 70 y.o. male I MRN: [id number]  Unit/Bed#: ED-05 I Date of Admission: 5/23/2025   Date of Service: 5/24/2025 I Hospital Day: 0     Assessment & Plan  Gallstone pancreatitis  1 day pain  Lipase 6500, tibili 1.4    Plan:  Admit to surgery  NPO/IVG  Ctx/flagyl  MRCP. If negative, lap rodney. If positive for CBD stones, GI c/s    History of Present Illness   Oral Mendoza is a 70 y.o. male, consult for pancreatitis    Hx of RCC s/p R nephrectomy (2023), parkinsonianism. P/w intermittent RUQ pain and nausea, worsened for 1 day. No bm's for several days. Labs lipase 6500, tbili 1.4, AST//75. CT showed distended GB with mild inflammation, pancreatic cystic lesion. RUQ showed sludge    Review of Systems  Medical History Review: I have reviewed the patient's PMH, PSH, Social History, Family History, Meds, and Allergies     Objective :  Temp:  [97.5 °F (36.4 °C)-97.8 °F (36.6 °C)] 97.8 °F (36.6 °C)  HR:  [61-67] 64  BP: (130-166)/(74-86) 166/74  Resp:  [16] 16  SpO2:  [98 %-99 %] 98 %  O2 Device: None (Room air)      Physical Exam  General: NAD  CV: nl rate  Lungs: nl wob. No resp distress.  Chest: no lesions  ABD: Soft, non tender, ND  Extrem: No CCE      Lab Results: I have reviewed the following results:  Recent Labs     05/23/25  1718   WBC 9.03   HGB 13.9   HCT 40.9      SODIUM 135   K 3.7      CO2 26   BUN 18   CREATININE 0.73   GLUC 92   *   ALT 75*   ALB 4.4   TBILI 1.41*   ALKPHOS 66             VTE Pharmacologic Prophylaxis: VTE covered by:    None     VTE Mechanical Prophylaxis: sequential compression device

## 2025-05-24 NOTE — DISCHARGE INSTR - AVS FIRST PAGE
Newly discovered well-defined cystic pancreatic lesion(s):  Recommend characterization study and establishment of baseline now. Preferred imaging modality: abdomen MRI and MRCP with and without IV contrast, or triple phase abdomen CT with IV contrast, or abdomen MRI and MRCP without IV contrast.     Management and follow-up recommendations for cystic pancreatic lesions are based on Institutional consensus and international evidence-based Kyoto guidelines for the management of intraductal papillary mucinous neoplasm of the pancreas. Pancreatology 24   2024) 255-270. A.  ___________________________      Surgery Discharge Instructions     QUESTIONS AND RETURN PRECAUTIONS:  - If you have any questions or concerns after discharge please call the office.  - Call office or return to ER if fever greater than 101, chills, persistent nausea/vomiting, worsening/uncontrollable pain, and/or increasing redness or purulent/foul smelling drainage from incision(s).    Activity:  - Normal daily activities including climbing steps are okay. Increase activity as tolerated or instructed by your surgery team.  - No driving until no longer using pain medications.     Return to work:    - You may return to work in 2 weeks or sooner if you are feeling well enough.     Diet:    - You may resume your normal diet, unless otherwise instructed.     Medications:    - You may resume all of your regular medications after discharge unless otherwise instructed. Please refer to your discharge medication list for further details.  - Please take the pain medications as directed.  - You are encouraged to use non-narcotic pain medications first and whenever possible. Reserve the use of narcotic pain medication for moderate to severe pain not controlled by non-narcotic medications.  - No driving while taking narcotic pain medications.       ___________________________________

## 2025-05-24 NOTE — ASSESSMENT & PLAN NOTE
-Likely exacerbated by Parkinson's   - can continue Colace 100 mg twice daily  -Add Amitiza 24 mcg twice daily  -Consider Motegri as an outpatient

## 2025-05-24 NOTE — ED PROVIDER NOTES
"Time reflects when diagnosis was documented in both MDM as applicable and the Disposition within this note       Time User Action Codes Description Comment    5/23/2025 10:25 PM July Iqbal [R10.9] Abdominal pain     5/23/2025 10:25 PM July Iqbal [K86.2] Pancreatic cyst     5/23/2025 10:25 PM July Iqbal [K85.90] Pancreatitis           ED Disposition       ED Disposition   Admit    Condition   Stable    Date/Time   Fri May 23, 2025 10:26 PM    Comment   Case was discussed with surgery  and the patient's admission status was agreed to be Admission Status: inpatient status to the service of   .               Assessment & Plan       Medical Decision Making  Oral is a 70-year-old male with a past medical history of HTN, parkinsonism, HLD, pulmonary nodule, right sided weakness, bilateral glaucoma, malnutrition, presented to the emergency department due to right upper quadrant pain and constipation.    DDx includes but is not limited to: appendicitis, gastroenteritis, gastritis, PUD, GERD, gastroparesis, hepatitis, pancreatitis, colitis, enteritis, diverticulitis, food poisoning, epiploic appendagitis, mesenteric adenitis, mesenteric panniculitis, mesenteric ischemia, IBD, IBS, ileus, bowel obstruction, volvulus, internal hernia, cholecystitis, biliary colic, choledocholithiasis, perforated viscus, tumor, splenic etiology, constipation, AAA, testicular torsion, renal colic, pyelonephritis, UTI; doubt cardiac etiology.     Patient's workup supportive of pancreatitis and cystic pancreatic lesion.  Patient also presented for abdominal pain. Patient is currently without pain but has a lipase of 6500, and CT showed: \"Distended gallbladder with mild infiltration of the fat adjacent to the gallbladder fundus. No pericholecystic fluid or gallbladder wall thickening. Recommend further evaluation with right upper quadrant ultrasound exam. Large amount of formed stool throughout the entire colon. " "No evidence of obstruction. Newly discovered well-defined cystic pancreatic lesion(s): Recommend characterization study and establishment of baseline now. Preferred imaging modality: abdomen MRI and MRCP with and without IV contrast, or triple phase abdomen CT with IV contrast, or abdomen MRI and MRCP without IV contrast.\".  Patient also has mild LFT elevations, and with the recommendation of the scan we have obtained a right upper quadrant ultrasound which is pending.     Patient admitted to surgery.      Amount and/or Complexity of Data Reviewed  Labs: ordered.  Radiology: ordered. Decision-making details documented in ED Course.    Risk  Prescription drug management.  Decision regarding hospitalization.          ED Course as of 05/26/25 0311   Fri May 23, 2025   2041 CT abdomen pelvis with contrast  Distended gallbladder with mild infiltration of the fat adjacent to the gallbladder fundus. No pericholecystic fluid or gallbladder wall thickening. Recommend further evaluation with right upper quadrant ultrasound exam.     Large amount of formed stool throughout the entire colon. No evidence of obstruction.        Newly discovered well-defined cystic pancreatic lesion(s):  Recommend characterization study and establishment of baseline now. Preferred imaging modality: abdomen MRI and MRCP with and without IV contrast, or triple phase abdomen CT with IV contrast, or abdomen MRI and MRCP without IV contrast.     Sat May 24, 2025   0048 Patient admitted to surgery.       Medications   sodium chloride 0.9 % bolus 1,000 mL (0 mL Intravenous Stopped 5/23/25 2059)   iohexol (OMNIPAQUE) 350 MG/ML injection (MULTI-DOSE) 85 mL (85 mL Intravenous Given 5/23/25 1855)       ED Risk Strat Scores                    No data recorded        SBIRT 20yo+      Flowsheet Row Most Recent Value   Initial Alcohol Screen: US AUDIT-C     1. How often do you have a drink containing alcohol? 0 Filed at: 05/23/2025 1632   2. How many drinks " containing alcohol do you have on a typical day you are drinking?  0 Filed at: 05/23/2025 1632   3b. FEMALE Any Age, or MALE 65+: How often do you have 4 or more drinks on one occassion? 0 Filed at: 05/23/2025 1632   Audit-C Score 0 Filed at: 05/23/2025 1632   PEGGY: How many times in the past year have you...    Used an illegal drug or used a prescription medication for non-medical reasons? Never Filed at: 05/23/2025 1632                            History of Present Illness       Chief Complaint   Patient presents with    Abdominal Pain     Pt reports intermittent RUQ abdominal pain starting this AM. Constipation chronically. Denies n/v/d. Hx of Parkinsons and renal cancer.        Past Medical History[1]   Past Surgical History[2]   Family History[3]   Social History[4]   E-Cigarette/Vaping    E-Cigarette Use Never User       E-Cigarette/Vaping Substances    Nicotine No     THC No     CBD No     Flavoring No       I have reviewed and agree with the history as documented.     Oral is a 70-year-old male with a past medical history of HTN, parkinsonism, HLD, pulmonary nodule, right sided weakness, bilateral glaucoma, malnutrition, presented to the emergency department due to right upper quadrant pain and constipation.  Patient reports that he was seen at urgent care, but was told to come to the emergency department for further evaluation.  Patient is reporting an episode of epigastric/right upper quadrant pain which has resolved now, but was very severe and concerning to him.  He reports that he has chronic issues with constipation which has been resistant to any laxative regimen that he has been put on, he has been told that it is secondary to the parkinsonism.  He reports that his last bowel movement was yesterday morning, but it was small, and did not feel like he had complete emptying.    He denies any nausea, vomiting, diarrhea, melena, hematochezia, or recent trauma.        Review of Systems   Constitutional:          As per HPI   All other systems reviewed and are negative.          Objective       ED Triage Vitals   Temperature Pulse Blood Pressure Respirations SpO2 Patient Position - Orthostatic VS   05/23/25 1632 05/23/25 1632 05/23/25 1632 05/23/25 1632 05/23/25 1632 05/23/25 1632   97.8 °F (36.6 °C) 67 151/86 16 98 % Sitting      Temp Source Heart Rate Source BP Location FiO2 (%) Pain Score    05/23/25 1632 05/23/25 1632 05/23/25 1632 -- 05/24/25 0200    Oral Monitor Right arm  No Pain      Vitals      Date and Time Temp Pulse SpO2 Resp BP Pain Score FACES Pain Rating User   05/25/25 1247 -- -- -- -- -- 8 -- AS   05/25/25 1002 97.9 °F (36.6 °C) 53 95 % 16 121/68 -- -- DII   05/25/25 0012 -- -- -- -- -- No Pain -- AP   05/24/25 2256 98.1 °F (36.7 °C) 53 96 % -- 133/72 -- -- DII   05/24/25 2112 -- 58 97 % -- 145/75 -- -- DII   05/24/25 1832 -- -- -- -- -- No Pain -- AS   05/24/25 1505 -- -- -- -- -- No Pain -- AS   05/24/25 1505 -- -- -- -- 131/75 -- -- DII   05/24/25 1100 -- -- -- -- -- 3 -- AS   05/24/25 0910 -- -- -- -- -- 7 -- AS   05/24/25 0900 -- -- 97 % -- -- No Pain -- AS   05/24/25 0723 98.3 °F (36.8 °C) 64 92 % -- 152/78 -- -- DII   05/24/25 0600 -- -- -- -- -- 5 -- CL   05/24/25 0255 -- -- -- -- -- No Pain -- CL   05/24/25 0200 -- -- -- -- -- No Pain -- AL   05/24/25 0124 98.1 °F (36.7 °C) 68 96 % 18 140/88 -- -- DII   05/23/25 2132 -- 64 98 % 16 166/74 -- -- JR   05/23/25 1632 97.8 °F (36.6 °C) 67 98 % 16 151/86 -- -- RC            Physical Exam  Vitals and nursing note reviewed.   Constitutional:       Appearance: Normal appearance.   HENT:      Head: Normocephalic and atraumatic.      Right Ear: External ear normal.      Left Ear: External ear normal.      Nose: Nose normal.      Mouth/Throat:      Mouth: Mucous membranes are dry.      Pharynx: Oropharynx is clear.     Eyes:      Extraocular Movements: Extraocular movements intact.      Conjunctiva/sclera: Conjunctivae normal.       Cardiovascular:       Rate and Rhythm: Regular rhythm. Bradycardia present.   Pulmonary:      Effort: Pulmonary effort is normal.      Breath sounds: Normal breath sounds.   Abdominal:      Palpations: Abdomen is soft.      Tenderness: There is abdominal tenderness. There is no guarding.     Musculoskeletal:      Cervical back: Normal range of motion and neck supple.      Comments: Limited ROM of both shoulders, baseline, b/l NVI     Skin:     General: Skin is warm and dry.     Neurological:      General: No focal deficit present.      Mental Status: He is alert and oriented to person, place, and time.      Comments: Baseline tremor   Psychiatric:         Mood and Affect: Mood normal.         Behavior: Behavior normal.         Results Reviewed       Procedure Component Value Units Date/Time    Basic metabolic panel [[id number]] Collected: 05/24/25 0516    Lab Status: Final result Specimen: Blood from Arm, Left Updated: 05/24/25 0638     Sodium 139 mmol/L      Potassium 3.8 mmol/L      Chloride 106 mmol/L      CO2 24 mmol/L      ANION GAP 9 mmol/L      BUN 14 mg/dL      Creatinine 0.69 mg/dL      Glucose 73 mg/dL      Calcium 8.9 mg/dL      eGFR 94 ml/min/1.73sq m     Narrative:      National Kidney Disease Foundation guidelines for Chronic Kidney Disease (CKD):     Stage 1 with normal or high GFR (GFR > 90 mL/min/1.73 square meters)    Stage 2 Mild CKD (GFR = 60-89 mL/min/1.73 square meters)    Stage 3A Moderate CKD (GFR = 45-59 mL/min/1.73 square meters)    Stage 3B Moderate CKD (GFR = 30-44 mL/min/1.73 square meters)    Stage 4 Severe CKD (GFR = 15-29 mL/min/1.73 square meters)    Stage 5 End Stage CKD (GFR <15 mL/min/1.73 square meters)  Note: GFR calculation is accurate only with a steady state creatinine    Magnesium [[id number]]  (Normal) Collected: 05/24/25 0516    Lab Status: Final result Specimen: Blood from Arm, Left Updated: 05/24/25 0638     Magnesium 2.2 mg/dL     Phosphorus [[id number]]  (Normal) Collected: 05/24/25 0516     Lab Status: Final result Specimen: Blood from Arm, Left Updated: 05/24/25 0638     Phosphorus 2.8 mg/dL     CBC and differential [[id number]] Collected: 05/24/25 0516    Lab Status: Final result Specimen: Blood from Arm, Left Updated: 05/24/25 0621     WBC 6.46 Thousand/uL      RBC 4.74 Million/uL      Hemoglobin 14.2 g/dL      Hematocrit 42.0 %      MCV 89 fL      MCH 30.0 pg      MCHC 33.8 g/dL      RDW 12.6 %      MPV 9.5 fL      Platelets 234 Thousands/uL      nRBC 0 /100 WBCs      Segmented % 62 %      Immature Grans % 0 %      Lymphocytes % 29 %      Monocytes % 7 %      Eosinophils Relative 1 %      Basophils Relative 1 %      Absolute Neutrophils 3.98 Thousands/µL      Absolute Immature Grans 0.02 Thousand/uL      Absolute Lymphocytes 1.85 Thousands/µL      Absolute Monocytes 0.48 Thousand/µL      Eosinophils Absolute 0.09 Thousand/µL      Basophils Absolute 0.04 Thousands/µL     Comprehensive metabolic panel [[id number]]  (Abnormal) Collected: 05/23/25 1718    Lab Status: Final result Specimen: Blood from Arm, Right Updated: 05/23/25 1759     Sodium 135 mmol/L      Potassium 3.7 mmol/L      Chloride 103 mmol/L      CO2 26 mmol/L      ANION GAP 6 mmol/L      BUN 18 mg/dL      Creatinine 0.73 mg/dL      Glucose 92 mg/dL      Calcium 9.1 mg/dL       U/L      ALT 75 U/L      Alkaline Phosphatase 66 U/L      Total Protein 6.7 g/dL      Albumin 4.4 g/dL      Total Bilirubin 1.41 mg/dL      eGFR 92 ml/min/1.73sq m     Narrative:      National Kidney Disease Foundation guidelines for Chronic Kidney Disease (CKD):     Stage 1 with normal or high GFR (GFR > 90 mL/min/1.73 square meters)    Stage 2 Mild CKD (GFR = 60-89 mL/min/1.73 square meters)    Stage 3A Moderate CKD (GFR = 45-59 mL/min/1.73 square meters)    Stage 3B Moderate CKD (GFR = 30-44 mL/min/1.73 square meters)    Stage 4 Severe CKD (GFR = 15-29 mL/min/1.73 square meters)    Stage 5 End Stage CKD (GFR <15 mL/min/1.73 square meters)  Note: GFR  calculation is accurate only with a steady state creatinine    Lipase [[id number]]  (Abnormal) Collected: 05/23/25 1718    Lab Status: Final result Specimen: Blood from Arm, Right Updated: 05/23/25 1759     Lipase 6,539 u/L     CBC and differential [[id number]]  (Abnormal) Collected: 05/23/25 1718    Lab Status: Final result Specimen: Blood from Arm, Right Updated: 05/23/25 1731     WBC 9.03 Thousand/uL      RBC 4.65 Million/uL      Hemoglobin 13.9 g/dL      Hematocrit 40.9 %      MCV 88 fL      MCH 29.9 pg      MCHC 34.0 g/dL      RDW 12.7 %      MPV 9.0 fL      Platelets 235 Thousands/uL      nRBC 0 /100 WBCs      Segmented % 76 %      Immature Grans % 0 %      Lymphocytes % 15 %      Monocytes % 8 %      Eosinophils Relative 1 %      Basophils Relative 0 %      Absolute Neutrophils 6.77 Thousands/µL      Absolute Immature Grans 0.02 Thousand/uL      Absolute Lymphocytes 1.34 Thousands/µL      Absolute Monocytes 0.73 Thousand/µL      Eosinophils Absolute 0.13 Thousand/µL      Basophils Absolute 0.04 Thousands/µL             MRI abdomen w wo contrast and mrcp   Final Interpretation by Dewey Contreras MD (05/24 1306)      Small pancreatic cysts measuring up to 6 mm in the head of the pancreas. These cysts have a nonaggressive appearance. Follow-up MRI recommended in 2 years.      Small gallbladder polyps measuring up to 6 mm. Follow-up ultrasound recommended in 6 months.      Absent right kidney.      Workstation performed: [id number]         US right upper quadrant   Final Interpretation by Dewey Contreras MD (05/23 2158)      Small gallbladder polyps measuring up to 6 mm. Follow-up ultrasound recommended in 6 months.   Top normal thickness of the gallbladder wall without other findings of cholecystitis.      Right nephrectomy.      Workstation performed: [id number]         CT abdomen pelvis with contrast   Final Interpretation by Raysa Foley MD (05/23 2031)      Distended gallbladder with mild  infiltration of the fat adjacent to the gallbladder fundus. No pericholecystic fluid or gallbladder wall thickening. Recommend further evaluation with right upper quadrant ultrasound exam.      Large amount of formed stool throughout the entire colon. No evidence of obstruction.         Newly discovered well-defined cystic pancreatic lesion(s):   Recommend characterization study and establishment of baseline now. Preferred imaging modality: abdomen MRI and MRCP with and without IV contrast, or triple phase abdomen CT with IV contrast, or abdomen MRI and MRCP without IV contrast.      Management and follow-up recommendations for cystic pancreatic lesions are based on Institutional consensus and international evidence-based Kyoto guidelines for the management of intraductal papillary mucinous neoplasm of the pancreas. Pancreatology 24    (2024) 255-270. A.      The study was marked in EPIC for immediate notification.      Imaging follow-up reminder notification scheduled in the electronic medical record.         Workstation performed: [id number]             Procedures    ED Medication and Procedure Management   Prior to Admission Medications   Prescriptions Last Dose Informant Patient Reported? Taking?   Docosahexaenoic Acid (DHA OMEGA 3) 100 MG CAPS Past Week Self Yes Yes   Sig: Take 4 capsules by mouth in the morning.   Multiple Vitamin tablet Past Week Self Yes Yes   Sig: Take 1 tablet by mouth in the morning.   acetaminophen (TYLENOL) 325 mg tablet Past Week Self No Yes   Sig: Take 2 tablets (650 mg total) by mouth every 6 (six) hours as needed for mild pain   amLODIPine (NORVASC) 5 mg tablet 5/23/2025 Morning  No Yes   Sig: Take 1 tablet (5 mg total) by mouth 2 (two) times a day   busPIRone (BUSPAR) 7.5 mg tablet Past Week  No Yes   Sig: Take 1 tablet (7.5 mg total) by mouth 2 (two) times a day   carbidopa-levodopa (Sinemet)  mg per tablet 5/23/2025 Morning  No Yes   Sig: Take 1 tablet by mouth 3 (three)  times a day   carvedilol (Coreg) 3.125 mg tablet 5/23/2025 Morning  No Yes   Sig: Take 1 tablet (3.125 mg total) by mouth 2 (two) times a day with meals   cycloSPORINE (RESTASIS) 0.05 % ophthalmic emulsion 5/23/2025 Noon Self Yes Yes   Sig: Administer 1 drop to both eyes in the morning and 1 drop before bedtime.   docusate sodium (COLACE) 100 mg capsule 5/23/2025 Self No Yes   Sig: Take 1 capsule (100 mg total) by mouth 2 (two) times a day   fexofenadine (ALLEGRA) 180 MG tablet 5/23/2025 Self Yes Yes   Sig: Take 1 tablet by mouth daily as needed   gabapentin (Neurontin) 100 mg capsule 5/23/2025 Morning  No Yes   Sig: Take 1 capsule (100 mg total) by mouth daily at bedtime   lidocaine 0.5 % topical gel Not Taking Self Yes No   Sig: Apply 1 Application topically as needed (pain). Indications: pain   Patient not taking: Reported on 5/24/2025   polyethylene glycol (GLYCOLAX) 17 GM/SCOOP powder Past Week Self Yes Yes   Sig: Take 17 g by mouth as needed (constipation)   senna (SENOKOT) 8.6 mg Past Week Self No Yes   Sig: Take 1 tablet (8.6 mg total) by mouth daily as needed for constipation      Facility-Administered Medications: None     Discharge Medication List as of 5/25/2025 12:39 PM        CONTINUE these medications which have NOT CHANGED    Details   acetaminophen (TYLENOL) 325 mg tablet Take 2 tablets (650 mg total) by mouth every 6 (six) hours as needed for mild pain, Starting Fri 5/5/2023, No Print      amLODIPine (NORVASC) 5 mg tablet Take 1 tablet (5 mg total) by mouth 2 (two) times a day, Starting Fri 3/14/2025, Normal      busPIRone (BUSPAR) 7.5 mg tablet Take 1 tablet (7.5 mg total) by mouth 2 (two) times a day, Starting Fri 4/11/2025, Normal      carbidopa-levodopa (Sinemet)  mg per tablet Take 1 tablet by mouth 3 (three) times a day, Starting Tue 12/17/2024, Normal      carvedilol (Coreg) 3.125 mg tablet Take 1 tablet (3.125 mg total) by mouth 2 (two) times a day with meals, Starting Fri 4/11/2025,  Normal      cycloSPORINE (RESTASIS) 0.05 % ophthalmic emulsion Administer 1 drop to both eyes in the morning and 1 drop before bedtime., Starting Fri 11/3/2023, Historical Med      Docosahexaenoic Acid (DHA OMEGA 3) 100 MG CAPS Take 4 capsules by mouth in the morning., Historical Med      docusate sodium (COLACE) 100 mg capsule Take 1 capsule (100 mg total) by mouth 2 (two) times a day, Starting Fri 6/2/2023, Normal      fexofenadine (ALLEGRA) 180 MG tablet Take 1 tablet by mouth daily as needed, Starting Tue 6/10/2014, Historical Med      gabapentin (Neurontin) 100 mg capsule Take 1 capsule (100 mg total) by mouth daily at bedtime, Starting Thu 2/20/2025, Normal      Multiple Vitamin tablet Take 1 tablet by mouth in the morning., Historical Med      polyethylene glycol (GLYCOLAX) 17 GM/SCOOP powder Take 17 g by mouth as needed (constipation), Historical Med      senna (SENOKOT) 8.6 mg Take 1 tablet (8.6 mg total) by mouth daily as needed for constipation, Starting Thu 3/7/2024, Normal      lidocaine 0.5 % topical gel Apply 1 Application topically as needed (pain). Indications: pain, Historical Med           No discharge procedures on file.  ED SEPSIS DOCUMENTATION   Time reflects when diagnosis was documented in both MDM as applicable and the Disposition within this note       Time User Action Codes Description Comment    5/23/2025 10:25 PM July Iqbal [R10.9] Abdominal pain     5/23/2025 10:25 PM July Iqbal [K86.2] Pancreatic cyst     5/23/2025 10:25 PM July Iqbal [K85.90] Pancreatitis                    [1]   Past Medical History:  Diagnosis Date    Acute deep vein thrombosis (DVT) of brachial vein of right upper extremity (HCC) 07/16/2023    Allergic     Arthritis     Asthma     Cancer (HCC)     kidney    Chronic kidney disease 4/2023    renal cancer    Hypertension     Impaired fasting glucose     Mitral valve disorder     Mitral valve prolapse     Murmur, cardiac     Nodular  prostate without lower urinary tract symptoms     Onychomycosis 5 years ago    used topical solution that did not work.    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     PVC (premature ventricular contraction)     PVC (premature ventricular contraction)     Renal cancer (HCC) 2023    Thyroid nodule    [2]   Past Surgical History:  Procedure Laterality Date    ABDOMINAL SURGERY  nephrectomy - rt kidney    9/23    COLONOSCOPY  2008    HAND SURGERY      CT COLONOSCOPY FLX DX W/COLLJ SPEC WHEN PFRMD N/A 02/09/2018    Procedure: COLONOSCOPY;  Surgeon: Joe Pabon MD;  Location: [facility];  Service: Gastroenterology    CT LAPAROSCOPY RADICAL NEPHRECTOMY Right 09/20/2023    Procedure: NEPHRECTOMY RADICAL LAPAROSCOPIC W/ ROBOTICS;  Surgeon: Dario Domingo MD;  Location: [facility];  Service: Urology    PROSTATE BIOPSY      SHOULDER SURGERY      TONSILLECTOMY  ???    childhood   [3]   Family History  Problem Relation Name Age of Onset    Diabetes Mother Mom     Stroke Mother Mom     Hypertension Mother Mom     Stroke Father Dad     Heart disease Father Dad     Hypertension Father Dad     Diabetes Sister Sister     Hypertension Sister Sister     Heart disease Family Dad    [4]   Social History  Tobacco Use    Smoking status: Never     Passive exposure: Never    Smokeless tobacco: Never   Vaping Use    Vaping status: Never Used   Substance Use Topics    Alcohol use: Yes     Alcohol/week: 1.0 standard drink of alcohol     Types: 1 Glasses of wine per week     Comment: 4 oz wine with dinner a few days a week    Drug use: No        July Iqbal MD  05/26/25 3889

## 2025-05-25 VITALS
SYSTOLIC BLOOD PRESSURE: 121 MMHG | RESPIRATION RATE: 16 BRPM | OXYGEN SATURATION: 95 % | HEART RATE: 53 BPM | DIASTOLIC BLOOD PRESSURE: 68 MMHG | TEMPERATURE: 97.9 F

## 2025-05-25 PROBLEM — K82.4 GALL BLADDER POLYP: Status: ACTIVE | Noted: 2025-05-25

## 2025-05-25 PROBLEM — E43 SEVERE PROTEIN-CALORIE MALNUTRITION (HCC): Status: ACTIVE | Noted: 2025-05-25

## 2025-05-25 LAB
ALBUMIN SERPL BCG-MCNC: 4 G/DL (ref 3.5–5)
ALP SERPL-CCNC: 65 U/L (ref 34–104)
ALT SERPL W P-5'-P-CCNC: 14 U/L (ref 7–52)
ANION GAP SERPL CALCULATED.3IONS-SCNC: 8 MMOL/L (ref 4–13)
AST SERPL W P-5'-P-CCNC: 45 U/L (ref 13–39)
BASOPHILS # BLD AUTO: 0.04 THOUSANDS/ÂΜL (ref 0–0.1)
BASOPHILS NFR BLD AUTO: 1 % (ref 0–1)
BILIRUB DIRECT SERPL-MCNC: 0.14 MG/DL (ref 0–0.2)
BILIRUB SERPL-MCNC: 0.94 MG/DL (ref 0.2–1)
BUN SERPL-MCNC: 13 MG/DL (ref 5–25)
CALCIUM SERPL-MCNC: 8.8 MG/DL (ref 8.4–10.2)
CHLORIDE SERPL-SCNC: 105 MMOL/L (ref 96–108)
CO2 SERPL-SCNC: 23 MMOL/L (ref 21–32)
CREAT SERPL-MCNC: 0.67 MG/DL (ref 0.6–1.3)
EOSINOPHIL # BLD AUTO: 0.2 THOUSAND/ÂΜL (ref 0–0.61)
EOSINOPHIL NFR BLD AUTO: 4 % (ref 0–6)
ERYTHROCYTE [DISTWIDTH] IN BLOOD BY AUTOMATED COUNT: 12.7 % (ref 11.6–15.1)
GFR SERPL CREATININE-BSD FRML MDRD: 95 ML/MIN/1.73SQ M
GLUCOSE SERPL-MCNC: 77 MG/DL (ref 65–140)
HCT VFR BLD AUTO: 40.3 % (ref 36.5–49.3)
HGB BLD-MCNC: 13.8 G/DL (ref 12–17)
IMM GRANULOCYTES # BLD AUTO: 0.01 THOUSAND/UL (ref 0–0.2)
IMM GRANULOCYTES NFR BLD AUTO: 0 % (ref 0–2)
LIPASE SERPL-CCNC: 148 U/L (ref 11–82)
LYMPHOCYTES # BLD AUTO: 1.4 THOUSANDS/ÂΜL (ref 0.6–4.47)
LYMPHOCYTES NFR BLD AUTO: 28 % (ref 14–44)
MAGNESIUM SERPL-MCNC: 2.2 MG/DL (ref 1.9–2.7)
MCH RBC QN AUTO: 30.2 PG (ref 26.8–34.3)
MCHC RBC AUTO-ENTMCNC: 34.2 G/DL (ref 31.4–37.4)
MCV RBC AUTO: 88 FL (ref 82–98)
MONOCYTES # BLD AUTO: 0.54 THOUSAND/ÂΜL (ref 0.17–1.22)
MONOCYTES NFR BLD AUTO: 11 % (ref 4–12)
NEUTROPHILS # BLD AUTO: 2.74 THOUSANDS/ÂΜL (ref 1.85–7.62)
NEUTS SEG NFR BLD AUTO: 56 % (ref 43–75)
NRBC BLD AUTO-RTO: 0 /100 WBCS
PHOSPHATE SERPL-MCNC: 2.6 MG/DL (ref 2.3–4.1)
PLATELET # BLD AUTO: 221 THOUSANDS/UL (ref 149–390)
PMV BLD AUTO: 8.9 FL (ref 8.9–12.7)
POTASSIUM SERPL-SCNC: 4.3 MMOL/L (ref 3.5–5.3)
PROT SERPL-MCNC: 6.2 G/DL (ref 6.4–8.4)
RBC # BLD AUTO: 4.57 MILLION/UL (ref 3.88–5.62)
SODIUM SERPL-SCNC: 136 MMOL/L (ref 135–147)
TRIGL SERPL-MCNC: 43 MG/DL (ref ?–150)
WBC # BLD AUTO: 4.93 THOUSAND/UL (ref 4.31–10.16)

## 2025-05-25 PROCEDURE — 85025 COMPLETE CBC W/AUTO DIFF WBC: CPT

## 2025-05-25 PROCEDURE — 80076 HEPATIC FUNCTION PANEL: CPT

## 2025-05-25 PROCEDURE — 83735 ASSAY OF MAGNESIUM: CPT

## 2025-05-25 PROCEDURE — 84478 ASSAY OF TRIGLYCERIDES: CPT | Performed by: SURGERY

## 2025-05-25 PROCEDURE — 82787 IGG 1 2 3 OR 4 EACH: CPT | Performed by: SURGERY

## 2025-05-25 PROCEDURE — NC001 PR NO CHARGE: Performed by: SURGERY

## 2025-05-25 PROCEDURE — 99232 SBSQ HOSP IP/OBS MODERATE 35: CPT | Performed by: SURGERY

## 2025-05-25 PROCEDURE — 99232 SBSQ HOSP IP/OBS MODERATE 35: CPT | Performed by: INTERNAL MEDICINE

## 2025-05-25 PROCEDURE — 84100 ASSAY OF PHOSPHORUS: CPT

## 2025-05-25 PROCEDURE — 83690 ASSAY OF LIPASE: CPT | Performed by: PHYSICIAN ASSISTANT

## 2025-05-25 PROCEDURE — 82784 ASSAY IGA/IGD/IGG/IGM EACH: CPT | Performed by: SURGERY

## 2025-05-25 PROCEDURE — 80048 BASIC METABOLIC PNL TOTAL CA: CPT

## 2025-05-25 RX ADMIN — BUSPIRONE HYDROCHLORIDE 7.5 MG: 5 TABLET ORAL at 08:52

## 2025-05-25 RX ADMIN — OXYCODONE HYDROCHLORIDE 5 MG: 5 TABLET ORAL at 12:47

## 2025-05-25 RX ADMIN — ENOXAPARIN SODIUM 40 MG: 40 INJECTION SUBCUTANEOUS at 08:49

## 2025-05-25 RX ADMIN — CEFTRIAXONE SODIUM 1000 MG: 10 INJECTION, POWDER, FOR SOLUTION INTRAVENOUS at 00:04

## 2025-05-25 RX ADMIN — CARBIDOPA AND LEVODOPA 1 TABLET: 25; 100 TABLET ORAL at 08:53

## 2025-05-25 RX ADMIN — CARVEDILOL 3.12 MG: 3.12 TABLET, FILM COATED ORAL at 08:53

## 2025-05-25 RX ADMIN — LUBIPROSTONE 24 MCG: 24 CAPSULE, GELATIN COATED ORAL at 08:51

## 2025-05-25 RX ADMIN — DOCUSATE SODIUM 100 MG: 100 CAPSULE, LIQUID FILLED ORAL at 08:50

## 2025-05-25 RX ADMIN — SODIUM CHLORIDE, SODIUM GLUCONATE, SODIUM ACETATE, POTASSIUM CHLORIDE, MAGNESIUM CHLORIDE, SODIUM PHOSPHATE, DIBASIC, AND POTASSIUM PHOSPHATE 125 ML/HR: .53; .5; .37; .037; .03; .012; .00082 INJECTION, SOLUTION INTRAVENOUS at 05:12

## 2025-05-25 RX ADMIN — METRONIDAZOLE 500 MG: 500 INJECTION, SOLUTION INTRAVENOUS at 00:51

## 2025-05-25 RX ADMIN — AMLODIPINE BESYLATE 5 MG: 5 TABLET ORAL at 08:53

## 2025-05-25 NOTE — ASSESSMENT & PLAN NOTE
- May 23, 2025  ALT 75 alk phos 66 albumin 4.4 total bilirubin 1.41  -May 25, 2025 AST 45 ALT 14 alk phos 65 albumin 4.0 bilirubin 0.94  -Continue to follow liver functions daily

## 2025-05-25 NOTE — ASSESSMENT & PLAN NOTE
-Likely exacerbated by Parkinson's   - can continue Colace 100 mg twice daily  -Add Amitiza 24 mcg twice daily   - Had BM yesterday  -Consider [facility] as an outpatient

## 2025-05-25 NOTE — DISCHARGE SUMMARY
Discharge Summary - Surgery-General   Name: Oral Mendoza 70 y.o. male I MRN: [id number]  Unit/Bed#: S -01 I Date of Admission: 5/23/2025   Date of Service: 5/25/2025 I Hospital Day: 1    Discharge Summary - Surgery  Oral Mendoza 70 y.o. male MRN: [id number]  Unit/Bed#: S -01 Encounter: [id number]    Admission Date: 5/23/2025     Discharge Date: 05/25/25        Admitting Diagnosis: Pancreatic cyst [K86.2]  Pancreatitis [K85.90]  Abdominal pain [R10.9]    Discharge Diagnosis: Principal Problem:    Gallstone pancreatitis  Active Problems:    Slow transit constipation    Pancreas cyst    Elevated liver function tests    Gall bladder polyp    Severe protein-calorie malnutrition (HCC)  Resolved Problems:    * No resolved hospital problems. *      Attending and Service:   Roman Donohue,*;    Procedures Performed:   Non op management    Hospital Course:   Oral Mendoza 70 y.o. male, admitted for likely gallstone pancreatitis. The stone likely passed and he was managed non-operatively. Of note, he had 6 mm gallbladder polyps noted on RUQ US, though not noted on MRCP. He also had an incidental non-aggressive appearing 6 mm cyst in pancreatic head seen on MRCP.    Patient was discharged on HD#1. On the day of discharge, the patient was voiding spontaneously, tolerating nutrition, and pain was well controlled, no longer requiring inpatient hospitalization. He understood all instructions for discharge. He was also given the names and numbers of the providers as well as instructions for follow up appointments.    Condition at Discharge: good    Discharge instructions/Information to patient and family:   See after visit summary for information provided to patient and family.    Provisions for Follow-Up Care:  See after visit summary for information related to follow-up care and any pertinent home health orders.      Disposition: home    Planned Readmission: No    Discharge Statement   I  spent 30 minutes discharging the patient. This time was spent on the day of discharge. I had direct contact with the patient on the day of discharge. Additional documentation is required if more than 30 minutes were spent on discharge.     Discharge Medications:  See after visit summary for reconciled discharge medications provided to patient and family.      Teofilo Vidal MD  5/25/2025  11:17 AM

## 2025-05-25 NOTE — ASSESSMENT & PLAN NOTE
- Lipase 6539 on admission   - Down to 148 on May 25, 2025  -Triglycerides normal  -IgG4 subclass in process  -Elevation in LFTs but no leukocytosis or fever   - LFTs almost normalized  - MRI MRCP May 24, 2025 showed small pancreatic cyst 6 mm in the head of the pancreas and small gallbladder polyps measuring up to 6 mm   - Recommended repeat ultrasound in 6 months  -No new medications recently  -Surgery on board and no plans for surgical intervention

## 2025-05-25 NOTE — ASSESSMENT & PLAN NOTE
69 yo M presenting with concern for biliary source of pancreatitis, workup unrevealing for biliary cause, MRCP showing some pancreatitic cyst which appears benign and GB polyp, lipase downtrending.     -low fat diet  -d/c IVF  -d/c abx  -f/u lipase now 148 from 6500  -f/u TG and IgG4s  -analgesia and anti-emetics  -dvt ppx  -oob and ambulation TID  -encourage IS use  -dispo planning

## 2025-05-25 NOTE — ASSESSMENT & PLAN NOTE
- CT shows new 6 mm cyst in the head of the pancreas with no pancreatic duct dilation.  Gallbladder also distended but no pericholecystic fluid.  No wall thickening and possible small polyps on the wall of the gallbladder.  -MRI MRCP May 24, 2025 showed small pancreatic cyst 6 mm in the head of the pancreas and small gallbladder polyps measuring up to 6 mm   - Recommend repeat MRI in 2 years  - Ultrasound right upper quadrant shows small gallbladder polyps measuring up to 6 mm and top normal thickness of gallbladder wall without findings of cholecystitis

## 2025-05-25 NOTE — PROGRESS NOTES
Progress Note - Gastroenterology   Name: Oral Mendoza 70 y.o. male I MRN: [id number]  Unit/Bed#: S -01 I Date of Admission: 5/23/2025   Date of Service: 5/25/2025 I Hospital Day: 1    Assessment & Plan  Gallstone pancreatitis  - Lipase 6539 on admission   - Down to 148 on May 25, 2025  -Triglycerides normal  -IgG4 subclass in process  -Elevation in LFTs but no leukocytosis or fever   - LFTs almost normalized  - MRI MRCP May 24, 2025 showed small pancreatic cyst 6 mm in the head of the pancreas and small gallbladder polyps measuring up to 6 mm   - Recommended repeat ultrasound in 6 months  -No new medications recently  -Surgery on board and no plans for surgical intervention  Pancreas cyst  - CT shows new 6 mm cyst in the head of the pancreas with no pancreatic duct dilation.  Gallbladder also distended but no pericholecystic fluid.  No wall thickening and possible small polyps on the wall of the gallbladder.  -MRI MRCP May 24, 2025 showed small pancreatic cyst 6 mm in the head of the pancreas and small gallbladder polyps measuring up to 6 mm   - Recommend repeat MRI in 2 years  - Ultrasound right upper quadrant shows small gallbladder polyps measuring up to 6 mm and top normal thickness of gallbladder wall without findings of cholecystitis  Elevated liver function tests  - May 23, 2025  ALT 75 alk phos 66 albumin 4.4 total bilirubin 1.41  -May 25, 2025 AST 45 ALT 14 alk phos 65 albumin 4.0 bilirubin 0.94  -Continue to follow liver functions daily  Slow transit constipation  -Likely exacerbated by Parkinson's   - can continue Colace 100 mg twice daily  -Add Amitiza 24 mcg twice daily   - Had BM yesterday  -Consider Motegrity as an outpatient        Subjective   Overall patient doing better.  No further pain.  He did report a bowel movement yesterday with regard to his constipation and starting Amitiza.  Surgery plans for discharge today.  No surgical intervention planned.    Objective :  Temp:   [97.9 °F (36.6 °C)-98.1 °F (36.7 °C)] 97.9 °F (36.6 °C)  HR:  [53-58] 53  BP: (121-145)/(68-75) 121/68  Resp:  [16] 16  SpO2:  [95 %-97 %] 95 %  O2 Device: None (Room air)    Physical Exam  Constitutional:       General: He is not in acute distress.     Appearance: Normal appearance. He is not ill-appearing.   HENT:      Head: Normocephalic and atraumatic.     Eyes:      General: No scleral icterus.     Conjunctiva/sclera: Conjunctivae normal.       Cardiovascular:      Rate and Rhythm: Normal rate and regular rhythm.   Pulmonary:      Effort: Pulmonary effort is normal. No respiratory distress.      Breath sounds: Normal breath sounds.   Abdominal:      General: Bowel sounds are normal. There is no distension.      Palpations: Abdomen is soft.      Tenderness: There is no abdominal tenderness. There is no guarding.     Skin:     General: Skin is warm and dry.     Neurological:      Mental Status: He is alert and oriented to person, place, and time.     Psychiatric:         Mood and Affect: Mood normal.         Behavior: Behavior normal.           Lab Results: I have reviewed the following results:CBC/BMP:   .     05/25/25  0540   WBC 4.93   HGB 13.8   HCT 40.3      SODIUM 136   K 4.3      CO2 23   BUN 13   CREATININE 0.67   GLUC 77   MG 2.2   PHOS 2.6    , LFTs:   .     05/25/25  0540   AST 45*   ALT 14   ALB 4.0   TBILI 0.94   ALKPHOS 65    , PTT/INR:No new results in last 24 hours. , Lipase:   Lab Results   Component Value Date    LIPASE 148 (H) 05/25/2025       Imaging Results Review: I reviewed radiology reports from this admission including: CT abdomen/pelvis and MRI abdomen/MRCP.  Other Study Results Review: No additional pertinent studies reviewed.        Rolan Jett PA-C  [facility] - Gastroentrology

## 2025-05-25 NOTE — UTILIZATION REVIEW
Initial Clinical Review    Admission: Date/Time/Statement:  care started in ED on 5/23/25 and at time of IP order has crossed one midnight.    Admission Orders (From admission, onward)       Ordered        05/24/25 0053  Inpatient Admission  Once                          Orders Placed This Encounter   Procedures    Inpatient Admission     Standing Status:   Standing     Number of Occurrences:   1     Level of Care:   Med Surg [16]     Estimated length of stay:   Not Applicable     ED Arrival Information       Expected   5/23/2025     Arrival   5/23/2025 16:12    Acuity   Urgent              Means of arrival   Walk-In    Escorted by   Spouse    Service   Surgery-General    Admission type   Emergency              Arrival complaint   Abdominal pain, constipation             Chief Complaint   Patient presents with    Abdominal Pain     Pt reports intermittent RUQ abdominal pain starting this AM. Constipation chronically. Denies n/v/d. Hx of Parkinsons and renal cancer.        Initial Presentation: 70 y.o. male  to ED via walk in from home.    Admitted to inpatient with Dx: Gallstone Pancreatitis.  .  Presented to ED with intermittent right upper quadrant and epigastric abdominal pain,  worsening the morning of arrival.  Chills.  Decreased appetite.  No BM in 5 days.  . PMHx:R nephrectomy (2023), parkinsonianism . On exam: appears frail.  Mucous membranes dry.  Abdomen soft, distended with tenderness in epigastrium and RUQ.   .  ALT 75.   Lipase 6539.  Imaging shows distended gallbladder.  Large amount of stool in rectum.  Pancreatic lesion.  Ultrasound demonstrates gallbladder polyps without sludge or stones.  ED treatment:  IVF bolus of 1 liter. .    Plan includes:  NPO.  Continued IVF.  Analgesia and antiemetics as needed.  Consult GI.  MRCP.  Start antibiotics.      Date: 5/24/25    Day 2:  abdominal pain and nausea improved.  On exam:  abdomen soft.  .  ALT 75.  Total bili 1.41.  MRCP. Antibiotics and  IVF.     5/24/25 per GI - RUQ pain with unclear etiology, with possible passed stone as symptoms consistent with biliary colic vs differential of musculoskeletal.  Advance diet.      Patient has crossed 3 midnights and requires ongoing care    5/25/2025 .  Patient presents with  tolerating  clears.  + flatus.  Feels overall well.   On exam abdomen soft   Abnormal labs or imaging:  AST 45.  Lipase 148. MRCP showing some pancreatitic cyst which appears benign and GB polyp   Diagnosis/Plan    Gallstone Pancreatitis.    Stop IVF.  Continue analgesia as needed.  Stop antibiotics.     ED Treatment-Medication Administration from 05/23/2025 1533 to 05/24/2025 0121         Date/Time Order Dose Route Action     05/23/2025 1749 sodium chloride 0.9 % bolus 1,000 mL 1,000 mL Intravenous New Bag          Scheduled Medications:  acetaminophen, 975 mg, Oral, Q6H  amLODIPine, 5 mg, Oral, BID  busPIRone, 7.5 mg, Oral, BID  carbidopa-levodopa, 1 tablet, Oral, TID  carvedilol, 3.125 mg, Oral, BID With Meals  docusate sodium, 100 mg, Oral, BID  enoxaparin, 40 mg, Subcutaneous, Daily  gabapentin, 100 mg, Oral, HS  gabapentin, 100 mg, Oral, TID  lubiprostone, 24 mcg, Oral, BID With Meals  methocarbamol, 500 mg, Oral, Q6H AMERICO    ceftriaxone (ROCEPHIN) 1 g/50 mL in dextrose IVPB  Dose: 1,000 mg  Freq: Every 24 hours Route: IV  Last Dose: 1,000 mg (05/25/25 0004)  Start: 05/24/25 0100 End: 05/25/25 0821  metroNIDAZOLE (FLAGYL) IVPB (premix) 500 mg 100 mL  Dose: 500 mg  Freq: Every 12 hours Route: IV  Last Dose: 500 mg (05/25/25 0051)  Start: 05/24/25 0100 End: 05/25/25 0821    potassium chloride (Klor-Con M20) CR tablet 40 mEq  Dose: 40 mEq  Freq: Once Route: PO  Start: 05/24/25 0815 End: 05/24/25 0909     Continuous IV Infusions:  multi-electrolyte (Plasmalyte-A/Isolyte-S PH 7.4/Normosol-R) IV solution  Rate: 125 mL/hr Dose: 125 mL/hr  Freq: Continuous Route: IV  Last Dose: Stopped (05/25/25 0839)  Start: 05/24/25 0100 End: 05/25/25 0816      PRN Meds:  HYDROmorphone, 0.2 mg, Intravenous, Q3H PRN  ondansetron, 4 mg, Intravenous, Q6H PRN  oxyCODONE, 5 mg, Oral, Q4H PRN x 1 5/24.  X 1 5/25  oxyCODONE, 2.5 mg, Oral, Q4H PRN  polyethylene glycol, 17 g, Oral, Daily PRN  senna, 8.6 mg, Oral, Daily PRN    ED Triage Vitals   Temperature Pulse Respirations Blood Pressure SpO2 Pain Score   05/23/25 1632 05/23/25 1632 05/23/25 1632 05/23/25 1632 05/23/25 1632 05/24/25 0200   97.8 °F (36.6 °C) 67 16 151/86 98 % No Pain     Weight (last 2 days)       None          Vital Signs (last 3 days)       Date/Time Temp Pulse Resp BP MAP (mmHg) SpO2 O2 Device Patient Position - Orthostatic VS [place] Coma Scale Score Pain    05/25/25 10:02:12 97.9 °F (36.6 °C) 53 16 121/68 86 95 % -- -- -- --    05/25/25 0012 -- -- -- -- -- -- -- -- -- No Pain    05/24/25 22:56:40 98.1 °F (36.7 °C) 53 -- 133/72 92 96 % None (Room air) -- -- --    05/24/25 21:12:40 -- 58 -- 145/75 98 97 % -- -- -- --    05/24/25 1832 -- -- -- -- -- -- -- -- -- No Pain    05/24/25 15:05:27 -- -- -- 131/75 94 -- -- -- -- No Pain    05/24/25 1100 -- -- -- -- -- -- -- -- -- 3    05/24/25 0910 -- -- -- -- -- -- -- -- -- 7    05/24/25 0900 -- -- -- -- -- 97 % None (Room air) -- 15 No Pain    05/24/25 07:23:43 98.3 °F (36.8 °C) 64 -- 152/78 103 92 % -- -- -- --    05/24/25 0600 -- -- -- -- -- -- -- -- -- 5    05/24/25 0255 -- -- -- -- -- -- None (Room air) -- 15 No Pain    05/24/25 0200 -- -- -- -- -- -- None (Room air) -- 15 No Pain    05/24/25 01:24:33 98.1 °F (36.7 °C) 68 18 140/88 105 96 % -- -- -- --    05/23/25 2132 -- 64 16 166/74 -- 98 % None (Room air) Lying -- --    05/23/25 1724 -- -- -- -- -- -- None (Room air) -- -- --    05/23/25 1723 -- -- -- -- -- -- -- -- 15 --    05/23/25 1632 97.8 °F (36.6 °C) 67 16 151/86 112 98 % None (Room air) Sitting -- --          Pertinent Labs/Diagnostic Test Results:   Radiology:  MRI abdomen w wo contrast and mrcp   Final Interpretation by Dewey Contreras MD (05/24  1306)      Small pancreatic cysts measuring up to 6 mm in the head of the pancreas. These cysts have a nonaggressive appearance. Follow-up MRI recommended in 2 years.      Small gallbladder polyps measuring up to 6 mm. Follow-up ultrasound recommended in 6 months.      Absent right kidney.      Workstation performed: [id number]         US right upper quadrant   Final Interpretation by Dewey Contreras MD (05/23 2158)      Small gallbladder polyps measuring up to 6 mm. Follow-up ultrasound recommended in 6 months.   Top normal thickness of the gallbladder wall without other findings of cholecystitis.      Right nephrectomy.      Workstation performed: [id number]         CT abdomen pelvis with contrast   Final Interpretation by Raysa Foley MD (05/23 2031)      Distended gallbladder with mild infiltration of the fat adjacent to the gallbladder fundus. No pericholecystic fluid or gallbladder wall thickening. Recommend further evaluation with right upper quadrant ultrasound exam.      Large amount of formed stool throughout the entire colon. No evidence of obstruction.         Newly discovered well-defined cystic pancreatic lesion(s):   Recommend characterization study and establishment of baseline now. Preferred imaging modality: abdomen MRI and MRCP with and without IV contrast, or triple phase abdomen CT with IV contrast, or abdomen MRI and MRCP without IV contrast.      Management and follow-up recommendations for cystic pancreatic lesions are based on Institutional consensus and international evidence-based Kyoto guidelines for the management of intraductal papillary mucinous neoplasm of the pancreas. Pancreatology 24    (2024) 255-270. A.      The study was marked in EPIC for immediate notification.      Imaging follow-up reminder notification scheduled in the electronic medical record.         Workstation performed: [id number]           Cardiology:  No orders to display     GI:  No orders to display      Results from last 7 days   Lab Units 05/25/25  0540 05/24/25  0516 05/23/25  1718   WBC Thousand/uL 4.93 6.46 9.03   HEMOGLOBIN g/dL 13.8 14.2 13.9   HEMATOCRIT % 40.3 42.0 40.9   PLATELETS Thousands/uL 221 234 235   TOTAL NEUT ABS Thousands/µL 2.74 3.98 6.77     Results from last 7 days   Lab Units 05/25/25  0540 05/24/25  0516 05/23/25  1718   SODIUM mmol/L 136 139 135   POTASSIUM mmol/L 4.3 3.8 3.7   CHLORIDE mmol/L 105 106 103   CO2 mmol/L 23 24 26   ANION GAP mmol/L 8 9 6   BUN mg/dL 13 14 18   CREATININE mg/dL 0.67 0.69 0.73   EGFR ml/min/1.73sq m 95 94 92   CALCIUM mg/dL 8.8 8.9 9.1   MAGNESIUM mg/dL 2.2 2.2  --    PHOSPHORUS mg/dL 2.6 2.8  --      Results from last 7 days   Lab Units 05/25/25  0540 05/23/25  1718   AST U/L 45* 107*   ALT U/L 14 75*   ALK PHOS U/L 65 66   TOTAL PROTEIN g/dL 6.2* 6.7   ALBUMIN g/dL 4.0 4.4   TOTAL BILIRUBIN mg/dL 0.94 1.41*   BILIRUBIN DIRECT mg/dL 0.14  --      Results from last 7 days   Lab Units 05/25/25  0540 05/24/25  0516 05/23/25  1718   GLUCOSE RANDOM mg/dL 77 73 92     Results from last 7 days   Lab Units 05/25/25  0540 05/23/25  1718   LIPASE u/L 148* 6,539*       Past Medical History[1]  Present on Admission:   Slow transit constipation      Admitting Diagnosis: Pancreatic cyst [K86.2]  Pancreatitis [K85.90]  Abdominal pain [R10.9]  Age/Sex: 70 y.o. male    Network Utilization Review Department  ATTENTION: Please call with any questions or concerns to [phone] and carefully listen to the prompts so that you are directed to the right person. All voicemails are confidential.   For Discharge needs, contact Care Management DC Support Team at [phone] opt. 2  Send all requests for admission clinical reviews, approved or denied determinations and any other requests to dedicated fax number below belonging to the campus where the patient is receiving treatment. List of dedicated fax numbers for the Facilities:  FACILITY NAME UR FAX NUMBER   ADMISSION DENIALS  (Administrative/Medical Necessity) [phone]   DISCHARGE SUPPORT TEAM (NETWORK) [phone]   PARENT CHILD HEALTH (Maternity/NICU/Pediatrics) [phone]   [facility] [phone]   [facility] [phone]   [facility] [phone]   [facility] [phone]   [facility] [phone]   [facility] [phone]   [facility] [phone]   [facility] [phone]   [facility] [phone]   [facility] [phone]   [facility] [phone]   [facility] [phone]              [1]   Past Medical History:  Diagnosis Date    Acute deep vein thrombosis (DVT) of brachial vein of right upper extremity (HCC) 07/16/2023    Allergic     Arthritis     Asthma     Cancer (HCC)     kidney    Chronic kidney disease 4/2023    renal cancer    Hypertension     Impaired fasting glucose     Mitral valve disorder     Mitral valve prolapse     Murmur, cardiac     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     Onychomycosis 5 years ago    used topical solution that did not work.    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

## 2025-05-25 NOTE — PROGRESS NOTES
Progress Note - Surgery-General   Name: Oral Mendoza 70 y.o. male I MRN: [id number]  Unit/Bed#: S -[facility] I Date of Admission: 5/23/2025   Date of Service: 5/25/2025 I Hospital Day: 1    Assessment & Plan  Gallstone pancreatitis  71 yo M presenting with concern for biliary source of pancreatitis, workup unrevealing for biliary cause, MRCP showing some pancreatitic cyst which appears benign and GB polyp, lipase downtrending.     -low fat diet  -d/c IVF  -d/c abx  -f/u lipase now 148 from 6500  -f/u TG and IgG4s  -analgesia and anti-emetics  -dvt ppx  -oob and ambulation TID  -encourage IS use  -dispo planning   Pancreas cyst  -outpt MRI in 2 yrs    Gall bladder polyp  -f/u outpt RUQUS in 6 mo        Subjective   No n/v, pain free. Bridgette CLD. Passing flatus, no BM. Feels well overall.     Objective :  Temp:  [98.1 °F (36.7 °C)] 98.1 °F (36.7 °C)  HR:  [53-58] 53  BP: (131-145)/(72-75) 133/72  SpO2:  [96 %-97 %] 96 %  O2 Device: None (Room air)    I/O         05/23 0701 05/24 0700 05/24 0701 05/25 0700 05/25 0701 05/26 0700    IV Piggyback 1000      Total Intake 1000      Urine 1000      Total Output 1000      Net 0             Unmeasured Urine Occurrence  2 x     Unmeasured Stool Occurrence  1 x             Physical Exam  Physical Exam:  General: No acute distress, alert and oriented  CV: RRR  Lungs: Normal work of breathing   Abdomen: s/nt/nd  Skin: Warm, dry      Lab Results: I have reviewed the following results:  Recent Labs     05/25/25  0540   WBC 4.93   HGB 13.8   HCT 40.3      SODIUM 136   K 4.3      CO2 23   BUN 13   CREATININE 0.67   GLUC 77   MG 2.2   PHOS 2.6   AST 45*   ALT 14   ALB 4.0   TBILI 0.94   ALKPHOS 65             VTE Pharmacologic Prophylaxis: VTE covered by:  enoxaparin, Subcutaneous, 40 mg at 05/24/25 0908     VTE Mechanical Prophylaxis: sequential compression device

## 2025-05-25 NOTE — PLAN OF CARE
Problem: INFECTION - ADULT  Goal: Absence or prevention of progression during hospitalization  Description: INTERVENTIONS:  - Assess and monitor for signs and symptoms of infection  - Monitor lab/diagnostic results  - Monitor all insertion sites, i.e. indwelling lines, tubes, and drains  - Monitor endotracheal if appropriate and nasal secretions for changes in amount and color  - [place] appropriate cooling/warming therapies per order  - Administer medications as ordered  - Instruct and encourage patient and family to use good hand hygiene technique  - Identify and instruct in appropriate isolation precautions for identified infection/condition  Outcome: Progressing     Problem: SAFETY ADULT  Goal: Maintain or return to baseline ADL function  Description: INTERVENTIONS:  -  Assess patient's ability to carry out ADLs; assess patient's baseline for ADL function and identify physical deficits which impact ability to perform ADLs (bathing, care of mouth/teeth, toileting, grooming, dressing, etc.)  - Assess/evaluate cause of self-care deficits   - Assess range of motion  - Assess patient's mobility; develop plan if impaired  - Assess patient's need for assistive devices and provide as appropriate  - Encourage maximum independence but intervene and supervise when necessary  - Involve family in performance of ADLs  - Assess for home care needs following discharge   - Consider OT consult to assist with ADL evaluation and planning for discharge  - Provide patient education as appropriate  - Monitor functional capacity and physical performance, use of AM PAC & JH-HLM   - Monitor gait, balance and fatigue with ambulation    Outcome: Progressing     Problem: DISCHARGE PLANNING  Goal: Discharge to home or other facility with appropriate resources  Description: INTERVENTIONS:  - Identify barriers to discharge w/patient and caregiver  - Arrange for needed discharge resources and transportation as appropriate  - Identify  discharge learning needs (meds, wound care, etc.)  - Arrange for interpretive services to assist at discharge as needed  - Refer to Case Management Department for coordinating discharge planning if the patient needs post-hospital services based on physician/advanced practitioner order or complex needs related to functional status, cognitive ability, or social support system  Outcome: Progressing

## 2025-05-25 NOTE — MALNUTRITION/BMI
This medical record reflects one or more clinical indicators suggestive of malnutrition.    Malnutrition Findings:   Adult Malnutrition type: Chronic illness  Adult Degree of Malnutrition: Other severe protein calorie malnutrition  Malnutrition Characteristics: Inadequate energy, Fat loss, Muscle loss                360 Statement: Other severe protein calorie malnutrition in context of chronic illness as evidenced by consuming < 75% of energy intake compared to estimated needs for > 1 month, severe subcutaneous fat loss (orbitals), and severe  muscle wasting (temples, clavicles). Treatment pending diet advancement. ONS ordered already ordered for when diet advances. Patient declined Ensure Clear.      See Nutrition note dated 5/24/2025 for additional details.  Completed nutrition assessment is viewable in the nutrition documentation.

## 2025-05-27 ENCOUNTER — TRANSITIONAL CARE MANAGEMENT (OUTPATIENT)
Dept: INTERNAL MEDICINE CLINIC | Facility: CLINIC | Age: 70
End: 2025-05-27

## 2025-05-27 ENCOUNTER — PREP FOR PROCEDURE (OUTPATIENT)
Age: 70
End: 2025-05-27

## 2025-05-27 ENCOUNTER — TELEPHONE (OUTPATIENT)
Age: 70
End: 2025-05-27

## 2025-05-27 DIAGNOSIS — K85.90 ACUTE PANCREATITIS, UNSPECIFIED COMPLICATION STATUS, UNSPECIFIED PANCREATITIS TYPE: ICD-10-CM

## 2025-05-27 DIAGNOSIS — R10.84 GENERALIZED ABDOMINAL PAIN: ICD-10-CM

## 2025-05-27 DIAGNOSIS — Z12.11 ENCOUNTER FOR SCREENING COLONOSCOPY: Primary | ICD-10-CM

## 2025-05-27 NOTE — UTILIZATION REVIEW
NOTIFICATION OF ADMISSION DISCHARGE   This is a Notification of Discharge from [facility]. Please be advised that this patient has been discharge from our facility. Below you will find the admission and discharge date and time including the patient’s disposition.   UTILIZATION REVIEW CONTACT:  Utilization Review Assistants  Network Utilization Review Department  Phone: [phone] x carefully listen to the prompts. All voicemails are confidential.  Email: [email or web address]     ADMISSION INFORMATION  PRESENTATION DATE: 5/23/2025  4:27 PM  OBERVATION ADMISSION DATE: N/A  INPATIENT ADMISSION DATE: 5/24/25 12:53 AM   DISCHARGE DATE: 5/25/2025 12:51 PM   DISPOSITION:Home/Self Care    Network Utilization Review Department  ATTENTION: Please call with any questions or concerns to [phone] and carefully listen to the prompts so that you are directed to the right person. All voicemails are confidential.   For Discharge needs, contact Care Management DC Support Team at [phone] opt. 2  Send all requests for admission clinical reviews, approved or denied determinations and any other requests to dedicated fax number below belonging to the campus where the patient is receiving treatment. List of dedicated fax numbers for the Facilities:  FACILITY NAME UR FAX NUMBER   ADMISSION DENIALS (Administrative/Medical Necessity) [phone]   DISCHARGE SUPPORT TEAM ([facility]) [phone]   PARENT CHILD HEALTH (Maternity/NICU/Pediatrics) [phone]   [facility] [phone]   [facility] [phone]   [facility] [phone]   [facility] [phone]   Critical access hospital [phone]   [facility] [phone]   [facility] [phone]   [facility] [phone]   [facility]  [facility] [phone]   [facility] [phone]   [facility] [phone]   [facility] [phone]

## 2025-05-27 NOTE — TELEPHONE ENCOUNTER
----- Message from Joe Pabon MD sent at 5/25/2025  9:16 PM EDT -----  Patient needs an EGD and colonoscopy, he is overdue.  Screening examination, poor prep.Will need a 2-day bowel prep.

## 2025-05-27 NOTE — TELEPHONE ENCOUNTER
Called and spoke with patient and wife. He was just discharged over the weekend from the hospital. He isn't ready to deal with a colonoscopy. Stated he has constipation. I told him that is the reason Dr. Pabon wanted me to call and schedule.  When he is ready, they will call and schedule. Order is in chart.  Will need golytely since he is having constipation issues.

## 2025-05-28 DIAGNOSIS — R79.89 ELEVATED LIVER FUNCTION TESTS: Primary | ICD-10-CM

## 2025-05-29 ENCOUNTER — OFFICE VISIT (OUTPATIENT)
Dept: INTERNAL MEDICINE CLINIC | Facility: CLINIC | Age: 70
End: 2025-05-29
Payer: COMMERCIAL

## 2025-05-29 VITALS
DIASTOLIC BLOOD PRESSURE: 70 MMHG | OXYGEN SATURATION: 98 % | HEART RATE: 65 BPM | WEIGHT: 132 LBS | SYSTOLIC BLOOD PRESSURE: 120 MMHG | BODY MASS INDEX: 20.67 KG/M2

## 2025-05-29 DIAGNOSIS — K86.2 PANCREAS CYST: ICD-10-CM

## 2025-05-29 DIAGNOSIS — E53.8 B12 DEFICIENCY: ICD-10-CM

## 2025-05-29 DIAGNOSIS — K59.09 CHRONIC CONSTIPATION: ICD-10-CM

## 2025-05-29 DIAGNOSIS — K82.4 GALLBLADDER POLYP: ICD-10-CM

## 2025-05-29 DIAGNOSIS — E55.9 VITAMIN D DEFICIENCY: ICD-10-CM

## 2025-05-29 DIAGNOSIS — K85.10 GALLSTONE PANCREATITIS: Primary | ICD-10-CM

## 2025-05-29 LAB
IGG SERPL-MCNC: 969 MG/DL (ref 603–1613)
IGG1 SER-MCNC: 445 MG/DL (ref 248–810)
IGG2 SER-MCNC: 306 MG/DL (ref 130–555)
IGG3 SER-MCNC: 53 MG/DL (ref 15–102)
IGG4 SER-MCNC: 58 MG/DL (ref 2–96)

## 2025-05-29 PROCEDURE — 99496 TRANSJ CARE MGMT HIGH F2F 7D: CPT | Performed by: INTERNAL MEDICINE

## 2025-05-29 RX ORDER — LUBIPROSTONE 24 UG/1
24 CAPSULE ORAL 2 TIMES DAILY WITH MEALS
Qty: 60 CAPSULE | Refills: 0 | Status: SHIPPED | OUTPATIENT
Start: 2025-05-29 | End: 2025-05-30 | Stop reason: SDUPTHER

## 2025-05-29 NOTE — ASSESSMENT & PLAN NOTE
- Amitiza recommended by GI during his hospitalization for chronic constipation.  Patient reports however the prescription was never sent.  Will send a prescription with GIs recommendation of 24 mcg twice daily.  Orders:  •  lubiprostone (AMITIZA) 24 mcg capsule; Take 1 capsule (24 mcg total) by mouth 2 (two) times a day with meals with food and water

## 2025-05-29 NOTE — PROGRESS NOTES
Transition of Care Visit:  Name: Oral Mendoza      : 1955      MRN: [id number]  Encounter Provider: Benjamin Granado MD  Encounter Date: 2025   [facility]: [facility]    Assessment & Plan  Gallstone pancreatitis  - Resolved.  Patient has repeat labs from GI to be drawn next week.       Pancreas cyst  Benign-appearing cyst on MRCP. Follow-up MRI in 2 years was recommended.       Gallbladder polyp  -Seen on right upper quadrant ultrasound during hospitalization.  6-month follow-up ultrasound was recommended.  An order was placed.  Orders:  •  US right upper quadrant; Future    Chronic constipation  - Amitiza recommended by GI during his hospitalization for chronic constipation.  Patient reports however the prescription was never sent.  Will send a prescription with GIs recommendation of 24 mcg twice daily.  Orders:  •  lubiprostone (AMITIZA) 24 mcg capsule; Take 1 capsule (24 mcg total) by mouth 2 (two) times a day with meals with food and water    B12 deficiency    Orders:  •  Vitamin B12; Future    Vitamin D deficiency    Orders:  •  Vitamin D 25 hydroxy; Future         History of Present Illness     Transitional Care Management Review:   Oral Mendoza is a 70 y.o. male here for TCM follow up.     During the TCM phone call patient stated:  TCM Call (since 5/15/2025)     Date and time call was made  2025  9:05 AM    Hospital care reviewed  Records reviewed    Patient was hospitialized at  [facility]    Date of Admission  25    Date of discharge  25    Diagnosis  Gallstone Pancreatitis, K85.10    Disposition  Home    Were the patients medications reviewed and updated  No    Current Symptoms  None      TCM Call (since 5/15/2025)     Post hospital issues  None    Scheduled for follow up?  Yes    Did you obtain your prescribed medications  Yes    Do you need help managing your prescriptions or medications  No    Is  transportation to your appointment needed  No    I have advised the patient to call PCP with any new or worsening symptoms  Yamila Hammonds CMA/Coordinator    Living Arrangements  Spouse or Significiant other        [facility]  Hospital Course:   Oral Mendoza 70 y.o. male, admitted for likely gallstone pancreatitis. The stone likely passed and he was managed non-operatively. Of note, he had 6 mm gallbladder polyps noted on RUQ US, though not noted on MRCP. He also had an incidental non-aggressive appearing 6 mm cyst in pancreatic head seen on MRCP.     Patient was discharged on HD#1. On the day of discharge, the patient was voiding spontaneously, tolerating nutrition, and pain was well controlled, no longer requiring inpatient hospitalization. He understood all instructions for discharge. He was also given the names and numbers of the providers as well as instructions for follow up appointments.    Patient states overall he feels better.  He denies any abdominal pain.  Unfortunately he states his constipation is still an issue.  He reports while in the hospital he was started on Amitiza which he felt was helping.    Review of Systems  All other systems negative except for pertinent findings noted in HPI.     Objective   /70 (BP Location: Left arm, Patient Position: Sitting, Cuff Size: Standard)   Pulse 65   Wt 59.9 kg (132 lb)   SpO2 98%   BMI 20.67 kg/m²     Physical Exam  General: NAD  HEENT: NCAT, EOMI, normal conjunctiva  Cardiovascular: RRR, normal S1 and S2, no m/r/g  Pulmonary: Normal respiratory effort, no wheezes, rales or rhonchi  GI: Soft, nontender, nondistended, hypoactive BS  Extremities: No lower extremity edema  Skin: Normal skin color, no rashes   Psychiatric: Normal mood and affect    Medications have been reviewed by provider in current encounter

## 2025-05-29 NOTE — ASSESSMENT & PLAN NOTE
-Seen on right upper quadrant ultrasound during hospitalization.  6-month follow-up ultrasound was recommended.  An order was placed.  Orders:  •  US right upper quadrant; Future

## 2025-05-30 DIAGNOSIS — K59.09 CHRONIC CONSTIPATION: ICD-10-CM

## 2025-05-30 RX ORDER — LUBIPROSTONE 24 UG/1
24 CAPSULE ORAL 2 TIMES DAILY WITH MEALS
Qty: 60 CAPSULE | Refills: 5 | Status: SHIPPED | OUTPATIENT
Start: 2025-05-30

## 2025-06-04 NOTE — TELEPHONE ENCOUNTER
Pt called and scheduled hospital f/u with  in [place] . Scheduled pt for first available o/v on 09/03 , pt needs sooner appt . Pt stated  spoke to him about prescribing him a medication for chronic constipation.

## 2025-06-04 NOTE — TELEPHONE ENCOUNTER
Spoke with pt, he is currently taking Amitiza once daily, last bm today, small amount, will add miralax daily and call back in 1-2 weeks if no relief.   F/u made 7/22/25 prefers to see Dr Pabon at [facility]

## 2025-06-05 NOTE — TELEPHONE ENCOUNTER
Spoke with patient; appt moved from 7/22 to 6/11 with JEAN; patient is aware he is seeing a PAC.  Patient is satisfied with the call.

## 2025-06-06 ENCOUNTER — APPOINTMENT (OUTPATIENT)
Dept: LAB | Facility: CLINIC | Age: 70
End: 2025-06-06
Payer: COMMERCIAL

## 2025-06-06 DIAGNOSIS — R79.89 ELEVATED LIVER FUNCTION TESTS: ICD-10-CM

## 2025-06-06 DIAGNOSIS — E55.9 VITAMIN D DEFICIENCY: ICD-10-CM

## 2025-06-06 DIAGNOSIS — E53.8 B12 DEFICIENCY: ICD-10-CM

## 2025-06-06 LAB
25(OH)D3 SERPL-MCNC: 72.1 NG/ML (ref 30–100)
ALBUMIN SERPL BCG-MCNC: 4.5 G/DL (ref 3.5–5)
ALP SERPL-CCNC: 65 U/L (ref 34–104)
ALT SERPL W P-5'-P-CCNC: 49 U/L (ref 7–52)
ANION GAP SERPL CALCULATED.3IONS-SCNC: 8 MMOL/L (ref 4–13)
AST SERPL W P-5'-P-CCNC: 30 U/L (ref 13–39)
BILIRUB DIRECT SERPL-MCNC: 0.16 MG/DL (ref 0–0.2)
BILIRUB SERPL-MCNC: 0.77 MG/DL (ref 0.2–1)
BUN SERPL-MCNC: 15 MG/DL (ref 5–25)
CALCIUM SERPL-MCNC: 9.1 MG/DL (ref 8.4–10.2)
CHLORIDE SERPL-SCNC: 101 MMOL/L (ref 96–108)
CO2 SERPL-SCNC: 28 MMOL/L (ref 21–32)
CREAT SERPL-MCNC: 0.72 MG/DL (ref 0.6–1.3)
GFR SERPL CREATININE-BSD FRML MDRD: 93 ML/MIN/1.73SQ M
GLUCOSE P FAST SERPL-MCNC: 94 MG/DL (ref 65–99)
LIPASE SERPL-CCNC: 32 U/L (ref 11–82)
POTASSIUM SERPL-SCNC: 4.1 MMOL/L (ref 3.5–5.3)
PROT SERPL-MCNC: 6.8 G/DL (ref 6.4–8.4)
SODIUM SERPL-SCNC: 137 MMOL/L (ref 135–147)
VIT B12 SERPL-MCNC: 928 PG/ML (ref 180–914)

## 2025-06-06 PROCEDURE — 82607 VITAMIN B-12: CPT

## 2025-06-06 PROCEDURE — 80048 BASIC METABOLIC PNL TOTAL CA: CPT

## 2025-06-06 PROCEDURE — 80076 HEPATIC FUNCTION PANEL: CPT

## 2025-06-06 PROCEDURE — 82306 VITAMIN D 25 HYDROXY: CPT

## 2025-06-06 PROCEDURE — 83690 ASSAY OF LIPASE: CPT

## 2025-06-06 PROCEDURE — 36415 COLL VENOUS BLD VENIPUNCTURE: CPT

## 2025-06-10 ENCOUNTER — RESULTS FOLLOW-UP (OUTPATIENT)
Dept: INTERNAL MEDICINE CLINIC | Facility: CLINIC | Age: 70
End: 2025-06-10

## 2025-06-10 ENCOUNTER — OFFICE VISIT (OUTPATIENT)
Dept: NEUROLOGY | Facility: CLINIC | Age: 70
End: 2025-06-10
Payer: COMMERCIAL

## 2025-06-10 VITALS
WEIGHT: 133 LBS | HEIGHT: 67 IN | HEART RATE: 66 BPM | DIASTOLIC BLOOD PRESSURE: 88 MMHG | SYSTOLIC BLOOD PRESSURE: 148 MMHG | BODY MASS INDEX: 20.88 KG/M2

## 2025-06-10 DIAGNOSIS — G20.A1 PARKINSON'S DISEASE WITHOUT DYSKINESIA, UNSPECIFIED WHETHER MANIFESTATIONS FLUCTUATE (HCC): Primary | ICD-10-CM

## 2025-06-10 PROCEDURE — 99214 OFFICE O/P EST MOD 30 MIN: CPT | Performed by: PSYCHIATRY & NEUROLOGY

## 2025-06-10 RX ORDER — AMANTADINE HYDROCHLORIDE 100 MG/1
100 TABLET ORAL 2 TIMES DAILY
Qty: 60 TABLET | Refills: 4 | Status: SHIPPED | OUTPATIENT
Start: 2025-06-10

## 2025-06-10 NOTE — PATIENT INSTRUCTIONS
Take Carbidopa/Levodopa after meals 3 times daily. If no side effects, can take Carbidopa/levodopa 1 tab 3 times daily after meals. If still has side effects, stop it completely    Start taking Amantadine 100mg daily for 3-5 days and if no side effects, take Amantadine 100mg twice daily

## 2025-06-10 NOTE — ASSESSMENT & PLAN NOTE
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 Last office visit was on 12/17/2024.    Exam significant for right hand resting tremor, right hand and leg bradykinesia, right hand cogwheel rigidity, reduced right arm swinging on ambulation and mild shuffling    Plan:  -Case discussed with Dr. Plaza  -Recommend to take Carbidopa/Levodopa (Sinemet) 25-100mg 1, 0.5, 0.5 tab: 3 times daily after meals. If he can tolerate it. If no side effects, can take 1 tab 3 times daily after meals. If he has side effects taking even after eating meals, stop Sinemet completely  -Will initiate Amantadine 100mg daily for 3-5 days then twice daily afterward for resting tremor. If patient has hallucinations or impulse control, recommend to stop Carbidopa/levodopa  -Continue doing exercises as tolerated  -Follow up with GI to manage constipation which is part of Parkinson's disease process    Follow up in about 4 months      Orders:    Amantadine HCl 100 MG tablet; Take 1 tablet (100 mg total) by mouth 2 (two) times a day

## 2025-06-10 NOTE — PROGRESS NOTES
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 Last office visit was on 2024.    Exam significant for right hand resting tremor, right hand and leg bradykinesia, right hand cogwheel rigidity, reduced right arm swinging on ambulation and mild shuffling    Plan:  -Case discussed with Dr. Plaza  -Recommend to take Carbidopa/Levodopa (Sinemet) 25-100mg 1, 0.5, 0.5 tab: 3 times daily after meals. If he can tolerate it. If no side effects, can take 1 tab 3 times daily after meals. If he has side effects taking even after eating meals, stop Sinemet completely  -Will initiate Amantadine 100mg daily for 3-5 days then twice daily afterward for resting tremor. 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 Last office visit was on 2024.    Since last visit, patient was hospitalized on 2025 due to pancreas cyst, gallbladder polyp and constipation. He has been following up with GI after. Patient prefers to defer JULIO scan due to his health issues and possibility of an allergic  "reaction to it. Patient is taking Carbidopa/Levodopa 25-100mg 3 times daily: 1 tab, 0.5 tab, 0.5 tab. He experiences light headedness, dizziness, and per wife, he looks like a \"zombie\". He takes Carbidopa/levodopa before meals. Patient also has severe constipation. He denies falls.    Brief history:  On April 10, 2023, patient was in the chair for a root canal for about 2 hours resulting in right shoulder pain. A day or 2 later, he started noticing the weakness of his right arm associated with pain.  He went to the hospital on April 14, 2023.  Extensive imaging including MRI brain, MRI C-spine, and MRI brachial plexus showed no acute abnormalities. He had EMG 1 upper limb and 1 lower limb twice.  The most recent one on 6/26/2023, EMG showed chronic pan brachial plexopathy with ongoing denervation and signs of early reinnervation.  Patient has been working with physical therapy.  Due to parkinsonism features of shuffling gait, reduced arm swings, bradykinesia, constipation and hypophonia at night, he was started on Sinemet  mg, but patient was taking 3 times but they are 1 tablet, 0.5 tablet and 0.5 tablet due to lightheadedness/dizziness.    Review of Systems   Constitutional:  Negative for chills and fever.   HENT:  Negative for ear pain and sore throat.    Eyes:  Negative for pain and visual disturbance.   Respiratory:  Negative for cough and shortness of breath.    Cardiovascular:  Negative for chest pain and palpitations.   Gastrointestinal:  Negative for abdominal pain and vomiting.   Genitourinary:  Negative for dysuria and hematuria.   Musculoskeletal:  Negative for arthralgias and back pain.   Skin:  Negative for color change and rash.   Neurological:  Positive for tremors. Negative for seizures, syncope and weakness.   All other systems reviewed and are negative.   I have personally reviewed the MA's review of systems and made changes as necessary.    Medications Ordered Prior to Encounter[1]    " "  Objective   /88   Pulse 66   Ht 5' 7\" (1.702 m)   Wt 60.3 kg (133 lb)   BMI 20.83 kg/m²     Physical Exam  Constitutional:       General: He is not in acute distress.  HENT:      Head: Normocephalic and atraumatic.      Nose: Nose normal.      Mouth/Throat:      Mouth: Mucous membranes are moist.      Pharynx: Oropharynx is clear.     Eyes:      General: Lids are normal.      Extraocular Movements: Extraocular movements intact.      Pupils: Pupils are equal, round, and reactive to light.       Cardiovascular:      Rate and Rhythm: Normal rate.      Pulses: Normal pulses.   Pulmonary:      Effort: Pulmonary effort is normal.     Musculoskeletal:      Cervical back: Normal range of motion.     Skin:     General: Skin is warm and dry.     Neurological:      Motor: Motor strength is normal.     Deep Tendon Reflexes:      Reflex Scores:       Tricep reflexes are 2+ on the right side and 2+ on the left side.       Bicep reflexes are 2+ on the right side and 2+ on the left side.       Brachioradialis reflexes are 2+ on the right side and 2+ on the left side.       Patellar reflexes are 3+ on the right side and 3+ on the left side.       Achilles reflexes are 2+ on the right side and 2+ on the left side.    Psychiatric:         Mood and Affect: Mood normal.         Speech: Speech normal.         Neurological Exam  Mental Status  Awake, alert and oriented to person, place and time. Oriented to person, place and time. Speech is normal. Language is fluent with no aphasia.  Flat affect.    Cranial Nerves  CN II: Visual acuity is normal. Visual fields full to confrontation. Right funduscopic exam: not visualized. Left funduscopic exam: not visualized.  CN III, IV, VI: Extraocular movements intact bilaterally. Normal lids and orbits bilaterally. Pupils equal round and reactive to light bilaterally.  CN V: Facial sensation is normal.  CN VII: Full and symmetric facial movement.  CN VIII: Hearing is normal.  CN IX, X: " Palate elevates symmetrically  CN XI: Shoulder shrug strength is normal.  CN XII: Tongue midline without atrophy or fasciculations.    Motor  Normal muscle bulk throughout. No fasciculations present. The following abnormal movements were seen: Took Carbidopa/levodopa 25-100mg 0.5 tab at 2pm  Examined at 2:15pm    Mild-moderate R hand resting tremor, Bradykinesia noted on R finger tapping, pronation/supination, R toe tapping. R wrist cogwheel rigidity and arm rigidity more than left arm.   Strength is 5/5 throughout all four extremities.    Sensory  Light touch is normal in upper and lower extremities.     Reflexes                                            Right                      Left  Brachioradialis                    2+                         2+  Biceps                                 2+                         2+  Triceps                                2+                         2+  Patellar                                3+                         3+  Achilles                                2+                         2+  Right Plantar: equivocal  Left Plantar: equivocal    Right pathological reflexes: Suprapatellar present.  Left pathological reflexes: Suprapatellar present.    Coordination  Right: Finger-to-nose normal.Left: Finger-to-nose normal.    Gait    Very mild shuffling. Reduced right arm swim (severe rotator cuff injury).      Radiology Results Review : No pertinent imaging studies reviewed.         [1]   Current Outpatient Medications on File Prior to Visit   Medication Sig Dispense Refill    acetaminophen (TYLENOL) 325 mg tablet Take 2 tablets (650 mg total) by mouth every 6 (six) hours as needed for mild pain  0    amLODIPine (NORVASC) 5 mg tablet Take 1 tablet (5 mg total) by mouth 2 (two) times a day 180 tablet 1    busPIRone (BUSPAR) 7.5 mg tablet Take 1 tablet (7.5 mg total) by mouth 2 (two) times a day 60 tablet 0    carbidopa-levodopa (Sinemet)  mg per tablet Take 1 tablet by mouth  3 (three) times a day 90 tablet 5    carvedilol (Coreg) 3.125 mg tablet Take 1 tablet (3.125 mg total) by mouth 2 (two) times a day with meals 180 tablet 1    cycloSPORINE (RESTASIS) 0.05 % ophthalmic emulsion Administer 1 drop to both eyes in the morning and 1 drop before bedtime.      Docosahexaenoic Acid (DHA OMEGA 3) 100 MG CAPS Take 4 capsules by mouth in the morning.      docusate sodium (COLACE) 100 mg capsule Take 1 capsule (100 mg total) by mouth 2 (two) times a day 60 capsule 2    fexofenadine (ALLEGRA) 180 MG tablet Take 1 tablet by mouth daily as needed      gabapentin (Neurontin) 100 mg capsule Take 1 capsule (100 mg total) by mouth daily at bedtime 100 capsule 1    lidocaine 0.5 % topical gel Apply 1 Application topically as needed (pain)      lubiprostone (AMITIZA) 24 mcg capsule Take 1 capsule (24 mcg total) by mouth 2 (two) times a day with meals with food and water 60 capsule 5    Multiple Vitamin tablet Take 1 tablet by mouth in the morning.      polyethylene glycol (GLYCOLAX) 17 GM/SCOOP powder Take 17 g by mouth as needed (constipation)      senna (SENOKOT) 8.6 mg Take 1 tablet (8.6 mg total) by mouth daily as needed for constipation 30 tablet 0     No current facility-administered medications on file prior to visit.

## 2025-06-11 ENCOUNTER — OFFICE VISIT (OUTPATIENT)
Dept: GASTROENTEROLOGY | Facility: AMBULARY SURGERY CENTER | Age: 70
End: 2025-06-11
Payer: COMMERCIAL

## 2025-06-11 ENCOUNTER — RESULTS FOLLOW-UP (OUTPATIENT)
Dept: GASTROENTEROLOGY | Facility: AMBULARY SURGERY CENTER | Age: 70
End: 2025-06-11

## 2025-06-11 VITALS
OXYGEN SATURATION: 98 % | WEIGHT: 132.2 LBS | HEART RATE: 59 BPM | HEIGHT: 67 IN | BODY MASS INDEX: 20.75 KG/M2 | DIASTOLIC BLOOD PRESSURE: 64 MMHG | SYSTOLIC BLOOD PRESSURE: 124 MMHG

## 2025-06-11 DIAGNOSIS — K82.4 GALLBLADDER POLYP: ICD-10-CM

## 2025-06-11 DIAGNOSIS — K59.00 CONSTIPATION, UNSPECIFIED CONSTIPATION TYPE: Primary | ICD-10-CM

## 2025-06-11 DIAGNOSIS — K86.2 PANCREATIC CYST: ICD-10-CM

## 2025-06-11 DIAGNOSIS — K85.90 ACUTE PANCREATITIS WITHOUT INFECTION OR NECROSIS, UNSPECIFIED PANCREATITIS TYPE: ICD-10-CM

## 2025-06-11 PROCEDURE — 99214 OFFICE O/P EST MOD 30 MIN: CPT | Performed by: PHYSICIAN ASSISTANT

## 2025-06-11 RX ORDER — BISACODYL 5 MG/1
10 TABLET, DELAYED RELEASE ORAL ONCE
Qty: 2 TABLET | Refills: 0 | Status: SHIPPED | OUTPATIENT
Start: 2025-06-11 | End: 2025-06-11

## 2025-06-11 NOTE — ASSESSMENT & PLAN NOTE
Benign appearing gallbladder polyps noted on ultrasound during recent hospitalization    - Surveillance ultrasound in 6 months, this appears to be ordered already

## 2025-06-11 NOTE — PATIENT INSTRUCTIONS
Scheduled date of colonoscopy (as of today): Sept 24, 2025  Physician performing colonoscopy: Dr. Pabon   Location of colonoscopy: [facility]   Bowel prep reviewed with patient: 2 day Janie   Instructions reviewed with patient by: TAYLER   Clearances: n/a

## 2025-06-11 NOTE — PROGRESS NOTES
Name: Oral Mendoza      : 1955      MRN: [id number]  Encounter Provider: Wade Levy PA-C  Encounter Date: 2025   Encounter department: [facility]  :  Assessment & Plan  Constipation, unspecified constipation type  Chronic constipation likely related at least in part to Parkinson's disease however has not had colonoscopy since 2018, and that exam was poorly prepped.  Should rule out underlying adenomatous polyps or malignancy    - Plan for colonoscopy with 2-day prep, I also recommended patient take MiraLAX at least once daily for the week leading up to bowel prep    - Continue Amitiza as well, as this appears to have been helpful    - Procedure was explained in detail to the patient at this time including associated risks and benefits, risks including but not limited to infection, perforation and bleed    - Encouraged regular fluid intake and physical activity within the patient's capabilities   Orders:    Colonoscopy; Future    polyethylene glycol (GOLYTELY) 4000 mL solution; Take 4,000 mL by mouth once for 1 dose    bisacodyl (DULCOLAX) 5 mg EC tablet; Take 2 tablets (10 mg total) by mouth once for 1 dose    Gallbladder polyp  Benign appearing gallbladder polyps noted on ultrasound during recent hospitalization    - Surveillance ultrasound in 6 months, this appears to be ordered already       Pancreatic cyst  Benign-appearing pancreatic cysts noted on MRI during recent hospitalization with what was felt to be biliary pancreatitis    -Surveillance MRI/MRCP in 2 years       Acute pancreatitis without infection or necrosis, unspecified pancreatitis type  Appears resolved clinically at this time, presumed to have been biliary given mildly elevated liver enzymes however not noted with cholelithiasis on ultrasound or MRI, no other immediately apparent cause noted however    - Can consider surgical evaluation for empiric cholecystectomy if patient  experiences recurrent episodes of pancreatitis moving forward    - We generally recommend avoiding large portions of fatty/greasy foods, however he can gradually reincorporate regular diet at this time as he appears to be clinically improved           History of Present Illness     Oral Mendoza is a 70 y.o. male with history of parkinsonism, renal cell carcinoma status post right nephrectomy who presents for follow-up, we had seen him during recent hospitalization last month when he had presented with right upper quadrant pain, markedly elevated lipase and mildly elevated liver enzymes which resolved, felt to have biliary pancreatitis, MRI showed no evidence of choledocholithiasis or biliary ductal dilation or any gallstones though.  At any rate the MRI also showed some small pancreatic head cysts for which MRI in 2 years for surveillance was recommended.  Ultrasound showed some gallbladder polyps for which 6-month surveillance was recommended.  His CT scan during the admission also showed a large amount of retained stool throughout his colon.    His last colonoscopy in 2018 had been noted with poor prep for which repeat in 1 to 2 years with a 2-day bowel prep had been recommended at the time.    At this time the patient says that he has had some improvement in his constipation with Amitiza, still experiencing some incomplete evacuation however is having about 2-3 bowel movements a day which are small but formed, he denies any rectal bleeding or melena.  He says prior to starting Amitiza it was common for him to go a week without having a bowel movement at all.  He does not recall starting any new medications prior to his onset of pancreatitis, his triglycerides during recent admission also appeared to be normal.  He denies any alcohol intake.  No known family history of pancreatic issues including pancreatitis or pancreatic cancer.    HPI    Review of Systems   Constitutional:  Negative for activity change,  appetite change, chills, fatigue, fever and unexpected weight change.   HENT:  Negative for congestion, nosebleeds, rhinorrhea, sore throat and trouble swallowing.    Eyes:  Negative for pain, itching and visual disturbance.   Respiratory:  Negative for cough, shortness of breath and wheezing.    Cardiovascular:  Negative for chest pain, palpitations and leg swelling.   Gastrointestinal:  Negative for constipation, diarrhea, nausea and vomiting.   Endocrine: Negative for cold intolerance, heat intolerance and polyuria.   Genitourinary:  Negative for difficulty urinating, dysuria, flank pain, frequency and hematuria.   Musculoskeletal:  Negative for arthralgias, back pain, myalgias and neck pain.   Skin:  Negative for color change, pallor and rash.   Neurological:  Negative for dizziness, speech difficulty, weakness, numbness and headaches.   Hematological:  Does not bruise/bleed easily.   Psychiatric/Behavioral:  Negative for dysphoric mood and sleep disturbance. The patient is not nervous/anxious.     A complete review of systems is negative other than that noted above in the HPI.      Current Medications[1]  Objective   There were no vitals taken for this visit.    Physical Exam  Constitutional:       General: He is not in acute distress.     Appearance: He is well-developed. He is not diaphoretic.   HENT:      Head: Normocephalic and atraumatic.     Eyes:      Conjunctiva/sclera: Conjunctivae normal.      Pupils: Pupils are equal, round, and reactive to light.       Cardiovascular:      Rate and Rhythm: Normal rate and regular rhythm.      Heart sounds: Normal heart sounds. No murmur heard.     No friction rub. No gallop.   Pulmonary:      Effort: Pulmonary effort is normal. No respiratory distress.      Breath sounds: Normal breath sounds. No stridor. No wheezing or rales.   Abdominal:      General: Bowel sounds are normal. There is no distension.      Palpations: Abdomen is soft. There is no mass.       Tenderness: There is no abdominal tenderness. There is no guarding or rebound.     Musculoskeletal:         General: No tenderness. Normal range of motion.      Cervical back: Normal range of motion and neck supple.     Skin:     General: Skin is warm and dry.      Coloration: Skin is not pale.      Findings: No erythema or rash.     Neurological:      Mental Status: He is alert and oriented to person, place, and time.     Psychiatric:         Behavior: Behavior normal.          Lab Results: I personally reviewed relevant lab results.                  [1]   Current Outpatient Medications   Medication Sig Dispense Refill    acetaminophen (TYLENOL) 325 mg tablet Take 2 tablets (650 mg total) by mouth every 6 (six) hours as needed for mild pain  0    Amantadine HCl 100 MG tablet Take 1 tablet (100 mg total) by mouth 2 (two) times a day 60 tablet 4    amLODIPine (NORVASC) 5 mg tablet Take 1 tablet (5 mg total) by mouth 2 (two) times a day 180 tablet 1    busPIRone (BUSPAR) 7.5 mg tablet Take 1 tablet (7.5 mg total) by mouth 2 (two) times a day 60 tablet 0    carbidopa-levodopa (Sinemet)  mg per tablet Take 1 tablet by mouth 3 (three) times a day 90 tablet 5    carvedilol (Coreg) 3.125 mg tablet Take 1 tablet (3.125 mg total) by mouth 2 (two) times a day with meals 180 tablet 1    cycloSPORINE (RESTASIS) 0.05 % ophthalmic emulsion Administer 1 drop to both eyes in the morning and 1 drop before bedtime.      Docosahexaenoic Acid (DHA OMEGA 3) 100 MG CAPS Take 4 capsules by mouth in the morning.      docusate sodium (COLACE) 100 mg capsule Take 1 capsule (100 mg total) by mouth 2 (two) times a day 60 capsule 2    fexofenadine (ALLEGRA) 180 MG tablet Take 1 tablet by mouth daily as needed      gabapentin (Neurontin) 100 mg capsule Take 1 capsule (100 mg total) by mouth daily at bedtime 100 capsule 1    lidocaine 0.5 % topical gel Apply 1 Application topically as needed (pain)      lubiprostone (AMITIZA) 24 mcg capsule  Take 1 capsule (24 mcg total) by mouth 2 (two) times a day with meals with food and water 60 capsule 5    Multiple Vitamin tablet Take 1 tablet by mouth in the morning.      polyethylene glycol (GLYCOLAX) 17 GM/SCOOP powder Take 17 g by mouth as needed (constipation)      senna (SENOKOT) 8.6 mg Take 1 tablet (8.6 mg total) by mouth daily as needed for constipation 30 tablet 0     No current facility-administered medications for this visit.

## 2025-06-12 ENCOUNTER — OFFICE VISIT (OUTPATIENT)
Dept: PODIATRY | Facility: CLINIC | Age: 70
End: 2025-06-12
Payer: COMMERCIAL

## 2025-06-12 VITALS — HEIGHT: 67 IN | WEIGHT: 132 LBS | BODY MASS INDEX: 20.72 KG/M2

## 2025-06-12 DIAGNOSIS — L60.0 INGROWN TOENAIL: Primary | ICD-10-CM

## 2025-06-12 PROCEDURE — 99212 OFFICE O/P EST SF 10 MIN: CPT | Performed by: PODIATRIST

## 2025-06-12 NOTE — PROGRESS NOTES
":  Assessment & Plan  Ingrown toenail  Resolved. Reappoint as needed           History of Present Illness     Oral Mendoza is a 70 y.o. male   F/u ingrown nail removal. No pain      Review of Systems  Objective   Ht 5' 7\" (1.702 m)   Wt 59.9 kg (132 lb)   BMI 20.67 kg/m²      Physical Exam  Feet:      Right foot:      Skin integrity: No erythema.      Left foot:      Skin integrity: No erythema.           "

## 2025-07-04 ENCOUNTER — HOSPITAL ENCOUNTER (EMERGENCY)
Facility: HOSPITAL | Age: 70
Discharge: HOME | End: 2025-07-04
Attending: EMERGENCY MEDICINE
Payer: COMMERCIAL

## 2025-07-04 ENCOUNTER — APPOINTMENT (EMERGENCY)
Dept: RADIOLOGY | Facility: HOSPITAL | Age: 70
End: 2025-07-04
Payer: COMMERCIAL

## 2025-07-04 ENCOUNTER — APPOINTMENT (EMERGENCY)
Dept: CT IMAGING | Facility: HOSPITAL | Age: 70
End: 2025-07-04
Payer: COMMERCIAL

## 2025-07-04 VITALS
OXYGEN SATURATION: 99 % | DIASTOLIC BLOOD PRESSURE: 75 MMHG | HEART RATE: 57 BPM | RESPIRATION RATE: 18 BRPM | SYSTOLIC BLOOD PRESSURE: 164 MMHG | TEMPERATURE: 97.7 F

## 2025-07-04 DIAGNOSIS — M54.50 ACUTE LEFT-SIDED LOW BACK PAIN WITHOUT SCIATICA: Primary | ICD-10-CM

## 2025-07-04 DIAGNOSIS — K29.70 GASTRITIS: ICD-10-CM

## 2025-07-04 LAB
ALBUMIN SERPL BCG-MCNC: 4.6 G/DL (ref 3.5–5)
ALP SERPL-CCNC: 62 U/L (ref 34–104)
ALT SERPL W P-5'-P-CCNC: 14 U/L (ref 7–52)
ANION GAP SERPL CALCULATED.3IONS-SCNC: 6 MMOL/L (ref 4–13)
AST SERPL W P-5'-P-CCNC: 22 U/L (ref 13–39)
BASOPHILS # BLD AUTO: 0.03 THOUSANDS/ÂΜL (ref 0–0.1)
BASOPHILS NFR BLD AUTO: 1 % (ref 0–1)
BILIRUB SERPL-MCNC: 0.74 MG/DL (ref 0.2–1)
BILIRUB UR QL STRIP: NEGATIVE
BUN SERPL-MCNC: 19 MG/DL (ref 5–25)
CALCIUM SERPL-MCNC: 9.1 MG/DL (ref 8.4–10.2)
CHLORIDE SERPL-SCNC: 101 MMOL/L (ref 96–108)
CLARITY UR: CLEAR
CO2 SERPL-SCNC: 27 MMOL/L (ref 21–32)
COLOR UR: NORMAL
CREAT SERPL-MCNC: 0.7 MG/DL (ref 0.6–1.3)
EOSINOPHIL # BLD AUTO: 0.07 THOUSAND/ÂΜL (ref 0–0.61)
EOSINOPHIL NFR BLD AUTO: 1 % (ref 0–6)
ERYTHROCYTE [DISTWIDTH] IN BLOOD BY AUTOMATED COUNT: 12.9 % (ref 11.6–15.1)
GFR SERPL CREATININE-BSD FRML MDRD: 94 ML/MIN/1.73SQ M
GLUCOSE SERPL-MCNC: 97 MG/DL (ref 65–140)
GLUCOSE UR STRIP-MCNC: NEGATIVE MG/DL
HCT VFR BLD AUTO: 41.6 % (ref 36.5–49.3)
HGB BLD-MCNC: 14.4 G/DL (ref 12–17)
HGB UR QL STRIP.AUTO: NEGATIVE
IMM GRANULOCYTES # BLD AUTO: 0.02 THOUSAND/UL (ref 0–0.2)
IMM GRANULOCYTES NFR BLD AUTO: 0 % (ref 0–2)
KETONES UR STRIP-MCNC: NEGATIVE MG/DL
LEUKOCYTE ESTERASE UR QL STRIP: NEGATIVE
LYMPHOCYTES # BLD AUTO: 1.27 THOUSANDS/ÂΜL (ref 0.6–4.47)
LYMPHOCYTES NFR BLD AUTO: 23 % (ref 14–44)
MCH RBC QN AUTO: 30.3 PG (ref 26.8–34.3)
MCHC RBC AUTO-ENTMCNC: 34.6 G/DL (ref 31.4–37.4)
MCV RBC AUTO: 88 FL (ref 82–98)
MONOCYTES # BLD AUTO: 0.54 THOUSAND/ÂΜL (ref 0.17–1.22)
MONOCYTES NFR BLD AUTO: 10 % (ref 4–12)
NEUTROPHILS # BLD AUTO: 3.72 THOUSANDS/ÂΜL (ref 1.85–7.62)
NEUTS SEG NFR BLD AUTO: 65 % (ref 43–75)
NITRITE UR QL STRIP: NEGATIVE
NRBC BLD AUTO-RTO: 0 /100 WBCS
PH UR STRIP.AUTO: 7 [PH]
PLATELET # BLD AUTO: 254 THOUSANDS/UL (ref 149–390)
PMV BLD AUTO: 9.5 FL (ref 8.9–12.7)
POTASSIUM SERPL-SCNC: 4.1 MMOL/L (ref 3.5–5.3)
PROT SERPL-MCNC: 6.8 G/DL (ref 6.4–8.4)
PROT UR STRIP-MCNC: NEGATIVE MG/DL
RBC # BLD AUTO: 4.75 MILLION/UL (ref 3.88–5.62)
SODIUM SERPL-SCNC: 134 MMOL/L (ref 135–147)
SP GR UR STRIP.AUTO: 1.01 (ref 1–1.03)
UROBILINOGEN UR STRIP-ACNC: <2 MG/DL
WBC # BLD AUTO: 5.65 THOUSAND/UL (ref 4.31–10.16)

## 2025-07-04 PROCEDURE — 80053 COMPREHEN METABOLIC PANEL: CPT

## 2025-07-04 PROCEDURE — 85025 COMPLETE CBC W/AUTO DIFF WBC: CPT

## 2025-07-04 PROCEDURE — 96361 HYDRATE IV INFUSION ADD-ON: CPT

## 2025-07-04 PROCEDURE — 74177 CT ABD & PELVIS W/CONTRAST: CPT

## 2025-07-04 PROCEDURE — 71045 X-RAY EXAM CHEST 1 VIEW: CPT

## 2025-07-04 PROCEDURE — 99284 EMERGENCY DEPT VISIT MOD MDM: CPT

## 2025-07-04 PROCEDURE — 36415 COLL VENOUS BLD VENIPUNCTURE: CPT

## 2025-07-04 PROCEDURE — 81003 URINALYSIS AUTO W/O SCOPE: CPT

## 2025-07-04 PROCEDURE — 99285 EMERGENCY DEPT VISIT HI MDM: CPT | Performed by: EMERGENCY MEDICINE

## 2025-07-04 PROCEDURE — 96374 THER/PROPH/DIAG INJ IV PUSH: CPT

## 2025-07-04 RX ORDER — LIDOCAINE 50 MG/G
1 PATCH TOPICAL ONCE
Status: DISCONTINUED | OUTPATIENT
Start: 2025-07-04 | End: 2025-07-04 | Stop reason: HOSPADM

## 2025-07-04 RX ORDER — KETOROLAC TROMETHAMINE 30 MG/ML
15 INJECTION, SOLUTION INTRAMUSCULAR; INTRAVENOUS ONCE
Status: COMPLETED | OUTPATIENT
Start: 2025-07-04 | End: 2025-07-04

## 2025-07-04 RX ADMIN — SODIUM CHLORIDE 1000 ML: 0.9 INJECTION, SOLUTION INTRAVENOUS at 16:04

## 2025-07-04 RX ADMIN — LIDOCAINE 1 PATCH: 700 PATCH TOPICAL at 16:44

## 2025-07-04 RX ADMIN — KETOROLAC TROMETHAMINE 15 MG: 30 INJECTION, SOLUTION INTRAMUSCULAR; INTRAVENOUS at 14:46

## 2025-07-04 RX ADMIN — IOHEXOL 70 ML: 350 INJECTION, SOLUTION INTRAVENOUS at 15:57

## 2025-07-04 NOTE — ED PROVIDER NOTES
Time reflects when diagnosis was documented in both MDM as applicable and the Disposition within this note       Time User Action Codes Description Comment    7/4/2025  4:54 PM Nahomy Obrien Add [M54.50] Acute left-sided low back pain without sciatica     7/4/2025  4:55 PM Nahomy Obrien Add [K29.70] Gastritis           ED Disposition       ED Disposition   Discharge    Condition   Stable    Date/Time   Fri Jul 4, 2025  4:54 PM    Comment   Oral Mendoza discharge to home/self care.                   Assessment & Plan       Medical Decision Making  70-year-old male with history of Parkinson's presents emergency department for evaluation of left flank pain, dysuria, and chills    Differential diagnosis includes was not limited to pyelonephritis, diverticulitis, nephrolithiasis, other infectious or metabolic etiology    ED course as below.        Amount and/or Complexity of Data Reviewed  Labs: ordered. Decision-making details documented in ED Course.  Radiology: ordered and independent interpretation performed.    Risk  Prescription drug management.        ED Course as of 07/05/25 2242   Fri Jul 04, 2025   1501 CBC and differential  No leukocytosis, hemoglobin stable.  Platelets within normal limits.   1518 Comprehensive metabolic panel(!)  Very mild hyponatremia, electrolytes otherwise within normal limits.  Renal function at patient's baseline, LFTs within normal limits.   1527 Patient reports improvement of his left back/flank pain after IV Toradol.   Sat Jul 05, 2025   2240 UA and CT negative. Patient's pain well controlled with IV Toradol and Lidocaine patch. Pain most likely MSK in etiology. Discussed with patient and his wife at bedside results of negative workup and plan to d/c home. Both were agreeable. Strict return precautions given. Patient discharged in stable condition.        Medications   ketorolac (TORADOL) injection 15 mg (15 mg Intravenous Given 7/4/25 1446)   sodium chloride 0.9 % bolus  1,000 mL (0 mL Intravenous Stopped 7/4/25 1658)   iohexol (OMNIPAQUE) 350 MG/ML injection (MULTI-DOSE) 70 mL (70 mL Intravenous Given 7/4/25 5257)       ED Risk Strat Scores                    No data recorded        SBIRT 22yo+      Flowsheet Row Most Recent Value   Initial Alcohol Screen: US AUDIT-C     1. How often do you have a drink containing alcohol? 0 Filed at: 07/04/2025 1430   2. How many drinks containing alcohol do you have on a typical day you are drinking?  0 Filed at: 07/04/2025 1430   3a. Male UNDER 65: How often do you have five or more drinks on one occasion? 0 Filed at: 07/04/2025 1430   3b. FEMALE Any Age, or MALE 65+: How often do you have 4 or more drinks on one occassion? 0 Filed at: 07/04/2025 1430   Audit-C Score 0 Filed at: 07/04/2025 1430   PEGGY: How many times in the past year have you...    Used an illegal drug or used a prescription medication for non-medical reasons? Never Filed at: 07/04/2025 1430                            History of Present Illness       Chief Complaint   Patient presents with    Back Pain     Sharp left/lower back pain that started several days ago. Pt also c/o increased brain fog.       Past Medical History[1]   Past Surgical History[2]   Family History[3]   Social History[4]   E-Cigarette/Vaping    E-Cigarette Use Never User       E-Cigarette/Vaping Substances    Nicotine No     THC No     CBD No     Flavoring No       I have reviewed and agree with the history as documented.     Patient is a 70-year-old male with history of renal cell carcinoma status post right nephrectomy as well as Parkinson's on carbidopa/levodopa presents to the emergency department due to nonradiating left lower back pain and chills for the past 2 days.  Also notes burning with urination but denies any blood in his urine or stool.  Denies any fevers, nausea, vomiting, abdominal pain, constipation, diarrhea, or recent trauma.      Back Pain  Associated symptoms: dysuria    Associated  symptoms: no fever        Review of Systems   Constitutional:  Positive for chills. Negative for fever.   Genitourinary:  Positive for dysuria and flank pain. Negative for hematuria.   Musculoskeletal:  Positive for back pain.   Psychiatric/Behavioral:  Positive for decreased concentration.            Objective       ED Triage Vitals [07/04/25 1429]   Temperature Pulse Blood Pressure Respirations SpO2 Patient Position - Orthostatic VS   97.7 °F (36.5 °C) 62 164/87 19 99 % --      Temp src Heart Rate Source BP Location FiO2 (%) Pain Score    -- -- -- -- 5      Vitals      Date and Time Temp Pulse SpO2 Resp BP Pain Score FACES Pain Rating User   07/04/25 1642 -- -- -- -- -- 5 -- [facility]   07/04/25 1641 -- 57 99 % 18 164/75 -- -- [facility]   07/04/25 1429 97.7 °F (36.5 °C) 62 99 % 19 164/87 5 -- [facility]            Physical Exam  Vitals and nursing note reviewed.   Constitutional:       General: He is not in acute distress.     Appearance: Normal appearance.   HENT:      Head: Normocephalic and atraumatic.     Eyes:      Extraocular Movements: Extraocular movements intact.      Conjunctiva/sclera: Conjunctivae normal.      Pupils: Pupils are equal, round, and reactive to light.       Cardiovascular:      Rate and Rhythm: Normal rate and regular rhythm.      Pulses: Normal pulses.      Heart sounds: Normal heart sounds. No murmur heard.  Pulmonary:      Effort: Pulmonary effort is normal. No respiratory distress.      Breath sounds: Normal breath sounds.   Abdominal:      General: There is no distension.      Palpations: Abdomen is soft.      Tenderness: There is no abdominal tenderness. There is no guarding or rebound.     Musculoskeletal:         General: Tenderness present. Normal range of motion.      Cervical back: Normal range of motion.      Right lower leg: No edema.      Left lower leg: No edema.      Comments: Left CVA tenderness     Skin:     General: Skin is warm and dry.      Capillary Refill: Capillary refill takes less  than 2 seconds.      Findings: No rash.     Neurological:      General: No focal deficit present.      Mental Status: He is alert and oriented to person, place, and time.      Sensory: No sensory deficit.      Comments: Slow to respond, baseline tremors worse in bilateral hands. No focal deficit.   Psychiatric:         Mood and Affect: Mood normal.         Behavior: Behavior normal.         Results Reviewed       Procedure Component Value Units Date/Time    UA w Reflex to Microscopic w Reflex to Culture [[id number]] Collected: 07/04/25 1527    Lab Status: Final result Specimen: Urine, Clean Catch Updated: 07/04/25 1627     Color, UA Light Yellow     Clarity, UA Clear     Specific Gravity, UA 1.009     pH, UA 7.0     Leukocytes, UA Negative     Nitrite, UA Negative     Protein, UA Negative mg/dl      Glucose, UA Negative mg/dl      Ketones, UA Negative mg/dl      Urobilinogen, UA <2.0 mg/dl      Bilirubin, UA Negative     Occult Blood, UA Negative    Comprehensive metabolic panel [[id number]]  (Abnormal) Collected: 07/04/25 1446    Lab Status: Final result Specimen: Blood from Arm, Left Updated: 07/04/25 1515     Sodium 134 mmol/L      Potassium 4.1 mmol/L      Chloride 101 mmol/L      CO2 27 mmol/L      ANION GAP 6 mmol/L      BUN 19 mg/dL      Creatinine 0.70 mg/dL      Glucose 97 mg/dL      Calcium 9.1 mg/dL      AST 22 U/L      ALT 14 U/L      Alkaline Phosphatase 62 U/L      Total Protein 6.8 g/dL      Albumin 4.6 g/dL      Total Bilirubin 0.74 mg/dL      eGFR 94 ml/min/1.73sq m     Narrative:      National Kid[employer] guidelines for Chronic Kidney Disease (CKD):     Stage 1 with normal or high GFR (GFR > 90 mL/min/1.73 square meters)    Stage 2 Mild CKD (GFR = 60-89 mL/min/1.73 square meters)    Stage 3A Moderate CKD (GFR = 45-59 mL/min/1.73 square meters)    Stage 3B Moderate CKD (GFR = 30-44 mL/min/1.73 square meters)    Stage 4 Severe CKD (GFR = 15-29 mL/min/1.73 square meters)    Stage 5 End  Stage CKD (GFR <15 mL/min/1.73 square meters)  Note: GFR calculation is accurate only with a steady state creatinine    CBC and differential [[id number]] Collected: 07/04/25 1446    Lab Status: Final result Specimen: Blood from Arm, Left Updated: 07/04/25 1454     WBC 5.65 Thousand/uL      RBC 4.75 Million/uL      Hemoglobin 14.4 g/dL      Hematocrit 41.6 %      MCV 88 fL      MCH 30.3 pg      MCHC 34.6 g/dL      RDW 12.9 %      MPV 9.5 fL      Platelets 254 Thousands/uL      nRBC 0 /100 WBCs      Segmented % 65 %      Immature Grans % 0 %      Lymphocytes % 23 %      Monocytes % 10 %      Eosinophils Relative 1 %      Basophils Relative 1 %      Absolute Neutrophils 3.72 Thousands/µL      Absolute Immature Grans 0.02 Thousand/uL      Absolute Lymphocytes 1.27 Thousands/µL      Absolute Monocytes 0.54 Thousand/µL      Eosinophils Absolute 0.07 Thousand/µL      Basophils Absolute 0.03 Thousands/µL             CT abdomen pelvis with contrast   Final Interpretation by Carolyn Cordova MD (07/04 1630)         1. Mild wall thickening of the gastric antrum and proximal duodenum versus under distention. Correlate for peptic ulcer disease.   2. Large amount of stool throughout the colon suggesting constipation.   3. Status post right nephrectomy with unremarkable left kidney.               Workstation performed: [id number]         CT recon only lumbar spine   Final Interpretation by Carolyn Cordova MD (07/04 1642)      No fracture or traumatic subluxation.   Lumbar spondylosis.            Workstation performed: [id number]         XR chest 1 view portable   ED Interpretation by Nahomy Obrien MD (07/04 1540)   Wet read: No effusion, no infiltrate, no pneumothorax.       Final Interpretation by Stephany Cheema MD (07/05 2019)      No acute cardiopulmonary disease.            Workstation performed: [id number]             Procedures    ED Medication and Procedure Management   Prior to Admission Medications    Prescriptions Last Dose Informant Patient Reported? Taking?   Amantadine HCl 100 MG tablet  Self, Spouse/Significant Other No No   Sig: Take 1 tablet (100 mg total) by mouth 2 (two) times a day   Docosahexaenoic Acid (DHA OMEGA 3) 100 MG CAPS  Self, Spouse/Significant Other Yes No   Sig: Take 4 capsules by mouth in the morning.   Multiple Vitamin tablet  Self, Spouse/Significant Other Yes No   Sig: Take 1 tablet by mouth in the morning.   acetaminophen (TYLENOL) 325 mg tablet  Self, Spouse/Significant Other No No   Sig: Take 2 tablets (650 mg total) by mouth every 6 (six) hours as needed for mild pain   amLODIPine (NORVASC) 5 mg tablet  Self, Spouse/Significant Other No No   Sig: Take 1 tablet (5 mg total) by mouth 2 (two) times a day   bisacodyl (DULCOLAX) 5 mg EC tablet   No No   Sig: Take 2 tablets (10 mg total) by mouth once for 1 dose   busPIRone (BUSPAR) 7.5 mg tablet  Self, Spouse/Significant Other No No   Sig: Take 1 tablet (7.5 mg total) by mouth 2 (two) times a day   carbidopa-levodopa (Sinemet)  mg per tablet  Self, Spouse/Significant Other No No   Sig: Take 1 tablet by mouth 3 (three) times a day   carvedilol (Coreg) 3.125 mg tablet  Self, Spouse/Significant Other No No   Sig: Take 1 tablet (3.125 mg total) by mouth 2 (two) times a day with meals   cycloSPORINE (RESTASIS) 0.05 % ophthalmic emulsion  Self, Spouse/Significant Other Yes No   Sig: Administer 1 drop to both eyes in the morning and 1 drop before bedtime.   docusate sodium (COLACE) 100 mg capsule  Self, Spouse/Significant Other No No   Sig: Take 1 capsule (100 mg total) by mouth 2 (two) times a day   fexofenadine (ALLEGRA) 180 MG tablet  Self, Spouse/Significant Other Yes No   Sig: Take 1 tablet by mouth daily as needed   gabapentin (Neurontin) 100 mg capsule  Self, Spouse/Significant Other No No   Sig: Take 1 capsule (100 mg total) by mouth daily at bedtime   lidocaine 0.5 % topical gel  Self, Spouse/Significant Other Yes No   Sig:  Apply 1 Application topically as needed (pain)   lubiprostone (AMITIZA) 24 mcg capsule  Self, Spouse/Significant Other No No   Sig: Take 1 capsule (24 mcg total) by mouth 2 (two) times a day with meals with food and water   polyethylene glycol (GLYCOLAX) 17 GM/SCOOP powder  Self, Spouse/Significant Other Yes No   Sig: Take 17 g by mouth as needed (constipation)   polyethylene glycol (GOLYTELY) 4000 mL solution   No No   Sig: Take 4,000 mL by mouth once for 1 dose   senna (SENOKOT) 8.6 mg  Self, Spouse/Significant Other No No   Sig: Take 1 tablet (8.6 mg total) by mouth daily as needed for constipation      Facility-Administered Medications: None     Discharge Medication List as of 7/4/2025  4:56 PM        CONTINUE these medications which have NOT CHANGED    Details   acetaminophen (TYLENOL) 325 mg tablet Take 2 tablets (650 mg total) by mouth every 6 (six) hours as needed for mild pain, Starting Fri 5/5/2023, No Print      Amantadine HCl 100 MG tablet Take 1 tablet (100 mg total) by mouth 2 (two) times a day, Starting Tue 6/10/2025, Normal      amLODIPine (NORVASC) 5 mg tablet Take 1 tablet (5 mg total) by mouth 2 (two) times a day, Starting Fri 3/14/2025, Normal      bisacodyl (DULCOLAX) 5 mg EC tablet Take 2 tablets (10 mg total) by mouth once for 1 dose, Starting Wed 6/11/2025, Normal      busPIRone (BUSPAR) 7.5 mg tablet Take 1 tablet (7.5 mg total) by mouth 2 (two) times a day, Starting Fri 4/11/2025, Normal      carbidopa-levodopa (Sinemet)  mg per tablet Take 1 tablet by mouth 3 (three) times a day, Starting Tue 12/17/2024, Normal      carvedilol (Coreg) 3.125 mg tablet Take 1 tablet (3.125 mg total) by mouth 2 (two) times a day with meals, Starting Fri 4/11/2025, Normal      cycloSPORINE (RESTASIS) 0.05 % ophthalmic emulsion Administer 1 drop to both eyes in the morning and 1 drop before bedtime., Starting Fri 11/3/2023, Historical Med      Docosahexaenoic Acid (DHA OMEGA 3) 100 MG CAPS Take 4 capsules  by mouth in the morning., Historical Med      docusate sodium (COLACE) 100 mg capsule Take 1 capsule (100 mg total) by mouth 2 (two) times a day, Starting Fri 6/2/2023, Normal      fexofenadine (ALLEGRA) 180 MG tablet Take 1 tablet by mouth daily as needed, Starting Tue 6/10/2014, Historical Med      gabapentin (Neurontin) 100 mg capsule Take 1 capsule (100 mg total) by mouth daily at bedtime, Starting Thu 2/20/2025, Normal      lidocaine 0.5 % topical gel Apply 1 Application topically as needed (pain), Historical Med      lubiprostone (AMITIZA) 24 mcg capsule Take 1 capsule (24 mcg total) by mouth 2 (two) times a day with meals with food and water, Starting Fri 5/30/2025, Normal      Multiple Vitamin tablet Take 1 tablet by mouth in the morning., Historical Med      polyethylene glycol (GLYCOLAX) 17 GM/SCOOP powder Take 17 g by mouth as needed (constipation), Historical Med      polyethylene glycol (GOLYTELY) 4000 mL solution Take 4,000 mL by mouth once for 1 dose, Starting Wed 6/11/2025, Normal      senna (SENOKOT) 8.6 mg Take 1 tablet (8.6 mg total) by mouth daily as needed for constipation, Starting Thu 3/7/2024, Normal             ED SEPSIS DOCUMENTATION   Time reflects when diagnosis was documented in both MDM as applicable and the Disposition within this note       Time User Action Codes Description Comment    7/4/2025  4:54 PM Nahomy Obrien Add [M54.50] Acute left-sided low back pain without sciatica     7/4/2025  4:55 PM Nahomy Obrien Add [K29.70] Gastritis                    [1]   Past Medical History:  Diagnosis Date    Acute deep vein thrombosis (DVT) of brachial vein of right upper extremity (HCC) 07/16/2023    Allergic     Arthritis     Asthma     Cancer (HCC)     kidney    Chronic kidney disease 4/2023    renal cancer    Hypertension     Impaired fasting glucose     Mitral valve disorder     Mitral valve prolapse     Murmur, cardiac     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      Onychomycosis 5 years ago    used topical solution that did not work.    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     PVC (premature ventricular contraction)     PVC (premature ventricular contraction)     Renal cancer (HCC) 2023    Thyroid nodule    [2]   Past Surgical History:  Procedure Laterality Date    ABDOMINAL SURGERY  nephrectomy - rt kidney    9/23    COLONOSCOPY  2008    HAND SURGERY      AK COLONOSCOPY FLX DX W/COLLJ SPEC WHEN PFRMD N/A 02/09/2018    Procedure: COLONOSCOPY;  Surgeon: Joe Pabon MD;  Location: [facility];  Service: Gastroenterology    AK LAPAROSCOPY RADICAL NEPHRECTOMY Right 09/20/2023    Procedure: NEPHRECTOMY RADICAL LAPAROSCOPIC W/ ROBOTICS;  Surgeon: Dario Domingo MD;  Location: [facility];  Service: Urology    PROSTATE BIOPSY      SHOULDER SURGERY      TONSILLECTOMY  ???    childhood   [3]   Family History  Problem Relation Name Age of Onset    Diabetes Mother Mom     Stroke Mother Mom     Hypertension Mother Mom     Stroke Father Dad     Heart disease Father Dad     Hypertension Father Dad     Diabetes Sister Sister     Hypertension Sister Sister     Heart disease Family Dad    [4]   Social History  Tobacco Use    Smoking status: Never     Passive exposure: Never    Smokeless tobacco: Never   Vaping Use    Vaping status: Never Used   Substance Use Topics    Alcohol use: Yes     Alcohol/week: 1.0 standard drink of alcohol     Types: 1 Glasses of wine per week     Comment: 4 oz wine with dinner a few days a week    Drug use: No        Nahomy Obrien MD  07/05/25 3369

## 2025-07-04 NOTE — ED ATTENDING ATTESTATION
7/4/2025  I, Nacho Liz DO, saw and evaluated the patient. I have discussed the patient with the resident/non-physician practitioner and agree with the resident's/non-physician practitioner's findings, Plan of Care, and MDM as documented in the resident's/non-physician practitioner's note, except where noted. All available labs and Radiology studies were reviewed.  I was present for key portions of any procedure(s) performed by the resident/non-physician practitioner and I was immediately available to provide assistance.       At this point I agree with the current assessment done in the Emergency Department.  I have conducted an independent evaluation of this patient a history and physical is as follows:    Patient is a 70-year-old male with history of renal carcinoma status post nephrectomy, Parkinson's disease, pancreatitis who presents with back pain.  Patient states that he has had left lower back pain for the last couple of days.  He denies any specific injury.  He states that it is exacerbated with certain movements of his back and lower extremities.  He denies any abdominal pain, nausea, vomiting, diarrhea.  He admits to constipation at baseline.  He does admit to dysuria for the last couple of days as well.  He admits to chills but denies fever.    On exam, patient is in no acute distress.  Heart is regular rate and rhythm.  Breath sounds normal.  Abdomen is soft, nontender, nondistended.  No rebound or guarding.  No midline tenderness to palpation in the lumbar spine.  No CVA tenderness, but patient does describe pain in that area.  It is just not reproducible to palpation.  Patient has no back pain with passive range of motion of lower extremities but does have left lower back pain with active elevation of his left lower extremity.    Labs fairly unremarkable.  Urinalysis negative.  Do not suspect cystitis or pyelonephritis.  Imaging reveals possible gastritis/duodenitis.  There is also  "constipation but no other acute findings.  CT lumbar spine with no fracture or traumatic subluxation.  Patient is feeling better after Toradol.  Will discharge with symptomatic treatment for musculoskeletal back pain.  Patient is well-appearing and appropriate for discharge at this time.  Discussed return to ED if symptoms worsen or he develops numbness, tingling, weakness in extremities or bowel or bladder dysfunction.    Portions of the above record have been created with voice recognition software.  Occasional wrong word or \"sound alike\" substitutions may have occurred due to the inherent limitations of voice recognition software.  Read the chart carefully and recognize, using context, where substitutions may have occurred.      ED Course         Critical Care Time  Procedures      "

## 2025-07-04 NOTE — DISCHARGE INSTRUCTIONS
Please follow up with Dr. Pabon regarding your CT findings suggestive of peptic ulcer disease/gastritis.

## 2025-08-01 ENCOUNTER — TELEPHONE (OUTPATIENT)
Age: 70
End: 2025-08-01

## 2025-08-15 ENCOUNTER — OFFICE VISIT (OUTPATIENT)
Dept: INTERNAL MEDICINE CLINIC | Facility: CLINIC | Age: 70
End: 2025-08-15
Payer: COMMERCIAL

## 2025-08-15 PROBLEM — L30.9 DERMATITIS: Status: ACTIVE | Noted: 2025-08-15

## (undated) DEVICE — THE ECHELON, ECHELON ENDOPATH™ AND ECHELON FLEX™ FAMILIES OF ENDOSCOPIC LINEAR CUTTERS AND RELOADS ARE STERILE, SINGLE PATIENT USE INSTRUMENTS THAT SIMULTANEOUSLY CUT AND STAPLE TISSUE. THERE ARE SIX STAGGERED ROWS OF STAPLES, THREE ON EITHER SIDE OF THE CUT LINE. THE 45 MM INSTRUMENTS HAVE A STAPLE LINE THATIS APPROXIMATELY 45 MM LONG AND A CUT LINE THAT IS APPROXIMATELY 42 MM LONG. THE SHAFT CAN ROTATE FREELY IN BOTH DIRECTIONS AND AN ARTICULATION MECHANISM ON ARTICULATING INSTRUMENTS ENABLES BENDING THE DISTAL PORTIONOF THE SHAFT TO FACILITATE LATERAL ACCESS OF THE OPERATIVE SITE.THE INSTRUMENTS ARE SHIPPED WITHOUT A RELOAD AND MUST BE LOADED PRIOR TO USE. A STAPLE RETAINING CAP ON THE RELOAD PROTECTS THE STAPLE LEG POINTS DURING SHIPPING AND TRANSPORTATION. THE INSTRUMENTS’ LOCK-OUT FEATURE IS DESIGNED TO PREVENT A USED RELOAD FROM BEING REFIRED.: Brand: ECHELON ENDOPATH

## (undated) DEVICE — TIP COVER ACCESSORY

## (undated) DEVICE — CANNULA SEAL

## (undated) DEVICE — GLOVE SRG BIOGEL ECLIPSE 7.5

## (undated) DEVICE — ALL PURPOSE SPONGES,NONWOVEN, 4 PLY: Brand: CURITY

## (undated) DEVICE — AIRSEAL TUBE SMOKE EVAC LUMENX3 FILTERED

## (undated) DEVICE — SUREFORM 45 RELOAD WHITE: Brand: SUREFORM

## (undated) DEVICE — SUT MONOCRYL 4-0 PS-2 27 IN Y426H

## (undated) DEVICE — SUT PDS II 2-0 CT-1 27 IN Z339H

## (undated) DEVICE — THE ECHELON FLEX POWERED PLUS ARTICULATING ENDOSCOPIC LINEAR CUTTERS ARE STERILE, SINGLE PATIENT USE INSTRUMENTS THAT SIMULTANEOUSLYCUT AND STAPLE TISSUE. THERE ARE SIX STAGGERED ROWS OF STAPLES, THREE ON EITHER SIDE OF THE CUT LINE. THE ECHELON FLEX 45 POWERED PLUSINSTRUMENTS HAVE A STAPLE LINE THAT IS APPROXIMATELY 45 MM LONG AND A CUT LINE THAT IS APPROXIMATELY 42 MM LONG. THE SHAFT CAN ROTATE FREELYIN BOTH DIRECTIONS AND AN ARTICULATION MECHANISM ENABLES THE DISTAL PORTION OF THE SHAFT TO PIVOT TO FACILITATE LATERAL ACCESS TO THE OPERATIVESITE.THE INSTRUMENTS ARE PACKAGED WITH A PRIMARY LITHIUM BATTERY PACK THAT MUST BE INSTALLED PRIOR TO USE. THERE ARE SPECIFIC REQUIREMENTS FORDISPOSING OF THE BATTERY PACK. REFER TO THE BATTERY PACK DISPOSAL SECTION.THE INSTRUMENTS ARE PACKAGED WITHOUT A RELOAD AND MUST BE LOADED PRIOR TO USE. A STAPLE RETAINING CAP ON THE RELOAD PROTECTS THE STAPLE LEGPOINTS DURING SHIPPING AND TRANSPORTATION. THE INSTRUMENTS’ LOCK-OUT FEATURE IS DESIGNED TO PREVENT A USED OR IMPROPERLY INSTALLED RELOADFROM BEING REFIRED OR AN INSTRUMENT FROM BEING FIRED WITHOUT A RELOAD.: Brand: ECHELON FLEX

## (undated) DEVICE — SYRINGE 10ML LL

## (undated) DEVICE — INTENDED FOR TISSUE SEPARATION, AND OTHER PROCEDURES THAT REQUIRE A SHARP SURGICAL BLADE TO PUNCTURE OR CUT.: Brand: BARD-PARKER SAFETY BLADES SIZE 15, STERILE

## (undated) DEVICE — BETHLEHEM MAJOR GENERAL PACK: Brand: CARDINAL HEALTH

## (undated) DEVICE — SUT ETHILON 3-0 FS-1 18 IN 663G

## (undated) DEVICE — IRRIG ENDO FLO TUBING

## (undated) DEVICE — HEMOSTATIC MATRIX SURGIFLO 8ML W/THROMBIN

## (undated) DEVICE — TISSUE RETRIEVAL SYSTEM: Brand: INZII RETRIEVAL SYSTEM

## (undated) DEVICE — TROCAR: Brand: KII SLEEVE

## (undated) DEVICE — ADHESIVE SKIN HIGH VISCOSITY EXOFIN 1ML

## (undated) DEVICE — ELECTRO LUBE IS A SINGLE PATIENT USE DEVICE THAT IS INTENDED TO BE USED ON ELECTROSURGICAL ELECTRODES TO REDUCE STICKING.: Brand: KEY SURGICAL ELECTRO LUBE

## (undated) DEVICE — SURGICEL 4 X 8

## (undated) DEVICE — PROGRASP FORCEPS: Brand: ENDOWRIST

## (undated) DEVICE — SUT PDS II 0 CT-2 27 IN Z334H

## (undated) DEVICE — INSUFFLATION NEEDLE TO ESTABLISH PNEUMOPERITONEUM.: Brand: INSUFFLATION NEEDLE

## (undated) DEVICE — ARM DRAPE

## (undated) DEVICE — HEM-O-LOK CLIP CARTRIDGE LARGE DA VINCI SI/XI

## (undated) DEVICE — INTENDED FOR TISSUE SEPARATION, AND OTHER PROCEDURES THAT REQUIRE A SHARP SURGICAL BLADE TO PUNCTURE OR CUT.: Brand: BARD-PARKER SAFETY BLADES SIZE 11, STERILE

## (undated) DEVICE — PENCIL ELECTROSURG E-Z CLEAN -0035H

## (undated) DEVICE — VISUALIZATION SYSTEM: Brand: CLEARIFY

## (undated) DEVICE — DRAPE SHEET X-LG

## (undated) DEVICE — HEAVY DUTY TABLE COVER: Brand: CONVERTORS

## (undated) DEVICE — COLUMN DRAPE

## (undated) DEVICE — 3M™ IOBAN™ 2 ANTIMICROBIAL INCISE DRAPE 6650EZ: Brand: IOBAN™ 2

## (undated) DEVICE — STAPLER CANNULA SEAL: Brand: ENDOWRIST

## (undated) DEVICE — TROCAR PORT ACCESS 12 X120MM W/BLDLS OPTICAL TIP AIRSEAL

## (undated) DEVICE — MONOPOLAR CURVED SCISSORS: Brand: ENDOWRIST

## (undated) DEVICE — VESSEL SEALER EXTEND: Brand: ENDOWRIST

## (undated) DEVICE — NEEDLE 25G X 1 1/2

## (undated) DEVICE — FENESTRATED BIPOLAR FORCEPS: Brand: ENDOWRIST

## (undated) DEVICE — GLOVE INDICATOR PI UNDERGLOVE SZ 8 BLUE

## (undated) DEVICE — SUT VLOC 90 3-0 V-20 9IN VLOCM0644

## (undated) DEVICE — TRAY FOLEY 16FR URIMETER SURESTEP

## (undated) DEVICE — CLAMP TOWEL TUBING DISPOSABLE